# Patient Record
Sex: FEMALE | Race: WHITE | NOT HISPANIC OR LATINO | Employment: OTHER | URBAN - METROPOLITAN AREA
[De-identification: names, ages, dates, MRNs, and addresses within clinical notes are randomized per-mention and may not be internally consistent; named-entity substitution may affect disease eponyms.]

---

## 2017-02-03 ENCOUNTER — OFFICE VISIT (OUTPATIENT)
Dept: FAMILY MEDICINE | Facility: CLINIC | Age: 62
End: 2017-02-03
Payer: COMMERCIAL

## 2017-02-03 VITALS
OXYGEN SATURATION: 97 % | TEMPERATURE: 98 F | BODY MASS INDEX: 22.81 KG/M2 | WEIGHT: 128.75 LBS | DIASTOLIC BLOOD PRESSURE: 62 MMHG | SYSTOLIC BLOOD PRESSURE: 114 MMHG | HEART RATE: 82 BPM | HEIGHT: 63 IN

## 2017-02-03 DIAGNOSIS — J20.9 ACUTE BRONCHITIS, UNSPECIFIED ORGANISM: Primary | ICD-10-CM

## 2017-02-03 DIAGNOSIS — R05.3 PERSISTENT COUGH: ICD-10-CM

## 2017-02-03 DIAGNOSIS — R06.2 WHEEZING: ICD-10-CM

## 2017-02-03 PROCEDURE — 3078F DIAST BP <80 MM HG: CPT | Mod: S$GLB,,, | Performed by: FAMILY MEDICINE

## 2017-02-03 PROCEDURE — 99214 OFFICE O/P EST MOD 30 MIN: CPT | Mod: S$GLB,,, | Performed by: FAMILY MEDICINE

## 2017-02-03 PROCEDURE — 99999 PR PBB SHADOW E&M-EST. PATIENT-LVL III: CPT | Mod: PBBFAC,,, | Performed by: FAMILY MEDICINE

## 2017-02-03 PROCEDURE — 3074F SYST BP LT 130 MM HG: CPT | Mod: S$GLB,,, | Performed by: FAMILY MEDICINE

## 2017-02-03 RX ORDER — ALBUTEROL SULFATE 90 UG/1
2 AEROSOL, METERED RESPIRATORY (INHALATION) 4 TIMES DAILY
Qty: 1 INHALER | Refills: 1 | Status: SHIPPED | OUTPATIENT
Start: 2017-02-03 | End: 2017-04-11 | Stop reason: ALTCHOICE

## 2017-02-03 RX ORDER — HYDROCODONE POLISTIREX AND CHLORPHENIRAMINE POLISTIREX 10; 8 MG/5ML; MG/5ML
5 SUSPENSION, EXTENDED RELEASE ORAL EVERY 12 HOURS PRN
Qty: 115 ML | Refills: 0 | Status: SHIPPED | OUTPATIENT
Start: 2017-02-03 | End: 2017-02-13

## 2017-02-03 RX ORDER — ALBUTEROL SULFATE 0.83 MG/ML
2.5 SOLUTION RESPIRATORY (INHALATION) EVERY 6 HOURS PRN
Qty: 1 BOX | Refills: 0 | Status: SHIPPED | OUTPATIENT
Start: 2017-02-03 | End: 2017-03-06

## 2017-02-03 RX ORDER — AZITHROMYCIN 250 MG/1
TABLET, FILM COATED ORAL
Qty: 6 TABLET | Refills: 0 | Status: SHIPPED | OUTPATIENT
Start: 2017-02-03 | End: 2017-03-06 | Stop reason: ALTCHOICE

## 2017-02-03 NOTE — PROGRESS NOTES
Subjective:       Patient ID: Aysha Moore is a 61 y.o. female.    Chief Complaint: Cough    Cough   This is a new problem. The current episode started 1 to 4 weeks ago. The problem has been gradually worsening. The cough is productive of purulent sputum. Associated symptoms include headaches and shortness of breath. Pertinent negatives include no chest pain, fever or rash. She has tried a beta-agonist inhaler for the symptoms. The treatment provided mild relief.     Review of Systems   Constitutional: Negative for fever.   Respiratory: Positive for cough and shortness of breath.    Cardiovascular: Negative for chest pain.   Gastrointestinal: Positive for diarrhea (resolved now). Negative for abdominal pain and nausea.   Skin: Negative for rash.   Neurological: Positive for headaches.   All other systems reviewed and are negative.      Objective:      Physical Exam   Constitutional: She appears well-developed. No distress.   HENT:   Right Ear: Tympanic membrane is not erythematous.   Left Ear: Tympanic membrane is not erythematous.   Nose: Mucosal edema present. Right sinus exhibits no maxillary sinus tenderness. Left sinus exhibits no maxillary sinus tenderness.   Mouth/Throat: Posterior oropharyngeal erythema present.   Neck: Neck supple.   Cardiovascular: Normal rate and regular rhythm.    No murmur heard.  Pulmonary/Chest: Effort normal. She has wheezes.   Lymphadenopathy:     She has no cervical adenopathy.       Assessment:       1. Acute bronchitis, unspecified organism    2. Wheezing    3. Persistent cough        Plan:         Aysha was seen today for cough.    Diagnoses and all orders for this visit:    Acute bronchitis, unspecified organism  -     azithromycin (Z-ROYAL) 250 MG tablet; As directed on pack  -     albuterol 90 mcg/actuation inhaler; Inhale 2 puffs into the lungs 4 (four) times daily.  -     albuterol (PROVENTIL) 2.5 mg /3 mL (0.083 %) nebulizer solution; Take 3 mLs (2.5 mg total) by  nebulization every 6 (six) hours as needed for Wheezing. Rescue  -     hydrocodone-chlorpheniramine (TUSSIONEX) 10-8 mg/5 mL suspension; Take 5 mLs by mouth every 12 (twelve) hours as needed.    Wheezing  -     azithromycin (Z-ROYAL) 250 MG tablet; As directed on pack  -     albuterol 90 mcg/actuation inhaler; Inhale 2 puffs into the lungs 4 (four) times daily.  -     albuterol (PROVENTIL) 2.5 mg /3 mL (0.083 %) nebulizer solution; Take 3 mLs (2.5 mg total) by nebulization every 6 (six) hours as needed for Wheezing. Rescue  -     hydrocodone-chlorpheniramine (TUSSIONEX) 10-8 mg/5 mL suspension; Take 5 mLs by mouth every 12 (twelve) hours as needed.    Persistent cough  -     azithromycin (Z-ROYAL) 250 MG tablet; As directed on pack  -     albuterol 90 mcg/actuation inhaler; Inhale 2 puffs into the lungs 4 (four) times daily.  -     albuterol (PROVENTIL) 2.5 mg /3 mL (0.083 %) nebulizer solution; Take 3 mLs (2.5 mg total) by nebulization every 6 (six) hours as needed for Wheezing. Rescue  -     hydrocodone-chlorpheniramine (TUSSIONEX) 10-8 mg/5 mL suspension; Take 5 mLs by mouth every 12 (twelve) hours as needed.

## 2017-02-24 ENCOUNTER — TELEPHONE (OUTPATIENT)
Dept: FAMILY MEDICINE | Facility: CLINIC | Age: 62
End: 2017-02-24

## 2017-02-24 NOTE — TELEPHONE ENCOUNTER
----- Message from Dana Murphy sent at 2/24/2017 10:46 AM CST -----  Contact: self [phone]  Please call her regarding a prescription she would like for you to order for her.  Thank you!

## 2017-03-06 ENCOUNTER — DOCUMENTATION ONLY (OUTPATIENT)
Dept: FAMILY MEDICINE | Facility: CLINIC | Age: 62
End: 2017-03-06

## 2017-03-06 ENCOUNTER — OFFICE VISIT (OUTPATIENT)
Dept: FAMILY MEDICINE | Facility: CLINIC | Age: 62
End: 2017-03-06
Payer: COMMERCIAL

## 2017-03-06 VITALS
HEIGHT: 63 IN | TEMPERATURE: 98 F | DIASTOLIC BLOOD PRESSURE: 60 MMHG | OXYGEN SATURATION: 96 % | BODY MASS INDEX: 22.46 KG/M2 | WEIGHT: 126.75 LBS | HEART RATE: 89 BPM | SYSTOLIC BLOOD PRESSURE: 109 MMHG

## 2017-03-06 DIAGNOSIS — J45.909 BRONCHITIS WITH ASTHMA, ACUTE: Primary | ICD-10-CM

## 2017-03-06 DIAGNOSIS — J20.9 BRONCHITIS WITH ASTHMA, ACUTE: Primary | ICD-10-CM

## 2017-03-06 PROCEDURE — 3078F DIAST BP <80 MM HG: CPT | Mod: S$GLB,,, | Performed by: PHYSICIAN ASSISTANT

## 2017-03-06 PROCEDURE — 94640 AIRWAY INHALATION TREATMENT: CPT | Mod: 51,S$GLB,, | Performed by: FAMILY MEDICINE

## 2017-03-06 PROCEDURE — 96372 THER/PROPH/DIAG INJ SC/IM: CPT | Mod: 59,S$GLB,, | Performed by: FAMILY MEDICINE

## 2017-03-06 PROCEDURE — 99213 OFFICE O/P EST LOW 20 MIN: CPT | Mod: 25,S$GLB,, | Performed by: PHYSICIAN ASSISTANT

## 2017-03-06 PROCEDURE — 94642 AEROSOL INHALATION TREATMENT: CPT | Mod: S$GLB,,, | Performed by: PHYSICIAN ASSISTANT

## 2017-03-06 PROCEDURE — 99999 PR PBB SHADOW E&M-EST. PATIENT-LVL III: CPT | Mod: PBBFAC,,, | Performed by: PHYSICIAN ASSISTANT

## 2017-03-06 PROCEDURE — 3074F SYST BP LT 130 MM HG: CPT | Mod: S$GLB,,, | Performed by: PHYSICIAN ASSISTANT

## 2017-03-06 PROCEDURE — 1160F RVW MEDS BY RX/DR IN RCRD: CPT | Mod: S$GLB,,, | Performed by: PHYSICIAN ASSISTANT

## 2017-03-06 RX ORDER — PREDNISONE 10 MG/1
TABLET ORAL
Qty: 12 TABLET | Refills: 0 | Status: SHIPPED | OUTPATIENT
Start: 2017-03-06 | End: 2017-04-11 | Stop reason: ALTCHOICE

## 2017-03-06 RX ORDER — IPRATROPIUM BROMIDE AND ALBUTEROL SULFATE 2.5; .5 MG/3ML; MG/3ML
3 SOLUTION RESPIRATORY (INHALATION)
Status: COMPLETED | OUTPATIENT
Start: 2017-03-06 | End: 2017-03-06

## 2017-03-06 RX ORDER — IPRATROPIUM BROMIDE AND ALBUTEROL SULFATE 2.5; .5 MG/3ML; MG/3ML
3 SOLUTION RESPIRATORY (INHALATION) EVERY 6 HOURS PRN
Qty: 72 ML | Refills: 0 | Status: SHIPPED | OUTPATIENT
Start: 2017-03-06 | End: 2017-04-11 | Stop reason: ALTCHOICE

## 2017-03-06 RX ORDER — AMOXICILLIN AND CLAVULANATE POTASSIUM 875; 125 MG/1; MG/1
1 TABLET, FILM COATED ORAL 2 TIMES DAILY
Qty: 20 TABLET | Refills: 0 | Status: SHIPPED | OUTPATIENT
Start: 2017-03-06 | End: 2017-03-16

## 2017-03-06 RX ADMIN — IPRATROPIUM BROMIDE AND ALBUTEROL SULFATE 3 ML: 2.5; .5 SOLUTION RESPIRATORY (INHALATION) at 04:03

## 2017-03-06 NOTE — PROGRESS NOTES
Subjective:       Patient ID: Aysha Moore is a 61 y.o. female.    Chief Complaint: Cough; Shortness of Breath; and Wheezing    Wheezing    This is a new problem. The current episode started yesterday. The problem occurs constantly. The problem has been gradually worsening. Associated symptoms include chills, coughing, rhinorrhea, shortness of breath, a sore throat and sputum production. Pertinent negatives include no abdominal pain, chest pain, coryza, diarrhea, ear pain, fever, headaches, neck pain, swollen glands or vomiting. The symptoms are aggravated by URIs. She has tried OTC cough suppressant for the symptoms. The treatment provided no relief. There is no history of asthma.     Review of Systems   Constitutional: Positive for chills. Negative for activity change, appetite change, fatigue and fever.   HENT: Positive for congestion, postnasal drip, rhinorrhea and sore throat. Negative for ear discharge, ear pain, hearing loss, nosebleeds, trouble swallowing and voice change.    Eyes: Negative for discharge, redness and visual disturbance.   Respiratory: Positive for cough, sputum production, shortness of breath and wheezing. Negative for chest tightness.    Cardiovascular: Negative for chest pain and leg swelling.   Gastrointestinal: Negative.  Negative for abdominal pain, diarrhea and vomiting.   Musculoskeletal: Negative for neck pain.   Neurological: Negative for headaches.       Objective:      Physical Exam   Constitutional: She appears well-developed and well-nourished. No distress.   HENT:   Head: Normocephalic and atraumatic.   Right Ear: External ear normal.   Left Ear: External ear normal.   Mouth/Throat: Uvula is midline, oropharynx is clear and moist and mucous membranes are normal. No oral lesions. No uvula swelling. No oropharyngeal exudate, posterior oropharyngeal edema or posterior oropharyngeal erythema.   Eyes: Conjunctivae and EOM are normal. Pupils are equal, round, and reactive to  light.   Neck: Normal range of motion. Neck supple. No thyromegaly present.   Cardiovascular: Normal rate, regular rhythm and normal heart sounds.  Exam reveals no gallop and no friction rub.    No murmur heard.  Pulmonary/Chest: Effort normal. No respiratory distress. She has wheezes in the right upper field, the right middle field, the right lower field, the left upper field, the left middle field and the left lower field. She has no rales.   Abdominal: Soft. Bowel sounds are normal. There is no tenderness.   Skin: She is not diaphoretic.       Assessment:       1. Bronchitis with asthma, acute        Plan:       Asyha was seen today for cough, shortness of breath and wheezing.    Diagnoses and all orders for this visit:    Bronchitis with asthma, acute  -     Discontinue: methylPREDNISolone sod suc(PF) 125 mg/2 mL injection 125 mg; Inject 125 mg into the vein one time.  -     albuterol-ipratropium 2.5mg-0.5mg/3mL nebulizer solution 3 mL; Take 3 mLs by nebulization one time.  -     methylPREDNISolone sod suc(PF) 125 mg/2 mL injection 125 mg; Inject 125 mg into the muscle one time.  -     albuterol-ipratropium 2.5mg-0.5mg/3mL (DUO-NEB) 0.5 mg-3 mg(2.5 mg base)/3 mL nebulizer solution; Take 3 mLs by nebulization every 6 (six) hours as needed for Wheezing. Rescue  -     amoxicillin-clavulanate 875-125mg (AUGMENTIN) 875-125 mg per tablet; Take 1 tablet by mouth 2 (two) times daily.  -     predniSONE (DELTASONE) 10 MG tablet; Take 2 pills a day for 3 days then 1 pill a day for 3 days

## 2017-03-06 NOTE — PROGRESS NOTES
Pre-Visit Chart Review  For Appointment Scheduled on 03/06/2017      Health Maintenance Due   Topic Date Due    Colonoscopy  11/29/2012    Influenza Vaccine  08/01/2016    Lipid Panel  10/16/2016

## 2017-03-07 DIAGNOSIS — F32.A DEPRESSION: ICD-10-CM

## 2017-03-07 RX ORDER — CITALOPRAM 20 MG/1
TABLET, FILM COATED ORAL
Qty: 30 TABLET | Refills: 2 | Status: SHIPPED | OUTPATIENT
Start: 2017-03-07 | End: 2017-12-21 | Stop reason: SDUPTHER

## 2017-04-07 ENCOUNTER — DOCUMENTATION ONLY (OUTPATIENT)
Dept: FAMILY MEDICINE | Facility: CLINIC | Age: 62
End: 2017-04-07

## 2017-04-07 NOTE — PROGRESS NOTES
Pre-Visit Chart Review  For Appointment Scheduled on 4-11-17    Health Maintenance Due   Topic Date Due    TETANUS VACCINE  08/20/1973    Colonoscopy  11/29/2012    Influenza Vaccine  08/01/2016    Lipid Panel  10/16/2016

## 2017-04-11 ENCOUNTER — OFFICE VISIT (OUTPATIENT)
Dept: FAMILY MEDICINE | Facility: CLINIC | Age: 62
End: 2017-04-11
Payer: COMMERCIAL

## 2017-04-11 VITALS
OXYGEN SATURATION: 97 % | TEMPERATURE: 98 F | HEIGHT: 63 IN | BODY MASS INDEX: 23.48 KG/M2 | WEIGHT: 132.5 LBS | RESPIRATION RATE: 16 BRPM | SYSTOLIC BLOOD PRESSURE: 123 MMHG | DIASTOLIC BLOOD PRESSURE: 73 MMHG | HEART RATE: 73 BPM

## 2017-04-11 DIAGNOSIS — E78.5 HYPERLIPIDEMIA, UNSPECIFIED HYPERLIPIDEMIA TYPE: ICD-10-CM

## 2017-04-11 DIAGNOSIS — Z12.11 COLON CANCER SCREENING: ICD-10-CM

## 2017-04-11 DIAGNOSIS — F41.9 ANXIETY: ICD-10-CM

## 2017-04-11 DIAGNOSIS — I25.83 CORONARY ARTERY DISEASE DUE TO LIPID RICH PLAQUE: ICD-10-CM

## 2017-04-11 DIAGNOSIS — I10 GOOD HYPERTENSION CONTROL: ICD-10-CM

## 2017-04-11 DIAGNOSIS — Z00.00 ANNUAL PHYSICAL EXAM: Primary | ICD-10-CM

## 2017-04-11 DIAGNOSIS — I25.10 CORONARY ARTERY DISEASE DUE TO LIPID RICH PLAQUE: ICD-10-CM

## 2017-04-11 PROCEDURE — 99396 PREV VISIT EST AGE 40-64: CPT | Mod: S$GLB,,, | Performed by: FAMILY MEDICINE

## 2017-04-11 PROCEDURE — 3078F DIAST BP <80 MM HG: CPT | Mod: S$GLB,,, | Performed by: FAMILY MEDICINE

## 2017-04-11 PROCEDURE — 3074F SYST BP LT 130 MM HG: CPT | Mod: S$GLB,,, | Performed by: FAMILY MEDICINE

## 2017-04-11 PROCEDURE — 99999 PR PBB SHADOW E&M-EST. PATIENT-LVL III: CPT | Mod: PBBFAC,,, | Performed by: FAMILY MEDICINE

## 2017-04-11 RX ORDER — IBUPROFEN 100 MG/5ML
1000 SUSPENSION, ORAL (FINAL DOSE FORM) ORAL DAILY PRN
COMMUNITY
End: 2019-05-28 | Stop reason: ALTCHOICE

## 2017-04-11 RX ORDER — ZINC GLUCONATE 50 MG
50 TABLET ORAL DAILY
COMMUNITY
End: 2017-12-20

## 2017-04-11 NOTE — PROGRESS NOTES
Subjective:       Patient ID: Aysha Moore is a 61 y.o. female.    Chief Complaint: Annual Exam    HPI Comments: 61 year old female in for annual exam.  She was ill earlier this year just before leaving on a cruise and was treated with a zithromax z-pack which helped but did not resolve her symptoms.  She worsened in the last two days of the trip and returned to be seen again and was given augmentin and steroids which did resolve the problem.  She states she was told she had a mild pneumonia but no cxr was done.  She more recently developed a stabbing pain in the throat associated with some congestion but that is improving spontaneously.    Past Medical History:  No date: Allergy  No date: Anxiety  No date: Arthritis  No date: CAD (coronary artery disease)  No date: Chronic back pain  No date: Chronic rhinitis  No date: Hyperlipidemia  No date: Hypertension    Past Surgical History:  No date: BREAST SURGERY      Comment: breast reduction  No date: coranary stent  No date: HYSTERECTOMY      Comment: total    Review of patient's family history indicates:    Cancer                         Mother                      Comment: breast, ovarian, cervical     Heart disease                  Mother                    Cancer                         Father                      Comment: melanoma    Stroke                         Sister                    Heart disease                  Sister                    Cancer                         Sister                      Comment: leukemia    Heart disease                  Brother                   Heart disease                  Maternal Grandmother      No Known Problems              Daughter                  No Known Problems              Son                       No Known Problems              Maternal Grandfather      No Known Problems              Paternal Grandmother      No Known Problems              Paternal Grandfather      Cancer                         Maternal Uncle             Cancer                         Paternal Aunt               Comment: breast    Cancer                         Paternal Uncle            Social History    Marital status:              Spouse name:                       Years of education:                 Number of children:               Social History Main Topics    Smoking status: Former Smoker                                                                Packs/day: 1.00      Years: 0.00           Types: Cigarettes       Quit date: 3/4/2017    Smokeless status: Never Used                        Alcohol use: Yes           0.0 oz/week       0 Standard drinks or equivalent per week       Comment: sometimes    Drug use: No              Sexual activity: Yes               Partners with: Male        Current Outpatient Prescriptions:     alprazolam (XANAX) 0.25 MG tablet, Take 0.25 mg by mouth daily as needed., Disp: , Rfl: 5    ascorbic acid, vitamin C, (VITAMIN C) 1000 MG tablet, Take 1,000 mg by mouth once daily., Disp: , Rfl:     aspirin (ASPIRIN LOW DOSE) 81 MG EC tablet, Take 81 mg by mouth once daily. Every day, Disp: , Rfl:     calcium carbonate-vit D3-min (CALTRATE PLUS) 600 mg calcium- 400 unit Tab, Take 1 tablet by mouth once daily. Every day, Disp: , Rfl:     citalopram (CELEXA) 20 MG tablet, TAKE 1 TABLET (20 MG TOTAL) BY MOUTH ONCE DAILY., Disp: 30 tablet, Rfl: 2    diclofenac (VOLTAREN) 75 MG EC tablet, Take 1 tablet (75 mg total) by mouth 2 (two) times daily. (Patient taking differently: Take 75 mg by mouth 2 (two) times daily as needed. ), Disp: 60 tablet, Rfl: 5    simvastatin (ZOCOR) 40 MG tablet, TAKE 1 TABLET (40 MG TOTAL) BY MOUTH EVERY EVENING, Disp: 90 tablet, Rfl: 0    zinc gluconate 50 mg tablet, Take 50 mg by mouth once daily., Disp: , Rfl:     TETANUS VACCINE due on 08/20/1973-DECLINES  Colonoscopy due on 11/29/2012  Influenza Vaccine due on 08/01/2016-DECLINES  Lipid Panel due on 10/16/2016      Review of Systems    Constitutional: Negative for chills, diaphoresis, fatigue, fever and unexpected weight change.   HENT: Positive for congestion, postnasal drip, rhinorrhea, sore throat and trouble swallowing.    Eyes: Negative for itching and visual disturbance.   Respiratory: Negative for cough, chest tightness, shortness of breath and wheezing.    Cardiovascular: Negative for chest pain, palpitations and leg swelling.   Gastrointestinal: Negative for abdominal pain, blood in stool, constipation, diarrhea, nausea and vomiting.   Genitourinary: Negative for dysuria, frequency, hematuria, menstrual problem, pelvic pain, vaginal bleeding and vaginal discharge.   Musculoskeletal: Negative for arthralgias, back pain, joint swelling and myalgias.   Neurological: Negative for dizziness and headaches.   Hematological: Negative for adenopathy.   Psychiatric/Behavioral: Negative for sleep disturbance. The patient is not nervous/anxious.        Objective:      Physical Exam   Constitutional: She is oriented to person, place, and time. She appears well-developed and well-nourished. No distress.   Good blood pressure control  Patient is normal weight with bmi of 23.5.   Weight is increased by 4lb since last visit of 6/15/16.     HENT:   Head: Normocephalic and atraumatic.   Right Ear: Hearing, external ear and ear canal normal. Tympanic membrane is injected.   Left Ear: Hearing, tympanic membrane, external ear and ear canal normal.   Nose: Mucosal edema and rhinorrhea present. Right sinus exhibits no maxillary sinus tenderness and no frontal sinus tenderness. Left sinus exhibits no maxillary sinus tenderness and no frontal sinus tenderness.   Mouth/Throat: Posterior oropharyngeal erythema present. No oropharyngeal exudate, posterior oropharyngeal edema or tonsillar abscesses.   Eyes: Conjunctivae and EOM are normal. Pupils are equal, round, and reactive to light. Right eye exhibits no discharge. Left eye exhibits no discharge. No scleral  icterus.   Neck: Normal range of motion. Neck supple. No JVD present. No tracheal deviation present. No thyromegaly present.   Cardiovascular: Normal rate, regular rhythm and normal heart sounds.  Exam reveals no gallop and no friction rub.    No murmur heard.  Pulmonary/Chest: Effort normal and breath sounds normal. No respiratory distress. She has no wheezes. She has no rales. She exhibits no tenderness.   Abdominal: Soft. Bowel sounds are normal. She exhibits no distension and no mass. There is no tenderness. There is no rebound and no guarding. No hernia.   Musculoskeletal: Normal range of motion. She exhibits no edema or tenderness.   Lymphadenopathy:     She has no cervical adenopathy.   Neurological: She is alert and oriented to person, place, and time. She has normal reflexes. She displays normal reflexes. No cranial nerve deficit. She exhibits normal muscle tone.   Skin: Skin is warm and dry. No rash noted. She is not diaphoretic. No erythema. No pallor.   Psychiatric: She has a normal mood and affect. Her behavior is normal. Judgment and thought content normal.   Nursing note and vitals reviewed.      Assessment:       1. Annual physical exam    2. Good hypertension control    3. Hyperlipidemia, unspecified hyperlipidemia type    4. Coronary artery disease due to lipid rich plaque    5. Anxiety    6. Colon cancer screening    7. Body mass index (BMI) 23.0-23.9, adult        Plan:       1. Annual physical exam  - Comprehensive metabolic panel; Future  - CBC auto differential; Future  - Lipid panel; Future  - TSH; Future    2. Good hypertension control  No changes needed  - Comprehensive metabolic panel; Future  - CBC auto differential; Future    3. Hyperlipidemia, unspecified hyperlipidemia type  Await lab  - Comprehensive metabolic panel; Future  - Lipid panel; Future    4. Coronary artery disease due to lipid rich plaque    5. Anxiety    6. Colon cancer screening  - Ambulatory referral to  Gastroenterology    7. Body mass index (BMI) 23.0-23.9, adult         Patient readiness: acceptance and barriers:none    During the course of the visit the patient was educated and counseled about the following:     Hypertension:   Medication: no change.  Dietary sodium restriction.  Regular aerobic exercise.    Goals: Hypertension: Reduce Blood Pressure    Did patient meet goals/outcomes: Yes    The following self management tools provided: blood pressure log    Patient Instructions (the written plan) was given to the patient/family.     Time spent with patient: 45 minutes

## 2017-04-11 NOTE — MR AVS SNAPSHOT
[facility]  [address]  [place] [address]  Phone: [phone]  Fax: [phone]                  Aysha Moore   2017 3:20 PM   Office Visit    Description:  Female : 1955   Provider:  Yoni Daniels MD   Department:  [facility]           Reason for Visit     Annual Exam           Diagnoses this Visit        Comments    Annual physical exam    -  Primary     Good hypertension control         Hyperlipidemia, unspecified hyperlipidemia type         Coronary artery disease due to lipid rich plaque         Anxiety         Colon cancer screening                To Do List           Future Appointments        Provider Department Dept Phone    2017 9:00 AM [facility] [phone]      Goals (5 Years of Data)     None      [facility]     [facility] On Call Nurse Care Line -  Assistance  Unless otherwise directed by your provider, please contact [facility], our nurse care line that is available for  assistance.     Registered nurses in the [facility] On Call Center provide: appointment scheduling, clinical advisement, health education, and other advisory services.  Call: [phone] (toll free)               Medications           Message regarding Medications     Verify the changes and/or additions to your medication regime listed below are the same as discussed with your clinician today.  If any of these changes or additions are incorrect, please notify your healthcare provider.        STOP taking these medications     albuterol 90 mcg/actuation inhaler Inhale 2 puffs into the lungs 4 (four) times daily.    albuterol-ipratropium 2.5mg-0.5mg/3mL (DUO-NEB) 0.5 mg-3 mg(2.5 mg base)/3 mL nebulizer solution Take 3 mLs by nebulization every 6 (six) hours as needed for Wheezing. Rescue    predniSONE (DELTASONE) 10 MG tablet Take 2 pills a day for 3 days then 1 pill a day for 3 days    valacyclovir (VALTREX) 500 MG tablet  "TAKE 1 TABLET (500 MG TOTAL) BY MOUTH 2 (TWO) TIMES DAILY.           Verify that the below list of medications is an accurate representation of the medications you are currently taking.  If none reported, the list may be blank. If incorrect, please contact your healthcare provider. Carry this list with you in case of emergency.           Current Medications     alprazolam (XANAX) 0.25 MG tablet Take 0.25 mg by mouth daily as needed.    ascorbic acid, vitamin C, (VITAMIN C) 1000 MG tablet Take 1,000 mg by mouth once daily.    aspirin (ASPIRIN LOW DOSE) 81 MG EC tablet Take 81 mg by mouth once daily. Every day    calcium carbonate-vit D3-min (CALTRATE PLUS) 600 mg calcium- 400 unit Tab Take 1 tablet by mouth once daily. Every day    citalopram (CELEXA) 20 MG tablet TAKE 1 TABLET (20 MG TOTAL) BY MOUTH ONCE DAILY.    diclofenac (VOLTAREN) 75 MG EC tablet Take 1 tablet (75 mg total) by mouth 2 (two) times daily.    simvastatin (ZOCOR) 40 MG tablet TAKE 1 TABLET (40 MG TOTAL) BY MOUTH EVERY EVENING    zinc gluconate 50 mg tablet Take 50 mg by mouth once daily.           Clinical Reference Information           Your Vitals Were     BP Pulse Temp Resp Height Weight    123/73 (BP Location: Right arm, Patient Position: Sitting, BP Method: Automatic) 73 98.1 °F (36.7 °C) (Oral) 16 5' 3" (1.6 m) 60.1 kg (132 lb 7.9 oz)    SpO2 BMI             97% 23.47 kg/m2         Blood Pressure          Most Recent Value    BP  123/73      Allergies as of 4/11/2017     Cephalexin    Doxycycline Monohydrate    Lansoprazole    Lanoxin  [Digoxin]      Immunizations Administered on Date of Encounter - 4/11/2017     None      Orders Placed During Today's Visit      Normal Orders This Visit    Ambulatory referral to Gastroenterology     Future Labs/Procedures Expected by Expires    CBC auto differential  4/11/2017 4/12/2018    Comprehensive metabolic panel  4/11/2017 4/12/2018    Lipid panel  4/11/2017 4/12/2018    TSH  4/11/2017 4/12/2018    "   Language Assistance Services     ATTENTION: Language assistance services are available, free of charge. Please call [phone].      ATENCIÓN: Si habla arpan, tiene a garnica disposición servicios gratuitos de asistencia lingüística. Llame al [phone].     CHÚ Ý: N?u b?n nói Ti?ng Vi?t, có các d?ch v? h? tr? ngôn ng? mi?n phí dành cho b?n. G?i s? [phone].         [facility] complies with applicable Federal civil rights laws and does not discriminate on the basis of race, color, national origin, age, disability, or sex.

## 2017-04-25 ENCOUNTER — TELEPHONE (OUTPATIENT)
Dept: FAMILY MEDICINE | Facility: CLINIC | Age: 62
End: 2017-04-25

## 2017-04-25 NOTE — TELEPHONE ENCOUNTER
----- Message from Rosa Jacob sent at 4/25/2017  9:03 AM CDT -----  Contact: Aysha  Was in [facility] and want to get results from [facility], before [facility].  MRI, and CT were done in hospital. Call back [phone] or cell [phone] thanks!

## 2017-04-26 NOTE — TELEPHONE ENCOUNTER
----- Message from Nano Dior sent at 4/26/2017 10:57 AM CDT -----  Please call patient in reference to her ER visit/[facility], please call patient at [phone].

## 2017-04-26 NOTE — TELEPHONE ENCOUNTER
Patient notified results were negative. She was repeating herself and didn't know her date of birth while on phone with daughter. Went to [facility] er. Was taking Chantix to stop smoking after coming home from cruise with pneumonia. Patient declined follow up with .

## 2017-11-13 ENCOUNTER — TELEPHONE (OUTPATIENT)
Dept: FAMILY MEDICINE | Facility: CLINIC | Age: 62
End: 2017-11-13

## 2017-11-13 ENCOUNTER — DOCUMENTATION ONLY (OUTPATIENT)
Dept: FAMILY MEDICINE | Facility: CLINIC | Age: 62
End: 2017-11-13

## 2017-11-13 NOTE — PROGRESS NOTES
Pre-Visit Chart Review  For Appointment Scheduled on 11-14-17    Health Maintenance Due   Topic Date Due    TETANUS VACCINE  08/20/1973    Colonoscopy  11/29/2012    Influenza Vaccine  08/01/2017    Mammogram  10/19/2017

## 2017-11-13 NOTE — TELEPHONE ENCOUNTER
----- Message from Jenny Valente sent at 11/13/2017 11:53 AM CST -----  Contact: self   Patient want to speak with a nurse regarding scheduling a appointment this evening or in the morning. Patient has a sinus infection and coughing. Please call back at [phone] (home)

## 2017-11-14 ENCOUNTER — OFFICE VISIT (OUTPATIENT)
Dept: FAMILY MEDICINE | Facility: CLINIC | Age: 62
End: 2017-11-14
Attending: FAMILY MEDICINE
Payer: COMMERCIAL

## 2017-11-14 VITALS
HEART RATE: 80 BPM | HEIGHT: 63 IN | TEMPERATURE: 98 F | DIASTOLIC BLOOD PRESSURE: 72 MMHG | BODY MASS INDEX: 23.75 KG/M2 | WEIGHT: 134.06 LBS | SYSTOLIC BLOOD PRESSURE: 134 MMHG | RESPIRATION RATE: 16 BRPM

## 2017-11-14 DIAGNOSIS — J01.00 ACUTE NON-RECURRENT MAXILLARY SINUSITIS: Primary | ICD-10-CM

## 2017-11-14 DIAGNOSIS — M54.42 CHRONIC BILATERAL LOW BACK PAIN WITH LEFT-SIDED SCIATICA: ICD-10-CM

## 2017-11-14 DIAGNOSIS — Z72.0 TOBACCO ABUSE: ICD-10-CM

## 2017-11-14 DIAGNOSIS — Z12.31 ENCOUNTER FOR SCREENING MAMMOGRAM FOR BREAST CANCER: ICD-10-CM

## 2017-11-14 DIAGNOSIS — G89.29 CHRONIC BILATERAL LOW BACK PAIN WITH LEFT-SIDED SCIATICA: ICD-10-CM

## 2017-11-14 PROCEDURE — 99214 OFFICE O/P EST MOD 30 MIN: CPT | Mod: S$GLB,,, | Performed by: FAMILY MEDICINE

## 2017-11-14 PROCEDURE — 99999 PR PBB SHADOW E&M-EST. PATIENT-LVL III: CPT | Mod: PBBFAC,,, | Performed by: FAMILY MEDICINE

## 2017-11-14 RX ORDER — METHYLPREDNISOLONE 4 MG/1
TABLET ORAL
Qty: 1 PACKAGE | Refills: 0 | Status: SHIPPED | OUTPATIENT
Start: 2017-11-14 | End: 2017-12-05

## 2017-11-14 RX ORDER — AMOXICILLIN AND CLAVULANATE POTASSIUM 875; 125 MG/1; MG/1
1 TABLET, FILM COATED ORAL EVERY 12 HOURS
Qty: 20 TABLET | Refills: 0 | Status: SHIPPED | OUTPATIENT
Start: 2017-11-14 | End: 2017-12-20 | Stop reason: ALTCHOICE

## 2017-11-14 RX ORDER — DICLOFENAC SODIUM 75 MG/1
75 TABLET, DELAYED RELEASE ORAL 2 TIMES DAILY
Qty: 60 TABLET | Refills: 5 | Status: SHIPPED | OUTPATIENT
Start: 2017-11-14 | End: 2019-05-28 | Stop reason: SDUPTHER

## 2017-11-14 NOTE — PROGRESS NOTES
Subjective:       Patient ID: Aysha Moore is a 62 y.o. female.    Chief Complaint: Cough    62-year-old female who's been having facial pain and pressure, sore throat and a productive cough for over 2 weeks with low-grade fever and some night sweats.  She has pain in the left side of the face and ear as well as the TMJ area that radiates into the jaw.  She's been around numerous ill grandchildren one of whom was determined to have strep throat this morning who stayed with her last night.  She is interested in getting the low-dose CT scanning has she continues to smoke for more than 15 years more than 30 packs per year and is at the appropriate age.  She is due for a mammogram and is still due for a colonoscopy with previous order placed that her physical in April.    Past Medical History:  No date: Allergy  No date: Anxiety  No date: Arthritis  No date: CAD (coronary artery disease)  No date: Chronic back pain  No date: Chronic rhinitis  No date: Hyperlipidemia  No date: Hypertension    Past Surgical History:  No date: BREAST SURGERY      Comment: breast reduction  No date: coranary stent  No date: HYSTERECTOMY      Comment: total      Current Outpatient Prescriptions:     alprazolam (XANAX) 0.25 MG tablet, Take 0.25 mg by mouth daily as needed., Disp: , Rfl: 5    ascorbic acid, vitamin C, (VITAMIN C) 1000 MG tablet, Take 1,000 mg by mouth once daily., Disp: , Rfl:     aspirin (ASPIRIN LOW DOSE) 81 MG EC tablet, Take 81 mg by mouth once daily. Every day, Disp: , Rfl:     calcium carbonate-vit D3-min (CALTRATE PLUS) 600 mg calcium- 400 unit Tab, Take 1 tablet by mouth once daily. Every day, Disp: , Rfl:     citalopram (CELEXA) 20 MG tablet, TAKE 1 TABLET (20 MG TOTAL) BY MOUTH ONCE DAILY., Disp: 30 tablet, Rfl: 2    simvastatin (ZOCOR) 40 MG tablet, TAKE 1 TABLET (40 MG TOTAL) BY MOUTH EVERY EVENING, Disp: 90 tablet, Rfl: 0    zinc gluconate 50 mg tablet, Take 50 mg by mouth once daily., Disp: , Rfl:      TETANUS VACCINE due on 08/20/1973  Colonoscopy due on 11/29/2012  Influenza Vaccine due on 08/01/2017  Mammogram due on 10/19/2017        Review of Systems   Constitutional: Positive for chills and fever.   HENT: Positive for congestion, postnasal drip, sinus pain, sinus pressure, sore throat and voice change.    Respiratory: Positive for cough. Negative for chest tightness and shortness of breath.    Cardiovascular: Negative for chest pain and palpitations.       Objective:      Physical Exam   Constitutional: She appears well-developed and well-nourished. No distress.   Good blood pressure control, afebrile at this time  Normal weight with BMI 23.7 she is up 1.5 pounds from her physical April 11, 2007   HENT:   Head: Normocephalic and atraumatic.   Right Ear: Hearing, external ear and ear canal normal. Tympanic membrane is injected and bulging. Tympanic membrane is not erythematous. Tympanic membrane mobility is abnormal.   Left Ear: Hearing, external ear and ear canal normal. Tympanic membrane is injected and bulging. Tympanic membrane is not erythematous. Tympanic membrane mobility is abnormal.   Nose: Mucosal edema and rhinorrhea present. Right sinus exhibits maxillary sinus tenderness. Left sinus exhibits maxillary sinus tenderness.   Mouth/Throat: Posterior oropharyngeal edema and posterior oropharyngeal erythema present. No oropharyngeal exudate or tonsillar abscesses.   No TMJ tenderness, no temporal artery tenderness   Eyes: EOM are normal. Pupils are equal, round, and reactive to light.   Neck: Normal range of motion. Neck supple. No JVD present. No tracheal deviation present. No thyromegaly present.   Cardiovascular: Normal rate, regular rhythm and normal heart sounds.  Exam reveals no gallop and no friction rub.    No murmur heard.  Pulmonary/Chest: Effort normal and breath sounds normal. No respiratory distress. She has no wheezes. She exhibits no tenderness.   Lymphadenopathy:     She has no  cervical adenopathy.   Skin: She is not diaphoretic.   Nursing note and vitals reviewed.      Assessment:       1. Acute non-recurrent maxillary sinusitis    2. Chronic bilateral low back pain with left-sided sciatica    3. Encounter for screening mammogram for breast cancer    4. Tobacco abuse        Plan:       1. Acute non-recurrent maxillary sinusitis  - amoxicillin-clavulanate 875-125mg (AUGMENTIN) 875-125 mg per tablet; Take 1 tablet by mouth every 12 (twelve) hours.  Dispense: 20 tablet; Refill: 0  - methylPREDNISolone (MEDROL DOSEPACK) 4 mg tablet; use as directed  Dispense: 1 Package; Refill: 0    2. Chronic bilateral low back pain with left-sided sciatica  Needs refill of all tearing  - diclofenac (VOLTAREN) 75 MG EC tablet; Take 1 tablet (75 mg total) by mouth 2 (two) times daily.  Dispense: 60 tablet; Refill: 5    3. Encounter for screening mammogram for breast cancer  - Mammo Digital Screening Bilat with CAD; Future    4. Tobacco abuse  - CT Chest Lung Screening Low Dose; Future

## 2017-11-16 ENCOUNTER — HOSPITAL ENCOUNTER (OUTPATIENT)
Dept: RADIOLOGY | Facility: HOSPITAL | Age: 62
Discharge: HOME | End: 2017-11-16
Attending: FAMILY MEDICINE
Payer: COMMERCIAL

## 2017-11-16 ENCOUNTER — HOSPITAL ENCOUNTER (OUTPATIENT)
Dept: RADIOLOGY | Facility: HOSPITAL | Age: 62
Discharge: HOME | End: 2017-11-16
Attending: FAMILY MEDICINE

## 2017-11-16 VITALS — WEIGHT: 134 LBS | HEIGHT: 63 IN | BODY MASS INDEX: 23.74 KG/M2

## 2017-11-16 DIAGNOSIS — Z12.31 ENCOUNTER FOR SCREENING MAMMOGRAM FOR BREAST CANCER: ICD-10-CM

## 2017-11-16 DIAGNOSIS — Z72.0 TOBACCO ABUSE: ICD-10-CM

## 2017-11-16 PROCEDURE — 77067 SCR MAMMO BI INCL CAD: CPT | Mod: TC

## 2017-11-16 PROCEDURE — 77063 BREAST TOMOSYNTHESIS BI: CPT | Mod: 26,,, | Performed by: FAMILY MEDICINE

## 2017-11-16 PROCEDURE — 77067 SCR MAMMO BI INCL CAD: CPT | Mod: 26,,, | Performed by: FAMILY MEDICINE

## 2017-11-16 PROCEDURE — 76497 UNLISTED CT PROCEDURE: CPT | Mod: TC

## 2017-12-11 ENCOUNTER — LAB VISIT (OUTPATIENT)
Dept: LAB | Facility: HOSPITAL | Age: 62
End: 2017-12-11
Attending: FAMILY MEDICINE
Payer: COMMERCIAL

## 2017-12-11 ENCOUNTER — TELEPHONE (OUTPATIENT)
Dept: FAMILY MEDICINE | Facility: CLINIC | Age: 62
End: 2017-12-11

## 2017-12-11 DIAGNOSIS — E78.5 HYPERLIPIDEMIA, UNSPECIFIED HYPERLIPIDEMIA TYPE: ICD-10-CM

## 2017-12-11 DIAGNOSIS — R30.0 BURNING WITH URINATION: ICD-10-CM

## 2017-12-11 DIAGNOSIS — R31.9 URINARY TRACT INFECTION WITH HEMATURIA, SITE UNSPECIFIED: ICD-10-CM

## 2017-12-11 DIAGNOSIS — R30.0 BURNING WITH URINATION: Primary | ICD-10-CM

## 2017-12-11 DIAGNOSIS — D64.9 ANEMIA, UNSPECIFIED TYPE: Primary | ICD-10-CM

## 2017-12-11 DIAGNOSIS — Z00.00 ANNUAL PHYSICAL EXAM: ICD-10-CM

## 2017-12-11 DIAGNOSIS — I10 GOOD HYPERTENSION CONTROL: ICD-10-CM

## 2017-12-11 DIAGNOSIS — N39.0 URINARY TRACT INFECTION WITH HEMATURIA, SITE UNSPECIFIED: ICD-10-CM

## 2017-12-11 LAB
ALBUMIN SERPL BCP-MCNC: 3 G/DL
ALP SERPL-CCNC: 53 U/L
ALT SERPL W/O P-5'-P-CCNC: 13 U/L
ANION GAP SERPL CALC-SCNC: 9 MMOL/L
AST SERPL-CCNC: 18 U/L
BACTERIA #/AREA URNS AUTO: ABNORMAL /HPF
BASOPHILS # BLD AUTO: 0.03 K/UL
BASOPHILS NFR BLD: 0.6 %
BILIRUB SERPL-MCNC: 1.4 MG/DL
BILIRUB UR QL STRIP: ABNORMAL
BUN SERPL-MCNC: 19 MG/DL
CALCIUM SERPL-MCNC: 9.9 MG/DL
CHLORIDE SERPL-SCNC: 104 MMOL/L
CHOLEST SERPL-MCNC: 106 MG/DL
CHOLEST/HDLC SERPL: 2.8 {RATIO}
CLARITY UR: CLEAR
CO2 SERPL-SCNC: 27 MMOL/L
COLOR UR: YELLOW
CREAT SERPL-MCNC: 0.8 MG/DL
DIFFERENTIAL METHOD: ABNORMAL
EOSINOPHIL # BLD AUTO: 0.4 K/UL
EOSINOPHIL NFR BLD: 6.7 %
ERYTHROCYTE [DISTWIDTH] IN BLOOD BY AUTOMATED COUNT: 13.9 %
EST. GFR  (AFRICAN AMERICAN): >60 ML/MIN/1.73 M^2
EST. GFR  (NON AFRICAN AMERICAN): >60 ML/MIN/1.73 M^2
GLUCOSE SERPL-MCNC: 116 MG/DL
GLUCOSE UR QL STRIP: NEGATIVE
HCT VFR BLD AUTO: 31.5 %
HDLC SERPL-MCNC: 38 MG/DL
HDLC SERPL: 35.8 %
HGB BLD-MCNC: 10.3 G/DL
HGB UR QL STRIP: ABNORMAL
IMM GRANULOCYTES # BLD AUTO: 0.02 K/UL
IMM GRANULOCYTES NFR BLD AUTO: 0.4 %
KETONES UR QL STRIP: NEGATIVE
LDLC SERPL CALC-MCNC: 50.2 MG/DL
LEUKOCYTE ESTERASE UR QL STRIP: ABNORMAL
LYMPHOCYTES # BLD AUTO: 1 K/UL
LYMPHOCYTES NFR BLD: 19.3 %
MCH RBC QN AUTO: 28.5 PG
MCHC RBC AUTO-ENTMCNC: 32.7 G/DL
MCV RBC AUTO: 87 FL
MICROSCOPIC COMMENT: ABNORMAL
MONOCYTES # BLD AUTO: 0.6 K/UL
MONOCYTES NFR BLD: 10.2 %
NEUTROPHILS # BLD AUTO: 3.4 K/UL
NEUTROPHILS NFR BLD: 62.8 %
NITRITE UR QL STRIP: POSITIVE
NON-SQ EPI CELLS #/AREA URNS AUTO: 1 /HPF
NONHDLC SERPL-MCNC: 68 MG/DL
NRBC BLD-RTO: 0 /100 WBC
PH UR STRIP: 6 [PH] (ref 5–8)
PLATELET # BLD AUTO: 169 K/UL
PMV BLD AUTO: 10.8 FL
POTASSIUM SERPL-SCNC: 3.9 MMOL/L
PROT SERPL-MCNC: 6.3 G/DL
PROT UR QL STRIP: NEGATIVE
RBC # BLD AUTO: 3.61 M/UL
RBC #/AREA URNS AUTO: 1 /HPF (ref 0–4)
SODIUM SERPL-SCNC: 140 MMOL/L
SP GR UR STRIP: 1.02 (ref 1–1.03)
SQUAMOUS #/AREA URNS AUTO: 2 /HPF
TRIGL SERPL-MCNC: 89 MG/DL
TSH SERPL DL<=0.005 MIU/L-ACNC: 0.53 UIU/ML
URN SPEC COLLECT METH UR: ABNORMAL
UROBILINOGEN UR STRIP-ACNC: ABNORMAL EU/DL
WBC # BLD AUTO: 5.4 K/UL
WBC #/AREA URNS AUTO: 24 /HPF (ref 0–5)
WBC CLUMPS UR QL AUTO: ABNORMAL
YEAST UR QL AUTO: ABNORMAL

## 2017-12-11 PROCEDURE — 84443 ASSAY THYROID STIM HORMONE: CPT

## 2017-12-11 PROCEDURE — 80061 LIPID PANEL: CPT

## 2017-12-11 PROCEDURE — 81000 URINALYSIS NONAUTO W/SCOPE: CPT | Mod: PO

## 2017-12-11 PROCEDURE — 36415 COLL VENOUS BLD VENIPUNCTURE: CPT | Mod: PO

## 2017-12-11 PROCEDURE — 80053 COMPREHEN METABOLIC PANEL: CPT

## 2017-12-11 PROCEDURE — 87186 SC STD MICRODIL/AGAR DIL: CPT

## 2017-12-11 PROCEDURE — 87088 URINE BACTERIA CULTURE: CPT

## 2017-12-11 PROCEDURE — 87086 URINE CULTURE/COLONY COUNT: CPT

## 2017-12-11 PROCEDURE — 85025 COMPLETE CBC W/AUTO DIFF WBC: CPT

## 2017-12-11 PROCEDURE — 87077 CULTURE AEROBIC IDENTIFY: CPT

## 2017-12-11 RX ORDER — NITROFURANTOIN 25; 75 MG/1; MG/1
100 CAPSULE ORAL 2 TIMES DAILY
Qty: 14 CAPSULE | Refills: 0 | Status: SHIPPED | OUTPATIENT
Start: 2017-12-11 | End: 2017-12-20 | Stop reason: ALTCHOICE

## 2017-12-11 NOTE — TELEPHONE ENCOUNTER
----- Message from Saray Sung sent at 12/11/2017  9:33 AM CST -----  Contact: self  Patient [phone] started with a urinary tract infection this past Saturday 12 09 17 and is asking if Dr Daniels would just call in a prescription for this to her pharmacy/blood in her urine/lower abdominal/pain when urinating/frequency of urinating/     [id number] [place] [facility] [address] [place] [address]  Phone: [phone] Fax: [phone]

## 2017-12-11 NOTE — TELEPHONE ENCOUNTER
Patient will come in today for ua and culture- urinary frequency, burning and abdominal pain- blood in urine- afebrile.

## 2017-12-11 NOTE — TELEPHONE ENCOUNTER
The urine is positive, microscopic exam and culture is pending.  Last kidney function we have is good, todays results not yet available.  Allergic to keflex and doxycycline.   [facility] [place]

## 2017-12-12 ENCOUNTER — TELEPHONE (OUTPATIENT)
Dept: FAMILY MEDICINE | Facility: CLINIC | Age: 62
End: 2017-12-12

## 2017-12-12 NOTE — TELEPHONE ENCOUNTER
Patient informed. Patient was not fasting for testing. She has sugar and cream in coffee. She will  hemocult cards with copy of results. Lab was booked 12-13-17

## 2017-12-12 NOTE — TELEPHONE ENCOUNTER
----- Message from Yoni Daniels MD sent at 12/11/2017  8:20 PM CST -----  The thyroid level is normal.    The blood glucose on the chemistry panel is in the prediabetic range.  Was this fasting?  The albumin is low suggesting possible protein deficiency.    The cholesterol levels look good with no changes needed on the simvastatin.    There is a moderate anemia with hemoglobin down one third from the level of 2 years ago.    Recommend Hemoccults ×3, iron levels, ferritin, B12 and folate, reticulocyte count.  Orders are in.

## 2017-12-13 ENCOUNTER — LAB VISIT (OUTPATIENT)
Dept: LAB | Facility: HOSPITAL | Age: 62
End: 2017-12-13
Attending: FAMILY MEDICINE
Payer: COMMERCIAL

## 2017-12-13 ENCOUNTER — IMMUNIZATION (OUTPATIENT)
Dept: FAMILY MEDICINE | Facility: CLINIC | Age: 62
End: 2017-12-13
Payer: COMMERCIAL

## 2017-12-13 DIAGNOSIS — D64.9 ANEMIA, UNSPECIFIED TYPE: ICD-10-CM

## 2017-12-13 LAB
BACTERIA UR CULT: NORMAL
FERRITIN SERPL-MCNC: 186 NG/ML
FOLATE SERPL-MCNC: 8.8 NG/ML
IRON SERPL-MCNC: 94 UG/DL
RETICS/RBC NFR AUTO: 1 %
SATURATED IRON: 30 %
TOTAL IRON BINDING CAPACITY: 318 UG/DL
TRANSFERRIN SERPL-MCNC: 215 MG/DL
VIT B12 SERPL-MCNC: 346 PG/ML

## 2017-12-13 PROCEDURE — 36415 COLL VENOUS BLD VENIPUNCTURE: CPT | Mod: PO

## 2017-12-13 PROCEDURE — 90686 IIV4 VACC NO PRSV 0.5 ML IM: CPT | Mod: S$GLB,,, | Performed by: INTERNAL MEDICINE

## 2017-12-13 PROCEDURE — 82607 VITAMIN B-12: CPT

## 2017-12-13 PROCEDURE — 82728 ASSAY OF FERRITIN: CPT

## 2017-12-13 PROCEDURE — 83540 ASSAY OF IRON: CPT

## 2017-12-13 PROCEDURE — 82746 ASSAY OF FOLIC ACID SERUM: CPT

## 2017-12-13 PROCEDURE — 90471 IMMUNIZATION ADMIN: CPT | Mod: S$GLB,,, | Performed by: INTERNAL MEDICINE

## 2017-12-13 PROCEDURE — 85045 AUTOMATED RETICULOCYTE COUNT: CPT

## 2017-12-13 NOTE — PROGRESS NOTES
Two person identification name, d.o.b with verbal feedback.  Aseptic technique used. Administration influenza quadrivalent PF  vaccine on R deltoid.  Tolerated well.  VIS 8/7/15  given/mp

## 2017-12-20 ENCOUNTER — DOCUMENTATION ONLY (OUTPATIENT)
Dept: FAMILY MEDICINE | Facility: CLINIC | Age: 62
End: 2017-12-20

## 2017-12-20 ENCOUNTER — HOSPITAL ENCOUNTER (OUTPATIENT)
Dept: RADIOLOGY | Facility: CLINIC | Age: 62
Discharge: HOME | End: 2017-12-20
Attending: FAMILY MEDICINE
Payer: COMMERCIAL

## 2017-12-20 ENCOUNTER — OFFICE VISIT (OUTPATIENT)
Dept: FAMILY MEDICINE | Facility: CLINIC | Age: 62
End: 2017-12-20
Attending: FAMILY MEDICINE
Payer: COMMERCIAL

## 2017-12-20 VITALS
SYSTOLIC BLOOD PRESSURE: 108 MMHG | DIASTOLIC BLOOD PRESSURE: 62 MMHG | HEART RATE: 94 BPM | HEIGHT: 63 IN | WEIGHT: 128.31 LBS | TEMPERATURE: 98 F | BODY MASS INDEX: 22.73 KG/M2 | OXYGEN SATURATION: 92 % | RESPIRATION RATE: 16 BRPM

## 2017-12-20 DIAGNOSIS — J32.0 CHRONIC MAXILLARY SINUSITIS: Primary | ICD-10-CM

## 2017-12-20 DIAGNOSIS — J18.9 PNEUMONIA OF RIGHT LOWER LOBE DUE TO INFECTIOUS ORGANISM: ICD-10-CM

## 2017-12-20 DIAGNOSIS — R93.89 ABNORMAL CXR: Primary | ICD-10-CM

## 2017-12-20 LAB
CTP QC/QA: YES
FECAL OCCULT BLOOD, POC: NEGATIVE

## 2017-12-20 PROCEDURE — 71020 XR CHEST PA AND LATERAL: CPT | Mod: TC,PO

## 2017-12-20 PROCEDURE — 99999 PR PBB SHADOW E&M-EST. PATIENT-LVL IV: CPT | Mod: PBBFAC,,, | Performed by: FAMILY MEDICINE

## 2017-12-20 PROCEDURE — 71020 XR CHEST PA AND LATERAL: CPT | Mod: 26,,, | Performed by: RADIOLOGY

## 2017-12-20 PROCEDURE — 99214 OFFICE O/P EST MOD 30 MIN: CPT | Mod: S$GLB,,, | Performed by: FAMILY MEDICINE

## 2017-12-20 RX ORDER — PREDNISONE 20 MG/1
TABLET ORAL
Qty: 8 TABLET | Refills: 0 | Status: SHIPPED | OUTPATIENT
Start: 2017-12-20 | End: 2018-03-27 | Stop reason: ALTCHOICE

## 2017-12-20 RX ORDER — IPRATROPIUM BROMIDE AND ALBUTEROL SULFATE 2.5; .5 MG/3ML; MG/3ML
3 SOLUTION RESPIRATORY (INHALATION) EVERY 6 HOURS PRN
COMMUNITY
Start: 2017-03-06 | End: 2018-03-27 | Stop reason: SDUPTHER

## 2017-12-20 RX ORDER — LEVOFLOXACIN 750 MG/1
750 TABLET ORAL DAILY
Qty: 14 TABLET | Refills: 0 | Status: SHIPPED | OUTPATIENT
Start: 2017-12-20 | End: 2018-03-27 | Stop reason: ALTCHOICE

## 2017-12-20 NOTE — PROGRESS NOTES
Pre-Visit Chart Review  For Appointment Scheduled on 12-20-17    Health Maintenance Due   Topic Date Due    TETANUS VACCINE  08/20/1973    Colonoscopy  11/29/2012

## 2017-12-20 NOTE — PROGRESS NOTES
Subjective:       Patient ID: Aysha Moore is a 62 y.o. female.    Chief Complaint: Fever and URI    62-year-old female previously treated November 14 with Augmentin and a Medrol pack for maxillary sinusitis on the right side.  She reports that she initially got better but after completing the antibiotics her symptoms returned and she is having worsening cough, shortness of breath, and chest tightness.  She's also had a low-grade fever and just yesterday developed a generalized papular rash over the extremities and trunk.  This started well after completion of the penicillin is not felt to be related.  She has been using her DuoNeb nebulizer which is giving her some relief of the cough and shortness of breath.    Past Medical History:  No date: Allergy  No date: Anxiety  No date: Arthritis  No date: CAD (coronary artery disease)  No date: Chronic back pain  No date: Chronic rhinitis  No date: Hyperlipidemia  No date: Hypertension    Past Surgical History:  No date: BREAST SURGERY      Comment: breast reduction  No date: coranary stent  No date: HYSTERECTOMY      Comment: total      Current Outpatient Prescriptions:     albuterol-ipratropium 2.5mg-0.5mg/3mL (DUO-NEB) 0.5 mg-3 mg(2.5 mg base)/3 mL nebulizer solution, Take 3 mLs by nebulization every 6 (six) hours as needed for Wheezing. Rescue, Disp: , Rfl:     ascorbic acid, vitamin C, (VITAMIN C) 1000 MG tablet, Take 1,000 mg by mouth once daily., Disp: , Rfl:     aspirin (ASPIRIN LOW DOSE) 81 MG EC tablet, Take 81 mg by mouth once daily. Every day, Disp: , Rfl:     calcium carbonate-vit D3-min (CALTRATE PLUS) 600 mg calcium- 400 unit Tab, Take 1 tablet by mouth once daily. Every day, Disp: , Rfl:     citalopram (CELEXA) 20 MG tablet, TAKE 1 TABLET (20 MG TOTAL) BY MOUTH ONCE DAILY., Disp: 30 tablet, Rfl: 2    diclofenac (VOLTAREN) 75 MG EC tablet, Take 1 tablet (75 mg total) by mouth 2 (two) times daily., Disp: 60 tablet, Rfl: 5    simvastatin (ZOCOR) 40  MG tablet, TAKE 1 TABLET (40 MG TOTAL) BY MOUTH EVERY EVENING, Disp: 90 tablet, Rfl: 0    alprazolam (XANAX) 0.25 MG tablet, Take 0.25 mg by mouth daily as needed., Disp: , Rfl: 5    TETANUS VACCINE due on 08/20/1973  Colonoscopy due on 11/29/2012        Review of Systems   Constitutional: Positive for fever. Negative for chills and diaphoresis.   HENT: Positive for congestion, rhinorrhea, sinus pain, sinus pressure and sore throat ([place]). Negative for trouble swallowing and voice change.    Respiratory: Positive for cough, chest tightness, shortness of breath, wheezing and stridor.    Cardiovascular: Negative for chest pain and palpitations.   Gastrointestinal: Negative for nausea and vomiting.   Neurological: Positive for headaches. Negative for dizziness and light-headedness.       Objective:      Physical Exam   Constitutional: She is oriented to person, place, and time. She appears well-developed and well-nourished. She appears distressed.   Good blood pressure, normal pulse with regular rhythm, afebrile at this time, mild hypoxia with oxygen saturation of 92%  Normal weight with BMI 22.7.  She is down 5.7 pounds since the previous visit November 14, 2017  She is coughing frequently with some stridorous noise from the upper airway   HENT:   Head: Normocephalic and atraumatic.   Right Ear: Hearing, tympanic membrane, external ear and ear canal normal.   Left Ear: Hearing, tympanic membrane, external ear and ear canal normal.   Nose: Mucosal edema (Mild turbinate swelling and minimal erythema on the right side but much improved from November 14) and rhinorrhea present. Right sinus exhibits no maxillary sinus tenderness and no frontal sinus tenderness. Left sinus exhibits no maxillary sinus tenderness and no frontal sinus tenderness.   Eyes: EOM are normal. Pupils are equal, round, and reactive to light. No scleral icterus.   Neck: Normal range of motion. Neck supple. No JVD present. No tracheal deviation  present. No thyromegaly present.   Cardiovascular: Normal rate, regular rhythm and normal heart sounds.  Exam reveals no gallop and no friction rub.    No murmur heard.  Pulmonary/Chest: No accessory muscle usage. No tachypnea. She has decreased breath sounds in the right lower field and the left lower field. She has no wheezes. She has no rhonchi. She has rales in the right lower field. Chest wall is dull to percussion (Slight dullness right subscapular area relative to left). She exhibits no crepitus, no deformity and no retraction.   Lymphadenopathy:     She has no cervical adenopathy.   Neurological: She is alert and oriented to person, place, and time.   Skin: She is not diaphoretic.   Psychiatric: She has a normal mood and affect. Her behavior is normal. Thought content normal.   Nursing note and vitals reviewed.      Assessment:       1. Chronic maxillary sinusitis    2. Pneumonia of right lower lobe due to infectious organism        Plan:       1. Chronic maxillary sinusitis  Appears improved but not entirely resolved after treatment November 14, 2017  - levoFLOXacin (LEVAQUIN) 750 MG tablet; Take 1 tablet (750 mg total) by mouth once daily.  Dispense: 14 tablet; Refill: 0  - predniSONE (DELTASONE) 20 MG tablet; Take two a day for three days then one a day for day 4 and 5  Dispense: 8 tablet; Refill: 0    2. Pneumonia of right lower lobe due to infectious organism  Suspect mild right lower lobe pneumonia due to mild dullness and mild hypoxia.  Patient cardiovascular status is stable and not currently febrile.  Will check chest x-ray but suspect she can be successfully treated outpatient.  - X-Ray Chest PA And Lateral; Future  - levoFLOXacin (LEVAQUIN) 750 MG tablet; Take 1 tablet (750 mg total) by mouth once daily.  Dispense: 14 tablet; Refill: 0  - predniSONE (DELTASONE) 20 MG tablet; Take two a day for three days then one a day for day 4 and 5  Dispense: 8 tablet; Refill: 0

## 2017-12-21 RX ORDER — CITALOPRAM 20 MG/1
20 TABLET, FILM COATED ORAL DAILY
Qty: 30 TABLET | Refills: 5 | Status: SHIPPED | OUTPATIENT
Start: 2017-12-21 | End: 2018-06-21 | Stop reason: SDUPTHER

## 2018-01-03 ENCOUNTER — HOSPITAL ENCOUNTER (OUTPATIENT)
Dept: RADIOLOGY | Facility: CLINIC | Age: 63
Discharge: HOME | End: 2018-01-03
Attending: FAMILY MEDICINE
Payer: COMMERCIAL

## 2018-01-03 DIAGNOSIS — R93.89 ABNORMAL CXR: ICD-10-CM

## 2018-01-03 PROCEDURE — 71046 X-RAY EXAM CHEST 2 VIEWS: CPT | Mod: FY,,, | Performed by: RADIOLOGY

## 2018-01-03 PROCEDURE — 71046 X-RAY EXAM CHEST 2 VIEWS: CPT | Mod: TC,FY,PO

## 2018-01-10 ENCOUNTER — TELEPHONE (OUTPATIENT)
Dept: FAMILY MEDICINE | Facility: CLINIC | Age: 63
End: 2018-01-10

## 2018-01-10 NOTE — TELEPHONE ENCOUNTER
----- Message from RT Farooq sent at 1/10/2018  2:51 PM CST -----  Contact: pt   pt , requesting medication refill: Celexa, thanks.

## 2018-01-30 ENCOUNTER — TELEPHONE (OUTPATIENT)
Dept: FAMILY MEDICINE | Facility: CLINIC | Age: 63
End: 2018-01-30

## 2018-01-30 DIAGNOSIS — Z20.828 EXPOSURE TO THE FLU: Primary | ICD-10-CM

## 2018-01-30 RX ORDER — OSELTAMIVIR PHOSPHATE 75 MG/1
75 CAPSULE ORAL DAILY
Qty: 10 CAPSULE | Refills: 0 | Status: SHIPPED | OUTPATIENT
Start: 2018-01-30 | End: 2018-02-09

## 2018-01-30 NOTE — TELEPHONE ENCOUNTER
----- Message from Alyssa Arriaza sent at 1/30/2018  9:31 AM CST -----  Contact: Patient  Aysha, patient [phone], calling to get a Rx for Tamiflu. She is keeping her grandchild who has the flu. Please advise. Thanks.   [facility]/pharmacy #[id number] - [address] [place] [address]  Phone: [phone] Fax: [phone]

## 2018-02-02 ENCOUNTER — TELEPHONE (OUTPATIENT)
Dept: FAMILY MEDICINE | Facility: CLINIC | Age: 63
End: 2018-02-02

## 2018-02-02 NOTE — TELEPHONE ENCOUNTER
----- Message from Cate Mao sent at 2/2/2018 12:14 PM CST -----  Contact: [phone] or cell [phone]  Patient is requesting a call back from the nurse stated she need a copy of lab results to be sent to dr jesus lees office, she have an appt on Monday and they haven't received labs yet.     Please call the patient upon request at phone number [phone] or [phone].

## 2018-02-12 DIAGNOSIS — B00.1 FEVER BLISTER: Primary | ICD-10-CM

## 2018-02-12 NOTE — TELEPHONE ENCOUNTER
----- Message from Sanna Hightower sent at 2/12/2018 11:16 AM CST -----  Contact: self   Patient is needing a refill on medication Valtrex       [facility]/pharmacy [id number] - [place] - [address]  [address] [place] [address]  Phone: [phone] Fax: [phone]

## 2018-02-19 RX ORDER — VALACYCLOVIR HYDROCHLORIDE 1 G/1
TABLET, FILM COATED ORAL
Qty: 20 TABLET | Refills: 5 | Status: SHIPPED | OUTPATIENT
Start: 2018-02-19 | End: 2020-04-07 | Stop reason: SDUPTHER

## 2018-03-26 ENCOUNTER — DOCUMENTATION ONLY (OUTPATIENT)
Dept: FAMILY MEDICINE | Facility: CLINIC | Age: 63
End: 2018-03-26

## 2018-03-26 NOTE — PROGRESS NOTES
Pre-Visit Chart Review  For Appointment Scheduled on 3-27-18    Health Maintenance Due   Topic Date Due    TETANUS VACCINE  08/20/1973    Colonoscopy  11/29/2012

## 2018-03-27 ENCOUNTER — OFFICE VISIT (OUTPATIENT)
Dept: FAMILY MEDICINE | Facility: CLINIC | Age: 63
End: 2018-03-27
Attending: FAMILY MEDICINE
Payer: COMMERCIAL

## 2018-03-27 VITALS
BODY MASS INDEX: 22.35 KG/M2 | SYSTOLIC BLOOD PRESSURE: 120 MMHG | DIASTOLIC BLOOD PRESSURE: 70 MMHG | OXYGEN SATURATION: 95 % | HEIGHT: 63 IN | WEIGHT: 126.13 LBS | TEMPERATURE: 98 F | RESPIRATION RATE: 12 BRPM | HEART RATE: 80 BPM

## 2018-03-27 DIAGNOSIS — J31.0 CHRONIC RHINITIS, UNSPECIFIED TYPE: ICD-10-CM

## 2018-03-27 DIAGNOSIS — J45.21 MILD INTERMITTENT REACTIVE AIRWAY DISEASE WITH ACUTE EXACERBATION: ICD-10-CM

## 2018-03-27 DIAGNOSIS — J32.0 CHRONIC MAXILLARY SINUSITIS: Primary | ICD-10-CM

## 2018-03-27 PROCEDURE — 99214 OFFICE O/P EST MOD 30 MIN: CPT | Mod: S$GLB,,, | Performed by: FAMILY MEDICINE

## 2018-03-27 PROCEDURE — 99999 PR PBB SHADOW E&M-EST. PATIENT-LVL III: CPT | Mod: PBBFAC,,, | Performed by: FAMILY MEDICINE

## 2018-03-27 RX ORDER — AMOXICILLIN AND CLAVULANATE POTASSIUM 875; 125 MG/1; MG/1
1 TABLET, FILM COATED ORAL EVERY 12 HOURS
Qty: 30 TABLET | Refills: 0 | Status: SHIPPED | OUTPATIENT
Start: 2018-03-27 | End: 2019-01-23

## 2018-03-27 RX ORDER — MONTELUKAST SODIUM 10 MG/1
10 TABLET ORAL NIGHTLY
Qty: 30 TABLET | Refills: 5 | Status: SHIPPED | OUTPATIENT
Start: 2018-03-27 | End: 2018-09-13 | Stop reason: SDUPTHER

## 2018-03-27 RX ORDER — IPRATROPIUM BROMIDE AND ALBUTEROL SULFATE 2.5; .5 MG/3ML; MG/3ML
3 SOLUTION RESPIRATORY (INHALATION) EVERY 6 HOURS PRN
Qty: 300 ML | Refills: 5 | Status: SHIPPED | OUTPATIENT
Start: 2018-03-27 | End: 2022-01-12 | Stop reason: SDUPTHER

## 2018-03-27 NOTE — PROGRESS NOTES
Subjective:       Patient ID: Aysha Moore is a 62 y.o. female.    Chief Complaint: Cough and Sinus Problem (congestion)    60-year-old female with chronic respiratory congestion head and chest is been going on since prior to November.  She's completed 1 course of Augmentin and another of Levaquin both time she relapsed within a few days of stopping the antibiotic.  Sputum is green and she has chest tightness and wheezing at times.  She's been using DuoNeb but is out of that needs a refill.  She has frontal pressure type headaches but no fever at present.    Past Medical History:  No date: Allergy  No date: Anxiety  No date: Arthritis  No date: CAD (coronary artery disease)  No date: Chronic back pain  No date: Chronic rhinitis  No date: Hyperlipidemia  No date: Hypertension    Past Surgical History:  No date: BREAST SURGERY      Comment: breast reduction  No date: coranary stent  No date: HYSTERECTOMY      Comment: total      Current Outpatient Prescriptions:     albuterol-ipratropium 2.5mg-0.5mg/3mL (DUO-NEB) 0.5 mg-3 mg(2.5 mg base)/3 mL nebulizer solution, Take 3 mLs by nebulization every 6 (six) hours as needed for Wheezing. Rescue, Disp: 300 mL, Rfl: 5    alprazolam (XANAX) 0.25 MG tablet, Take 0.25 mg by mouth daily as needed., Disp: , Rfl: 5    ascorbic acid, vitamin C, (VITAMIN C) 1000 MG tablet, Take 1,000 mg by mouth daily as needed. , Disp: , Rfl:     aspirin (ASPIRIN LOW DOSE) 81 MG EC tablet, Take 81 mg by mouth once daily. Every day, Disp: , Rfl:     calcium carbonate-vit D3-min (CALTRATE PLUS) 600 mg calcium- 400 unit Tab, Take 1 tablet by mouth once daily. Every day, Disp: , Rfl:     citalopram (CELEXA) 20 MG tablet, Take 1 tablet (20 mg total) by mouth once daily., Disp: 30 tablet, Rfl: 5    diclofenac (VOLTAREN) 75 MG EC tablet, Take 1 tablet (75 mg total) by mouth 2 (two) times daily. (Patient taking differently: Take 75 mg by mouth 2 (two) times daily as needed. ), Disp: 60 tablet,  Rfl: 5    simvastatin (ZOCOR) 40 MG tablet, TAKE 1 TABLET (40 MG TOTAL) BY MOUTH EVERY EVENING, Disp: 90 tablet, Rfl: 0    valACYclovir (VALTREX) 1000 MG tablet, Take 2 at onset of fever blisters then repeat in 12 hours (total 4 tabs per episode), Disp: 20 tablet, Rfl: 5    TETANUS VACCINE due on 08/20/1973  Colonoscopy due on 11/29/2012        Review of Systems   Constitutional: Negative for chills and fever.   HENT: Positive for congestion, postnasal drip, rhinorrhea, sinus pain, sinus pressure, sore throat and voice change.    Respiratory: Positive for cough, chest tightness, shortness of breath, wheezing and stridor.    Cardiovascular: Negative for chest pain and palpitations.       Objective:      Physical Exam   Constitutional: She appears well-developed and well-nourished. No distress.   Good blood pressure control  Normal weight with BMI 22.3 she is down 2.2 pounds since December 20, 2017  She is coughing frequently and repeatedly with mild hoarseness   HENT:   Head: Normocephalic and atraumatic.   Right Ear: Hearing, external ear and ear canal normal. Tympanic membrane is injected, erythematous and bulging. Tympanic membrane mobility is normal.   Left Ear: Hearing, external ear and ear canal normal. Tympanic membrane is injected and bulging. Tympanic membrane is not erythematous. Tympanic membrane mobility is abnormal.   Nose: Mucosal edema and rhinorrhea present. Right sinus exhibits maxillary sinus tenderness. Left sinus exhibits maxillary sinus tenderness and frontal sinus tenderness.   Eyes: EOM are normal. Pupils are equal, round, and reactive to light. No scleral icterus.   Neck: Normal range of motion. Neck supple. No JVD present. No tracheal deviation present. No thyromegaly present.   Cardiovascular: Normal rate, regular rhythm and normal heart sounds.  Exam reveals no gallop and no friction rub.    No murmur heard.  Pulmonary/Chest: She has no decreased breath sounds. She has no wheezes. She has  no rhonchi. She has no rales. Chest wall is not dull to percussion. She exhibits no crepitus and no retraction.   Lymphadenopathy:     She has cervical adenopathy.   Skin: She is not diaphoretic.   Nursing note and vitals reviewed.      Assessment:       1. Chronic maxillary sinusitis    2. Chronic rhinitis, unspecified type    3. Mild intermittent reactive airway disease with acute exacerbation        Plan:       1. Chronic maxillary sinusitis  - amoxicillin-clavulanate 875-125mg (AUGMENTIN) 875-125 mg per tablet; Take 1 tablet by mouth every 12 (twelve) hours.  Dispense: 30 tablet; Refill: 0    2. Chronic rhinitis, unspecified type  - montelukast (SINGULAIR) 10 mg tablet; Take 1 tablet (10 mg total) by mouth every evening.  Dispense: 30 tablet; Refill: 5    3. Mild intermittent reactive airway disease with acute exacerbation  - albuterol-ipratropium 2.5mg-0.5mg/3mL (DUO-NEB) 0.5 mg-3 mg(2.5 mg base)/3 mL nebulizer solution; Take 3 mLs by nebulization every 6 (six) hours as needed for Wheezing. Rescue  Dispense: 300 mL; Refill: 5

## 2018-04-25 ENCOUNTER — TELEPHONE (OUTPATIENT)
Dept: FAMILY MEDICINE | Facility: CLINIC | Age: 63
End: 2018-04-25

## 2018-04-25 ENCOUNTER — LAB VISIT (OUTPATIENT)
Dept: LAB | Facility: HOSPITAL | Age: 63
End: 2018-04-25
Attending: FAMILY MEDICINE
Payer: COMMERCIAL

## 2018-04-25 DIAGNOSIS — R31.9 HEMATURIA, UNSPECIFIED TYPE: ICD-10-CM

## 2018-04-25 DIAGNOSIS — R31.9 HEMATURIA, UNSPECIFIED TYPE: Primary | ICD-10-CM

## 2018-04-25 DIAGNOSIS — R39.15 URGENCY OF URINATION: ICD-10-CM

## 2018-04-25 LAB
BACTERIA #/AREA URNS AUTO: ABNORMAL /HPF
BILIRUB UR QL STRIP: NEGATIVE
CLARITY UR REFRACT.AUTO: CLEAR
COLOR UR AUTO: ABNORMAL
GLUCOSE UR QL STRIP: NEGATIVE
HGB UR QL STRIP: ABNORMAL
KETONES UR QL STRIP: NEGATIVE
LEUKOCYTE ESTERASE UR QL STRIP: ABNORMAL
MICROSCOPIC COMMENT: ABNORMAL
NITRITE UR QL STRIP: POSITIVE
NON-SQ EPI CELLS #/AREA URNS AUTO: 2 /HPF
PH UR STRIP: 5 [PH] (ref 5–8)
PROT UR QL STRIP: NEGATIVE
RBC #/AREA URNS AUTO: 3 /HPF (ref 0–4)
SP GR UR STRIP: 1.01 (ref 1–1.03)
SQUAMOUS #/AREA URNS AUTO: 1 /HPF
URN SPEC COLLECT METH UR: ABNORMAL
UROBILINOGEN UR STRIP-ACNC: 2 EU/DL
WBC #/AREA URNS AUTO: 35 /HPF (ref 0–5)
WBC CLUMPS UR QL AUTO: ABNORMAL

## 2018-04-25 PROCEDURE — 81001 URINALYSIS AUTO W/SCOPE: CPT

## 2018-04-25 PROCEDURE — 87186 SC STD MICRODIL/AGAR DIL: CPT

## 2018-04-25 PROCEDURE — 87086 URINE CULTURE/COLONY COUNT: CPT

## 2018-04-25 PROCEDURE — 87088 URINE BACTERIA CULTURE: CPT

## 2018-04-25 PROCEDURE — 87077 CULTURE AEROBIC IDENTIFY: CPT

## 2018-04-25 NOTE — TELEPHONE ENCOUNTER
----- Message from Nano Barby sent at 4/25/2018  9:25 AM CDT -----  [facility][phone].  Please call when called in at [phone].

## 2018-04-25 NOTE — TELEPHONE ENCOUNTER
Spoke to patient notified to come in for ua and culture tomorrow so azo does not affect result.  Patient requested to come in today because she is pain and needs to take azo tonight.   Appointment scheduled for today

## 2018-04-26 DIAGNOSIS — R31.9 URINARY TRACT INFECTION WITH HEMATURIA, SITE UNSPECIFIED: Primary | ICD-10-CM

## 2018-04-26 DIAGNOSIS — N39.0 URINARY TRACT INFECTION WITH HEMATURIA, SITE UNSPECIFIED: Primary | ICD-10-CM

## 2018-04-26 RX ORDER — NITROFURANTOIN 25; 75 MG/1; MG/1
100 CAPSULE ORAL 2 TIMES DAILY
Qty: 14 CAPSULE | Refills: 0 | Status: SHIPPED | OUTPATIENT
Start: 2018-04-26 | End: 2018-05-03

## 2018-04-27 LAB — BACTERIA UR CULT: NORMAL

## 2018-05-01 ENCOUNTER — TELEPHONE (OUTPATIENT)
Dept: FAMILY MEDICINE | Facility: CLINIC | Age: 63
End: 2018-05-01

## 2018-05-01 NOTE — TELEPHONE ENCOUNTER
Patient sick for 4 days- 104 fever, headache, congestion, sob if active, rash on back- appt made 5/2/18.

## 2018-05-01 NOTE — TELEPHONE ENCOUNTER
----- Message from Cassidy Milligan sent at 5/1/2018 12:41 PM CDT -----  Contact: Patient  Patient would like to speak with someone because she has been in bed sick for 4 days.  Call Back#[phone]  Thanks

## 2018-05-02 ENCOUNTER — TELEPHONE (OUTPATIENT)
Dept: FAMILY MEDICINE | Facility: CLINIC | Age: 63
End: 2018-05-02

## 2018-05-02 ENCOUNTER — NURSE TRIAGE (OUTPATIENT)
Dept: ADMINISTRATIVE | Facility: CLINIC | Age: 63
End: 2018-05-02

## 2018-05-02 NOTE — TELEPHONE ENCOUNTER
----- Message from Cortney Esparza sent at 5/2/2018 10:28 AM CDT -----  Contact: Patient  Type: Needs Medical Advice    Who Called:  Aysha patient  Symptoms (please be specific):  Trouble with breathing  How long has patient had these symptoms:  [facility]  Pharmacy name and phone #:  [facility]  Best Call Back Number: [phone]  Additional Information: Patient is scheduled for 2 pm today, called to speak with a nurse to see if she should wait until then or if she should go to the emergency room. Transferred to [facility] on call nurse (Aundrea) for further advice. Thanks!

## 2018-05-02 NOTE — TELEPHONE ENCOUNTER
Spoke to patient- wheezing and sob- rash worsened- she is advised to go to closest er [facility] since she declines to call 911- to drive patient.

## 2018-05-02 NOTE — TELEPHONE ENCOUNTER
Reason for Disposition   SEVERE difficulty breathing (e.g., struggling for each breath, speaks in single words, pulse > 120)    Protocols used: ST BREATHING DIFFICULTY-A-OH    Aysha was calling to speak to Dr. Daniels's nurse.  She was transferred to [facility] due to difficulty breathing.  Aysha is having severe difficulty breathing.  Can hear her struggling over phone.  Advised to call 911.  States she lives very close to hospital and  can drive her there quicker than waiting on the ambulance.  Again advised she should call 911.  States she will go to hospital but wants to speak to Dr. Daniels's nurse first.  Call placed to Osiris Moreland LPN.  Osiris will call Aysha at home.

## 2018-05-04 ENCOUNTER — TELEPHONE (OUTPATIENT)
Dept: FAMILY MEDICINE | Facility: CLINIC | Age: 63
End: 2018-05-04

## 2018-05-04 NOTE — TELEPHONE ENCOUNTER
Called pt regarding below message. Pt states she was admitted to [facility] on 5/1/18. Pt is curious if the hospitalist at [facility] has access to her records at [facility]. Informed pt she will need to discuss this with [facility] providers. Informed pt if they do need any of her records they can call us. Pt verbalized understanding with no further questions.     ----- Message from Clara Suarez sent at 5/4/2018  9:41 AM CDT -----  Type: Needs Medical Advice    Who Called:  Patient  Symptoms (please be specific): NA  How long has patient had these symptoms:  NA  Best Call Back Number: [phone]  Additional Information: Patient stated she is currently in the [facility] and would like to speak with nurse (Paula)has question.

## 2018-05-07 ENCOUNTER — TELEPHONE (OUTPATIENT)
Dept: FAMILY MEDICINE | Facility: CLINIC | Age: 63
End: 2018-05-07

## 2018-05-07 NOTE — TELEPHONE ENCOUNTER
----- Message from Karyn Gilbert sent at 5/7/2018  9:32 AM CDT -----  Contact: self  Calling to schedule hospital follow up with Dr Daniels. Please call [phone]

## 2018-05-11 ENCOUNTER — OFFICE VISIT (OUTPATIENT)
Dept: FAMILY MEDICINE | Facility: CLINIC | Age: 63
End: 2018-05-11
Attending: FAMILY MEDICINE
Payer: COMMERCIAL

## 2018-05-11 VITALS
WEIGHT: 131.38 LBS | TEMPERATURE: 98 F | OXYGEN SATURATION: 97 % | HEART RATE: 67 BPM | HEIGHT: 63 IN | RESPIRATION RATE: 15 BRPM | BODY MASS INDEX: 23.28 KG/M2 | SYSTOLIC BLOOD PRESSURE: 110 MMHG | DIASTOLIC BLOOD PRESSURE: 58 MMHG

## 2018-05-11 DIAGNOSIS — J18.9 PNEUMONIA DUE TO INFECTIOUS ORGANISM, UNSPECIFIED LATERALITY, UNSPECIFIED PART OF LUNG: ICD-10-CM

## 2018-05-11 DIAGNOSIS — A41.9 SEPSIS, DUE TO UNSPECIFIED ORGANISM: Primary | ICD-10-CM

## 2018-05-11 PROCEDURE — 99214 OFFICE O/P EST MOD 30 MIN: CPT | Mod: S$GLB,,, | Performed by: FAMILY MEDICINE

## 2018-05-11 PROCEDURE — 99999 PR PBB SHADOW E&M-EST. PATIENT-LVL IV: CPT | Mod: PBBFAC,,, | Performed by: FAMILY MEDICINE

## 2018-05-11 PROCEDURE — 3008F BODY MASS INDEX DOCD: CPT | Mod: CPTII,S$GLB,, | Performed by: FAMILY MEDICINE

## 2018-05-11 NOTE — PROGRESS NOTES
Subjective:       Patient ID: Aysha Moore is a 62 y.o. female.    Chief Complaint: Follow-up (hospital)    62-year-old female was treated for urinary tract infection back in late April.  She developed fever chills coughing and wheezing several days later and was referred to the emergency room where she was found to have sepsis with possible right lower lobe pneumonia and lactic acidosis.  She was admitted to ICU started on IV antibiotics and steroids and improved fairly rapidly.  She broke out with a rash that appeared very much like scarlatina according to photos on her cell phone she showed me while in the hospital and was told that it was an ALLERGY to the Macrobid.  Cultures were done with no growth on blood cultures and no growth on urine.  Serologies were done with a negative Legionella, negative mycoplasma IgM but a positive IgG.  Chest x-ray showed suggestion of groundglass opacity in the right lower lobe.  The patient improved and was sent home after 2 days in the hospital on May 4 on Levaquin 750 mg for 10 days and a 4 week taper of prednisone 10 mg.  She's currently on prednisone 30 mg daily.  She's feeling much better with no shortness of breath, cough, or chest discomfort.  She does have some fluid retention and a little bit of agitation and stimulation secondary to the prednisone.  She was also given Xopenex as albuterol caused rather severe stimulation and tachycardia.    Past Medical History:  No date: Allergy  No date: Anxiety  No date: Arthritis  No date: CAD (coronary artery disease)  No date: Chronic back pain  No date: Chronic rhinitis  No date: Hyperlipidemia  No date: Hypertension    Past Surgical History:  No date: BREAST SURGERY      Comment: breast reduction  No date: coranary stent  No date: HYSTERECTOMY      Comment: total      Current Outpatient Prescriptions:     albuterol-ipratropium 2.5mg-0.5mg/3mL (DUO-NEB) 0.5 mg-3 mg(2.5 mg base)/3 mL nebulizer solution, Take 3 mLs by  nebulization every 6 (six) hours as needed for Wheezing. Rescue, Disp: 300 mL, Rfl: 5    alprazolam (XANAX) 0.25 MG tablet, Take 0.25 mg by mouth daily as needed., Disp: , Rfl: 5    amoxicillin-clavulanate 875-125mg (AUGMENTIN) 875-125 mg per tablet, Take 1 tablet by mouth every 12 (twelve) hours., Disp: 30 tablet, Rfl: 0    ascorbic acid, vitamin C, (VITAMIN C) 1000 MG tablet, Take 1,000 mg by mouth daily as needed. , Disp: , Rfl:     aspirin (ASPIRIN LOW DOSE) 81 MG EC tablet, Take 81 mg by mouth once daily. Every day, Disp: , Rfl:     calcium carbonate-vit D3-min (CALTRATE PLUS) 600 mg calcium- 400 unit Tab, Take 1 tablet by mouth once daily. Every day, Disp: , Rfl:     citalopram (CELEXA) 20 MG tablet, Take 1 tablet (20 mg total) by mouth once daily., Disp: 30 tablet, Rfl: 5    diclofenac (VOLTAREN) 75 MG EC tablet, Take 1 tablet (75 mg total) by mouth 2 (two) times daily. (Patient taking differently: Take 75 mg by mouth 2 (two) times daily as needed. ), Disp: 60 tablet, Rfl: 5    montelukast (SINGULAIR) 10 mg tablet, Take 1 tablet (10 mg total) by mouth every evening., Disp: 30 tablet, Rfl: 5    simvastatin (ZOCOR) 40 MG tablet, TAKE 1 TABLET (40 MG TOTAL) BY MOUTH EVERY EVENING, Disp: 90 tablet, Rfl: 0    valACYclovir (VALTREX) 1000 MG tablet, Take 2 at onset of fever blisters then repeat in 12 hours (total 4 tabs per episode), Disp: 20 tablet, Rfl: 5        Review of Systems   Constitutional: Negative for chills, diaphoresis and fever.   HENT: Negative for congestion, postnasal drip, rhinorrhea, sinus pain and sinus pressure.    Respiratory: Negative for cough, chest tightness and shortness of breath.    Cardiovascular: Negative for chest pain and palpitations.   Gastrointestinal: Negative for constipation, diarrhea, nausea and vomiting.   Genitourinary: Negative for dysuria, frequency and hematuria.       Objective:      Physical Exam   Constitutional: She appears well-developed. No distress.    Good blood pressure control, afebrile with good oxygenation 97% oxygen saturation on room air  Normal weight with BMI 23.3 she is up 5.3 pounds from her March 27, 2018 visit   HENT:   Head: Normocephalic and atraumatic.   Right Ear: External ear normal.   Left Ear: External ear normal.   Nose: Nose normal.   Mouth/Throat: Oropharynx is clear and moist. No oropharyngeal exudate.   Eyes: EOM are normal. Pupils are equal, round, and reactive to light. No scleral icterus.   Neck: Normal range of motion. Neck supple. No JVD present. No tracheal deviation present. No thyromegaly present.   Cardiovascular: Normal rate, regular rhythm and normal heart sounds.  Exam reveals no gallop and no friction rub.    No murmur heard.  Pulmonary/Chest: Effort normal and breath sounds normal. No respiratory distress. She has no wheezes. She has no rales. She exhibits no tenderness.   Lymphadenopathy:     She has no cervical adenopathy.   Skin: She is not diaphoretic.   Nursing note and vitals reviewed.      Assessment:       1. Sepsis, due to unspecified organism    2. Pneumonia due to infectious organism, unspecified laterality, unspecified part of lung        Plan:       1. Sepsis, due to unspecified organism  We'll check a repeat titer for mycoplasma IgG at recheck next week.  Arising titer would suggest acute infection  - MYCOPLASMA PNEUMONIAE ANTIBODY, IGG; Future  - CBC auto differential; Future    2. Pneumonia due to infectious organism, unspecified laterality, unspecified part of lung  - MYCOPLASMA PNEUMONIAE ANTIBODY, IGG; Future  - CBC auto differential; Future  - X-Ray Chest PA And Lateral; Future

## 2018-05-16 ENCOUNTER — HOSPITAL ENCOUNTER (OUTPATIENT)
Dept: RADIOLOGY | Facility: CLINIC | Age: 63
Discharge: HOME | End: 2018-05-16
Attending: FAMILY MEDICINE
Payer: COMMERCIAL

## 2018-05-16 DIAGNOSIS — J18.9 PNEUMONIA DUE TO INFECTIOUS ORGANISM, UNSPECIFIED LATERALITY, UNSPECIFIED PART OF LUNG: ICD-10-CM

## 2018-05-16 PROCEDURE — 71046 X-RAY EXAM CHEST 2 VIEWS: CPT | Mod: 26,,, | Performed by: RADIOLOGY

## 2018-05-16 PROCEDURE — 71046 X-RAY EXAM CHEST 2 VIEWS: CPT | Mod: TC,FY,PO

## 2018-05-17 ENCOUNTER — TELEPHONE (OUTPATIENT)
Dept: FAMILY MEDICINE | Facility: CLINIC | Age: 63
End: 2018-05-17

## 2018-05-17 NOTE — TELEPHONE ENCOUNTER
----- Message from Yoni Daniels MD sent at 5/16/2018  7:05 PM CDT -----  The blood count looks normal with no anemia, much improved from your previous drop in 5 months ago.  The white blood cells are normal with no suggestion of infection.  Your platelets are mildly elevated but not a problem.    The repeat mycoplasma titer is still pending    The chest x-ray is normal.  No sign of any active pneumonia.    I will forward copies of labs with the repeat mycoplasma titer when it's available.

## 2018-05-17 NOTE — TELEPHONE ENCOUNTER
Lab results discussed with patient- she has appt tomorrow for a recheck - she is feeling well- she asks if she should rescheduled appt to next week after mycoplasma results are in.

## 2018-06-21 RX ORDER — CITALOPRAM 20 MG/1
20 TABLET, FILM COATED ORAL DAILY
Qty: 30 TABLET | Refills: 5 | Status: SHIPPED | OUTPATIENT
Start: 2018-06-21 | End: 2019-05-28 | Stop reason: DRUGHIGH

## 2018-07-12 RX ORDER — SIMVASTATIN 40 MG/1
TABLET, FILM COATED ORAL
Qty: 90 TABLET | Refills: 0 | Status: SHIPPED | OUTPATIENT
Start: 2018-07-12 | End: 2018-10-14 | Stop reason: SDUPTHER

## 2018-09-04 ENCOUNTER — TELEPHONE (OUTPATIENT)
Dept: FAMILY MEDICINE | Facility: CLINIC | Age: 63
End: 2018-09-04

## 2018-09-04 RX ORDER — TIZANIDINE 4 MG/1
4 TABLET ORAL EVERY 6 HOURS PRN
Qty: 60 TABLET | Refills: 1 | Status: SHIPPED | OUTPATIENT
Start: 2018-09-04 | End: 2020-05-22 | Stop reason: SDUPTHER

## 2018-09-04 NOTE — TELEPHONE ENCOUNTER
Patient pulled muscle in left lower back today moving patio furniture- asking to have muscle relaxer called in to [facility]. Is taking Ibuprofen.

## 2018-09-04 NOTE — TELEPHONE ENCOUNTER
----- Message from Jessi Loredo sent at 9/4/2018  1:13 PM CDT -----  Contact: self  Type: Needs Medical Advice    Who Called:  self  Symptoms (please be specific):  Pulled back   How long has patient had these symptoms:  One hour ago  Pharmacy name and phone #:  [facility]  Best Call Back Number: [phone]  Additional Information: patient would like a muscle relaxer called in. Please call patient to advise. Thanks!   [id number] - [place] - [address]  [address] [place] [address]  Phone: [phone] Fax: [phone]

## 2018-09-13 DIAGNOSIS — J31.0 CHRONIC RHINITIS: ICD-10-CM

## 2018-09-13 RX ORDER — MONTELUKAST SODIUM 10 MG/1
10 TABLET ORAL NIGHTLY
Qty: 30 TABLET | Refills: 5 | Status: SHIPPED | OUTPATIENT
Start: 2018-09-13 | End: 2020-01-24

## 2018-10-15 RX ORDER — SIMVASTATIN 40 MG/1
TABLET, FILM COATED ORAL
Qty: 90 TABLET | Refills: 0 | Status: SHIPPED | OUTPATIENT
Start: 2018-10-15 | End: 2019-01-11 | Stop reason: SDUPTHER

## 2018-10-25 RX ORDER — TIZANIDINE 4 MG/1
TABLET ORAL
Qty: 60 TABLET | Refills: 1 | OUTPATIENT
Start: 2018-10-25

## 2018-10-26 NOTE — TELEPHONE ENCOUNTER
Patient stated pharmacy tried to fill it from auto refill. She did not request or need it at this time.

## 2019-01-11 DIAGNOSIS — E78.5 HYPERLIPIDEMIA, UNSPECIFIED HYPERLIPIDEMIA TYPE: Primary | ICD-10-CM

## 2019-01-11 DIAGNOSIS — I10 HYPERTENSION, ESSENTIAL: ICD-10-CM

## 2019-01-11 DIAGNOSIS — Z51.81 THERAPEUTIC DRUG MONITORING: ICD-10-CM

## 2019-01-11 RX ORDER — SIMVASTATIN 40 MG/1
TABLET, FILM COATED ORAL
Qty: 30 TABLET | Refills: 0 | Status: SHIPPED | OUTPATIENT
Start: 2019-01-11 | End: 2019-02-26 | Stop reason: SDUPTHER

## 2019-01-15 NOTE — TELEPHONE ENCOUNTER
Patient was called. She would like to have her platelet count rechecked since sister had leukemia.   Lab booked for Friday.

## 2019-01-22 ENCOUNTER — TELEPHONE (OUTPATIENT)
Dept: FAMILY MEDICINE | Facility: CLINIC | Age: 64
End: 2019-01-22

## 2019-01-22 NOTE — TELEPHONE ENCOUNTER
----- Message from Saray Daniels sent at 1/22/2019 12:56 PM CST -----  Contact: Aysha  Type: Needs Medical Advice    Who Called:  patient  Symptoms (please be specific):  Productive cough mucous is yellow in color, congestion  How long has patient had these symptoms:  Two days  Pharmacy name and phone #:    [id number] [place] - [address]  [address] [place] [address]  Phone: [phone] Fax: [phone]  Best Call Back Number: [phone]  Additional Information:  Asking for Rx if possible to be sent to above pharmacy as does not want to get pneumonia again as symptoms are not getting better. Thanks!

## 2019-01-23 ENCOUNTER — OFFICE VISIT (OUTPATIENT)
Dept: FAMILY MEDICINE | Facility: CLINIC | Age: 64
End: 2019-01-23
Payer: COMMERCIAL

## 2019-01-23 VITALS
OXYGEN SATURATION: 98 % | BODY MASS INDEX: 25.01 KG/M2 | HEART RATE: 88 BPM | DIASTOLIC BLOOD PRESSURE: 75 MMHG | SYSTOLIC BLOOD PRESSURE: 117 MMHG | HEIGHT: 63 IN | TEMPERATURE: 98 F | WEIGHT: 141.13 LBS

## 2019-01-23 DIAGNOSIS — J32.4 PANSINUSITIS, UNSPECIFIED CHRONICITY: Primary | ICD-10-CM

## 2019-01-23 DIAGNOSIS — J40 BRONCHITIS: ICD-10-CM

## 2019-01-23 PROCEDURE — 3074F SYST BP LT 130 MM HG: CPT | Mod: CPTII,S$GLB,, | Performed by: FAMILY MEDICINE

## 2019-01-23 PROCEDURE — 99999 PR PBB SHADOW E&M-EST. PATIENT-LVL III: CPT | Mod: PBBFAC,,, | Performed by: FAMILY MEDICINE

## 2019-01-23 PROCEDURE — 99214 PR OFFICE/OUTPT VISIT, EST, LEVL IV, 30-39 MIN: ICD-10-PCS | Mod: S$GLB,,, | Performed by: FAMILY MEDICINE

## 2019-01-23 PROCEDURE — 3078F DIAST BP <80 MM HG: CPT | Mod: CPTII,S$GLB,, | Performed by: FAMILY MEDICINE

## 2019-01-23 PROCEDURE — 99214 OFFICE O/P EST MOD 30 MIN: CPT | Mod: S$GLB,,, | Performed by: FAMILY MEDICINE

## 2019-01-23 PROCEDURE — 3008F BODY MASS INDEX DOCD: CPT | Mod: CPTII,S$GLB,, | Performed by: FAMILY MEDICINE

## 2019-01-23 PROCEDURE — 99999 PR PBB SHADOW E&M-EST. PATIENT-LVL III: ICD-10-PCS | Mod: PBBFAC,,, | Performed by: FAMILY MEDICINE

## 2019-01-23 PROCEDURE — 3078F PR MOST RECENT DIASTOLIC BLOOD PRESSURE < 80 MM HG: ICD-10-PCS | Mod: CPTII,S$GLB,, | Performed by: FAMILY MEDICINE

## 2019-01-23 PROCEDURE — 3074F PR MOST RECENT SYSTOLIC BLOOD PRESSURE < 130 MM HG: ICD-10-PCS | Mod: CPTII,S$GLB,, | Performed by: FAMILY MEDICINE

## 2019-01-23 PROCEDURE — 3008F PR BODY MASS INDEX (BMI) DOCUMENTED: ICD-10-PCS | Mod: CPTII,S$GLB,, | Performed by: FAMILY MEDICINE

## 2019-01-23 RX ORDER — ALBUTEROL SULFATE 90 UG/1
2 AEROSOL, METERED RESPIRATORY (INHALATION) 4 TIMES DAILY
Qty: 1 INHALER | Refills: 1 | Status: SHIPPED | OUTPATIENT
Start: 2019-01-23 | End: 2019-03-22 | Stop reason: SDUPTHER

## 2019-01-23 RX ORDER — PREDNISONE 20 MG/1
TABLET ORAL
Qty: 10 TABLET | Refills: 0 | Status: SHIPPED | OUTPATIENT
Start: 2019-01-23 | End: 2019-05-28 | Stop reason: ALTCHOICE

## 2019-01-23 RX ORDER — AMOXICILLIN AND CLAVULANATE POTASSIUM 875; 125 MG/1; MG/1
1 TABLET, FILM COATED ORAL 2 TIMES DAILY
Qty: 20 TABLET | Refills: 0 | Status: SHIPPED | OUTPATIENT
Start: 2019-01-23 | End: 2019-02-02

## 2019-01-23 NOTE — PROGRESS NOTES
Subjective:       Patient ID: Aysha Moore is a 63 y.o. female.    Chief Complaint: Cough and Nasal Congestion    Patient here for UC visit.      Cough   This is a new problem. The current episode started in the past 7 days. The problem has been gradually worsening. The problem occurs hourly. The cough is productive of purulent sputum. Associated symptoms include a fever (low grade), nasal congestion, postnasal drip, shortness of breath and wheezing. Pertinent negatives include no chest pain, hemoptysis or rash. She has tried a beta-agonist inhaler (Using inhlaer BID) for the symptoms. The treatment provided no relief. Her past medical history is significant for pneumonia.     Review of Systems   Constitutional: Positive for fever (low grade).   HENT: Positive for postnasal drip.    Respiratory: Positive for cough, shortness of breath and wheezing. Negative for hemoptysis.    Cardiovascular: Negative for chest pain.   Gastrointestinal: Negative for abdominal pain and nausea.   Skin: Negative for rash.   All other systems reviewed and are negative.      Objective:      Physical Exam   Constitutional: She appears well-developed. No distress.   HENT:   Right Ear: Tympanic membrane normal. Tympanic membrane is not erythematous.   Left Ear: Tympanic membrane normal. Tympanic membrane is not erythematous.   Nose: Mucosal edema present. Right sinus exhibits maxillary sinus tenderness and frontal sinus tenderness. Left sinus exhibits maxillary sinus tenderness and frontal sinus tenderness.   Mouth/Throat: Posterior oropharyngeal erythema present.   Neck: Neck supple.   Cardiovascular: Normal rate and regular rhythm.   No murmur heard.  Pulmonary/Chest: Effort normal. She has wheezes. She has no rales.   Lymphadenopathy:     She has no cervical adenopathy.   Skin: Skin is warm and dry.       Assessment:       1. Pansinusitis, unspecified chronicity    2. Bronchitis        Plan:       Aysha was seen today for cough and  nasal congestion.    Diagnoses and all orders for this visit:    Pansinusitis, unspecified chronicity    Bronchitis    Other orders  -     amoxicillin-clavulanate 875-125mg (AUGMENTIN) 875-125 mg per tablet; Take 1 tablet by mouth 2 (two) times daily. Take after meals. for 10 days  -     albuterol (PROVENTIL/VENTOLIN HFA) 90 mcg/actuation inhaler; Inhale 2 puffs into the lungs 4 (four) times daily.  -     predniSONE (DELTASONE) 20 MG tablet; One BID for 3 days then once a day orally    Neb or Inhlaer QID; push fluids.  Pt had Pneumonia/early sepsis 5/2018 so we discussed her calling or RTC for any worsening.  She also needs Flu Vax and second Pneumococcal vax once she is well.

## 2019-02-25 DIAGNOSIS — E78.5 HYPERLIPIDEMIA, UNSPECIFIED HYPERLIPIDEMIA TYPE: ICD-10-CM

## 2019-02-26 RX ORDER — SIMVASTATIN 40 MG/1
40 TABLET, FILM COATED ORAL NIGHTLY
Qty: 30 TABLET | Refills: 0 | Status: SHIPPED | OUTPATIENT
Start: 2019-02-26 | End: 2019-03-27 | Stop reason: SDUPTHER

## 2019-02-26 RX ORDER — SIMVASTATIN 40 MG/1
TABLET, FILM COATED ORAL
Qty: 30 TABLET | Refills: 0 | OUTPATIENT
Start: 2019-02-26

## 2019-03-22 RX ORDER — ALBUTEROL SULFATE 90 UG/1
AEROSOL, METERED RESPIRATORY (INHALATION)
Qty: 18 INHALER | Refills: 1 | Status: ON HOLD | OUTPATIENT
Start: 2019-03-22 | End: 2020-10-18 | Stop reason: HOSPADM

## 2019-03-25 ENCOUNTER — TELEPHONE (OUTPATIENT)
Dept: FAMILY MEDICINE | Facility: CLINIC | Age: 64
End: 2019-03-25

## 2019-03-25 DIAGNOSIS — R73.09 ELEVATED GLUCOSE: ICD-10-CM

## 2019-03-25 DIAGNOSIS — R73.9 HYPERGLYCEMIA: Primary | ICD-10-CM

## 2019-03-25 NOTE — TELEPHONE ENCOUNTER
Patient due for cbc, hepatic function, lipid and bmp-patient was in [facility] with in last 2 weeks and had lab. Will get records and check for lab. Patient advised will let her know.

## 2019-03-25 NOTE — TELEPHONE ENCOUNTER
----- Message from Hansa Alex sent at 3/25/2019 10:46 AM CDT -----  Contact: pt  Type: Needs Medical Advice    Who Called:  Pt  Best Call Back Number: [phone] (home)   Additional Information: Pt asked that nurse Heena give her a call back  She has a question about blood work

## 2019-03-25 NOTE — TELEPHONE ENCOUNTER
Glucose was pretty high, even with steroids.  Likely has underlying diabetes or prediabetes.  I'd suggest get a fasting bmp and a1c as well.  Orders are in for a1c, bmp already in

## 2019-03-25 NOTE — TELEPHONE ENCOUNTER
All lab done at [facility] except lipids. Glucose was elevated at [facility] but patient was on steroids. See results on your desk, please. Patient will have lipids done 3/27/19 here.

## 2019-03-27 ENCOUNTER — LAB VISIT (OUTPATIENT)
Dept: LAB | Facility: HOSPITAL | Age: 64
End: 2019-03-27
Attending: FAMILY MEDICINE
Payer: COMMERCIAL

## 2019-03-27 DIAGNOSIS — E78.5 HYPERLIPIDEMIA, UNSPECIFIED HYPERLIPIDEMIA TYPE: ICD-10-CM

## 2019-03-27 DIAGNOSIS — Z51.81 THERAPEUTIC DRUG MONITORING: ICD-10-CM

## 2019-03-27 DIAGNOSIS — R73.9 HYPERGLYCEMIA: ICD-10-CM

## 2019-03-27 LAB
ANION GAP SERPL CALC-SCNC: 11 MMOL/L (ref 8–16)
BUN SERPL-MCNC: 13 MG/DL (ref 8–23)
CALCIUM SERPL-MCNC: 9.5 MG/DL (ref 8.7–10.5)
CHLORIDE SERPL-SCNC: 104 MMOL/L (ref 95–110)
CHOLEST SERPL-MCNC: 231 MG/DL (ref 120–199)
CHOLEST/HDLC SERPL: 5.6 {RATIO} (ref 2–5)
CO2 SERPL-SCNC: 24 MMOL/L (ref 23–29)
CREAT SERPL-MCNC: 0.8 MG/DL (ref 0.5–1.4)
EST. GFR  (AFRICAN AMERICAN): >60 ML/MIN/1.73 M^2
EST. GFR  (NON AFRICAN AMERICAN): >60 ML/MIN/1.73 M^2
ESTIMATED AVG GLUCOSE: 117 MG/DL (ref 68–131)
GLUCOSE SERPL-MCNC: 100 MG/DL (ref 70–110)
HBA1C MFR BLD HPLC: 5.7 % (ref 4–5.6)
HDLC SERPL-MCNC: 41 MG/DL (ref 40–75)
HDLC SERPL: 17.7 % (ref 20–50)
LDLC SERPL CALC-MCNC: 134.6 MG/DL (ref 63–159)
NONHDLC SERPL-MCNC: 190 MG/DL
POTASSIUM SERPL-SCNC: 4.1 MMOL/L (ref 3.5–5.1)
SODIUM SERPL-SCNC: 139 MMOL/L (ref 136–145)
TRIGL SERPL-MCNC: 277 MG/DL (ref 30–150)

## 2019-03-27 PROCEDURE — 83036 HEMOGLOBIN GLYCOSYLATED A1C: CPT

## 2019-03-27 PROCEDURE — 80061 LIPID PANEL: CPT

## 2019-03-27 PROCEDURE — 36415 COLL VENOUS BLD VENIPUNCTURE: CPT | Mod: PO

## 2019-03-27 PROCEDURE — 80048 BASIC METABOLIC PNL TOTAL CA: CPT

## 2019-03-28 ENCOUNTER — TELEPHONE (OUTPATIENT)
Dept: FAMILY MEDICINE | Facility: CLINIC | Age: 64
End: 2019-03-28

## 2019-03-28 DIAGNOSIS — Z51.81 THERAPEUTIC DRUG MONITORING: Primary | ICD-10-CM

## 2019-03-28 DIAGNOSIS — E78.5 HYPERLIPIDEMIA, UNSPECIFIED HYPERLIPIDEMIA TYPE: ICD-10-CM

## 2019-03-28 RX ORDER — SIMVASTATIN 40 MG/1
TABLET, FILM COATED ORAL
Qty: 30 TABLET | Refills: 5 | Status: SHIPPED | OUTPATIENT
Start: 2019-03-28 | End: 2019-05-28 | Stop reason: SDUPTHER

## 2019-03-28 NOTE — TELEPHONE ENCOUNTER
Patient just started taking her simvastatin in the last month most days. She stated she will take it daily and work on diet and exercise. Please place future orders to be booked.

## 2019-03-28 NOTE — TELEPHONE ENCOUNTER
See other message / patient has not been using until the last month. She started but not taking daily.   She stated she would start taking daily with diet and exercise.   Please place future labs requested in other note.

## 2019-03-28 NOTE — TELEPHONE ENCOUNTER
----- Message from Yoni Daniels MD sent at 3/27/2019  7:06 PM CDT -----  Her chemistry panel looks good with a normal blood sugar of 100 and good kidney function.  The A1c indicates overall glucose control with no changes needed there.    Her cholesterol levels are very high and much worse than they were a year ago.  Has she been taking the simvastatin?  If so we need to change

## 2019-03-28 NOTE — TELEPHONE ENCOUNTER
See result note dated today, cholesterol levels are very high and it does not look like she has been taking this.  If she has been using it it is not working and we need to make a change

## 2019-05-25 DIAGNOSIS — G89.29 CHRONIC BILATERAL LOW BACK PAIN WITH LEFT-SIDED SCIATICA: ICD-10-CM

## 2019-05-25 DIAGNOSIS — M54.42 CHRONIC BILATERAL LOW BACK PAIN WITH LEFT-SIDED SCIATICA: ICD-10-CM

## 2019-05-28 ENCOUNTER — OFFICE VISIT (OUTPATIENT)
Dept: FAMILY MEDICINE | Facility: CLINIC | Age: 64
End: 2019-05-28
Payer: COMMERCIAL

## 2019-05-28 VITALS
TEMPERATURE: 99 F | WEIGHT: 141 LBS | SYSTOLIC BLOOD PRESSURE: 106 MMHG | DIASTOLIC BLOOD PRESSURE: 70 MMHG | HEART RATE: 74 BPM | BODY MASS INDEX: 24.98 KG/M2 | HEIGHT: 63 IN

## 2019-05-28 DIAGNOSIS — I25.10 CORONARY ARTERY DISEASE INVOLVING NATIVE CORONARY ARTERY OF NATIVE HEART WITHOUT ANGINA PECTORIS: ICD-10-CM

## 2019-05-28 DIAGNOSIS — J43.9 PULMONARY EMPHYSEMA, UNSPECIFIED EMPHYSEMA TYPE: ICD-10-CM

## 2019-05-28 DIAGNOSIS — E78.5 HYPERLIPIDEMIA, UNSPECIFIED HYPERLIPIDEMIA TYPE: ICD-10-CM

## 2019-05-28 DIAGNOSIS — Z95.5 HISTORY OF HEART ARTERY STENT: ICD-10-CM

## 2019-05-28 DIAGNOSIS — Z23 NEED FOR ZOSTER VACCINATION: ICD-10-CM

## 2019-05-28 DIAGNOSIS — G89.29 CHRONIC BILATERAL LOW BACK PAIN WITH LEFT-SIDED SCIATICA: ICD-10-CM

## 2019-05-28 DIAGNOSIS — Z00.00 ANNUAL PHYSICAL EXAM: Primary | ICD-10-CM

## 2019-05-28 DIAGNOSIS — Z12.39 BREAST CANCER SCREENING: ICD-10-CM

## 2019-05-28 DIAGNOSIS — Z23 NEED FOR VACCINE FOR DT (DIPHTHERIA-TETANUS): ICD-10-CM

## 2019-05-28 DIAGNOSIS — Z12.11 COLON CANCER SCREENING: ICD-10-CM

## 2019-05-28 DIAGNOSIS — M54.42 CHRONIC BILATERAL LOW BACK PAIN WITH LEFT-SIDED SCIATICA: ICD-10-CM

## 2019-05-28 DIAGNOSIS — R73.03 PREDIABETES: ICD-10-CM

## 2019-05-28 DIAGNOSIS — S83.241D TEAR OF MEDIAL MENISCUS OF RIGHT KNEE, CURRENT, UNSPECIFIED TEAR TYPE, SUBSEQUENT ENCOUNTER: ICD-10-CM

## 2019-05-28 DIAGNOSIS — F41.9 ANXIETY: ICD-10-CM

## 2019-05-28 PROBLEM — S83.241A TEAR OF MEDIAL MENISCUS OF RIGHT KNEE, CURRENT: Status: ACTIVE | Noted: 2019-05-28

## 2019-05-28 PROCEDURE — 99999 PR PBB SHADOW E&M-EST. PATIENT-LVL IV: ICD-10-PCS | Mod: PBBFAC,,, | Performed by: NURSE PRACTITIONER

## 2019-05-28 PROCEDURE — 90471 IMMUNIZATION ADMIN: CPT | Mod: S$GLB,,, | Performed by: NURSE PRACTITIONER

## 2019-05-28 PROCEDURE — 99396 PR PREVENTIVE VISIT,EST,40-64: ICD-10-PCS | Mod: 25,S$GLB,, | Performed by: NURSE PRACTITIONER

## 2019-05-28 PROCEDURE — 90471 TD VACCINE GREATER THAN OR EQUAL TO 7YO PRESERVATIVE FREE IM: ICD-10-PCS | Mod: S$GLB,,, | Performed by: NURSE PRACTITIONER

## 2019-05-28 PROCEDURE — 3074F PR MOST RECENT SYSTOLIC BLOOD PRESSURE < 130 MM HG: ICD-10-PCS | Mod: CPTII,S$GLB,, | Performed by: NURSE PRACTITIONER

## 2019-05-28 PROCEDURE — 90714 TD VACC NO PRESV 7 YRS+ IM: CPT | Mod: S$GLB,,, | Performed by: NURSE PRACTITIONER

## 2019-05-28 PROCEDURE — 3074F SYST BP LT 130 MM HG: CPT | Mod: CPTII,S$GLB,, | Performed by: NURSE PRACTITIONER

## 2019-05-28 PROCEDURE — 90714 TD VACCINE GREATER THAN OR EQUAL TO 7YO PRESERVATIVE FREE IM: ICD-10-PCS | Mod: S$GLB,,, | Performed by: NURSE PRACTITIONER

## 2019-05-28 PROCEDURE — 3078F DIAST BP <80 MM HG: CPT | Mod: CPTII,S$GLB,, | Performed by: NURSE PRACTITIONER

## 2019-05-28 PROCEDURE — 90472 IMMUNIZATION ADMIN EACH ADD: CPT | Mod: S$GLB,,, | Performed by: NURSE PRACTITIONER

## 2019-05-28 PROCEDURE — 90750 HZV VACC RECOMBINANT IM: CPT | Mod: S$GLB,,, | Performed by: NURSE PRACTITIONER

## 2019-05-28 PROCEDURE — 3078F PR MOST RECENT DIASTOLIC BLOOD PRESSURE < 80 MM HG: ICD-10-PCS | Mod: CPTII,S$GLB,, | Performed by: NURSE PRACTITIONER

## 2019-05-28 PROCEDURE — 90750 ZOSTER RECOMBINANT VACCINE: ICD-10-PCS | Mod: S$GLB,,, | Performed by: NURSE PRACTITIONER

## 2019-05-28 PROCEDURE — 99396 PREV VISIT EST AGE 40-64: CPT | Mod: 25,S$GLB,, | Performed by: NURSE PRACTITIONER

## 2019-05-28 PROCEDURE — 99999 PR PBB SHADOW E&M-EST. PATIENT-LVL IV: CPT | Mod: PBBFAC,,, | Performed by: NURSE PRACTITIONER

## 2019-05-28 PROCEDURE — 90472 ZOSTER RECOMBINANT VACCINE: ICD-10-PCS | Mod: S$GLB,,, | Performed by: NURSE PRACTITIONER

## 2019-05-28 RX ORDER — CITALOPRAM 40 MG/1
40 TABLET, FILM COATED ORAL DAILY
Qty: 30 TABLET | Refills: 5 | Status: SHIPPED | OUTPATIENT
Start: 2019-05-28 | End: 2019-11-26 | Stop reason: SDUPTHER

## 2019-05-28 RX ORDER — SIMVASTATIN 40 MG/1
40 TABLET, FILM COATED ORAL NIGHTLY
Qty: 30 TABLET | Refills: 5 | Status: SHIPPED | OUTPATIENT
Start: 2019-05-28 | End: 2020-03-27 | Stop reason: SDUPTHER

## 2019-05-28 RX ORDER — FLUTICASONE FUROATE AND VILANTEROL 100; 25 UG/1; UG/1
1 POWDER RESPIRATORY (INHALATION) DAILY
COMMUNITY
End: 2019-07-30 | Stop reason: ALTCHOICE

## 2019-05-28 RX ORDER — ASPIRIN 81 MG/1
81 TABLET ORAL DAILY
Refills: 0 | COMMUNITY
Start: 2019-05-28 | End: 2023-08-02

## 2019-05-28 RX ORDER — DICLOFENAC SODIUM 75 MG/1
75 TABLET, DELAYED RELEASE ORAL 2 TIMES DAILY
Qty: 60 TABLET | Refills: 3 | OUTPATIENT
Start: 2019-05-28

## 2019-05-28 RX ORDER — DICLOFENAC SODIUM 75 MG/1
75 TABLET, DELAYED RELEASE ORAL 2 TIMES DAILY
Qty: 60 TABLET | Refills: 5 | Status: SHIPPED | OUTPATIENT
Start: 2019-05-28 | End: 2019-11-26 | Stop reason: SDUPTHER

## 2019-05-28 NOTE — PROGRESS NOTES
Subjective:       Patient ID: Aysha Moore is a 63 y.o. female.    Chief Complaint: Annual Exam    Chief Complaint  Chief Complaint   Patient presents with    Annual Exam       HPI  Aysha Moore is a 63 y.o. female with medical diagnoses as listed in the medical history and problem list that presents for an annual exam.  Patient has coronary artery disease and has had stents placed.  Prior to having the stents placed she was treated for hypertension but is no longer taking medication for her blood pressure and the blood pressure reading today is 106/70.  She is also treated for hyperlipidemia, anxiety, prediabetes, and COPD.  Patient follows up regularly with cardiology, Dr. Simon. Patient has an appointment scheduled with pulmonologist, Dr. Abad in July. She is also being followed by orthopedist Dr. Zuñiga for torn meniscus to the right knee and is planning for surgery after pulmonology clearance. BMI today is 24.98 and the patient's weight is unchanged at 141 lb since her last visit.  Established patient with last clinic appointment 1/23/2019.  The patient is due for a colonoscopy, tetanus immunization, and zoster immunization.      PAST MEDICAL HISTORY:  Past Medical History:   Diagnosis Date    Allergy     Anxiety     Arthritis     CAD (coronary artery disease)     Chronic back pain     Chronic rhinitis     Hyperlipidemia     Hypertension        PAST SURGICAL HISTORY:  Past Surgical History:   Procedure Laterality Date    BREAST SURGERY      breast reduction    coranary stent      HYSTERECTOMY      total       SOCIAL HISTORY:  Social History     Socioeconomic History    Marital status:      Spouse name: Not on file    Number of children: Not on file    Years of education: Not on file    Highest education level: Not on file   Occupational History    Not on file   Social Needs    Financial resource strain: Not on file    Food insecurity:     Worry: Not on file     Inability:  Not on file    Transportation needs:     Medical: Not on file     Non-medical: Not on file   Tobacco Use    Smoking status: Former Smoker     Packs/day: 1.00     Types: Cigarettes     Last attempt to quit: 3/4/2017     Years since quittin.2    Smokeless tobacco: Never Used   Substance and Sexual Activity    Alcohol use: Yes     Alcohol/week: 0.0 oz     Comment: sometimes    Drug use: No    Sexual activity: Yes     Partners: Male   Lifestyle    Physical activity:     Days per week: Not on file     Minutes per session: Not on file    Stress: Not on file   Relationships    Social connections:     Talks on phone: Not on file     Gets together: Not on file     Attends Christianity service: Not on file     Active member of club or organization: Not on file     Attends meetings of clubs or organizations: Not on file     Relationship status: Not on file   Other Topics Concern    Not on file   Social History Narrative    Not on file       FAMILY HISTORY:  Family History   Problem Relation Age of Onset    Cancer Mother         breast, ovarian, cervical     Heart disease Mother     Breast cancer Mother     Cancer Father         melanoma    Stroke Sister     Heart disease Sister     Cancer Sister         leukemia    Heart disease Brother     Heart disease Maternal Grandmother     No Known Problems Daughter     No Known Problems Son     Cancer Maternal Uncle     Cancer Paternal Aunt         breast    Breast cancer Paternal Aunt     Cancer Paternal Uncle     Macular degeneration Sister     Heart disease Sister        ALLERGIES AND MEDICATIONS: updated and reviewed.  Review of patient's allergies indicates:   Allergen Reactions    Cephalexin      Other reaction(s): Rash    Doxycycline monohydrate Hives    Lanoxin  [digoxin]      Other reaction(s): Rash    Lansoprazole      Other reaction(s): Rash    Macrobid [nitrofurantoin monohyd/m-cryst]      Current Outpatient Medications   Medication Sig  Dispense Refill    albuterol-ipratropium 2.5mg-0.5mg/3mL (DUO-NEB) 0.5 mg-3 mg(2.5 mg base)/3 mL nebulizer solution Take 3 mLs by nebulization every 6 (six) hours as needed for Wheezing. Rescue 300 mL 5    alprazolam (XANAX) 0.25 MG tablet Take 0.25 mg by mouth daily as needed.  5    calcium carbonate-vit D3-min (CALTRATE PLUS) 600 mg calcium- 400 unit Tab Take 1 tablet by mouth once daily. Every day      diclofenac (VOLTAREN) 75 MG EC tablet Take 1 tablet (75 mg total) by mouth 2 (two) times daily. 60 tablet 5    fluticasone furoate-vilanterol (BREO) 100-25 mcg/dose diskus inhaler Inhale 1 puff into the lungs once daily. Controller      montelukast (SINGULAIR) 10 mg tablet TAKE 1 TABLET (10 MG TOTAL) BY MOUTH EVERY EVENING. 30 tablet 5    simvastatin (ZOCOR) 40 MG tablet Take 1 tablet (40 mg total) by mouth every evening. 30 tablet 5    valACYclovir (VALTREX) 1000 MG tablet Take 2 at onset of fever blisters then repeat in 12 hours (total 4 tabs per episode) 20 tablet 5    VENTOLIN HFA 90 mcg/actuation inhaler INHALE 2 PUFFS INTO THE LUNGS 4 (FOUR) TIMES DAILY 18 Inhaler 1    aspirin (ECOTRIN) 81 MG EC tablet Take 1 tablet (81 mg total) by mouth once daily.  0    citalopram (CELEXA) 40 MG tablet Take 1 tablet (40 mg total) by mouth once daily. 30 tablet 5     No current facility-administered medications for this visit.        I have reviewed the patient's medical, family, and social history in detail and updated the computerized patient record.    Review of Systems   Constitutional: Negative for activity change, appetite change, chills, fatigue and fever.        No regular exercise, active lifestyle.    HENT: Positive for congestion. Negative for ear pain, postnasal drip, rhinorrhea, sinus pressure, sinus pain and sore throat.         Woke yesterday with congestion and headache, better today.   Eyes: Negative for visual disturbance.        Vision corrected with glasses, needs eye exam, [facility]    Respiratory: Negative for cough, shortness of breath and wheezing.    Cardiovascular: Negative for chest pain, palpitations and leg swelling.   Gastrointestinal: Negative for abdominal pain, constipation, diarrhea, nausea and vomiting.        Some occasional diet related heartburn   Genitourinary: Negative for difficulty urinating, dysuria and menstrual problem.        Patient has had hysterectomy and removal of ovaries.   Musculoskeletal: Positive for arthralgias. Negative for myalgias.        Patient has pain to right knee due to torn right meniscus   Skin: Negative for rash and wound.   Neurological: Positive for headaches. Negative for dizziness and numbness.        Occasional sinus headache.   Hematological: Does not bruise/bleed easily.   Psychiatric/Behavioral: Positive for sleep disturbance. Negative for dysphoric mood. The patient is nervous/anxious.         Has had increased anxiety recently, 2 daughters that are single with children that she is helping,  has retired. Would like to increase the dose of her celexa, was working better at 40 mg dose. Does not sleep well some nights, difficulty falling asleep.         Objective:      Vitals:    05/28/19 1217   BP: 106/70   BP Location: Right arm   Patient Position: Sitting   BP Method: Large (Automatic)   Pulse: 74   Temp: 98.5 °F (36.9 °C)   TempSrc: Oral   Weight: 64 kg (141 lb)   Height: 5' 3" (1.6 m)     Physical Exam   Constitutional: She is oriented to person, place, and time. Vital signs are normal. She appears well-developed and well-nourished. No distress.   Blood pressure 106/70   HENT:   Head: Normocephalic and atraumatic.   Right Ear: Hearing, tympanic membrane, external ear and ear canal normal.   Left Ear: Hearing, tympanic membrane, external ear and ear canal normal.   Nose: Nose normal. No mucosal edema.   Mouth/Throat: Oropharynx is clear and moist and mucous membranes are normal. Normal dentition. No oropharyngeal exudate. "   Eyes: Pupils are equal, round, and reactive to light. Conjunctivae, EOM and lids are normal. Right eye exhibits no discharge. Left eye exhibits no discharge. Right conjunctiva is not injected. Left conjunctiva is not injected.   Neck: Normal range of motion. Neck supple. Normal carotid pulses present. Carotid bruit is not present. Normal range of motion present. No thyromegaly present.   Cardiovascular: Normal rate, regular rhythm, S1 normal, S2 normal, normal heart sounds, intact distal pulses and normal pulses.   No murmur heard.  Pulses:       Carotid pulses are 2+ on the right side, and 2+ on the left side.       Radial pulses are 2+ on the right side, and 2+ on the left side.        Posterior tibial pulses are 2+ on the right side, and 2+ on the left side.   No edema   Pulmonary/Chest: Effort normal and breath sounds normal. No respiratory distress. She has no decreased breath sounds. She has no wheezes. She has no rhonchi. She has no rales.   Abdominal: Soft. Normal appearance, normal aorta and bowel sounds are normal. She exhibits no distension, no abdominal bruit, no pulsatile midline mass and no mass. There is no hepatosplenomegaly. There is no tenderness. No hernia.   Musculoskeletal: Normal range of motion. She exhibits no edema, tenderness or deformity.   Lymphadenopathy:        Head (right side): No submental, no submandibular and no tonsillar adenopathy present.        Head (left side): No submental, no submandibular and no tonsillar adenopathy present.     She has no cervical adenopathy.        Right: No supraclavicular adenopathy present.        Left: No supraclavicular adenopathy present.   Neurological: She is alert and oriented to person, place, and time. She has normal strength. Gait normal.   Skin: Skin is warm, dry and intact. No rash noted. She is not diaphoretic. No erythema. No pallor.   Insect bites noted to bilateral lower legs.   Psychiatric: She has a normal mood and affect. Her speech  is normal and behavior is normal. Judgment and thought content normal. Her mood appears not anxious. Cognition and memory are normal. She does not exhibit a depressed mood.   Nursing note and vitals reviewed.        Assessment:       1. Annual physical exam    2. Hyperlipidemia, unspecified hyperlipidemia type    3. Coronary artery disease involving native coronary artery of native heart without angina pectoris    4. History of heart artery stent    5. Anxiety    6. Pulmonary emphysema, unspecified emphysema type    7. Prediabetes    8. Tear of medial meniscus of right knee, current, unspecified tear type, subsequent encounter    9. Chronic bilateral low back pain with left-sided sciatica    10. BMI 24.0-24.9, adult    11. Need for zoster vaccination    12. Need for vaccine for DT (diphtheria-tetanus)    13. Colon cancer screening    14. Breast cancer screening          Plan:       Aysha was seen today for annual exam.    Diagnoses and all orders for this visit:    Annual physical exam   Discussed healthy diet, regular exercise, necessary labs, age appropriate cancer screening, and routine vaccinations.  Patient will receive tetanus and zoster immunization in office today.  Referral made to Gastroenterology for colonoscopy.  Screening mammogram ordered.   Educational handouts provided.  Prevention guidelines women age 50-64  Hyperlipidemia, unspecified hyperlipidemia type  -     simvastatin (ZOCOR) 40 MG tablet; Take 1 tablet (40 mg total) by mouth every evening.  -   Lab Results   Component Value Date    CHOL 231 (H) 03/27/2019    CHOL 106 (L) 12/11/2017    CHOL 189 10/16/2015     Lab Results   Component Value Date    HDL 41 03/27/2019    HDL 38 (L) 12/11/2017    HDL 42 10/16/2015     Lab Results   Component Value Date    LDLCALC 134.6 03/27/2019    LDLCALC 50.2 (L) 12/11/2017    LDLCALC 106.8 10/16/2015     Lab Results   Component Value Date    TRIG 277 (H) 03/27/2019    TRIG 89 12/11/2017    TRIG 201 (H)  10/16/2015     Lab Results   Component Value Date    CHOLHDL 17.7 (L) 03/27/2019    CHOLHDL 35.8 12/11/2017    CHOLHDL 22.2 10/16/2015   - patient was not taking her simvastatin as prescribed at the time of her last lipid panel, simvastatin 40 mg daily was restarted and patient is scheduled for repeat lab in July.    Coronary artery disease involving native coronary artery of native heart without angina pectoris  -     aspirin (ECOTRIN) 81 MG EC tablet; Take 1 tablet (81 mg total) by mouth once daily.  - Stable, asymptomatic chronic condition.  Will continue to maximize risk factor reduction and adjust medication as needed.  - follow up with Cardiology, Dr. Simon, as instructed    History of heart artery stent   follow up with Cardiology, Dr. Simon, as instructed  Anxiety  -     citalopram (CELEXA) 40 MG tablet; Take 1 tablet (40 mg total) by mouth once daily, dose increased today from 20 mg due to patient's complaints of increased anxiety and in effectiveness of medication at lower dose.    Pulmonary emphysema, unspecified emphysema type   Continue current prescribed medications, reports stable symptoms, denies cough and shortness of breath   Keep scheduled appointment to establish care with pulmonology, Dr. Abad, 7/30/19  Prediabetes     Lab Results   Component Value Date    HGBA1C 5.7 (H) 03/27/2019    Educational handouts provided.  Pre diabetes   At this time lifestyle and diet changes are recommended.  Increase exercise and eat a portion controlled well-balanced diet.  Avoid concentrated sweets like cookies, cakes, and candy.  Do not drink sugar sweetened soft drinks.  Eat fewer white carbohydrates like potatoes, rice, bread, and pasta.  Choose sweet potatoes, brown rice, whole wheat bread and wheat pasta.      Tear of medial meniscus of right knee, current, unspecified tear type, subsequent encounter   Continue follow-up with orthopedist, Dr. Zuñiga, as instructed  Chronic bilateral low back pain with  left-sided sciatica  -     diclofenac (VOLTAREN) 75 MG EC tablet; Take 1 tablet (75 mg total) by mouth 2 (two) times daily.  - medication effective in relieving symptoms, to continue as is    BMI 24.0-24.9, adult   BMI today is 24.98 and the patient's weight is unchanged at 141 lb since visit on 1/23/2019   Maintain healthy weight with heathy diet and exercise/active lifestyle    Need for zoster vaccination  -     (In Office Administered) Zoster Recombinant Vaccine    Need for vaccine for DT (diphtheria-tetanus)  -     (In Office Administered) Td Vaccine - Preservative Free    Colon cancer screening  -     Ambulatory referral to Gastroenterology    Breast cancer screening  -     Mammo Digital Screening Bilateral With CAD; Future      Follow up in about 1 year (around 5/28/2020) for Dr. Daniels, 40 minutes, schedule second shingles vaccine.

## 2019-05-28 NOTE — PATIENT INSTRUCTIONS
Prevention Guidelines, Women Ages 50 to 64  Screening tests and vaccines are an important part of managing your health. Health counseling is essential, too. Below are guidelines for these, for women ages 50 to 64. Talk with your healthcare provider to make sure youre up to date on what you need.  Screening Who needs it How often   Type 2 diabetes or prediabetes All adults beginning at age 45 and adults without symptoms at any age who are overweight or obese and have 1 or more additional risk factors for diabetes. At  least every 3 years   Alcohol misuse All women in this age group At routine exams   Blood pressure All women in this age group Every 2 years if your blood pressure is less than 120/80 mm Hg; yearly if your systolic blood pressure is 120 to 139 mm Hg, or your diastolic blood pressure reading is 80 to 89 mm Hg   Breast cancer All women in this age group Yearly mammogram and clinical breast exam1   Cervical cancer All women in this age group, except women who have had a complete hysterectomy Pap test every 3 years or Pap test with human papillomavirus (HPV) test every 5 years   Chlamydia Women at increased risk for infection At routine exams   Colorectal cancer All women in this age group Flexible sigmoidoscopy every 5 years, or colonoscopy every 10 years, or double-contrast barium enema every 5 years; yearly fecal occult blood test or fecal immunochemical test; or a stool DNA test as often as your health care provider advises; talk with your health care provider about which tests are best for you   Depression All women in this age group At routine exams   Gonorrhea Sexually active women at increased risk for infection At routine exams   Hepatitis C Anyone at increased risk; 1 time for those born between 1945 and 1965 At routine exams   High cholesterol or triglycerides All women in this age group who are at risk for coronary artery disease At least every 5 years   HIV All women At routine exams   Lung  cancer Adults age 55 to 80 who have smoked Yearly screening in smokers with 30 pack-year history of smoking or who quit within 15 years   Obesity All women in this age group At routine exams   Osteoporosis Women who are postmenopausal Ask your healthcare provider   Syphilis Women at increased risk for infection - talk with your healthcare provider At routine exams   Tuberculosis Women at increased risk for infection - talk with your healthcare provider Ask your healthcare provider   Vision All women in this age group Ask your healthcare provider   Vaccine Who needs it How often   Chickenpox (varicella) All women in this age group who have no record of this infection or vaccine 2 doses; the second dose should be given at least 4 weeks after the first dose   Hepatitis A Women at increased risk for infection - talk with your healthcare provider 2 doses given at least 6 months apart   Hepatitis B Women at increased risk for infection - talk with your healthcare provider 3 doses over 6 months; second dose should be given 1 month after the first dose; the third dose should be given at least 2 months after the second dose and at least 4 months after the first dose   Haemophilus influenzaeType B (HIB) Women at increased risk for infection - talk with your healthcare provider 1 to 3 doses   Influenza (flu) All women in this age group Once a year   Measles, mumps, rubella (MMR) Women in this age group through their late 50s who have no record of these infections or vaccines 1 dose   Meningococcal Women at increased risk for infection - talk with your healthcare provider 1 or more doses   Pneumococcal conjugate vaccine (PCV13) and pneumococcal polysaccharide vaccine (PPSV23) Women at increased risk for infection - talk with your healthcare provider PCV13: 1 dose ages 19 to 65 (protects against 13 types of pneumococcal bacteria)  PPSV23: 1 to 2 doses through age 64, or 1 dose at 65 or older (protects against 23 types of  pneumococcal bacteria)   Tetanus/diphtheria/pertussis (Td/Tdap) booster All women in this age group Td every 10 years, or a one-time dose of Tdap instead of a Td booster after age 18, then Td every 10 years   Zoster All women ages 60 and older 1 dose   Counseling Who needs it How often   BRCA gene mutation testing for breast and ovarian cancer susceptibility Women with increased risk for having gene mutation When your risk is known   Breast cancer and chemoprevention Women at high risk for breast cancer When your risk is known   Diet and exercise Women who are overweight or obese When diagnosed, and then at routine exams   Sexually transmitted infection prevention Women at increased risk for infection - talk with your healthcare provider At routine exams   Use of daily aspirin Women ages 55 and up in this age group who are at risk for cardiovascular health problems such as stroke When your risk is known   Use of tobacco and the health effects it can cause All women in this age group Every exam   1[employer]  Date Last Reviewed: 1/26/2016  © 0645-1303 [employer]. [address] [place] [address], [place] [address]. All rights reserved. This information is not intended as a substitute for professional medical care. Always follow your healthcare professional's instructions.        Prediabetes  You have been diagnosed with prediabetes. This means that the level of sugar (glucose) in your blood is too high. If you have prediabetes, you are at risk for developing type 2 diabetes. Type 2 diabetes is diagnosed when the level of glucose in the blood reaches a certain high level. With prediabetes, it hasnt reached this point yet, but it is higher than normal. It is vital to make lifestyle changes to lower your blood sugar, improve your health, and prevent diabetes. This sheet will tell you more.      Why worry about prediabetes?  Prediabetes is a disease where the bodys cells have trouble using glucose in  the blood for energy. As a result, too much glucose stays in the blood and can affect how your heart and blood vessels work. Without changes in diet and lifestyle, the problem can get worse. Once you have type 2 diabetes, it is chronic (ongoing) and needs to be managed for the rest of your life. Diabetes can harm the body and your health by damaging organs, such as your eyes and kidneys. It makes you more likely to have heart disease. And it can damage nerves and blood vessels.  Who is a risk for prediabetes?  The exact cause of prediabetes is not clear. But certain risk factors make a person more likely to have it. These include:  · A family history of type 2 diabetes  · Being overweight  · Being over age 45  · Have hypertension or elevated cholesterol   · Having had gestational diabetes  · Not being physically active  · Being ,  American, , , , or   Diagnosing prediabetes  Prediabetes may have no symptoms or you may have some of the symptoms of diabetes. The diagnosis is made with a blood test. You may have one or more of these blood tests:   · Fasting glucose test. Blood is taken and tested after you have fasted (not eaten) for at least 8 hours. A normal test result is 99 milligrams per deciliter (mg/dL) or lower. Prediabetes is 100 mg/dL to 125 mg/dL. Diabetes is 126 mg/dL or higher.  · Glucose tolerance test. Your blood sugar is measured before and after you drink a very sugary liquid. A normal test result is 139 milligrams per deciliter (mg/dL) or lower. Prediabetes is 140 mg/dL to 199 mg/dL. Diabetes is 200 mg/dL or higher.  · Hemoglobin A1c (HbA1c). Your HbA1c is normal if it is below 5.7%. Prediabetes is 5.7% to 6.4%. Diabetes is 6.5% or higher.   Treating prediabetes  The best way to treat prediabetes is to lose at least 5% to 7% of your current weight and be more physically active by getting at least 150 minutes a week of physical  activity. When sitting for long periods of time, get up for short sessions of light activity every 30 minutes. These changes help the bodys cells use blood sugar better. Even a small amount of weight loss can help. Work with your healthcare provider to make a plan to eat well and be more active. Keep in mind that small changes can add up. Other changes in your lifestyle (or even taking certain medicines, such as metformin) may make you less likely to develop diabetes. Your healthcare provider can talk with you about these.  Follow-up  If it is untreated, prediabetes can turn into diabetes. This is a serious health condition. Take steps to stop this from happening. Follow the treatment plan you have been given. You may have your blood glucose tested again in about 12 to 18 months.  Symptoms of diabetes  Let your healthcare provider know if you have any of the following:  · Always feel very tired  · Feel very thirsty or hungry much of the time  · Have to urinate often  · Lose weight for no reason  · Feel numbness or tingling in your fingers or toes  · Have cuts or bruises that dont seem to heal  · Have blurry vision   Date Last Reviewed: 5/1/2016  © 1914-1596 [employer]. [address] [place] [address], [place] [address]. All rights reserved. This information is not intended as a substitute for professional medical care. Always follow your healthcare professional's instructions.      At this time lifestyle and diet changes are recommended.  You should increase exercise and lose weight by eating a portion controlled well-balanced diet.  Avoid concentrated sweets like cookies, cakes, and candy.  Do not drink sugar sweetened soft drinks.  Eat fewer white carbohydrates like potatoes, rice, bread, and pasta.  Choose sweet potatoes, brown rice, whole wheat bread and wheat pasta.

## 2019-06-21 ENCOUNTER — HOSPITAL ENCOUNTER (OUTPATIENT)
Dept: RADIOLOGY | Facility: HOSPITAL | Age: 64
Discharge: HOME | End: 2019-06-21
Attending: NURSE PRACTITIONER
Payer: COMMERCIAL

## 2019-06-21 DIAGNOSIS — Z12.39 BREAST CANCER SCREENING: ICD-10-CM

## 2019-06-21 PROCEDURE — 77063 MAMMO DIGITAL SCREENING BILAT WITH TOMOSYNTHESIS_CAD: ICD-10-PCS | Mod: 26,,, | Performed by: RADIOLOGY

## 2019-06-21 PROCEDURE — 77067 MAMMO DIGITAL SCREENING BILAT WITH TOMOSYNTHESIS_CAD: ICD-10-PCS | Mod: 26,,, | Performed by: RADIOLOGY

## 2019-06-21 PROCEDURE — 77067 SCR MAMMO BI INCL CAD: CPT | Mod: TC

## 2019-06-21 PROCEDURE — 77067 SCR MAMMO BI INCL CAD: CPT | Mod: 26,,, | Performed by: RADIOLOGY

## 2019-06-21 PROCEDURE — 77063 BREAST TOMOSYNTHESIS BI: CPT | Mod: 26,,, | Performed by: RADIOLOGY

## 2019-07-10 ENCOUNTER — LAB VISIT (OUTPATIENT)
Dept: LAB | Facility: HOSPITAL | Age: 64
End: 2019-07-10
Attending: FAMILY MEDICINE
Payer: COMMERCIAL

## 2019-07-10 DIAGNOSIS — Z51.81 THERAPEUTIC DRUG MONITORING: ICD-10-CM

## 2019-07-10 DIAGNOSIS — E78.5 HYPERLIPIDEMIA, UNSPECIFIED HYPERLIPIDEMIA TYPE: ICD-10-CM

## 2019-07-10 LAB
ALBUMIN SERPL BCP-MCNC: 3.7 G/DL (ref 3.5–5.2)
ALP SERPL-CCNC: 68 U/L (ref 55–135)
ALT SERPL W/O P-5'-P-CCNC: 15 U/L (ref 10–44)
ANION GAP SERPL CALC-SCNC: 10 MMOL/L (ref 8–16)
AST SERPL-CCNC: 14 U/L (ref 10–40)
BASOPHILS # BLD AUTO: 0.03 K/UL (ref 0–0.2)
BASOPHILS NFR BLD: 0.5 % (ref 0–1.9)
BILIRUB SERPL-MCNC: 0.7 MG/DL (ref 0.1–1)
BUN SERPL-MCNC: 16 MG/DL (ref 8–23)
CALCIUM SERPL-MCNC: 9.3 MG/DL (ref 8.7–10.5)
CHLORIDE SERPL-SCNC: 106 MMOL/L (ref 95–110)
CHOLEST SERPL-MCNC: 165 MG/DL (ref 120–199)
CHOLEST/HDLC SERPL: 3.9 {RATIO} (ref 2–5)
CO2 SERPL-SCNC: 23 MMOL/L (ref 23–29)
CREAT SERPL-MCNC: 0.7 MG/DL (ref 0.5–1.4)
DIFFERENTIAL METHOD: ABNORMAL
EOSINOPHIL # BLD AUTO: 0.2 K/UL (ref 0–0.5)
EOSINOPHIL NFR BLD: 3.8 % (ref 0–8)
ERYTHROCYTE [DISTWIDTH] IN BLOOD BY AUTOMATED COUNT: 11.6 % (ref 11.5–14.5)
EST. GFR  (AFRICAN AMERICAN): >60 ML/MIN/1.73 M^2
EST. GFR  (NON AFRICAN AMERICAN): >60 ML/MIN/1.73 M^2
GLUCOSE SERPL-MCNC: 85 MG/DL (ref 70–110)
HCT VFR BLD AUTO: 40.3 % (ref 37–48.5)
HDLC SERPL-MCNC: 42 MG/DL (ref 40–75)
HDLC SERPL: 25.5 % (ref 20–50)
HGB BLD-MCNC: 13.3 G/DL (ref 12–16)
IMM GRANULOCYTES # BLD AUTO: 0 K/UL (ref 0–0.04)
IMM GRANULOCYTES NFR BLD AUTO: 0 % (ref 0–0.5)
LDLC SERPL CALC-MCNC: 85 MG/DL (ref 63–159)
LYMPHOCYTES # BLD AUTO: 3.2 K/UL (ref 1–4.8)
LYMPHOCYTES NFR BLD: 57.3 % (ref 18–48)
MCH RBC QN AUTO: 30.6 PG (ref 27–31)
MCHC RBC AUTO-ENTMCNC: 33 G/DL (ref 32–36)
MCV RBC AUTO: 93 FL (ref 82–98)
MONOCYTES # BLD AUTO: 0.5 K/UL (ref 0.3–1)
MONOCYTES NFR BLD: 8.2 % (ref 4–15)
NEUTROPHILS # BLD AUTO: 1.7 K/UL (ref 1.8–7.7)
NEUTROPHILS NFR BLD: 30.2 % (ref 38–73)
NONHDLC SERPL-MCNC: 123 MG/DL
NRBC BLD-RTO: 0 /100 WBC
PLATELET # BLD AUTO: 241 K/UL (ref 150–350)
PMV BLD AUTO: 10.3 FL (ref 9.2–12.9)
POTASSIUM SERPL-SCNC: 4 MMOL/L (ref 3.5–5.1)
PROT SERPL-MCNC: 6.5 G/DL (ref 6–8.4)
RBC # BLD AUTO: 4.34 M/UL (ref 4–5.4)
SODIUM SERPL-SCNC: 139 MMOL/L (ref 136–145)
TRIGL SERPL-MCNC: 190 MG/DL (ref 30–150)
WBC # BLD AUTO: 5.5 K/UL (ref 3.9–12.7)

## 2019-07-10 PROCEDURE — 80053 COMPREHEN METABOLIC PANEL: CPT

## 2019-07-10 PROCEDURE — 85025 COMPLETE CBC W/AUTO DIFF WBC: CPT

## 2019-07-10 PROCEDURE — 36415 COLL VENOUS BLD VENIPUNCTURE: CPT | Mod: PO

## 2019-07-10 PROCEDURE — 80061 LIPID PANEL: CPT

## 2019-07-30 ENCOUNTER — OFFICE VISIT (OUTPATIENT)
Dept: PULMONOLOGY | Facility: CLINIC | Age: 64
End: 2019-07-30
Payer: COMMERCIAL

## 2019-07-30 VITALS
BODY MASS INDEX: 25.52 KG/M2 | HEART RATE: 83 BPM | WEIGHT: 144 LBS | OXYGEN SATURATION: 98 % | SYSTOLIC BLOOD PRESSURE: 130 MMHG | HEIGHT: 63 IN | DIASTOLIC BLOOD PRESSURE: 74 MMHG

## 2019-07-30 DIAGNOSIS — J43.9 PULMONARY EMPHYSEMA, UNSPECIFIED EMPHYSEMA TYPE: Primary | ICD-10-CM

## 2019-07-30 DIAGNOSIS — Z87.891 PERSONAL HISTORY OF TOBACCO USE, PRESENTING HAZARDS TO HEALTH: ICD-10-CM

## 2019-07-30 PROCEDURE — 3008F PR BODY MASS INDEX (BMI) DOCUMENTED: ICD-10-PCS | Mod: S$GLB,,, | Performed by: INTERNAL MEDICINE

## 2019-07-30 PROCEDURE — 99204 PR OFFICE/OUTPT VISIT, NEW, LEVL IV, 45-59 MIN: ICD-10-PCS | Mod: S$GLB,,, | Performed by: INTERNAL MEDICINE

## 2019-07-30 PROCEDURE — 3078F DIAST BP <80 MM HG: CPT | Mod: S$GLB,,, | Performed by: INTERNAL MEDICINE

## 2019-07-30 PROCEDURE — 3078F PR MOST RECENT DIASTOLIC BLOOD PRESSURE < 80 MM HG: ICD-10-PCS | Mod: S$GLB,,, | Performed by: INTERNAL MEDICINE

## 2019-07-30 PROCEDURE — 3075F PR MOST RECENT SYSTOLIC BLOOD PRESS GE 130-139MM HG: ICD-10-PCS | Mod: S$GLB,,, | Performed by: INTERNAL MEDICINE

## 2019-07-30 PROCEDURE — 3008F BODY MASS INDEX DOCD: CPT | Mod: S$GLB,,, | Performed by: INTERNAL MEDICINE

## 2019-07-30 PROCEDURE — 99204 OFFICE O/P NEW MOD 45 MIN: CPT | Mod: S$GLB,,, | Performed by: INTERNAL MEDICINE

## 2019-07-30 PROCEDURE — 3075F SYST BP GE 130 - 139MM HG: CPT | Mod: S$GLB,,, | Performed by: INTERNAL MEDICINE

## 2019-07-30 NOTE — PROGRESS NOTES
New Office Visit/Consultation Note    Patient Name: Aysha Moore  MRN: [id number]  : 1955      Reason for visit: COPD    HPI:     2019 - Here for evaluation of COPD.  Has been hospitalized twice for respiratory issues.  Here more recently has noted some symptoms but has been out of BREO for a week or so.  Has a h/o smoking - has quit cigarettes but is using a vape.  Has smoked about 1 PPD for about 40 years.    Low Dose Screening CT Chest    Cigarettes - 1 PPD x 40 YEARS  Still smoking -   NO  QUIT 3/2019    Date of last LD CT - p    Result - p    I have discussed with pt about using a screening CT of the chest due to history of cigarette smoking.  We have discussed the possible findings and possible actions as a result of these findings.  The pt would like to proceed.      Past Medical History    Past Medical History:   Diagnosis Date    Allergy     Anxiety     Arthritis     CAD (coronary artery disease)     Chronic back pain     Chronic rhinitis     Hyperlipidemia     Hypertension        Past Surgical History    Past Surgical History:   Procedure Laterality Date    BREAST SURGERY      breast reduction    coranary stent      HYSTERECTOMY      total       Medications      Current Outpatient Medications:     albuterol-ipratropium 2.5mg-0.5mg/3mL (DUO-NEB) 0.5 mg-3 mg(2.5 mg base)/3 mL nebulizer solution, Take 3 mLs by nebulization every 6 (six) hours as needed for Wheezing. Rescue, Disp: 300 mL, Rfl: 5    aspirin (ECOTRIN) 81 MG EC tablet, Take 1 tablet (81 mg total) by mouth once daily., Disp: , Rfl: 0    citalopram (CELEXA) 40 MG tablet, Take 1 tablet (40 mg total) by mouth once daily., Disp: 30 tablet, Rfl: 5    diclofenac (VOLTAREN) 75 MG EC tablet, Take 1 tablet (75 mg total) by mouth 2 (two) times daily., Disp: 60 tablet, Rfl: 5    fluticasone furoate-vilanterol (BREO) 100-25 mcg/dose diskus inhaler, Inhale 1 puff into the lungs once daily. Controller, Disp: , Rfl:     montelukast  (SINGULAIR) 10 mg tablet, TAKE 1 TABLET (10 MG TOTAL) BY MOUTH EVERY EVENING., Disp: 30 tablet, Rfl: 5    simvastatin (ZOCOR) 40 MG tablet, Take 1 tablet (40 mg total) by mouth every evening., Disp: 30 tablet, Rfl: 5    valACYclovir (VALTREX) 1000 MG tablet, Take 2 at onset of fever blisters then repeat in 12 hours (total 4 tabs per episode), Disp: 20 tablet, Rfl: 5    VENTOLIN HFA 90 mcg/actuation inhaler, INHALE 2 PUFFS INTO THE LUNGS 4 (FOUR) TIMES DAILY, Disp: 18 Inhaler, Rfl: 1    alprazolam (XANAX) 0.25 MG tablet, Take 0.25 mg by mouth daily as needed., Disp: , Rfl: 5    calcium carbonate-vit D3-min (CALTRATE PLUS) 600 mg calcium- 400 unit Tab, Take 1 tablet by mouth once daily. Every day, Disp: , Rfl:     Allergies    Review of patient's allergies indicates:   Allergen Reactions    Cephalexin      Other reaction(s): Rash    Doxycycline monohydrate Hives    Lanoxin  [digoxin]      Other reaction(s): Rash    Lansoprazole      Other reaction(s): Rash    Macrobid [nitrofurantoin monohyd/m-cryst]        SocHx    Social History     Tobacco Use   Smoking Status Former Smoker    Packs/day: 1.00    Types: Cigarettes    Last attempt to quit: 3/4/2017    Years since quittin.4   Smokeless Tobacco Never Used       Social History     Substance and Sexual Activity   Alcohol Use Yes    Alcohol/week: 0.0 oz    Comment: sometimes       Drug Use - no  Occupation - housewife  Asbestos exposure - no  Pets - dogs    FMHx    Family History   Problem Relation Age of Onset    Cancer Mother         breast, ovarian, cervical     Heart disease Mother     Breast cancer Mother     Cancer Father         melanoma    Stroke Sister     Heart disease Sister     Cancer Sister         leukemia    Heart disease Brother     Heart disease Maternal Grandmother     No Known Problems Daughter     No Known Problems Son     Cancer Maternal Uncle     Cancer Paternal Aunt         breast    Breast cancer Paternal Aunt      "Cancer Paternal Uncle     Macular degeneration Sister     Heart disease Sister          Review of Systems  Review of Systems   Constitutional: Negative for chills, diaphoresis, fever, malaise/fatigue and weight loss.   HENT: Positive for congestion and tinnitus. Negative for ear discharge, ear pain, hearing loss, nosebleeds, sinus pain and sore throat.         Has had tinnitus for years (has seen ENT)   Eyes: Negative for pain.   Respiratory: Positive for shortness of breath and wheezing. Negative for cough, hemoptysis, sputum production and stridor.    Cardiovascular: Negative for chest pain, palpitations, orthopnea, claudication, leg swelling and PND.        H/o PCI   Gastrointestinal: Negative for abdominal pain, blood in stool, constipation, diarrhea, heartburn, melena, nausea and vomiting.   Genitourinary: Negative for dysuria, flank pain, frequency, hematuria and urgency.   Musculoskeletal: Positive for back pain, joint pain and neck pain. Negative for falls and myalgias.        Right knee meniscal injury   Skin: Negative for itching and rash.   Neurological: Negative for dizziness, tingling, tremors, sensory change, speech change, focal weakness, seizures, weakness and headaches.   Endo/Heme/Allergies: Negative for environmental allergies.   Psychiatric/Behavioral: Negative for depression, substance abuse and suicidal ideas. The patient is not nervous/anxious.        Physical Exam    Vitals:    07/30/19 0934   BP: 130/74   Pulse: 83   SpO2: 98%   Weight: 65.3 kg (144 lb)   Height: 5' 3" (1.6 m)       Physical Exam   Constitutional: She is oriented to person, place, and time. She appears well-developed and well-nourished. No distress.   HENT:   Head: Normocephalic and atraumatic.   Right Ear: External ear normal.   Left Ear: External ear normal.   Nose: Nose normal.   Mouth/Throat: Oropharynx is clear and moist.   O/p - mild postnasal drip   Eyes: Pupils are equal, round, and reactive to light. Conjunctivae " and EOM are normal.   Neck: Normal range of motion. Neck supple. No JVD present. No tracheal deviation present. No thyromegaly present.   Cardiovascular: Normal rate, regular rhythm, normal heart sounds and intact distal pulses. Exam reveals no gallop and no friction rub.   No murmur heard.  Pulmonary/Chest: Effort normal and breath sounds normal. No stridor. No respiratory distress. She has no wheezes. She has no rales. She exhibits no tenderness.   Abdominal: Soft. Bowel sounds are normal. She exhibits no distension. There is no tenderness.   Musculoskeletal: Normal range of motion. She exhibits no edema or tenderness.   Lymphadenopathy:     She has no cervical adenopathy.   Neurological: She is alert and oriented to person, place, and time. No cranial nerve deficit.   Skin: Skin is warm and dry. She is not diaphoretic.   Psychiatric: She has a normal mood and affect. Her behavior is normal.   Nursing note and vitals reviewed.      Labs    Lab Results   Component Value Date    WBC 5.50 07/10/2019    HGB 13.3 07/10/2019    HCT 40.3 07/10/2019     07/10/2019       Sodium   Date Value Ref Range Status   07/10/2019 139 136 - 145 mmol/L Final     Potassium   Date Value Ref Range Status   07/10/2019 4.0 3.5 - 5.1 mmol/L Final     Chloride   Date Value Ref Range Status   07/10/2019 106 95 - 110 mmol/L Final     CO2   Date Value Ref Range Status   07/10/2019 23 23 - 29 mmol/L Final     Glucose   Date Value Ref Range Status   07/10/2019 85 70 - 110 mg/dL Final     BUN, Bld   Date Value Ref Range Status   07/10/2019 16 8 - 23 mg/dL Final     Creatinine   Date Value Ref Range Status   07/10/2019 0.7 0.5 - 1.4 mg/dL Final     Calcium   Date Value Ref Range Status   07/10/2019 9.3 8.7 - 10.5 mg/dL Final     Total Protein   Date Value Ref Range Status   07/10/2019 6.5 6.0 - 8.4 g/dL Final     Albumin   Date Value Ref Range Status   07/10/2019 3.7 3.5 - 5.2 g/dL Final     Total Bilirubin   Date Value Ref Range Status    07/10/2019 0.7 0.1 - 1.0 mg/dL Final     Comment:     For infants and newborns, interpretation of results should be based  on gestational age, weight and in agreement with clinical  observations.  Premature Infant recommended reference ranges:  Up to 24 hours.............<8.0 mg/dL  Up to 48 hours............<12.0 mg/dL  3-5 days..................<15.0 mg/dL  6-29 days.................<15.0 mg/dL       Alkaline Phosphatase   Date Value Ref Range Status   07/10/2019 68 55 - 135 U/L Final     AST   Date Value Ref Range Status   07/10/2019 14 10 - 40 U/L Final     ALT   Date Value Ref Range Status   07/10/2019 15 10 - 44 U/L Final     Anion Gap   Date Value Ref Range Status   07/10/2019 10 8 - 16 mmol/L Final       Xrays        Impression/Plan    Problem List Items Addressed This Visit        Pulmonary    Pulmonary emphysema - Primary  -  Needs evaluation and will order studies  - change to ANORO (samples and RX done), and prn beta agonist  - RTC 3 month    Relevant Orders    Complete PFT with bronchodilator    Six Minute Walk Test to qualify for Home Oxygen       Other    Personal history of tobacco use, presenting hazards to health  - has quit  - needs to stop vaping    Relevant Orders    CT Chest Lung Screening Low Dose          I have spent about 45 minutes with the patient taking the history and examining the patient.  We have discussed the diagnoses and current plan and all questions have been answered.  We have discussed the follow up plan.  The patient and family (if present) know to contact the office with any questions they may have.        Raad Abad MD

## 2019-08-01 ENCOUNTER — HOSPITAL ENCOUNTER (OUTPATIENT)
Dept: RADIOLOGY | Facility: HOSPITAL | Age: 64
Discharge: HOME | End: 2019-08-01
Attending: INTERNAL MEDICINE
Payer: COMMERCIAL

## 2019-08-01 DIAGNOSIS — Z87.891 PERSONAL HISTORY OF TOBACCO USE, PRESENTING HAZARDS TO HEALTH: ICD-10-CM

## 2019-08-01 PROCEDURE — G0297 LDCT FOR LUNG CA SCREEN: HCPCS | Mod: TC

## 2019-08-02 DIAGNOSIS — J43.9 EMPHYSEMATOUS BLEB: Primary | ICD-10-CM

## 2019-08-07 ENCOUNTER — HOSPITAL ENCOUNTER (OUTPATIENT)
Dept: PULMONOLOGY | Facility: HOSPITAL | Age: 64
Discharge: HOME | End: 2019-08-07
Attending: INTERNAL MEDICINE
Payer: COMMERCIAL

## 2019-08-07 VITALS — HEIGHT: 63 IN | WEIGHT: 144 LBS | BODY MASS INDEX: 25.52 KG/M2

## 2019-08-07 DIAGNOSIS — J43.9 EMPHYSEMATOUS BLEB: ICD-10-CM

## 2019-08-07 PROCEDURE — 94060 EVALUATION OF WHEEZING: CPT

## 2019-08-07 PROCEDURE — 94727 GAS DIL/WSHOT DETER LNG VOL: CPT

## 2019-08-07 PROCEDURE — 94618 PULMONARY STRESS TESTING: CPT

## 2019-08-07 PROCEDURE — 94729 DIFFUSING CAPACITY: CPT

## 2019-08-07 NOTE — CARE UPDATE
"   08/07/19 1514   6MW Test   Diagnosis Qualify for Oxygen   Height 5' 3" (1.6 m)   Weight 65.3 kg (144 lb)   BMI (Calculated) 25.6   Predicted Distance 350.97   Patient Race    6MWT Status completed without stopping   Patient Reported Dyspnea   Was O2 used? No   6MW Distance walked (feet) 1200 feet   Distance walked (meters) 365.76 meters   Did patient stop? No   Type of assistive device(s) used? no assistive devices   Is extra documentation required for this patient?   (This is a Non-Hypoxemic 6 min. walk. Patient did not qualify for Home Oxygen. )   Pre-Exercise   Oxygen Saturation 95 %   Supplemental Oxygen Room Air   Heart Rate 73 bpm   [place] Dyspnea Rating  very, very light (just noticeable)   Post Exercise   Oxygen Saturation 94 %   Supplemental Oxygen Room Air   Heart Rate 88 bpm   [place] Dyspnea Rating  moderate   Recovery   Oxygen Saturation 95 %   Supplemental Oxygen Room Air   Heart Rate 86 bpm   [place] Dyspnea Rating  light   Interpretation   Is procedure ready for interpretation? No   Predicted Distance Meters (Calculated) 490.88 meters   Oxygen Qualification   Oxygen Qualification? No     "

## 2019-08-10 ENCOUNTER — TELEPHONE (OUTPATIENT)
Dept: PULMONOLOGY | Facility: HOSPITAL | Age: 64
End: 2019-08-10

## 2019-10-28 ENCOUNTER — TELEPHONE (OUTPATIENT)
Dept: PULMONOLOGY | Facility: CLINIC | Age: 64
End: 2019-10-28

## 2019-11-04 ENCOUNTER — OFFICE VISIT (OUTPATIENT)
Dept: PULMONOLOGY | Facility: CLINIC | Age: 64
End: 2019-11-04
Payer: COMMERCIAL

## 2019-11-04 VITALS
OXYGEN SATURATION: 98 % | SYSTOLIC BLOOD PRESSURE: 122 MMHG | BODY MASS INDEX: 25.87 KG/M2 | DIASTOLIC BLOOD PRESSURE: 80 MMHG | HEIGHT: 63 IN | HEART RATE: 76 BPM | WEIGHT: 146 LBS

## 2019-11-04 DIAGNOSIS — Z87.891 PERSONAL HISTORY OF TOBACCO USE, PRESENTING HAZARDS TO HEALTH: ICD-10-CM

## 2019-11-04 DIAGNOSIS — J43.9 PULMONARY EMPHYSEMA, UNSPECIFIED EMPHYSEMA TYPE: Primary | ICD-10-CM

## 2019-11-04 PROCEDURE — 3079F DIAST BP 80-89 MM HG: CPT | Mod: S$GLB,,, | Performed by: INTERNAL MEDICINE

## 2019-11-04 PROCEDURE — 3074F SYST BP LT 130 MM HG: CPT | Mod: S$GLB,,, | Performed by: INTERNAL MEDICINE

## 2019-11-04 PROCEDURE — 99214 OFFICE O/P EST MOD 30 MIN: CPT | Mod: S$GLB,,, | Performed by: INTERNAL MEDICINE

## 2019-11-04 PROCEDURE — 3074F PR MOST RECENT SYSTOLIC BLOOD PRESSURE < 130 MM HG: ICD-10-PCS | Mod: S$GLB,,, | Performed by: INTERNAL MEDICINE

## 2019-11-04 PROCEDURE — 3008F BODY MASS INDEX DOCD: CPT | Mod: S$GLB,,, | Performed by: INTERNAL MEDICINE

## 2019-11-04 PROCEDURE — 3008F PR BODY MASS INDEX (BMI) DOCUMENTED: ICD-10-PCS | Mod: S$GLB,,, | Performed by: INTERNAL MEDICINE

## 2019-11-04 PROCEDURE — 3079F PR MOST RECENT DIASTOLIC BLOOD PRESSURE 80-89 MM HG: ICD-10-PCS | Mod: S$GLB,,, | Performed by: INTERNAL MEDICINE

## 2019-11-04 PROCEDURE — 99214 PR OFFICE/OUTPT VISIT, EST, LEVL IV, 30-39 MIN: ICD-10-PCS | Mod: S$GLB,,, | Performed by: INTERNAL MEDICINE

## 2019-11-04 RX ORDER — UMECLIDINIUM BROMIDE AND VILANTEROL TRIFENATATE 62.5; 25 UG/1; UG/1
POWDER RESPIRATORY (INHALATION)
Refills: 5 | COMMUNITY
Start: 2019-10-02 | End: 2020-03-09

## 2019-11-04 NOTE — PROGRESS NOTES
"Office Visit    Patient Name: Aysha Moore  MRN: [id number]  : 1955      Reason for visit: COPD    HPI:     2019 - Here for evaluation of COPD.  Has been hospitalized twice for respiratory issues.  Here more recently has noted some symptoms but has been out of BREO for a week or so.  Has a h/o smoking - has quit cigarettes but is using a vape.  Has smoked about 1 PPD for about 40 years.    2019 - Here for follow up, no new issues reported.  Still vaping (d/we pt).  Reviewed PFT with pt.  Pt is currently stable on present medications with no recent increases in their symptoms or use of rescue medications.  I have reviewed the medical regimen and re-educated the pt on the role of rescue and controlling medications.  All questions answered.  Inhaler technique seems adequate.      Low Dose Screening CT Chest    Cigarettes - 1 PPD x 40 YEARS  Still smoking -   NO  QUIT 3/2019     Date of last LD CT - 2019    Result - Category 1, needs repeat in 1 year    I have discussed with pt about using a screening CT of the chest due to history of cigarette smoking.  We have discussed the possible findings and possible actions as a result of these findings.  The pt would like to proceed.    COPD Flowsheet    GOLD A    Last PFT - 2019  FEV1- 74 % DLCO - 50 %     + LABA/LAMA    + prn ALBUTEROL    mMRC -  0 - SOB with strenuous exercise   - + 1 - SOB level ground, slight hill   -  2 - SOB walk slower or stop for breath level ground   -  3 - SOB at 100 yards or after few minutes   -  4 - SOB in house, dressing    Referral to PULMONARY REHABILITATION - NO    Tested for alpha-1-antitrypsin - NO  Result - na    Cigarette Counseling    Currently smoking 1 packs per day  40 pack years    Quit smoking but is vaping    I have counseled pt for 3-5 minutes regarding cigarette cessation.  This has included the need to stop smoking as well as strategies, including but not limited to "cold turkey", CHANTIX (including risks " and benefits of whitney drug), nicotine replacement and WELLBUTRIN.    Flu and Pneumonia Vaccination    I have recommended that the patient get this years influenza vaccine.  We discussed the risks and benefits of this treatment.    I reviewed patient's current pneumonia vaccine status.  Patient needs vaccination.  We have discussed the current guidelines and recommendations for pneumonia vaccination.            Past Medical History    Past Medical History:   Diagnosis Date    Allergy     Anxiety     Arthritis     CAD (coronary artery disease)     Chronic back pain     Chronic rhinitis     Hyperlipidemia     Hypertension        Past Surgical History    Past Surgical History:   Procedure Laterality Date    BREAST SURGERY      breast reduction    coranary stent      HYSTERECTOMY      total       Medications      Current Outpatient Medications:     albuterol-ipratropium 2.5mg-0.5mg/3mL (DUO-NEB) 0.5 mg-3 mg(2.5 mg base)/3 mL nebulizer solution, Take 3 mLs by nebulization every 6 (six) hours as needed for Wheezing. Rescue, Disp: 300 mL, Rfl: 5    alprazolam (XANAX) 0.25 MG tablet, Take 0.25 mg by mouth daily as needed., Disp: , Rfl: 5    ANORO ELLIPTA 62.5-25 mcg/actuation DsDv, INHALE 1 PUFF INTO THE LUNGS ONCE DAILY, Disp: , Rfl: 5    aspirin (ECOTRIN) 81 MG EC tablet, Take 1 tablet (81 mg total) by mouth once daily., Disp: , Rfl: 0    calcium carbonate-vit D3-min (CALTRATE PLUS) 600 mg calcium- 400 unit Tab, Take 1 tablet by mouth once daily. Every day, Disp: , Rfl:     citalopram (CELEXA) 40 MG tablet, Take 1 tablet (40 mg total) by mouth once daily., Disp: 30 tablet, Rfl: 5    diclofenac (VOLTAREN) 75 MG EC tablet, Take 1 tablet (75 mg total) by mouth 2 (two) times daily., Disp: 60 tablet, Rfl: 5    montelukast (SINGULAIR) 10 mg tablet, TAKE 1 TABLET (10 MG TOTAL) BY MOUTH EVERY EVENING., Disp: 30 tablet, Rfl: 5    simvastatin (ZOCOR) 40 MG tablet, Take 1 tablet (40 mg total) by mouth every  evening., Disp: 30 tablet, Rfl: 5    valACYclovir (VALTREX) 1000 MG tablet, Take 2 at onset of fever blisters then repeat in 12 hours (total 4 tabs per episode), Disp: 20 tablet, Rfl: 5    VENTOLIN HFA 90 mcg/actuation inhaler, INHALE 2 PUFFS INTO THE LUNGS 4 (FOUR) TIMES DAILY, Disp: 18 Inhaler, Rfl: 1    Allergies    Review of patient's allergies indicates:   Allergen Reactions    Cephalexin      Other reaction(s): Rash    Doxycycline monohydrate Hives    Lanoxin  [digoxin]      Other reaction(s): Rash    Lansoprazole      Other reaction(s): Rash    Macrobid [nitrofurantoin monohyd/m-cryst]        SocHx    Social History     Tobacco Use   Smoking Status Former Smoker    Packs/day: 1.00    Types: Cigarettes    Last attempt to quit: 3/4/2017    Years since quittin.6   Smokeless Tobacco Never Used       Social History     Substance and Sexual Activity   Alcohol Use Yes    Alcohol/week: 0.0 standard drinks    Comment: sometimes       Drug Use - no  Occupation - housewife  Asbestos exposure - no  Pets - dogs    FMHx    Family History   Problem Relation Age of Onset    Cancer Mother         breast, ovarian, cervical     Heart disease Mother     Breast cancer Mother     Cancer Father         melanoma    Stroke Sister     Heart disease Sister     Cancer Sister         leukemia    Heart disease Brother     Heart disease Maternal Grandmother     No Known Problems Daughter     No Known Problems Son     Cancer Maternal Uncle     Cancer Paternal Aunt         breast    Breast cancer Paternal Aunt     Cancer Paternal Uncle     Macular degeneration Sister     Heart disease Sister          Review of Systems  Review of Systems   Constitutional: Negative for chills, diaphoresis, fever, malaise/fatigue and weight loss.   HENT: Positive for congestion and tinnitus. Negative for ear discharge, ear pain, hearing loss, nosebleeds, sinus pain and sore throat.         Has had tinnitus for years (has seen  "ENT)   Eyes: Negative for pain.   Respiratory: Positive for shortness of breath and wheezing. Negative for cough, hemoptysis, sputum production and stridor.    Cardiovascular: Negative for chest pain, palpitations, orthopnea, claudication, leg swelling and PND.        H/o PCI   Gastrointestinal: Negative for abdominal pain, blood in stool, constipation, diarrhea, heartburn, melena, nausea and vomiting.   Genitourinary: Negative for dysuria, flank pain, frequency, hematuria and urgency.   Musculoskeletal: Positive for back pain, joint pain and neck pain. Negative for falls and myalgias.        Right knee meniscal injury   Skin: Negative for itching and rash.   Neurological: Negative for dizziness, tingling, tremors, sensory change, speech change, focal weakness, seizures, weakness and headaches.   Endo/Heme/Allergies: Negative for environmental allergies.   Psychiatric/Behavioral: Negative for depression, substance abuse and suicidal ideas. The patient is not nervous/anxious.        Physical Exam    Vitals:    11/04/19 1124   BP: 122/80   Pulse: 76   SpO2: 98%   Weight: 66.2 kg (146 lb)   Height: 5' 3" (1.6 m)       Physical Exam   Constitutional: She is oriented to person, place, and time. She appears well-developed and well-nourished. No distress.   HENT:   Head: Normocephalic and atraumatic.   Right Ear: External ear normal.   Left Ear: External ear normal.   Nose: Nose normal.   Mouth/Throat: Oropharynx is clear and moist.   O/p - mild postnasal drip   Eyes: Pupils are equal, round, and reactive to light. Conjunctivae and EOM are normal.   Neck: Normal range of motion. Neck supple. No JVD present. No tracheal deviation present. No thyromegaly present.   Cardiovascular: Normal rate, regular rhythm, normal heart sounds and intact distal pulses. Exam reveals no gallop and no friction rub.   No murmur heard.  Pulmonary/Chest: Effort normal and breath sounds normal. No stridor. No respiratory distress. She has no " wheezes. She has no rales. She exhibits no tenderness.   Abdominal: Soft. Bowel sounds are normal. She exhibits no distension. There is no tenderness.   Musculoskeletal: Normal range of motion. She exhibits no edema or tenderness.   Lymphadenopathy:     She has no cervical adenopathy.   Neurological: She is alert and oriented to person, place, and time. No cranial nerve deficit.   Skin: Skin is warm and dry. She is not diaphoretic.   Psychiatric: She has a normal mood and affect. Her behavior is normal.   Nursing note and vitals reviewed.      Labs    Lab Results   Component Value Date    WBC 5.50 07/10/2019    HGB 13.3 07/10/2019    HCT 40.3 07/10/2019     07/10/2019       Sodium   Date Value Ref Range Status   07/10/2019 139 136 - 145 mmol/L Final     Potassium   Date Value Ref Range Status   07/10/2019 4.0 3.5 - 5.1 mmol/L Final     Chloride   Date Value Ref Range Status   07/10/2019 106 95 - 110 mmol/L Final     CO2   Date Value Ref Range Status   07/10/2019 23 23 - 29 mmol/L Final     Glucose   Date Value Ref Range Status   07/10/2019 85 70 - 110 mg/dL Final     BUN, Bld   Date Value Ref Range Status   07/10/2019 16 8 - 23 mg/dL Final     Creatinine   Date Value Ref Range Status   07/10/2019 0.7 0.5 - 1.4 mg/dL Final     Calcium   Date Value Ref Range Status   07/10/2019 9.3 8.7 - 10.5 mg/dL Final     Total Protein   Date Value Ref Range Status   07/10/2019 6.5 6.0 - 8.4 g/dL Final     Albumin   Date Value Ref Range Status   07/10/2019 3.7 3.5 - 5.2 g/dL Final     Total Bilirubin   Date Value Ref Range Status   07/10/2019 0.7 0.1 - 1.0 mg/dL Final     Comment:     For infants and newborns, interpretation of results should be based  on gestational age, weight and in agreement with clinical  observations.  Premature Infant recommended reference ranges:  Up to 24 hours.............<8.0 mg/dL  Up to 48 hours............<12.0 mg/dL  3-5 days..................<15.0 mg/dL  6-29 days.................<15.0  mg/dL       Alkaline Phosphatase   Date Value Ref Range Status   07/10/2019 68 55 - 135 U/L Final     AST   Date Value Ref Range Status   07/10/2019 14 10 - 40 U/L Final     ALT   Date Value Ref Range Status   07/10/2019 15 10 - 44 U/L Final     Anion Gap   Date Value Ref Range Status   07/10/2019 10 8 - 16 mmol/L Final       Xrays        Impression/Plan    Problem List Items Addressed This Visit        Pulmonary    Pulmonary emphysema - Primary  -  Continue present meds  -  RTC 6 month                   Other    Personal history of tobacco use, presenting hazards to health  - has quit  - needs to stop vaping                      Raad Abad MD

## 2019-11-26 DIAGNOSIS — M54.42 CHRONIC BILATERAL LOW BACK PAIN WITH LEFT-SIDED SCIATICA: ICD-10-CM

## 2019-11-26 DIAGNOSIS — G89.29 CHRONIC BILATERAL LOW BACK PAIN WITH LEFT-SIDED SCIATICA: ICD-10-CM

## 2019-11-26 DIAGNOSIS — F41.9 ANXIETY: ICD-10-CM

## 2019-11-26 RX ORDER — CITALOPRAM 40 MG/1
TABLET, FILM COATED ORAL
Qty: 30 TABLET | Refills: 0 | Status: SHIPPED | OUTPATIENT
Start: 2019-11-26 | End: 2019-12-30

## 2019-11-26 RX ORDER — DICLOFENAC SODIUM 75 MG/1
TABLET, DELAYED RELEASE ORAL
Qty: 60 TABLET | Refills: 0 | Status: SHIPPED | OUTPATIENT
Start: 2019-11-26 | End: 2019-12-30

## 2019-12-25 DIAGNOSIS — F41.9 ANXIETY: ICD-10-CM

## 2019-12-30 DIAGNOSIS — M54.42 CHRONIC BILATERAL LOW BACK PAIN WITH LEFT-SIDED SCIATICA: ICD-10-CM

## 2019-12-30 DIAGNOSIS — G89.29 CHRONIC BILATERAL LOW BACK PAIN WITH LEFT-SIDED SCIATICA: ICD-10-CM

## 2019-12-30 RX ORDER — DICLOFENAC SODIUM 75 MG/1
TABLET, DELAYED RELEASE ORAL
Qty: 60 TABLET | Refills: 0 | Status: ON HOLD | OUTPATIENT
Start: 2019-12-30 | End: 2020-10-18 | Stop reason: HOSPADM

## 2019-12-30 RX ORDER — CITALOPRAM 40 MG/1
TABLET, FILM COATED ORAL
Qty: 30 TABLET | Refills: 3 | Status: ON HOLD | OUTPATIENT
Start: 2019-12-30 | End: 2020-10-16

## 2020-01-23 DIAGNOSIS — J31.0 CHRONIC RHINITIS: ICD-10-CM

## 2020-01-23 NOTE — TELEPHONE ENCOUNTER
I have not seen her in over a year and a half, she is being followed by Pulmonary.  They could refill this if they want her on it

## 2020-01-24 ENCOUNTER — DOCUMENTATION ONLY (OUTPATIENT)
Dept: FAMILY MEDICINE | Facility: CLINIC | Age: 65
End: 2020-01-24

## 2020-01-24 RX ORDER — MONTELUKAST SODIUM 10 MG/1
10 TABLET ORAL NIGHTLY
Qty: 30 TABLET | Refills: 0 | Status: SHIPPED | OUTPATIENT
Start: 2020-01-24 | End: 2020-02-24

## 2020-01-24 NOTE — PROGRESS NOTES
Pre-Visit Chart Review  For Appointment Scheduled on 1-30-20    Health Maintenance Due   Topic Date Due    Colonoscopy  11/29/2012

## 2020-01-30 ENCOUNTER — DOCUMENTATION ONLY (OUTPATIENT)
Dept: FAMILY MEDICINE | Facility: CLINIC | Age: 65
End: 2020-01-30

## 2020-01-30 NOTE — PROGRESS NOTES
Pre-Visit Chart Review  For Appointment Scheduled on 4-1-20    Health Maintenance Due   Topic Date Due    Colonoscopy  11/29/2012

## 2020-02-23 DIAGNOSIS — J31.0 CHRONIC RHINITIS: ICD-10-CM

## 2020-02-24 RX ORDER — MONTELUKAST SODIUM 10 MG/1
TABLET ORAL
Qty: 30 TABLET | Refills: 1 | Status: SHIPPED | OUTPATIENT
Start: 2020-02-24 | End: 2020-03-26

## 2020-03-09 RX ORDER — UMECLIDINIUM BROMIDE AND VILANTEROL TRIFENATATE 62.5; 25 UG/1; UG/1
POWDER RESPIRATORY (INHALATION)
Qty: 1 EACH | Refills: 5 | Status: SHIPPED | OUTPATIENT
Start: 2020-03-09 | End: 2022-03-10 | Stop reason: ALTCHOICE

## 2020-03-16 ENCOUNTER — NURSE TRIAGE (OUTPATIENT)
Dept: ADMINISTRATIVE | Facility: CLINIC | Age: 65
End: 2020-03-16

## 2020-03-16 NOTE — TELEPHONE ENCOUNTER
Has been having a fever since Friday, has not gone over 100.4, also has a severe headache.  She had a sore throat and nasal congestion for the first few days, but it's almost gone, now.   Began with dry coughing on Saturday, and she has sob, but no worse than her baseline sob.  Pt has a  Cardiac hx and emphysema/COPD.    Reason for Disposition   Fever > 100.0 F (37.8 C) and has diabetes mellitus or a weak immune system (e.g., HIV positive, cancer chemotherapy, organ transplant, splenectomy, chronic steroids)    Additional Information   Negative: Severe difficulty breathing (e.g., struggling for each breath, speaks in single words)   Negative: Very weak (can't stand)   Negative: Sounds like a life-threatening emergency to the triager   Negative: Runny nose is caused by pollen or other allergies   Negative: Cough is the main symptom   Negative: Sore throat is the main symptom   Negative: Patient sounds very sick or weak to the triager   Negative: Fever > 103 F (39.4 C)   Negative: Fever > 101 F (38.3 C) and over 60 years of age    Protocols used: COMMON COLD-A-OH

## 2020-03-17 ENCOUNTER — TELEPHONE (OUTPATIENT)
Dept: FAMILY MEDICINE | Facility: CLINIC | Age: 65
End: 2020-03-17

## 2020-03-18 ENCOUNTER — DOCUMENTATION ONLY (OUTPATIENT)
Dept: FAMILY MEDICINE | Facility: CLINIC | Age: 65
End: 2020-03-18

## 2020-03-18 ENCOUNTER — OFFICE VISIT (OUTPATIENT)
Dept: FAMILY MEDICINE | Facility: CLINIC | Age: 65
End: 2020-03-18
Attending: FAMILY MEDICINE
Payer: COMMERCIAL

## 2020-03-18 VITALS
WEIGHT: 145.06 LBS | RESPIRATION RATE: 12 BRPM | SYSTOLIC BLOOD PRESSURE: 118 MMHG | BODY MASS INDEX: 25.7 KG/M2 | TEMPERATURE: 98 F | HEIGHT: 63 IN | OXYGEN SATURATION: 95 % | HEART RATE: 80 BPM | DIASTOLIC BLOOD PRESSURE: 64 MMHG

## 2020-03-18 DIAGNOSIS — R50.9 FEVER, UNSPECIFIED FEVER CAUSE: ICD-10-CM

## 2020-03-18 DIAGNOSIS — J06.9 UPPER RESPIRATORY TRACT INFECTION, UNSPECIFIED TYPE: ICD-10-CM

## 2020-03-18 DIAGNOSIS — J02.9 PHARYNGITIS, UNSPECIFIED ETIOLOGY: Primary | ICD-10-CM

## 2020-03-18 DIAGNOSIS — J43.9 PULMONARY EMPHYSEMA, UNSPECIFIED EMPHYSEMA TYPE: ICD-10-CM

## 2020-03-18 LAB
GROUP A STREP, MOLECULAR: NEGATIVE
INFLUENZA A, MOLECULAR: NEGATIVE
INFLUENZA B, MOLECULAR: NEGATIVE
SPECIMEN SOURCE: NORMAL

## 2020-03-18 PROCEDURE — 99999 PR PBB SHADOW E&M-EST. PATIENT-LVL III: CPT | Mod: PBBFAC,,, | Performed by: FAMILY MEDICINE

## 2020-03-18 PROCEDURE — 3008F PR BODY MASS INDEX (BMI) DOCUMENTED: ICD-10-PCS | Mod: CPTII,S$GLB,, | Performed by: FAMILY MEDICINE

## 2020-03-18 PROCEDURE — 99214 OFFICE O/P EST MOD 30 MIN: CPT | Mod: S$GLB,,, | Performed by: FAMILY MEDICINE

## 2020-03-18 PROCEDURE — 3074F PR MOST RECENT SYSTOLIC BLOOD PRESSURE < 130 MM HG: ICD-10-PCS | Mod: CPTII,S$GLB,, | Performed by: FAMILY MEDICINE

## 2020-03-18 PROCEDURE — 99999 PR PBB SHADOW E&M-EST. PATIENT-LVL III: ICD-10-PCS | Mod: PBBFAC,,, | Performed by: FAMILY MEDICINE

## 2020-03-18 PROCEDURE — 87502 INFLUENZA DNA AMP PROBE: CPT | Mod: PO

## 2020-03-18 PROCEDURE — U0002 COVID-19 LAB TEST NON-CDC: HCPCS

## 2020-03-18 PROCEDURE — 3078F PR MOST RECENT DIASTOLIC BLOOD PRESSURE < 80 MM HG: ICD-10-PCS | Mod: CPTII,S$GLB,, | Performed by: FAMILY MEDICINE

## 2020-03-18 PROCEDURE — 3008F BODY MASS INDEX DOCD: CPT | Mod: CPTII,S$GLB,, | Performed by: FAMILY MEDICINE

## 2020-03-18 PROCEDURE — 3078F DIAST BP <80 MM HG: CPT | Mod: CPTII,S$GLB,, | Performed by: FAMILY MEDICINE

## 2020-03-18 PROCEDURE — 99214 PR OFFICE/OUTPT VISIT, EST, LEVL IV, 30-39 MIN: ICD-10-PCS | Mod: S$GLB,,, | Performed by: FAMILY MEDICINE

## 2020-03-18 PROCEDURE — 3074F SYST BP LT 130 MM HG: CPT | Mod: CPTII,S$GLB,, | Performed by: FAMILY MEDICINE

## 2020-03-18 PROCEDURE — 87651 STREP A DNA AMP PROBE: CPT | Mod: PO

## 2020-03-18 NOTE — PROGRESS NOTES
Subjective:       Patient ID: Aysha Moore is a 64 y.o. female.    Chief Complaint: Fever; Wheezing; Cough; Sore Throat; and Headache    64-year-old female with a history of pulmonary emphysema, coronary artery disease, hypertension and hyperlipidemia comes in with several days history of fever up to 100.5 wheezing coughing and shortness of breath with sore throat.  Symptoms occurred within a few days after babysitting her two grandchildren who had recently returned from a trip to [place].  The patient did not have a flu shot this year.  One of her grandchildren had a runny nose but was not overtly ill and she has had no other exposure to illness that she is aware of.    Past Medical History:  No date: Allergy  No date: Anxiety  No date: Arthritis  No date: CAD (coronary artery disease)  No date: Chronic back pain  No date: Chronic rhinitis  No date: Hyperlipidemia  No date: Hypertension    Past Surgical History:  No date: BREAST SURGERY      Comment:  breast reduction  No date: coranary stent  No date: HYSTERECTOMY      Comment:  total    Current Outpatient Medications on File Prior to Visit:  albuterol-ipratropium 2.5mg-0.5mg/3mL (DUO-NEB) 0.5 mg-3 mg(2.5 mg base)/3 mL nebulizer solution, Take 3 mLs by nebulization every 6 (six) hours as needed for Wheezing. Rescue, Disp: 300 mL, Rfl: 5  alprazolam (XANAX) 0.25 MG tablet, Take 0.25 mg by mouth daily as needed., Disp: , Rfl: 5  ANORO ELLIPTA 62.5-25 mcg/actuation DsDv, INHALE 1 PUFF INTO THE LUNGS ONCE DAILY, Disp: 1 each, Rfl: 5  citalopram (CELEXA) 40 MG tablet, TAKE 1 TABLET BY MOUTH EVERY DAY, Disp: 30 tablet, Rfl: 3  diclofenac (VOLTAREN) 75 MG EC tablet, TAKE 1 TABLET BY MOUTH TWICE A DAY (Patient taking differently: Take 75 mg by mouth every evening. ), Disp: 60 tablet, Rfl: 0  montelukast (SINGULAIR) 10 mg tablet, TAKE 1 TABLET BY MOUTH EVERY DAY IN THE EVENING, Disp: 30 tablet, Rfl: 1  simvastatin (ZOCOR) 40 MG tablet, Take 1 tablet (40 mg total)  by mouth every evening., Disp: 30 tablet, Rfl: 5  valACYclovir (VALTREX) 1000 MG tablet, Take 2 at onset of fever blisters then repeat in 12 hours (total 4 tabs per episode), Disp: 20 tablet, Rfl: 5  VENTOLIN HFA 90 mcg/actuation inhaler, INHALE 2 PUFFS INTO THE LUNGS 4 (FOUR) TIMES DAILY (Patient taking differently: Inhale 1 puff into the lungs. ), Disp: 18 Inhaler, Rfl: 1  aspirin (ECOTRIN) 81 MG EC tablet, Take 1 tablet (81 mg total) by mouth once daily. (Patient not taking: Reported on 3/18/2020), Disp: , Rfl: 0  calcium carbonate-vit D3-min (CALTRATE PLUS) 600 mg calcium- 400 unit Tab, Take 1 tablet by mouth once daily. Every day, Disp: , Rfl:     No current facility-administered medications on file prior to visit.         Review of Systems   Constitutional: Positive for fatigue and fever. Negative for chills and diaphoresis.   HENT: Positive for congestion and sore throat. Negative for ear pain, hearing loss, sinus pressure and sinus pain.    Respiratory: Positive for cough, chest tightness, shortness of breath and wheezing.    Neurological: Positive for headaches.       Objective:      Physical Exam   Constitutional: She appears well-developed and well-nourished. No distress.   Currently afebrile with a temperature of 98.2°  Normal respiratory rate and oxygen saturation  Normal blood pressure  Normal weight with a BMI of 25.7 she is up 4.1 lb from her last visit with Prabha May 28, 2019   HENT:   Head: Normocephalic and atraumatic.   Right Ear: External ear normal.   Left Ear: External ear normal.   Mouth/Throat: No oropharyngeal exudate.   Mild to moderate nasal turbinate swelling with moderate erythema slightly discolored exudate present  Erythematous throat without exudate   Eyes: Pupils are equal, round, and reactive to light. EOM are normal. No scleral icterus.   Neck: Normal range of motion. Neck supple. No JVD present. No tracheal deviation present. No thyromegaly present.   Cardiovascular: Normal  rate, regular rhythm and normal heart sounds. Exam reveals no gallop and no friction rub.   No murmur heard.  Pulmonary/Chest: Effort normal. No accessory muscle usage or stridor. No respiratory distress. She has no decreased breath sounds. She has no wheezes. She has no rhonchi. She has rales in the right lower field and the left lower field. She exhibits no tenderness, no crepitus and no deformity.   Lymphadenopathy:     She has no cervical adenopathy.   Skin: She is not diaphoretic.   Psychiatric: She has a normal mood and affect. Her behavior is normal. Judgment and thought content normal.   Nursing note and vitals reviewed.      Assessment:       1. Pharyngitis, unspecified etiology    2. Fever, unspecified fever cause    3. Upper respiratory tract infection, unspecified type    4. Pulmonary emphysema, unspecified emphysema type        Plan:       1. Pharyngitis, unspecified etiology  - Influenza A & B by Molecular  - Group A Strep, Molecular  - SARS- CoV-2 (COVID-19) QUALITATIVE PCR    2. Fever, unspecified fever cause  Flu and strep test both negative.  Sars-CoV-2 screen collected and taken to lab  - Influenza A & B by Molecular  - SARS- CoV-2 (COVID-19) QUALITATIVE PCR    3. Upper respiratory tract infection, unspecified type  - SARS- CoV-2 (COVID-19) QUALITATIVE PCR    4. Pulmonary emphysema, unspecified emphysema type  - SARS- CoV-2 (COVID-19) QUALITATIVE PCR

## 2020-03-18 NOTE — PROGRESS NOTES
Pre-Visit Chart Review  For Appointment Scheduled on 3-18-20    Health Maintenance Due   Topic Date Due    Colonoscopy  11/29/2012

## 2020-03-19 ENCOUNTER — TELEPHONE (OUTPATIENT)
Dept: FAMILY MEDICINE | Facility: CLINIC | Age: 65
End: 2020-03-19

## 2020-03-19 NOTE — TELEPHONE ENCOUNTER
Patient said she is feeling a LOT better. She said she has no fever, the headache has subsided, and her cough is just once in a while but feels like she has a sinus drip and that's what is making her cough.

## 2020-03-20 ENCOUNTER — TELEPHONE (OUTPATIENT)
Dept: FAMILY MEDICINE | Facility: CLINIC | Age: 65
End: 2020-03-20

## 2020-03-20 NOTE — TELEPHONE ENCOUNTER
Patient states she is feeling fine, much better. Denies fever, states cough and SOB is much better. Patient states it feels more like a postnasal drip.

## 2020-03-21 ENCOUNTER — TELEPHONE (OUTPATIENT)
Dept: FAMILY MEDICINE | Facility: CLINIC | Age: 65
End: 2020-03-21

## 2020-03-21 NOTE — TELEPHONE ENCOUNTER
No voicemail box set up. Unable to leave message.    Calling to see how patient is feeling and advise her we are still waiting for results.

## 2020-03-22 LAB — SARS-COV-2 RNA RESP QL NAA+PROBE: NOT DETECTED

## 2020-03-26 DIAGNOSIS — J31.0 CHRONIC RHINITIS: ICD-10-CM

## 2020-03-26 RX ORDER — MONTELUKAST SODIUM 10 MG/1
TABLET ORAL
Qty: 30 TABLET | Refills: 10 | Status: SHIPPED | OUTPATIENT
Start: 2020-03-26 | End: 2020-10-29

## 2020-03-27 DIAGNOSIS — E78.5 HYPERLIPIDEMIA, UNSPECIFIED HYPERLIPIDEMIA TYPE: ICD-10-CM

## 2020-03-27 RX ORDER — SIMVASTATIN 40 MG/1
40 TABLET, FILM COATED ORAL NIGHTLY
Qty: 90 TABLET | Refills: 1 | Status: SHIPPED | OUTPATIENT
Start: 2020-03-27 | End: 2020-10-02

## 2020-04-07 DIAGNOSIS — B00.1 FEVER BLISTER: ICD-10-CM

## 2020-04-07 RX ORDER — VALACYCLOVIR HYDROCHLORIDE 1 G/1
TABLET, FILM COATED ORAL
Qty: 20 TABLET | Refills: 5 | Status: SHIPPED | OUTPATIENT
Start: 2020-04-07 | End: 2021-08-04

## 2020-04-07 NOTE — TELEPHONE ENCOUNTER
----- Message from Lynette Kenny sent at 4/7/2020  2:15 PM CDT -----  Contact: patient  Type:  RX Refill Request    Who Called:  patient  Refill or New Rx:  refill  RX Name and Strength:  valACYclovir (VALTREX) 1000 MG tablet  How is the patient currently taking it? (ex. 1XDay):  As needed  Is this a 30 day or 90 day RX:    Preferred Pharmacy with phone number:    [id number] - [place] - [address]  [address] [place] [address]  Phone: [phone] Fax: [phone]    Local or Mail Order:  local  Ordering Provider:  juliet Zaidi Call Back Number:  [phone]  Additional Information:

## 2020-04-17 ENCOUNTER — TELEPHONE (OUTPATIENT)
Dept: FAMILY MEDICINE | Facility: CLINIC | Age: 65
End: 2020-04-17

## 2020-05-19 ENCOUNTER — DOCUMENTATION ONLY (OUTPATIENT)
Dept: FAMILY MEDICINE | Facility: CLINIC | Age: 65
End: 2020-05-19

## 2020-05-19 NOTE — PROGRESS NOTES
Pre-Visit Chart Review  For Appointment Scheduled on 5-22-20    Health Maintenance Due   Topic Date Due    Colonoscopy  11/29/2012

## 2020-05-22 ENCOUNTER — OFFICE VISIT (OUTPATIENT)
Dept: FAMILY MEDICINE | Facility: CLINIC | Age: 65
End: 2020-05-22
Attending: FAMILY MEDICINE
Payer: COMMERCIAL

## 2020-05-22 VITALS
RESPIRATION RATE: 16 BRPM | TEMPERATURE: 98 F | SYSTOLIC BLOOD PRESSURE: 112 MMHG | OXYGEN SATURATION: 96 % | HEART RATE: 74 BPM | WEIGHT: 141.13 LBS | HEIGHT: 63 IN | BODY MASS INDEX: 25.01 KG/M2 | DIASTOLIC BLOOD PRESSURE: 66 MMHG

## 2020-05-22 DIAGNOSIS — G89.29 CHRONIC BILATERAL LOW BACK PAIN WITH LEFT-SIDED SCIATICA: ICD-10-CM

## 2020-05-22 DIAGNOSIS — I25.10 CORONARY ARTERY DISEASE INVOLVING NATIVE CORONARY ARTERY OF NATIVE HEART WITHOUT ANGINA PECTORIS: ICD-10-CM

## 2020-05-22 DIAGNOSIS — E78.5 HYPERLIPIDEMIA, UNSPECIFIED HYPERLIPIDEMIA TYPE: ICD-10-CM

## 2020-05-22 DIAGNOSIS — M54.42 CHRONIC BILATERAL LOW BACK PAIN WITH LEFT-SIDED SCIATICA: ICD-10-CM

## 2020-05-22 DIAGNOSIS — Z95.5 HISTORY OF HEART ARTERY STENT: ICD-10-CM

## 2020-05-22 DIAGNOSIS — R73.03 PREDIABETES: ICD-10-CM

## 2020-05-22 DIAGNOSIS — F41.9 ANXIETY: ICD-10-CM

## 2020-05-22 DIAGNOSIS — Z86.010 HISTORY OF COLON POLYPS: ICD-10-CM

## 2020-05-22 DIAGNOSIS — Z12.11 COLON CANCER SCREENING: ICD-10-CM

## 2020-05-22 DIAGNOSIS — Z12.31 ENCOUNTER FOR SCREENING MAMMOGRAM FOR BREAST CANCER: ICD-10-CM

## 2020-05-22 DIAGNOSIS — Z00.00 ENCOUNTER FOR PREVENTIVE HEALTH EXAMINATION: Primary | ICD-10-CM

## 2020-05-22 PROCEDURE — 3074F PR MOST RECENT SYSTOLIC BLOOD PRESSURE < 130 MM HG: ICD-10-PCS | Mod: CPTII,S$GLB,, | Performed by: FAMILY MEDICINE

## 2020-05-22 PROCEDURE — 99396 PR PREVENTIVE VISIT,EST,40-64: ICD-10-PCS | Mod: S$GLB,,, | Performed by: FAMILY MEDICINE

## 2020-05-22 PROCEDURE — 3074F SYST BP LT 130 MM HG: CPT | Mod: CPTII,S$GLB,, | Performed by: FAMILY MEDICINE

## 2020-05-22 PROCEDURE — 99999 PR PBB SHADOW E&M-EST. PATIENT-LVL IV: CPT | Mod: PBBFAC,,, | Performed by: FAMILY MEDICINE

## 2020-05-22 PROCEDURE — 99999 PR PBB SHADOW E&M-EST. PATIENT-LVL IV: ICD-10-PCS | Mod: PBBFAC,,, | Performed by: FAMILY MEDICINE

## 2020-05-22 PROCEDURE — 3078F PR MOST RECENT DIASTOLIC BLOOD PRESSURE < 80 MM HG: ICD-10-PCS | Mod: CPTII,S$GLB,, | Performed by: FAMILY MEDICINE

## 2020-05-22 PROCEDURE — 99396 PREV VISIT EST AGE 40-64: CPT | Mod: S$GLB,,, | Performed by: FAMILY MEDICINE

## 2020-05-22 PROCEDURE — 3078F DIAST BP <80 MM HG: CPT | Mod: CPTII,S$GLB,, | Performed by: FAMILY MEDICINE

## 2020-05-22 RX ORDER — TIZANIDINE 4 MG/1
4 TABLET ORAL EVERY 6 HOURS PRN
Qty: 60 TABLET | Refills: 1 | Status: SHIPPED | OUTPATIENT
Start: 2020-05-22 | End: 2020-06-02

## 2020-05-22 RX ORDER — ALPRAZOLAM 0.25 MG/1
0.25 TABLET ORAL DAILY PRN
Qty: 30 TABLET | Refills: 0 | Status: SHIPPED | OUTPATIENT
Start: 2020-05-22 | End: 2022-03-10 | Stop reason: ALTCHOICE

## 2020-05-22 NOTE — PROGRESS NOTES
Subjective:       Patient ID: Aysha Moore is a 64 y.o. female.    Chief Complaint: Annual Exam    64-year-old female comes in for a physical examination.  She has been very stressed out with a lock-down, she has not been able to see her grandchildren for several months because their father works for the [employer] and has been exposed to COVID-19.  Her  has been very irritable.  Physically she is feeling fine just somewhat fatigued from the stress.  She has a history of coronary disease with stents, hypertension, hyperlipidemia, chronic allergic rhinitis, arthritis, anxiety, chronic back pain with left-sided sciatica and pre diabetes.  She is not fasting for lab today.  She is overdue for her colonoscopy and will be due for her mammogram after June 21st.    Past Medical History:  No date: Allergy  No date: Anxiety  No date: Arthritis  No date: CAD (coronary artery disease)  No date: Chronic back pain  No date: Chronic rhinitis  No date: Hyperlipidemia  No date: Hypertension    Past Surgical History:  No date: BREAST SURGERY      Comment:  breast reduction  No date: coranary stent  No date: HYSTERECTOMY      Comment:  total    Review of patient's family history indicates:  Problem: Cancer      Relation: Mother          Age of Onset: (Not Specified)          Comment: breast, ovarian, cervical   Problem: Heart disease      Relation: Mother          Age of Onset: (Not Specified)  Problem: Breast cancer      Relation: Mother          Age of Onset: (Not Specified)  Problem: Cancer      Relation: Father          Age of Onset: (Not Specified)          Comment: melanoma  Problem: Stroke      Relation: Sister          Age of Onset: (Not Specified)  Problem: Heart disease      Relation: Sister          Age of Onset: (Not Specified)  Problem: Cancer      Relation: Sister          Age of Onset: (Not Specified)          Comment: leukemia  Problem: Heart disease      Relation: Brother          Age of Onset: (Not  Specified)  Problem: Heart disease      Relation: Maternal Grandmother          Age of Onset: (Not Specified)  Problem: No Known Problems      Relation: Daughter          Age of Onset: (Not Specified)  Problem: No Known Problems      Relation: Son          Age of Onset: (Not Specified)  Problem: Cancer      Relation: Maternal Uncle          Age of Onset: (Not Specified)  Problem: Cancer      Relation: Paternal Aunt          Age of Onset: (Not Specified)          Comment: breast  Problem: Breast cancer      Relation: Paternal Aunt          Age of Onset: (Not Specified)  Problem: Cancer      Relation: Paternal Uncle          Age of Onset: (Not Specified)  Problem: Macular degeneration      Relation: Sister          Age of Onset: (Not Specified)  Problem: Heart disease      Relation: Sister          Age of Onset: (Not Specified)    Social History    Tobacco Use      Smoking status: Former Smoker        Packs/day: 1.00        Types: Cigarettes        Quit date: 3/4/2017        Years since quitting: 3.2      Smokeless tobacco: Never Used    Alcohol use: Yes      Alcohol/week: 0.0 standard drinks      Comment: sometimes    Drug use: No    Current Outpatient Medications on File Prior to Visit:  albuterol-ipratropium 2.5mg-0.5mg/3mL (DUO-NEB) 0.5 mg-3 mg(2.5 mg base)/3 mL nebulizer solution, Take 3 mLs by nebulization every 6 (six) hours as needed for Wheezing. Rescue, Disp: 300 mL, Rfl: 5  ANORO ELLIPTA 62.5-25 mcg/actuation DsDv, INHALE 1 PUFF INTO THE LUNGS ONCE DAILY, Disp: 1 each, Rfl: 5  aspirin (ECOTRIN) 81 MG EC tablet, Take 1 tablet (81 mg total) by mouth once daily. (Patient taking differently: Take 81 mg by mouth as needed. ), Disp: , Rfl: 0  citalopram (CELEXA) 40 MG tablet, TAKE 1 TABLET BY MOUTH EVERY DAY, Disp: 30 tablet, Rfl: 3  diclofenac (VOLTAREN) 75 MG EC tablet, TAKE 1 TABLET BY MOUTH TWICE A DAY (Patient taking differently: Take 75 mg by mouth once daily. ), Disp: 60 tablet, Rfl: 0  montelukast  (SINGULAIR) 10 mg tablet, TAKE 1 TABLET BY MOUTH EVERY DAY IN THE EVENING, Disp: 30 tablet, Rfl: 10  simvastatin (ZOCOR) 40 MG tablet, Take 1 tablet (40 mg total) by mouth every evening., Disp: 90 tablet, Rfl: 1  valACYclovir (VALTREX) 1000 MG tablet, Take 2 at onset of fever blisters then repeat in 12 hours (total 4 tabs per episode) (Patient taking differently: Take by mouth as needed. Take 2 at onset of fever blisters then repeat in 12 hours (total 4 tabs per episode)), Disp: 20 tablet, Rfl: 5  VENTOLIN HFA 90 mcg/actuation inhaler, INHALE 2 PUFFS INTO THE LUNGS 4 (FOUR) TIMES DAILY (Patient taking differently: Inhale 1 puff into the lungs as needed. ), Disp: 18 Inhaler, Rfl: 1  (DISCONTINUED) alprazolam (XANAX) 0.25 MG tablet, Take 0.25 mg by mouth daily as needed., Disp: , Rfl: 5  (DISCONTINUED) calcium carbonate-vit D3-min (CALTRATE PLUS) 600 mg calcium- 400 unit Tab, Take 1 tablet by mouth once daily. Every day, Disp: , Rfl:   (DISCONTINUED) tiZANidine (ZANAFLEX) 4 MG tablet, Take 1 tablet (4 mg total) by mouth every 6 (six) hours as needed., Disp: 60 tablet, Rfl: 1    No current facility-administered medications on file prior to visit.     Colonoscopy due on 11/29/2012      Review of Systems   Constitutional: Negative for chills, diaphoresis, fatigue, fever and unexpected weight change.   HENT: Positive for congestion and sinus pressure. Negative for ear pain, hearing loss and postnasal drip.    Eyes: Negative for itching and visual disturbance.   Respiratory: Negative for cough, chest tightness, shortness of breath and wheezing.    Cardiovascular: Negative for chest pain, palpitations and leg swelling.   Gastrointestinal: Negative for abdominal pain, blood in stool, constipation, diarrhea, nausea and vomiting.   Genitourinary: Negative for dysuria, frequency, hematuria, menstrual problem, pelvic pain, vaginal bleeding and vaginal discharge.   Musculoskeletal: Positive for back pain. Negative for  arthralgias, joint swelling and myalgias.   Neurological: Negative for dizziness and headaches.   Hematological: Negative for adenopathy.   Psychiatric/Behavioral: Negative for sleep disturbance. The patient is nervous/anxious.        Objective:      Physical Exam   Constitutional: She is oriented to person, place, and time. She appears well-developed and well-nourished. No distress.   Good blood pressure control  Normal weight with a BMI of 25.2 she is down 4 lb from her March 18, 2020 visit   HENT:   Head: Normocephalic and atraumatic.   Right Ear: External ear normal.   Left Ear: External ear normal.   Mouth/Throat: Oropharynx is clear and moist. No oropharyngeal exudate.   Moderate nasal turbinate swelling on the right side without erythema exudate or tenderness in the frontal or maxillary areas to percussion   Eyes: Pupils are equal, round, and reactive to light. Conjunctivae and EOM are normal. Right eye exhibits no discharge. Left eye exhibits no discharge. No scleral icterus.   Neck: Normal range of motion. Neck supple. No JVD present. No tracheal deviation present. No thyromegaly present.   Cardiovascular: Normal rate, regular rhythm and normal heart sounds. Exam reveals no gallop and no friction rub.   No murmur heard.  Carotid pulses 2+ no bruit   Pulmonary/Chest: Effort normal and breath sounds normal. No stridor. No respiratory distress. She has no wheezes. She has no rales. She exhibits no tenderness.   Abdominal: Soft. Bowel sounds are normal. She exhibits no distension and no mass. There is no tenderness. There is no rebound and no guarding. No hernia.   Musculoskeletal: Normal range of motion. She exhibits no edema or tenderness.   Lymphadenopathy:     She has no cervical adenopathy.   Neurological: She is alert and oriented to person, place, and time. She has normal reflexes. She displays normal reflexes. No cranial nerve deficit or sensory deficit. She exhibits normal muscle tone.   Skin: Skin is  warm and dry. No rash noted. She is not diaphoretic. No erythema.   Psychiatric: She has a normal mood and affect. Her behavior is normal. Judgment and thought content normal.   Nursing note and vitals reviewed.      Assessment:       1. Encounter for preventive health examination    2. Coronary artery disease involving native coronary artery of native heart without angina pectoris    3. History of heart artery stent    4. Hyperlipidemia, unspecified hyperlipidemia type    5. Prediabetes    6. Chronic bilateral low back pain with left-sided sciatica    7. Anxiety    8. Colon cancer screening    9. History of colon polyps    10. Encounter for screening mammogram for breast cancer    11. BMI 25.0-25.9,adult        Plan:       1. Encounter for preventive health examination  - Comprehensive metabolic panel; Future  - Lipid Panel; Future  - CBC auto differential; Future    2. Coronary artery disease involving native coronary artery of native heart without angina pectoris  Asymptomatic, had recent follow-up with Dr. Simon and will be scheduled for a two year follow-up stress test in the near future    3. History of heart artery stent  Asymptomatic    4. Hyperlipidemia, unspecified hyperlipidemia type  Awaiting labs, copies to Dr. Simon  - Comprehensive metabolic panel; Future  - Lipid Panel; Future    5. Prediabetes  Awaiting labs  - Comprehensive metabolic panel; Future  - Hemoglobin A1C; Future    6. Chronic bilateral low back pain with left-sided sciatica  Moderate pain usually controlled pretty well with tizanidine but needs a refill  - tiZANidine (ZANAFLEX) 4 MG tablet; Take 1 tablet (4 mg total) by mouth every 6 (six) hours as needed.  Dispense: 60 tablet; Refill: 1    7. Anxiety  Discussed habit-forming nature, usually she gets these from Dr. Simon but did not do so at her recent visit  - ALPRAZolam (XANAX) 0.25 MG tablet; Take 1 tablet (0.25 mg total) by mouth daily as needed.  Dispense: 30 tablet; Refill:  0    8. Colon cancer screening  - Ambulatory referral/consult to Gastroenterology; Future    9. History of colon polyps  Prefers to see Dr. Arshad  - Ambulatory referral/consult to Gastroenterology; Future    10. Encounter for screening mammogram for breast cancer  - Mammo Digital Screening Bilat; Future    11. BMI 25.0-25.9,adult

## 2020-05-29 ENCOUNTER — LAB VISIT (OUTPATIENT)
Dept: LAB | Facility: HOSPITAL | Age: 65
End: 2020-05-29
Attending: FAMILY MEDICINE
Payer: COMMERCIAL

## 2020-05-29 DIAGNOSIS — Z00.00 ENCOUNTER FOR PREVENTIVE HEALTH EXAMINATION: ICD-10-CM

## 2020-05-29 DIAGNOSIS — E78.5 HYPERLIPIDEMIA, UNSPECIFIED HYPERLIPIDEMIA TYPE: ICD-10-CM

## 2020-05-29 DIAGNOSIS — R73.03 PREDIABETES: ICD-10-CM

## 2020-05-29 LAB
ALBUMIN SERPL BCP-MCNC: 4.2 G/DL (ref 3.5–5.2)
ALP SERPL-CCNC: 82 U/L (ref 55–135)
ALT SERPL W/O P-5'-P-CCNC: 18 U/L (ref 10–44)
ANION GAP SERPL CALC-SCNC: 10 MMOL/L (ref 8–16)
AST SERPL-CCNC: 15 U/L (ref 10–40)
BASOPHILS # BLD AUTO: 0.02 K/UL (ref 0–0.2)
BASOPHILS NFR BLD: 0.4 % (ref 0–1.9)
BILIRUB SERPL-MCNC: 0.8 MG/DL (ref 0.1–1)
BUN SERPL-MCNC: 15 MG/DL (ref 8–23)
CALCIUM SERPL-MCNC: 9.3 MG/DL (ref 8.7–10.5)
CHLORIDE SERPL-SCNC: 105 MMOL/L (ref 95–110)
CHOLEST SERPL-MCNC: 178 MG/DL (ref 120–199)
CHOLEST/HDLC SERPL: 4 {RATIO} (ref 2–5)
CO2 SERPL-SCNC: 25 MMOL/L (ref 23–29)
CREAT SERPL-MCNC: 0.8 MG/DL (ref 0.5–1.4)
DIFFERENTIAL METHOD: NORMAL
EOSINOPHIL # BLD AUTO: 0.2 K/UL (ref 0–0.5)
EOSINOPHIL NFR BLD: 3.3 % (ref 0–8)
ERYTHROCYTE [DISTWIDTH] IN BLOOD BY AUTOMATED COUNT: 11.8 % (ref 11.5–14.5)
EST. GFR  (AFRICAN AMERICAN): >60 ML/MIN/1.73 M^2
EST. GFR  (NON AFRICAN AMERICAN): >60 ML/MIN/1.73 M^2
ESTIMATED AVG GLUCOSE: 103 MG/DL (ref 68–131)
GLUCOSE SERPL-MCNC: 104 MG/DL (ref 70–110)
HBA1C MFR BLD HPLC: 5.2 % (ref 4–5.6)
HCT VFR BLD AUTO: 41 % (ref 37–48.5)
HDLC SERPL-MCNC: 45 MG/DL (ref 40–75)
HDLC SERPL: 25.3 % (ref 20–50)
HGB BLD-MCNC: 13.4 G/DL (ref 12–16)
IMM GRANULOCYTES # BLD AUTO: 0.01 K/UL (ref 0–0.04)
IMM GRANULOCYTES NFR BLD AUTO: 0.2 % (ref 0–0.5)
LDLC SERPL CALC-MCNC: 105 MG/DL (ref 63–159)
LYMPHOCYTES # BLD AUTO: 2.2 K/UL (ref 1–4.8)
LYMPHOCYTES NFR BLD: 41 % (ref 18–48)
MCH RBC QN AUTO: 30.5 PG (ref 27–31)
MCHC RBC AUTO-ENTMCNC: 32.7 G/DL (ref 32–36)
MCV RBC AUTO: 93 FL (ref 82–98)
MONOCYTES # BLD AUTO: 0.6 K/UL (ref 0.3–1)
MONOCYTES NFR BLD: 10.4 % (ref 4–15)
NEUTROPHILS # BLD AUTO: 2.4 K/UL (ref 1.8–7.7)
NEUTROPHILS NFR BLD: 44.7 % (ref 38–73)
NONHDLC SERPL-MCNC: 133 MG/DL
NRBC BLD-RTO: 0 /100 WBC
PLATELET # BLD AUTO: 253 K/UL (ref 150–350)
PMV BLD AUTO: 10.6 FL (ref 9.2–12.9)
POTASSIUM SERPL-SCNC: 4.1 MMOL/L (ref 3.5–5.1)
PROT SERPL-MCNC: 7.5 G/DL (ref 6–8.4)
RBC # BLD AUTO: 4.4 M/UL (ref 4–5.4)
SODIUM SERPL-SCNC: 140 MMOL/L (ref 136–145)
TRIGL SERPL-MCNC: 140 MG/DL (ref 30–150)
WBC # BLD AUTO: 5.41 K/UL (ref 3.9–12.7)

## 2020-05-29 PROCEDURE — 80061 LIPID PANEL: CPT

## 2020-05-29 PROCEDURE — 36415 COLL VENOUS BLD VENIPUNCTURE: CPT | Mod: PO

## 2020-05-29 PROCEDURE — 85025 COMPLETE CBC W/AUTO DIFF WBC: CPT

## 2020-05-29 PROCEDURE — 80053 COMPREHEN METABOLIC PANEL: CPT

## 2020-05-29 PROCEDURE — 83036 HEMOGLOBIN GLYCOSYLATED A1C: CPT

## 2020-06-01 ENCOUNTER — OFFICE VISIT (OUTPATIENT)
Dept: FAMILY MEDICINE | Facility: CLINIC | Age: 65
End: 2020-06-01
Payer: COMMERCIAL

## 2020-06-01 ENCOUNTER — TELEPHONE (OUTPATIENT)
Dept: FAMILY MEDICINE | Facility: CLINIC | Age: 65
End: 2020-06-01

## 2020-06-01 VITALS
SYSTOLIC BLOOD PRESSURE: 114 MMHG | OXYGEN SATURATION: 97 % | HEIGHT: 62 IN | WEIGHT: 143.75 LBS | TEMPERATURE: 100 F | HEART RATE: 80 BPM | DIASTOLIC BLOOD PRESSURE: 72 MMHG | RESPIRATION RATE: 18 BRPM | BODY MASS INDEX: 26.45 KG/M2

## 2020-06-01 DIAGNOSIS — J02.9 PHARYNGITIS, UNSPECIFIED ETIOLOGY: ICD-10-CM

## 2020-06-01 DIAGNOSIS — J03.90 TONSILLITIS: Primary | ICD-10-CM

## 2020-06-01 PROCEDURE — 99999 PR PBB SHADOW E&M-EST. PATIENT-LVL IV: CPT | Mod: PBBFAC,,, | Performed by: NURSE PRACTITIONER

## 2020-06-01 PROCEDURE — 99213 PR OFFICE/OUTPT VISIT, EST, LEVL III, 20-29 MIN: ICD-10-PCS | Mod: S$GLB,,, | Performed by: NURSE PRACTITIONER

## 2020-06-01 PROCEDURE — 3008F PR BODY MASS INDEX (BMI) DOCUMENTED: ICD-10-PCS | Mod: CPTII,S$GLB,, | Performed by: NURSE PRACTITIONER

## 2020-06-01 PROCEDURE — 3074F PR MOST RECENT SYSTOLIC BLOOD PRESSURE < 130 MM HG: ICD-10-PCS | Mod: CPTII,S$GLB,, | Performed by: NURSE PRACTITIONER

## 2020-06-01 PROCEDURE — 3008F BODY MASS INDEX DOCD: CPT | Mod: CPTII,S$GLB,, | Performed by: NURSE PRACTITIONER

## 2020-06-01 PROCEDURE — 3078F PR MOST RECENT DIASTOLIC BLOOD PRESSURE < 80 MM HG: ICD-10-PCS | Mod: CPTII,S$GLB,, | Performed by: NURSE PRACTITIONER

## 2020-06-01 PROCEDURE — 3074F SYST BP LT 130 MM HG: CPT | Mod: CPTII,S$GLB,, | Performed by: NURSE PRACTITIONER

## 2020-06-01 PROCEDURE — 99213 OFFICE O/P EST LOW 20 MIN: CPT | Mod: S$GLB,,, | Performed by: NURSE PRACTITIONER

## 2020-06-01 PROCEDURE — 3078F DIAST BP <80 MM HG: CPT | Mod: CPTII,S$GLB,, | Performed by: NURSE PRACTITIONER

## 2020-06-01 PROCEDURE — 99999 PR PBB SHADOW E&M-EST. PATIENT-LVL IV: ICD-10-PCS | Mod: PBBFAC,,, | Performed by: NURSE PRACTITIONER

## 2020-06-01 RX ORDER — SULFAMETHOXAZOLE AND TRIMETHOPRIM 800; 160 MG/1; MG/1
1 TABLET ORAL 2 TIMES DAILY
Qty: 10 TABLET | Refills: 0 | Status: CANCELLED | OUTPATIENT
Start: 2020-06-01 | End: 2020-06-06

## 2020-06-01 RX ORDER — AMOXICILLIN AND CLAVULANATE POTASSIUM 875; 125 MG/1; MG/1
1 TABLET, FILM COATED ORAL EVERY 12 HOURS
Qty: 14 TABLET | Refills: 0 | Status: SHIPPED | OUTPATIENT
Start: 2020-06-01 | End: 2020-06-08

## 2020-06-01 NOTE — PROGRESS NOTES
Patient ID: Aysha Moore is a 64 y.o. female.    Chief Complaint:   Otalgia (right side ) and Sore Throat    Otalgia    There is pain in the right ear. This is a new problem. The current episode started in the past 7 days. The problem occurs constantly. The problem has been gradually worsening. There has been no fever. The pain is moderate. Associated symptoms include headaches and a sore throat. Pertinent negatives include no abdominal pain, coughing, diarrhea, ear discharge, hearing loss, neck pain, rash, rhinorrhea or vomiting. She has tried NSAIDs for the symptoms. The treatment provided no relief.   Sore Throat    This is a new problem. The current episode started in the past 7 days. The problem has been gradually worsening. The pain is worse on the right side. There has been no fever. The pain is moderate. Associated symptoms include ear pain and headaches. Pertinent negatives include no abdominal pain, congestion, coughing, diarrhea, drooling, ear discharge, hoarse voice, neck pain, shortness of breath, stridor, swollen glands, trouble swallowing or vomiting. She has tried NSAIDs for the symptoms. The treatment provided no relief.        Patient is new to me. Reviewed past medical and social history.    Past Medical History:   Diagnosis Date    Allergy     Anxiety     Arthritis     CAD (coronary artery disease)     Chronic back pain     Chronic rhinitis     Hyperlipidemia     Hypertension      Past Surgical History:   Procedure Laterality Date    BREAST SURGERY      breast reduction    coranary stent      HYSTERECTOMY      total     Current Outpatient Medications on File Prior to Visit   Medication Sig Dispense Refill    albuterol-ipratropium 2.5mg-0.5mg/3mL (DUO-NEB) 0.5 mg-3 mg(2.5 mg base)/3 mL nebulizer solution Take 3 mLs by nebulization every 6 (six) hours as needed for Wheezing. Rescue 300 mL 5    ALPRAZolam (XANAX) 0.25 MG tablet Take 1 tablet (0.25 mg total) by mouth daily as  needed. 30 tablet 0    ANORO ELLIPTA 62.5-25 mcg/actuation DsDv INHALE 1 PUFF INTO THE LUNGS ONCE DAILY 1 each 5    citalopram (CELEXA) 40 MG tablet TAKE 1 TABLET BY MOUTH EVERY DAY 30 tablet 3    diclofenac (VOLTAREN) 75 MG EC tablet TAKE 1 TABLET BY MOUTH TWICE A DAY (Patient taking differently: Take 75 mg by mouth once daily. ) 60 tablet 0    montelukast (SINGULAIR) 10 mg tablet TAKE 1 TABLET BY MOUTH EVERY DAY IN THE EVENING 30 tablet 10    simvastatin (ZOCOR) 40 MG tablet Take 1 tablet (40 mg total) by mouth every evening. 90 tablet 1    tiZANidine (ZANAFLEX) 4 MG tablet Take 1 tablet (4 mg total) by mouth every 6 (six) hours as needed. 60 tablet 1    valACYclovir (VALTREX) 1000 MG tablet Take 2 at onset of fever blisters then repeat in 12 hours (total 4 tabs per episode) (Patient taking differently: Take by mouth as needed. Take 2 at onset of fever blisters then repeat in 12 hours (total 4 tabs per episode)) 20 tablet 5    VENTOLIN HFA 90 mcg/actuation inhaler INHALE 2 PUFFS INTO THE LUNGS 4 (FOUR) TIMES DAILY (Patient taking differently: Inhale 1 puff into the lungs as needed. ) 18 Inhaler 1    aspirin (ECOTRIN) 81 MG EC tablet Take 1 tablet (81 mg total) by mouth once daily. (Patient taking differently: Take 81 mg by mouth as needed. )  0     No current facility-administered medications on file prior to visit.        Review of Systems   Constitutional: Negative for chills and fever.   HENT: Positive for ear pain, postnasal drip and sore throat. Negative for congestion, drooling, ear discharge, hearing loss, hoarse voice, rhinorrhea, sinus pressure, sinus pain, trouble swallowing and voice change.    Respiratory: Negative for cough, chest tightness, shortness of breath and stridor.    Cardiovascular: Negative for chest pain and palpitations.   Gastrointestinal: Negative for abdominal pain, diarrhea and vomiting.   Musculoskeletal: Negative for neck pain.   Skin: Negative for rash.   Neurological:  Positive for headaches.   All other systems reviewed and are negative.      Objective:      Physical Exam   Constitutional: She appears well-developed and well-nourished.   HENT:   Head: Normocephalic.   Right Ear: Hearing, external ear and ear canal normal. Tympanic membrane is erythematous and bulging.   Mouth/Throat: Uvula is midline. Posterior oropharyngeal erythema present. Tonsils are 1+ on the right. Tonsils are 1+ on the left. No tonsillar exudate.   Eyes: Pupils are equal, round, and reactive to light. Conjunctivae and EOM are normal.   Neck: Normal range of motion. Neck supple.   Cardiovascular: Normal rate, regular rhythm and normal heart sounds.   Pulmonary/Chest: Effort normal and breath sounds normal.   Abdominal: Soft. Bowel sounds are normal.   Musculoskeletal: Normal range of motion.   Skin: Skin is warm and dry.   Psychiatric: She has a normal mood and affect.   Vitals reviewed.      Assessment:       1. Tonsillitis    2. Pharyngitis, unspecified etiology    3. BMI 26.0-26.9,adult        Plan:       Aysha was seen today for otalgia and sore throat.    Diagnoses and all orders for this visit:    Tonsillitis  -     amoxicillin-clavulanate 875-125mg (AUGMENTIN) 875-125 mg per tablet; Take 1 tablet by mouth every 12 (twelve) hours. for 7 days    Pharyngitis, unspecified etiology  -     amoxicillin-clavulanate 875-125mg (AUGMENTIN) 875-125 mg per tablet; Take 1 tablet by mouth every 12 (twelve) hours. for 7 days    BMI 26.0-26.9,adult    Patient education provided.  All questions and concerns addressed  RTC PRN and if symptoms worsen or fail to improve  Patient verbalizes understanding      Patient Instructions     Pharyngitis: Strep (Presumed)    You have pharyngitis (sore throat). The cause is thought to be the streptococcus, or strep, bacterium. Strep throat infection can cause throat pain that is worse when swallowing, aching all over, headache, and fever. The infection may be spread by coughing,  kissing, or touching others after touching your mouth or nose. Antibiotic medications are given to treat the infection.  Home care  · Rest at home. Drink plenty of fluids to avoid dehydration.  · No work or school for the first 2 days of taking the antibiotics. After this time, you will not be contagious. You can then return to work or school if you are feeling better.   · The antibiotic medication must be taken for the full 10 days, even if you feel better. This is very important to ensure the infection is treated. It is also important to prevent drug-resistant organisms from developing. If you were given an antibiotic shot, no more antibiotics are needed.  · You may use acetaminophen or ibuprofen to control pain or fever, unless another medicine was prescribed for this. If you have chronic liver or kidney disease or ever had a stomach ulcer or GI bleeding, talk with your doctor before using these medicines.  · Throat lozenges or a throat-numbing sprays can help reduce throat pain. Gargling with warm salt water can also help. Dissolve 1/2 teaspoon of salt in 1 8 ounce glass of warm water.   · Avoid salty or spicy foods, which can irritate the throat.  Follow-up care  Follow up with your healthcare provider or our staff if you are not improving over the next week.  When to seek medical advice  Call your healthcare provider right away if any of these occur:  · Fever as directed by your doctor.   · New or worsening ear pain, sinus pain, or headache  · Painful lumps in the back of neck  · Stiff neck  · Lymph nodes are getting larger  · Inability to swallow liquids, excessive drooling, or inability to open mouth wide due to throat pain  · Signs of dehydration (very dark urine or no urine, sunken eyes, dizziness)  · Trouble breathing or noisy breathing  · Muffled voice  · New rash  Date Last Reviewed: 4/13/2015  © 1646-7351 [employer]. [address] [place] [address], [place] [address]. All rights reserved. This  information is not intended as a substitute for professional medical care. Always follow your healthcare professional's instructions.

## 2020-06-01 NOTE — TELEPHONE ENCOUNTER
----- Message from Nichole Gallardo sent at 6/1/2020  9:05 AM CDT -----  Contact: pt  Type: Needs Medical Advice  Who Called:  pt  Symptoms (please be specific):    How long has patient had these symptoms:    Pharmacy name and phone #:    [id number] - [place] - [address] [facility]  [address] [place] [address]  Phone: [phone] Fax: [phone]    Best Call Back Number: [phone] (cell)     Additional Information: pt stated that rt side ear and throat is sore she wants to know if the provider call into her pharmacy a script for antibiotic. pls call to advise

## 2020-06-01 NOTE — PATIENT INSTRUCTIONS
Recommend otc non-sedating antihistamine such as Loratadine and/or steroid nasal spray such as Flonase as directed and as needed.  Please return to clinic if symptoms persist after these interventions.    Pharyngitis: Strep (Presumed)    You have pharyngitis (sore throat). The cause is thought to be the streptococcus, or strep, bacterium. Strep throat infection can cause throat pain that is worse when swallowing, aching all over, headache, and fever. The infection may be spread by coughing, kissing, or touching others after touching your mouth or nose. Antibiotic medications are given to treat the infection.  Home care  · Rest at home. Drink plenty of fluids to avoid dehydration.  · No work or school for the first 2 days of taking the antibiotics. After this time, you will not be contagious. You can then return to work or school if you are feeling better.   · The antibiotic medication must be taken for the full 10 days, even if you feel better. This is very important to ensure the infection is treated. It is also important to prevent drug-resistant organisms from developing. If you were given an antibiotic shot, no more antibiotics are needed.  · You may use acetaminophen or ibuprofen to control pain or fever, unless another medicine was prescribed for this. If you have chronic liver or kidney disease or ever had a stomach ulcer or GI bleeding, talk with your doctor before using these medicines.  · Throat lozenges or a throat-numbing sprays can help reduce throat pain. Gargling with warm salt water can also help. Dissolve 1/2 teaspoon of salt in 1 8 ounce glass of warm water.   · Avoid salty or spicy foods, which can irritate the throat.  Follow-up care  Follow up with your healthcare provider or our staff if you are not improving over the next week.  When to seek medical advice  Call your healthcare provider right away if any of these occur:  · Fever as directed by your doctor.   · New or worsening ear pain,  sinus pain, or headache  · Painful lumps in the back of neck  · Stiff neck  · Lymph nodes are getting larger  · Inability to swallow liquids, excessive drooling, or inability to open mouth wide due to throat pain  · Signs of dehydration (very dark urine or no urine, sunken eyes, dizziness)  · Trouble breathing or noisy breathing  · Muffled voice  · New rash  Date Last Reviewed: 4/13/2015  © 7364-1495 [employer]. [address] [place] [address], [place] [address]. All rights reserved. This information is not intended as a substitute for professional medical care. Always follow your healthcare professional's instructions.

## 2020-06-05 ENCOUNTER — PATIENT MESSAGE (OUTPATIENT)
Dept: FAMILY MEDICINE | Facility: CLINIC | Age: 65
End: 2020-06-05

## 2020-06-05 NOTE — TELEPHONE ENCOUNTER
Patient seen by Ms. Munguia 6/1- given 7 day course of Augmentin-patient says she has had severe pain since Saturday with throat and ear-about 20 mins ago she got a terrible taste in her mouth- she looked and her right tonsil is very swollen and oozing infection that is brownish. She found an old Prednisone at home and took that with some relief. She is asking Dr. Daniels if he can help with the pain.

## 2020-06-05 NOTE — TELEPHONE ENCOUNTER
Patient says after she spoke to me it burst and drained and is no longer in pain. Has been gargling with salt water. She was advised to go to er this weekend if it starts again. She will call me on Monday with progress.

## 2020-06-05 NOTE — TELEPHONE ENCOUNTER
Sounds like she has a peritonsillar abscess and needs to be seen as soon as possible.  Usually ENT has to drain these.

## 2020-06-24 ENCOUNTER — PATIENT OUTREACH (OUTPATIENT)
Dept: ADMINISTRATIVE | Facility: HOSPITAL | Age: 65
End: 2020-06-24

## 2020-06-29 ENCOUNTER — HOSPITAL ENCOUNTER (OUTPATIENT)
Dept: RADIOLOGY | Facility: CLINIC | Age: 65
Discharge: HOME | End: 2020-06-29
Attending: FAMILY MEDICINE
Payer: COMMERCIAL

## 2020-06-29 DIAGNOSIS — Z12.31 ENCOUNTER FOR SCREENING MAMMOGRAM FOR BREAST CANCER: ICD-10-CM

## 2020-06-29 PROCEDURE — 77063 BREAST TOMOSYNTHESIS BI: CPT | Mod: 26,,, | Performed by: RADIOLOGY

## 2020-06-29 PROCEDURE — 77067 MAMMO DIGITAL SCREENING BILAT WITH TOMOSYNTHESIS_CAD: ICD-10-PCS | Mod: 26,,, | Performed by: RADIOLOGY

## 2020-06-29 PROCEDURE — 77063 MAMMO DIGITAL SCREENING BILAT WITH TOMOSYNTHESIS_CAD: ICD-10-PCS | Mod: 26,,, | Performed by: RADIOLOGY

## 2020-06-29 PROCEDURE — 77067 SCR MAMMO BI INCL CAD: CPT | Mod: 26,,, | Performed by: RADIOLOGY

## 2020-06-29 PROCEDURE — 77067 SCR MAMMO BI INCL CAD: CPT | Mod: TC,PO

## 2020-10-02 DIAGNOSIS — E78.5 HYPERLIPIDEMIA, UNSPECIFIED HYPERLIPIDEMIA TYPE: ICD-10-CM

## 2020-10-02 RX ORDER — SIMVASTATIN 40 MG/1
TABLET, FILM COATED ORAL
Qty: 90 TABLET | Refills: 3 | Status: ON HOLD | OUTPATIENT
Start: 2020-10-02 | End: 2020-10-18 | Stop reason: HOSPADM

## 2020-10-02 NOTE — TELEPHONE ENCOUNTER
No new care gaps identified.  Powered by [employer]. Reference number: [id number]. 10/02/2020 12:06:06 AM   ANGELITO

## 2020-10-02 NOTE — PROGRESS NOTES
Refill Authorization Note   Aysha Moore is requesting a refill authorization.     Brief assessment and rationale for refill: APPROVE: PRR          Medication Therapy Plan: CDMR; APPROVE PER PROTOCOL    Medication reconciliation completed: No                    Comments:          Requested Prescriptions   Pending Prescriptions Disp Refills    simvastatin (ZOCOR) 40 MG tablet [Pharmacy Med Name: SIMVASTATIN 40 MG TABLET] 90 tablet 3     Sig: TAKE 1 TABLET BY MOUTH EVERY DAY IN THE EVENING       Cardiovascular:  Antilipid - Statins Passed - 10/2/2020 12:05 AM        Passed - Patient is at least 18 years old        Passed - Office Visit within last 12 months or future 90 days.     Recent Outpatient Visits            4 months ago Tonsillitis    [facility] Sosa Munguia DNP    4 months ago Encounter for preventive health examination    [facility] Yoni Daniels MD    6 months ago Pharyngitis, unspecified etiology    [facility] Yoni Daniels MD    11 months ago Pulmonary emphysema, unspecified emphysema type    [facility] Raad Abad MD    1 year ago Pulmonary emphysema, unspecified emphysema type    [facility] Pul[facility] Raad Abad MD                    Passed - ALT is 94 or below and within 360 days     ALT   Date Value Ref Range Status   05/29/2020 18 10 - 44 U/L Final   07/10/2019 15 10 - 44 U/L Final   12/11/2017 13 10 - 44 U/L Final              Passed - AST is 54 or below and within 360 days     AST   Date Value Ref Range Status   05/29/2020 15 10 - 40 U/L Final   07/10/2019 14 10 - 40 U/L Final   12/11/2017 18 10 - 40 U/L Final              Passed - Total Cholesterol within 360 days     Cholesterol   Date Value Ref Range Status   05/29/2020 178 120 - 199 mg/dL Final     Comment:     The National Cholesterol Education Program (NCEP) has set the  following guidelines (reference ranges) for  Cholesterol:  Optimal.....................<200 mg/dL  Borderline High.............200-239 mg/dL  High........................> or = 240 mg/dL     07/10/2019 165 120 - 199 mg/dL Final     Comment:     The National Cholesterol Education Program (NCEP) has set the  following guidelines (reference ranges) for Cholesterol:  Optimal.....................<200 mg/dL  Borderline High.............200-239 mg/dL  High........................> or = 240 mg/dL     03/27/2019 231 (H) 120 - 199 mg/dL Final     Comment:     The National Cholesterol Education Program (NCEP) has set the  following guidelines (reference ranges) for Cholesterol:  Optimal.....................<200 mg/dL  Borderline High.............200-239 mg/dL  High........................> or = 240 mg/dL                Passed - LDL within 360 days     LDL Cholesterol   Date Value Ref Range Status   05/29/2020 105.0 63.0 - 159.0 mg/dL Final     Comment:     The National Cholesterol Education Program (NCEP) has set the  following guidelines (reference values) for LDL Cholesterol:  Optimal.......................<130 mg/dL  Borderline High...............130-159 mg/dL  High..........................160-189 mg/dL  Very High.....................>190 mg/dL              Passed - HDL within 360 days     HDL   Date Value Ref Range Status   05/29/2020 45 40 - 75 mg/dL Final     Comment:     The National Cholesterol Education Program (NCEP) has set the  following guidelines (reference values) for HDL Cholesterol:  Low...............<40 mg/dL  Optimal...........>60 mg/dL              Passed - Triglycerides within 360 days     Triglycerides   Date Value Ref Range Status   05/29/2020 140 30 - 150 mg/dL Final     Comment:     The National Cholesterol Education Program (NCEP) has set the  following guidelines (reference values) for triglycerides:  Normal......................<150 mg/dL  Borderline High.............150-199 mg/dL  High........................200-499 mg/dL     07/10/2019 190  (H) 30 - 150 mg/dL Final     Comment:     The National Cholesterol Education Program (NCEP) has set the  following guidelines (reference values) for triglycerides:  Normal......................<150 mg/dL  Borderline High.............150-199 mg/dL  High........................200-499 mg/dL     03/27/2019 277 (H) 30 - 150 mg/dL Final     Comment:     The National Cholesterol Education Program (NCEP) has set the  following guidelines (reference values) for triglycerides:  Normal......................<150 mg/dL  Borderline High.............150-199 mg/dL  High........................200-499 mg/dL                    Appointments  past 12m or future 3m with PCP    Date Provider   Last Visit   5/22/2020 Yoni Daniels MD   Next Visit   Visit date not found Yoni Daniels MD   ED visits in past 90 days: 0     Note composed:10:01 AM 10/02/2020

## 2020-10-16 ENCOUNTER — HOSPITAL ENCOUNTER (INPATIENT)
Facility: HOSPITAL | Age: 65
LOS: 2 days | Discharge: HOME | DRG: 247 | End: 2020-10-18
Attending: EMERGENCY MEDICINE | Admitting: HOSPITALIST
Payer: COMMERCIAL

## 2020-10-16 DIAGNOSIS — I25.10 CAD (CORONARY ARTERY DISEASE): ICD-10-CM

## 2020-10-16 DIAGNOSIS — R07.9 CHEST PAIN: ICD-10-CM

## 2020-10-16 DIAGNOSIS — I21.3 STEMI (ST ELEVATION MYOCARDIAL INFARCTION): ICD-10-CM

## 2020-10-16 DIAGNOSIS — I21.19 ACUTE MYOCARDIAL INFARCTION OF INFERIOR WALL: ICD-10-CM

## 2020-10-16 DIAGNOSIS — I21.19 ACUTE ST ELEVATION MYOCARDIAL INFARCTION (STEMI) OF INFERIOR WALL: Primary | ICD-10-CM

## 2020-10-16 LAB
ABO + RH BLD: NORMAL
ALBUMIN SERPL BCP-MCNC: 4.4 G/DL (ref 3.5–5.2)
ALBUMIN SERPL BCP-MCNC: 4.4 G/DL (ref 3.5–5.2)
ALP SERPL-CCNC: 71 U/L (ref 55–135)
ALP SERPL-CCNC: 71 U/L (ref 55–135)
ALT SERPL W/O P-5'-P-CCNC: 21 U/L (ref 10–44)
ALT SERPL W/O P-5'-P-CCNC: 21 U/L (ref 10–44)
ANION GAP SERPL CALC-SCNC: 11 MMOL/L (ref 8–16)
ANION GAP SERPL CALC-SCNC: 11 MMOL/L (ref 8–16)
APTT PPP: 27.1 SEC (ref 23.6–33.3)
AST SERPL-CCNC: 20 U/L (ref 10–40)
AST SERPL-CCNC: 20 U/L (ref 10–40)
BASOPHILS # BLD AUTO: 0.03 K/UL (ref 0–0.2)
BASOPHILS # BLD AUTO: 0.03 K/UL (ref 0–0.2)
BASOPHILS NFR BLD: 0.6 % (ref 0–1.9)
BASOPHILS NFR BLD: 0.6 % (ref 0–1.9)
BILIRUB SERPL-MCNC: 1.8 MG/DL (ref 0.1–1)
BILIRUB SERPL-MCNC: 1.8 MG/DL (ref 0.1–1)
BLD GP AB SCN CELLS X3 SERPL QL: NORMAL
BNP SERPL-MCNC: 24 PG/ML (ref 0–99)
BUN SERPL-MCNC: 13 MG/DL (ref 8–23)
BUN SERPL-MCNC: 13 MG/DL (ref 8–23)
CALCIUM SERPL-MCNC: 9.3 MG/DL (ref 8.7–10.5)
CALCIUM SERPL-MCNC: 9.3 MG/DL (ref 8.7–10.5)
CHLORIDE SERPL-SCNC: 100 MMOL/L (ref 95–110)
CHLORIDE SERPL-SCNC: 100 MMOL/L (ref 95–110)
CK MB SERPL-MCNC: 479.2 NG/ML (ref 0.1–6.5)
CO2 SERPL-SCNC: 26 MMOL/L (ref 23–29)
CO2 SERPL-SCNC: 26 MMOL/L (ref 23–29)
CREAT SERPL-MCNC: 0.6 MG/DL (ref 0.5–1.4)
CREAT SERPL-MCNC: 0.6 MG/DL (ref 0.5–1.4)
DIFFERENTIAL METHOD: ABNORMAL
DIFFERENTIAL METHOD: ABNORMAL
EOSINOPHIL # BLD AUTO: 0.1 K/UL (ref 0–0.5)
EOSINOPHIL # BLD AUTO: 0.1 K/UL (ref 0–0.5)
EOSINOPHIL NFR BLD: 2.7 % (ref 0–8)
EOSINOPHIL NFR BLD: 2.7 % (ref 0–8)
ERYTHROCYTE [DISTWIDTH] IN BLOOD BY AUTOMATED COUNT: 11.7 % (ref 11.5–14.5)
ERYTHROCYTE [DISTWIDTH] IN BLOOD BY AUTOMATED COUNT: 11.7 % (ref 11.5–14.5)
EST. GFR  (AFRICAN AMERICAN): >60 ML/MIN/1.73 M^2
EST. GFR  (AFRICAN AMERICAN): >60 ML/MIN/1.73 M^2
EST. GFR  (NON AFRICAN AMERICAN): >60 ML/MIN/1.73 M^2
EST. GFR  (NON AFRICAN AMERICAN): >60 ML/MIN/1.73 M^2
GLUCOSE SERPL-MCNC: 128 MG/DL (ref 70–110)
GLUCOSE SERPL-MCNC: 128 MG/DL (ref 70–110)
HCT VFR BLD AUTO: 40.9 % (ref 37–48.5)
HCT VFR BLD AUTO: 40.9 % (ref 37–48.5)
HGB BLD-MCNC: 13.9 G/DL (ref 12–16)
HGB BLD-MCNC: 13.9 G/DL (ref 12–16)
IMM GRANULOCYTES # BLD AUTO: 0.01 K/UL (ref 0–0.04)
IMM GRANULOCYTES # BLD AUTO: 0.01 K/UL (ref 0–0.04)
IMM GRANULOCYTES NFR BLD AUTO: 0.2 % (ref 0–0.5)
IMM GRANULOCYTES NFR BLD AUTO: 0.2 % (ref 0–0.5)
INR PPP: 1.1
INR PPP: 1.1
LYMPHOCYTES # BLD AUTO: 2.7 K/UL (ref 1–4.8)
LYMPHOCYTES # BLD AUTO: 2.7 K/UL (ref 1–4.8)
LYMPHOCYTES NFR BLD: 50.9 % (ref 18–48)
LYMPHOCYTES NFR BLD: 50.9 % (ref 18–48)
MCH RBC QN AUTO: 29.9 PG (ref 27–31)
MCH RBC QN AUTO: 29.9 PG (ref 27–31)
MCHC RBC AUTO-ENTMCNC: 34 G/DL (ref 32–36)
MCHC RBC AUTO-ENTMCNC: 34 G/DL (ref 32–36)
MCV RBC AUTO: 88 FL (ref 82–98)
MCV RBC AUTO: 88 FL (ref 82–98)
MONOCYTES # BLD AUTO: 0.5 K/UL (ref 0.3–1)
MONOCYTES # BLD AUTO: 0.5 K/UL (ref 0.3–1)
MONOCYTES NFR BLD: 9.9 % (ref 4–15)
MONOCYTES NFR BLD: 9.9 % (ref 4–15)
NEUTROPHILS # BLD AUTO: 1.9 K/UL (ref 1.8–7.7)
NEUTROPHILS # BLD AUTO: 1.9 K/UL (ref 1.8–7.7)
NEUTROPHILS NFR BLD: 35.7 % (ref 38–73)
NEUTROPHILS NFR BLD: 35.7 % (ref 38–73)
NRBC BLD-RTO: 0 /100 WBC
NRBC BLD-RTO: 0 /100 WBC
PLATELET # BLD AUTO: 193 K/UL (ref 150–350)
PLATELET # BLD AUTO: 193 K/UL (ref 150–350)
PMV BLD AUTO: 10.9 FL (ref 9.2–12.9)
PMV BLD AUTO: 10.9 FL (ref 9.2–12.9)
POC ACTIVATED CLOTTING TIME K: 180 SEC (ref 74–137)
POC ACTIVATED CLOTTING TIME K: 208 SEC (ref 74–137)
POTASSIUM SERPL-SCNC: 3.5 MMOL/L (ref 3.5–5.1)
POTASSIUM SERPL-SCNC: 3.5 MMOL/L (ref 3.5–5.1)
PROT SERPL-MCNC: 7.3 G/DL (ref 6–8.4)
PROT SERPL-MCNC: 7.3 G/DL (ref 6–8.4)
PROTHROMBIN TIME: 13.2 SEC (ref 10.6–14.8)
PROTHROMBIN TIME: 13.5 SEC (ref 10.6–14.8)
RBC # BLD AUTO: 4.65 M/UL (ref 4–5.4)
RBC # BLD AUTO: 4.65 M/UL (ref 4–5.4)
SAMPLE: ABNORMAL
SAMPLE: ABNORMAL
SARS-COV-2 RDRP RESP QL NAA+PROBE: NEGATIVE
SODIUM SERPL-SCNC: 137 MMOL/L (ref 136–145)
SODIUM SERPL-SCNC: 137 MMOL/L (ref 136–145)
TROPONIN I SERPL DL<=0.01 NG/ML-MCNC: 71.37 NG/ML
TROPONIN I SERPL DL<=0.01 NG/ML-MCNC: <0.03 NG/ML
TROPONIN I SERPL DL<=0.01 NG/ML-MCNC: <0.03 NG/ML
WBC # BLD AUTO: 5.25 K/UL (ref 3.9–12.7)
WBC # BLD AUTO: 5.25 K/UL (ref 3.9–12.7)

## 2020-10-16 PROCEDURE — 96375 TX/PRO/DX INJ NEW DRUG ADDON: CPT

## 2020-10-16 PROCEDURE — 85025 COMPLETE CBC W/AUTO DIFF WBC: CPT

## 2020-10-16 PROCEDURE — 63600175 PHARM REV CODE 636 W HCPCS: Performed by: SPECIALIST

## 2020-10-16 PROCEDURE — 85610 PROTHROMBIN TIME: CPT

## 2020-10-16 PROCEDURE — 80053 COMPREHEN METABOLIC PANEL: CPT

## 2020-10-16 PROCEDURE — 94761 N-INVAS EAR/PLS OXIMETRY MLT: CPT

## 2020-10-16 PROCEDURE — 83036 HEMOGLOBIN GLYCOSYLATED A1C: CPT

## 2020-10-16 PROCEDURE — 99152 MOD SED SAME PHYS/QHP 5/>YRS: CPT | Performed by: SPECIALIST

## 2020-10-16 PROCEDURE — 93010 ELECTROCARDIOGRAM REPORT: CPT | Mod: ,,, | Performed by: INTERNAL MEDICINE

## 2020-10-16 PROCEDURE — 25000003 PHARM REV CODE 250: Performed by: HOSPITALIST

## 2020-10-16 PROCEDURE — S4991 NICOTINE PATCH NONLEGEND: HCPCS | Performed by: HOSPITALIST

## 2020-10-16 PROCEDURE — C1725 CATH, TRANSLUMIN NON-LASER: HCPCS | Performed by: SPECIALIST

## 2020-10-16 PROCEDURE — 86850 RBC ANTIBODY SCREEN: CPT

## 2020-10-16 PROCEDURE — C1874 STENT, COATED/COV W/DEL SYS: HCPCS | Performed by: SPECIALIST

## 2020-10-16 PROCEDURE — 93005 ELECTROCARDIOGRAM TRACING: CPT | Performed by: INTERNAL MEDICINE

## 2020-10-16 PROCEDURE — 63600175 PHARM REV CODE 636 W HCPCS

## 2020-10-16 PROCEDURE — C1894 INTRO/SHEATH, NON-LASER: HCPCS | Performed by: SPECIALIST

## 2020-10-16 PROCEDURE — 25000003 PHARM REV CODE 250: Performed by: SPECIALIST

## 2020-10-16 PROCEDURE — 93458 L HRT ARTERY/VENTRICLE ANGIO: CPT | Mod: XU | Performed by: SPECIALIST

## 2020-10-16 PROCEDURE — 25000003 PHARM REV CODE 250: Performed by: EMERGENCY MEDICINE

## 2020-10-16 PROCEDURE — C9606 PERC D-E COR REVASC W AMI S: HCPCS | Mod: LC | Performed by: SPECIALIST

## 2020-10-16 PROCEDURE — C1887 CATHETER, GUIDING: HCPCS | Performed by: SPECIALIST

## 2020-10-16 PROCEDURE — 63600175 PHARM REV CODE 636 W HCPCS: Performed by: EMERGENCY MEDICINE

## 2020-10-16 PROCEDURE — 99291 CRITICAL CARE FIRST HOUR: CPT

## 2020-10-16 PROCEDURE — 85610 PROTHROMBIN TIME: CPT | Mod: 91

## 2020-10-16 PROCEDURE — 84484 ASSAY OF TROPONIN QUANT: CPT

## 2020-10-16 PROCEDURE — 96365 THER/PROPH/DIAG IV INF INIT: CPT

## 2020-10-16 PROCEDURE — 27800903 OPTIME MED/SURG SUP & DEVICES OTHER IMPLANTS: Performed by: SPECIALIST

## 2020-10-16 PROCEDURE — C1760 CLOSURE DEV, VASC: HCPCS | Performed by: SPECIALIST

## 2020-10-16 PROCEDURE — 84484 ASSAY OF TROPONIN QUANT: CPT | Mod: 91

## 2020-10-16 PROCEDURE — 99153 MOD SED SAME PHYS/QHP EA: CPT | Performed by: SPECIALIST

## 2020-10-16 PROCEDURE — 25000003 PHARM REV CODE 250

## 2020-10-16 PROCEDURE — C1769 GUIDE WIRE: HCPCS | Performed by: SPECIALIST

## 2020-10-16 PROCEDURE — 85730 THROMBOPLASTIN TIME PARTIAL: CPT

## 2020-10-16 PROCEDURE — 93010 EKG 12-LEAD: ICD-10-PCS | Mod: ,,, | Performed by: INTERNAL MEDICINE

## 2020-10-16 PROCEDURE — 20000000 HC ICU ROOM

## 2020-10-16 PROCEDURE — U0002 COVID-19 LAB TEST NON-CDC: HCPCS

## 2020-10-16 PROCEDURE — 27000221 HC OXYGEN, UP TO 24 HOURS

## 2020-10-16 PROCEDURE — 99900035 HC TECH TIME PER 15 MIN (STAT)

## 2020-10-16 PROCEDURE — 83880 ASSAY OF NATRIURETIC PEPTIDE: CPT

## 2020-10-16 PROCEDURE — 82553 CREATINE MB FRACTION: CPT

## 2020-10-16 PROCEDURE — 36415 COLL VENOUS BLD VENIPUNCTURE: CPT

## 2020-10-16 DEVICE — STENT RONYX25022UX RESOLUTE ONYX 2.50X22
Type: IMPLANTABLE DEVICE | Site: HEART | Status: FUNCTIONAL
Brand: RESOLUTE ONYX™

## 2020-10-16 DEVICE — ANGIO-SEAL VIP VASCULAR CLOSURE DEVICE
Type: IMPLANTABLE DEVICE | Site: HEART | Status: FUNCTIONAL
Brand: ANGIO-SEAL

## 2020-10-16 RX ORDER — POTASSIUM CHLORIDE 20 MEQ/1
40 TABLET, EXTENDED RELEASE ORAL
Status: DISCONTINUED | OUTPATIENT
Start: 2020-10-16 | End: 2020-10-18 | Stop reason: HOSPADM

## 2020-10-16 RX ORDER — HEPARIN SODIUM 10000 [USP'U]/ML
INJECTION, SOLUTION INTRAVENOUS; SUBCUTANEOUS
Status: DISCONTINUED | OUTPATIENT
Start: 2020-10-16 | End: 2020-10-16 | Stop reason: HOSPADM

## 2020-10-16 RX ORDER — LANOLIN ALCOHOL/MO/W.PET/CERES
800 CREAM (GRAM) TOPICAL
Status: DISCONTINUED | OUTPATIENT
Start: 2020-10-16 | End: 2020-10-18 | Stop reason: HOSPADM

## 2020-10-16 RX ORDER — SODIUM CHLORIDE 9 MG/ML
20 INJECTION, SOLUTION INTRAVENOUS CONTINUOUS
Status: DISCONTINUED | OUTPATIENT
Start: 2020-10-16 | End: 2020-10-17

## 2020-10-16 RX ORDER — NITROGLYCERIN 20 MG/100ML
5 INJECTION INTRAVENOUS CONTINUOUS
Status: DISCONTINUED | OUTPATIENT
Start: 2020-10-16 | End: 2020-10-18 | Stop reason: HOSPADM

## 2020-10-16 RX ORDER — ACETAMINOPHEN 325 MG/1
650 TABLET ORAL EVERY 6 HOURS PRN
Status: DISCONTINUED | OUTPATIENT
Start: 2020-10-16 | End: 2020-10-18 | Stop reason: HOSPADM

## 2020-10-16 RX ORDER — SODIUM,POTASSIUM PHOSPHATES 280-250MG
2 POWDER IN PACKET (EA) ORAL
Status: DISCONTINUED | OUTPATIENT
Start: 2020-10-16 | End: 2020-10-18 | Stop reason: HOSPADM

## 2020-10-16 RX ORDER — POTASSIUM CHLORIDE 7.45 MG/ML
20 INJECTION INTRAVENOUS
Status: DISCONTINUED | OUTPATIENT
Start: 2020-10-16 | End: 2020-10-18 | Stop reason: HOSPADM

## 2020-10-16 RX ORDER — CLOPIDOGREL BISULFATE 75 MG/1
TABLET ORAL
Status: DISCONTINUED | OUTPATIENT
Start: 2020-10-16 | End: 2020-10-16 | Stop reason: HOSPADM

## 2020-10-16 RX ORDER — IBUPROFEN 200 MG
1 TABLET ORAL
Status: DISCONTINUED | OUTPATIENT
Start: 2020-10-16 | End: 2020-10-18 | Stop reason: HOSPADM

## 2020-10-16 RX ORDER — METOPROLOL TARTRATE 25 MG/1
25 TABLET, FILM COATED ORAL 2 TIMES DAILY
Status: DISCONTINUED | OUTPATIENT
Start: 2020-10-16 | End: 2020-10-18 | Stop reason: HOSPADM

## 2020-10-16 RX ORDER — MIDAZOLAM HYDROCHLORIDE 1 MG/ML
INJECTION INTRAMUSCULAR; INTRAVENOUS
Status: DISCONTINUED | OUTPATIENT
Start: 2020-10-16 | End: 2020-10-16 | Stop reason: HOSPADM

## 2020-10-16 RX ORDER — LIDOCAINE HYDROCHLORIDE 10 MG/ML
INJECTION, SOLUTION EPIDURAL; INFILTRATION; INTRACAUDAL; PERINEURAL
Status: DISCONTINUED | OUTPATIENT
Start: 2020-10-16 | End: 2020-10-16 | Stop reason: HOSPADM

## 2020-10-16 RX ORDER — ACETAMINOPHEN 325 MG/1
650 TABLET ORAL EVERY 4 HOURS PRN
Status: DISCONTINUED | OUTPATIENT
Start: 2020-10-16 | End: 2020-10-18 | Stop reason: HOSPADM

## 2020-10-16 RX ORDER — CLOPIDOGREL BISULFATE 75 MG/1
75 TABLET ORAL DAILY
Status: DISCONTINUED | OUTPATIENT
Start: 2020-10-17 | End: 2020-10-18 | Stop reason: HOSPADM

## 2020-10-16 RX ORDER — ALPRAZOLAM 0.25 MG/1
0.25 TABLET ORAL 2 TIMES DAILY PRN
Status: DISCONTINUED | OUTPATIENT
Start: 2020-10-16 | End: 2020-10-18 | Stop reason: HOSPADM

## 2020-10-16 RX ORDER — MAGNESIUM SULFATE 1 G/100ML
1 INJECTION INTRAVENOUS
Status: DISCONTINUED | OUTPATIENT
Start: 2020-10-16 | End: 2020-10-18 | Stop reason: HOSPADM

## 2020-10-16 RX ORDER — MAGNESIUM SULFATE HEPTAHYDRATE 40 MG/ML
2 INJECTION, SOLUTION INTRAVENOUS
Status: DISCONTINUED | OUTPATIENT
Start: 2020-10-16 | End: 2020-10-18 | Stop reason: HOSPADM

## 2020-10-16 RX ORDER — POTASSIUM CHLORIDE 20 MEQ/1
20 TABLET, EXTENDED RELEASE ORAL
Status: DISCONTINUED | OUTPATIENT
Start: 2020-10-16 | End: 2020-10-18 | Stop reason: HOSPADM

## 2020-10-16 RX ORDER — IBUPROFEN 200 MG
1 TABLET ORAL DAILY
Status: DISCONTINUED | OUTPATIENT
Start: 2020-10-17 | End: 2020-10-16

## 2020-10-16 RX ORDER — NITROGLYCERIN 20 MG/100ML
INJECTION INTRAVENOUS
Status: DISCONTINUED | OUTPATIENT
Start: 2020-10-16 | End: 2020-10-18 | Stop reason: HOSPADM

## 2020-10-16 RX ORDER — IPRATROPIUM BROMIDE AND ALBUTEROL SULFATE 2.5; .5 MG/3ML; MG/3ML
3 SOLUTION RESPIRATORY (INHALATION) EVERY 6 HOURS PRN
Status: DISCONTINUED | OUTPATIENT
Start: 2020-10-16 | End: 2020-10-18 | Stop reason: HOSPADM

## 2020-10-16 RX ORDER — FENTANYL CITRATE 50 UG/ML
INJECTION, SOLUTION INTRAMUSCULAR; INTRAVENOUS
Status: DISCONTINUED | OUTPATIENT
Start: 2020-10-16 | End: 2020-10-16 | Stop reason: HOSPADM

## 2020-10-16 RX ORDER — POTASSIUM CHLORIDE 7.45 MG/ML
40 INJECTION INTRAVENOUS
Status: DISCONTINUED | OUTPATIENT
Start: 2020-10-16 | End: 2020-10-18 | Stop reason: HOSPADM

## 2020-10-16 RX ORDER — HYDROCODONE BITARTRATE AND ACETAMINOPHEN 5; 325 MG/1; MG/1
1 TABLET ORAL EVERY 4 HOURS PRN
Status: DISCONTINUED | OUTPATIENT
Start: 2020-10-16 | End: 2020-10-18 | Stop reason: HOSPADM

## 2020-10-16 RX ORDER — ATORVASTATIN CALCIUM 40 MG/1
80 TABLET, FILM COATED ORAL ONCE
Status: COMPLETED | OUTPATIENT
Start: 2020-10-16 | End: 2020-10-16

## 2020-10-16 RX ORDER — POTASSIUM CHLORIDE 1.5 G/1.58G
20 POWDER, FOR SOLUTION ORAL ONCE
Status: COMPLETED | OUTPATIENT
Start: 2020-10-16 | End: 2020-10-16

## 2020-10-16 RX ORDER — MAGNESIUM SULFATE HEPTAHYDRATE 40 MG/ML
4 INJECTION, SOLUTION INTRAVENOUS
Status: DISCONTINUED | OUTPATIENT
Start: 2020-10-16 | End: 2020-10-18 | Stop reason: HOSPADM

## 2020-10-16 RX ORDER — HEPARIN SODIUM 5000 [USP'U]/ML
4000 INJECTION, SOLUTION INTRAVENOUS; SUBCUTANEOUS
Status: COMPLETED | OUTPATIENT
Start: 2020-10-16 | End: 2020-10-16

## 2020-10-16 RX ORDER — ASPIRIN 325 MG
325 TABLET ORAL
Status: COMPLETED | OUTPATIENT
Start: 2020-10-16 | End: 2020-10-16

## 2020-10-16 RX ORDER — ONDANSETRON 2 MG/ML
4 INJECTION INTRAMUSCULAR; INTRAVENOUS EVERY 6 HOURS PRN
Status: DISCONTINUED | OUTPATIENT
Start: 2020-10-16 | End: 2020-10-18 | Stop reason: HOSPADM

## 2020-10-16 RX ORDER — ASPIRIN 81 MG/1
81 TABLET ORAL DAILY
Status: DISCONTINUED | OUTPATIENT
Start: 2020-10-17 | End: 2020-10-18 | Stop reason: HOSPADM

## 2020-10-16 RX ADMIN — HEPARIN SODIUM 4000 UNITS: 5000 INJECTION, SOLUTION INTRAVENOUS; SUBCUTANEOUS at 01:10

## 2020-10-16 RX ADMIN — NICOTINE 1 PATCH: 21 PATCH, EXTENDED RELEASE TRANSDERMAL at 09:10

## 2020-10-16 RX ADMIN — SODIUM CHLORIDE 1270 ML: 0.9 INJECTION, SOLUTION INTRAVENOUS at 05:10

## 2020-10-16 RX ADMIN — METOPROLOL TARTRATE 25 MG: 25 TABLET, FILM COATED ORAL at 05:10

## 2020-10-16 RX ADMIN — ATORVASTATIN CALCIUM 80 MG: 40 TABLET, FILM COATED ORAL at 05:10

## 2020-10-16 RX ADMIN — NITROGLYCERIN 5 MCG/MIN: 20 INJECTION INTRAVENOUS at 01:10

## 2020-10-16 RX ADMIN — ASPIRIN 325 MG ORAL TABLET 325 MG: 325 PILL ORAL at 01:10

## 2020-10-16 RX ADMIN — POTASSIUM CHLORIDE 20 MEQ: 1.5 POWDER, FOR SOLUTION ORAL at 05:10

## 2020-10-16 RX ADMIN — ALPRAZOLAM 0.25 MG: 0.25 TABLET ORAL at 10:10

## 2020-10-16 NOTE — Clinical Note
Catheter is inserted into the aorta. Angiography performed of the right coronary arteries in multiple views.Unable to engage.

## 2020-10-16 NOTE — Clinical Note
250 ml injected throughout the case. 30 mL total wasted during the case. 280 mL total used in the case.

## 2020-10-16 NOTE — SUBJECTIVE & OBJECTIVE
Past Medical History:   Diagnosis Date    Allergy     Anxiety     Arthritis     CAD (coronary artery disease)     Chronic back pain     Chronic rhinitis     Hyperlipidemia     Hypertension        Past Surgical History:   Procedure Laterality Date    BREAST SURGERY      breast reduction    coranary stent      HYSTERECTOMY      total    TOTAL REDUCTION MAMMOPLASTY Bilateral 2000       Review of patient's allergies indicates:   Allergen Reactions    Cephalexin      Other reaction(s): Rash    Doxycycline monohydrate Hives    Lanoxin  [digoxin]      Other reaction(s): Rash    Lansoprazole      Other reaction(s): Rash    Macrobid [nitrofurantoin monohyd/m-cryst]        No current facility-administered medications on file prior to encounter.      Current Outpatient Medications on File Prior to Encounter   Medication Sig    NITROGLYCERIN ORAL Take by mouth.    albuterol-ipratropium 2.5mg-0.5mg/3mL (DUO-NEB) 0.5 mg-3 mg(2.5 mg base)/3 mL nebulizer solution Take 3 mLs by nebulization every 6 (six) hours as needed for Wheezing. Rescue    ALPRAZolam (XANAX) 0.25 MG tablet Take 1 tablet (0.25 mg total) by mouth daily as needed.    ANORO ELLIPTA 62.5-25 mcg/actuation DsDv INHALE 1 PUFF INTO THE LUNGS ONCE DAILY (Patient taking differently: Inhale 1 puff into the lungs once daily. )    aspirin (ECOTRIN) 81 MG EC tablet Take 1 tablet (81 mg total) by mouth once daily. (Patient taking differently: Take 81 mg by mouth as needed. )    diclofenac (VOLTAREN) 75 MG EC tablet TAKE 1 TABLET BY MOUTH TWICE A DAY (Patient taking differently: Take 75 mg by mouth once daily. )    montelukast (SINGULAIR) 10 mg tablet TAKE 1 TABLET BY MOUTH EVERY DAY IN THE EVENING (Patient taking differently: Take 10 mg by mouth every evening. )    simvastatin (ZOCOR) 40 MG tablet TAKE 1 TABLET BY MOUTH EVERY DAY IN THE EVENING (Patient taking differently: Take 40 mg by mouth every evening. )    tiZANidine (ZANAFLEX) 4 MG tablet TAKE 1  TABLET BY MOUTH EVERY 6 HOURS AS NEEDED    valACYclovir (VALTREX) 1000 MG tablet Take 2 at onset of fever blisters then repeat in 12 hours (total 4 tabs per episode) (Patient taking differently: Take by mouth as needed. Take 2 at onset of fever blisters then repeat in 12 hours (total 4 tabs per episode))    VENTOLIN HFA 90 mcg/actuation inhaler INHALE 2 PUFFS INTO THE LUNGS 4 (FOUR) TIMES DAILY (Patient taking differently: Inhale 1 puff into the lungs as needed. )    [DISCONTINUED] citalopram (CELEXA) 40 MG tablet TAKE 1 TABLET BY MOUTH EVERY DAY (Patient taking differently: Take 40 mg by mouth once daily. )     Family History     Problem Relation (Age of Onset)    Breast cancer Mother, Paternal Aunt    Cancer Mother, Father, Sister, Maternal Uncle, Paternal Aunt, Paternal Uncle    Heart disease Mother, Sister, Brother, Maternal Grandmother, Sister    Macular degeneration Sister    No Known Problems Daughter, Son    Stroke Sister        Tobacco Use    Smoking status: Former Smoker     Packs/day: 1.00     Types: Cigarettes     Quit date: 3/4/2017     Years since quitting: 3.6    Smokeless tobacco: Never Used   Substance and Sexual Activity    Alcohol use: Yes     Alcohol/week: 0.0 standard drinks     Comment: sometimes    Drug use: No    Sexual activity: Yes     Partners: Male     Review of Systems comprehensive review of systems as per HPI otherwise noncontributory  Objective:     Vital Signs (Most Recent):  Temp: 98 °F (36.7 °C) (10/16/20 1701)  Pulse: 68 (10/16/20 1816)  Resp: (!) 29 (10/16/20 1816)  BP: (!) 141/63 (10/16/20 1816)  SpO2: 100 % (10/16/20 1816) Vital Signs (24h Range):  Temp:  [97.5 °F (36.4 °C)-98 °F (36.7 °C)] 98 °F (36.7 °C)  Pulse:  [57-77] 68  Resp:  [14-29] 29  SpO2:  [96 %-100 %] 100 %  BP: (131-172)/(60-82) 141/63     Weight: 63.5 kg (140 lb)  Body mass index is 24.8 kg/m².    Physical Exam      Patient appears in no acute distress lying in bed  HEENT sclerae nonicteric  Neck is  supple nontender  Lungs are clear to auscultation  Heart is regular rate and rhythm no rub no murmur  Abdomen is soft nontender  Extremities no edema  Neuro patient is alert oriented x3 motor exam is nonfocal  Psych patient is pleasant cooperative   exam no Marin    Significant Labs:   BMP:   Recent Labs   Lab 10/16/20  1343   *  128*     137   K 3.5  3.5     100   CO2 26  26   BUN 13  13   CREATININE 0.6  0.6   CALCIUM 9.3  9.3     CBC:   Recent Labs   Lab 10/16/20  1343   WBC 5.25  5.25   HGB 13.9  13.9   HCT 40.9  40.9     193     CMP:   Recent Labs   Lab 10/16/20  1343     137   K 3.5  3.5     100   CO2 26  26   *  128*   BUN 13  13   CREATININE 0.6  0.6   CALCIUM 9.3  9.3   PROT 7.3  7.3   ALBUMIN 4.4  4.4   BILITOT 1.8*  1.8*   ALKPHOS 71  71   AST 20  20   ALT 21  21   ANIONGAP 11  11   EGFRNONAA >60.0  >60.0     Troponin:   Recent Labs   Lab 10/16/20  1343   TROPONINI <0.030  <0.030       Significant Imaging:  Chest x-ray read by me shows no acute cardiopulmonary process  ECG read by me shows ST elevation inferiorly sinus rhythm

## 2020-10-16 NOTE — ED PROVIDER NOTES
Encounter Date: 10/16/2020       History     Chief Complaint   Patient presents with    Chest Pain     65-year-old female presented emergency department with substernal chest pain which felt like indigestion which started about 2 hours ago while she was trying to take a shower and still had persistent pain so took nitroglycerin and came here.  Patient states the pain radiates across the chest and rates it 10/10 when it started however after the nitroglycerin she took now the pain is down to 3/10 however still has persistent pain.  Denies shortness of breath.  Denies nausea or vomiting.  Patient describes this pain as indigestion and also pressure in the substernal area.  Patient had previous history of heart disease and had 2 previous cardiac stents done in 2002.  Patient's pain does not radiate into the arm.  Denies any dysuria or hematuria or fever chills denies any cough or shortness of breath.        Review of patient's allergies indicates:   Allergen Reactions    Cephalexin      Other reaction(s): Rash    Doxycycline monohydrate Hives    Lanoxin  [digoxin]      Other reaction(s): Rash    Lansoprazole      Other reaction(s): Rash    Macrobid [nitrofurantoin monohyd/m-cryst]      Past Medical History:   Diagnosis Date    Allergy     Anxiety     Arthritis     CAD (coronary artery disease)     Chronic back pain     Chronic rhinitis     Hyperlipidemia     Hypertension      Past Surgical History:   Procedure Laterality Date    BREAST SURGERY      breast reduction    coranary stent      HYSTERECTOMY      total    TOTAL REDUCTION MAMMOPLASTY Bilateral 2000     Family History   Problem Relation Age of Onset    Cancer Mother         breast, ovarian, cervical     Heart disease Mother     Breast cancer Mother     Cancer Father         melanoma    Stroke Sister     Heart disease Sister     Cancer Sister         leukemia    Heart disease Brother     Heart disease Maternal Grandmother     No Known  Problems Daughter     No Known Problems Son     Cancer Maternal Uncle     Cancer Paternal Aunt         breast    Breast cancer Paternal Aunt     Cancer Paternal Uncle     Macular degeneration Sister     Heart disease Sister      Social History     Tobacco Use    Smoking status: Former Smoker     Packs/day: 1.00     Types: Cigarettes     Quit date: 3/4/2017     Years since quitting: 3.6    Smokeless tobacco: Never Used   Substance Use Topics    Alcohol use: Yes     Alcohol/week: 0.0 standard drinks     Comment: sometimes    Drug use: No     Review of Systems   Constitutional: Negative.    HENT: Negative.    Eyes: Negative.    Respiratory: Negative.    Cardiovascular: Positive for chest pain.   Gastrointestinal: Negative.    Endocrine: Negative.    Genitourinary: Negative.    Musculoskeletal: Negative.    Skin: Negative.    Allergic/Immunologic: Negative.    Neurological: Negative.    Hematological: Negative.    Psychiatric/Behavioral: Negative.    All other systems reviewed and are negative.      Physical Exam     Initial Vitals [10/16/20 1335]   BP Pulse Resp Temp SpO2   (!) (P) 172/82 (!) (P) 57 (P) 20 -- (P) 96 %      MAP       --         Physical Exam    Nursing note and vitals reviewed.  Constitutional: She appears well-developed and well-nourished.   HENT:   Head: Normocephalic and atraumatic.   Nose: Nose normal.   Mouth/Throat: Oropharynx is clear and moist.   Eyes: Conjunctivae and EOM are normal. Pupils are equal, round, and reactive to light.   Neck: Normal range of motion. Neck supple. No thyromegaly present. No tracheal deviation present. No JVD present.   Cardiovascular: Normal rate, regular rhythm, normal heart sounds and intact distal pulses.   No murmur heard.  Pulmonary/Chest: Breath sounds normal. No stridor. No respiratory distress. She has no wheezes. She has no rales.   Abdominal: Soft. Bowel sounds are normal. She exhibits no distension. There is no abdominal tenderness.    Musculoskeletal: Normal range of motion. No edema.   Neurological: She is alert and oriented to person, place, and time. She has normal strength. No cranial nerve deficit or sensory deficit. GCS score is 15. GCS eye subscore is 4. GCS verbal subscore is 5. GCS motor subscore is 6.   Skin: Skin is warm. Capillary refill takes less than 2 seconds.   Psychiatric: She has a normal mood and affect. Thought content normal.         ED Course   Critical Care    Date/Time: 10/16/2020 1:53 PM  Performed by: Jose Slade MD  Authorized by: Jose Slade MD   Direct patient critical care time: 20 minutes  Ordering / reviewing critical care time: 5 minutes  Documentation critical care time: 5 minutes  Consulting other physicians critical care time: 5 minutes  Total critical care time (exclusive of procedural time) : 35 minutes        Labs Reviewed   CBC W/ AUTO DIFFERENTIAL   COMPREHENSIVE METABOLIC PANEL   TROPONIN I   B-TYPE NATRIURETIC PEPTIDE   CBC W/ AUTO DIFFERENTIAL   COMPREHENSIVE METABOLIC PANEL   PROTIME-INR   TROPONIN I   URINALYSIS, REFLEX TO URINE CULTURE   SARS-COV-2 RNA AMPLIFICATION, QUAL   TYPE & SCREEN     EKG Readings: (Independently Interpreted)   Initial Reading: STEMI. Rhythm: Normal Sinus Rhythm. Ectopy: No Ectopy. Conduction: Normal.   EKG consistent with inferior ST-elevation myocardial infarction       Imaging Results          X-Ray Chest AP Portable (In process)               X-Rays:   Independently Interpreted Readings:   Other Readings:  Chest x-ray unremarkable without any mediastinal widening    Medical Decision Making:   Differential Diagnosis:   65-year-old female presented emergency department with chest pain and EKG consistent with ST-elevation myocardial infarction.  Cardiologist contacted immediately and Dr. Robins on his way and cath lab contacted.  Patient to go to cath lab for intervention.  Patient having heart attack and patient and family counseled.  Chest x-ray unremarkable and  screening cardiac workup started and patient started on IV fluids and nitroglycerin.  Heparin given.  Aspirin given.  Patient did have improvement of pain and symptoms with treatment.  Hospital Medicine will later evaluate the patient however cardiologist taking the patient to cath lab  Clinical Tests:   Lab Tests: Reviewed  Radiological Study: Reviewed  Medical Tests: Reviewed                             Clinical Impression:       ICD-10-CM ICD-9-CM   1. Chest pain  R07.9 786.50   2. STEMI (ST elevation myocardial infarction)  I21.3 410.90                          ED Disposition Condition    Admit                             Jose Slade MD  10/16/20 4218

## 2020-10-16 NOTE — Clinical Note
Catheter is inserted into the ostium   right coronary artery. Angiography performed of the aorta and right coronary arteries. Angiography performed via power injection. Injection was 6 mL contrast at 3 mL/s. Power injection PSI was 200.. Unable to engage and suboptimal result.

## 2020-10-16 NOTE — Clinical Note
Catheter is inserted into the ostium   left main. Angiography performed of the left coronary arteries. Angiography performed via power injection. Injection was 8 mL contrast at 4 mL/s. Power injection PSI was 450..

## 2020-10-16 NOTE — Clinical Note
Catheter is inserted into the ostium   left main. Angiography performed of the left coronary arteries in multiple views. Angiography performed via power injection. Injection was 8 mL contrast at 4 mL/s. Power injection PSI was 450.. 6fr JL4

## 2020-10-16 NOTE — FIRST PROVIDER EVALUATION
"Medical screening exam completed.  I have conducted a focused provider triage encounter, findings are as follows:    Brief history of present illness:  Chest pain with nausea  Onset about 1 hour ago  Pain going between shoulder blades  Stress test last week. Took nitro times one with "some" relief     There were no vitals filed for this visit.    Pertinent physical exam:  HRR  Lungs CTA  Color pink     Brief workup plan:  Cardiac work up     Preliminary workup initiated; this workup will be continued and followed by the physician or advanced practice provider that is assigned to the patient when roomed.  "

## 2020-10-16 NOTE — Clinical Note
Catheter is inserted into the left ventricle. Angiography performed of the LV. Angiography performed via power injection. Injection was 24 mL contrast at 12 mL/s. Power injection PSI was 700.. 6FR PIGTAIL

## 2020-10-16 NOTE — Clinical Note
Catheter is inserted into the ostium   right coronary artery. Angiography performed of the right coronary arteries in multiple views. Angiography performed via power injection. Injection was 6 mL contrast at 3 mL/s. Power injection PSI was 200..

## 2020-10-16 NOTE — HPI
65-year-old female presented emergency department with substernal chest pain which felt like indigestion which started about 2 hours ago while she was trying to take a shower and still had persistent pain so took nitroglycerin and came here.  Patient states the pain radiates across the chest and rates it 10/10 when it started however after the nitroglycerin she took now the pain is down to 3/10 however still has persistent pain.  Denies shortness of breath.  Denies nausea or vomiting.  Patient describes this pain as indigestion and also pressure in the substernal area.  Patient had previous history of heart disease and had 2 previous cardiac stents done in 2002.  Patient was taken urgently to the cath lab    Cardiac catheterization and PCI note:  Patient has an acute inferior wall myocardial infarction.  There is high-grade stenosis in the distal edge of the stent and adjacent left circumflex artery; there is mild stenosis in the proximal portion of the stent in the circumflex.  There is TRUNG 2-3 flow.  PTCA 2.5 mm balloon; A RESOLUTE 2.5/20 MM stent was deployed with 0% residual stenosis is 40-50% ostial RCA stenosis; there is calcification of the aortic root adjacent to the RCA ostium.  Severe Postero basal hypokinesia on ventriculogram.  Plavix 600 mg load given; Plavix 70 mg daily.  Aspirin 81 mg daily

## 2020-10-16 NOTE — BRIEF OP NOTE
Cardiac catheterization and PCI note:  Patient has an acute inferior wall myocardial infarction.  There is high-grade stenosis in the distal edge of the stent and adjacent left circumflex artery; there is mild stenosis in the proximal portion of the stent in the circumflex.  There is TRUNG 2-3 flow.  PTCA 2.5 mm balloon; A RESOLUTE 2.5/20 MM stent was deployed with 0% residual stenosis is 40-50% ostial RCA stenosis; there is calcification of the aortic root adjacent to the RCA ostium.  Severe Postero basal hypokinesia on ventriculogram.  Plavix 600 mg load given; Plavix 70 mg daily.  Aspirin 81 mg daily

## 2020-10-16 NOTE — CONSULTS
Cardiology consultation                                                       10/16/2020    This 65-year-old white lady presents with an acute inferior wall myocardial infarction.  Patient seen in the emergency room.    The patient started with the chest discomfort approximately 3-4 hours ago with got progressively worse; pain in the mid epigastrium and retrosternal area and is described as an indigestion.  There was associated diaphoresis he denies nausea vomiting.  He has had 2 prior coronary stents in 2000 and 2004.  He had a recent routine stress test-results are pending at this time.    Past history:  Hypertension.  Dyslipidemia.  Chronic rhinitis.  Chronic back pain.  Arthritis.  Anxiety-Celexa.  Normal colonoscopy 3 months ago.  Breast reduction surgery.  Hysterectomy.    Family history:  Mother had heart disease and cancer.  Father had melanoma.    Social history:  Patient is to be 1/2 pack-per-day smoker.  She stopped smoking in 2017 after a bout of pneumonia.  She vapes.  She has an occasional drink.    Review of systems:    The patient denies hematemesis hematochezia melena.  He denies hematuria.  Occasional wheezing.  Patient's exercise capacity is good; she denies exertional chest pain tightness or shortness of breath.    This is a well-built white lady complaining of moderately severe chest discomfort.  Blood pressure 167/78 respirations 16 per minute pulse 67 per minute temperature 98° F O2 sats 96% on 4 L O2  Eyes:  Mild conjunctival pallor.  No scleral icterus.  Neck:  JVP is normal.  Hepatojugular reflex is negative.  Carotids are without bruits.  Lungs:  Air entry is equal bilaterally.  There were no rales or rhonchi.  Heart:  S1-S2 well heard.  There are no murmurs gallops or rubs.  Abdomen:  Soft; nontender.  No hepatomegaly.  Extremities:  No clubbing cyanosis or edema.    ECG reveals sinus bradycardia with marked ST segment elevation in the  inferior leads; there is mild ST elevation in V5 V6.  Chest x-ray is unremarkable      Impression:  1.  Acute inferior wall myocardial infarction; prior coronary stents 2000 and 2002; recent stress test-results unavailable  2.  Hypertension; dyslipidemia; anxiety disorder; chronic back pain    The patient was taken emergently to the cardiac catheterization laboratory.  She had high-grade stenosis in the mid left circumflex artery stents.  Resolute 2.5/22 mm stent was deployed with 0% residual stenosis.  I was unable to engage the right coronary artery.  There appears to be 40-50% ostial stenosis.  There is heavy calcification around the ostium in the aortic root. ST segment elevation resolved; the patient became transiently bradycardic and hypotensive during PCI-resolved spontaneously; the patient was chest pain-free.  Metoprolol 25 mg b.i.d..  Lipitor 80 mg now.

## 2020-10-16 NOTE — Clinical Note
Catheter is inserted into the ostium   right coronary artery. Angiography performed of the right coronary arteries in multiple views. Angiography performed via power injection. Injection was 6 mL contrast at 3 mL/s. Power injection PSI was 300.. 6FR JR4

## 2020-10-17 LAB
ANION GAP SERPL CALC-SCNC: 8 MMOL/L (ref 8–16)
ANION GAP SERPL CALC-SCNC: 8 MMOL/L (ref 8–16)
BASOPHILS # BLD AUTO: 0.01 K/UL (ref 0–0.2)
BASOPHILS NFR BLD: 0.1 % (ref 0–1.9)
BILIRUB UR QL STRIP: NEGATIVE
BUN SERPL-MCNC: 12 MG/DL (ref 8–23)
BUN SERPL-MCNC: 12 MG/DL (ref 8–23)
CALCIUM SERPL-MCNC: 8.8 MG/DL (ref 8.7–10.5)
CALCIUM SERPL-MCNC: 8.8 MG/DL (ref 8.7–10.5)
CHLORIDE SERPL-SCNC: 105 MMOL/L (ref 95–110)
CHLORIDE SERPL-SCNC: 105 MMOL/L (ref 95–110)
CHOLEST SERPL-MCNC: 159 MG/DL (ref 120–199)
CHOLEST/HDLC SERPL: 4.8 {RATIO} (ref 2–5)
CK MB SERPL-MCNC: 345.9 NG/ML (ref 0.1–6.5)
CLARITY UR: CLEAR
CO2 SERPL-SCNC: 24 MMOL/L (ref 23–29)
CO2 SERPL-SCNC: 24 MMOL/L (ref 23–29)
COLOR UR: YELLOW
CREAT SERPL-MCNC: 0.6 MG/DL (ref 0.5–1.4)
CREAT SERPL-MCNC: 0.6 MG/DL (ref 0.5–1.4)
DIFFERENTIAL METHOD: ABNORMAL
EOSINOPHIL # BLD AUTO: 0.1 K/UL (ref 0–0.5)
EOSINOPHIL NFR BLD: 0.8 % (ref 0–8)
ERYTHROCYTE [DISTWIDTH] IN BLOOD BY AUTOMATED COUNT: 11.8 % (ref 11.5–14.5)
ERYTHROCYTE [DISTWIDTH] IN BLOOD BY AUTOMATED COUNT: 11.8 % (ref 11.5–14.5)
EST. GFR  (AFRICAN AMERICAN): >60 ML/MIN/1.73 M^2
EST. GFR  (AFRICAN AMERICAN): >60 ML/MIN/1.73 M^2
EST. GFR  (NON AFRICAN AMERICAN): >60 ML/MIN/1.73 M^2
EST. GFR  (NON AFRICAN AMERICAN): >60 ML/MIN/1.73 M^2
ESTIMATED AVG GLUCOSE: 108 MG/DL (ref 68–131)
GLUCOSE SERPL-MCNC: 153 MG/DL (ref 70–110)
GLUCOSE SERPL-MCNC: 153 MG/DL (ref 70–110)
GLUCOSE UR QL STRIP: NEGATIVE
HBA1C MFR BLD HPLC: 5.4 % (ref 4.5–6.2)
HCT VFR BLD AUTO: 37.6 % (ref 37–48.5)
HCT VFR BLD AUTO: 37.6 % (ref 37–48.5)
HDLC SERPL-MCNC: 33 MG/DL (ref 40–75)
HDLC SERPL: 20.8 % (ref 20–50)
HGB BLD-MCNC: 12.6 G/DL (ref 12–16)
HGB BLD-MCNC: 12.6 G/DL (ref 12–16)
HGB UR QL STRIP: ABNORMAL
IMM GRANULOCYTES # BLD AUTO: 0.01 K/UL (ref 0–0.04)
IMM GRANULOCYTES NFR BLD AUTO: 0.1 % (ref 0–0.5)
KETONES UR QL STRIP: NEGATIVE
LDLC SERPL CALC-MCNC: 98.2 MG/DL (ref 63–159)
LEUKOCYTE ESTERASE UR QL STRIP: NEGATIVE
LYMPHOCYTES # BLD AUTO: 1.2 K/UL (ref 1–4.8)
LYMPHOCYTES NFR BLD: 15 % (ref 18–48)
MAGNESIUM SERPL-MCNC: 2 MG/DL (ref 1.6–2.6)
MCH RBC QN AUTO: 30.5 PG (ref 27–31)
MCH RBC QN AUTO: 30.5 PG (ref 27–31)
MCHC RBC AUTO-ENTMCNC: 33.5 G/DL (ref 32–36)
MCHC RBC AUTO-ENTMCNC: 33.5 G/DL (ref 32–36)
MCV RBC AUTO: 91 FL (ref 82–98)
MCV RBC AUTO: 91 FL (ref 82–98)
MONOCYTES # BLD AUTO: 0.6 K/UL (ref 0.3–1)
MONOCYTES NFR BLD: 7.4 % (ref 4–15)
NEUTROPHILS # BLD AUTO: 6.1 K/UL (ref 1.8–7.7)
NEUTROPHILS NFR BLD: 76.6 % (ref 38–73)
NITRITE UR QL STRIP: NEGATIVE
NONHDLC SERPL-MCNC: 126 MG/DL
NRBC BLD-RTO: 0 /100 WBC
PH UR STRIP: 6 [PH] (ref 5–8)
PLATELET # BLD AUTO: 233 K/UL (ref 150–350)
PLATELET # BLD AUTO: 233 K/UL (ref 150–350)
PMV BLD AUTO: 10.3 FL (ref 9.2–12.9)
PMV BLD AUTO: 10.3 FL (ref 9.2–12.9)
POTASSIUM SERPL-SCNC: 4.2 MMOL/L (ref 3.5–5.1)
POTASSIUM SERPL-SCNC: 4.2 MMOL/L (ref 3.5–5.1)
PROT UR QL STRIP: NEGATIVE
RBC # BLD AUTO: 4.13 M/UL (ref 4–5.4)
RBC # BLD AUTO: 4.13 M/UL (ref 4–5.4)
SODIUM SERPL-SCNC: 137 MMOL/L (ref 136–145)
SODIUM SERPL-SCNC: 137 MMOL/L (ref 136–145)
SP GR UR STRIP: >1.03 (ref 1–1.03)
TRIGL SERPL-MCNC: 139 MG/DL (ref 30–150)
TROPONIN I SERPL DL<=0.01 NG/ML-MCNC: 47.68 NG/ML
URN SPEC COLLECT METH UR: ABNORMAL
UROBILINOGEN UR STRIP-ACNC: NEGATIVE EU/DL
WBC # BLD AUTO: 7.92 K/UL (ref 3.9–12.7)
WBC # BLD AUTO: 7.92 K/UL (ref 3.9–12.7)

## 2020-10-17 PROCEDURE — 80061 LIPID PANEL: CPT

## 2020-10-17 PROCEDURE — 93005 ELECTROCARDIOGRAM TRACING: CPT | Performed by: INTERNAL MEDICINE

## 2020-10-17 PROCEDURE — 82553 CREATINE MB FRACTION: CPT

## 2020-10-17 PROCEDURE — 25000242 PHARM REV CODE 250 ALT 637 W/ HCPCS: Performed by: HOSPITALIST

## 2020-10-17 PROCEDURE — S4991 NICOTINE PATCH NONLEGEND: HCPCS | Performed by: HOSPITALIST

## 2020-10-17 PROCEDURE — 80048 BASIC METABOLIC PNL TOTAL CA: CPT

## 2020-10-17 PROCEDURE — 25000003 PHARM REV CODE 250: Performed by: HOSPITALIST

## 2020-10-17 PROCEDURE — 25000003 PHARM REV CODE 250: Performed by: SPECIALIST

## 2020-10-17 PROCEDURE — 36415 COLL VENOUS BLD VENIPUNCTURE: CPT

## 2020-10-17 PROCEDURE — 94640 AIRWAY INHALATION TREATMENT: CPT

## 2020-10-17 PROCEDURE — 99900035 HC TECH TIME PER 15 MIN (STAT)

## 2020-10-17 PROCEDURE — 85025 COMPLETE CBC W/AUTO DIFF WBC: CPT

## 2020-10-17 PROCEDURE — 93010 EKG 12-LEAD: ICD-10-PCS | Mod: ,,, | Performed by: INTERNAL MEDICINE

## 2020-10-17 PROCEDURE — 81003 URINALYSIS AUTO W/O SCOPE: CPT

## 2020-10-17 PROCEDURE — 83735 ASSAY OF MAGNESIUM: CPT

## 2020-10-17 PROCEDURE — 84484 ASSAY OF TROPONIN QUANT: CPT

## 2020-10-17 PROCEDURE — 93010 ELECTROCARDIOGRAM REPORT: CPT | Mod: ,,, | Performed by: INTERNAL MEDICINE

## 2020-10-17 PROCEDURE — 94761 N-INVAS EAR/PLS OXIMETRY MLT: CPT

## 2020-10-17 PROCEDURE — 21400001 HC TELEMETRY ROOM

## 2020-10-17 RX ORDER — MUPIROCIN 20 MG/G
OINTMENT TOPICAL 2 TIMES DAILY
Status: DISCONTINUED | OUTPATIENT
Start: 2020-10-17 | End: 2020-10-18 | Stop reason: HOSPADM

## 2020-10-17 RX ORDER — LISINOPRIL 2.5 MG/1
2.5 TABLET ORAL DAILY
Status: DISCONTINUED | OUTPATIENT
Start: 2020-10-17 | End: 2020-10-18 | Stop reason: HOSPADM

## 2020-10-17 RX ORDER — CHLORHEXIDINE GLUCONATE ORAL RINSE 1.2 MG/ML
15 SOLUTION DENTAL 2 TIMES DAILY
Status: DISCONTINUED | OUTPATIENT
Start: 2020-10-17 | End: 2020-10-18 | Stop reason: HOSPADM

## 2020-10-17 RX ORDER — ATORVASTATIN CALCIUM 40 MG/1
40 TABLET, FILM COATED ORAL NIGHTLY
Status: DISCONTINUED | OUTPATIENT
Start: 2020-10-17 | End: 2020-10-18 | Stop reason: HOSPADM

## 2020-10-17 RX ADMIN — UMECLIDINIUM BROMIDE AND VILANTEROL TRIFENATATE 1 PUFF: 62.5; 25 POWDER RESPIRATORY (INHALATION) at 07:10

## 2020-10-17 RX ADMIN — LISINOPRIL 2.5 MG: 2.5 TABLET ORAL at 02:10

## 2020-10-17 RX ADMIN — ATORVASTATIN CALCIUM 40 MG: 40 TABLET, FILM COATED ORAL at 09:10

## 2020-10-17 RX ADMIN — NITROGLYCERIN 5 MCG/MIN: 20 INJECTION INTRAVENOUS at 02:10

## 2020-10-17 RX ADMIN — ASPIRIN 81 MG: 81 TABLET, DELAYED RELEASE ORAL at 10:10

## 2020-10-17 RX ADMIN — ACETAMINOPHEN 650 MG: 325 TABLET, FILM COATED ORAL at 09:10

## 2020-10-17 RX ADMIN — CLOPIDOGREL BISULFATE 75 MG: 75 TABLET, FILM COATED ORAL at 10:10

## 2020-10-17 RX ADMIN — METOPROLOL TARTRATE 25 MG: 25 TABLET, FILM COATED ORAL at 10:10

## 2020-10-17 RX ADMIN — NICOTINE 1 PATCH: 21 PATCH, EXTENDED RELEASE TRANSDERMAL at 09:10

## 2020-10-17 RX ADMIN — METOPROLOL TARTRATE 25 MG: 25 TABLET, FILM COATED ORAL at 09:10

## 2020-10-17 NOTE — SUBJECTIVE & OBJECTIVE
Interval History:  Chest pain last night now resolved  No shortness of breath no abdominal pain no nausea no vomiting  Patient vapes  No  Acute events since emergent angiogram yesterday        Objective:     Vital Signs (Most Recent):  Temp: 98.3 °F (36.8 °C) (10/17/20 0301)  Pulse: 74 (10/17/20 0800)  Resp: (!) 24 (10/17/20 0800)  BP: (!) 112/58 (10/17/20 0800)  SpO2: 96 % (10/17/20 0800) Vital Signs (24h Range):  Temp:  [97.5 °F (36.4 °C)-98.5 °F (36.9 °C)] 98.3 °F (36.8 °C)  Pulse:  [57-77] 74  Resp:  [10-29] 24  SpO2:  [93 %-100 %] 96 %  BP: ()/(44-82) 112/58     Weight: 63.5 kg (140 lb)  Body mass index is 24.8 kg/m².    Intake/Output Summary (Last 24 hours) at 10/17/2020 0901  Last data filed at 10/17/2020 0600  Gross per 24 hour   Intake 1290 ml   Output 1400 ml   Net -110 ml      Physical Exam appears in no distress lying in bed  HEENT sclerae nonicteric  Neck is supple nontender  Lungs are clear to auscultation  Heart is regular rate and rhythm no rub no murmur  Abdomen is soft nontender, +BS  Extremities no edema  Neuro patient is alert oriented x3 motor exam is nonfocal  Exam no Marin  Skin no rash  Psych patient is pleasant calm cooperative    Significant Labs:   BMP:   Recent Labs   Lab 10/17/20  0445   *  153*     137   K 4.2  4.2     105   CO2 24  24   BUN 12  12   CREATININE 0.6  0.6   CALCIUM 8.8  8.8   MG 2.0     CBC:   Recent Labs   Lab 10/16/20  1343 10/17/20  0445   WBC 5.25  5.25 7.92  7.92   HGB 13.9  13.9 12.6  12.6   HCT 40.9  40.9 37.6  37.6     193 233  233     CMP:   Recent Labs   Lab 10/16/20  1343 10/17/20  0445     137 137  137   K 3.5  3.5 4.2  4.2     100 105  105   CO2 26  26 24  24   *  128* 153*  153*   BUN 13  13 12  12   CREATININE 0.6  0.6 0.6  0.6   CALCIUM 9.3  9.3 8.8  8.8   PROT 7.3  7.3  --    ALBUMIN 4.4  4.4  --    BILITOT 1.8*  1.8*  --    ALKPHOS 71  71  --    AST 20  20  --    ALT  21  21  --    ANIONGAP 11  11 8  8   EGFRNONAA >60.0  >60.0 >60.0  >60.0      Troponin peaked at 71 now trending down in the 40s

## 2020-10-17 NOTE — PROGRESS NOTES
[facility]  Progress Note    Patient Name: Aysha Moore  MRN: [id number]  Patient Class: IP- Inpatient   Admission Date: 10/16/2020  Length of Stay: 1 days  Attending Physician: Michael Prado DO  Primary Care Provider: Yoni Daniels MD        Subjective:     Principal Problem:<principal problem not specified>        HPI:  65-year-old female presented emergency department with substernal chest pain which felt like indigestion which started about 2 hours ago while she was trying to take a shower and still had persistent pain so took nitroglycerin and came here.  Patient states the pain radiates across the chest and rates it 10/10 when it started however after the nitroglycerin she took now the pain is down to 3/10 however still has persistent pain.  Denies shortness of breath.  Denies nausea or vomiting.  Patient describes this pain as indigestion and also pressure in the substernal area.  Patient had previous history of heart disease and had 2 previous cardiac stents done in 2002.  Patient was taken urgently to the cath lab    Cardiac catheterization and PCI note:  Patient has an acute inferior wall myocardial infarction.  There is high-grade stenosis in the distal edge of the stent and adjacent left circumflex artery; there is mild stenosis in the proximal portion of the stent in the circumflex.  There is TRUNG 2-3 flow.  PTCA 2.5 mm balloon; A RESOLUTE 2.5/20 MM stent was deployed with 0% residual stenosis is 40-50% ostial RCA stenosis; there is calcification of the aortic root adjacent to the RCA ostium.  Severe Postero basal hypokinesia on ventriculogram.  Plavix 600 mg load given; Plavix 70 mg daily.  Aspirin 81 mg daily    Overview/Hospital Course:  No notes on file    Interval History:  Chest pain last night now resolved  No shortness of breath no abdominal pain no nausea no vomiting  Patient vapes  No  Acute events since emergent angiogram yesterday        Objective:      Vital Signs (Most Recent):  Temp: 98.3 °F (36.8 °C) (10/17/20 0301)  Pulse: 74 (10/17/20 0800)  Resp: (!) 24 (10/17/20 0800)  BP: (!) 112/58 (10/17/20 0800)  SpO2: 96 % (10/17/20 0800) Vital Signs (24h Range):  Temp:  [97.5 °F (36.4 °C)-98.5 °F (36.9 °C)] 98.3 °F (36.8 °C)  Pulse:  [57-77] 74  Resp:  [10-29] 24  SpO2:  [93 %-100 %] 96 %  BP: ()/(44-82) 112/58     Weight: 63.5 kg (140 lb)  Body mass index is 24.8 kg/m².    Intake/Output Summary (Last 24 hours) at 10/17/2020 0901  Last data filed at 10/17/2020 0600  Gross per 24 hour   Intake 1290 ml   Output 1400 ml   Net -110 ml      Physical Exam appears in no distress lying in bed  HEENT sclerae nonicteric  Neck is supple nontender  Lungs are clear to auscultation  Heart is regular rate and rhythm no rub no murmur  Abdomen is soft nontender, +BS  Extremities no edema  Neuro patient is alert oriented x3 motor exam is nonfocal  Exam no Marin  Skin no rash  Psych patient is pleasant calm cooperative    Significant Labs:   BMP:   Recent Labs   Lab 10/17/20  0445   *  153*     137   K 4.2  4.2     105   CO2 24  24   BUN 12  12   CREATININE 0.6  0.6   CALCIUM 8.8  8.8   MG 2.0     CBC:   Recent Labs   Lab 10/16/20  1343 10/17/20  0445   WBC 5.25  5.25 7.92  7.92   HGB 13.9  13.9 12.6  12.6   HCT 40.9  40.9 37.6  37.6     193 233  233     CMP:   Recent Labs   Lab 10/16/20  1343 10/17/20  0445     137 137  137   K 3.5  3.5 4.2  4.2     100 105  105   CO2 26  26 24  24   *  128* 153*  153*   BUN 13  13 12  12   CREATININE 0.6  0.6 0.6  0.6   CALCIUM 9.3  9.3 8.8  8.8   PROT 7.3  7.3  --    ALBUMIN 4.4  4.4  --    BILITOT 1.8*  1.8*  --    ALKPHOS 71  71  --    AST 20  20  --    ALT 21  21  --    ANIONGAP 11  11 8  8   EGFRNONAA >60.0  >60.0 >60.0  >60.0      Troponin peaked at 71 now trending down in the 40s          Assessment/Plan:      #1 Acute ST-elevation inf MI-\S/P stent  of stenotic preexisting left circumflex stent.  Continuing care as outlined  #2 CAD 40-50% RCA  #3 Essential HTN -monitor  #4 Dyslipidemia -LDL no at target.  Increase statin  #5 Chronic back pain  #6 tob use counseled at bedside  #7 Hyperglycemia-check hemoglobin A1c    Troponins are trending down  Transfer out of ICU later today if okay with Cardiology    VTE Risk Mitigation (From admission, onward)         Ordered     IP VTE HIGH RISK PATIENT  Once      10/16/20 1843     Place sequential compression device  Until discontinued      10/16/20 1843     Place ALEXIS hose  Until discontinued      10/16/20 1843                      Michael Prado DO  Department of Hospital Medicine   [facility]

## 2020-10-17 NOTE — PROGRESS NOTES
Progress Note  Cardiology    Admit Date: 10/16/2020   LOS: 1 day     Follow-up For:  Acute inferior wall myocardial infarction; stent in left circumflex artery for InStent stenosis; prior coronary stents    Scheduled Meds:   aspirin  81 mg Oral Daily    clopidogreL  75 mg Oral Daily    metoprolol tartrate  25 mg Oral BID    nicotine  1 patch Transdermal Q24H    umeclidinium-vilanteroL  1 puff Inhalation Daily     Continuous Infusions:   nitroGLYCERIN Stopped (10/17/20 0301)    nitroGLYCERIN Stopped (10/16/20 1522)     PRN Meds:acetaminophen, acetaminophen, albuterol-ipratropium, ALPRAZolam, calcium chloride IVPB, calcium chloride IVPB, calcium chloride IVPB, HYDROcodone-acetaminophen, magnesium oxide, magnesium sulfate IVPB, magnesium sulfate IVPB, magnesium sulfate IVPB, magnesium sulfate IVPB, nitroGLYCERIN, ondansetron, potassium chloride in water, potassium chloride in water, potassium chloride in water, potassium chloride in water, potassium chloride, potassium chloride, potassium chloride, potassium chloride, potassium, sodium phosphates, sodium phosphate IVPB, sodium phosphate IVPB, sodium phosphate IVPB, sodium phosphate IVPB, sodium phosphate IVPB    Review of patient's allergies indicates:   Allergen Reactions    Cephalexin      Other reaction(s): Rash    Doxycycline monohydrate Hives    Lanoxin  [digoxin]      Other reaction(s): Rash    Lansoprazole      Other reaction(s): Rash    Macrobid [nitrofurantoin monohyd/m-cryst]        SUBJECTIVE:     Interval History: Patient had mild transient anterior chest discomfort early this morning; quality of pain different from admit chest pain.  She was on intravenous nitroglycerin for a short period of time.  The patient feels well now.    Review of Systems  Respiratory: negative for cough, hemoptysis, sputum and wheezing  Cardiovascular: negative for chest pressure/discomfort, dyspnea, orthopnea, palpitations and paroxysmal nocturnal dyspnea  She has  ambulated to the bathroom.  OBJECTIVE:     Vital Signs (Most Recent)  Temp: 98.1 °F (36.7 °C) (10/17/20 1130)  Pulse: 63 (10/17/20 1300)  Resp: 17 (10/17/20 1300)  BP: (!) 119/56 (10/17/20 1200)  SpO2: 97 % (10/17/20 1300)    Vital Signs Range (Last 24H):  Temp:  [97.5 °F (36.4 °C)-98.9 °F (37.2 °C)]   Pulse:  [57-77]   Resp:  [10-29]   BP: ()/(44-82)   SpO2:  [93 %-100 %]       Physical Exam:  Neck: no carotid bruit, no JVD and supple, symmetrical, trachea midline  Lungs: clear to auscultation bilaterally, normal respiratory effort  Heart: regular rate and rhythm, S1, S2 normal, no murmur, click, rub or gallop  Abdomen: soft, non-tender; bowel sounds normal; no masses,  no organomegaly  Extremities: Extremities normal, atraumatic, no cyanosis, clubbing, or edema and The right groin looks good    Recent Results (from the past 24 hour(s))   CBC auto differential    Collection Time: 10/16/20  1:43 PM   Result Value Ref Range    WBC 5.25 3.90 - 12.70 K/uL    RBC 4.65 4.00 - 5.40 M/uL    Hemoglobin 13.9 12.0 - 16.0 g/dL    Hematocrit 40.9 37.0 - 48.5 %    Mean Corpuscular Volume 88 82 - 98 fL    Mean Corpuscular Hemoglobin 29.9 27.0 - 31.0 pg    Mean Corpuscular Hemoglobin Conc 34.0 32.0 - 36.0 g/dL    RDW 11.7 11.5 - 14.5 %    Platelets 193 150 - 350 K/uL    MPV 10.9 9.2 - 12.9 fL    Immature Granulocytes 0.2 0.0 - 0.5 %    Gran # (ANC) 1.9 1.8 - 7.7 K/uL    Immature Grans (Abs) 0.01 0.00 - 0.04 K/uL    Lymph # 2.7 1.0 - 4.8 K/uL    Mono # 0.5 0.3 - 1.0 K/uL    Eos # 0.1 0.0 - 0.5 K/uL    Baso # 0.03 0.00 - 0.20 K/uL    nRBC 0 0 /100 WBC    Gran% 35.7 (L) 38.0 - 73.0 %    Lymph% 50.9 (H) 18.0 - 48.0 %    Mono% 9.9 4.0 - 15.0 %    Eosinophil% 2.7 0.0 - 8.0 %    Basophil% 0.6 0.0 - 1.9 %    Differential Method Automated    Comprehensive metabolic panel    Collection Time: 10/16/20  1:43 PM   Result Value Ref Range    Sodium 137 136 - 145 mmol/L    Potassium 3.5 3.5 - 5.1 mmol/L    Chloride 100 95 - 110 mmol/L     CO2 26 23 - 29 mmol/L    Glucose 128 (H) 70 - 110 mg/dL    BUN, Bld 13 8 - 23 mg/dL    Creatinine 0.6 0.5 - 1.4 mg/dL    Calcium 9.3 8.7 - 10.5 mg/dL    Total Protein 7.3 6.0 - 8.4 g/dL    Albumin 4.4 3.5 - 5.2 g/dL    Total Bilirubin 1.8 (H) 0.1 - 1.0 mg/dL    Alkaline Phosphatase 71 55 - 135 U/L    AST 20 10 - 40 U/L    ALT 21 10 - 44 U/L    Anion Gap 11 8 - 16 mmol/L    eGFR if African American >60.0 >60 mL/min/1.73 m^2    eGFR if non African American >60.0 >60 mL/min/1.73 m^2   Troponin I #1    Collection Time: 10/16/20  1:43 PM   Result Value Ref Range    Troponin I <0.030 <=0.040 ng/mL   B-Type natriuretic peptide (BNP)    Collection Time: 10/16/20  1:43 PM   Result Value Ref Range    BNP 24 0 - 99 pg/mL   CBC auto differential    Collection Time: 10/16/20  1:43 PM   Result Value Ref Range    WBC 5.25 3.90 - 12.70 K/uL    RBC 4.65 4.00 - 5.40 M/uL    Hemoglobin 13.9 12.0 - 16.0 g/dL    Hematocrit 40.9 37.0 - 48.5 %    Mean Corpuscular Volume 88 82 - 98 fL    Mean Corpuscular Hemoglobin 29.9 27.0 - 31.0 pg    Mean Corpuscular Hemoglobin Conc 34.0 32.0 - 36.0 g/dL    RDW 11.7 11.5 - 14.5 %    Platelets 193 150 - 350 K/uL    MPV 10.9 9.2 - 12.9 fL    Immature Granulocytes 0.2 0.0 - 0.5 %    Gran # (ANC) 1.9 1.8 - 7.7 K/uL    Immature Grans (Abs) 0.01 0.00 - 0.04 K/uL    Lymph # 2.7 1.0 - 4.8 K/uL    Mono # 0.5 0.3 - 1.0 K/uL    Eos # 0.1 0.0 - 0.5 K/uL    Baso # 0.03 0.00 - 0.20 K/uL    nRBC 0 0 /100 WBC    Gran% 35.7 (L) 38.0 - 73.0 %    Lymph% 50.9 (H) 18.0 - 48.0 %    Mono% 9.9 4.0 - 15.0 %    Eosinophil% 2.7 0.0 - 8.0 %    Basophil% 0.6 0.0 - 1.9 %    Differential Method Automated    Comprehensive metabolic panel    Collection Time: 10/16/20  1:43 PM   Result Value Ref Range    Sodium 137 136 - 145 mmol/L    Potassium 3.5 3.5 - 5.1 mmol/L    Chloride 100 95 - 110 mmol/L    CO2 26 23 - 29 mmol/L    Glucose 128 (H) 70 - 110 mg/dL    BUN, Bld 13 8 - 23 mg/dL    Creatinine 0.6 0.5 - 1.4 mg/dL    Calcium 9.3 8.7 -  10.5 mg/dL    Total Protein 7.3 6.0 - 8.4 g/dL    Albumin 4.4 3.5 - 5.2 g/dL    Total Bilirubin 1.8 (H) 0.1 - 1.0 mg/dL    Alkaline Phosphatase 71 55 - 135 U/L    AST 20 10 - 40 U/L    ALT 21 10 - 44 U/L    Anion Gap 11 8 - 16 mmol/L    eGFR if African American >60.0 >60 mL/min/1.73 m^2    eGFR if non African American >60.0 >60 mL/min/1.73 m^2   Protime-INR    Collection Time: 10/16/20  1:43 PM   Result Value Ref Range    PT 13.2 10.6 - 14.8 sec    INR 1.1    Troponin I    Collection Time: 10/16/20  1:43 PM   Result Value Ref Range    Troponin I <0.030 <=0.040 ng/mL   COVID-19 Rapid Screening    Collection Time: 10/16/20  1:44 PM   Result Value Ref Range    SARS-CoV-2 RNA, Amplification, Qual Negative Negative   ISTAT ACT-K    Collection Time: 10/16/20  2:35 PM   Result Value Ref Range    POC ACTIVATED CLOTTING TIME K 180 (H) 74 - 137 sec    Sample ARTERIAL    Type & Screen    Collection Time: 10/16/20  2:46 PM   Result Value Ref Range    Group & Rh A POS     Indirect Rajendra NEG    ISTAT ACT-K    Collection Time: 10/16/20  2:50 PM   Result Value Ref Range    POC ACTIVATED CLOTTING TIME K 208 (H) 74 - 137 sec    Sample ARTERIAL    CK-MB in 6 hours    Collection Time: 10/16/20  9:23 PM   Result Value Ref Range    CPK .2 (H) 0.1 - 6.5 ng/mL   Troponin I in 6 hours    Collection Time: 10/16/20  9:23 PM   Result Value Ref Range    Troponin I 71.369 (HH) <=0.040 ng/mL   Hemoglobin A1c    Collection Time: 10/16/20  9:23 PM   Result Value Ref Range    Hemoglobin A1C 5.4 4.5 - 6.2 %    Estimated Avg Glucose 108 68 - 131 mg/dL   PT/INR    Collection Time: 10/16/20  9:23 PM   Result Value Ref Range    PT 13.5 10.6 - 14.8 sec    INR 1.1    PTT    Collection Time: 10/16/20  9:23 PM   Result Value Ref Range    aPTT 27.1 23.6 - 33.3 sec   Urinalysis, Reflex to Urine Culture Urine, Clean Catch    Collection Time: 10/17/20  3:49 AM    Specimen: Urine   Result Value Ref Range    Specimen UA Urine, Clean Catch     Color, UA  Yellow Yellow, Straw, Mary    Appearance, UA Clear Clear    pH, UA 6.0 5.0 - 8.0    Specific Gravity, UA >1.030 (A) 1.005 - 1.030    Protein, UA Negative Negative    Glucose, UA Negative Negative    Ketones, UA Negative Negative    Bilirubin (UA) Negative Negative    Occult Blood UA Trace (A) Negative    Nitrite, UA Negative Negative    Urobilinogen, UA Negative Negative EU/dL    Leukocytes, UA Negative Negative   Basic metabolic panel    Collection Time: 10/17/20  4:45 AM   Result Value Ref Range    Sodium 137 136 - 145 mmol/L    Potassium 4.2 3.5 - 5.1 mmol/L    Chloride 105 95 - 110 mmol/L    CO2 24 23 - 29 mmol/L    Glucose 153 (H) 70 - 110 mg/dL    BUN, Bld 12 8 - 23 mg/dL    Creatinine 0.6 0.5 - 1.4 mg/dL    Calcium 8.8 8.7 - 10.5 mg/dL    Anion Gap 8 8 - 16 mmol/L    eGFR if African American >60.0 >60 mL/min/1.73 m^2    eGFR if non African American >60.0 >60 mL/min/1.73 m^2   CBC auto differential    Collection Time: 10/17/20  4:45 AM   Result Value Ref Range    WBC 7.92 3.90 - 12.70 K/uL    RBC 4.13 4.00 - 5.40 M/uL    Hemoglobin 12.6 12.0 - 16.0 g/dL    Hematocrit 37.6 37.0 - 48.5 %    Mean Corpuscular Volume 91 82 - 98 fL    Mean Corpuscular Hemoglobin 30.5 27.0 - 31.0 pg    Mean Corpuscular Hemoglobin Conc 33.5 32.0 - 36.0 g/dL    RDW 11.8 11.5 - 14.5 %    Platelets 233 150 - 350 K/uL    MPV 10.3 9.2 - 12.9 fL    Immature Granulocytes 0.1 0.0 - 0.5 %    Gran # (ANC) 6.1 1.8 - 7.7 K/uL    Immature Grans (Abs) 0.01 0.00 - 0.04 K/uL    Lymph # 1.2 1.0 - 4.8 K/uL    Mono # 0.6 0.3 - 1.0 K/uL    Eos # 0.1 0.0 - 0.5 K/uL    Baso # 0.01 0.00 - 0.20 K/uL    nRBC 0 0 /100 WBC    Gran% 76.6 (H) 38.0 - 73.0 %    Lymph% 15.0 (L) 18.0 - 48.0 %    Mono% 7.4 4.0 - 15.0 %    Eosinophil% 0.8 0.0 - 8.0 %    Basophil% 0.1 0.0 - 1.9 %    Differential Method Automated    CK-MB in a.m.    Collection Time: 10/17/20  4:45 AM   Result Value Ref Range    CPK .9 (H) 0.1 - 6.5 ng/mL   Troponin I in a.m.    Collection Time:  10/17/20  4:45 AM   Result Value Ref Range    Troponin I 47.683 (HH) <=0.040 ng/mL   Lipid Panel    Collection Time: 10/17/20  4:45 AM   Result Value Ref Range    Cholesterol 159 120 - 199 mg/dL    Triglycerides 139 30 - 150 mg/dL    HDL 33 (L) 40 - 75 mg/dL    LDL Cholesterol 98.2 63.0 - 159.0 mg/dL    Hdl/Cholesterol Ratio 20.8 20.0 - 50.0 %    Total Cholesterol/HDL Ratio 4.8 2.0 - 5.0    Non-HDL Cholesterol 126 mg/dL   Basic Metabolic Panel (BMP)    Collection Time: 10/17/20  4:45 AM   Result Value Ref Range    Sodium 137 136 - 145 mmol/L    Potassium 4.2 3.5 - 5.1 mmol/L    Chloride 105 95 - 110 mmol/L    CO2 24 23 - 29 mmol/L    Glucose 153 (H) 70 - 110 mg/dL    BUN, Bld 12 8 - 23 mg/dL    Creatinine 0.6 0.5 - 1.4 mg/dL    Calcium 8.8 8.7 - 10.5 mg/dL    Anion Gap 8 8 - 16 mmol/L    eGFR if African American >60.0 >60 mL/min/1.73 m^2    eGFR if non African American >60.0 >60 mL/min/1.73 m^2   Magnesium    Collection Time: 10/17/20  4:45 AM   Result Value Ref Range    Magnesium 2.0 1.6 - 2.6 mg/dL   CBC Without Differential    Collection Time: 10/17/20  4:45 AM   Result Value Ref Range    WBC 7.92 3.90 - 12.70 K/uL    RBC 4.13 4.00 - 5.40 M/uL    Hemoglobin 12.6 12.0 - 16.0 g/dL    Hematocrit 37.6 37.0 - 48.5 %    Mean Corpuscular Volume 91 82 - 98 fL    Mean Corpuscular Hemoglobin 30.5 27.0 - 31.0 pg    Mean Corpuscular Hemoglobin Conc 33.5 32.0 - 36.0 g/dL    RDW 11.8 11.5 - 14.5 %    Platelets 233 150 - 350 K/uL    MPV 10.3 9.2 - 12.9 fL       Diagnostic Results:  Labs: Reviewed  ECG: Reviewed    ASSESSMENT/PLAN:     The patient is doing well.  No chest pain or tightness.  Transfer to the floor.  Ambulate.  Echo in a.m..  Plavix, aspirin.  Start low-dose lisinopril.  Lipitor 20 mg daily.  Hopefully home tomorrow.  Smoking cessation emphasized; stop vapes.

## 2020-10-17 NOTE — PLAN OF CARE
10/17/20 0730   Patient Assessment/Suction   Level of Consciousness (AVPU) alert   Respiratory Effort Normal;Unlabored   Expansion/Accessory Muscles/Retractions expansion symmetric   All Lung Fields Breath Sounds clear;equal bilaterally   PRE-TX-O2   SpO2 95 %   Pulse (!) 58   Resp 19   Aerosol Therapy   $ Aerosol Therapy Charges PRN treatment not required   Inhaler   $ Inhaler Charges MDI (Metered Dose Inahler) Treatment;Mouth rinsed post treatment   Daily Review of Necessity (Inhaler) completed   Respiratory Treatment Status (Inhaler) given   Treatment Route (Inhaler) mouthpiece   Patient Position (Inhaler) HOB elevated   Post Treatment Assessment (Inhaler) breath sounds unchanged   Signs of Intolerance (Inhaler) none   CONTINUE MDI

## 2020-10-17 NOTE — NURSING
Eric called from lab. Troponin has increased to 71.369. Dr. JOSE ALBERTO Robins beeped thru answering service. 2320. Dr. JOSE ALBERTO Robins beeped again thru answering service. 0215. Pt complains of chest pain/chest soreness of a 2 on pain scale. EKG done. Dr. JOSE ALBERTO Robins beeped thru answering service. 0225. Pt started on low dose NTG infusion. 0237 Called Dr. KATIUSKA Robins cellphone and notified him of elevated troponin and pt having chest pain/chest soreness. 12 lead EKG done,and the NTG infusion started. Dr. KATIUSKA Robins said that he expected the troponins to be elevated and NTG infusion was fine. No new orders received at this time.

## 2020-10-18 ENCOUNTER — CLINICAL SUPPORT (OUTPATIENT)
Dept: CARDIOLOGY | Facility: HOSPITAL | Age: 65
DRG: 247 | End: 2020-10-18
Attending: SPECIALIST
Payer: COMMERCIAL

## 2020-10-18 VITALS
OXYGEN SATURATION: 98 % | RESPIRATION RATE: 16 BRPM | HEIGHT: 63 IN | WEIGHT: 140 LBS | SYSTOLIC BLOOD PRESSURE: 85 MMHG | DIASTOLIC BLOOD PRESSURE: 49 MMHG | HEART RATE: 66 BPM | TEMPERATURE: 98 F | BODY MASS INDEX: 24.8 KG/M2

## 2020-10-18 VITALS — WEIGHT: 140 LBS | HEIGHT: 63 IN | BODY MASS INDEX: 24.8 KG/M2

## 2020-10-18 LAB
ANION GAP SERPL CALC-SCNC: 8 MMOL/L (ref 8–16)
AORTIC ROOT ANNULUS: 2.67 CM
AORTIC VALVE CUSP SEPERATION: 2.7 CM
AV INDEX (PROSTH): 0.75
AV MEAN GRADIENT: 6 MMHG
AV PEAK GRADIENT: 11 MMHG
AV VALVE AREA: 2.46 CM2
AV VELOCITY RATIO: 58.79
BSA FOR ECHO PROCEDURE: 1.68 M2
BUN SERPL-MCNC: 13 MG/DL (ref 8–23)
CALCIUM SERPL-MCNC: 8.8 MG/DL (ref 8.7–10.5)
CHLORIDE SERPL-SCNC: 103 MMOL/L (ref 95–110)
CO2 SERPL-SCNC: 26 MMOL/L (ref 23–29)
CREAT SERPL-MCNC: 0.6 MG/DL (ref 0.5–1.4)
CV ECHO LV RWT: 0.49 CM
DOP CALC AO PEAK VEL: 1.63 M/S
DOP CALC AO VTI: 27.73 CM
DOP CALC LVOT AREA: 3.3 CM2
DOP CALC LVOT DIAMETER: 2.04 CM
DOP CALC LVOT PEAK VEL: 95.82 M/S
DOP CALC LVOT STROKE VOLUME: 68.34 CM3
DOP CALCLVOT PEAK VEL VTI: 20.92 CM
E WAVE DECELERATION TIME: 223.77 MSEC
E/A RATIO: 0.78
E/E' RATIO: 11.2 M/S
ECHO LV POSTERIOR WALL: 1.05 CM (ref 0.6–1.1)
EST. GFR  (AFRICAN AMERICAN): >60 ML/MIN/1.73 M^2
EST. GFR  (NON AFRICAN AMERICAN): >60 ML/MIN/1.73 M^2
FRACTIONAL SHORTENING: 23 % (ref 28–44)
GLUCOSE SERPL-MCNC: 126 MG/DL (ref 70–110)
INTERVENTRICULAR SEPTUM: 1.01 CM (ref 0.6–1.1)
LEFT ATRIUM SIZE: 3.42 CM
LEFT INTERNAL DIMENSION IN SYSTOLE: 3.28 CM (ref 2.1–4)
LEFT VENTRICLE DIASTOLIC VOLUME INDEX: 69.08 ML/M2
LEFT VENTRICLE DIASTOLIC VOLUME: 114.8 ML
LEFT VENTRICLE MASS INDEX: 88 G/M2
LEFT VENTRICLE SYSTOLIC VOLUME INDEX: 40.6 ML/M2
LEFT VENTRICLE SYSTOLIC VOLUME: 67.5 ML
LEFT VENTRICULAR INTERNAL DIMENSION IN DIASTOLE: 4.27 CM (ref 3.5–6)
LEFT VENTRICULAR MASS: 146.85 G
LV LATERAL E/E' RATIO: 12 M/S
LV SEPTAL E/E' RATIO: 10.5 M/S
MV PEAK A VEL: 1.08 M/S
MV PEAK E VEL: 0.84 M/S
PISA TR MAX VEL: 2.43 M/S
POTASSIUM SERPL-SCNC: 3.8 MMOL/L (ref 3.5–5.1)
PV PEAK VELOCITY: 111.22 CM/S
RA PRESSURE: 3 MMHG
RIGHT VENTRICULAR END-DIASTOLIC DIMENSION: 237 CM
SODIUM SERPL-SCNC: 137 MMOL/L (ref 136–145)
TDI LATERAL: 0.07 M/S
TDI SEPTAL: 0.08 M/S
TDI: 0.08 M/S
TR MAX PG: 24 MMHG
TROPONIN I SERPL DL<=0.01 NG/ML-MCNC: 18.65 NG/ML
TV REST PULMONARY ARTERY PRESSURE: 27 MMHG

## 2020-10-18 PROCEDURE — 94761 N-INVAS EAR/PLS OXIMETRY MLT: CPT

## 2020-10-18 PROCEDURE — 99900035 HC TECH TIME PER 15 MIN (STAT)

## 2020-10-18 PROCEDURE — 93306 TTE W/DOPPLER COMPLETE: CPT

## 2020-10-18 PROCEDURE — 25000003 PHARM REV CODE 250

## 2020-10-18 PROCEDURE — 25000003 PHARM REV CODE 250: Performed by: SPECIALIST

## 2020-10-18 PROCEDURE — 36415 COLL VENOUS BLD VENIPUNCTURE: CPT

## 2020-10-18 PROCEDURE — 25000003 PHARM REV CODE 250: Performed by: HOSPITALIST

## 2020-10-18 PROCEDURE — 84484 ASSAY OF TROPONIN QUANT: CPT

## 2020-10-18 PROCEDURE — 80048 BASIC METABOLIC PNL TOTAL CA: CPT

## 2020-10-18 RX ORDER — METOPROLOL TARTRATE 25 MG/1
25 TABLET, FILM COATED ORAL 2 TIMES DAILY
Qty: 60 TABLET | Refills: 0 | Status: SHIPPED | OUTPATIENT
Start: 2020-10-18 | End: 2022-08-24 | Stop reason: ALTCHOICE

## 2020-10-18 RX ORDER — NITROGLYCERIN 0.4 MG/1
0.4 TABLET SUBLINGUAL EVERY 5 MIN PRN
Qty: 30 TABLET | Refills: 0 | Status: SHIPPED | OUTPATIENT
Start: 2020-10-18

## 2020-10-18 RX ORDER — CLOPIDOGREL BISULFATE 75 MG/1
75 TABLET ORAL DAILY
Qty: 30 TABLET | Refills: 0 | Status: SHIPPED | OUTPATIENT
Start: 2020-10-18 | End: 2022-03-10 | Stop reason: ALTCHOICE

## 2020-10-18 RX ORDER — LISINOPRIL 2.5 MG/1
2.5 TABLET ORAL DAILY
Qty: 30 TABLET | Refills: 0 | Status: SHIPPED | OUTPATIENT
Start: 2020-10-19 | End: 2023-06-06 | Stop reason: SDUPTHER

## 2020-10-18 RX ORDER — CLOPIDOGREL BISULFATE 75 MG/1
75 TABLET ORAL DAILY
Qty: 30 TABLET | Refills: 0 | Status: SHIPPED | OUTPATIENT
Start: 2020-10-19 | End: 2020-10-18

## 2020-10-18 RX ORDER — ATORVASTATIN CALCIUM 40 MG/1
40 TABLET, FILM COATED ORAL NIGHTLY
Qty: 30 TABLET | Refills: 0 | Status: SHIPPED | OUTPATIENT
Start: 2020-10-18 | End: 2021-05-11 | Stop reason: SDUPTHER

## 2020-10-18 RX ADMIN — METOPROLOL TARTRATE 25 MG: 25 TABLET, FILM COATED ORAL at 09:10

## 2020-10-18 RX ADMIN — CHLORHEXIDINE GLUCONATE 15 ML: 1.2 RINSE ORAL at 09:10

## 2020-10-18 RX ADMIN — MUPIROCIN: 20 OINTMENT TOPICAL at 09:10

## 2020-10-18 RX ADMIN — ASPIRIN 81 MG: 81 TABLET, DELAYED RELEASE ORAL at 09:10

## 2020-10-18 RX ADMIN — CLOPIDOGREL BISULFATE 75 MG: 75 TABLET, FILM COATED ORAL at 09:10

## 2020-10-18 RX ADMIN — LISINOPRIL 2.5 MG: 2.5 TABLET ORAL at 09:10

## 2020-10-18 NOTE — HOSPITAL COURSE
Patient is a 65-year-old female with history of underlying coronary artery disease.  She presented with acute ST-elevation MI and was taken emergently to the cath lab.  She had a new stent placed in her circumflex artery with had a stenosed stent.  She did well without complications..  She has been cleared for discharge by Cardiology.

## 2020-10-18 NOTE — RESPIRATORY THERAPY
10/18/20 0730   Patient Assessment/Suction   Level of Consciousness (AVPU) alert   Respiratory Effort Normal;Unlabored   Expansion/Accessory Muscles/Retractions no use of accessory muscles;no retractions;expansion symmetric   All Lung Fields Breath Sounds clear   PRE-TX-O2   O2 Device (Oxygen Therapy) room air   SpO2 95 %   Pulse Oximetry Type Intermittent   $ Pulse Oximetry - Multiple Charge Pulse Oximetry - Multiple   Pulse 68   Resp 18   Temp 97.7 °F (36.5 °C)   BP (!) 107/55   Positioning   Body Position positioned/repositioned independently   Head of Bed (HOB) HOB at 30-45 degrees   Positioning/Transfer Devices pillows;in use   Inhaler   $ Inhaler Charges Other (see comments)  (Med unavailable)   Daily Review of Necessity (Inhaler) completed   Wound Care   Skin Color/Characteristics without discoloration   Skin Temperature warm        10/18/20 0730   Patient Assessment/Suction   Level of Consciousness (AVPU) alert   Respiratory Effort Normal;Unlabored   Expansion/Accessory Muscles/Retractions no use of accessory muscles;no retractions;expansion symmetric   All Lung Fields Breath Sounds clear   PRE-TX-O2   O2 Device (Oxygen Therapy) room air   SpO2 95 %   Pulse Oximetry Type Intermittent   $ Pulse Oximetry - Multiple Charge Pulse Oximetry - Multiple   Pulse 68   Resp 18   Temp 97.7 °F (36.5 °C)   BP (!) 107/55   Positioning   Body Position positioned/repositioned independently   Head of Bed (HOB) HOB at 30-45 degrees   Positioning/Transfer Devices pillows;in use   Inhaler   $ Inhaler Charges Other (see comments)  (Med unavailable)   Daily Review of Necessity (Inhaler) completed   Wound Care   Skin Color/Characteristics without discoloration   Skin Temperature warm

## 2020-10-18 NOTE — PROGRESS NOTES
Progress Note  Cardiology    Admit Date: 10/16/2020   LOS: 2 days     Follow-up For:  Acute inferior wall myocardial, stent in the mid left circumflex for InStent restenoses.  TRUNG 2 flow restored to TRUNG 3 flow.  Transient bradycardia and hypotension with PCI    Scheduled Meds:   aspirin  81 mg Oral Daily    atorvastatin  40 mg Oral QHS    chlorhexidine  15 mL Mouth/Throat BID    clopidogreL  75 mg Oral Daily    lisinopriL  2.5 mg Oral Daily    metoprolol tartrate  25 mg Oral BID    mupirocin   Nasal BID    nicotine  1 patch Transdermal Q24H    umeclidinium-vilanteroL  1 puff Inhalation Daily     Continuous Infusions:   nitroGLYCERIN Stopped (10/17/20 0301)    nitroGLYCERIN Stopped (10/16/20 1522)     PRN Meds:acetaminophen, acetaminophen, albuterol-ipratropium, ALPRAZolam, calcium chloride IVPB, calcium chloride IVPB, calcium chloride IVPB, HYDROcodone-acetaminophen, magnesium oxide, magnesium sulfate IVPB, magnesium sulfate IVPB, magnesium sulfate IVPB, magnesium sulfate IVPB, nitroGLYCERIN, ondansetron, potassium chloride in water, potassium chloride in water, potassium chloride in water, potassium chloride in water, potassium chloride, potassium chloride, potassium chloride, potassium chloride, potassium, sodium phosphates, sodium phosphate IVPB, sodium phosphate IVPB, sodium phosphate IVPB, sodium phosphate IVPB, sodium phosphate IVPB    Review of patient's allergies indicates:   Allergen Reactions    Cephalexin      Other reaction(s): Rash    Doxycycline monohydrate Hives    Lanoxin  [digoxin]      Other reaction(s): Rash    Lansoprazole      Other reaction(s): Rash    Macrobid [nitrofurantoin monohyd/m-cryst]        SUBJECTIVE:     Interval History: Patient has no complaint of chest pain tightness or shortness of breath.  The patient has been ambulating without problem.    Review of Systems  Respiratory: negative for cough, hemoptysis, sputum and wheezing  Cardiovascular: negative for chest  pressure/discomfort, dyspnea, orthopnea, palpitations and paroxysmal nocturnal dyspnea    OBJECTIVE:     Vital Signs (Most Recent)  Temp: 97.9 °F (36.6 °C) (10/18/20 1130)  Pulse: 66 (10/18/20 1130)  Resp: 16 (10/18/20 1130)  BP: (!) 85/49 (10/18/20 1132)  SpO2: 98 % (10/18/20 1130)    Vital Signs Range (Last 24H):  Temp:  [97.6 °F (36.4 °C)-99.3 °F (37.4 °C)]   Pulse:  [62-76]   Resp:  [16-24]   BP: ()/(40-58)   SpO2:  [95 %-98 %]       Physical Exam:  Neck: no carotid bruit, no JVD and supple, symmetrical, trachea midline  Lungs: clear to auscultation bilaterally, normal respiratory effort  Heart: regular rate and rhythm, S1, S2 normal, no murmur, click, rub or gallop  Abdomen: soft, non-tender; bowel sounds normal; no masses,  no organomegaly  Extremities: Extremities normal, atraumatic, no cyanosis, clubbing, or edema and Right groin is soft; mild tenderness    Recent Results (from the past 24 hour(s))   Troponin I    Collection Time: 10/18/20  4:45 AM   Result Value Ref Range    Troponin I 18.653 (HH) <=0.040 ng/mL   Basic Metabolic Panel    Collection Time: 10/18/20  4:45 AM   Result Value Ref Range    Sodium 137 136 - 145 mmol/L    Potassium 3.8 3.5 - 5.1 mmol/L    Chloride 103 95 - 110 mmol/L    CO2 26 23 - 29 mmol/L    Glucose 126 (H) 70 - 110 mg/dL    BUN, Bld 13 8 - 23 mg/dL    Creatinine 0.6 0.5 - 1.4 mg/dL    Calcium 8.8 8.7 - 10.5 mg/dL    Anion Gap 8 8 - 16 mmol/L    eGFR if African American >60.0 >60 mL/min/1.73 m^2    eGFR if non African American >60.0 >60 mL/min/1.73 m^2   Echo Color Flow Doppler? Yes    Collection Time: 10/18/20 10:58 AM   Result Value Ref Range    BSA 1.68 m2    PV PEAK VELOCITY 111.22 cm/s    LVIDd 4.27 3.5 - 6.0 cm    IVS 1.01 0.6 - 1.1 cm    Posterior Wall 1.05 0.6 - 1.1 cm    LVIDs 3.28 2.1 - 4.0 cm    LA size 3.42 cm    Ao root annulus 2.67 cm    RVDD 237.00 cm    TR Max Aston 2.43 m/s    AV mean gradient 6 mmHg    TDI LATERAL 0.07 m/s    E wave decelartion time 223.77  msec    LVOT diameter 2.04 cm    LVOT peak aston 95.82 m/s    LVOT peak VTI 20.92 cm    Ao peak aston 1.63 m/s    Ao VTI 27.73 cm    MV Peak E Aston 0.84 m/s    MV Peak A Aston 1.08 m/s    LV Systolic Volume 67.50 mL    LV Diastolic Volume 114.80 mL    AORTIC VALVE CUSP SEPERATION 2.70 cm    TDI SEPTAL 0.08 m/s    LV LATERAL E/E' RATIO 12.00 m/s    LV SEPTAL E/E' RATIO 10.50 m/s    FS 23 %    LV mass 146.85 g    Left Ventricle Relative Wall Thickness 0.49 cm    AV valve area 2.46 cm2    AV Velocity Ratio 58.79     AV index (prosthetic) 0.75     E/A ratio 0.78     Mean e' 0.08 m/s    LVOT area 3.3 cm2    LVOT stroke volume 68.34 cm3    AV peak gradient 11 mmHg    E/E' ratio 11.20 m/s    LV Systolic Volume Index 40.6 mL/m2    LV Diastolic Volume Index 69.08 mL/m2    LV Mass Index 88 g/m2    Triscuspid Valve Regurgitation Peak Gradient 24 mmHg    Right Atrial Pressure (from IVC) 3 mmHg    TV rest pulmonary artery pressure 27 mmHg       Diagnostic Results:  Labs: Reviewed  ECG: Reviewed  Echo: Reviewed    ASSESSMENT/PLAN:     Doing well.  The patient's blood pressure is low.  Discontinue lisinopril.  Metoprolol 25 mg b.i.d..  Plavix 75 mg daily aspirin 81 mg daily.  May switch to atorvastatin from simvastatin.  Follow-up in approximately 10 days.  Patient is strongly advised to stop vaping.

## 2020-10-18 NOTE — DISCHARGE SUMMARY
[facility]  Discharge Summary      Patient Name: Aysha Moore  MRN: [id number]  Admission Date: 10/16/2020  Hospital Length of Stay: 2 days  Discharge Date and Time:  10/18/2020 2:25 PM  Attending Physician: Michael Prado DO   Discharging Provider: Michael Prado DO  Primary Care Provider: Yoni Daniels MD      HPI:   65-year-old female presented emergency department with substernal chest pain which felt like indigestion which started about 2 hours ago while she was trying to take a shower and still had persistent pain so took nitroglycerin and came here.  Patient states the pain radiates across the chest and rates it 10/10 when it started however after the nitroglycerin she took now the pain is down to 3/10 however still has persistent pain.  Denies shortness of breath.  Denies nausea or vomiting.  Patient describes this pain as indigestion and also pressure in the substernal area.  Patient had previous history of heart disease and had 2 previous cardiac stents done in 2002.  Patient was taken urgently to the cath lab    Cardiac catheterization and PCI note:  Patient has an acute inferior wall myocardial infarction.  There is high-grade stenosis in the distal edge of the stent and adjacent left circumflex artery; there is mild stenosis in the proximal portion of the stent in the circumflex.  There is TRUNG 2-3 flow.  PTCA 2.5 mm balloon; A RESOLUTE 2.5/20 MM stent was deployed with 0% residual stenosis is 40-50% ostial RCA stenosis; there is calcification of the aortic root adjacent to the RCA ostium.  Severe Postero basal hypokinesia on ventriculogram.  Plavix 600 mg load given; Plavix 70 mg daily.  Aspirin 81 mg daily    Procedure(s) (LRB):  Left heart cath (Left)  Percutaneous coronary intervention (N/A)      Hospital Course:   Patient is a 65-year-old female with history of underlying coronary artery disease.  She presented with acute ST-elevation MI and was taken  emergently to the cath lab.  She had a new stent placed in her circumflex artery with had a stenosed stent.  She did well without complications..  She has been cleared for discharge by Cardiology.        Discharge diagnoses  #1 Acute ST-elevation inf MI-\S/P stent of stenotic preexisting left circumflex stent.  #2 CAD 40-50% RCA  #3 Essential HTN -  #4 Dyslipidemia -  #5 Chronic back pain  #6 tob use   #7 Hyperglycemia- hemoglobin A1c5.4      Final Active Diagnoses:    Diagnosis Date Noted POA    PRINCIPAL PROBLEM:  Acute ST elevation myocardial infarction (STEMI) of inferior wall [I21.19] 10/16/2020 Yes    Chest pain [R07.9] 10/16/2020 Yes      Problems Resolved During this Admission:       Discharged Condition: good  Patient appears in no distress lying in bed comfortable  Lungs are clear  Heart regular rate rhythm  Neuro patient is alert oriented x3    Disposition: Home or Self Care    Follow Up:  Follow-up Information     Garrett Robins MD In 1 week.    Specialties: Cardiology, INTERVENTIONAL CARDIOLOGY  Contact information:  [address]  [address]  [place] [address]  [phone]             Yoni Daniels MD In 1 week.    Specialty: Family Medicine  Contact information:  [address]  [place] [address]  [phone]                 Patient Instructions:      Diet Cardiac     Activity as tolerated       Significant Diagnostic Studies: Labs:   BMP:   Recent Labs   Lab 10/17/20  0445 10/18/20  0445   *  153* 126*     137 137   K 4.2  4.2 3.8     105 103   CO2 24  24 26   BUN 12  12 13   CREATININE 0.6  0.6 0.6   CALCIUM 8.8  8.8 8.8   MG 2.0  --    , CMP   Recent Labs   Lab 10/17/20  0445 10/18/20  0445     137 137   K 4.2  4.2 3.8     105 103   CO2 24  24 26   *  153* 126*   BUN 12  12 13   CREATININE 0.6  0.6 0.6   CALCIUM 8.8  8.8 8.8   ANIONGAP 8  8 8   ESTGFRAFRICA >60.0  >60.0 >60.0   EGFRNONAA >60.0  >60.0 >60.0   , CBC   Recent Labs    Lab 10/17/20  0445   WBC 7.92  7.92   HGB 12.6  12.6   HCT 37.6  37.6     233   , Lipid Panel   Lab Results   Component Value Date    CHOL 159 10/17/2020    HDL 33 (L) 10/17/2020    LDLCALC 98.2 10/17/2020    TRIG 139 10/17/2020    CHOLHDL 20.8 10/17/2020   , Troponin   Recent Labs   Lab 10/16/20  2123 10/17/20  0445 10/18/20  0445   TROPONINI 71.369* 47.683* 18.653*    and A1C:   Recent Labs   Lab 05/29/20  0854 10/16/20  2123   HGBA1C 5.2 5.4       Pending Diagnostic Studies:     None         Medications:  Reconciled Home Medications:      Medication List      START taking these medications    atorvastatin 40 MG tablet  Commonly known as: LIPITOR  Take 1 tablet (40 mg total) by mouth every evening.     clopidogreL 75 mg tablet  Commonly known as: PLAVIX  Take 1 tablet (75 mg total) by mouth once daily.     lisinopriL 2.5 MG tablet  Commonly known as: PRINIVIL,ZESTRIL  Take 1 tablet (2.5 mg total) by mouth once daily.  Start taking on: October 19, 2020     metoprolol tartrate 25 MG tablet  Commonly known as: LOPRESSOR  Take 1 tablet (25 mg total) by mouth 2 (two) times daily.     nitroGLYCERIN 0.4 MG SL tablet  Commonly known as: NITROSTAT  Place 1 tablet (0.4 mg total) under the tongue every 5 (five) minutes as needed for Chest pain.  Replaces: NITROGLYCERIN ORAL        CHANGE how you take these medications    aspirin 81 MG EC tablet  Commonly known as: ECOTRIN  Take 1 tablet (81 mg total) by mouth once daily.  What changed:   · when to take this  · reasons to take this     valACYclovir 1000 MG tablet  Commonly known as: VALTREX  Take 2 at onset of fever blisters then repeat in 12 hours (total 4 tabs per episode)  What changed:   · how to take this  · when to take this  · reasons to take this        CONTINUE taking these medications    albuterol-ipratropium 2.5 mg-0.5 mg/3 mL nebulizer solution  Commonly known as: DUO-NEB  Take 3 mLs by nebulization every 6 (six) hours as needed for Wheezing. Rescue      ALPRAZolam 0.25 MG tablet  Commonly known as: XANAX  Take 1 tablet (0.25 mg total) by mouth daily as needed.     ANORO ELLIPTA 62.5-25 mcg/actuation Dsdv  Generic drug: umeclidinium-vilanteroL  INHALE 1 PUFF INTO THE LUNGS ONCE DAILY     montelukast 10 mg tablet  Commonly known as: SINGULAIR  TAKE 1 TABLET BY MOUTH EVERY DAY IN THE EVENING        STOP taking these medications    citalopram 40 MG tablet  Commonly known as: CELEXA     diclofenac 75 MG EC tablet  Commonly known as: VOLTAREN     NITROGLYCERIN ORAL  Replaced by: nitroGLYCERIN 0.4 MG SL tablet     simvastatin 40 MG tablet  Commonly known as: ZOCOR     tiZANidine 4 MG tablet  Commonly known as: ZANAFLEX     VENTOLIN HFA 90 mcg/actuation inhaler  Generic drug: albuterol            Indwelling Lines/Drains at time of discharge:   Lines/Drains/Airways     None                 Time spent on the discharge of patient: 37 minutes  Patient was seen and examined on the date of discharge and determined to be suitable for discharge.         Michael Prado DO  Department of Hospital Medicine  [facility]

## 2020-10-19 ENCOUNTER — TELEPHONE (OUTPATIENT)
Dept: CARDIAC REHAB | Facility: HOSPITAL | Age: 65
End: 2020-10-19

## 2020-10-21 ENCOUNTER — TELEPHONE (OUTPATIENT)
Dept: FAMILY MEDICINE | Facility: CLINIC | Age: 65
End: 2020-10-21

## 2020-10-21 NOTE — TELEPHONE ENCOUNTER
Patient stated that she had a heart attack over the weekend, one of her stints was blocked and she had to be rushed into surgery to replace the stint that was clogged. She is c/o sore throat and low grade fever 100.1. she said that her left tonsil is red and inflamed and the right one is fine. She would like to get some antibiotics called in for her. Please advise.

## 2020-10-21 NOTE — TELEPHONE ENCOUNTER
----- Message from Marybeth Reynaga sent at 10/21/2020  9:33 AM CDT -----  Contact: pt  Type: Needs Medical Advice    Who Called:  Pt  Best Call Back Number: [phone]  Additional Information: Requesting a call back regarding pt running a low fever  Please Advise ---Thank you

## 2020-10-27 LAB — CATH EF ESTIMATED: 45 %

## 2020-10-28 DIAGNOSIS — J31.0 CHRONIC RHINITIS: ICD-10-CM

## 2020-10-28 DIAGNOSIS — J30.2 SEASONAL ALLERGIC RHINITIS, UNSPECIFIED TRIGGER: Primary | ICD-10-CM

## 2020-10-28 NOTE — TELEPHONE ENCOUNTER
No new care gaps identified.  Powered by [employer]. Reference number: [id number]. 10/28/2020 12:08:35 AM   INDIOT

## 2020-10-29 PROBLEM — J30.2 SEASONAL ALLERGIC RHINITIS: Status: ACTIVE | Noted: 2020-10-29

## 2020-10-29 RX ORDER — MONTELUKAST SODIUM 10 MG/1
10 TABLET ORAL NIGHTLY
Qty: 90 TABLET | Refills: 3 | Status: SHIPPED | OUTPATIENT
Start: 2020-10-29 | End: 2021-04-20

## 2020-10-29 NOTE — PROGRESS NOTES
Refill Routing Note   Medication(s) are not appropriate for processing by [facility] for the following reason(s):     - Patient has been hospitalized since the last PCP visit    [facility] actions taken in this encounter: Defer       Medication Therapy Plan: CDMR; Pt visited admitted for STEMI in 10/20; Defer to you  Medication reconciliation completed: No   Automatic Epic Generated Protocol Data:        Requested Prescriptions   Pending Prescriptions Disp Refills    montelukast (SINGULAIR) 10 mg tablet [Pharmacy Med Name: MONTELUKAST SOD 10 MG TABLET] 90 tablet 1     Sig: Take 1 tablet (10 mg total) by mouth every evening.       Pulmonology:  Leukotriene Inhibitors Failed - 10/28/2020 12:07 AM        Failed - An appropriate indication is on the problem list     Allergic Rhinitis  Sinusitis  Seasonal Allergies   Asthma              Passed - Patient is at least 18 years old        Passed - Office visit in past 12 months or future 90 days     Recent Outpatient Visits            5 months ago Tonsillitis    [place] - [facility] Medicine Sosa Munguia DNP    5 months ago Encounter for preventive health examination    [place] - Family Medicine Yoni Daniels MD    7 months ago Pharyngitis, unspecified etiology    [place] - [facility] Yoni Daniels MD    12 months ago Pulmonary emphysema, unspecified emphysema type    [facility] - Pulmonology Raad Abad MD    1 year ago Pulmonary emphysema, unspecified emphysema type    [facility] - Pulmonology Raad Abad MD                          Appointments  past 12m or future 3m with PCP    Date Provider   Last Visit   5/22/2020 Yoni Daniels MD   Next Visit   Visit date not found Yoni Daniels MD   ED visits in past 90 days: 0        Note composed:11:52 AM 10/29/2020

## 2020-10-30 DIAGNOSIS — J30.2 SEASONAL ALLERGIC RHINITIS, UNSPECIFIED TRIGGER: ICD-10-CM

## 2020-10-30 RX ORDER — MONTELUKAST SODIUM 10 MG/1
TABLET ORAL
Qty: 90 TABLET | Refills: 3 | OUTPATIENT
Start: 2020-10-30

## 2020-10-30 NOTE — TELEPHONE ENCOUNTER
No new care gaps identified.  Powered by [employer]. Reference number: [id number]. 10/30/2020 11:05:35 AM   INDIOT

## 2020-10-31 NOTE — PROGRESS NOTES
[employer]. Duplicate Request    Refill Authorization Note   Aysha Moore is requesting a refill authorization.    Brief assessment and rationale for refill: Quick Discontinue       Medication Therapy Plan: [facility]  Medication reconciliation completed: No    Comments:   Pended Medication(s)       Requested Prescriptions     Refused Prescriptions Disp Refills    montelukast (SINGULAIR) 10 mg tablet [Pharmacy Med Name: MONTELUKAST SOD 10 MG TABLET] 90 tablet 3     Sig: TAKE 1 TABLET BY MOUTH EVERY DAY IN THE EVENING     Refused By: HERB BRAR     Reason for Refusal: Request already responded to by other means (e.g. phone or fax)        Duplicate Pended Encounter(s)/ Last Prescribed Details:    Ordering Encounter Report    Associated Reports   View Encounter          Note composed:10:47 PM 10/30/2020

## 2020-11-07 DIAGNOSIS — F41.9 ANXIETY: ICD-10-CM

## 2020-11-07 NOTE — TELEPHONE ENCOUNTER
No new care gaps identified.  Powered by [employer]. Reference number: [id number]. 11/07/2020 9:33:23 AM   CST

## 2020-11-09 RX ORDER — CITALOPRAM 40 MG/1
TABLET, FILM COATED ORAL
Qty: 90 TABLET | Refills: 1 | OUTPATIENT
Start: 2020-11-09

## 2020-11-09 NOTE — TELEPHONE ENCOUNTER
This was discontinued by Dr. Robins after her heart attack admission and replaced with Xanax.  Refill is not appropriate

## 2020-11-09 NOTE — PROGRESS NOTES
Refill Routing Note   Medication(s) are not appropriate for processing by [facility] for the following reason(s):     - Medication not active on medication list    [facility] actions taken in this encounter: Defer       Medication Therapy Plan: CDMR; Discontinued 10/16/20 by Dr. Robins; Defer to you  Medication reconciliation completed: No   Automatic Epic Generated Protocol Data:        Requested Prescriptions   Pending Prescriptions Disp Refills    citalopram (CELEXA) 40 MG tablet [Pharmacy Med Name: CITALOPRAM HBR 40 MG TABLET] 90 tablet 1     Sig: TAKE 1 TABLET BY MOUTH EVERY DAY       Citalopram (Celexa) Protocol: Should not exceed 40 mg / day Failed - 11/7/2020  9:32 AM        Failed - Active on medication list        Passed - Patient is not currently pregnant        Passed - No positive pregnancy test in past 12 months        Passed - Visit with authorizing provider in past 12 months or upcoming 90 days         Passed - Maximum daily dose does not exceed 20mg       SSRI Protocol Failed - 11/7/2020  9:32 AM        Failed - Active on medication list        Passed - Patient is not currently pregnant        Passed - No positive pregnancy test in past 12 months         Passed - Visit with authorizing provider in past 12 months or upcoming 90 days        Psychiatry:  Antidepressants - SSRI Passed - 11/7/2020  9:32 AM        Passed - Patient is at least 18 years old        Passed - Office visit in past 12 months or future 90 days     Recent Outpatient Visits            5 months ago Tonsillitis    [place] - [facility] Sosa Munguia DNP    5 months ago Encounter for preventive health examination    [place] - [facility] Medicine Yoni Daniels MD    7 months ago Pharyngitis, unspecified etiology    [place] - [facility] Medicine Yoni Daniels MD    1 year ago Pulmonary emphysema, unspecified emphysema type    [facility] - Pulmonology Raad Abad MD    1 year ago Pulmonary emphysema, unspecified  emphysema type    [facility] - Pulmonology Raad Abad MD                          Appointments  past 12m or future 3m with PCP    Date Provider   Last Visit   5/22/2020 Yoni Daniels MD   Next Visit   Visit date not found Yoni Daniels MD   ED visits in past 90 days: 0        Note composed:9:03 PM 11/08/2020

## 2020-11-10 ENCOUNTER — TELEPHONE (OUTPATIENT)
Dept: FAMILY MEDICINE | Facility: CLINIC | Age: 65
End: 2020-11-10

## 2020-11-10 NOTE — TELEPHONE ENCOUNTER
Her discharge orders said to stop it.  Citalopram can cause heart rhythm disturbances.  She needs to confirm with her cardiologist if he is okay with her resuming it.

## 2020-11-18 NOTE — TELEPHONE ENCOUNTER
Sent message to cardiologist (in another encounter) to confirm if patient is ok to take this medication from a cardiac standpoint. I still have not received a response so I am faxing my request to his office to follow up.

## 2020-11-23 NOTE — TELEPHONE ENCOUNTER
Called patient to advise the medication dosage changes and to offer BuSpar if she is interested. I let pt know the cardiologist only approved her to take 10mg on citalopram. Pt reports this is not OK she needs the higher dose and she states she has already went over her medication list with another cardiologist, Dr. Simon, who says there is no problem with her being on the 40mg dose.    I called Dr. Simon office, spoke to Elaine. Advised Elaine we would need clearance from a cardiology standpoint to say whether or not the patient should take citalopram 40mg or decrease and add BuSpar. Elaine said she will send a message to his nurse to call me back. Will follow up with patient when I hear back.

## 2020-11-23 NOTE — TELEPHONE ENCOUNTER
Cardiology has given us the okay for a 10 mg citalopram dose.  This will be 1/4 of the previous dose.  Citalopram can aggravate irregular heartbeats.  I do not believe she has ever tried BuSpar which should not have the problem and is purely for anxiety.  If she wants to try the lower dose citalopram we can do so but keep the BuSpar in mind as a backup.

## 2020-11-24 ENCOUNTER — TELEPHONE (OUTPATIENT)
Dept: FAMILY MEDICINE | Facility: CLINIC | Age: 65
End: 2020-11-24

## 2020-11-24 DIAGNOSIS — F41.9 ANXIETY: ICD-10-CM

## 2020-11-24 RX ORDER — BUSPIRONE HYDROCHLORIDE 5 MG/1
5 TABLET ORAL 2 TIMES DAILY
Qty: 60 TABLET | Refills: 3 | Status: SHIPPED | OUTPATIENT
Start: 2020-11-24 | End: 2021-02-24

## 2020-11-24 RX ORDER — CITALOPRAM 20 MG/1
20 TABLET, FILM COATED ORAL DAILY
Qty: 30 TABLET | Refills: 2 | Status: SHIPPED | OUTPATIENT
Start: 2020-11-24 | End: 2021-02-24

## 2020-11-24 NOTE — TELEPHONE ENCOUNTER
Received call from Connie at Dr. Simon' office. She states that Dr. Simon does in fact want the patient to decrease her Cymbalta dose to 20mg. She states they want patient to try this, with BuSpar if patient agrees. They will approve the 40mg daily only as a last resort. I will call patient and let her know.

## 2020-11-24 NOTE — TELEPHONE ENCOUNTER
Connie with Dr. Simon office returned my phone call and states that Dr. Simon would like patient to take only 20mg of Celexa before they will authorize anything more. Called patient to advise. She states she is aware, they called her and advised of the decrease in dose. Patient is afraid the 20mg will not be enough to treat her anxiety so, she is interested in trying to Buspar. Pharmacy in pt chart

## 2020-12-28 NOTE — TELEPHONE ENCOUNTER
Visit Type : 3 week post op    Hx/Sx: Penile urethral stricture, urethral diverticulum    Records/Orders: Available    Pt Contacted: n/a    At Rooming: Normal     Patient notified  gave her 6 refills in December and to call her pharmacy.

## 2021-01-04 ENCOUNTER — TELEPHONE (OUTPATIENT)
Dept: FAMILY MEDICINE | Facility: CLINIC | Age: 66
End: 2021-01-04

## 2021-01-04 DIAGNOSIS — J01.00 ACUTE MAXILLARY SINUSITIS, RECURRENCE NOT SPECIFIED: Primary | ICD-10-CM

## 2021-01-04 RX ORDER — SULFAMETHOXAZOLE AND TRIMETHOPRIM 800; 160 MG/1; MG/1
1 TABLET ORAL 2 TIMES DAILY
Qty: 20 TABLET | Refills: 0 | Status: SHIPPED | OUTPATIENT
Start: 2021-01-04 | End: 2021-01-14

## 2021-02-01 ENCOUNTER — TELEPHONE (OUTPATIENT)
Dept: PULMONOLOGY | Facility: HOSPITAL | Age: 66
End: 2021-02-01

## 2021-02-01 ENCOUNTER — OFFICE VISIT (OUTPATIENT)
Dept: PULMONOLOGY | Facility: CLINIC | Age: 66
End: 2021-02-01
Payer: MEDICARE

## 2021-02-01 VITALS
WEIGHT: 144 LBS | TEMPERATURE: 97 F | DIASTOLIC BLOOD PRESSURE: 85 MMHG | SYSTOLIC BLOOD PRESSURE: 115 MMHG | BODY MASS INDEX: 25.51 KG/M2

## 2021-02-01 DIAGNOSIS — Z87.891 PERSONAL HISTORY OF TOBACCO USE, PRESENTING HAZARDS TO HEALTH: Primary | ICD-10-CM

## 2021-02-01 DIAGNOSIS — J43.9 PULMONARY EMPHYSEMA, UNSPECIFIED EMPHYSEMA TYPE: ICD-10-CM

## 2021-02-01 DIAGNOSIS — Z12.2 ENCOUNTER FOR SCREENING FOR MALIGNANT NEOPLASM OF RESPIRATORY ORGANS: ICD-10-CM

## 2021-02-01 PROCEDURE — 99406 BEHAV CHNG SMOKING 3-10 MIN: CPT | Mod: ,,, | Performed by: INTERNAL MEDICINE

## 2021-02-01 PROCEDURE — 99214 PR OFFICE/OUTPT VISIT, EST, LEVL IV, 30-39 MIN: ICD-10-PCS | Mod: 25,S$GLB,, | Performed by: INTERNAL MEDICINE

## 2021-02-01 PROCEDURE — 99214 OFFICE O/P EST MOD 30 MIN: CPT | Mod: 25,S$GLB,, | Performed by: INTERNAL MEDICINE

## 2021-02-01 PROCEDURE — 99406 PR TOBACCO USE CESSATION INTERMEDIATE 3-10 MINUTES: ICD-10-PCS | Mod: ,,, | Performed by: INTERNAL MEDICINE

## 2021-02-01 RX ORDER — AMOXICILLIN AND CLAVULANATE POTASSIUM 875; 125 MG/1; MG/1
1 TABLET, FILM COATED ORAL 2 TIMES DAILY
Qty: 14 TABLET | Refills: 0 | Status: SHIPPED | OUTPATIENT
Start: 2021-02-01 | End: 2021-05-07 | Stop reason: ALTCHOICE

## 2021-02-24 DIAGNOSIS — F41.9 ANXIETY: ICD-10-CM

## 2021-02-24 RX ORDER — CITALOPRAM 20 MG/1
TABLET, FILM COATED ORAL
Qty: 90 TABLET | Refills: 0 | Status: SHIPPED | OUTPATIENT
Start: 2021-02-24 | End: 2021-04-20

## 2021-02-24 RX ORDER — BUSPIRONE HYDROCHLORIDE 5 MG/1
TABLET ORAL
Qty: 180 TABLET | Refills: 1 | Status: SHIPPED | OUTPATIENT
Start: 2021-02-24 | End: 2021-08-26

## 2021-03-12 ENCOUNTER — HOSPITAL ENCOUNTER (OUTPATIENT)
Dept: RADIOLOGY | Facility: HOSPITAL | Age: 66
Discharge: HOME | End: 2021-03-12
Attending: INTERNAL MEDICINE
Payer: MEDICARE

## 2021-03-12 DIAGNOSIS — Z87.891 PERSONAL HISTORY OF TOBACCO USE, PRESENTING HAZARDS TO HEALTH: ICD-10-CM

## 2021-03-12 PROCEDURE — 71271 CT THORAX LUNG CANCER SCR C-: CPT | Mod: TC,PO

## 2021-04-17 DIAGNOSIS — F41.9 ANXIETY: ICD-10-CM

## 2021-04-17 DIAGNOSIS — J30.2 SEASONAL ALLERGIC RHINITIS, UNSPECIFIED TRIGGER: ICD-10-CM

## 2021-04-20 RX ORDER — CITALOPRAM 20 MG/1
TABLET, FILM COATED ORAL
Qty: 90 TABLET | Refills: 0 | Status: SHIPPED | OUTPATIENT
Start: 2021-04-20 | End: 2022-01-06

## 2021-04-20 RX ORDER — CITALOPRAM 40 MG/1
TABLET, FILM COATED ORAL
Qty: 90 TABLET | Refills: 1 | OUTPATIENT
Start: 2021-04-20

## 2021-04-20 RX ORDER — MONTELUKAST SODIUM 10 MG/1
TABLET ORAL
Qty: 90 TABLET | Refills: 0 | Status: SHIPPED | OUTPATIENT
Start: 2021-04-20 | End: 2021-07-16

## 2021-04-23 ENCOUNTER — PATIENT OUTREACH (OUTPATIENT)
Dept: ADMINISTRATIVE | Facility: HOSPITAL | Age: 66
End: 2021-04-23

## 2021-04-23 ENCOUNTER — PATIENT MESSAGE (OUTPATIENT)
Dept: ADMINISTRATIVE | Facility: HOSPITAL | Age: 66
End: 2021-04-23

## 2021-04-23 DIAGNOSIS — Z78.0 ASYMPTOMATIC MENOPAUSAL STATE: Primary | ICD-10-CM

## 2021-05-06 ENCOUNTER — PATIENT MESSAGE (OUTPATIENT)
Dept: RESEARCH | Facility: HOSPITAL | Age: 66
End: 2021-05-06

## 2021-05-07 ENCOUNTER — LAB VISIT (OUTPATIENT)
Dept: LAB | Facility: HOSPITAL | Age: 66
End: 2021-05-07
Attending: FAMILY MEDICINE
Payer: MEDICARE

## 2021-05-07 ENCOUNTER — OFFICE VISIT (OUTPATIENT)
Dept: FAMILY MEDICINE | Facility: CLINIC | Age: 66
End: 2021-05-07
Attending: FAMILY MEDICINE
Payer: MEDICARE

## 2021-05-07 VITALS
SYSTOLIC BLOOD PRESSURE: 104 MMHG | DIASTOLIC BLOOD PRESSURE: 62 MMHG | WEIGHT: 149.5 LBS | TEMPERATURE: 99 F | HEIGHT: 63 IN | OXYGEN SATURATION: 97 % | BODY MASS INDEX: 26.49 KG/M2 | HEART RATE: 63 BPM

## 2021-05-07 DIAGNOSIS — R73.03 PREDIABETES: ICD-10-CM

## 2021-05-07 DIAGNOSIS — Z95.5 HISTORY OF HEART ARTERY STENT: ICD-10-CM

## 2021-05-07 DIAGNOSIS — I25.10 CORONARY ARTERY DISEASE INVOLVING NATIVE CORONARY ARTERY OF NATIVE HEART WITHOUT ANGINA PECTORIS: ICD-10-CM

## 2021-05-07 DIAGNOSIS — J01.40 ACUTE NON-RECURRENT PANSINUSITIS: Primary | ICD-10-CM

## 2021-05-07 DIAGNOSIS — E78.5 HYPERLIPIDEMIA, UNSPECIFIED HYPERLIPIDEMIA TYPE: ICD-10-CM

## 2021-05-07 DIAGNOSIS — Z78.0 MENOPAUSE: ICD-10-CM

## 2021-05-07 DIAGNOSIS — J30.2 SEASONAL ALLERGIC RHINITIS, UNSPECIFIED TRIGGER: ICD-10-CM

## 2021-05-07 DIAGNOSIS — J43.9 PULMONARY EMPHYSEMA, UNSPECIFIED EMPHYSEMA TYPE: ICD-10-CM

## 2021-05-07 DIAGNOSIS — Z12.31 ENCOUNTER FOR SCREENING MAMMOGRAM FOR BREAST CANCER: ICD-10-CM

## 2021-05-07 DIAGNOSIS — M54.42 CHRONIC BILATERAL LOW BACK PAIN WITH LEFT-SIDED SCIATICA: ICD-10-CM

## 2021-05-07 DIAGNOSIS — G89.29 CHRONIC BILATERAL LOW BACK PAIN WITH LEFT-SIDED SCIATICA: ICD-10-CM

## 2021-05-07 LAB
ALBUMIN SERPL BCP-MCNC: 3.8 G/DL (ref 3.5–5.2)
ALP SERPL-CCNC: 63 U/L (ref 55–135)
ALT SERPL W/O P-5'-P-CCNC: 18 U/L (ref 10–44)
ANION GAP SERPL CALC-SCNC: 9 MMOL/L (ref 8–16)
AST SERPL-CCNC: 17 U/L (ref 10–40)
BASOPHILS # BLD AUTO: 0.02 K/UL (ref 0–0.2)
BASOPHILS NFR BLD: 0.5 % (ref 0–1.9)
BILIRUB SERPL-MCNC: 0.7 MG/DL (ref 0.1–1)
BUN SERPL-MCNC: 12 MG/DL (ref 8–23)
CALCIUM SERPL-MCNC: 9.6 MG/DL (ref 8.7–10.5)
CHLORIDE SERPL-SCNC: 106 MMOL/L (ref 95–110)
CHOLEST SERPL-MCNC: 210 MG/DL (ref 120–199)
CHOLEST/HDLC SERPL: 4.7 {RATIO} (ref 2–5)
CO2 SERPL-SCNC: 25 MMOL/L (ref 23–29)
CREAT SERPL-MCNC: 0.7 MG/DL (ref 0.5–1.4)
DIFFERENTIAL METHOD: ABNORMAL
EOSINOPHIL # BLD AUTO: 0.2 K/UL (ref 0–0.5)
EOSINOPHIL NFR BLD: 4.6 % (ref 0–8)
ERYTHROCYTE [DISTWIDTH] IN BLOOD BY AUTOMATED COUNT: 11.8 % (ref 11.5–14.5)
EST. GFR  (AFRICAN AMERICAN): >60 ML/MIN/1.73 M^2
EST. GFR  (NON AFRICAN AMERICAN): >60 ML/MIN/1.73 M^2
ESTIMATED AVG GLUCOSE: 103 MG/DL (ref 68–131)
GLUCOSE SERPL-MCNC: 91 MG/DL (ref 70–110)
HBA1C MFR BLD: 5.2 % (ref 4–5.6)
HCT VFR BLD AUTO: 39.3 % (ref 37–48.5)
HDLC SERPL-MCNC: 45 MG/DL (ref 40–75)
HDLC SERPL: 21.4 % (ref 20–50)
HGB BLD-MCNC: 13.3 G/DL (ref 12–16)
IMM GRANULOCYTES # BLD AUTO: 0.01 K/UL (ref 0–0.04)
IMM GRANULOCYTES NFR BLD AUTO: 0.2 % (ref 0–0.5)
LDLC SERPL CALC-MCNC: 139.8 MG/DL (ref 63–159)
LYMPHOCYTES # BLD AUTO: 2 K/UL (ref 1–4.8)
LYMPHOCYTES NFR BLD: 45 % (ref 18–48)
MCH RBC QN AUTO: 30.4 PG (ref 27–31)
MCHC RBC AUTO-ENTMCNC: 33.8 G/DL (ref 32–36)
MCV RBC AUTO: 90 FL (ref 82–98)
MONOCYTES # BLD AUTO: 0.5 K/UL (ref 0.3–1)
MONOCYTES NFR BLD: 11.7 % (ref 4–15)
NEUTROPHILS # BLD AUTO: 1.7 K/UL (ref 1.8–7.7)
NEUTROPHILS NFR BLD: 38 % (ref 38–73)
NONHDLC SERPL-MCNC: 165 MG/DL
NRBC BLD-RTO: 0 /100 WBC
PLATELET # BLD AUTO: 240 K/UL (ref 150–450)
PMV BLD AUTO: 10.7 FL (ref 9.2–12.9)
POTASSIUM SERPL-SCNC: 4.3 MMOL/L (ref 3.5–5.1)
PROT SERPL-MCNC: 7.3 G/DL (ref 6–8.4)
RBC # BLD AUTO: 4.37 M/UL (ref 4–5.4)
SODIUM SERPL-SCNC: 140 MMOL/L (ref 136–145)
TRIGL SERPL-MCNC: 126 MG/DL (ref 30–150)
WBC # BLD AUTO: 4.36 K/UL (ref 3.9–12.7)

## 2021-05-07 PROCEDURE — 36415 COLL VENOUS BLD VENIPUNCTURE: CPT | Mod: PO | Performed by: FAMILY MEDICINE

## 2021-05-07 PROCEDURE — 80053 COMPREHEN METABOLIC PANEL: CPT | Performed by: FAMILY MEDICINE

## 2021-05-07 PROCEDURE — 99214 OFFICE O/P EST MOD 30 MIN: CPT | Mod: S$PBB,,, | Performed by: FAMILY MEDICINE

## 2021-05-07 PROCEDURE — 85025 COMPLETE CBC W/AUTO DIFF WBC: CPT | Performed by: FAMILY MEDICINE

## 2021-05-07 PROCEDURE — 99999 PR PBB SHADOW E&M-EST. PATIENT-LVL V: CPT | Mod: PBBFAC,,, | Performed by: FAMILY MEDICINE

## 2021-05-07 PROCEDURE — 83036 HEMOGLOBIN GLYCOSYLATED A1C: CPT | Performed by: FAMILY MEDICINE

## 2021-05-07 PROCEDURE — 99215 OFFICE O/P EST HI 40 MIN: CPT | Mod: PBBFAC,PO | Performed by: FAMILY MEDICINE

## 2021-05-07 PROCEDURE — 80061 LIPID PANEL: CPT | Performed by: FAMILY MEDICINE

## 2021-05-07 PROCEDURE — 99214 PR OFFICE/OUTPT VISIT, EST, LEVL IV, 30-39 MIN: ICD-10-PCS | Mod: S$PBB,,, | Performed by: FAMILY MEDICINE

## 2021-05-07 PROCEDURE — 99999 PR PBB SHADOW E&M-EST. PATIENT-LVL V: ICD-10-PCS | Mod: PBBFAC,,, | Performed by: FAMILY MEDICINE

## 2021-05-07 RX ORDER — KETOROLAC TROMETHAMINE 5 MG/ML
SOLUTION OPHTHALMIC
COMMUNITY
Start: 2021-05-05 | End: 2022-03-29

## 2021-05-07 RX ORDER — PREDNISOLONE ACETATE 10 MG/ML
SUSPENSION/ DROPS OPHTHALMIC
COMMUNITY
Start: 2021-05-05 | End: 2022-03-10 | Stop reason: ALTCHOICE

## 2021-05-07 RX ORDER — TIZANIDINE 4 MG/1
4 TABLET ORAL EVERY 6 HOURS PRN
Qty: 60 TABLET | Refills: 5 | Status: SHIPPED | OUTPATIENT
Start: 2021-05-07 | End: 2021-10-11

## 2021-05-07 RX ORDER — OFLOXACIN 3 MG/ML
SOLUTION/ DROPS OPHTHALMIC
COMMUNITY
Start: 2021-05-05 | End: 2022-03-10 | Stop reason: ALTCHOICE

## 2021-05-07 RX ORDER — AMOXICILLIN AND CLAVULANATE POTASSIUM 875; 125 MG/1; MG/1
1 TABLET, FILM COATED ORAL 2 TIMES DAILY
Qty: 20 TABLET | Refills: 0 | Status: SHIPPED | OUTPATIENT
Start: 2021-05-07 | End: 2021-05-17

## 2021-05-11 ENCOUNTER — TELEPHONE (OUTPATIENT)
Dept: FAMILY MEDICINE | Facility: CLINIC | Age: 66
End: 2021-05-11

## 2021-05-11 DIAGNOSIS — E78.5 HYPERLIPIDEMIA, UNSPECIFIED HYPERLIPIDEMIA TYPE: Primary | ICD-10-CM

## 2021-05-11 RX ORDER — ATORVASTATIN CALCIUM 40 MG/1
40 TABLET, FILM COATED ORAL NIGHTLY
Qty: 90 TABLET | Refills: 4 | Status: SHIPPED | OUTPATIENT
Start: 2021-05-11 | End: 2022-01-19

## 2021-06-07 ENCOUNTER — PATIENT MESSAGE (OUTPATIENT)
Dept: PRIMARY CARE CLINIC | Facility: CLINIC | Age: 66
End: 2021-06-07

## 2021-07-07 ENCOUNTER — PATIENT MESSAGE (OUTPATIENT)
Dept: ADMINISTRATIVE | Facility: HOSPITAL | Age: 66
End: 2021-07-07

## 2021-07-14 RX ORDER — SILVER SULFADIAZINE 10 G/1000G
CREAM TOPICAL DAILY
Qty: 15 G | Refills: 0 | Status: SHIPPED | OUTPATIENT
Start: 2021-07-14 | End: 2022-03-10 | Stop reason: ALTCHOICE

## 2021-07-15 DIAGNOSIS — J30.2 SEASONAL ALLERGIC RHINITIS, UNSPECIFIED TRIGGER: ICD-10-CM

## 2021-07-16 RX ORDER — MONTELUKAST SODIUM 10 MG/1
TABLET ORAL
Qty: 90 TABLET | Refills: 3 | Status: SHIPPED | OUTPATIENT
Start: 2021-07-16 | End: 2022-03-10 | Stop reason: ALTCHOICE

## 2021-07-29 DIAGNOSIS — M54.42 CHRONIC BILATERAL LOW BACK PAIN WITH LEFT-SIDED SCIATICA: ICD-10-CM

## 2021-07-29 DIAGNOSIS — G89.29 CHRONIC BILATERAL LOW BACK PAIN WITH LEFT-SIDED SCIATICA: ICD-10-CM

## 2021-07-29 RX ORDER — TIZANIDINE 4 MG/1
4 TABLET ORAL EVERY 6 HOURS PRN
Qty: 360 TABLET | OUTPATIENT
Start: 2021-07-29

## 2021-08-03 ENCOUNTER — OFFICE VISIT (OUTPATIENT)
Dept: PULMONOLOGY | Facility: CLINIC | Age: 66
End: 2021-08-03
Payer: MEDICARE

## 2021-08-03 VITALS
HEART RATE: 77 BPM | OXYGEN SATURATION: 97 % | DIASTOLIC BLOOD PRESSURE: 70 MMHG | SYSTOLIC BLOOD PRESSURE: 102 MMHG | BODY MASS INDEX: 26.22 KG/M2 | WEIGHT: 148 LBS

## 2021-08-03 DIAGNOSIS — J43.9 PULMONARY EMPHYSEMA, UNSPECIFIED EMPHYSEMA TYPE: Primary | ICD-10-CM

## 2021-08-03 DIAGNOSIS — Z87.891 PERSONAL HISTORY OF TOBACCO USE, PRESENTING HAZARDS TO HEALTH: ICD-10-CM

## 2021-08-03 DIAGNOSIS — B00.1 FEVER BLISTER: ICD-10-CM

## 2021-08-03 PROCEDURE — 99214 PR OFFICE/OUTPT VISIT, EST, LEVL IV, 30-39 MIN: ICD-10-PCS | Mod: 25,S$GLB,, | Performed by: INTERNAL MEDICINE

## 2021-08-03 PROCEDURE — 99406 PR TOBACCO USE CESSATION INTERMEDIATE 3-10 MINUTES: ICD-10-PCS | Mod: ,,, | Performed by: INTERNAL MEDICINE

## 2021-08-03 PROCEDURE — 99214 OFFICE O/P EST MOD 30 MIN: CPT | Mod: 25,S$GLB,, | Performed by: INTERNAL MEDICINE

## 2021-08-03 PROCEDURE — 99406 BEHAV CHNG SMOKING 3-10 MIN: CPT | Mod: ,,, | Performed by: INTERNAL MEDICINE

## 2021-08-04 RX ORDER — VALACYCLOVIR HYDROCHLORIDE 1 G/1
TABLET, FILM COATED ORAL
Qty: 20 TABLET | Refills: 3 | Status: SHIPPED | OUTPATIENT
Start: 2021-08-04

## 2021-08-05 ENCOUNTER — TELEPHONE (OUTPATIENT)
Dept: FAMILY MEDICINE | Facility: CLINIC | Age: 66
End: 2021-08-05

## 2021-08-26 DIAGNOSIS — F41.9 ANXIETY: ICD-10-CM

## 2021-08-26 RX ORDER — BUSPIRONE HYDROCHLORIDE 5 MG/1
TABLET ORAL
Qty: 180 TABLET | Refills: 1 | Status: SHIPPED | OUTPATIENT
Start: 2021-08-26 | End: 2024-02-07

## 2021-09-13 ENCOUNTER — HOSPITAL ENCOUNTER (OUTPATIENT)
Dept: RADIOLOGY | Facility: HOSPITAL | Age: 66
Discharge: HOME | End: 2021-09-13
Attending: FAMILY MEDICINE
Payer: MEDICARE

## 2021-09-13 ENCOUNTER — OFFICE VISIT (OUTPATIENT)
Dept: OBSTETRICS AND GYNECOLOGY | Facility: CLINIC | Age: 66
End: 2021-09-13
Payer: MEDICARE

## 2021-09-13 VITALS
SYSTOLIC BLOOD PRESSURE: 120 MMHG | HEIGHT: 64 IN | WEIGHT: 147.94 LBS | BODY MASS INDEX: 25.25 KG/M2 | DIASTOLIC BLOOD PRESSURE: 80 MMHG

## 2021-09-13 DIAGNOSIS — Z01.419 WELL WOMAN EXAM: Primary | ICD-10-CM

## 2021-09-13 DIAGNOSIS — B37.49 CANDIDA INFECTION OF GENITAL REGION: ICD-10-CM

## 2021-09-13 DIAGNOSIS — Z71.6 ENCOUNTER FOR SMOKING CESSATION COUNSELING: ICD-10-CM

## 2021-09-13 DIAGNOSIS — Z12.31 ENCOUNTER FOR SCREENING MAMMOGRAM FOR BREAST CANCER: ICD-10-CM

## 2021-09-13 PROCEDURE — 87624 HPV HI-RISK TYP POOLED RSLT: CPT | Performed by: STUDENT IN AN ORGANIZED HEALTH CARE EDUCATION/TRAINING PROGRAM

## 2021-09-13 PROCEDURE — 99999 PR PBB SHADOW E&M-EST. PATIENT-LVL III: ICD-10-PCS | Mod: PBBFAC,,, | Performed by: STUDENT IN AN ORGANIZED HEALTH CARE EDUCATION/TRAINING PROGRAM

## 2021-09-13 PROCEDURE — 77063 BREAST TOMOSYNTHESIS BI: CPT | Mod: 26,,, | Performed by: RADIOLOGY

## 2021-09-13 PROCEDURE — G0101 PR CA SCREEN;PELVIC/BREAST EXAM: ICD-10-PCS | Mod: S$PBB,,, | Performed by: STUDENT IN AN ORGANIZED HEALTH CARE EDUCATION/TRAINING PROGRAM

## 2021-09-13 PROCEDURE — 99213 OFFICE O/P EST LOW 20 MIN: CPT | Mod: PBBFAC,PN | Performed by: STUDENT IN AN ORGANIZED HEALTH CARE EDUCATION/TRAINING PROGRAM

## 2021-09-13 PROCEDURE — G0101 CA SCREEN;PELVIC/BREAST EXAM: HCPCS | Mod: S$PBB,,, | Performed by: STUDENT IN AN ORGANIZED HEALTH CARE EDUCATION/TRAINING PROGRAM

## 2021-09-13 PROCEDURE — 99999 PR PBB SHADOW E&M-EST. PATIENT-LVL III: CPT | Mod: PBBFAC,,, | Performed by: STUDENT IN AN ORGANIZED HEALTH CARE EDUCATION/TRAINING PROGRAM

## 2021-09-13 PROCEDURE — 77067 SCR MAMMO BI INCL CAD: CPT | Mod: TC,PN

## 2021-09-13 PROCEDURE — 77063 MAMMO DIGITAL SCREENING BILAT WITH TOMO: ICD-10-PCS | Mod: 26,,, | Performed by: RADIOLOGY

## 2021-09-13 PROCEDURE — 77067 SCR MAMMO BI INCL CAD: CPT | Mod: 26,,, | Performed by: RADIOLOGY

## 2021-09-13 PROCEDURE — 77067 MAMMO DIGITAL SCREENING BILAT WITH TOMO: ICD-10-PCS | Mod: 26,,, | Performed by: RADIOLOGY

## 2021-09-13 RX ORDER — CLOBETASOL PROPIONATE 0.5 MG/G
CREAM TOPICAL 2 TIMES DAILY
Qty: 30 G | Refills: 1 | Status: SHIPPED | OUTPATIENT
Start: 2021-09-13 | End: 2022-03-10 | Stop reason: ALTCHOICE

## 2021-09-13 RX ORDER — FERROUS SULFATE, DRIED 160(50) MG
1 TABLET, EXTENDED RELEASE ORAL EVERY MORNING
COMMUNITY

## 2021-09-13 RX ORDER — NYSTATIN 100000 [USP'U]/G
POWDER TOPICAL
Qty: 60 G | Refills: 1 | Status: SHIPPED | OUTPATIENT
Start: 2021-09-13 | End: 2022-04-18

## 2021-09-28 ENCOUNTER — OFFICE VISIT (OUTPATIENT)
Dept: OBSTETRICS AND GYNECOLOGY | Facility: CLINIC | Age: 66
End: 2021-09-28
Payer: MEDICARE

## 2021-09-28 VITALS
HEIGHT: 64 IN | BODY MASS INDEX: 25.45 KG/M2 | WEIGHT: 149.06 LBS | RESPIRATION RATE: 18 BRPM | SYSTOLIC BLOOD PRESSURE: 110 MMHG | DIASTOLIC BLOOD PRESSURE: 70 MMHG

## 2021-09-28 DIAGNOSIS — R87.615 ENCOUNTER FOR REPEAT PAP SMEAR DUE TO PREVIOUS INSUFF CERVICAL CELLS: Primary | ICD-10-CM

## 2021-09-28 PROCEDURE — 88175 CYTOPATH C/V AUTO FLUID REDO: CPT | Performed by: STUDENT IN AN ORGANIZED HEALTH CARE EDUCATION/TRAINING PROGRAM

## 2021-09-28 PROCEDURE — 99213 OFFICE O/P EST LOW 20 MIN: CPT | Mod: S$PBB,,, | Performed by: STUDENT IN AN ORGANIZED HEALTH CARE EDUCATION/TRAINING PROGRAM

## 2021-09-28 PROCEDURE — 99213 PR OFFICE/OUTPT VISIT, EST, LEVL III, 20-29 MIN: ICD-10-PCS | Mod: S$PBB,,, | Performed by: STUDENT IN AN ORGANIZED HEALTH CARE EDUCATION/TRAINING PROGRAM

## 2021-09-28 PROCEDURE — 99999 PR PBB SHADOW E&M-EST. PATIENT-LVL II: ICD-10-PCS | Mod: PBBFAC,,, | Performed by: STUDENT IN AN ORGANIZED HEALTH CARE EDUCATION/TRAINING PROGRAM

## 2021-09-28 PROCEDURE — 99999 PR PBB SHADOW E&M-EST. PATIENT-LVL II: CPT | Mod: PBBFAC,,, | Performed by: STUDENT IN AN ORGANIZED HEALTH CARE EDUCATION/TRAINING PROGRAM

## 2021-09-28 PROCEDURE — 99212 OFFICE O/P EST SF 10 MIN: CPT | Mod: PBBFAC,PN,25 | Performed by: STUDENT IN AN ORGANIZED HEALTH CARE EDUCATION/TRAINING PROGRAM

## 2021-10-04 LAB
FINAL PATHOLOGIC DIAGNOSIS: NORMAL
Lab: NORMAL

## 2021-10-11 DIAGNOSIS — M54.42 CHRONIC BILATERAL LOW BACK PAIN WITH LEFT-SIDED SCIATICA: ICD-10-CM

## 2021-10-11 DIAGNOSIS — G89.29 CHRONIC BILATERAL LOW BACK PAIN WITH LEFT-SIDED SCIATICA: ICD-10-CM

## 2021-10-11 RX ORDER — TIZANIDINE 4 MG/1
4 TABLET ORAL EVERY 6 HOURS PRN
Qty: 360 TABLET | Refills: 0 | Status: SHIPPED | OUTPATIENT
Start: 2021-10-11 | End: 2022-01-06

## 2021-10-20 LAB
CYTOLOGIST CVX/VAG CYTO: NORMAL
CYTOLOGY CVX/VAG DOC CYTO: NORMAL
CYTOLOGY CVX/VAG DOC THIN PREP: NORMAL
CYTOLOGY THINPREP PAP COMMENT: NORMAL
HPV HR 12 DNA CVX QL NAA+PROBE: NEGATIVE
HPV16 DNA CVX QL NAA+PROBE: NEGATIVE
HPV18 DNA CVX QL NAA+PROBE: NEGATIVE
PAP NOTE: NORMAL
PAP QC REVIEWED BY: NORMAL
PATHOLOGIST CVX/VAG CYTO: NORMAL
STAT OF ADQ CVX/VAG CYTO-IMP: NORMAL

## 2021-10-22 ENCOUNTER — PATIENT MESSAGE (OUTPATIENT)
Dept: FAMILY MEDICINE | Facility: CLINIC | Age: 66
End: 2021-10-22
Payer: MEDICARE

## 2021-10-22 DIAGNOSIS — I25.10 CORONARY ARTERY DISEASE INVOLVING NATIVE CORONARY ARTERY OF NATIVE HEART WITHOUT ANGINA PECTORIS: ICD-10-CM

## 2021-10-22 DIAGNOSIS — E78.5 HYPERLIPIDEMIA, UNSPECIFIED HYPERLIPIDEMIA TYPE: Primary | ICD-10-CM

## 2021-10-22 DIAGNOSIS — R73.03 PREDIABETES: ICD-10-CM

## 2021-10-22 DIAGNOSIS — Z95.5 HISTORY OF HEART ARTERY STENT: ICD-10-CM

## 2021-10-22 NOTE — TELEPHONE ENCOUNTER
Called patient to find out why there is a refill request for Celexa when it was d/c'd by her cardiologist. Pt states she was only told to discontinue for the 2 days she was in the hospital. She has since started taking it again and also is taking the Xanex prescription as needed.    Patient verbalized understanding of results and stated she will try PT first. Referral placed and referral call center number provided.

## 2021-12-27 ENCOUNTER — LAB VISIT (OUTPATIENT)
Dept: PRIMARY CARE CLINIC | Facility: OTHER | Age: 66
End: 2021-12-27
Attending: INTERNAL MEDICINE
Payer: MEDICARE

## 2021-12-27 DIAGNOSIS — Z20.822 ENCOUNTER FOR LABORATORY TESTING FOR COVID-19 VIRUS: ICD-10-CM

## 2021-12-27 PROCEDURE — U0003 INFECTIOUS AGENT DETECTION BY NUCLEIC ACID (DNA OR RNA); SEVERE ACUTE RESPIRATORY SYNDROME CORONAVIRUS 2 (SARS-COV-2) (CORONAVIRUS DISEASE [COVID-19]), AMPLIFIED PROBE TECHNIQUE, MAKING USE OF HIGH THROUGHPUT TECHNOLOGIES AS DESCRIBED BY CMS-2020-01-R: HCPCS | Performed by: INTERNAL MEDICINE

## 2021-12-29 LAB
SARS-COV-2 RNA RESP QL NAA+PROBE: NOT DETECTED
SARS-COV-2- CYCLE NUMBER: NORMAL

## 2022-01-06 DIAGNOSIS — M54.42 CHRONIC BILATERAL LOW BACK PAIN WITH LEFT-SIDED SCIATICA: ICD-10-CM

## 2022-01-06 DIAGNOSIS — F41.9 ANXIETY: ICD-10-CM

## 2022-01-06 DIAGNOSIS — G89.29 CHRONIC BILATERAL LOW BACK PAIN WITH LEFT-SIDED SCIATICA: ICD-10-CM

## 2022-01-06 RX ORDER — TIZANIDINE 4 MG/1
4 TABLET ORAL EVERY 6 HOURS PRN
Qty: 360 TABLET | Refills: 0 | Status: SHIPPED | OUTPATIENT
Start: 2022-01-06 | End: 2022-04-05

## 2022-01-06 RX ORDER — CITALOPRAM 20 MG/1
TABLET, FILM COATED ORAL
Qty: 90 TABLET | Refills: 0 | Status: SHIPPED | OUTPATIENT
Start: 2022-01-06 | End: 2022-04-05

## 2022-01-06 NOTE — TELEPHONE ENCOUNTER
No new care gaps identified.  Powered by [employer]. Reference number: [id number].   1/06/2022 12:11:01 AM CST

## 2022-01-06 NOTE — TELEPHONE ENCOUNTER
Citalopram--LR--4-  Tizanidine--LR--10-    LOV--5-7-2021  FOV--None Noted    Order for Citalopram pended in this encounter to e-scribe to [facility]. (Normal, Disp-90 tablets, R-0).   Order for Tizanidine pended in this encounter to e-scribe to [facility]. (Normal, Disp-360 tablets, R-0). Please advise. Thank you.

## 2022-01-07 ENCOUNTER — PATIENT MESSAGE (OUTPATIENT)
Dept: FAMILY MEDICINE | Facility: CLINIC | Age: 67
End: 2022-01-07
Payer: MEDICARE

## 2022-01-07 NOTE — TELEPHONE ENCOUNTER
Patient has cough and difficulty breathing, afebrile, yellow mucus from chest- advised to go to ED.

## 2022-01-12 ENCOUNTER — LAB VISIT (OUTPATIENT)
Dept: LAB | Facility: HOSPITAL | Age: 67
End: 2022-01-12
Attending: FAMILY MEDICINE
Payer: MEDICARE

## 2022-01-12 ENCOUNTER — OFFICE VISIT (OUTPATIENT)
Dept: FAMILY MEDICINE | Facility: CLINIC | Age: 67
End: 2022-01-12
Payer: MEDICARE

## 2022-01-12 VITALS
DIASTOLIC BLOOD PRESSURE: 80 MMHG | HEART RATE: 88 BPM | HEIGHT: 64 IN | OXYGEN SATURATION: 95 % | BODY MASS INDEX: 25.33 KG/M2 | WEIGHT: 148.38 LBS | SYSTOLIC BLOOD PRESSURE: 136 MMHG

## 2022-01-12 DIAGNOSIS — J20.8 ACUTE BACTERIAL BRONCHITIS: ICD-10-CM

## 2022-01-12 DIAGNOSIS — R73.03 PREDIABETES: ICD-10-CM

## 2022-01-12 DIAGNOSIS — R05.9 COUGH: Primary | ICD-10-CM

## 2022-01-12 DIAGNOSIS — J45.21 MILD INTERMITTENT REACTIVE AIRWAY DISEASE WITH ACUTE EXACERBATION: ICD-10-CM

## 2022-01-12 DIAGNOSIS — E78.5 HYPERLIPIDEMIA, UNSPECIFIED HYPERLIPIDEMIA TYPE: ICD-10-CM

## 2022-01-12 DIAGNOSIS — B96.89 ACUTE BACTERIAL BRONCHITIS: ICD-10-CM

## 2022-01-12 DIAGNOSIS — I25.10 CORONARY ARTERY DISEASE INVOLVING NATIVE CORONARY ARTERY OF NATIVE HEART WITHOUT ANGINA PECTORIS: ICD-10-CM

## 2022-01-12 LAB
ALBUMIN SERPL BCP-MCNC: 4.1 G/DL (ref 3.5–5.2)
ALP SERPL-CCNC: 80 U/L (ref 55–135)
ALT SERPL W/O P-5'-P-CCNC: 17 U/L (ref 10–44)
ANION GAP SERPL CALC-SCNC: 9 MMOL/L (ref 8–16)
AST SERPL-CCNC: 20 U/L (ref 10–40)
BASOPHILS # BLD AUTO: 0.04 K/UL (ref 0–0.2)
BASOPHILS NFR BLD: 0.7 % (ref 0–1.9)
BILIRUB SERPL-MCNC: 1.1 MG/DL (ref 0.1–1)
BUN SERPL-MCNC: 9 MG/DL (ref 8–23)
CALCIUM SERPL-MCNC: 9.2 MG/DL (ref 8.7–10.5)
CHLORIDE SERPL-SCNC: 102 MMOL/L (ref 95–110)
CHOLEST SERPL-MCNC: 156 MG/DL (ref 120–199)
CHOLEST/HDLC SERPL: 3.5 {RATIO} (ref 2–5)
CO2 SERPL-SCNC: 26 MMOL/L (ref 23–29)
CREAT SERPL-MCNC: 0.8 MG/DL (ref 0.5–1.4)
CTP QC/QA: YES
DIFFERENTIAL METHOD: ABNORMAL
EOSINOPHIL # BLD AUTO: 0.8 K/UL (ref 0–0.5)
EOSINOPHIL NFR BLD: 14.6 % (ref 0–8)
ERYTHROCYTE [DISTWIDTH] IN BLOOD BY AUTOMATED COUNT: 11.5 % (ref 11.5–14.5)
EST. GFR  (AFRICAN AMERICAN): >60 ML/MIN/1.73 M^2
EST. GFR  (NON AFRICAN AMERICAN): >60 ML/MIN/1.73 M^2
ESTIMATED AVG GLUCOSE: 108 MG/DL (ref 68–131)
GLUCOSE SERPL-MCNC: 93 MG/DL (ref 70–110)
HBA1C MFR BLD: 5.4 % (ref 4–5.6)
HCT VFR BLD AUTO: 39.5 % (ref 37–48.5)
HDLC SERPL-MCNC: 44 MG/DL (ref 40–75)
HDLC SERPL: 28.2 % (ref 20–50)
HGB BLD-MCNC: 13.4 G/DL (ref 12–16)
IMM GRANULOCYTES # BLD AUTO: 0.01 K/UL (ref 0–0.04)
IMM GRANULOCYTES NFR BLD AUTO: 0.2 % (ref 0–0.5)
LDLC SERPL CALC-MCNC: 91.8 MG/DL (ref 63–159)
LYMPHOCYTES # BLD AUTO: 2.4 K/UL (ref 1–4.8)
LYMPHOCYTES NFR BLD: 41.1 % (ref 18–48)
MCH RBC QN AUTO: 30.6 PG (ref 27–31)
MCHC RBC AUTO-ENTMCNC: 33.9 G/DL (ref 32–36)
MCV RBC AUTO: 90 FL (ref 82–98)
MONOCYTES # BLD AUTO: 0.5 K/UL (ref 0.3–1)
MONOCYTES NFR BLD: 8.5 % (ref 4–15)
NEUTROPHILS # BLD AUTO: 2 K/UL (ref 1.8–7.7)
NEUTROPHILS NFR BLD: 34.9 % (ref 38–73)
NONHDLC SERPL-MCNC: 112 MG/DL
NRBC BLD-RTO: 0 /100 WBC
PLATELET # BLD AUTO: 235 K/UL (ref 150–450)
PMV BLD AUTO: 10.9 FL (ref 9.2–12.9)
POTASSIUM SERPL-SCNC: 3.9 MMOL/L (ref 3.5–5.1)
PROT SERPL-MCNC: 7.2 G/DL (ref 6–8.4)
RBC # BLD AUTO: 4.38 M/UL (ref 4–5.4)
SARS-COV-2 RDRP RESP QL NAA+PROBE: NEGATIVE
SODIUM SERPL-SCNC: 137 MMOL/L (ref 136–145)
TRIGL SERPL-MCNC: 101 MG/DL (ref 30–150)
WBC # BLD AUTO: 5.77 K/UL (ref 3.9–12.7)

## 2022-01-12 PROCEDURE — 80061 LIPID PANEL: CPT | Performed by: FAMILY MEDICINE

## 2022-01-12 PROCEDURE — 83036 HEMOGLOBIN GLYCOSYLATED A1C: CPT | Performed by: FAMILY MEDICINE

## 2022-01-12 PROCEDURE — 99214 OFFICE O/P EST MOD 30 MIN: CPT | Mod: S$PBB,,, | Performed by: FAMILY MEDICINE

## 2022-01-12 PROCEDURE — 99999 PR PBB SHADOW E&M-EST. PATIENT-LVL IV: ICD-10-PCS | Mod: PBBFAC,,, | Performed by: FAMILY MEDICINE

## 2022-01-12 PROCEDURE — 36415 COLL VENOUS BLD VENIPUNCTURE: CPT | Mod: PO | Performed by: FAMILY MEDICINE

## 2022-01-12 PROCEDURE — 99214 OFFICE O/P EST MOD 30 MIN: CPT | Mod: PBBFAC,PO | Performed by: FAMILY MEDICINE

## 2022-01-12 PROCEDURE — 99999 PR PBB SHADOW E&M-EST. PATIENT-LVL IV: CPT | Mod: PBBFAC,,, | Performed by: FAMILY MEDICINE

## 2022-01-12 PROCEDURE — 85025 COMPLETE CBC W/AUTO DIFF WBC: CPT | Performed by: FAMILY MEDICINE

## 2022-01-12 PROCEDURE — 99214 PR OFFICE/OUTPT VISIT, EST, LEVL IV, 30-39 MIN: ICD-10-PCS | Mod: S$PBB,,, | Performed by: FAMILY MEDICINE

## 2022-01-12 PROCEDURE — 80053 COMPREHEN METABOLIC PANEL: CPT | Performed by: FAMILY MEDICINE

## 2022-01-12 PROCEDURE — U0002 COVID-19 LAB TEST NON-CDC: HCPCS | Mod: PBBFAC,PO | Performed by: FAMILY MEDICINE

## 2022-01-12 RX ORDER — AMOXICILLIN AND CLAVULANATE POTASSIUM 875; 125 MG/1; MG/1
1 TABLET, FILM COATED ORAL 2 TIMES DAILY WITH MEALS
Qty: 20 TABLET | Refills: 0 | Status: SHIPPED | OUTPATIENT
Start: 2022-01-12 | End: 2022-03-10 | Stop reason: ALTCHOICE

## 2022-01-12 RX ORDER — ALBUTEROL SULFATE 90 UG/1
2 AEROSOL, METERED RESPIRATORY (INHALATION) EVERY 4 HOURS PRN
Qty: 18 G | Refills: 1 | Status: SHIPPED | OUTPATIENT
Start: 2022-01-12 | End: 2022-03-10 | Stop reason: ALTCHOICE

## 2022-01-12 RX ORDER — IPRATROPIUM BROMIDE AND ALBUTEROL SULFATE 2.5; .5 MG/3ML; MG/3ML
3 SOLUTION RESPIRATORY (INHALATION) EVERY 6 HOURS PRN
Qty: 300 ML | Refills: 5 | Status: SHIPPED | OUTPATIENT
Start: 2022-01-12 | End: 2022-03-10 | Stop reason: ALTCHOICE

## 2022-01-12 NOTE — PROGRESS NOTES
Subjective:       Patient ID: Aysha Moore is a 66 y.o. female.    Chief Complaint: Cough    New to me patient here for UC visit.  CC - cough; onset around Xmas with viral URI sx's - negative C19 then.  Improved until last week when she began having a productive cough with some SOB.  No fever or HA's.  Still some residual sinus congestion.  Negative C19 here today    Review of Systems   Constitutional: Negative for fever.   HENT: Positive for congestion and sinus pressure.    Respiratory: Positive for cough, shortness of breath and wheezing.    Cardiovascular: Negative for chest pain.   Gastrointestinal: Negative for abdominal pain and nausea.   Skin: Negative for rash.   All other systems reviewed and are negative.      Objective:      Physical Exam  Constitutional:       General: She is not in acute distress.     Appearance: She is well-developed.   HENT:      Right Ear: Tympanic membrane normal. Tympanic membrane is not erythematous.      Left Ear: Tympanic membrane normal. Tympanic membrane is not erythematous.      Nose: Mucosal edema present.      Right Sinus: No maxillary sinus tenderness.      Left Sinus: No maxillary sinus tenderness.      Mouth/Throat:      Pharynx: Posterior oropharyngeal erythema present.   Cardiovascular:      Rate and Rhythm: Normal rate and regular rhythm.      Heart sounds: No murmur heard.      Pulmonary:      Effort: Pulmonary effort is normal.      Breath sounds: Wheezing present.   Musculoskeletal:      Cervical back: Neck supple.   Lymphadenopathy:      Cervical: No cervical adenopathy.   Skin:     General: Skin is warm and dry.         Assessment:       1. Cough    2. Mild intermittent reactive airway disease with acute exacerbation    3. Acute bacterial bronchitis        Plan:       Cough  -     POCT COVID-19 Rapid Screening    Mild intermittent reactive airway disease with acute exacerbation  -     albuterol-ipratropium (DUO-NEB) 2.5 mg-0.5 mg/3 mL nebulizer solution;  Take 3 mLs by nebulization every 6 (six) hours as needed for Wheezing. Rescue  Dispense: 300 mL; Refill: 5  -     albuterol (PROVENTIL/VENTOLIN HFA) 90 mcg/actuation inhaler; Inhale 2 puffs into the lungs every 4 (four) hours as needed for Wheezing or Shortness of Breath (or cough).  Dispense: 18 g; Refill: 1    Acute bacterial bronchitis  -     albuterol-ipratropium (DUO-NEB) 2.5 mg-0.5 mg/3 mL nebulizer solution; Take 3 mLs by nebulization every 6 (six) hours as needed for Wheezing. Rescue  Dispense: 300 mL; Refill: 5  -     albuterol (PROVENTIL/VENTOLIN HFA) 90 mcg/actuation inhaler; Inhale 2 puffs into the lungs every 4 (four) hours as needed for Wheezing or Shortness of Breath (or cough).  Dispense: 18 g; Refill: 1  -     amoxicillin-clavulanate 875-125mg (AUGMENTIN) 875-125 mg per tablet; Take 1 tablet by mouth 2 (two) times daily with meals.  Dispense: 20 tablet; Refill: 0      Call [facility] w above or any worsening.

## 2022-01-14 ENCOUNTER — PATIENT MESSAGE (OUTPATIENT)
Dept: FAMILY MEDICINE | Facility: CLINIC | Age: 67
End: 2022-01-14
Payer: MEDICARE

## 2022-01-14 DIAGNOSIS — J40 BRONCHITIS: Primary | ICD-10-CM

## 2022-01-14 RX ORDER — PREDNISONE 20 MG/1
TABLET ORAL
Qty: 8 TABLET | Refills: 0 | Status: SHIPPED | OUTPATIENT
Start: 2022-01-14 | End: 2022-03-10 | Stop reason: ALTCHOICE

## 2022-01-14 RX ORDER — CODEINE PHOSPHATE AND GUAIFENESIN 10; 100 MG/5ML; MG/5ML
5 SOLUTION ORAL 3 TIMES DAILY PRN
Qty: 236 ML | Refills: 0 | Status: SHIPPED | OUTPATIENT
Start: 2022-01-14 | End: 2022-01-24

## 2022-01-19 ENCOUNTER — TELEPHONE (OUTPATIENT)
Dept: FAMILY MEDICINE | Facility: CLINIC | Age: 67
End: 2022-01-19
Payer: MEDICARE

## 2022-01-19 DIAGNOSIS — E78.5 HYPERLIPIDEMIA, UNSPECIFIED HYPERLIPIDEMIA TYPE: ICD-10-CM

## 2022-01-19 RX ORDER — ATORVASTATIN CALCIUM 80 MG/1
80 TABLET, FILM COATED ORAL NIGHTLY
Qty: 90 TABLET | Refills: 4 | Status: SHIPPED | OUTPATIENT
Start: 2022-01-19 | End: 2023-02-19

## 2022-01-19 NOTE — TELEPHONE ENCOUNTER
----- Message from Jeanne Boeckelman, MA sent at 1/19/2022  8:44 AM CST -----  I called the patient and went over her results with her, patient expressed verbal understanding and agreed to the increase in her atorvastatin

## 2022-01-19 NOTE — TELEPHONE ENCOUNTER
80 mg of atorvastatin sent to [facility]    Orders in for lipid panel and liver panel in 3 to 4 months    She may take her 40 mg two at a time to use them up

## 2022-02-01 ENCOUNTER — OFFICE VISIT (OUTPATIENT)
Dept: PULMONOLOGY | Facility: CLINIC | Age: 67
End: 2022-02-01
Payer: MEDICARE

## 2022-02-01 ENCOUNTER — LAB VISIT (OUTPATIENT)
Dept: LAB | Facility: HOSPITAL | Age: 67
End: 2022-02-01
Attending: INTERNAL MEDICINE
Payer: MEDICARE

## 2022-02-01 VITALS
DIASTOLIC BLOOD PRESSURE: 86 MMHG | HEART RATE: 75 BPM | BODY MASS INDEX: 25.58 KG/M2 | SYSTOLIC BLOOD PRESSURE: 116 MMHG | WEIGHT: 149 LBS | OXYGEN SATURATION: 96 %

## 2022-02-01 DIAGNOSIS — Z01.84 ENCOUNTER FOR ANTIBODY RESPONSE EXAMINATION: ICD-10-CM

## 2022-02-01 DIAGNOSIS — Z87.891 PERSONAL HISTORY OF TOBACCO USE, PRESENTING HAZARDS TO HEALTH: Primary | ICD-10-CM

## 2022-02-01 LAB
SARS-COV-2 IGG SERPL IA-ACNC: <50 AU/ML
SARS-COV-2 IGG SERPL QL IA: NEGATIVE

## 2022-02-01 PROCEDURE — 99214 PR OFFICE/OUTPT VISIT, EST, LEVL IV, 30-39 MIN: ICD-10-PCS | Mod: S$GLB,,, | Performed by: INTERNAL MEDICINE

## 2022-02-01 PROCEDURE — 86769 SARS-COV-2 COVID-19 ANTIBODY: CPT | Performed by: INTERNAL MEDICINE

## 2022-02-01 PROCEDURE — 36415 COLL VENOUS BLD VENIPUNCTURE: CPT | Performed by: INTERNAL MEDICINE

## 2022-02-01 PROCEDURE — 99214 OFFICE O/P EST MOD 30 MIN: CPT | Mod: S$GLB,,, | Performed by: INTERNAL MEDICINE

## 2022-02-01 NOTE — PROGRESS NOTES
Office Visit    Patient Name: Aysha Moore  MRN: [id number]  : 1955      Reason for visit: COPD    HPI:     2019 - Here for evaluation of COPD.  Has been hospitalized twice for respiratory issues.  Here more recently has noted some symptoms but has been out of BREO for a week or so.  Has a h/o smoking - has quit cigarettes but is using a vape.  Has smoked about 1 PPD for about 40 years.    2019 - Here for follow up, no new issues reported.  Still vaping (d/we pt).  Reviewed PFT with pt.  Pt is currently stable on present medications with no recent increases in their symptoms or use of rescue medications.  I have reviewed the medical regimen and re-educated the pt on the role of rescue and controlling medications.  All questions answered.  Inhaler technique seems adequate.    2021 - Here for follow up, Pt is currently stable on present medications with no recent increases in their symptoms or use of rescue medications.  Since our last visit there have been no hospitalizations or ER visits for their respiratory issues and there does not seem to be anything to suggest unrecognized exacerbations.  I have reviewed the medical regimen and re-educated the pt on the role of rescue and controlling medications.  Inhaler technique and understanding seems adequate.  The patient reports no issues with any of there medications for their COPD.  Refills will be taken care of as needed.  All questions answered.  She stopped singulair - she felt that it made her short tempered and she is better since stopping it.  She had a MI recently.  Continues with some sinus infection - seen at Urgent Care given doxycycline but developed itching.  Still with symptoms.  Patient has no known corona virus exposures and has been practicing social distancing.  We have discussed the virus and precautions and all questions have been answered.    8/3/2021 - Here for follow up, Pt is currently stable on present medications with no  recent increases in their symptoms or use of rescue medications.  Since our last visit there have been no hospitalizations or ER visits for their respiratory issues and there does not seem to be anything to suggest unrecognized exacerbations.  I have reviewed the medical regimen and re-educated the pt on the role of rescue and controlling medications.  Inhaler technique and understanding seems adequate.  The patient reports no issues with any of there medications for their COPD.  Refills will be taken care of as needed.  All questions answered.  Has been taken off of lopressor due to decreased BP.  Patient has no known corona virus exposures and has been practicing social distancing.  We have discussed the virus and precautions and all questions have been answered.    2/1/2022 - Here for follow up, was sick for about 3 weeks around [place] (had known Covid exposure, had HA, sore throat, weak, cough, increased dyspnea, low grade fever).  She did 2 rapid tests which were negative but is interested in getting Covid antibodies checked.  Pt is currently stable on present medications with no recent increases in their symptoms or use of rescue medications.  Since our last visit there have been no hospitalizations or ER visits for their respiratory issues and there does not seem to be anything to suggest unrecognized exacerbations.  I have reviewed the medical regimen and re-educated the pt on the role of rescue and controlling medications.  Inhaler technique and understanding seems adequate.  The patient reports no issues with any of there medications for their COPD.  Refills will be taken care of as needed.  All questions answered.  She has been otherwise stable except for recent illness.  She has not been vaccinated for Covid.          Low Dose Screening CT Chest    Cigarettes - 1 PPD x 40 YEARS  Still smoking -   NO  QUIT 3/2019     Date of last LD CT - 2/2021    Result - Category 1, needs repeat 2/2022    I have  "discussed with pt about using a screening CT of the chest due to history of cigarette smoking.  We have discussed the possible findings and possible actions as a result of these findings.  The pt would like to proceed.    COPD Flowsheet    GOLD A    Last PFT - 8/2019  FEV1- 74 % DLCO - 50 %     + LABA/LAMA    + prn ALBUTEROL    mMRC -  0 - SOB with strenuous exercise   - + 1 - SOB level ground, slight hill   -  2 - SOB walk slower or stop for breath level ground   -  3 - SOB at 100 yards or after few minutes   -  4 - SOB in house, dressing    Referral to PULMONARY REHABILITATION - NO    Tested for alpha-1-antitrypsin - NO  Result - na    Cigarette Counseling    Currently smoking 0 packs per day  40 pack years    Quit vaping as well    I have counseled pt for 3-5 minutes regarding cigarette cessation.  This has included the need to stop smoking as well as strategies, including but not limited to "cold turkey", CHANTIX (including risks and benefits of whitney drug), nicotine replacement and WELLBUTRIN.    Flu and Pneumonia Vaccination    I have recommended that the patient get this years influenza vaccine.  We discussed the risks and benefits of this treatment.  Did not get    I reviewed patient's current pneumonia vaccine status.  Patient needs vaccination.  We have discussed the current guidelines and recommendations for pneumonia vaccination.    She is considering Covid vaccine            Past Medical History    Past Medical History:   Diagnosis Date    Allergy     Anxiety     Arthritis     CAD (coronary artery disease)     Chronic back pain     Chronic rhinitis     Hyperlipidemia     Hypertension     Seasonal allergic rhinitis 10/29/2020       Past Surgical History    Past Surgical History:   Procedure Laterality Date    BREAST SURGERY      breast reduction    coranary stent      HYSTERECTOMY      total    LEFT HEART CATHETERIZATION Left 10/16/2020    Procedure: Left heart cath;  Surgeon: Garrett ABRAMS" MD Shimon;  Location: [facility] CATH/EP LAB;  Service: Cardiology;  Laterality: Left;    OOPHORECTOMY      TOTAL REDUCTION MAMMOPLASTY Bilateral 2000       Medications      Current Outpatient Medications:     albuterol (PROVENTIL/VENTOLIN HFA) 90 mcg/actuation inhaler, Inhale 2 puffs into the lungs every 4 (four) hours as needed for Wheezing or Shortness of Breath (or cough)., Disp: 18 g, Rfl: 1    albuterol-ipratropium (DUO-NEB) 2.5 mg-0.5 mg/3 mL nebulizer solution, Take 3 mLs by nebulization every 6 (six) hours as needed for Wheezing. Rescue, Disp: 300 mL, Rfl: 5    ALPRAZolam (XANAX) 0.25 MG tablet, Take 1 tablet (0.25 mg total) by mouth daily as needed., Disp: 30 tablet, Rfl: 0    amoxicillin-clavulanate 875-125mg (AUGMENTIN) 875-125 mg per tablet, Take 1 tablet by mouth 2 (two) times daily with meals., Disp: 20 tablet, Rfl: 0    ANORO ELLIPTA 62.5-25 mcg/actuation DsDv, INHALE 1 PUFF INTO THE LUNGS ONCE DAILY (Patient taking differently: Inhale 1 puff into the lungs once daily.), Disp: 1 each, Rfl: 5    atorvastatin (LIPITOR) 80 MG tablet, Take 1 tablet (80 mg total) by mouth every evening., Disp: 90 tablet, Rfl: 4    busPIRone (BUSPAR) 5 MG Tab, TAKE 1 TABLET BY MOUTH TWICE A DAY, Disp: 180 tablet, Rfl: 1    calcium-vitamin D3 (OS-JOLIE 500 + D3) 500 mg-5 mcg (200 unit) per tablet, Take 1 tablet by mouth 2 (two) times daily with meals., Disp: , Rfl:     citalopram (CELEXA) 20 MG tablet, TAKE 1 TABLET BY MOUTH EVERY DAY, Disp: 90 tablet, Rfl: 0    clobetasoL (TEMOVATE) 0.05 % cream, Apply topically 2 (two) times daily. Apply to area nightly for 10 days, Disp: 30 g, Rfl: 1    clopidogreL (PLAVIX) 75 mg tablet, Take 1 tablet (75 mg total) by mouth once daily., Disp: 30 tablet, Rfl: 0    ketorolac 0.5% (ACULAR) 0.5 % Drop, , Disp: , Rfl:     montelukast (SINGULAIR) 10 mg tablet, TAKE 1 TABLET BY MOUTH EVERY DAY IN THE EVENING, Disp: 90 tablet, Rfl: 3    nitroGLYCERIN (NITROSTAT) 0.4 MG SL tablet, Place  1 tablet (0.4 mg total) under the tongue every 5 (five) minutes as needed for Chest pain., Disp: 30 tablet, Rfl: 0    nystatin (MYCOSTATIN) powder, Apply to affected area 3 times daily, Disp: 60 g, Rfl: 1    ofloxacin (OCUFLOX) 0.3 % ophthalmic solution, , Disp: , Rfl:     prednisoLONE acetate (PRED FORTE) 1 % DrpS, , Disp: , Rfl:     predniSONE (DELTASONE) 20 MG tablet, Take two a day (40mg) for three days then one a day (20mg) for two days, Disp: 8 tablet, Rfl: 0    silver sulfADIAZINE 1% (SILVADENE) 1 % cream, Apply topically once daily., Disp: 15 g, Rfl: 0    tiZANidine (ZANAFLEX) 4 MG tablet, TAKE 1 TABLET (4 MG TOTAL) BY MOUTH EVERY 6 (SIX) HOURS AS NEEDED. BACK PAIN, Disp: 360 tablet, Rfl: 0    valACYclovir (VALTREX) 1000 MG tablet, TAKE 2 AT ONSET OF FEVER BLISTERS THEN REPEAT IN 12 HOURS (TOTAL 4 TABS PER EPISODE), Disp: 20 tablet, Rfl: 3    zinc 50 mg Tab, Take by mouth., Disp: , Rfl:     aspirin (ECOTRIN) 81 MG EC tablet, Take 1 tablet (81 mg total) by mouth once daily. (Patient taking differently: Take 81 mg by mouth as needed. ), Disp: , Rfl: 0    lisinopriL (PRINIVIL,ZESTRIL) 2.5 MG tablet, Take 1 tablet (2.5 mg total) by mouth once daily., Disp: 30 tablet, Rfl: 0    metoprolol tartrate (LOPRESSOR) 25 MG tablet, Take 1 tablet (25 mg total) by mouth 2 (two) times daily., Disp: 60 tablet, Rfl: 0    Allergies    Review of patient's allergies indicates:   Allergen Reactions    Cephalexin      Other reaction(s): Rash    Doxycycline monohydrate Hives    Lanoxin  [digoxin]      Other reaction(s): Rash    Lansoprazole      Other reaction(s): Rash    Macrobid [nitrofurantoin monohyd/m-cryst]        SocHx    Social History     Tobacco Use   Smoking Status Former Smoker    Packs/day: 1.00    Types: Cigarettes    Quit date: 3/4/2017    Years since quittin.9   Smokeless Tobacco Never Used       Social History     Substance and Sexual Activity   Alcohol Use Yes    Alcohol/week: 0.0 standard  drinks    Comment: sometimes       Drug Use - no  Occupation - housewife  Asbestos exposure - no  Pets - dogs    FMHx    Family History   Problem Relation Age of Onset    Cancer Mother         breast, ovarian, cervical     Heart disease Mother     Breast cancer Mother 50    Ovarian cancer Mother     Cancer Father         melanoma    Stroke Sister     Heart disease Sister     Cancer Sister         leukemia    Heart disease Brother     Heart disease Maternal Grandmother     No Known Problems Daughter     No Known Problems Son     Cancer Maternal Uncle     Cancer Paternal Aunt         breast    Breast cancer Paternal Aunt 60    Cancer Paternal Uncle     Macular degeneration Sister     Heart disease Sister          Review of Systems  Review of Systems   Constitutional: Negative for chills, diaphoresis, fever, malaise/fatigue and weight loss.   HENT: Positive for congestion and tinnitus. Negative for ear discharge, ear pain, hearing loss, nosebleeds, sinus pain and sore throat.         Has had tinnitus for years (has seen ENT)   Eyes: Negative for pain.   Respiratory: Positive for shortness of breath and wheezing. Negative for cough, hemoptysis, sputum production and stridor.    Cardiovascular: Negative for chest pain, palpitations, orthopnea, claudication, leg swelling and PND.        H/o PCI   Gastrointestinal: Negative for abdominal pain, blood in stool, constipation, diarrhea, heartburn, melena, nausea and vomiting.   Genitourinary: Negative for dysuria, flank pain, frequency, hematuria and urgency.   Musculoskeletal: Positive for back pain, joint pain and neck pain. Negative for falls and myalgias.        Right knee meniscal injury   Skin: Negative for itching and rash.   Neurological: Negative for dizziness, tingling, tremors, sensory change, speech change, focal weakness, seizures, weakness and headaches.   Endo/Heme/Allergies: Negative for environmental allergies.   Psychiatric/Behavioral:  Negative for depression, substance abuse and suicidal ideas. The patient is not nervous/anxious.        Physical Exam    Vitals:    02/01/22 1102   BP: 116/86   BP Location: Left arm   Patient Position: Sitting   BP Method: X-Large (Manual)   Pulse: 75   SpO2: 96%   Weight: 67.6 kg (149 lb)       Physical Exam  Vitals and nursing note reviewed.   Constitutional:       General: She is not in acute distress.     Appearance: She is well-developed. She is not diaphoretic.   HENT:      Head: Normocephalic and atraumatic.      Right Ear: External ear normal.      Left Ear: External ear normal.      Nose: Nose normal.   Eyes:      Conjunctiva/sclera: Conjunctivae normal.      Pupils: Pupils are equal, round, and reactive to light.   Neck:      Thyroid: No thyromegaly.      Vascular: No JVD.      Trachea: No tracheal deviation.   Cardiovascular:      Rate and Rhythm: Normal rate and regular rhythm.      Heart sounds: Normal heart sounds. No murmur heard.  No friction rub. No gallop.    Pulmonary:      Effort: Pulmonary effort is normal. No respiratory distress.      Breath sounds: Normal breath sounds. No stridor. No wheezing or rales.   Chest:      Chest wall: No tenderness.   Abdominal:      General: Bowel sounds are normal. There is no distension.      Palpations: Abdomen is soft.      Tenderness: There is no abdominal tenderness.   Musculoskeletal:         General: No tenderness. Normal range of motion.      Cervical back: Normal range of motion and neck supple.   Lymphadenopathy:      Cervical: No cervical adenopathy.   Skin:     General: Skin is warm and dry.   Neurological:      Mental Status: She is alert and oriented to person, place, and time.      Cranial Nerves: No cranial nerve deficit.   Psychiatric:         Behavior: Behavior normal.         Labs    Lab Results   Component Value Date    WBC 5.77 01/12/2022    HGB 13.4 01/12/2022    HCT 39.5 01/12/2022     01/12/2022       Sodium   Date Value Ref Range  Status   01/12/2022 137 136 - 145 mmol/L Final     Potassium   Date Value Ref Range Status   01/12/2022 3.9 3.5 - 5.1 mmol/L Final     Chloride   Date Value Ref Range Status   01/12/2022 102 95 - 110 mmol/L Final     CO2   Date Value Ref Range Status   01/12/2022 26 23 - 29 mmol/L Final     Glucose   Date Value Ref Range Status   01/12/2022 93 70 - 110 mg/dL Final     BUN   Date Value Ref Range Status   01/12/2022 9 8 - 23 mg/dL Final     Creatinine   Date Value Ref Range Status   01/12/2022 0.8 0.5 - 1.4 mg/dL Final     Calcium   Date Value Ref Range Status   01/12/2022 9.2 8.7 - 10.5 mg/dL Final     Total Protein   Date Value Ref Range Status   01/12/2022 7.2 6.0 - 8.4 g/dL Final     Albumin   Date Value Ref Range Status   01/12/2022 4.1 3.5 - 5.2 g/dL Final     Total Bilirubin   Date Value Ref Range Status   01/12/2022 1.1 (H) 0.1 - 1.0 mg/dL Final     Comment:     For infants and newborns, interpretation of results should be based  on gestational age, weight and in agreement with clinical  observations.    Premature Infant recommended reference ranges:  Up to 24 hours.............<8.0 mg/dL  Up to 48 hours............<12.0 mg/dL  3-5 days..................<15.0 mg/dL  6-29 days.................<15.0 mg/dL       Alkaline Phosphatase   Date Value Ref Range Status   01/12/2022 80 55 - 135 U/L Final     AST   Date Value Ref Range Status   01/12/2022 20 10 - 40 U/L Final     ALT   Date Value Ref Range Status   01/12/2022 17 10 - 44 U/L Final     Anion Gap   Date Value Ref Range Status   01/12/2022 9 8 - 16 mmol/L Final       Xrays    CMS MANDATED QUALITY DATA - CT RADIATION - 436     All CT scans at this facility utilize dose modulation, iterative  reconstruction, and/or weight based dosing when appropriate to reduce  radiation dose to as low as reasonably achievable.        REASON: screening     TECHNIQUE: Low-dose chest CT without IV contrast.     COMPARISON: CT chest August 1, 2019.     FINDINGS:     The central  tracheobronchial tree is patent. There is minimal biapical  pleural-parenchymal thickening. No pulmonary nodules identified. There  is redemonstration of parenchymal destructive changes in the periphery  of the bilateral lower lobes, unchanged from previous exam. No  interstitial thickening or consolidation. No pleural effusions. The  heart is normal size. The thoracic aorta is normal caliber with  atherosclerosis. There is no mediastinal lymphadenopathy or mass.     The visualized abdominal viscera are unremarkable. There is no acute  osseous abnormality.     IMPRESSION:     No pulmonary nodules identified.     LUNG RADS CATEGORY 1: NEGATIVE     RECOMMENDATION: Continued annual screening with LDCT in 12 months.     Electronically Signed by Soham Hyde on 3/12/2021 3:16 PM      Impression/Plan    Problem List Items Addressed This Visit        Pulmonary    Pulmonary emphysema - Primary   Continue present medications.   Will refill medications as needed.   Instructed patient to contact us with any issues concerning their medications (cost, reactions, etc.).   Have discussed with patient about inciting conditions which may exacerbate their disease.   We did discuss possible new therapies or de-escalation of therapy (if appropriate).   Asked patient if they were interested in pursuing pulmonary rehabilitation.   All questions answered   RTC 6 months   Patient instructed that they are to call if symptoms change or new issues develop prior to their next visit.                     Other    Personal history of tobacco use, presenting hazards to health  ·  has quit  ·  needs to stop vaping                        Raad Abad MD

## 2022-02-15 ENCOUNTER — HOSPITAL ENCOUNTER (OUTPATIENT)
Dept: RADIOLOGY | Facility: HOSPITAL | Age: 67
Discharge: HOME | End: 2022-02-15
Attending: INTERNAL MEDICINE
Payer: MEDICARE

## 2022-02-15 DIAGNOSIS — Z87.891 PERSONAL HISTORY OF TOBACCO USE, PRESENTING HAZARDS TO HEALTH: ICD-10-CM

## 2022-02-15 DIAGNOSIS — J18.9 PNEUMONIA OF RIGHT UPPER LOBE DUE TO INFECTIOUS ORGANISM: Primary | ICD-10-CM

## 2022-02-15 PROCEDURE — 71271 CT THORAX LUNG CANCER SCR C-: CPT | Mod: TC,PO

## 2022-03-09 ENCOUNTER — PATIENT MESSAGE (OUTPATIENT)
Dept: FAMILY MEDICINE | Facility: CLINIC | Age: 67
End: 2022-03-09
Payer: MEDICARE

## 2022-03-09 ENCOUNTER — TELEPHONE (OUTPATIENT)
Dept: FAMILY MEDICINE | Facility: CLINIC | Age: 67
End: 2022-03-09
Payer: MEDICARE

## 2022-03-09 NOTE — TELEPHONE ENCOUNTER
Spoke with pt via phone. Scheduled per pt request for 3/10/2022 at 8 am. Pt states she has been taking OTC meds to help with fever. All understanding.

## 2022-03-10 ENCOUNTER — TELEPHONE (OUTPATIENT)
Dept: CARDIOLOGY | Facility: CLINIC | Age: 67
End: 2022-03-10
Payer: MEDICARE

## 2022-03-10 ENCOUNTER — OFFICE VISIT (OUTPATIENT)
Dept: FAMILY MEDICINE | Facility: CLINIC | Age: 67
End: 2022-03-10
Payer: MEDICARE

## 2022-03-10 VITALS
SYSTOLIC BLOOD PRESSURE: 104 MMHG | BODY MASS INDEX: 25.36 KG/M2 | OXYGEN SATURATION: 95 % | HEIGHT: 64 IN | DIASTOLIC BLOOD PRESSURE: 60 MMHG | WEIGHT: 148.56 LBS | HEART RATE: 96 BPM

## 2022-03-10 DIAGNOSIS — B96.89 ACUTE BACTERIAL TONSILLITIS: Primary | ICD-10-CM

## 2022-03-10 DIAGNOSIS — J03.80 ACUTE BACTERIAL TONSILLITIS: Primary | ICD-10-CM

## 2022-03-10 LAB — GROUP A STREP, MOLECULAR: NEGATIVE

## 2022-03-10 PROCEDURE — 99213 OFFICE O/P EST LOW 20 MIN: CPT | Mod: PBBFAC,PO | Performed by: FAMILY MEDICINE

## 2022-03-10 PROCEDURE — 99213 PR OFFICE/OUTPT VISIT, EST, LEVL III, 20-29 MIN: ICD-10-PCS | Mod: S$PBB,,, | Performed by: FAMILY MEDICINE

## 2022-03-10 PROCEDURE — 87070 CULTURE OTHR SPECIMN AEROBIC: CPT | Performed by: FAMILY MEDICINE

## 2022-03-10 PROCEDURE — 87651 STREP A DNA AMP PROBE: CPT | Mod: PO | Performed by: FAMILY MEDICINE

## 2022-03-10 PROCEDURE — 99999 PR PBB SHADOW E&M-EST. PATIENT-LVL III: CPT | Mod: PBBFAC,,, | Performed by: FAMILY MEDICINE

## 2022-03-10 PROCEDURE — 99999 PR PBB SHADOW E&M-EST. PATIENT-LVL III: ICD-10-PCS | Mod: PBBFAC,,, | Performed by: FAMILY MEDICINE

## 2022-03-10 PROCEDURE — 99213 OFFICE O/P EST LOW 20 MIN: CPT | Mod: S$PBB,,, | Performed by: FAMILY MEDICINE

## 2022-03-10 RX ORDER — METHYLPREDNISOLONE 4 MG/1
TABLET ORAL
Qty: 1 EACH | Refills: 0 | Status: SHIPPED | OUTPATIENT
Start: 2022-03-10 | End: 2022-03-22

## 2022-03-10 RX ORDER — AMOXICILLIN AND CLAVULANATE POTASSIUM 875; 125 MG/1; MG/1
1 TABLET, FILM COATED ORAL 2 TIMES DAILY WITH MEALS
Qty: 20 TABLET | Refills: 0 | Status: SHIPPED | OUTPATIENT
Start: 2022-03-10 | End: 2022-03-29

## 2022-03-10 NOTE — PROGRESS NOTES
Subjective:       Patient ID: Aysha Moore is a 66 y.o. female.    Chief Complaint: No chief complaint on file.    Known to me patient here for UC visit.  Few day hx of worsening sore throat; Fever with T last night of 102.9.  More pain on left and radiates to left ear.  Pt reports has had tonsil infections throughout her life.  No known exposure to ill contacts.    Review of Systems   Constitutional: Positive for diaphoresis, fatigue and fever.   HENT: Positive for ear pain and sore throat.    Respiratory: Negative for shortness of breath.    Cardiovascular: Negative for chest pain.   Gastrointestinal: Negative for abdominal pain and nausea.   Skin: Negative for rash.   All other systems reviewed and are negative.      Objective:      Physical Exam  Constitutional:       General: She is not in acute distress.     Appearance: She is well-developed.   HENT:      Right Ear: Tympanic membrane normal. Tympanic membrane is not erythematous.      Left Ear: Tympanic membrane normal. Tympanic membrane is not erythematous.      Nose: Mucosal edema present.      Right Sinus: No maxillary sinus tenderness.      Left Sinus: No maxillary sinus tenderness.      Mouth/Throat:      Pharynx: Uvula midline. Posterior oropharyngeal erythema present. No uvula swelling.      Tonsils: 2+ on the right. 2+ on the left.   Cardiovascular:      Rate and Rhythm: Normal rate and regular rhythm.      Heart sounds: No murmur heard.  Pulmonary:      Effort: Pulmonary effort is normal.      Breath sounds: Normal breath sounds.   Musculoskeletal:      Cervical back: Neck supple.   Lymphadenopathy:      Cervical: No cervical adenopathy.   Skin:     General: Skin is warm and dry.         Assessment:       1. Acute bacterial tonsillitis        Plan:       Acute bacterial tonsillitis  -     amoxicillin-clavulanate 875-125mg (AUGMENTIN) 875-125 mg per tablet; Take 1 tablet by mouth 2 (two) times daily with meals.  Dispense: 20 tablet; Refill: 0  -      methylPREDNISolone (MEDROL DOSEPACK) 4 mg tablet; use as directed  Dispense: 1 each; Refill: 0  -     Group A Strep, Molecular  -     Throat culture      Hydrate - push fluids intake; soft, bland diet for now.

## 2022-03-10 NOTE — TELEPHONE ENCOUNTER
----- Message from [facility] sent at 3/10/2022  9:15 AM CST -----  Type:  Needs Medical Advice    Who Called: PT   Would the patient rather a call back or a response via [facility]? Callback   Best Call Back Number: [phone]  Additional Information: Pt states she feels really sick. Wanted a callback to see if she should cancel her appt today (03/10/2022) based on her symptoms (didn't give specifics)

## 2022-03-14 LAB — BACTERIA THROAT CULT: NORMAL

## 2022-03-22 ENCOUNTER — OFFICE VISIT (OUTPATIENT)
Dept: SURGERY | Facility: CLINIC | Age: 67
End: 2022-03-22
Payer: MEDICARE

## 2022-03-22 VITALS — SYSTOLIC BLOOD PRESSURE: 118 MMHG | DIASTOLIC BLOOD PRESSURE: 80 MMHG | TEMPERATURE: 97 F | HEART RATE: 96 BPM

## 2022-03-22 DIAGNOSIS — C43.72 MALIGNANT MELANOMA OF SKIN OF LEFT LEG: Primary | ICD-10-CM

## 2022-03-22 DIAGNOSIS — Z01.818 PRE-OP TESTING: ICD-10-CM

## 2022-03-22 DIAGNOSIS — L98.9 SKIN LESION OF LEFT LEG: ICD-10-CM

## 2022-03-22 PROCEDURE — 99203 PR OFFICE/OUTPT VISIT, NEW, LEVL III, 30-44 MIN: ICD-10-PCS | Mod: 25,S$GLB,, | Performed by: PHYSICIAN ASSISTANT

## 2022-03-22 PROCEDURE — 99203 OFFICE O/P NEW LOW 30 MIN: CPT | Mod: 25,S$GLB,, | Performed by: PHYSICIAN ASSISTANT

## 2022-03-22 PROCEDURE — 11104 PR PUNCH BIOPSY, SKIN, SINGLE LESION: ICD-10-PCS | Mod: S$GLB,,, | Performed by: PHYSICIAN ASSISTANT

## 2022-03-22 PROCEDURE — 11104 PUNCH BX SKIN SINGLE LESION: CPT | Mod: S$GLB,,, | Performed by: PHYSICIAN ASSISTANT

## 2022-03-22 RX ORDER — SODIUM CHLORIDE 9 MG/ML
INJECTION, SOLUTION INTRAVENOUS CONTINUOUS
Status: CANCELLED | OUTPATIENT
Start: 2022-03-22

## 2022-03-22 NOTE — PROCEDURES
"Excision of Lesion    Date/Time: 3/22/2022 9:45 AM  Performed by: Ana Maria Maria PA-C  Authorized by: Ana Maria Maria PA-C     Consent Done?:  Yes (Verbal)  Time out: Immediately prior to procedure a "time out" was called to verify the correct patient, procedure, equipment, support staff and site/side marked as required.      STAFF:  Assistants?: No      INDICATIONS:: skin lesion of left hip.  LOCATION:  Body area:  Upper leg / knee (left hip)(Left)    ANESTHESIA:  Anesthesia:  Local infiltration  Local anesthetic:  Bupivacaine 0.25% with epinephrine    PROCEDURE DETAILS:  Excision type:  Skin  Excision size (cm):  0.4  Incision type: 4mm punch.  Specimens?: Yes    Specimens submitted to pathology.  Hemostasis was obtained.  Wound closure:  Simple  Sutures:  Other (comments)  Post-op diagnosis: Same as pre-op diagnosis.  Needle, instrument, and sponge counts were correct.  Patient tolerated the procedure well with no immediate complications.  Post-operative instructions were provided for the patient.  Patient was discharged and will follow up for wound check and pathology results.      "

## 2022-03-22 NOTE — H&P
History & Physical    SUBJECTIVE:     History of Present Illness:  Patient is a 66 y.o. female presents with melanoma of the left thigh s/p shave biopsy. She also has a lesion on her left hip that she is concerned about.     Chief Complaint   Patient presents with    Cancer     skin       Review of patient's allergies indicates:   Allergen Reactions    Cephalexin      Other reaction(s): Rash    Doxycycline monohydrate Hives    Lanoxin  [digoxin]      Other reaction(s): Rash    Lansoprazole      Other reaction(s): Rash    Macrobid [nitrofurantoin monohyd/m-cryst]        Current Outpatient Medications   Medication Sig Dispense Refill    ammonium lactate 12 % Crea aaa bid prn dry skin 385 g 11    amoxicillin-clavulanate 875-125mg (AUGMENTIN) 875-125 mg per tablet Take 1 tablet by mouth 2 (two) times daily with meals. 20 tablet 0    atorvastatin (LIPITOR) 80 MG tablet Take 1 tablet (80 mg total) by mouth every evening. 90 tablet 4    busPIRone (BUSPAR) 5 MG Tab TAKE 1 TABLET BY MOUTH TWICE A  tablet 1    calcium-vitamin D3 (OS-JOLIE 500 + D3) 500 mg-5 mcg (200 unit) per tablet Take 1 tablet by mouth 2 (two) times daily with meals.      citalopram (CELEXA) 20 MG tablet TAKE 1 TABLET BY MOUTH EVERY DAY 90 tablet 0    ketorolac 0.5% (ACULAR) 0.5 % Drop       nitroGLYCERIN (NITROSTAT) 0.4 MG SL tablet Place 1 tablet (0.4 mg total) under the tongue every 5 (five) minutes as needed for Chest pain. 30 tablet 0    nystatin (MYCOSTATIN) powder Apply to affected area 3 times daily 60 g 1    tiZANidine (ZANAFLEX) 4 MG tablet TAKE 1 TABLET (4 MG TOTAL) BY MOUTH EVERY 6 (SIX) HOURS AS NEEDED. BACK PAIN 360 tablet 0    valACYclovir (VALTREX) 1000 MG tablet TAKE 2 AT ONSET OF FEVER BLISTERS THEN REPEAT IN 12 HOURS (TOTAL 4 TABS PER EPISODE) 20 tablet 3    zinc 50 mg Tab Take by mouth.      aspirin (ECOTRIN) 81 MG EC tablet Take 1 tablet (81 mg total) by mouth once daily. (Patient taking differently: Take 81 mg  by mouth as needed. )  0    lisinopriL (PRINIVIL,ZESTRIL) 2.5 MG tablet Take 1 tablet (2.5 mg total) by mouth once daily. 30 tablet 0    methylPREDNISolone (MEDROL DOSEPACK) 4 mg tablet use as directed 1 each 0    metoprolol tartrate (LOPRESSOR) 25 MG tablet Take 1 tablet (25 mg total) by mouth 2 (two) times daily. 60 tablet 0     No current facility-administered medications for this visit.       Past Medical History:   Diagnosis Date    Allergy     Anxiety     Arthritis     CAD (coronary artery disease)     Chronic back pain     Chronic rhinitis     Hyperlipidemia     Hypertension     Seasonal allergic rhinitis 10/29/2020     Past Surgical History:   Procedure Laterality Date    BREAST SURGERY      breast reduction    coranary stent      HYSTERECTOMY      total    LEFT HEART CATHETERIZATION Left 10/16/2020    Procedure: Left heart cath;  Surgeon: Garrett Robins MD;  Location: [facility] CATH/EP LAB;  Service: Cardiology;  Laterality: Left;    OOPHORECTOMY      TOTAL REDUCTION MAMMOPLASTY Bilateral      Family History   Problem Relation Age of Onset    Cancer Mother         breast, ovarian, cervical     Heart disease Mother     Breast cancer Mother 50    Ovarian cancer Mother     Cancer Father         melanoma    Stroke Sister     Heart disease Sister     Cancer Sister         leukemia    Heart disease Brother     Heart disease Maternal Grandmother     No Known Problems Daughter     No Known Problems Son     Cancer Maternal Uncle     Cancer Paternal Aunt         breast    Breast cancer Paternal Aunt 60    Cancer Paternal Uncle     Macular degeneration Sister     Heart disease Sister      Social History     Tobacco Use    Smoking status: Former Smoker     Packs/day: 1.00     Years: 20.00     Pack years: 20.00     Types: Cigarettes     Quit date: 3/4/2017     Years since quittin.0    Smokeless tobacco: Never Used   Substance Use Topics    Alcohol use: Yes     Alcohol/week:  0.0 standard drinks     Comment: sometimes    Drug use: No        Review of Systems:  Review of Systems   Constitutional: Negative for activity change, chills and fever.   Respiratory: Negative for shortness of breath.    Cardiovascular: Negative for chest pain.   Gastrointestinal: Negative for abdominal pain, nausea and vomiting.   Genitourinary: Negative for dysuria.   Musculoskeletal: Negative for myalgias.   Skin: Positive for wound.   Neurological: Negative for weakness.   Hematological: Does not bruise/bleed easily.   Psychiatric/Behavioral: The patient is not nervous/anxious.        OBJECTIVE:     Vital Signs (Most Recent)  Temp: 97.3 °F (36.3 °C) (03/22/22 0950)  Pulse: 96 (03/22/22 0950)  BP: 118/80 (03/22/22 0950)           Physical Exam:  Physical Exam  Vitals reviewed.   Constitutional:       Appearance: She is well-developed and normal weight. She is not ill-appearing.   HENT:      Head: Normocephalic and atraumatic.   Cardiovascular:      Rate and Rhythm: Normal rate.   Pulmonary:      Effort: Pulmonary effort is normal. No respiratory distress.   Musculoskeletal:         General: Normal range of motion.   Skin:     General: Skin is warm and dry.             Comments: Circular shallow wound on left thigh at shave biopsy site.    Neurological:      Mental Status: She is alert.   Psychiatric:         Thought Content: Thought content normal.       Pathology:   LEFT THIGH, SHAVE:   - MALIGNANT MELANOMA.     CASE SUMMARY: MALIGNANT MELANOMA:   HISTOLOGIC TYPE:   Superficial spreading type   SURGICAL MARGIN STATUS (SPECIFY PERIPHERAL AND/ OR DEEP;        SPECIFY IN SITU OR INVASIVE):     Lesion extends close to a peripheral edge in these planes of section   MEASURED BRESLOW THICKNESS (ROUND TO NEAREST 0.1 MM):   0.5 mm   DERMAL MITOTIC RATE (PER SQUARE MM):   0   ULCERATION (NOT IDENTIFIED OR PRESENT):   Not identified   GROWTH PHASE (RADIAL OR VERTICAL):   Vertical   LEVEL OF INVASION (LAY'S):   III    MICROSATELLITOSIS (NOT IDENTIFIED OR PRESENT):   Not identified   LYMPHOVASCULAR INVASION (NOT IDENTIFIED OR PRESENT):   Not identified   HOST CELL REACTION:   Non-Brisk   TUMOR REGRESSION:   Present, involving less than 75% of the lesion   ASSOCIATED NEVUS (NOT IDENTIFIED OR PRESENT):   Not identified   TNM CODE:   pT1a     ASSESSMENT/PLAN:     Superficial spreading malignant melanoma of the left thigh - plan for wide excision in OR. The procedure was discussed in detail, including risks and alternatives. The patient voices understanding and all questions were answered. The patient agrees to proceed as planned.    Lesion of left hip - will punch biopsy today and pending results plan for excision in OR vs other indicated procedure based on path. The patient agrees to proceed with this plan.

## 2022-03-22 NOTE — H&P (VIEW-ONLY)
History & Physical    SUBJECTIVE:     History of Present Illness:  Patient is a 66 y.o. female presents with melanoma of the left thigh s/p shave biopsy. She also has a lesion on her left hip that she is concerned about.     Chief Complaint   Patient presents with    Cancer     skin       Review of patient's allergies indicates:   Allergen Reactions    Cephalexin      Other reaction(s): Rash    Doxycycline monohydrate Hives    Lanoxin  [digoxin]      Other reaction(s): Rash    Lansoprazole      Other reaction(s): Rash    Macrobid [nitrofurantoin monohyd/m-cryst]        Current Outpatient Medications   Medication Sig Dispense Refill    ammonium lactate 12 % Crea aaa bid prn dry skin 385 g 11    amoxicillin-clavulanate 875-125mg (AUGMENTIN) 875-125 mg per tablet Take 1 tablet by mouth 2 (two) times daily with meals. 20 tablet 0    atorvastatin (LIPITOR) 80 MG tablet Take 1 tablet (80 mg total) by mouth every evening. 90 tablet 4    busPIRone (BUSPAR) 5 MG Tab TAKE 1 TABLET BY MOUTH TWICE A  tablet 1    calcium-vitamin D3 (OS-JOLIE 500 + D3) 500 mg-5 mcg (200 unit) per tablet Take 1 tablet by mouth 2 (two) times daily with meals.      citalopram (CELEXA) 20 MG tablet TAKE 1 TABLET BY MOUTH EVERY DAY 90 tablet 0    ketorolac 0.5% (ACULAR) 0.5 % Drop       nitroGLYCERIN (NITROSTAT) 0.4 MG SL tablet Place 1 tablet (0.4 mg total) under the tongue every 5 (five) minutes as needed for Chest pain. 30 tablet 0    nystatin (MYCOSTATIN) powder Apply to affected area 3 times daily 60 g 1    tiZANidine (ZANAFLEX) 4 MG tablet TAKE 1 TABLET (4 MG TOTAL) BY MOUTH EVERY 6 (SIX) HOURS AS NEEDED. BACK PAIN 360 tablet 0    valACYclovir (VALTREX) 1000 MG tablet TAKE 2 AT ONSET OF FEVER BLISTERS THEN REPEAT IN 12 HOURS (TOTAL 4 TABS PER EPISODE) 20 tablet 3    zinc 50 mg Tab Take by mouth.      aspirin (ECOTRIN) 81 MG EC tablet Take 1 tablet (81 mg total) by mouth once daily. (Patient taking differently: Take 81 mg  by mouth as needed. )  0    lisinopriL (PRINIVIL,ZESTRIL) 2.5 MG tablet Take 1 tablet (2.5 mg total) by mouth once daily. 30 tablet 0    methylPREDNISolone (MEDROL DOSEPACK) 4 mg tablet use as directed 1 each 0    metoprolol tartrate (LOPRESSOR) 25 MG tablet Take 1 tablet (25 mg total) by mouth 2 (two) times daily. 60 tablet 0     No current facility-administered medications for this visit.       Past Medical History:   Diagnosis Date    Allergy     Anxiety     Arthritis     CAD (coronary artery disease)     Chronic back pain     Chronic rhinitis     Hyperlipidemia     Hypertension     Seasonal allergic rhinitis 10/29/2020     Past Surgical History:   Procedure Laterality Date    BREAST SURGERY      breast reduction    coranary stent      HYSTERECTOMY      total    LEFT HEART CATHETERIZATION Left 10/16/2020    Procedure: Left heart cath;  Surgeon: Garrett Robins MD;  Location[facility] CATH[facility]EP LAB;  Service: Cardiology;  Laterality: Left;    OOPHORECTOMY      TOTAL REDUCTION MAMMOPLASTY Bilateral      Family History   Problem Relation Age of Onset    Cancer Mother         breast, ovarian, cervical     Heart disease Mother     Breast cancer Mother 50    Ovarian cancer Mother     Cancer Father         melanoma    Stroke Sister     Heart disease Sister     Cancer Sister         leukemia    Heart disease Brother     Heart disease Maternal Grandmother     No Known Problems Daughter     No Known Problems Son     Cancer Maternal Uncle     Cancer Paternal Aunt         breast    Breast cancer Paternal Aunt 60    Cancer Paternal Uncle     Macular degeneration Sister     Heart disease Sister      Social History     Tobacco Use    Smoking status: Former Smoker     Packs/day: 1.00     Years: 20.00     Pack years: 20.00     Types: Cigarettes     Quit date: 3/4/2017     Years since quittin.0    Smokeless tobacco: Never Used   Substance Use Topics    Alcohol use: Yes     Alcohol/week:  0.0 standard drinks     Comment: sometimes    Drug use: No        Review of Systems:  Review of Systems   Constitutional: Negative for activity change, chills and fever.   Respiratory: Negative for shortness of breath.    Cardiovascular: Negative for chest pain.   Gastrointestinal: Negative for abdominal pain, nausea and vomiting.   Genitourinary: Negative for dysuria.   Musculoskeletal: Negative for myalgias.   Skin: Positive for wound.   Neurological: Negative for weakness.   Hematological: Does not bruise/bleed easily.   Psychiatric/Behavioral: The patient is not nervous/anxious.        OBJECTIVE:     Vital Signs (Most Recent)  Temp: 97.3 °F (36.3 °C) (03/22/22 0950)  Pulse: 96 (03/22/22 0950)  BP: 118/80 (03/22/22 0950)           Physical Exam:  Physical Exam  Vitals reviewed.   Constitutional:       Appearance: She is well-developed and normal weight. She is not ill-appearing.   HENT:      Head: Normocephalic and atraumatic.   Cardiovascular:      Rate and Rhythm: Normal rate.   Pulmonary:      Effort: Pulmonary effort is normal. No respiratory distress.   Musculoskeletal:         General: Normal range of motion.   Skin:     General: Skin is warm and dry.             Comments: Circular shallow wound on left thigh at shave biopsy site.    Neurological:      Mental Status: She is alert.   Psychiatric:         Thought Content: Thought content normal.       Pathology:   LEFT THIGH, SHAVE:   - MALIGNANT MELANOMA.     CASE SUMMARY: MALIGNANT MELANOMA:   HISTOLOGIC TYPE:   Superficial spreading type   SURGICAL MARGIN STATUS (SPECIFY PERIPHERAL AND/ OR DEEP;        SPECIFY IN SITU OR INVASIVE):     Lesion extends close to a peripheral edge in these planes of section   MEASURED BRESLOW THICKNESS (ROUND TO NEAREST 0.1 MM):   0.5 mm   DERMAL MITOTIC RATE (PER SQUARE MM):   0   ULCERATION (NOT IDENTIFIED OR PRESENT):   Not identified   GROWTH PHASE (RADIAL OR VERTICAL):   Vertical   LEVEL OF INVASION (LAY'S):   III    MICROSATELLITOSIS (NOT IDENTIFIED OR PRESENT):   Not identified   LYMPHOVASCULAR INVASION (NOT IDENTIFIED OR PRESENT):   Not identified   HOST CELL REACTION:   Non-Brisk   TUMOR REGRESSION:   Present, involving less than 75% of the lesion   ASSOCIATED NEVUS (NOT IDENTIFIED OR PRESENT):   Not identified   TNM CODE:   pT1a     ASSESSMENT/PLAN:     Superficial spreading malignant melanoma of the left thigh - plan for wide excision in OR. The procedure was discussed in detail, including risks and alternatives. The patient voices understanding and all questions were answered. The patient agrees to proceed as planned.    Lesion of left hip - will punch biopsy today and pending results plan for excision in OR vs other indicated procedure based on path. The patient agrees to proceed with this plan.

## 2022-03-29 ENCOUNTER — HOSPITAL ENCOUNTER (OUTPATIENT)
Dept: PREADMISSION TESTING | Facility: HOSPITAL | Age: 67
Discharge: HOME | End: 2022-03-29
Attending: SURGERY
Payer: MEDICARE

## 2022-03-29 VITALS
SYSTOLIC BLOOD PRESSURE: 114 MMHG | DIASTOLIC BLOOD PRESSURE: 73 MMHG | WEIGHT: 149.69 LBS | HEIGHT: 63 IN | OXYGEN SATURATION: 97 % | RESPIRATION RATE: 12 BRPM | HEART RATE: 88 BPM | BODY MASS INDEX: 26.52 KG/M2 | TEMPERATURE: 98 F

## 2022-03-29 DIAGNOSIS — Z01.818 PREOP TESTING: Primary | ICD-10-CM

## 2022-03-29 PROCEDURE — 93005 ELECTROCARDIOGRAM TRACING: CPT | Performed by: GENERAL PRACTICE

## 2022-03-29 PROCEDURE — 93010 ELECTROCARDIOGRAM REPORT: CPT | Mod: ,,, | Performed by: GENERAL PRACTICE

## 2022-03-29 PROCEDURE — 93010 EKG 12-LEAD: ICD-10-PCS | Mod: ,,, | Performed by: GENERAL PRACTICE

## 2022-03-29 NOTE — DISCHARGE INSTRUCTIONS
INSTRUCTIONS  To confirm your doctor has scheduled your surgery for:    COVID TESTING SCHEDULED:     Morning of surgery please check in with registration near Parking Garage Entrance then proceed to Outpatient Surgery Department.    Preop nurses will call the afternoon prior to surgery between 4:00 and 6:00 PM with your final arrival time.  PLEASE NOTE:  The surgery schedule has many variables which may affect the time of your surgery case. Family members should be available if your surgery time changes. Plan to be here the day of your procedure between 4-6 hours.    TAKE ONLY THESE MEDICATIONS WITH A SMALL SIP OF WATER THE MORNING OF SURGERY: see list    DO NOT TAKE THESE MEDICATIONS 5-7 DAYS PRIOR to your procedure per your surgeon's request: ASPIRIN, ALEVE, BC powder, HARSH SELTZER, IBUPROFEN, FISH OIL, VITAMIN E, OR HERBALS   (May take Tylenol)    If you are prescribed any types of blood thinners (Aspirin, Coumadin, Plavix, Pradaxa, Xarelto, Aggrenox, Effient, Eliquis, Savasya, Brilinta or any other), please ask your surgeon how many days before scheduled procedure should you stop taking them. You may also need to verify with prescribing physician if it is OK to stop your blood thinners.      INSTRUCTIONS IMPORTANT!!  Do not eat or drink anything after midnight.  ONLY if you are diabetic, check your sugar in the morning before your procedure.  Do not smoke, vape or drink alcoholic beverages 24 hours prior to your procedure.  Shower the night before AND the morning of your procedure with a Chlorhexidine wash such as hibiclens or Dial antibacterial soap from neck down. You may use your own shampoo and face wash. This helps your skin to be as bacteria free as possible.  If you wear contact lenses, dentures, hearing aids or glasses, bring a container to put them in and give to a family member.    Please leave all jewelry, piercings and valuables at home.  DO NOT remove hair from the surgery site.   If your condition  changes such as fever, cough, etc, please notify your surgeon.   ONLY if you have been diagnosed with sleep apnea please bring your C-PAP machine.  ONLY if you wear home oxygen please bring your portable oxygen tank the day of your procedure.   ONLY for patients requiring bowel prep, written instructions will be given by your doctor's office.  ONLY if you have a neuro stimulator, please bring the controller with you the morning of surgery  Make arrangements in advance for transportation home by a responsible adult.  You must make arrangements for transportation, TAXI'S, UBER'S OR LYFTS ARE NOT ALLOWED.        If you have any questions about these instructions, call Pre-Op Admit  Nursing at [phone] or the Pre-Op Day Surgery Unit at [phone].

## 2022-03-30 ENCOUNTER — ANESTHESIA (OUTPATIENT)
Dept: SURGERY | Facility: HOSPITAL | Age: 67
End: 2022-03-30
Payer: MEDICARE

## 2022-03-30 ENCOUNTER — HOSPITAL ENCOUNTER (OUTPATIENT)
Facility: HOSPITAL | Age: 67
Discharge: HOME | End: 2022-03-30
Attending: SURGERY | Admitting: SURGERY
Payer: MEDICARE

## 2022-03-30 ENCOUNTER — ANESTHESIA EVENT (OUTPATIENT)
Dept: SURGERY | Facility: HOSPITAL | Age: 67
End: 2022-03-30
Payer: MEDICARE

## 2022-03-30 VITALS
SYSTOLIC BLOOD PRESSURE: 98 MMHG | RESPIRATION RATE: 16 BRPM | DIASTOLIC BLOOD PRESSURE: 65 MMHG | HEART RATE: 68 BPM | OXYGEN SATURATION: 96 % | TEMPERATURE: 98 F

## 2022-03-30 DIAGNOSIS — C43.72 MALIGNANT MELANOMA OF SKIN OF LEFT LEG: Primary | ICD-10-CM

## 2022-03-30 LAB — SARS-COV-2 RDRP RESP QL NAA+PROBE: NEGATIVE

## 2022-03-30 PROCEDURE — 71000033 HC RECOVERY, INTIAL HOUR: Performed by: SURGERY

## 2022-03-30 PROCEDURE — 11606 PR EXC SKIN MALIG >4 CM TRUNK,ARM,LEG: ICD-10-PCS | Mod: ,,, | Performed by: SURGERY

## 2022-03-30 PROCEDURE — 25000003 PHARM REV CODE 250: Performed by: STUDENT IN AN ORGANIZED HEALTH CARE EDUCATION/TRAINING PROGRAM

## 2022-03-30 PROCEDURE — 25000003 PHARM REV CODE 250: Performed by: SURGERY

## 2022-03-30 PROCEDURE — 11606 EXC TR-EXT MAL+MARG >4 CM: CPT | Mod: ,,, | Performed by: SURGERY

## 2022-03-30 PROCEDURE — 36000706: Performed by: SURGERY

## 2022-03-30 PROCEDURE — 63600175 PHARM REV CODE 636 W HCPCS: Performed by: STUDENT IN AN ORGANIZED HEALTH CARE EDUCATION/TRAINING PROGRAM

## 2022-03-30 PROCEDURE — 25000003 PHARM REV CODE 250: Performed by: PHYSICIAN ASSISTANT

## 2022-03-30 PROCEDURE — 71000016 HC POSTOP RECOV ADDL HR: Performed by: SURGERY

## 2022-03-30 PROCEDURE — 71000015 HC POSTOP RECOV 1ST HR: Performed by: SURGERY

## 2022-03-30 PROCEDURE — U0002 COVID-19 LAB TEST NON-CDC: HCPCS | Performed by: SURGERY

## 2022-03-30 PROCEDURE — 36000707: Performed by: SURGERY

## 2022-03-30 PROCEDURE — 37000009 HC ANESTHESIA EA ADD 15 MINS: Performed by: SURGERY

## 2022-03-30 PROCEDURE — 63600175 PHARM REV CODE 636 W HCPCS: Performed by: NURSE ANESTHETIST, CERTIFIED REGISTERED

## 2022-03-30 PROCEDURE — 25000003 PHARM REV CODE 250: Performed by: NURSE ANESTHETIST, CERTIFIED REGISTERED

## 2022-03-30 PROCEDURE — 71000039 HC RECOVERY, EACH ADD'L HOUR: Performed by: SURGERY

## 2022-03-30 PROCEDURE — 37000008 HC ANESTHESIA 1ST 15 MINUTES: Performed by: SURGERY

## 2022-03-30 PROCEDURE — 88305 TISSUE EXAM BY PATHOLOGIST: CPT | Mod: TC,59

## 2022-03-30 RX ORDER — HYDROMORPHONE HYDROCHLORIDE 1 MG/ML
0.2 INJECTION, SOLUTION INTRAMUSCULAR; INTRAVENOUS; SUBCUTANEOUS EVERY 5 MIN PRN
Status: DISCONTINUED | OUTPATIENT
Start: 2022-03-30 | End: 2022-03-30 | Stop reason: HOSPADM

## 2022-03-30 RX ORDER — LIDOCAINE HYDROCHLORIDE 20 MG/ML
INJECTION, SOLUTION EPIDURAL; INFILTRATION; INTRACAUDAL; PERINEURAL
Status: DISCONTINUED | OUTPATIENT
Start: 2022-03-30 | End: 2022-03-30

## 2022-03-30 RX ORDER — SODIUM CHLORIDE 9 MG/ML
INJECTION, SOLUTION INTRAVENOUS CONTINUOUS
Status: DISCONTINUED | OUTPATIENT
Start: 2022-03-30 | End: 2022-03-30 | Stop reason: HOSPADM

## 2022-03-30 RX ORDER — BUPIVACAINE HYDROCHLORIDE AND EPINEPHRINE 2.5; 5 MG/ML; UG/ML
INJECTION, SOLUTION EPIDURAL; INFILTRATION; INTRACAUDAL; PERINEURAL
Status: DISCONTINUED | OUTPATIENT
Start: 2022-03-30 | End: 2022-03-30 | Stop reason: HOSPADM

## 2022-03-30 RX ORDER — ROCURONIUM BROMIDE 10 MG/ML
INJECTION, SOLUTION INTRAVENOUS
Status: DISCONTINUED | OUTPATIENT
Start: 2022-03-30 | End: 2022-03-30

## 2022-03-30 RX ORDER — DIPHENHYDRAMINE HYDROCHLORIDE 50 MG/ML
INJECTION INTRAMUSCULAR; INTRAVENOUS
Status: DISCONTINUED | OUTPATIENT
Start: 2022-03-30 | End: 2022-03-30

## 2022-03-30 RX ORDER — ONDANSETRON 2 MG/ML
4 INJECTION INTRAMUSCULAR; INTRAVENOUS DAILY PRN
Status: DISCONTINUED | OUTPATIENT
Start: 2022-03-30 | End: 2022-03-30 | Stop reason: HOSPADM

## 2022-03-30 RX ORDER — SUCCINYLCHOLINE CHLORIDE 20 MG/ML
INJECTION INTRAMUSCULAR; INTRAVENOUS
Status: DISCONTINUED | OUTPATIENT
Start: 2022-03-30 | End: 2022-03-30

## 2022-03-30 RX ORDER — HYDROCODONE BITARTRATE AND ACETAMINOPHEN 5; 325 MG/1; MG/1
1 TABLET ORAL EVERY 6 HOURS PRN
Qty: 25 TABLET | Refills: 0 | Status: SHIPPED | OUTPATIENT
Start: 2022-03-30 | End: 2022-08-24

## 2022-03-30 RX ORDER — OXYCODONE HYDROCHLORIDE 5 MG/1
5 TABLET ORAL EVERY 4 HOURS PRN
Status: DISCONTINUED | OUTPATIENT
Start: 2022-03-30 | End: 2022-03-30 | Stop reason: HOSPADM

## 2022-03-30 RX ORDER — OXYCODONE HYDROCHLORIDE 5 MG/1
5 TABLET ORAL
Status: DISCONTINUED | OUTPATIENT
Start: 2022-03-30 | End: 2022-03-30 | Stop reason: HOSPADM

## 2022-03-30 RX ORDER — ONDANSETRON 4 MG/1
4 TABLET, ORALLY DISINTEGRATING ORAL ONCE AS NEEDED
Status: DISCONTINUED | OUTPATIENT
Start: 2022-03-30 | End: 2022-03-30 | Stop reason: HOSPADM

## 2022-03-30 RX ORDER — ACETAMINOPHEN 10 MG/ML
INJECTION, SOLUTION INTRAVENOUS
Status: DISCONTINUED | OUTPATIENT
Start: 2022-03-30 | End: 2022-03-30

## 2022-03-30 RX ORDER — ONDANSETRON 2 MG/ML
INJECTION INTRAMUSCULAR; INTRAVENOUS
Status: DISCONTINUED | OUTPATIENT
Start: 2022-03-30 | End: 2022-03-30

## 2022-03-30 RX ORDER — CLINDAMYCIN PHOSPHATE 900 MG/50ML
900 INJECTION, SOLUTION INTRAVENOUS
Status: DISCONTINUED | OUTPATIENT
Start: 2022-03-30 | End: 2022-03-30 | Stop reason: HOSPADM

## 2022-03-30 RX ORDER — SODIUM CHLORIDE 0.9 % (FLUSH) 0.9 %
10 SYRINGE (ML) INJECTION
Status: DISCONTINUED | OUTPATIENT
Start: 2022-03-30 | End: 2022-03-30 | Stop reason: HOSPADM

## 2022-03-30 RX ORDER — MEPERIDINE HYDROCHLORIDE 50 MG/ML
12.5 INJECTION INTRAMUSCULAR; INTRAVENOUS; SUBCUTANEOUS EVERY 10 MIN PRN
Status: DISCONTINUED | OUTPATIENT
Start: 2022-03-30 | End: 2022-03-30 | Stop reason: HOSPADM

## 2022-03-30 RX ORDER — DIPHENHYDRAMINE HYDROCHLORIDE 50 MG/ML
12.5 INJECTION INTRAMUSCULAR; INTRAVENOUS
Status: DISCONTINUED | OUTPATIENT
Start: 2022-03-30 | End: 2022-03-30 | Stop reason: HOSPADM

## 2022-03-30 RX ORDER — PHENYLEPHRINE HYDROCHLORIDE 10 MG/ML
INJECTION INTRAVENOUS
Status: DISCONTINUED | OUTPATIENT
Start: 2022-03-30 | End: 2022-03-30

## 2022-03-30 RX ORDER — FENTANYL CITRATE 50 UG/ML
INJECTION, SOLUTION INTRAMUSCULAR; INTRAVENOUS
Status: DISCONTINUED | OUTPATIENT
Start: 2022-03-30 | End: 2022-03-30

## 2022-03-30 RX ORDER — PROPOFOL 10 MG/ML
VIAL (ML) INTRAVENOUS
Status: DISCONTINUED | OUTPATIENT
Start: 2022-03-30 | End: 2022-03-30

## 2022-03-30 RX ORDER — FAMOTIDINE 10 MG/ML
INJECTION INTRAVENOUS
Status: DISCONTINUED | OUTPATIENT
Start: 2022-03-30 | End: 2022-03-30

## 2022-03-30 RX ORDER — MIDAZOLAM HYDROCHLORIDE 1 MG/ML
INJECTION INTRAMUSCULAR; INTRAVENOUS
Status: DISCONTINUED | OUTPATIENT
Start: 2022-03-30 | End: 2022-03-30

## 2022-03-30 RX ORDER — EPHEDRINE SULFATE 50 MG/ML
INJECTION, SOLUTION INTRAVENOUS
Status: DISCONTINUED | OUTPATIENT
Start: 2022-03-30 | End: 2022-03-30

## 2022-03-30 RX ADMIN — SODIUM CHLORIDE: 0.9 INJECTION, SOLUTION INTRAVENOUS at 06:03

## 2022-03-30 RX ADMIN — SUGAMMADEX 200 MG: 100 INJECTION, SOLUTION INTRAVENOUS at 08:03

## 2022-03-30 RX ADMIN — EPHEDRINE SULFATE 10 MG: 50 INJECTION INTRAVENOUS at 08:03

## 2022-03-30 RX ADMIN — MEPERIDINE HYDROCHLORIDE 12.5 MG: 50 INJECTION INTRAMUSCULAR; INTRAVENOUS; SUBCUTANEOUS at 09:03

## 2022-03-30 RX ADMIN — ROCURONIUM BROMIDE 10 MG: 10 INJECTION, SOLUTION INTRAVENOUS at 07:03

## 2022-03-30 RX ADMIN — ROCURONIUM BROMIDE 40 MG: 10 INJECTION, SOLUTION INTRAVENOUS at 07:03

## 2022-03-30 RX ADMIN — PHENYLEPHRINE HYDROCHLORIDE 100 MCG: 10 INJECTION INTRAVENOUS at 07:03

## 2022-03-30 RX ADMIN — ONDANSETRON 4 MG: 2 INJECTION INTRAMUSCULAR; INTRAVENOUS at 07:03

## 2022-03-30 RX ADMIN — PROPOFOL 100 MG: 10 INJECTION, EMULSION INTRAVENOUS at 07:03

## 2022-03-30 RX ADMIN — PHENYLEPHRINE HYDROCHLORIDE 100 MCG: 10 INJECTION INTRAVENOUS at 08:03

## 2022-03-30 RX ADMIN — DIPHENHYDRAMINE HYDROCHLORIDE 6.25 MG: 50 INJECTION, SOLUTION INTRAMUSCULAR; INTRAVENOUS at 07:03

## 2022-03-30 RX ADMIN — DEXTROSE 2 G: 50 INJECTION, SOLUTION INTRAVENOUS at 07:03

## 2022-03-30 RX ADMIN — SODIUM CHLORIDE, SODIUM LACTATE, POTASSIUM CHLORIDE, AND CALCIUM CHLORIDE: .6; .31; .03; .02 INJECTION, SOLUTION INTRAVENOUS at 07:03

## 2022-03-30 RX ADMIN — FAMOTIDINE 20 MG: 10 INJECTION, SOLUTION INTRAVENOUS at 07:03

## 2022-03-30 RX ADMIN — SUCCINYLCHOLINE CHLORIDE 120 MG: 20 INJECTION, SOLUTION INTRAMUSCULAR; INTRAVENOUS at 07:03

## 2022-03-30 RX ADMIN — LIDOCAINE HYDROCHLORIDE 60 MG: 20 INJECTION, SOLUTION INTRAVENOUS at 07:03

## 2022-03-30 RX ADMIN — OXYCODONE HYDROCHLORIDE 5 MG: 5 TABLET ORAL at 09:03

## 2022-03-30 RX ADMIN — FENTANYL CITRATE 100 MCG: 50 INJECTION INTRAMUSCULAR; INTRAVENOUS at 07:03

## 2022-03-30 RX ADMIN — ACETAMINOPHEN 1000 MG: 10 INJECTION, SOLUTION INTRAVENOUS at 07:03

## 2022-03-30 RX ADMIN — MIDAZOLAM HYDROCHLORIDE 2 MG: 1 INJECTION, SOLUTION INTRAMUSCULAR; INTRAVENOUS at 07:03

## 2022-03-30 NOTE — ANESTHESIA PROCEDURE NOTES
Intubation    Date/Time: 3/30/2022 7:35 AM  Performed by: Cheo Lai CRNA  Authorized by: Pasha Dickson MD     Intubation:     Induction:  Intravenous    Intubated:  Postinduction    Mask Ventilation:  Easy mask    Attempts:  1    Attempted By:  CRNA    Method of Intubation:  Video laryngoscopy    Blade:  Sanders 3    Laryngeal View Grade: Grade I - full view of cords      Difficult Airway Encountered?: No      Complications:  None    Airway Device:  Oral endotracheal tube    Airway Device Size:  7.5    Style/Cuff Inflation:  Cuffed (inflated to minimal occlusive pressure)    Tube secured:  21    Secured at:  The lips    Placement Verified By:  Capnometry and Revisualization with laryngoscopy    Complicating Factors:  None    Findings Post-Intubation:  BS equal bilateral

## 2022-03-30 NOTE — DISCHARGE INSTRUCTIONS
For 24 hours:  Do not shower  Do not sign any legal documents  Do not drink alcohol  No driving

## 2022-03-30 NOTE — PLAN OF CARE
1150 Pt states minimal pain at present. Denies any other c/o or concerns. Left via w/c in care of spouse.

## 2022-03-30 NOTE — TRANSFER OF CARE
Anesthesia Transfer of Care Note    Patient: Aysha Moore    Procedure(s) Performed: Procedure(s) (LRB):  EXCISION, MELANOMA (THIGH) (Left)  EXCISIONAL BIOPSY (HIP) (Left)    Patient location: PACU    Anesthesia Type: general    Transport from OR: Transported from OR on room air with adequate spontaneous ventilation    Post pain: adequate analgesia    Post assessment: no apparent anesthetic complications    Post vital signs: stable    Level of consciousness: awake and alert    Nausea/Vomiting: no nausea/vomiting    Complications: none    Transfer of care protocol was followed      Last vitals:   Visit Vitals  /87   Pulse 91   Temp 36.6 °C (97.9 °F) (Oral)   Resp 16   SpO2 96%   Breastfeeding No

## 2022-03-30 NOTE — ANESTHESIA PREPROCEDURE EVALUATION
03/30/2022  Aysha Moore is a 66 y.o., female.        Patient Active Problem List   Diagnosis    Hyperlipidemia    CAD (coronary artery disease)    Anxiety    History of heart artery stent    Pulmonary emphysema    Prediabetes    Tear of medial meniscus of right knee, current    Chronic bilateral low back pain with left-sided sciatica    BMI 24.0-24.9, adult    Personal history of tobacco use, presenting hazards to health    Chest pain    Acute ST elevation myocardial infarction (STEMI) of inferior wall    Seasonal allergic rhinitis       Past Surgical History:   Procedure Laterality Date    BREAST SURGERY      breast reduction    CATARACT EXTRACTION, BILATERAL      coranary stent      HYSTERECTOMY      total    LEFT HEART CATHETERIZATION Left 10/16/2020    Procedure: Left heart cath;  Surgeon: Garrett Robins MD;  Location: [facility] CATH/EP LAB;  Service: Cardiology;  Laterality: Left;    OOPHORECTOMY      TOTAL REDUCTION MAMMOPLASTY Bilateral 2000        Tobacco Use:  The patient  reports that she quit smoking about 5 years ago. Her smoking use included cigarettes. She started smoking about 48 years ago. She has a 20.00 pack-year smoking history. She has never used smokeless tobacco.     Results for orders placed or performed during the hospital encounter of 03/29/22   EKG 12-lead    Collection Time: 03/29/22  1:10 PM    Narrative    Test Reason : Z01.818,    Vent. Rate : 072 BPM     Atrial Rate : 072 BPM     P-R Int : 154 ms          QRS Dur : 102 ms      QT Int : 398 ms       P-R-T Axes : 056 069 069 degrees     QTc Int : 435 ms    Normal sinus rhythm  Low voltage QRS  Nonspecific T wave abnormality  Abnormal ECG  When compared with ECG of 17-OCT-2020 02:18,  ST no longer depressed in Anterior leads  Nonspecific T wave abnormality has replaced inverted T waves in  Inferior  leads  Nonspecific T wave abnormality has replaced inverted T waves in   Anterior-lateral leads    Referred By:  NATALIIA           Confirmed By:              Lab Results   Component Value Date    WBC 4.19 03/29/2022    HGB 13.5 03/29/2022    HCT 40.2 03/29/2022    MCV 90 03/29/2022     03/29/2022     BMP  Lab Results   Component Value Date     03/29/2022    K 4.0 03/29/2022     03/29/2022    CO2 28 03/29/2022    BUN 11 03/29/2022    CREATININE 0.7 03/29/2022    CALCIUM 9.1 03/29/2022    ANIONGAP 7 (L) 03/29/2022    GLU 87 03/29/2022    GLU 93 01/12/2022    GLU 91 05/07/2021       Results for orders placed during the hospital encounter of 10/16/20    Echo Color Flow Doppler? Yes    Interpretation Summary  · There is left ventricular concentric remodeling.  · The left ventricle is normal in sizeThe estimated ejection fraction is 46%.  · Grade I diastolic dysfunction.  · Plaque present in the aortic root (sinus).  · Normal central venous pressure (3 mmHg).  · The estimated PA systolic pressure is 27 mmHg.  · There are segmental left ventricular wall motion abnormalities.             Pre-op Assessment    I have reviewed the Patient Summary Reports.     I have reviewed the Nursing Notes. I have reviewed the NPO Status.   I have reviewed the Medications.     Review of Systems  Anesthesia Hx:  No problems with previous Anesthesia  Denies Family Hx of Anesthesia complications.   Denies Personal Hx of Anesthesia complications.   Social:  Non-Smoker    EENT/Dental:   chronic allergic rhinitis   Cardiovascular:   Exercise tolerance: good Hypertension Past MI CAD  CABG/stent (PCI x 3; most recent in 2018)  DAILY ECG has been reviewed. > 4 METS   Pulmonary:   COPD (No symptoms ), mild Shortness of breath    Renal/:  Renal/ Normal     Musculoskeletal:  Musculoskeletal Normal    Neurological:   Neuromuscular Disease,    Endocrine:  Endocrine Normal    Psych:  Psychiatric Normal           Physical  Exam  General: Well nourished    Airway:  Mallampati: II   Mouth Opening: Normal  Tongue: Normal  Neck ROM: Normal ROM    Dental:  Intact    Chest/Lungs:  Clear to auscultation, Normal Respiratory Rate    Heart:  Rate: Normal  Rhythm: Regular Rhythm  Sounds: Normal        Anesthesia Plan  Type of Anesthesia, risks & benefits discussed:    Anesthesia Type: Gen ETT  Intra-op Monitoring Plan: Standard ASA Monitors  Post Op Pain Control Plan: multimodal analgesia  Informed Consent: Informed consent signed with the Patient and all parties understand the risks and agree with anesthesia plan.  All questions answered.   ASA Score: 2  Anesthesia Plan Notes: GETA  Ofirmev 1000mg  Zofran  Pepcid     Ready For Surgery From Anesthesia Perspective.     .

## 2022-03-30 NOTE — ANESTHESIA POSTPROCEDURE EVALUATION
Anesthesia Post Evaluation    Patient: Aysha Moore    Procedure(s) Performed: Procedure(s) (LRB):  EXCISION, MELANOMA (THIGH) (Left)  EXCISIONAL BIOPSY (HIP) (Left)    Final Anesthesia Type: general      Patient location during evaluation: PACU  Patient participation: Yes- Able to Participate  Level of consciousness: awake and alert, oriented and awake  Post-procedure vital signs: reviewed and stable  Pain management: adequate  Airway patency: patent    PONV status at discharge: No PONV  Anesthetic complications: no      Cardiovascular status: blood pressure returned to baseline, hemodynamically stable and stable  Respiratory status: unassisted, spontaneous ventilation and room air  Hydration status: euvolemic  Follow-up not needed.          Vitals Value Taken Time   /55 03/30/22 1000   Temp 36.4 °C (97.5 °F) 03/30/22 1000   Pulse 76 03/30/22 1000   Resp 19 03/30/22 1000   SpO2 95 % 03/30/22 1000         No case tracking events are documented in the log.      Pain/Faisal Score: Pain Rating Prior to Med Admin: 3 (3/30/2022  9:34 AM)  Faisal Score: 9 (3/30/2022  9:45 AM)

## 2022-03-30 NOTE — INTERVAL H&P NOTE
The patient has been examined and the H&P has been reviewed:    I concur with the findings and changes have been noted since the H&P was written: 0.5 mm Breslow thickness. Punch biopsy of the left hip lesion is benign - seborrheic keratosis. Will proceed with WLE     Surgery risks, benefits and alternative options discussed and understood by patient/family.          Active Hospital Problems    Diagnosis  POA    *Malignant melanoma of skin of left leg [C43.72]  Yes      Resolved Hospital Problems   No resolved problems to display.

## 2022-03-30 NOTE — INTERVAL H&P NOTE
The patient has been examined and the H&P has been reviewed:    I concur with the findings and changes have been noted since the H&P was written: The left lower abdominal lesion is benign seborrheic keratosis    Surgery risks, benefits and alternative options discussed and understood by patient/family.          There are no hospital problems to display for this patient.

## 2022-03-31 NOTE — OP NOTE
Surgery Date: 3/30/2022     Surgeon(s) and Role:     * David Mcpherson III, MD - Primary    Assisting Surgeon: None    Pre-op Diagnosis:  Malignant melanoma of skin of left leg [C43.72] T1a    Post-op Diagnosis:  Post-Op Diagnosis Codes:     * Malignant melanoma of skin of left leg [C43.72] T1a    Procedure(s) (LRB):  Wide local excision of malignant melanoma from left lateral thigh    Anesthesia: General    Operative Findings:  Patient diagnosed with a malignant melanoma of the left lateral thigh via shave biopsy.  Breslow thickness is  0.5 mm.  There is no ulceration.    Patient brought the operating room transferred the operating table supine position.  She was given general anesthesia intubated.  She was put on her right side left side up.  The left thigh was prepped and draped.  I measured 1 cm on each side of the edges of the biopsy.  A large elliptical incision was made around the melanoma staying close to the lateral and medial 1 cm margins.  The cut tissue involved the skin subcutaneous tissue down to the muscle fascia.  This ellipse of specimen was excised and sent for permanent specimen.  It was marked at the superior and lateral margins.  Hemostasis was obtained.  This resulted in a 10 by 3-4 cm wound.  The incision was closed with interrupted nylon suture.  Incision was bandaged.  She was put back in supine position and extubated.  She was brought to recovery room stable condition.  Complications none.  Instrument counts correct.    Estimated Blood Loss: 10 mL  Estimated Blood Loss has been documented.         Specimens:   Specimen (24h ago, onward)            None          [id number]

## 2022-03-31 NOTE — BRIEF OP NOTE
[facility]  Brief Operative Note    SUMMARY     Surgery Date: 3/30/2022     Surgeon(s) and Role:     * David Mcpherson III, MD - Primary    Assisting Surgeon: None    Pre-op Diagnosis:  Malignant melanoma of skin of left leg [C43.72]    Post-op Diagnosis:  Post-Op Diagnosis Codes:     * Malignant melanoma of skin of left leg [C43.72]    Procedure(s) (LRB):  Wide local excision of malignant melanoma from left lateral thigh    Anesthesia: General    Operative Findings:  Patient diagnosed with a malignant melanoma of the left lateral thigh via shave biopsy.  Breslow thickness is  0.5 mm.  There is no ulceration.    Patient brought the operating room transferred the operating table supine position.  She was given general anesthesia intubated.  She was put on her right side left side up.  The left thigh was prepped and draped.  I measured 1 cm on each side of the edges of the biopsy.  A large elliptical incision was made around the melanoma staying close to the lateral and medial 1 cm margins.  The cut tissue involved the skin subcutaneous tissue down to the muscle fascia.  This ellipse of specimen was excised and sent for permanent specimen.  It was marked at the superior and lateral margins.  Hemostasis was obtained.  This resulted in a 10 by 3-4 cm wound.  The incision was closed with interrupted nylon suture.  Incision was bandaged.  She was put back in supine position and extubated.  She was brought to recovery room stable condition.  Complications none.  Instrument counts correct.    Estimated Blood Loss: 10 mL  Estimated Blood Loss has been documented.         Specimens:   Specimen (24h ago, onward)            None          [id number]

## 2022-04-05 DIAGNOSIS — M54.42 CHRONIC BILATERAL LOW BACK PAIN WITH LEFT-SIDED SCIATICA: ICD-10-CM

## 2022-04-05 DIAGNOSIS — F41.9 ANXIETY: ICD-10-CM

## 2022-04-05 DIAGNOSIS — G89.29 CHRONIC BILATERAL LOW BACK PAIN WITH LEFT-SIDED SCIATICA: ICD-10-CM

## 2022-04-05 RX ORDER — TIZANIDINE 4 MG/1
4 TABLET ORAL EVERY 6 HOURS PRN
Qty: 360 TABLET | Refills: 0 | Status: SHIPPED | OUTPATIENT
Start: 2022-04-05 | End: 2022-07-05

## 2022-04-05 RX ORDER — CITALOPRAM 20 MG/1
TABLET, FILM COATED ORAL
Qty: 90 TABLET | Refills: 0 | Status: SHIPPED | OUTPATIENT
Start: 2022-04-05 | End: 2022-07-05

## 2022-04-05 NOTE — TELEPHONE ENCOUNTER
No new care gaps identified.  Powered by [employer]. Reference number: [id number].   4/05/2022 12:06:41 AM CDT

## 2022-04-05 NOTE — TELEPHONE ENCOUNTER
No new care gaps identified.  Powered by [employer]. Reference number: [id number].   4/05/2022 12:06:11 AM CDT

## 2022-04-05 NOTE — TELEPHONE ENCOUNTER
No new care gaps identified.  Powered by [employer]. Reference number: [id number].   4/05/2022 12:10:58 AM CDT

## 2022-04-07 ENCOUNTER — OFFICE VISIT (OUTPATIENT)
Dept: SURGERY | Facility: CLINIC | Age: 67
End: 2022-04-07
Payer: MEDICARE

## 2022-04-07 VITALS — TEMPERATURE: 98 F

## 2022-04-07 DIAGNOSIS — Z98.890 POST-OPERATIVE STATE: Primary | ICD-10-CM

## 2022-04-07 PROCEDURE — 99024 PR POST-OP FOLLOW-UP VISIT: ICD-10-PCS | Mod: S$GLB,POP,, | Performed by: PHYSICIAN ASSISTANT

## 2022-04-07 PROCEDURE — 99024 POSTOP FOLLOW-UP VISIT: CPT | Mod: S$GLB,POP,, | Performed by: PHYSICIAN ASSISTANT

## 2022-04-07 NOTE — PROGRESS NOTES
"Aysha Moore is status post excision of melanoma from left leg and additional lesion from left hip and presents today for follow-up care.  She has the following systemic symptoms or complaints: no complaints.     PE: Incisions clean, dry, and intact with suture. No signs of infection.    Pathology: reviewed with patient -   1. SKIN, LEFT LATERAL MALIGNANT MELANOMA WIDE EXCISION:   - BIOPSY SITE CHANGES.   - NO RESIDUAL IN SITU OR INVASIVE MELANOMA IS IDENTIFIED.     2. SKIN, DESIGNATED AS "SEBORRHEIC KERATOSIS":   - SEBORRHEIC KERATOSIS.     A/P:  Normal post-operative course.    The patient is instructed to avoid heavy lifting until 2 weeks after the surgery date.  Return to clinic as scheduled for suture removal    "

## 2022-04-08 ENCOUNTER — TELEPHONE (OUTPATIENT)
Dept: CARDIOLOGY | Facility: CLINIC | Age: 67
End: 2022-04-08
Payer: MEDICARE

## 2022-04-08 NOTE — TELEPHONE ENCOUNTER
----- Message from James Munguia sent at 4/8/2022  3:36 PM CDT -----  Regarding: advice  Contact: self  Type: Needs Medical Advice  Who Called:  self  Symptoms (please be specific):    How long has patient had these symptoms:    Pharmacy name and phone #:    Best Call Back Number:   Additional Information: Patient requesting a doctor's excuse for jury duty. Please e mail to [email or web address]

## 2022-04-18 ENCOUNTER — OFFICE VISIT (OUTPATIENT)
Dept: CARDIOLOGY | Facility: CLINIC | Age: 67
End: 2022-04-18
Payer: MEDICARE

## 2022-04-18 VITALS
OXYGEN SATURATION: 99 % | BODY MASS INDEX: 26.22 KG/M2 | HEIGHT: 63 IN | WEIGHT: 148 LBS | SYSTOLIC BLOOD PRESSURE: 138 MMHG | RESPIRATION RATE: 16 BRPM | DIASTOLIC BLOOD PRESSURE: 80 MMHG | HEART RATE: 78 BPM

## 2022-04-18 DIAGNOSIS — Z95.5 HISTORY OF HEART ARTERY STENT: ICD-10-CM

## 2022-04-18 DIAGNOSIS — I25.10 CORONARY ARTERY DISEASE, UNSPECIFIED VESSEL OR LESION TYPE, UNSPECIFIED WHETHER ANGINA PRESENT, UNSPECIFIED WHETHER NATIVE OR TRANSPLANTED HEART: Primary | ICD-10-CM

## 2022-04-18 DIAGNOSIS — M54.42 CHRONIC BILATERAL LOW BACK PAIN WITH LEFT-SIDED SCIATICA: ICD-10-CM

## 2022-04-18 DIAGNOSIS — C43.72 MALIGNANT MELANOMA OF SKIN OF LEFT LEG: ICD-10-CM

## 2022-04-18 DIAGNOSIS — E78.00 PURE HYPERCHOLESTEROLEMIA: ICD-10-CM

## 2022-04-18 DIAGNOSIS — G89.29 CHRONIC BILATERAL LOW BACK PAIN WITH LEFT-SIDED SCIATICA: ICD-10-CM

## 2022-04-18 PROCEDURE — 99214 PR OFFICE/OUTPT VISIT, EST, LEVL IV, 30-39 MIN: ICD-10-PCS | Mod: S$GLB,,, | Performed by: INTERNAL MEDICINE

## 2022-04-18 PROCEDURE — 93000 EKG 12-LEAD: ICD-10-PCS | Mod: S$GLB,,, | Performed by: INTERNAL MEDICINE

## 2022-04-18 PROCEDURE — 99214 OFFICE O/P EST MOD 30 MIN: CPT | Mod: S$GLB,,, | Performed by: INTERNAL MEDICINE

## 2022-04-18 PROCEDURE — 93000 ELECTROCARDIOGRAM COMPLETE: CPT | Mod: S$GLB,,, | Performed by: INTERNAL MEDICINE

## 2022-04-18 RX ORDER — EZETIMIBE 10 MG/1
10 TABLET ORAL DAILY
Qty: 90 TABLET | Refills: 3 | Status: SHIPPED | OUTPATIENT
Start: 2022-04-18 | End: 2023-02-08

## 2022-04-18 NOTE — ASSESSMENT & PLAN NOTE
No recent anginal symptoms are noted.  She has some residual bronchospasm at this time continue on risk factor modification maintain on low doses of aspirin at 81 mg daily, maintain lipid management as mentioned above and continue low-dose ofmetoprolol 25 mg she is currently taking once a day may consider changing this to Toprol-XL for longer-acting properties.

## 2022-04-18 NOTE — PROGRESS NOTES
Subjective:    Patient ID:  Aysha Moore is a 66 y.o. female patient here for evaluation Establish Care (Previous Dr. Simon patient)      History of Present Illness:    Patient is a 66-year-old lady with history of known coronary artery disease and previous myocardial infarction had 2 stents placed and repeated are revascularization was performed about 2020 or so.  Since then she has been doing fairly well denies having episodes of angina are she does have some shortness of breath she attributes to her pulmonary condition no arm neck or jaw pain noted she does have some back problems with sciatica and are that limits her physical activity but no swelling in the lower extremities noted.  From a cardiac perspective no palpitations and no symptoms of worsening heart failure noted.          Review of patient's allergies indicates:   Allergen Reactions    Cephalexin      Other reaction(s): Rash    Doxycycline monohydrate Hives    Lanoxin  [digoxin]      Other reaction(s): Rash    Lansoprazole      Other reaction(s): Rash    Macrobid [nitrofurantoin monohyd/m-cryst]        Past Medical History:   Diagnosis Date    Allergy     Anxiety     Arthritis     CAD (coronary artery disease)     coronary stents    Chronic back pain     lower back pain radiates to left leg    Chronic rhinitis     DAILY (dyspnea on exertion)     Hyperlipidemia     Hypertension     Melanoma in situ of left upper extremity     left thigh area    MI (myocardial infarction)     Seasonal allergic rhinitis 10/29/2020     Past Surgical History:   Procedure Laterality Date    BREAST SURGERY      breast reduction    CATARACT EXTRACTION, BILATERAL      coranary stent      EXCISION OF MELANOMA Left 3/30/2022    Procedure: EXCISION, MELANOMA;  Surgeon: David Mcpherson III, MD;  [facility];  Service: General;  Laterality: Left;    EXCISIONAL BIOPSY Left 3/30/2022    Procedure: EXCISIONAL BIOPSY;  Surgeon: David Mcpherson III  MD;  Location: [facility] OR;  Service: General;  Laterality: Left;    HYSTERECTOMY      total    LEFT HEART CATHETERIZATION Left 10/16/2020    Procedure: Left heart cath;  Surgeon: Garrett Robins MD;  Location: [facility] CATH/EP LAB;  Service: Cardiology;  Laterality: Left;    OOPHORECTOMY      TOTAL REDUCTION MAMMOPLASTY Bilateral      Social History     Tobacco Use    Smoking status: Former Smoker     Packs/day: 1.00     Years: 20.00     Pack years: 20.00     Types: Cigarettes     Start date:      Quit date: 3/4/2017     Years since quittin.1    Smokeless tobacco: Never Used   Substance Use Topics    Alcohol use: Not Currently     Alcohol/week: 0.0 standard drinks     Comment: sometimes    Drug use: No        Review of Systems   As noted in HPI in addition     Constitutional: Negative for chills, fatigue and fever.   Eyes: No double vision, No blurred vision  Neuro: No headaches, No dizziness  Respiratory:  She does have some she has seen pulmonologist are advised to use some are inhalers a regular basis.    Cardiovascular: Negative for chest pain. Negative for palpitations and leg swelling.   Gastrointestinal: Negative for abdominal pain, No melena, diarrhea, nausea and vomiting.   Genitourinary: Negative for dysuria and frequency, Negative for hematuria  Skin: Negative for bruising, Negative for edema or discoloration noted.   Endocrine: Negative for polyphagia, Negative for heat intolerance, Negative for cold intolerance  Psychiatric: Negative for depression, Negative for anxiety, Negative for memory loss  Musculoskeletal: Negative for neck pain, Negative for muscle weakness, Negative for back pain   She does have occasional headache on the left side of her temporoparietal area.  And resolved spontaneously no trauma elicited       Objective        Vitals:    22 1138   BP: 138/80   Pulse: 78   Resp: 16       LIPIDS - LAST 2   Lab Results   Component Value Date    CHOL 156 2022    CHOL  210 (H) 05/07/2021    HDL 44 01/12/2022    HDL 45 05/07/2021    LDLCALC 91.8 01/12/2022    LDLCALC 139.8 05/07/2021    TRIG 101 01/12/2022    TRIG 126 05/07/2021    CHOLHDL 28.2 01/12/2022    CHOLHDL 21.4 05/07/2021       CBC - LAST 2  Lab Results   Component Value Date    WBC 4.19 03/29/2022    WBC 5.77 01/12/2022    RBC 4.49 03/29/2022    RBC 4.38 01/12/2022    HGB 13.5 03/29/2022    HGB 13.4 01/12/2022    HCT 40.2 03/29/2022    HCT 39.5 01/12/2022    MCV 90 03/29/2022    MCV 90 01/12/2022    MCH 30.1 03/29/2022    MCH 30.6 01/12/2022    MCHC 33.6 03/29/2022    MCHC 33.9 01/12/2022    RDW 11.9 03/29/2022    RDW 11.5 01/12/2022     03/29/2022     01/12/2022    MPV 10.3 03/29/2022    MPV 10.9 01/12/2022    GRAN 1.4 (L) 03/29/2022    GRAN 33.4 (L) 03/29/2022    LYMPH 1.9 03/29/2022    LYMPH 46.1 03/29/2022    MONO 0.6 03/29/2022    MONO 13.8 03/29/2022    BASO 0.03 03/29/2022    BASO 0.04 01/12/2022    NRBC 0 03/29/2022    NRBC 0 01/12/2022       CHEMISTRY & LIVER FUNCTION - LAST 2  Lab Results   Component Value Date     03/29/2022     01/12/2022    K 4.0 03/29/2022    K 3.9 01/12/2022     03/29/2022     01/12/2022    CO2 28 03/29/2022    CO2 26 01/12/2022    ANIONGAP 7 (L) 03/29/2022    ANIONGAP 9 01/12/2022    BUN 11 03/29/2022    BUN 9 01/12/2022    CREATININE 0.7 03/29/2022    CREATININE 0.8 01/12/2022    GLU 87 03/29/2022    GLU 93 01/12/2022    CALCIUM 9.1 03/29/2022    CALCIUM 9.2 01/12/2022    MG 2.0 10/17/2020    ALBUMIN 4.1 01/12/2022    ALBUMIN 3.8 05/07/2021    PROT 7.2 01/12/2022    PROT 7.3 05/07/2021    ALKPHOS 80 01/12/2022    ALKPHOS 63 05/07/2021    ALT 17 01/12/2022    ALT 18 05/07/2021    AST 20 01/12/2022    AST 17 05/07/2021    BILITOT 1.1 (H) 01/12/2022    BILITOT 0.7 05/07/2021        CARDIAC PROFILE - LAST 2  Lab Results   Component Value Date    BNP 24 10/16/2020    CPK 68 07/18/2008    CPK 46 03/06/2008    CPKMB 345.9 (H) 10/17/2020    CPKMB 479.2 (H)  10/16/2020    TROPONINI 18.653 (HH) 10/18/2020    TROPONINI 47.683 (HH) 10/17/2020        COAGULATION - LAST 2  Lab Results   Component Value Date    LABPT 13.5 10/16/2020    LABPT 13.2 10/16/2020    INR 1.1 10/16/2020    INR 1.1 10/16/2020    APTT 27.1 10/16/2020       ENDOCRINE & PSA - LAST 2  Lab Results   Component Value Date    HGBA1C 5.4 01/12/2022    HGBA1C 5.2 05/07/2021    TSH 0.528 12/11/2017    TSH 0.871 10/16/2015        ECHOCARDIOGRAM RESULTS  Results for orders placed during the hospital encounter of 10/16/20    Echo Color Flow Doppler? Yes    Interpretation Summary  · There is left ventricular concentric remodeling.  · The left ventricle is normal in sizeThe estimated ejection fraction is 46%.  · Grade I diastolic dysfunction.  · Plaque present in the aortic root (sinus).  · Normal central venous pressure (3 mmHg).  · The estimated PA systolic pressure is 27 mmHg.  · There are segmental left ventricular wall motion abnormalities.      CURRENT/PREVIOUS VISIT EKG  Results for orders placed or performed during the hospital encounter of 03/29/22   EKG 12-lead    Collection Time: 03/29/22  1:10 PM    Narrative    Test Reason : Z01.818,    Vent. Rate : 072 BPM     Atrial Rate : 072 BPM     P-R Int : 154 ms          QRS Dur : 102 ms      QT Int : 398 ms       P-R-T Axes : 056 069 069 degrees     QTc Int : 435 ms    Normal sinus rhythm  Low voltage QRS  Nonspecific T wave abnormality  Abnormal ECG  When compared with ECG of 17-OCT-2020 02:18,  ST no longer depressed in Anterior leads  Nonspecific T wave abnormality has replaced inverted T waves in Inferior  leads  Nonspecific T wave abnormality has replaced inverted T waves in   Anterior-lateral leads  Confirmed by Bruna FRANCISCO, Terence SMALL ([id number]) on 4/6/2022 5:23:13 PM    Referred By:  NATALIIA           Confirmed By:Terence Mcfarland MD     No valid procedures specified.   No results found for this or any previous visit.    No valid procedures  specified.      Twelve lead EKG  April 18, 2022 normal sinus rhythm low-voltage QRS duration nonspecific ST T wave changes noted.      PREVIOUS STRESS TEST              PREVIOUS ANGIOGRAM        PHYSICAL EXAM    GENERAL: well built, well nourished, well-developed in no apparent distress alert and oriented.   HEENT: Normocephalic. Pupils normal and conjunctivae normal.  Mucous membranes normal, no cyanosis or icterus, trachea central,no pallor or icterus is noted..   NECK: No JVD. No bruit..   THYROID: Thyroid not enlarged. No nodules present..   CARDIAC: Regular rate and rhythm. S1 is normal.S2 is normal.No gallops, clicks or murmurs noted at this time.  CHEST ANATOMY: normal.   LUNGS:  Mild end-expiratory wheezes are noted.    ABDOMEN: Soft no masses or organomegaly.  No abdomen pulsations or bruits.  Normal bowel sounds. No pulsations and no masses felt, No guarding or rebound.   EXTREMITIES: No cyanosis, clubbing or edema noted at this time., no calf tenderness bilaterally.   PERIPHERAL VASCULAR SYSTEM: Good palpable distal pulses.   CENTRAL NERVOUS SYSTEM: No focal motor or sensory deficits noted.   SKIN: Skin without lesions, moist, well perfused.   MUSCLE STRENGTH & TONE: No noteable weakness, atrophy or abnormal movement.     I HAVE REVIEWED :    The vital signs, nurses notes, and all the pertinent radiology and labs.        Current Outpatient Medications   Medication Instructions    ammonium lactate 12 % Crea aaa bid prn dry skin    aspirin (ECOTRIN) 81 mg, Oral, Daily    atorvastatin (LIPITOR) 80 mg, Oral, Nightly    busPIRone (BUSPAR) 5 MG Tab TAKE 1 TABLET BY MOUTH TWICE A DAY    calcium-vitamin D3 (OS-JOLIE 500 + D3) 500 mg-5 mcg (200 unit) per tablet 1 tablet, Oral, Every morning    citalopram (CELEXA) 20 MG tablet TAKE 1 TABLET BY MOUTH EVERY DAY    HYDROcodone-acetaminophen (NORCO) 5-325 mg per tablet 1 tablet, Oral, Every 6 hours PRN    lisinopriL (PRINIVIL,ZESTRIL) 2.5 mg, Oral, Daily     metoprolol tartrate (LOPRESSOR) 25 mg, Oral, 2 times daily    nitroGLYCERIN (NITROSTAT) 0.4 mg, Sublingual, Every 5 min PRN    tiZANidine (ZANAFLEX) 4 mg, Oral, Every 6 hours PRN, Back pain    valACYclovir (VALTREX) 1000 MG tablet TAKE 2 AT ONSET OF FEVER BLISTERS THEN REPEAT IN 12 HOURS (TOTAL 4 TABS PER EPISODE)    zinc 50 mg Tab Oral          Assessment & Plan     Hyperlipidemia  She is subtherapeutic on her lipid management.  Her dose was recently titrated upwards to 80 mg await their evaluation of the same.  May consider adding Zetia 10 mg to her regimen in an effort to bring her LDL cholesterol 70 or less.  Also maintain on low-fat low-cholesterol diet    CAD (coronary artery disease)  No recent anginal symptoms are noted.  She has some residual bronchospasm at this time continue on risk factor modification maintain on low doses of aspirin at 81 mg daily, maintain lipid management as mentioned above and continue low-dose ofmetoprolol 25 mg she is currently taking once a day may consider changing this to Toprol-XL for longer-acting properties.    History of heart artery stent  Prior history of coronary revascularization and aggressive diet control and for management of lipids as mentioned    Malignant melanoma of skin of left leg  She had a skin lesion removed her mildly melanoma about 5 in of scar or of from the left leg and she is a bit sore from the site otherwise no drainage apparently from the side    Chronic bilateral low back pain with left-sided sciatica  Management per Dr. Daniels          No follow-ups on file.

## 2022-04-18 NOTE — ASSESSMENT & PLAN NOTE
She is subtherapeutic on her lipid management.  Her dose was recently titrated upwards to 80 mg await their evaluation of the same.  May consider adding Zetia 10 mg to her regimen in an effort to bring her LDL cholesterol 70 or less.  Also maintain on low-fat low-cholesterol diet

## 2022-04-18 NOTE — ASSESSMENT & PLAN NOTE
She had a skin lesion removed her mildly melanoma about 5 in of scar or of from the left leg and she is a bit sore from the site otherwise no drainage apparently from the side

## 2022-04-18 NOTE — ASSESSMENT & PLAN NOTE
Prior history of coronary revascularization and aggressive diet control and for management of lipids as mentioned

## 2022-05-05 ENCOUNTER — HOSPITAL ENCOUNTER (OUTPATIENT)
Dept: RADIOLOGY | Facility: HOSPITAL | Age: 67
Discharge: HOME | End: 2022-05-05
Attending: INTERNAL MEDICINE
Payer: MEDICARE

## 2022-05-05 DIAGNOSIS — J18.9 PNEUMONIA OF RIGHT UPPER LOBE DUE TO INFECTIOUS ORGANISM: ICD-10-CM

## 2022-05-05 PROCEDURE — 71250 CT THORAX DX C-: CPT | Mod: TC,PO

## 2022-05-12 DIAGNOSIS — E78.5 HYPERLIPIDEMIA, UNSPECIFIED HYPERLIPIDEMIA TYPE: ICD-10-CM

## 2022-05-12 RX ORDER — ATORVASTATIN CALCIUM 40 MG/1
TABLET, FILM COATED ORAL
Qty: 90 TABLET | Refills: 4 | OUTPATIENT
Start: 2022-05-12

## 2022-05-12 NOTE — TELEPHONE ENCOUNTER
Quick DC. Inappropriate Request    Refill Authorization Note   Aysha Moore  is requesting a refill authorization.  Brief Assessment and Rationale for Refill:  Quick Discontinue  Medication Therapy Plan:       Medication Reconciliation Completed:  No      Comments:     Note composed:7:33 AM 05/12/2022

## 2022-05-12 NOTE — TELEPHONE ENCOUNTER
No new care gaps identified.  [facility] Embedded Care Gaps. Reference number: [id number]. 5/12/2022   1:23:10 AM CDT

## 2022-07-01 ENCOUNTER — TELEPHONE (OUTPATIENT)
Dept: FAMILY MEDICINE | Facility: CLINIC | Age: 67
End: 2022-07-01
Payer: MEDICARE

## 2022-07-01 NOTE — TELEPHONE ENCOUNTER
I agree with the Tylenol and fluids.  She can use Chloraseptic spray or salt water gargle for the sore throat.      At this point I do not recommend taking the antiviral medications, no one seems to be able to tolerate them.

## 2022-07-01 NOTE — TELEPHONE ENCOUNTER
Patient reports testing positive for Covid per home test kit today 7-1-22.  Reports symptoms of headache, sore throat, and temp of 100.8.  Symptoms started on yesterday. Patient's granddaughter was Covid positive on Friday of last week; and patient was around her granddaughter. Patient is currently taking Tylenol, and drinking plenty of fluids. Patient is requesting to notify Dr. Daniels. Please advise. Thank you.

## 2022-07-01 NOTE — TELEPHONE ENCOUNTER
----- Message from Inge Meyer, Patient Care Assistant sent at 7/1/2022  2:08 PM CDT -----  Contact: Pt  Type: Needs Medical Advice    Who Called: Pt  Best Call Back Number: [phone]  Inquiry/Question: Pt is calling to advise of testing positive for Covid. Thank you~

## 2022-07-18 ENCOUNTER — PATIENT MESSAGE (OUTPATIENT)
Dept: FAMILY MEDICINE | Facility: CLINIC | Age: 67
End: 2022-07-18
Payer: MEDICARE

## 2022-07-18 ENCOUNTER — TELEPHONE (OUTPATIENT)
Dept: FAMILY MEDICINE | Facility: CLINIC | Age: 67
End: 2022-07-18
Payer: MEDICARE

## 2022-08-23 ENCOUNTER — TELEPHONE (OUTPATIENT)
Dept: CARDIOLOGY | Facility: CLINIC | Age: 67
End: 2022-08-23
Payer: MEDICARE

## 2022-08-24 ENCOUNTER — OFFICE VISIT (OUTPATIENT)
Dept: FAMILY MEDICINE | Facility: CLINIC | Age: 67
End: 2022-08-24
Attending: FAMILY MEDICINE
Payer: MEDICARE

## 2022-08-24 VITALS
HEART RATE: 72 BPM | TEMPERATURE: 98 F | HEIGHT: 63 IN | BODY MASS INDEX: 25.84 KG/M2 | RESPIRATION RATE: 18 BRPM | OXYGEN SATURATION: 98 % | DIASTOLIC BLOOD PRESSURE: 63 MMHG | WEIGHT: 145.81 LBS | SYSTOLIC BLOOD PRESSURE: 99 MMHG

## 2022-08-24 DIAGNOSIS — G89.29 CHRONIC BILATERAL LOW BACK PAIN WITH LEFT-SIDED SCIATICA: ICD-10-CM

## 2022-08-24 DIAGNOSIS — I25.10 CORONARY ARTERY DISEASE INVOLVING NATIVE CORONARY ARTERY OF NATIVE HEART WITHOUT ANGINA PECTORIS: Primary | ICD-10-CM

## 2022-08-24 DIAGNOSIS — E78.00 PURE HYPERCHOLESTEROLEMIA: ICD-10-CM

## 2022-08-24 DIAGNOSIS — J43.9 PULMONARY EMPHYSEMA, UNSPECIFIED EMPHYSEMA TYPE: ICD-10-CM

## 2022-08-24 DIAGNOSIS — C43.72 MALIGNANT MELANOMA OF SKIN OF LEFT LEG: ICD-10-CM

## 2022-08-24 DIAGNOSIS — I10 PRIMARY HYPERTENSION: ICD-10-CM

## 2022-08-24 DIAGNOSIS — M54.42 CHRONIC BILATERAL LOW BACK PAIN WITH LEFT-SIDED SCIATICA: ICD-10-CM

## 2022-08-24 DIAGNOSIS — R73.03 PREDIABETES: ICD-10-CM

## 2022-08-24 DIAGNOSIS — Z95.5 HISTORY OF HEART ARTERY STENT: ICD-10-CM

## 2022-08-24 DIAGNOSIS — I51.89 GRADE I DIASTOLIC DYSFUNCTION: ICD-10-CM

## 2022-08-24 DIAGNOSIS — J30.2 SEASONAL ALLERGIC RHINITIS, UNSPECIFIED TRIGGER: ICD-10-CM

## 2022-08-24 PROCEDURE — 99214 OFFICE O/P EST MOD 30 MIN: CPT | Mod: S$PBB,,, | Performed by: FAMILY MEDICINE

## 2022-08-24 PROCEDURE — 99214 OFFICE O/P EST MOD 30 MIN: CPT | Mod: PBBFAC,PN | Performed by: FAMILY MEDICINE

## 2022-08-24 PROCEDURE — 99214 PR OFFICE/OUTPT VISIT, EST, LEVL IV, 30-39 MIN: ICD-10-PCS | Mod: S$PBB,,, | Performed by: FAMILY MEDICINE

## 2022-08-24 PROCEDURE — 99999 PR PBB SHADOW E&M-EST. PATIENT-LVL IV: CPT | Mod: PBBFAC,,, | Performed by: FAMILY MEDICINE

## 2022-08-24 PROCEDURE — 99999 PR PBB SHADOW E&M-EST. PATIENT-LVL IV: ICD-10-PCS | Mod: PBBFAC,,, | Performed by: FAMILY MEDICINE

## 2022-08-24 RX ORDER — METOPROLOL SUCCINATE 50 MG/1
50 TABLET, EXTENDED RELEASE ORAL DAILY
Qty: 30 TABLET | Refills: 11 | Status: SHIPPED | OUTPATIENT
Start: 2022-08-24 | End: 2023-06-06 | Stop reason: SDUPTHER

## 2022-08-24 NOTE — PROGRESS NOTES
Subjective:       Patient ID: Aysha Moore is a 67 y.o. female.    Chief Complaint: Annual Exam (Pt states that she is here for her annual exam )    67-year-old female coming in for general checkup and follow-up of multiple medical problems.  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 Cheng.  Dr. Corado performed an excision with apparent resolution of the problem but the patient has some other lesions, one of which on the right lower extremity appears similar to the lesion that was biopsied and removed.  She is concerned that Dr. Cheng focused on the one obvious lesion in did not seem to pay much attention to the others.  She would like to see Dr. Billy for a more extensive skin evaluation and follow-up.  She has a history of coronary artery disease with ST elevation MI and recently established with Dr. Adorno for cardiology care.  On an echocardiogram in 2020 she had grade 1 diastolic dysfunction and a mildly reduced ejection fraction of 46% but she is relatively asymptomatic.  She does have some fatigue which she attributes to aging and her blood pressure commonly runs a little low with the pulse generally within normal range.  She is currently taking lisinopril 2.5 mg daily and Lopressor 25 mg b.i.d..  The Lopressor seems to contribute to her fatigue and she has difficulty remembering to take the 2nd pill.  We discussed the extended release Toprol and she was interested in trying it.  I suggested switching to the 50 mg once a day for a direct transfer at the same dose and we can reduce it if needed and replace it with a 25 mg daily if needed.  She has had prediabetic level blood glucose readings with an A1c of 5.7 about three years ago.  Most recently she had a 5.4 approximately eight months ago.  She has a history of bilateral low back pain with left-sided sciatica and has had a medial meniscus tear of the right knee repaired.    Past  Medical History:  No date: Allergy  No date: Anxiety  No date: Arthritis  No date: CAD (coronary artery disease)      Comment:  coronary stents  No date: Chronic back pain      Comment:  lower back pain radiates to left leg  No date: Chronic rhinitis  No date: DAILY (dyspnea on exertion)  No date: Hyperlipidemia  No date: Hypertension  No date: Melanoma in situ of left upper extremity      Comment:  left thigh area  No date: MI (myocardial infarction)  10/29/2020: Seasonal allergic rhinitis    Past Surgical History:  No date: BREAST SURGERY      Comment:  breast reduction  No date: CATARACT EXTRACTION, BILATERAL  No date: coranary stent  3/30/2022: EXCISION OF MELANOMA; Left      Comment:  Procedure: EXCISION, MELANOMA;  Surgeon: David Mcpherson III, MD;  Location: [facility] OR;  Service: General;                 Laterality: Left;  3/30/2022: EXCISIONAL BIOPSY; Left      Comment:  Procedure: EXCISIONAL BIOPSY;  Surgeon: David Mcpherson III, MD;  Location: [facility] OR;  Service: General;                 Laterality: Left;  No date: HYSTERECTOMY      Comment:  total  10/16/2020: LEFT HEART CATHETERIZATION; Left      Comment:  Procedure: Left heart cath;  Surgeon: Garrett Robins MD;  Location: [facility] CATH/EP LAB;  Service:                Cardiology;  Laterality: Left;  No date: OOPHORECTOMY  2000: TOTAL REDUCTION MAMMOPLASTY; Bilateral  '    Review of patient's family history indicates:  Problem: Cancer      Relation: Mother          Age of Onset: (Not Specified)          Comment: breast, ovarian, cervical   Problem: Heart disease      Relation: Mother          Age of Onset: (Not Specified)  Problem: Breast cancer      Relation: Mother          Age of Onset: 50  Problem: Ovarian cancer      Relation: Mother          Age of Onset: (Not Specified)  Problem: Cancer      Relation: Father          Age of Onset: (Not Specified)          Comment: melanoma  Problem: Stroke       Relation: Sister          Age of Onset: (Not Specified)  Problem: Heart disease      Relation: Sister          Age of Onset: (Not Specified)  Problem: Cancer      Relation: Sister          Age of Onset: (Not Specified)          Comment: leukemia  Problem: Heart disease      Relation: Brother          Age of Onset: (Not Specified)  Problem: Heart disease      Relation: Maternal Grandmother          Age of Onset: (Not Specified)  Problem: No Known Problems      Relation: Daughter          Age of Onset: (Not Specified)  Problem: No Known Problems      Relation: Son          Age of Onset: (Not Specified)  Problem: Cancer      Relation: Maternal Uncle          Age of Onset: (Not Specified)  Problem: Cancer      Relation: Paternal Aunt          Age of Onset: (Not Specified)          Comment: breast  Problem: Breast cancer      Relation: Paternal Aunt          Age of Onset: 60  Problem: Cancer      Relation: Paternal Uncle          Age of Onset: (Not Specified)  Problem: Macular degeneration      Relation: Sister          Age of Onset: (Not Specified)  Problem: Heart disease      Relation: Sister          Age of Onset: (Not Specified)    Social History    Tobacco Use      Smoking status: Former Smoker        Packs/day: 1.00        Years: 20.00        Pack years: 20        Types: Cigarettes, Vaping with nicotine        Start date:         Quit date: 3/4/2017        Years since quittin.4      Smokeless tobacco: Never Used    Alcohol use: Not Currently      Alcohol/week: 0.0 standard drinks      Comment: sometimes    Drug use: No    Current Outpatient Medications on File Prior to Visit:  ammonium lactate 12 % Crea, aaa bid prn dry skin, Disp: 385 g, Rfl: 11  atorvastatin (LIPITOR) 80 MG tablet, Take 1 tablet (80 mg total) by mouth every evening., Disp: 90 tablet, Rfl: 4  busPIRone (BUSPAR) 5 MG Tab, TAKE 1 TABLET BY MOUTH TWICE A DAY (Patient taking differently: Take 5 mg by mouth every evening.), Disp: 180 tablet,  Rfl: 1  calcium-vitamin D3 (OS-JOLIE 500 + D3) 500 mg-5 mcg (200 unit) per tablet, Take 1 tablet by mouth every morning., Disp: , Rfl:   citalopram (CELEXA) 20 MG tablet, TAKE 1 TABLET BY MOUTH EVERY DAY, Disp: 90 tablet, Rfl: 0  ezetimibe (ZETIA) 10 mg tablet, Take 1 tablet (10 mg total) by mouth once daily., Disp: 90 tablet, Rfl: 3  lisinopriL (PRINIVIL,ZESTRIL) 2.5 MG tablet, Take 1 tablet (2.5 mg total) by mouth once daily. (Patient taking differently: Take 2.5 mg by mouth every evening.), Disp: 30 tablet, Rfl: 0  nitroGLYCERIN (NITROSTAT) 0.4 MG SL tablet, Place 1 tablet (0.4 mg total) under the tongue every 5 (five) minutes as needed for Chest pain., Disp: 30 tablet, Rfl: 0  tiZANidine (ZANAFLEX) 4 MG tablet, TAKE 1 TABLET (4 MG TOTAL) BY MOUTH EVERY 6 (SIX) HOURS AS NEEDED. BACK PAIN, Disp: 360 tablet, Rfl: 0  valACYclovir (VALTREX) 1000 MG tablet, TAKE 2 AT ONSET OF FEVER BLISTERS THEN REPEAT IN 12 HOURS (TOTAL 4 TABS PER EPISODE), Disp: 20 tablet, Rfl: 3  zinc 50 mg Tab, Take by mouth., Disp: , Rfl:   (DISCONTINUED) metoprolol tartrate (LOPRESSOR) 25 MG tablet, Take 1 tablet (25 mg total) by mouth 2 (two) times daily. (Patient taking differently: Take 25 mg by mouth nightly.), Disp: 60 tablet, Rfl: 0  aspirin (ECOTRIN) 81 MG EC tablet, Take 1 tablet (81 mg total) by mouth once daily. (Patient not taking: Reported on 8/24/2022), Disp: , Rfl: 0  (DISCONTINUED) HYDROcodone-acetaminophen (NORCO) 5-325 mg per tablet, Take 1 tablet by mouth every 6 (six) hours as needed for Pain., Disp: 25 tablet, Rfl: 0    No current facility-administered medications on file prior to visit.        Review of Systems   Constitutional: Positive for fatigue. Negative for chills, diaphoresis, fever and unexpected weight change.   HENT: Negative for congestion, ear pain, hearing loss, postnasal drip and sinus pressure.    Eyes: Negative for itching and visual disturbance.   Respiratory: Negative for cough, chest tightness, shortness of  breath and wheezing.    Cardiovascular: Negative for chest pain, palpitations and leg swelling.   Gastrointestinal: Negative for abdominal pain, blood in stool, constipation, diarrhea, nausea and vomiting.   Genitourinary: Negative for dysuria, frequency and hematuria.   Musculoskeletal: Negative for arthralgias, back pain, joint swelling and myalgias.   Skin: Positive for color change.   Neurological: Negative for dizziness and headaches.   Hematological: Negative for adenopathy.   Psychiatric/Behavioral: Negative for sleep disturbance. The patient is not nervous/anxious.        Objective:      Physical Exam  Vitals and nursing note reviewed.   Constitutional:       General: She is not in acute distress.     Appearance: Normal appearance. She is well-developed and normal weight. She is not ill-appearing, toxic-appearing or diaphoretic.      Comments: Borderline low blood pressure with systolic of 99 and diastolic of 63   Normal pulse at 72 with a regular rhythm  Normal weight with a BMI of 25.8 she is down 3.7 lb from her last general checkup May 7, 2021   HENT:      Head: Normocephalic and atraumatic.      Right Ear: Tympanic membrane, ear canal and external ear normal. There is no impacted cerumen.      Left Ear: Tympanic membrane, ear canal and external ear normal. There is no impacted cerumen.      Nose: Nose normal. No congestion or rhinorrhea.      Mouth/Throat:      Mouth: Mucous membranes are moist.      Pharynx: Oropharynx is clear. No oropharyngeal exudate or posterior oropharyngeal erythema.   Eyes:      General: No scleral icterus.        Right eye: No discharge.         Left eye: No discharge.      Extraocular Movements: Extraocular movements intact.      Conjunctiva/sclera: Conjunctivae normal.      Pupils: Pupils are equal, round, and reactive to light.   Neck:      Thyroid: No thyromegaly.      Vascular: No carotid bruit or JVD.   Cardiovascular:      Rate and Rhythm: Normal rate and regular rhythm.       Pulses: Normal pulses.      Heart sounds: Normal heart sounds. No murmur heard.    No friction rub. No gallop.   Pulmonary:      Effort: Pulmonary effort is normal. No respiratory distress.      Breath sounds: Normal breath sounds. No stridor. No wheezing, rhonchi or rales.   Chest:      Chest wall: No tenderness.   Abdominal:      General: Bowel sounds are normal. There is no distension.      Palpations: Abdomen is soft. There is no mass.      Tenderness: There is no abdominal tenderness. There is no guarding or rebound.      Hernia: No hernia is present.   Musculoskeletal:         General: No swelling, tenderness, deformity or signs of injury. Normal range of motion.      Cervical back: Normal range of motion and neck supple. No rigidity or tenderness.      Right lower leg: No edema.      Left lower leg: No edema.   Lymphadenopathy:      Cervical: No cervical adenopathy.   Skin:     General: Skin is warm and dry.      Coloration: Skin is not pale.      Findings: Lesion present. No erythema or rash.          Neurological:      General: No focal deficit present.      Mental Status: She is alert and oriented to person, place, and time. Mental status is at baseline.      Cranial Nerves: No cranial nerve deficit.      Sensory: No sensory deficit.      Motor: No weakness.      Coordination: Coordination normal.      Gait: Gait normal.      Deep Tendon Reflexes: Reflexes are normal and symmetric. Reflexes normal.   Psychiatric:         Mood and Affect: Mood normal.         Behavior: Behavior normal.         Thought Content: Thought content normal.         Judgment: Judgment normal.         Assessment:       1. Coronary artery disease involving native coronary artery of native heart without angina pectoris    2. Malignant melanoma of skin of left leg    3. Pure hypercholesterolemia    4. Pulmonary emphysema, unspecified emphysema type    5. Seasonal allergic rhinitis, unspecified trigger    6. Primary hypertension     7. History of heart artery stent    8. Prediabetes    9. Chronic bilateral low back pain with left-sided sciatica    10. Grade I diastolic dysfunction    11. BMI 25.0-25.9,adult        Plan:       1. Coronary artery disease involving native coronary artery of native heart without angina pectoris  Asymptomatic, echocardiogram scheduled with Dr. Adorno on Friday August 26th  - Comprehensive Metabolic Panel; Future  - Lipid Panel; Future  - CBC Auto Differential; Future  - metoprolol succinate (TOPROL-XL) 50 MG 24 hr tablet; Take 1 tablet (50 mg total) by mouth once daily.  Dispense: 30 tablet; Refill: 11    2. Malignant melanoma of skin of left leg  Status post excision of superficial spreading melanoma left thigh, possible 2nd lesion anterior distal right thigh  - Ambulatory referral/consult to Dermatology; Future    3. Pure hypercholesterolemia  Lab Results   Component Value Date    CHOL 156 01/12/2022    CHOL 210 (H) 05/07/2021    CHOL 159 10/17/2020     Lab Results   Component Value Date    HDL 44 01/12/2022    HDL 45 05/07/2021    HDL 33 (L) 10/17/2020     Lab Results   Component Value Date    LDLCALC 91.8 01/12/2022    LDLCALC 139.8 05/07/2021    LDLCALC 98.2 10/17/2020     Lab Results   Component Value Date    TRIG 101 01/12/2022    TRIG 126 05/07/2021    TRIG 139 10/17/2020     Lab Results   Component Value Date    CHOLHDL 28.2 01/12/2022    CHOLHDL 21.4 05/07/2021    CHOLHDL 20.8 10/17/2020     Insufficient reduction of LDL with maximum dose Lipitor 80 mg.  Started on Zetia approximately four months ago by Dr. Adorno.  Time for a recheck.  Will schedule fasting  - Comprehensive Metabolic Panel; Future  - Lipid Panel; Future    4. Pulmonary emphysema, unspecified emphysema type  Followed by Pulmonary upcoming appointment scheduled    5. Seasonal allergic rhinitis, unspecified trigger  Mildly symptomatic    6. Primary hypertension  Good control, possibly a little too low.  Will change Lopressor 25 b.i.d. to  Toprol XL 50 daily.  If still too strong consider reduction to 25 mg.  If that should proved to be too little we could increase lisinopril  - Comprehensive Metabolic Panel; Future  - Lipid Panel; Future  - CBC Auto Differential; Future  - metoprolol succinate (TOPROL-XL) 50 MG 24 hr tablet; Take 1 tablet (50 mg total) by mouth once daily.  Dispense: 30 tablet; Refill: 11    7. History of heart artery stent  Followed by Dr. Adorno    8. Prediabetes  Await fasting chemistry panel and A1c  - Comprehensive Metabolic Panel; Future  - Hemoglobin A1C; Future    9. Chronic bilateral low back pain with left-sided sciatica  Stable    10. Grade I diastolic dysfunction  Followed by Dr. Adorno    11. BMI 25.0-25.9,adult  Good weight, no changes needed

## 2022-08-25 ENCOUNTER — LAB VISIT (OUTPATIENT)
Dept: LAB | Facility: HOSPITAL | Age: 67
End: 2022-08-25
Attending: FAMILY MEDICINE
Payer: MEDICARE

## 2022-08-25 DIAGNOSIS — R73.03 PREDIABETES: ICD-10-CM

## 2022-08-25 DIAGNOSIS — I25.10 CORONARY ARTERY DISEASE INVOLVING NATIVE CORONARY ARTERY OF NATIVE HEART WITHOUT ANGINA PECTORIS: ICD-10-CM

## 2022-08-25 DIAGNOSIS — I10 PRIMARY HYPERTENSION: ICD-10-CM

## 2022-08-25 DIAGNOSIS — E78.00 PURE HYPERCHOLESTEROLEMIA: ICD-10-CM

## 2022-08-25 LAB
ALBUMIN SERPL BCP-MCNC: 4.1 G/DL (ref 3.5–5.2)
ALP SERPL-CCNC: 81 U/L (ref 55–135)
ALT SERPL W/O P-5'-P-CCNC: 22 U/L (ref 10–44)
ANION GAP SERPL CALC-SCNC: 8 MMOL/L (ref 8–16)
AST SERPL-CCNC: 17 U/L (ref 10–40)
BASOPHILS # BLD AUTO: 0.03 K/UL (ref 0–0.2)
BASOPHILS NFR BLD: 0.6 % (ref 0–1.9)
BILIRUB SERPL-MCNC: 1.7 MG/DL (ref 0.1–1)
BUN SERPL-MCNC: 13 MG/DL (ref 8–23)
CALCIUM SERPL-MCNC: 9.6 MG/DL (ref 8.7–10.5)
CHLORIDE SERPL-SCNC: 105 MMOL/L (ref 95–110)
CHOLEST SERPL-MCNC: 141 MG/DL (ref 120–199)
CHOLEST/HDLC SERPL: 3.3 {RATIO} (ref 2–5)
CO2 SERPL-SCNC: 27 MMOL/L (ref 23–29)
CREAT SERPL-MCNC: 0.8 MG/DL (ref 0.5–1.4)
DIFFERENTIAL METHOD: ABNORMAL
EOSINOPHIL # BLD AUTO: 0.2 K/UL (ref 0–0.5)
EOSINOPHIL NFR BLD: 4.4 % (ref 0–8)
ERYTHROCYTE [DISTWIDTH] IN BLOOD BY AUTOMATED COUNT: 12 % (ref 11.5–14.5)
EST. GFR  (NO RACE VARIABLE): >60 ML/MIN/1.73 M^2
ESTIMATED AVG GLUCOSE: 111 MG/DL (ref 68–131)
GLUCOSE SERPL-MCNC: 104 MG/DL (ref 70–110)
HBA1C MFR BLD: 5.5 % (ref 4–5.6)
HCT VFR BLD AUTO: 41.4 % (ref 37–48.5)
HDLC SERPL-MCNC: 43 MG/DL (ref 40–75)
HDLC SERPL: 30.5 % (ref 20–50)
HGB BLD-MCNC: 13.9 G/DL (ref 12–16)
IMM GRANULOCYTES # BLD AUTO: 0.01 K/UL (ref 0–0.04)
IMM GRANULOCYTES NFR BLD AUTO: 0.2 % (ref 0–0.5)
LDLC SERPL CALC-MCNC: 70.6 MG/DL (ref 63–159)
LYMPHOCYTES # BLD AUTO: 3.1 K/UL (ref 1–4.8)
LYMPHOCYTES NFR BLD: 59.9 % (ref 18–48)
MCH RBC QN AUTO: 30.8 PG (ref 27–31)
MCHC RBC AUTO-ENTMCNC: 33.6 G/DL (ref 32–36)
MCV RBC AUTO: 92 FL (ref 82–98)
MONOCYTES # BLD AUTO: 0.5 K/UL (ref 0.3–1)
MONOCYTES NFR BLD: 10.4 % (ref 4–15)
NEUTROPHILS # BLD AUTO: 1.3 K/UL (ref 1.8–7.7)
NEUTROPHILS NFR BLD: 24.5 % (ref 38–73)
NONHDLC SERPL-MCNC: 98 MG/DL
NRBC BLD-RTO: 0 /100 WBC
PLATELET # BLD AUTO: 222 K/UL (ref 150–450)
PMV BLD AUTO: 10.5 FL (ref 9.2–12.9)
POTASSIUM SERPL-SCNC: 4.5 MMOL/L (ref 3.5–5.1)
PROT SERPL-MCNC: 6.9 G/DL (ref 6–8.4)
RBC # BLD AUTO: 4.52 M/UL (ref 4–5.4)
SODIUM SERPL-SCNC: 140 MMOL/L (ref 136–145)
TRIGL SERPL-MCNC: 137 MG/DL (ref 30–150)
WBC # BLD AUTO: 5.21 K/UL (ref 3.9–12.7)

## 2022-08-25 PROCEDURE — 83036 HEMOGLOBIN GLYCOSYLATED A1C: CPT | Performed by: FAMILY MEDICINE

## 2022-08-25 PROCEDURE — 80061 LIPID PANEL: CPT | Performed by: FAMILY MEDICINE

## 2022-08-25 PROCEDURE — 80053 COMPREHEN METABOLIC PANEL: CPT | Performed by: FAMILY MEDICINE

## 2022-08-25 PROCEDURE — 36415 COLL VENOUS BLD VENIPUNCTURE: CPT | Mod: PO | Performed by: FAMILY MEDICINE

## 2022-08-25 PROCEDURE — 85025 COMPLETE CBC W/AUTO DIFF WBC: CPT | Performed by: FAMILY MEDICINE

## 2022-08-26 ENCOUNTER — TELEPHONE (OUTPATIENT)
Dept: FAMILY MEDICINE | Facility: CLINIC | Age: 67
End: 2022-08-26
Payer: MEDICARE

## 2022-08-29 ENCOUNTER — TELEPHONE (OUTPATIENT)
Dept: CARDIOLOGY | Facility: CLINIC | Age: 67
End: 2022-08-29
Payer: MEDICARE

## 2022-08-29 NOTE — TELEPHONE ENCOUNTER
----- Message from Danii Fiore MA sent at 8/29/2022 12:01 PM CDT -----  Contact: ROMINA STANLEY [[id number]]  Type: Needs Medical Advice    Who Called: ROMINA STANLEY [[id number]]  Best Call Back Number: [phone]  Inquiry/Question: Please call ROMINA STANLEY [[id number]] regarding follow up visit from echo      Thank you~

## 2022-08-31 ENCOUNTER — HOSPITAL ENCOUNTER (OUTPATIENT)
Dept: CARDIOLOGY | Facility: HOSPITAL | Age: 67
Discharge: HOME | End: 2022-08-31
Attending: INTERNAL MEDICINE
Payer: MEDICARE

## 2022-08-31 VITALS — WEIGHT: 145 LBS | BODY MASS INDEX: 25.69 KG/M2 | HEIGHT: 63 IN

## 2022-08-31 DIAGNOSIS — I25.10 CORONARY ARTERY DISEASE, UNSPECIFIED VESSEL OR LESION TYPE, UNSPECIFIED WHETHER ANGINA PRESENT, UNSPECIFIED WHETHER NATIVE OR TRANSPLANTED HEART: ICD-10-CM

## 2022-08-31 PROCEDURE — 93306 TTE W/DOPPLER COMPLETE: CPT | Mod: 26,,, | Performed by: INTERNAL MEDICINE

## 2022-08-31 PROCEDURE — 93306 TTE W/DOPPLER COMPLETE: CPT

## 2022-08-31 PROCEDURE — 93306 ECHO (CUPID ONLY): ICD-10-PCS | Mod: 26,,, | Performed by: INTERNAL MEDICINE

## 2022-10-05 ENCOUNTER — OFFICE VISIT (OUTPATIENT)
Dept: PULMONOLOGY | Facility: CLINIC | Age: 67
End: 2022-10-05
Payer: MEDICARE

## 2022-10-05 VITALS
HEART RATE: 67 BPM | BODY MASS INDEX: 26.08 KG/M2 | SYSTOLIC BLOOD PRESSURE: 140 MMHG | WEIGHT: 147.19 LBS | OXYGEN SATURATION: 97 % | DIASTOLIC BLOOD PRESSURE: 80 MMHG

## 2022-10-05 DIAGNOSIS — Z87.891 PERSONAL HISTORY OF TOBACCO USE, PRESENTING HAZARDS TO HEALTH: ICD-10-CM

## 2022-10-05 DIAGNOSIS — J43.9 PULMONARY EMPHYSEMA, UNSPECIFIED EMPHYSEMA TYPE: Primary | ICD-10-CM

## 2022-10-05 PROCEDURE — 90662 IIV NO PRSV INCREASED AG IM: CPT | Mod: S$GLB,,, | Performed by: INTERNAL MEDICINE

## 2022-10-05 PROCEDURE — 90662 FLU VACCINE - QUADRIVALENT - HIGH DOSE (65+) PRESERVATIVE FREE IM: ICD-10-PCS | Mod: S$GLB,,, | Performed by: INTERNAL MEDICINE

## 2022-10-05 PROCEDURE — 99214 OFFICE O/P EST MOD 30 MIN: CPT | Mod: 25,S$GLB,, | Performed by: INTERNAL MEDICINE

## 2022-10-05 PROCEDURE — 99214 PR OFFICE/OUTPT VISIT, EST, LEVL IV, 30-39 MIN: ICD-10-PCS | Mod: 25,S$GLB,, | Performed by: INTERNAL MEDICINE

## 2022-10-05 PROCEDURE — G0008 ADMIN INFLUENZA VIRUS VAC: HCPCS | Mod: S$GLB,,, | Performed by: INTERNAL MEDICINE

## 2022-10-05 PROCEDURE — G0008 FLU VACCINE - QUADRIVALENT - HIGH DOSE (65+) PRESERVATIVE FREE IM: ICD-10-PCS | Mod: S$GLB,,, | Performed by: INTERNAL MEDICINE

## 2022-10-05 NOTE — PROGRESS NOTES
Office Visit    Patient Name: Aysha Moore  MRN: [id number]  : 1955      Reason for visit: COPD    HPI:     2019 - Here for evaluation of COPD.  Has been hospitalized twice for respiratory issues.  Here more recently has noted some symptoms but has been out of BREO for a week or so.  Has a h/o smoking - has quit cigarettes but is using a vape.  Has smoked about 1 PPD for about 40 years.    2019 - Here for follow up, no new issues reported.  Still vaping (d/we pt).  Reviewed PFT with pt.  Pt is currently stable on present medications with no recent increases in their symptoms or use of rescue medications.  I have reviewed the medical regimen and re-educated the pt on the role of rescue and controlling medications.  All questions answered.  Inhaler technique seems adequate.    2021 - Here for follow up, Pt is currently stable on present medications with no recent increases in their symptoms or use of rescue medications.  Since our last visit there have been no hospitalizations or ER visits for their respiratory issues and there does not seem to be anything to suggest unrecognized exacerbations.  I have reviewed the medical regimen and re-educated the pt on the role of rescue and controlling medications.  Inhaler technique and understanding seems adequate.  The patient reports no issues with any of there medications for their COPD.  Refills will be taken care of as needed.  All questions answered.  She stopped singulair - she felt that it made her short tempered and she is better since stopping it.  She had a MI recently.  Continues with some sinus infection - seen at Urgent Care given doxycycline but developed itching.  Still with symptoms.  Patient has no known corona virus exposures and has been practicing social distancing.  We have discussed the virus and precautions and all questions have been answered.    8/3/2021 - Here for follow up, Pt is currently stable on present medications with no  recent increases in their symptoms or use of rescue medications.  Since our last visit there have been no hospitalizations or ER visits for their respiratory issues and there does not seem to be anything to suggest unrecognized exacerbations.  I have reviewed the medical regimen and re-educated the pt on the role of rescue and controlling medications.  Inhaler technique and understanding seems adequate.  The patient reports no issues with any of there medications for their COPD.  Refills will be taken care of as needed.  All questions answered.  Has been taken off of lopressor due to decreased BP.  Patient has no known corona virus exposures and has been practicing social distancing.  We have discussed the virus and precautions and all questions have been answered.    2/1/2022 - Here for follow up, was sick for about 3 weeks around [employer] (had known Covid exposure, had HA, sore throat, weak, cough, increased dyspnea, low grade fever).  She did 2 rapid tests which were negative but is interested in getting Covid antibodies checked.  Pt is currently stable on present medications with no recent increases in their symptoms or use of rescue medications.  Since our last visit there have been no hospitalizations or ER visits for their respiratory issues and there does not seem to be anything to suggest unrecognized exacerbations.  I have reviewed the medical regimen and re-educated the pt on the role of rescue and controlling medications.  Inhaler technique and understanding seems adequate.  The patient reports no issues with any of there medications for their COPD.  Refills will be taken care of as needed.  All questions answered.  She has been otherwise stable except for recent illness.  She has not been vaccinated for Covid.    10/5/2022 - Here for follow p, no new issues or problems reported.  We reviewed her CT scans and she will need a follow up CT in 5/2023.  Pt is currently stable on present medications with no  "recent increases in their symptoms or use of rescue medications.  Since our last visit there have been no hospitalizations or ER visits for their respiratory issues and there does not seem to be anything to suggest unrecognized exacerbations.  I have reviewed the medical regimen and re-educated the pt on the role of rescue and controlling medications.  Inhaler technique and understanding seems adequate.  The patient reports no issues with any of there medications for their COPD.  Refills will be taken care of as needed.  All questions answered.  Did have Covid in early summer but was not very sick.          Low Dose Screening CT Chest    Cigarettes - 1 PPD x 40 YEARS  Still smoking -   NO  QUIT 3/2019     Date of last LD CT - 2/2021    Result - Category 1, needs repeat 2/2022    I have discussed with pt about using a screening CT of the chest due to history of cigarette smoking.  We have discussed the possible findings and possible actions as a result of these findings.  The pt would like to proceed.    COPD Flowsheet    GOLD A    Last PFT - 8/2019  FEV1- 74 % DLCO - 50 %     + LABA/LAMA    + prn ALBUTEROL    mMRC -  0 - SOB with strenuous exercise   - + 1 - SOB level ground, slight hill   -  2 - SOB walk slower or stop for breath level ground   -  3 - SOB at 100 yards or after few minutes   -  4 - SOB in house, dressing    Referral to PULMONARY REHABILITATION - NO    Tested for alpha-1-antitrypsin - NO  Result - na    Cigarette Counseling    Currently smoking 0 packs per day  40 pack years    Vaping a little    I have counseled pt for 3-5 minutes regarding cigarette cessation.  This has included the need to stop smoking as well as strategies, including but not limited to "cold turkey", CHANTIX (including risks and benefits of whitney drug), nicotine replacement and WELLBUTRIN.    Flu and Pneumonia Vaccination    I have recommended that the patient get this years influenza vaccine.  We discussed the risks and benefits " of this treatment.  Will give today    I reviewed patient's current pneumonia vaccine status.  Patient needs vaccination.  We have discussed the current guidelines and recommendations for pneumonia vaccination.              Past Medical History    Past Medical History:   Diagnosis Date    Allergy     Anxiety     Arthritis     CAD (coronary artery disease)     coronary stents    Chronic back pain     lower back pain radiates to left leg    Chronic rhinitis     DAILY (dyspnea on exertion)     Hyperlipidemia     Hypertension     Melanoma in situ of left upper extremity     left thigh area    MI (myocardial infarction)     Seasonal allergic rhinitis 10/29/2020       Past Surgical History    Past Surgical History:   Procedure Laterality Date    BREAST SURGERY      breast reduction    CATARACT EXTRACTION, BILATERAL      coranary stent      EXCISION OF MELANOMA Left 3/30/2022    Procedure: EXCISION, MELANOMA;  Surgeon: David Mcpherson III, MD;  Location: [facility] OR;  Service: General;  Laterality: Left;    EXCISIONAL BIOPSY Left 3/30/2022    Procedure: EXCISIONAL BIOPSY;  Surgeon: David Mcpherson III, MD;  Location: [facility] OR;  Service: General;  Laterality: Left;    HYSTERECTOMY      total    LEFT HEART CATHETERIZATION Left 10/16/2020    Procedure: Left heart cath;  Surgeon: Garrett Robins MD;  Location: [facility] CATH/EP LAB;  Service: Cardiology;  Laterality: Left;    OOPHORECTOMY      TOTAL REDUCTION MAMMOPLASTY Bilateral 2000       Medications      Current Outpatient Medications:     ammonium lactate 12 % Crea, aaa bid prn dry skin, Disp: 385 g, Rfl: 11    atorvastatin (LIPITOR) 80 MG tablet, Take 1 tablet (80 mg total) by mouth every evening., Disp: 90 tablet, Rfl: 4    busPIRone (BUSPAR) 5 MG Tab, TAKE 1 TABLET BY MOUTH TWICE A DAY (Patient taking differently: Take 5 mg by mouth every evening.), Disp: 180 tablet, Rfl: 1    calcium-vitamin D3 (OS-JOLIE 500 + D3) 500 mg-5 mcg (200 unit) per tablet, Take 1 tablet by mouth  every morning., Disp: , Rfl:     citalopram (CELEXA) 20 MG tablet, TAKE 1 TABLET BY MOUTH EVERY DAY, Disp: 90 tablet, Rfl: 3    ezetimibe (ZETIA) 10 mg tablet, Take 1 tablet (10 mg total) by mouth once daily., Disp: 90 tablet, Rfl: 3    metoprolol succinate (TOPROL-XL) 50 MG 24 hr tablet, Take 1 tablet (50 mg total) by mouth once daily., Disp: 30 tablet, Rfl: 11    nitroGLYCERIN (NITROSTAT) 0.4 MG SL tablet, Place 1 tablet (0.4 mg total) under the tongue every 5 (five) minutes as needed for Chest pain., Disp: 30 tablet, Rfl: 0    tiZANidine (ZANAFLEX) 4 MG tablet, TAKE 1 TABLET (4 MG TOTAL) BY MOUTH EVERY 6 (SIX) HOURS AS NEEDED. BACK PAIN, Disp: 360 tablet, Rfl: 0    valACYclovir (VALTREX) 1000 MG tablet, TAKE 2 AT ONSET OF FEVER BLISTERS THEN REPEAT IN 12 HOURS (TOTAL 4 TABS PER EPISODE), Disp: 20 tablet, Rfl: 3    zinc 50 mg Tab, Take by mouth., Disp: , Rfl:     aspirin (ECOTRIN) 81 MG EC tablet, Take 1 tablet (81 mg total) by mouth once daily. (Patient not taking: Reported on 2022), Disp: , Rfl: 0    lisinopriL (PRINIVIL,ZESTRIL) 2.5 MG tablet, Take 1 tablet (2.5 mg total) by mouth once daily. (Patient taking differently: Take 2.5 mg by mouth every evening.), Disp: 30 tablet, Rfl: 0    Allergies    Review of patient's allergies indicates:   Allergen Reactions    Cephalexin      Other reaction(s): Rash    Doxycycline monohydrate Hives    Lanoxin  [digoxin]      Other reaction(s): Rash    Lansoprazole      Other reaction(s): Rash    Macrobid [nitrofurantoin monohyd/m-cryst]        SocHx    Social History     Tobacco Use   Smoking Status Former    Packs/day: 1.00    Years: 20.00    Pack years: 20.00    Types: Cigarettes, Vaping with nicotine    Start date:     Quit date: 3/4/2017    Years since quittin.5   Smokeless Tobacco Never       Social History     Substance and Sexual Activity   Alcohol Use Not Currently    Alcohol/week: 0.0 standard drinks    Comment: sometimes       Drug Use - no  Occupation -  housewife  Asbestos exposure - no  Pets - dogs    FMHx    Family History   Problem Relation Age of Onset    Cancer Mother         breast, ovarian, cervical     Heart disease Mother     Breast cancer Mother 50    Ovarian cancer Mother     Cancer Father         melanoma    Stroke Sister     Heart disease Sister     Cancer Sister         leukemia    Heart disease Brother     Heart disease Maternal Grandmother     No Known Problems Daughter     No Known Problems Son     Cancer Maternal Uncle     Cancer Paternal Aunt         breast    Breast cancer Paternal Aunt 60    Cancer Paternal Uncle     Macular degeneration Sister     Heart disease Sister          Review of Systems  Review of Systems   Constitutional:  Negative for chills, diaphoresis, fever, malaise/fatigue and weight loss.   HENT:  Positive for congestion and tinnitus. Negative for ear discharge, ear pain, hearing loss, nosebleeds, sinus pain and sore throat.         Has had tinnitus for years (has seen ENT)   Eyes:  Negative for pain.   Respiratory:  Positive for shortness of breath and wheezing. Negative for cough, hemoptysis, sputum production and stridor.    Cardiovascular:  Negative for chest pain, palpitations, orthopnea, claudication, leg swelling and PND.        H/o PCI   Gastrointestinal:  Negative for abdominal pain, blood in stool, constipation, diarrhea, heartburn, melena, nausea and vomiting.   Genitourinary:  Negative for dysuria, flank pain, frequency, hematuria and urgency.   Musculoskeletal:  Positive for back pain, joint pain and neck pain. Negative for falls and myalgias.        Right knee meniscal injury   Skin:  Negative for itching and rash.   Neurological:  Negative for dizziness, tingling, tremors, sensory change, speech change, focal weakness, seizures, weakness and headaches.   Endo/Heme/Allergies:  Negative for environmental allergies.   Psychiatric/Behavioral:  Negative for depression, substance abuse and suicidal ideas. The patient  is not nervous/anxious.      Physical Exam    Vitals:    10/05/22 1051   BP: (!) 140/80   BP Location: Left arm   Patient Position: Sitting   BP Method: Medium (Manual)   Pulse: 67   SpO2: 97%   Weight: 66.8 kg (147 lb 3.2 oz)       Physical Exam  Vitals and nursing note reviewed.   Constitutional:       General: She is not in acute distress.     Appearance: She is well-developed. She is not diaphoretic.   HENT:      Head: Normocephalic and atraumatic.      Right Ear: External ear normal.      Left Ear: External ear normal.      Nose: Nose normal.   Eyes:      Conjunctiva/sclera: Conjunctivae normal.      Pupils: Pupils are equal, round, and reactive to light.   Neck:      Thyroid: No thyromegaly.      Vascular: No JVD.      Trachea: No tracheal deviation.   Cardiovascular:      Rate and Rhythm: Normal rate and regular rhythm.      Heart sounds: Normal heart sounds. No murmur heard.    No friction rub. No gallop.   Pulmonary:      Effort: Pulmonary effort is normal. No respiratory distress.      Breath sounds: Normal breath sounds. No stridor. No wheezing or rales.   Chest:      Chest wall: No tenderness.   Abdominal:      General: Bowel sounds are normal. There is no distension.      Palpations: Abdomen is soft.      Tenderness: There is no abdominal tenderness.   Musculoskeletal:         General: No tenderness. Normal range of motion.      Cervical back: Normal range of motion and neck supple.   Lymphadenopathy:      Cervical: No cervical adenopathy.   Skin:     General: Skin is warm and dry.   Neurological:      Mental Status: She is alert and oriented to person, place, and time.      Cranial Nerves: No cranial nerve deficit.   Psychiatric:         Behavior: Behavior normal.       Labs    Lab Results   Component Value Date    WBC 5.21 08/25/2022    HGB 13.9 08/25/2022    HCT 41.4 08/25/2022     08/25/2022       Sodium   Date Value Ref Range Status   08/25/2022 140 136 - 145 mmol/L Final     Potassium    Date Value Ref Range Status   08/25/2022 4.5 3.5 - 5.1 mmol/L Final     Chloride   Date Value Ref Range Status   08/25/2022 105 95 - 110 mmol/L Final     CO2   Date Value Ref Range Status   08/25/2022 27 23 - 29 mmol/L Final     Glucose   Date Value Ref Range Status   08/25/2022 104 70 - 110 mg/dL Final     BUN   Date Value Ref Range Status   08/25/2022 13 8 - 23 mg/dL Final     Creatinine   Date Value Ref Range Status   08/25/2022 0.8 0.5 - 1.4 mg/dL Final     Calcium   Date Value Ref Range Status   08/25/2022 9.6 8.7 - 10.5 mg/dL Final     Total Protein   Date Value Ref Range Status   08/25/2022 6.9 6.0 - 8.4 g/dL Final     Albumin   Date Value Ref Range Status   08/25/2022 4.1 3.5 - 5.2 g/dL Final     Total Bilirubin   Date Value Ref Range Status   08/25/2022 1.7 (H) 0.1 - 1.0 mg/dL Final     Comment:     For infants and newborns, interpretation of results should be based  on gestational age, weight and in agreement with clinical  observations.    Premature Infant recommended reference ranges:  Up to 24 hours.............<8.0 mg/dL  Up to 48 hours............<12.0 mg/dL  3-5 days..................<15.0 mg/dL  6-29 days.................<15.0 mg/dL       Alkaline Phosphatase   Date Value Ref Range Status   08/25/2022 81 55 - 135 U/L Final     AST   Date Value Ref Range Status   08/25/2022 17 10 - 40 U/L Final     ALT   Date Value Ref Range Status   08/25/2022 22 10 - 44 U/L Final     Anion Gap   Date Value Ref Range Status   08/25/2022 8 8 - 16 mmol/L Final       Xrays    CT chest (5/2022)  1.  No pulmonary nodules identified.  2.  Mild/moderate emphysematous lung disease.  3.  No consolidation or pleural effusion.     LUNG RADS CATEGORY 1: NEGATIVE        Impression/Plan    Problem List Items Addressed This Visit          Pulmonary    Pulmonary emphysema - Primary  Continue present medications.  Will refill medications as needed.  Instructed patient to contact us with any issues concerning their medications  (cost, reactions, etc.).  Have discussed with patient about inciting conditions which may exacerbate their disease.  We did discuss possible new therapies or de-escalation of therapy (if appropriate).  Asked patient if they were interested in pursuing pulmonary rehabilitation.  All questions answered  RTC 6 months  Patient instructed that they are to call if symptoms change or new issues develop prior to their next visit.                     Other    Personal history of tobacco use, presenting hazards to health   has quit   needs to stop vaping                          Raad Abad MD

## 2022-10-20 LAB
AORTIC VALVE CUSP SEPERATION: 1.7 CM
AV INDEX (PROSTH): 0.85
AV MEAN GRADIENT: 9 MMHG
AV PEAK GRADIENT: 9 MMHG
AV VALVE AREA: 2.68 CM2
AV VELOCITY RATIO: 0.42
BSA FOR ECHO PROCEDURE: 1.71 M2
CV ECHO LV RWT: 0.37 CM
DOP CALC AO PEAK VEL: 1.52 M/S
DOP CALC AO VTI: 28.2 CM
DOP CALC LVOT AREA: 3.1 CM2
DOP CALC LVOT DIAMETER: 2 CM
DOP CALC LVOT PEAK VEL: 0.64 M/S
DOP CALC LVOT STROKE VOLUME: 75.67 CM3
DOP CALCLVOT PEAK VEL VTI: 24.1 CM
E WAVE DECELERATION TIME: 187 MS
ECHO LV POSTERIOR WALL: 0.9 CM (ref 0.6–1.1)
EJECTION FRACTION: 45 %
FRACTIONAL SHORTENING: 37 % (ref 28–44)
INTERVENTRICULAR SEPTUM: 0.9 CM (ref 0.6–1.1)
IVRT: 111 MS
LEFT ATRIUM SIZE: 3.1 CM
LEFT INTERNAL DIMENSION IN SYSTOLE: 3.1 CM (ref 2.1–4)
LEFT VENTRICLE MASS INDEX: 91 G/M2
LEFT VENTRICULAR INTERNAL DIMENSION IN DIASTOLE: 4.9 CM (ref 3.5–6)
LEFT VENTRICULAR MASS: 152.95 G
MV PEAK A VEL: 0.9 M/S
MV STENOSIS PRESSURE HALF TIME: 68 MS
MV VALVE AREA P 1/2 METHOD: 3.24 CM2
PISA TR MAX VEL: 1.98 M/S
RA PRESSURE: 3 MMHG
RIGHT VENTRICULAR END-DIASTOLIC DIMENSION: 3 CM
TDI LATERAL: 0.06 M/S
TDI SEPTAL: 0.04 M/S
TDI: 0.05 M/S
TR MAX PG: 16 MMHG
TV REST PULMONARY ARTERY PRESSURE: 19 MMHG

## 2022-11-23 PROBLEM — E78.00 PURE HYPERCHOLESTEROLEMIA: Status: ACTIVE | Noted: 2022-11-23

## 2022-11-23 PROBLEM — I50.42 CHRONIC COMBINED SYSTOLIC AND DIASTOLIC HEART FAILURE: Status: ACTIVE | Noted: 2022-11-23

## 2022-11-30 ENCOUNTER — PATIENT MESSAGE (OUTPATIENT)
Dept: SURGERY | Facility: CLINIC | Age: 67
End: 2022-11-30
Payer: MEDICARE

## 2022-12-29 ENCOUNTER — PATIENT MESSAGE (OUTPATIENT)
Dept: OBSTETRICS AND GYNECOLOGY | Facility: CLINIC | Age: 67
End: 2022-12-29
Payer: MEDICARE

## 2023-02-06 ENCOUNTER — OFFICE VISIT (OUTPATIENT)
Dept: FAMILY MEDICINE | Facility: CLINIC | Age: 68
End: 2023-02-06
Payer: MEDICARE

## 2023-02-06 ENCOUNTER — TELEPHONE (OUTPATIENT)
Dept: FAMILY MEDICINE | Facility: CLINIC | Age: 68
End: 2023-02-06
Payer: MEDICARE

## 2023-02-06 VITALS
TEMPERATURE: 98 F | BODY MASS INDEX: 25.78 KG/M2 | RESPIRATION RATE: 16 BRPM | HEIGHT: 63 IN | SYSTOLIC BLOOD PRESSURE: 120 MMHG | DIASTOLIC BLOOD PRESSURE: 72 MMHG | OXYGEN SATURATION: 95 % | HEART RATE: 64 BPM | WEIGHT: 145.5 LBS

## 2023-02-06 DIAGNOSIS — E78.00 PURE HYPERCHOLESTEROLEMIA: ICD-10-CM

## 2023-02-06 DIAGNOSIS — I10 PRIMARY HYPERTENSION: ICD-10-CM

## 2023-02-06 DIAGNOSIS — R09.81 SINUS CONGESTION: Primary | ICD-10-CM

## 2023-02-06 PROCEDURE — 99214 PR OFFICE/OUTPT VISIT, EST, LEVL IV, 30-39 MIN: ICD-10-PCS | Mod: S$PBB,,,

## 2023-02-06 PROCEDURE — 99214 OFFICE O/P EST MOD 30 MIN: CPT | Mod: S$PBB,,,

## 2023-02-06 PROCEDURE — 99999 PR PBB SHADOW E&M-EST. PATIENT-LVL IV: CPT | Mod: PBBFAC,,,

## 2023-02-06 PROCEDURE — 99999 PR PBB SHADOW E&M-EST. PATIENT-LVL IV: ICD-10-PCS | Mod: PBBFAC,,,

## 2023-02-06 PROCEDURE — 99214 OFFICE O/P EST MOD 30 MIN: CPT | Mod: PBBFAC,PO

## 2023-02-06 RX ORDER — MONTELUKAST SODIUM 10 MG/1
10 TABLET ORAL NIGHTLY
Qty: 30 TABLET | Refills: 0 | Status: SHIPPED | OUTPATIENT
Start: 2023-02-06 | End: 2023-03-03

## 2023-02-06 RX ORDER — AMOXICILLIN AND CLAVULANATE POTASSIUM 875; 125 MG/1; MG/1
1 TABLET, FILM COATED ORAL 2 TIMES DAILY
Qty: 14 TABLET | Refills: 0 | Status: SHIPPED | OUTPATIENT
Start: 2023-02-06 | End: 2023-02-13

## 2023-02-06 RX ORDER — FLUTICASONE PROPIONATE 50 MCG
1 SPRAY, SUSPENSION (ML) NASAL DAILY PRN
Qty: 9.9 ML | Refills: 2 | Status: SHIPPED | OUTPATIENT
Start: 2023-02-06

## 2023-02-06 RX ORDER — AZELASTINE 1 MG/ML
1 SPRAY, METERED NASAL 2 TIMES DAILY PRN
Qty: 30 ML | Refills: 2 | Status: SHIPPED | OUTPATIENT
Start: 2023-02-06 | End: 2023-04-21

## 2023-02-06 NOTE — PROGRESS NOTES
Subjective:       Patient ID: Aysha Moore is a 67 y.o. female.    Chief Complaint: Nasal Congestion and Sinus Problem    Presents to the clinic w/ complaints of     Sinus Problem  This is a new problem. The current episode started 1 to 4 weeks ago. The problem has been gradually worsening since onset. There has been no fever. The pain is moderate. Associated symptoms include congestion, ear pain, sinus pressure and sneezing. Pertinent negatives include no coughing. Past treatments include saline sprays, sitting up, lying down, oral decongestants and spray decongestants. The treatment provided mild relief.     Past Medical History:   Diagnosis Date    Allergy     Anxiety     Arthritis     CAD (coronary artery disease)     coronary stents    Chronic back pain     lower back pain radiates to left leg    Chronic rhinitis     DAILY (dyspnea on exertion)     Hyperlipidemia     Hypertension     Melanoma in situ of left upper extremity     left thigh area    MI (myocardial infarction)     Seasonal allergic rhinitis 10/29/2020       Review of patient's allergies indicates:   Allergen Reactions    Cephalexin      Other reaction(s): Rash    Doxycycline monohydrate Hives    Lanoxin  [digoxin]      Other reaction(s): Rash    Lansoprazole      Other reaction(s): Rash    Macrobid [nitrofurantoin monohyd/m-cryst]          Current Outpatient Medications:     ammonium lactate 12 % Crea, aaa bid prn dry skin, Disp: 385 g, Rfl: 11    atorvastatin (LIPITOR) 80 MG tablet, Take 1 tablet (80 mg total) by mouth every evening., Disp: 90 tablet, Rfl: 4    busPIRone (BUSPAR) 5 MG Tab, TAKE 1 TABLET BY MOUTH TWICE A DAY (Patient taking differently: Take 5 mg by mouth every evening.), Disp: 180 tablet, Rfl: 1    calcium-vitamin D3 (OS-JOLIE 500 + D3) 500 mg-5 mcg (200 unit) per tablet, Take 1 tablet by mouth every morning., Disp: , Rfl:     citalopram (CELEXA) 20 MG tablet, TAKE 1 TABLET BY MOUTH EVERY DAY, Disp: 90 tablet, Rfl: 3    ezetimibe  (ZETIA) 10 mg tablet, Take 1 tablet (10 mg total) by mouth once daily., Disp: 90 tablet, Rfl: 3    lisinopriL (PRINIVIL,ZESTRIL) 2.5 MG tablet, Take 1 tablet (2.5 mg total) by mouth once daily. (Patient taking differently: Take 2.5 mg by mouth every evening.), Disp: 30 tablet, Rfl: 0    metoprolol succinate (TOPROL-XL) 50 MG 24 hr tablet, Take 1 tablet (50 mg total) by mouth once daily., Disp: 30 tablet, Rfl: 11    nitroGLYCERIN (NITROSTAT) 0.4 MG SL tablet, Place 1 tablet (0.4 mg total) under the tongue every 5 (five) minutes as needed for Chest pain., Disp: 30 tablet, Rfl: 0    tiZANidine (ZANAFLEX) 4 MG tablet, TAKE 1 TABLET (4 MG TOTAL) BY MOUTH EVERY 6 (SIX) HOURS AS NEEDED. BACK PAIN, Disp: 360 tablet, Rfl: 0    valACYclovir (VALTREX) 1000 MG tablet, TAKE 2 AT ONSET OF FEVER BLISTERS THEN REPEAT IN 12 HOURS (TOTAL 4 TABS PER EPISODE), Disp: 20 tablet, Rfl: 3    zinc 50 mg Tab, Take by mouth., Disp: , Rfl:     amoxicillin-clavulanate 875-125mg (AUGMENTIN) 875-125 mg per tablet, Take 1 tablet by mouth 2 (two) times daily. for 7 days, Disp: 14 tablet, Rfl: 0    aspirin (ECOTRIN) 81 MG EC tablet, Take 1 tablet (81 mg total) by mouth once daily. (Patient not taking: Reported on 8/24/2022), Disp: , Rfl: 0    azelastine (ASTELIN) 137 mcg (0.1 %) nasal spray, 1 spray (137 mcg total) by Nasal route 2 (two) times daily as needed for Rhinitis (or sinusitis)., Disp: 30 mL, Rfl: 2    fluticasone propionate (FLONASE) 50 mcg/actuation nasal spray, 1 spray (50 mcg total) by Each Nostril route daily as needed for Rhinitis or Allergies., Disp: 9.9 mL, Rfl: 2    montelukast (SINGULAIR) 10 mg tablet, Take 1 tablet (10 mg total) by mouth every evening., Disp: 30 tablet, Rfl: 0    Review of Systems   HENT:  Positive for congestion, ear pain, sinus pressure and sneezing.    Respiratory:  Negative for cough.      Objective:      /72 (BP Location: Right arm, Patient Position: Sitting, BP Method: Medium (Manual))   Pulse 64    "Temp 98.2 °F (36.8 °C) (Oral)   Resp 16   Ht 5' 3" (1.6 m)   Wt 66 kg (145 lb 8.1 oz)   SpO2 95%   BMI 25.77 kg/m²   Physical Exam  Vitals reviewed.   Constitutional:       General: She is not in acute distress.     Appearance: Normal appearance. She is normal weight. She is not ill-appearing, toxic-appearing or diaphoretic.   HENT:      Head: Normocephalic.      Right Ear: Hearing and external ear normal. No middle ear effusion. There is no impacted cerumen. Tympanic membrane is injected and erythematous.      Left Ear: Hearing and external ear normal.  No middle ear effusion. There is no impacted cerumen. Tympanic membrane is injected and erythematous.      Nose: Congestion and rhinorrhea present.      Mouth/Throat:      Mouth: Mucous membranes are moist.      Pharynx: Oropharynx is clear.   Eyes:      General: No scleral icterus.        Right eye: No discharge.         Left eye: No discharge.      Extraocular Movements: Extraocular movements intact.      Conjunctiva/sclera: Conjunctivae normal.   Cardiovascular:      Rate and Rhythm: Normal rate and regular rhythm.      Pulses: Normal pulses.      Heart sounds: Normal heart sounds. No murmur heard.    No friction rub. No gallop.   Pulmonary:      Effort: Pulmonary effort is normal. No respiratory distress.      Breath sounds: Normal breath sounds. No wheezing, rhonchi or rales.   Chest:      Chest wall: No tenderness.   Musculoskeletal:         General: No swelling, tenderness or deformity. Normal range of motion.      Cervical back: Normal range of motion.      Right lower leg: No edema.      Left lower leg: No edema.   Skin:     General: Skin is warm and dry.      Capillary Refill: Capillary refill takes less than 2 seconds.      Coloration: Skin is not jaundiced.      Findings: No bruising, erythema, lesion or rash.   Neurological:      Mental Status: She is alert and oriented to person, place, and time.      Gait: Gait normal.   Psychiatric:         Mood " and Affect: Mood normal.         Behavior: Behavior normal.         Thought Content: Thought content normal.         Judgment: Judgment normal.       Assessment:       1. Sinus congestion    2. Pure hypercholesterolemia    3. Primary hypertension          Plan:       Sinus congestion  -     amoxicillin-clavulanate 875-125mg (AUGMENTIN) 875-125 mg per tablet; Take 1 tablet by mouth 2 (two) times daily. for 7 days  Dispense: 14 tablet; Refill: 0  -     montelukast (SINGULAIR) 10 mg tablet; Take 1 tablet (10 mg total) by mouth every evening.  Dispense: 30 tablet; Refill: 0  -     azelastine (ASTELIN) 137 mcg (0.1 %) nasal spray; 1 spray (137 mcg total) by Nasal route 2 (two) times daily as needed for Rhinitis (or sinusitis).  Dispense: 30 mL; Refill: 2  -     fluticasone propionate (FLONASE) 50 mcg/actuation nasal spray; 1 spray (50 mcg total) by Each Nostril route daily as needed for Rhinitis or Allergies.  Dispense: 9.9 mL; Refill: 2    Pure hypercholesterolemia        -    Continue meds. Will continue to monitor.    Primary hypertension        -    Stable. Continue meds. Will continue to monitor.          Has follow up with Dr. Daniels scheduled        Dru Gaxiola PA-C  Family Medicine Physician Assistant       I spent a total of 20 minutes on the day of the visit.This includes face to face time and non-face to face time preparing to see the patient (eg, review of tests), obtaining and/or reviewing separately obtained history, documenting clinical information in the electronic or other health record, independently interpreting results and communicating results to the patient/family/caregiver, or care coordinator.      We have addressed [4] Moderate: 1 or more chronic illnesses with exacerbation, progression, or side effects of treatment / 2 or more stable chronic illnesses / 1 undiagnosed new problem with uncertain prognosis / 1 acute illness with systemic symptoms / 1 acute complicated injury  The complexity of  the data reviewed and analyzed for this visit was [2] Minimal or None  The risk of complications and/or morbidity or mortality are [4] Moderate risk (I.e. prescription drug management / decision regarding minor surgery with identified pt or procedure risk factors / decision regarding elective major surgery without identified pt or procedure risk factors / diagnosis or treatment significantly limited by social determinants of health)   The level of Medical Decision Making for this visit is [4] Moderate

## 2023-02-06 NOTE — PATIENT INSTRUCTIONS
Boy Olmstead,     If you are due for any health screening(s) below please notify me so we can arrange them to be ordered and scheduled to maintain your health. Most healthy patients complete it. Don't lose out on improving your health.     Tests to Keep You Healthy    Mammogram: DUE  Colon Cancer Screening: Met on 6/23/2020  Last Blood Pressure <= 139/89 (2/6/2023): Yes      Breast Cancer Screening    Breast cancer is the second most common cancer in women after skin cancer, and the second leading cause of death from cancer after lung cancer. Mammograms can detect breast cancer early, which significantly increases the chances of curing the cancer.      A screening mammogram is an x-ray image of the breasts used for early breast cancer detection. It can help reduce the number of deaths from breast cancer among women. To get a clear image, the breast is placed between two plastic plates to make it flat. How often a mammogram is needed depends on your age and your breast cancer risk.    Although you are still overdue for this important screening, due to the COVID-19 pandemic, we recommend scheduling it in the near future.

## 2023-02-06 NOTE — TELEPHONE ENCOUNTER
----- Message from Zully Yoo sent at 2/6/2023  7:59 AM CST -----  Contact: patient  Type: Needs Medical Advice  Who Called:  patient  Symptoms (please be specific):  congestion, ear pain, pressure in head, runny nose  How long has patient had these symptoms:  2 weeks  Pharmacy name and phone #:    [id number] - [place] - [address]  [address] [place] [address]  Phone: [phone] Fax: [phone]  [facility] Call Back Number: [phone] (home)   Additional Information: patient requesting a call back from Heena- she would like an antibiotic

## 2023-02-14 ENCOUNTER — PATIENT MESSAGE (OUTPATIENT)
Dept: FAMILY MEDICINE | Facility: CLINIC | Age: 68
End: 2023-02-14
Payer: MEDICARE

## 2023-02-14 DIAGNOSIS — J32.9 SINUSITIS, UNSPECIFIED CHRONICITY, UNSPECIFIED LOCATION: Primary | ICD-10-CM

## 2023-02-14 RX ORDER — AMOXICILLIN AND CLAVULANATE POTASSIUM 875; 125 MG/1; MG/1
1 TABLET, FILM COATED ORAL 2 TIMES DAILY
Qty: 6 TABLET | Refills: 0 | Status: SHIPPED | OUTPATIENT
Start: 2023-02-14 | End: 2023-02-17

## 2023-02-19 DIAGNOSIS — E78.5 HYPERLIPIDEMIA, UNSPECIFIED HYPERLIPIDEMIA TYPE: ICD-10-CM

## 2023-02-19 RX ORDER — ATORVASTATIN CALCIUM 80 MG/1
TABLET, FILM COATED ORAL
Qty: 90 TABLET | Refills: 1 | Status: SHIPPED | OUTPATIENT
Start: 2023-02-19 | End: 2023-06-06 | Stop reason: SDUPTHER

## 2023-02-19 NOTE — TELEPHONE ENCOUNTER
No new care gaps identified.  [facility] Embedded Care Gaps. Reference number: [id number]. 2/19/2023   12:22:07 AM CST

## 2023-03-02 ENCOUNTER — OFFICE VISIT (OUTPATIENT)
Dept: OBSTETRICS AND GYNECOLOGY | Facility: CLINIC | Age: 68
End: 2023-03-02
Payer: MEDICARE

## 2023-03-02 ENCOUNTER — HOSPITAL ENCOUNTER (OUTPATIENT)
Dept: RADIOLOGY | Facility: HOSPITAL | Age: 68
Discharge: HOME | End: 2023-03-02
Attending: STUDENT IN AN ORGANIZED HEALTH CARE EDUCATION/TRAINING PROGRAM
Payer: MEDICARE

## 2023-03-02 VITALS — SYSTOLIC BLOOD PRESSURE: 118 MMHG | WEIGHT: 147.5 LBS | DIASTOLIC BLOOD PRESSURE: 76 MMHG | BODY MASS INDEX: 26.13 KG/M2

## 2023-03-02 DIAGNOSIS — Z12.31 ENCOUNTER FOR SCREENING MAMMOGRAM FOR MALIGNANT NEOPLASM OF BREAST: ICD-10-CM

## 2023-03-02 DIAGNOSIS — Z01.419 WELL WOMAN EXAM: ICD-10-CM

## 2023-03-02 DIAGNOSIS — Z01.419 WELL WOMAN EXAM: Primary | ICD-10-CM

## 2023-03-02 PROCEDURE — 77067 MAMMO DIGITAL SCREENING BILAT WITH TOMO: ICD-10-PCS | Mod: 26,,, | Performed by: RADIOLOGY

## 2023-03-02 PROCEDURE — G0101 CA SCREEN;PELVIC/BREAST EXAM: HCPCS | Mod: S$PBB,,, | Performed by: STUDENT IN AN ORGANIZED HEALTH CARE EDUCATION/TRAINING PROGRAM

## 2023-03-02 PROCEDURE — 99213 OFFICE O/P EST LOW 20 MIN: CPT | Mod: PBBFAC,PN | Performed by: STUDENT IN AN ORGANIZED HEALTH CARE EDUCATION/TRAINING PROGRAM

## 2023-03-02 PROCEDURE — 77063 MAMMO DIGITAL SCREENING BILAT WITH TOMO: ICD-10-PCS | Mod: 26,,, | Performed by: RADIOLOGY

## 2023-03-02 PROCEDURE — 99999 PR PBB SHADOW E&M-EST. PATIENT-LVL III: ICD-10-PCS | Mod: PBBFAC,,, | Performed by: STUDENT IN AN ORGANIZED HEALTH CARE EDUCATION/TRAINING PROGRAM

## 2023-03-02 PROCEDURE — 77063 BREAST TOMOSYNTHESIS BI: CPT | Mod: 26,,, | Performed by: RADIOLOGY

## 2023-03-02 PROCEDURE — 77067 SCR MAMMO BI INCL CAD: CPT | Mod: 26,,, | Performed by: RADIOLOGY

## 2023-03-02 PROCEDURE — 99999 PR PBB SHADOW E&M-EST. PATIENT-LVL III: CPT | Mod: PBBFAC,,, | Performed by: STUDENT IN AN ORGANIZED HEALTH CARE EDUCATION/TRAINING PROGRAM

## 2023-03-02 PROCEDURE — 77067 SCR MAMMO BI INCL CAD: CPT | Mod: TC,PN

## 2023-03-02 PROCEDURE — G0101 PR CA SCREEN;PELVIC/BREAST EXAM: ICD-10-PCS | Mod: S$PBB,,, | Performed by: STUDENT IN AN ORGANIZED HEALTH CARE EDUCATION/TRAINING PROGRAM

## 2023-03-02 RX ORDER — CLOBETASOL PROPIONATE 0.5 MG/G
OINTMENT TOPICAL 2 TIMES DAILY
Qty: 45 G | Refills: 3 | Status: SHIPPED | OUTPATIENT
Start: 2023-03-02 | End: 2023-12-29

## 2023-03-02 NOTE — PROGRESS NOTES
"History & Physical  Gynecology      SUBJECTIVE:     Chief Complaint: Annual Exam       History of Present Illness:  67 y.o.  presents today for annual.     Gyn: hx of abdominal hyst 20 years ago for "precervical cancer", had  BSO later in life, last pap normal. Not sexually active. Denies PMB. Denies FH of gyn or colon cancer.    Social: vapes,     Hx of MI and CAD     Review of patient's allergies indicates:   Allergen Reactions    Cephalexin      Other reaction(s): Rash    Doxycycline monohydrate Hives    Lanoxin  [digoxin]      Other reaction(s): Rash    Lansoprazole      Other reaction(s): Rash    Macrobid [nitrofurantoin monohyd/m-cryst]        Past Medical History:   Diagnosis Date    Allergy     Anxiety     Arthritis     CAD (coronary artery disease)     coronary stents    Chronic back pain     lower back pain radiates to left leg    Chronic rhinitis     DAILY (dyspnea on exertion)     Hyperlipidemia     Hypertension     Melanoma in situ of left upper extremity     left thigh area    MI (myocardial infarction)     Seasonal allergic rhinitis 10/29/2020     Past Surgical History:   Procedure Laterality Date    BREAST SURGERY      breast reduction    CATARACT EXTRACTION, BILATERAL      coranary stent      EXCISION OF MELANOMA Left 3/30/2022    Procedure: EXCISION, MELANOMA;  Surgeon: David Mcpherson III, MD;  Location: [facility] OR;  Service: General;  Laterality: Left;    EXCISIONAL BIOPSY Left 3/30/2022    Procedure: EXCISIONAL BIOPSY;  Surgeon: David Mcpherson III, MD;  Location: [facility] OR;  Service: General;  Laterality: Left;    HYSTERECTOMY      total    LEFT HEART CATHETERIZATION Left 10/16/2020    Procedure: Left heart cath;  Surgeon: Garrett Robins MD;  Location: [facility] CATH/EP LAB;  Service: Cardiology;  Laterality: Left;    OOPHORECTOMY      TOTAL REDUCTION MAMMOPLASTY Bilateral      OB History          4    Para   4    Term   4            AB        Living             SAB        " IAB        Ectopic        Multiple        Live Births                   Family History   Problem Relation Age of Onset    Cancer Mother         breast, ovarian, cervical     Heart disease Mother     Breast cancer Mother 50    Ovarian cancer Mother     Cancer Father         melanoma    Stroke Sister     Heart disease Sister     Cancer Sister         leukemia    Heart disease Brother     Heart disease Maternal Grandmother     No Known Problems Daughter     No Known Problems Son     Cancer Maternal Uncle     Cancer Paternal Aunt         breast    Breast cancer Paternal Aunt 60    Cancer Paternal Uncle     Macular degeneration Sister     Heart disease Sister      Social History     Tobacco Use    Smoking status: Former     Packs/day: 1.00     Years: 20.00     Pack years: 20.00     Types: Cigarettes, Vaping with nicotine     Start date:      Quit date: 3/4/2017     Years since quittin.9    Smokeless tobacco: Never   Substance Use Topics    Alcohol use: Not Currently     Alcohol/week: 0.0 standard drinks     Comment: sometimes    Drug use: No       Current Outpatient Medications   Medication Sig    ammonium lactate 12 % Crea aaa bid prn dry skin    aspirin (ECOTRIN) 81 MG EC tablet Take 1 tablet (81 mg total) by mouth once daily. (Patient not taking: Reported on 2022)    atorvastatin (LIPITOR) 80 MG tablet TAKE 1 TABLET BY MOUTH EVERY EVENING    azelastine (ASTELIN) 137 mcg (0.1 %) nasal spray 1 spray (137 mcg total) by Nasal route 2 (two) times daily as needed for Rhinitis (or sinusitis).    busPIRone (BUSPAR) 5 MG Tab TAKE 1 TABLET BY MOUTH TWICE A DAY (Patient taking differently: Take 5 mg by mouth every evening.)    calcium-vitamin D3 (OS-JOLIE 500 + D3) 500 mg-5 mcg (200 unit) per tablet Take 1 tablet by mouth every morning.    citalopram (CELEXA) 20 MG tablet TAKE 1 TABLET BY MOUTH EVERY DAY    ezetimibe (ZETIA) 10 mg tablet TAKE 1 TABLET BY MOUTH EVERY DAY    fluticasone propionate (FLONASE) 50  mcg/actuation nasal spray 1 spray (50 mcg total) by Each Nostril route daily as needed for Rhinitis or Allergies.    lisinopriL (PRINIVIL,ZESTRIL) 2.5 MG tablet Take 1 tablet (2.5 mg total) by mouth once daily. (Patient taking differently: Take 2.5 mg by mouth every evening.)    metoprolol succinate (TOPROL-XL) 50 MG 24 hr tablet Take 1 tablet (50 mg total) by mouth once daily.    montelukast (SINGULAIR) 10 mg tablet Take 1 tablet (10 mg total) by mouth every evening.    nitroGLYCERIN (NITROSTAT) 0.4 MG SL tablet Place 1 tablet (0.4 mg total) under the tongue every 5 (five) minutes as needed for Chest pain.    tiZANidine (ZANAFLEX) 4 MG tablet TAKE 1 TABLET (4 MG TOTAL) BY MOUTH EVERY 6 (SIX) HOURS AS NEEDED. BACK PAIN    valACYclovir (VALTREX) 1000 MG tablet TAKE 2 AT ONSET OF FEVER BLISTERS THEN REPEAT IN 12 HOURS (TOTAL 4 TABS PER EPISODE)    zinc 50 mg Tab Take by mouth.     No current facility-administered medications for this visit.         Review of Systems:  Review of Systems   Constitutional:  Negative for chills, fatigue and fever.   HENT:  Negative for congestion.    Eyes:  Negative for visual disturbance.   Respiratory:  Negative for cough and shortness of breath.    Cardiovascular:  Negative for chest pain and palpitations.   Gastrointestinal:  Negative for abdominal distention, abdominal pain, constipation, diarrhea, nausea and vomiting.   Genitourinary:  Positive for vaginal pain. Negative for difficulty urinating, dysuria, hematuria, vaginal bleeding and vaginal discharge.   Skin:  Positive for rash.   Neurological:  Negative for dizziness, seizures, light-headedness and headaches.   Hematological:  Does not bruise/bleed easily.   Psychiatric/Behavioral:  Negative for dysphoric mood. The patient is not nervous/anxious.       OBJECTIVE:     Physical Exam:  Physical Exam  Vitals reviewed.   Constitutional:       General: She is not in acute distress.     Appearance: Normal appearance. She is  well-developed.   HENT:      Head: Normocephalic and atraumatic.   Cardiovascular:      Rate and Rhythm: Normal rate and regular rhythm.      Heart sounds: Normal heart sounds.   Pulmonary:      Effort: Pulmonary effort is normal.      Breath sounds: Normal breath sounds.   Chest:   Breasts:     Right: No inverted nipple, mass, nipple discharge, skin change or tenderness.      Left: No inverted nipple, mass, nipple discharge, skin change or tenderness.      Comments: Evidence of breast reduction   Abdominal:      General: Bowel sounds are normal. There is no distension.      Palpations: Abdomen is soft.   Genitourinary:     Vagina: Normal.      Comments: Vaginal cuff intact, no lesions noted. Adnexa without fullness or tenderness.    Some lichen changes noted   Skin:     General: Skin is warm.   Neurological:      Mental Status: She is alert and oriented to person, place, and time.   Psychiatric:         Behavior: Behavior normal.         Thought Content: Thought content normal.         Judgment: Judgment normal.         ASSESSMENT:       ICD-10-CM ICD-9-CM    1. Well woman exam  Z01.419 V72.31 Mammo Digital Screening Bilat      2. Encounter for screening mammogram for malignant neoplasm of breast  Z12.31 V76.12 Mammo Digital Screening Bilat          Orders Placed This Encounter   Procedures    Mammo Digital Screening Bilat     Standing Status:   Future     Standing Expiration Date:   5/1/2024           Plan:      Well Woman Exam:  - Pap cotest negative last year   - MMG today   - colonoscopy has had done  - tobacco cessation referral placed   - contraception n/a  - HPV vaccine n/a  - Safe Sex n/a  - DEXA ordered per PCP  - Counseled to take daily multivitamin. If patient is of reproductive age and not on contraception, to take prenatal vitamin. Patient has been counseled on the vitamin D and calcium requirements per ACOG recommendations.  Age   Calcium(mg/day)   Vitamin D (IU/day)  9-18    1300                        600  19-50  1000                       600  51-70  1200                       600  >70      1,200                      800        Naya Mccauley M.D.  Obstetrics and Gynecology

## 2023-05-17 NOTE — TELEPHONE ENCOUNTER
Aysha PARSON Oscar   [id number]   10/19/2020         Spoke with: no answer    Received Medications?:not applicable    Follow Up Appt?:not applicable    Cardio Pulmonary Rehab Appt?:not applicable    Comments: no answer. Will retry 10/20/2020    Connie Olsen RN     Acute encephalopathy

## 2023-05-24 ENCOUNTER — OFFICE VISIT (OUTPATIENT)
Dept: PULMONOLOGY | Facility: CLINIC | Age: 68
End: 2023-05-24
Payer: MEDICARE

## 2023-05-24 VITALS
BODY MASS INDEX: 26.09 KG/M2 | OXYGEN SATURATION: 96 % | HEART RATE: 76 BPM | SYSTOLIC BLOOD PRESSURE: 120 MMHG | DIASTOLIC BLOOD PRESSURE: 72 MMHG | WEIGHT: 147.31 LBS

## 2023-05-24 DIAGNOSIS — J43.9 PULMONARY EMPHYSEMA, UNSPECIFIED EMPHYSEMA TYPE: ICD-10-CM

## 2023-05-24 DIAGNOSIS — Z87.891 PERSONAL HISTORY OF TOBACCO USE, PRESENTING HAZARDS TO HEALTH: Primary | ICD-10-CM

## 2023-05-24 PROCEDURE — 99214 OFFICE O/P EST MOD 30 MIN: CPT | Mod: 25,S$GLB,, | Performed by: INTERNAL MEDICINE

## 2023-05-24 PROCEDURE — 99406 BEHAV CHNG SMOKING 3-10 MIN: CPT | Mod: S$GLB,,, | Performed by: INTERNAL MEDICINE

## 2023-05-24 PROCEDURE — 99214 PR OFFICE/OUTPT VISIT, EST, LEVL IV, 30-39 MIN: ICD-10-PCS | Mod: 25,S$GLB,, | Performed by: INTERNAL MEDICINE

## 2023-05-24 PROCEDURE — 99406 PR TOBACCO USE CESSATION INTERMEDIATE 3-10 MINUTES: ICD-10-PCS | Mod: S$GLB,,, | Performed by: INTERNAL MEDICINE

## 2023-05-24 RX ORDER — LEVOFLOXACIN 500 MG/1
500 TABLET, FILM COATED ORAL EVERY MORNING
COMMUNITY
Start: 2023-04-14 | End: 2023-07-12 | Stop reason: CLARIF

## 2023-05-24 RX ORDER — METHYLPREDNISOLONE 4 MG/1
TABLET ORAL
COMMUNITY
Start: 2023-04-19 | End: 2023-07-12 | Stop reason: CLARIF

## 2023-05-24 NOTE — PROGRESS NOTES
Office Visit    Patient Name: Aysha Moore  MRN: [id number]  : 1955      Reason for visit: COPD    HPI:     2019 - Here for evaluation of COPD.  Has been hospitalized twice for respiratory issues.  Here more recently has noted some symptoms but has been out of BREO for a week or so.  Has a h/o smoking - has quit cigarettes but is using a vape.  Has smoked about 1 PPD for about 40 years.    2019 - Here for follow up, no new issues reported.  Still vaping (d/we pt).  Reviewed PFT with pt.  Pt is currently stable on present medications with no recent increases in their symptoms or use of rescue medications.  I have reviewed the medical regimen and re-educated the pt on the role of rescue and controlling medications.  All questions answered.  Inhaler technique seems adequate.    2021 - Here for follow up, Pt is currently stable on present medications with no recent increases in their symptoms or use of rescue medications.  Since our last visit there have been no hospitalizations or ER visits for their respiratory issues and there does not seem to be anything to suggest unrecognized exacerbations.  I have reviewed the medical regimen and re-educated the pt on the role of rescue and controlling medications.  Inhaler technique and understanding seems adequate.  The patient reports no issues with any of there medications for their COPD.  Refills will be taken care of as needed.  All questions answered.  She stopped singulair - she felt that it made her short tempered and she is better since stopping it.  She had a MI recently.  Continues with some sinus infection - seen at Urgent Care given doxycycline but developed itching.  Still with symptoms.  Patient has no known corona virus exposures and has been practicing social distancing.  We have discussed the virus and precautions and all questions have been answered.    8/3/2021 - Here for follow up, Pt is currently stable on present medications with no  recent increases in their symptoms or use of rescue medications.  Since our last visit there have been no hospitalizations or ER visits for their respiratory issues and there does not seem to be anything to suggest unrecognized exacerbations.  I have reviewed the medical regimen and re-educated the pt on the role of rescue and controlling medications.  Inhaler technique and understanding seems adequate.  The patient reports no issues with any of there medications for their COPD.  Refills will be taken care of as needed.  All questions answered.  Has been taken off of lopressor due to decreased BP.  Patient has no known corona virus exposures and has been practicing social distancing.  We have discussed the virus and precautions and all questions have been answered.    2/1/2022 - Here for follow up, was sick for about 3 weeks around [place] (had known Covid exposure, had HA, sore throat, weak, cough, increased dyspnea, low grade fever).  She did 2 rapid tests which were negative but is interested in getting Covid antibodies checked.  Pt is currently stable on present medications with no recent increases in their symptoms or use of rescue medications.  Since our last visit there have been no hospitalizations or ER visits for their respiratory issues and there does not seem to be anything to suggest unrecognized exacerbations.  I have reviewed the medical regimen and re-educated the pt on the role of rescue and controlling medications.  Inhaler technique and understanding seems adequate.  The patient reports no issues with any of there medications for their COPD.  Refills will be taken care of as needed.  All questions answered.  She has been otherwise stable except for recent illness.  She has not been vaccinated for Covid.    10/5/2022 - Here for follow p, no new issues or problems reported.  We reviewed her CT scans and she will need a follow up CT in 5/2023.  Pt is currently stable on present medications with no  recent increases in their symptoms or use of rescue medications.  Since our last visit there have been no hospitalizations or ER visits for their respiratory issues and there does not seem to be anything to suggest unrecognized exacerbations.  I have reviewed the medical regimen and re-educated the pt on the role of rescue and controlling medications.  Inhaler technique and understanding seems adequate.  The patient reports no issues with any of there medications for their COPD.  Refills will be taken care of as needed.  All questions answered.  Did have Covid in early summer but was not very sick.    5/24/2023 - Here for follow up, Pt is currently stable on present medications with no recent increases in their symptoms or use of rescue medications.  Since our last visit there have been no hospitalizations or ER visits for their respiratory issues and there does not seem to be anything to suggest unrecognized exacerbations.  I have reviewed the medical regimen and re-educated the pt on the role of rescue and controlling medications.  Inhaler technique and understanding seems adequate.  The patient reports no issues with any of there medications for their COPD.  Refills will be taken care of as needed.  All questions answered.  Having issues with her sinuses and she is seeing Dr Chisholm and they are considering surgery.  Reviewed last PFT with her.  She has a skin lesion on her leg and is to see dermatology (she has a FMHx of melanoma and a prior melanoma).             Low Dose Screening CT Chest    Cigarettes - 1 PPD x 40 YEARS  Still smoking -   NO  QUIT 3/2019     Date of last LD CT - 5/2022    Result - Category 1, needs repeat 5/2023    I have discussed with pt about using a screening CT of the chest due to history of cigarette smoking.  We have discussed the possible findings and possible actions as a result of these findings.  The pt would like to proceed.    COPD Flowsheet    GOLD A    Last PFT - 8/2019  FEV1-  "74 % DLCO - 50 %     + LABA/LAMA    + prn ALBUTEROL    mMRC -  0 - SOB with strenuous exercise   - + 1 - SOB level ground, slight hill   -  2 - SOB walk slower or stop for breath level ground   -  3 - SOB at 100 yards or after few minutes   -  4 - SOB in house, dressing    Referral to PULMONARY REHABILITATION - NO    Tested for alpha-1-antitrypsin - NO  Result - na    Cigarette Counseling    Currently smoking 0 packs per day  40 pack years    Vaping a little    I have counseled pt for 3-5 minutes regarding cigarette cessation.  This has included the need to stop smoking as well as strategies, including but not limited to "cold turkey", CHANTIX (including risks and benefits of whitney drug), nicotine replacement and WELLBUTRIN.    Flu and Pneumonia Vaccination    I have recommended that the patient get this years influenza vaccine.  We discussed the risks and benefits of this treatment.      I reviewed patient's current pneumonia vaccine status.  Patient needs vaccination.  We have discussed the current guidelines and recommendations for pneumonia vaccination.              Past Medical History    Past Medical History:   Diagnosis Date    Allergy     Anxiety     Arthritis     CAD (coronary artery disease)     coronary stents    Chronic back pain     lower back pain radiates to left leg    Chronic rhinitis     DAILY (dyspnea on exertion)     Hyperlipidemia     Hypertension     Melanoma in situ of left upper extremity     left thigh area    MI (myocardial infarction)     Seasonal allergic rhinitis 10/29/2020       Past Surgical History    Past Surgical History:   Procedure Laterality Date    BREAST SURGERY      breast reduction    CATARACT EXTRACTION, BILATERAL      coranary stent      EXCISION OF MELANOMA Left 3/30/2022    Procedure: EXCISION, MELANOMA;  Surgeon: David Mcpherson III, MD;  Location: [facility] OR;  Service: General;  Laterality: Left;    EXCISIONAL BIOPSY Left 3/30/2022    Procedure: EXCISIONAL BIOPSY;  Surgeon: " David Mcpherson III, MD;  Location: [facility] OR;  Service: General;  Laterality: Left;    HYSTERECTOMY      total    LEFT HEART CATHETERIZATION Left 10/16/2020    Procedure: Left heart cath;  Surgeon: Garrett Robins MD;  Location: [facility] CATH/EP LAB;  Service: Cardiology;  Laterality: Left;    OOPHORECTOMY      TOTAL REDUCTION MAMMOPLASTY Bilateral 2000       Medications      Current Outpatient Medications:     ammonium lactate 12 % Crea, aaa bid prn dry skin, Disp: 385 g, Rfl: 11    atorvastatin (LIPITOR) 80 MG tablet, TAKE 1 TABLET BY MOUTH EVERY EVENING, Disp: 90 tablet, Rfl: 1    azelastine (ASTELIN) 137 mcg (0.1 %) nasal spray, 1 SPRAY (137 MCG TOTAL) BY NASAL ROUTE 2 (TWO) TIMES DAILY AS NEEDED FOR RHINITIS (OR SINUSITIS)., Disp: 90 mL, Rfl: 1    busPIRone (BUSPAR) 5 MG Tab, TAKE 1 TABLET BY MOUTH TWICE A DAY (Patient taking differently: Take 5 mg by mouth every evening.), Disp: 180 tablet, Rfl: 1    calcium-vitamin D3 (OS-JOLIE 500 + D3) 500 mg-5 mcg (200 unit) per tablet, Take 1 tablet by mouth every morning., Disp: , Rfl:     citalopram (CELEXA) 20 MG tablet, TAKE 1 TABLET BY MOUTH EVERY DAY, Disp: 90 tablet, Rfl: 3    ezetimibe (ZETIA) 10 mg tablet, TAKE 1 TABLET BY MOUTH EVERY DAY, Disp: 30 tablet, Rfl: 0    fluticasone propionate (FLONASE) 50 mcg/actuation nasal spray, 1 spray (50 mcg total) by Each Nostril route daily as needed for Rhinitis or Allergies., Disp: 9.9 mL, Rfl: 2    metoprolol succinate (TOPROL-XL) 50 MG 24 hr tablet, Take 1 tablet (50 mg total) by mouth once daily., Disp: 30 tablet, Rfl: 11    tiZANidine (ZANAFLEX) 4 MG tablet, TAKE 1 TABLET (4 MG TOTAL) BY MOUTH EVERY 6 (SIX) HOURS AS NEEDED. BACK PAIN, Disp: 360 tablet, Rfl: 0    valACYclovir (VALTREX) 1000 MG tablet, TAKE 2 AT ONSET OF FEVER BLISTERS THEN REPEAT IN 12 HOURS (TOTAL 4 TABS PER EPISODE), Disp: 20 tablet, Rfl: 3    zinc 50 mg Tab, Take by mouth., Disp: , Rfl:     aspirin (ECOTRIN) 81 MG EC tablet, Take 1 tablet (81 mg  total) by mouth once daily. (Patient not taking: Reported on 2022), Disp: , Rfl: 0    clindamycin (CLEOCIN) 300 MG capsule, TAKE 1 CAPSULE BY MOUTH EVERY MORNING AT NOON AT BEDTIME FOR 10 DAYS, Disp: , Rfl:     clobetasol 0.05% (TEMOVATE) 0.05 % Oint, Apply topically 2 (two) times daily. for 14 days, Disp: 45 g, Rfl: 3    levoFLOXacin (LEVAQUIN) 500 MG tablet, Take 500 mg by mouth every morning., Disp: , Rfl:     lisinopriL (PRINIVIL,ZESTRIL) 2.5 MG tablet, Take 1 tablet (2.5 mg total) by mouth once daily. (Patient taking differently: Take 2.5 mg by mouth every evening.), Disp: 30 tablet, Rfl: 0    methylPREDNISolone (MEDROL DOSEPACK) 4 mg tablet, TAKE 6 TABLETS ON DAY 1 AS DIRECTED ON PACKAGE AND DECREASE BY 1 TAB EACH DAY FOR A TOTAL OF 6 DAYS, Disp: , Rfl:     montelukast (SINGULAIR) 10 mg tablet, TAKE 1 TABLET BY MOUTH EVERY DAY IN THE EVENING (Patient not taking: Reported on 2023), Disp: 30 tablet, Rfl: 5    nitroGLYCERIN (NITROSTAT) 0.4 MG SL tablet, Place 1 tablet (0.4 mg total) under the tongue every 5 (five) minutes as needed for Chest pain., Disp: 30 tablet, Rfl: 0    Allergies    Review of patient's allergies indicates:   Allergen Reactions    Cephalexin      Other reaction(s): Rash    Doxycycline monohydrate Hives    Lanoxin  [digoxin]      Other reaction(s): Rash    Lansoprazole      Other reaction(s): Rash    Macrobid [nitrofurantoin monohyd/m-cryst]        SocHx    Social History     Tobacco Use   Smoking Status Former    Packs/day: 1.00    Years: 20.00    Pack years: 20.00    Types: Cigarettes, Vaping with nicotine    Start date:     Quit date: 3/4/2017    Years since quittin.2   Smokeless Tobacco Never       Social History     Substance and Sexual Activity   Alcohol Use Not Currently    Alcohol/week: 0.0 standard drinks    Comment: sometimes       Drug Use - no  Occupation - housewife  Asbestos exposure - no  Pets - dogs    FMHx    Family History   Problem Relation Age of Onset     Cancer Mother         breast, ovarian, cervical     Heart disease Mother     Breast cancer Mother 50    Ovarian cancer Mother     Cancer Father         melanoma    Stroke Sister     Heart disease Sister     Cancer Sister         leukemia    Macular degeneration Sister     Heart disease Sister     No Known Problems Daughter     Cancer Maternal Uncle     Cancer Paternal Aunt         breast    Breast cancer Paternal Aunt 60    Cancer Paternal Uncle     Heart disease Maternal Grandmother     Heart disease Brother     No Known Problems Son          Review of Systems  Review of Systems   Constitutional:  Negative for chills, diaphoresis, fever, malaise/fatigue and weight loss.   HENT:  Positive for congestion and tinnitus. Negative for ear discharge, ear pain, hearing loss, nosebleeds, sinus pain and sore throat.         Has had tinnitus for years (has seen ENT)   Eyes:  Negative for pain.   Respiratory:  Positive for shortness of breath and wheezing. Negative for cough, hemoptysis, sputum production and stridor.    Cardiovascular:  Negative for chest pain, palpitations, orthopnea, claudication, leg swelling and PND.        H/o PCI   Gastrointestinal:  Negative for abdominal pain, blood in stool, constipation, diarrhea, heartburn, melena, nausea and vomiting.   Genitourinary:  Negative for dysuria, flank pain, frequency, hematuria and urgency.   Musculoskeletal:  Positive for back pain, joint pain and neck pain. Negative for falls and myalgias.        Right knee meniscal injury   Skin:  Negative for itching and rash.   Neurological:  Negative for dizziness, tingling, tremors, sensory change, speech change, focal weakness, seizures, weakness and headaches.   Endo/Heme/Allergies:  Negative for environmental allergies.   Psychiatric/Behavioral:  Negative for depression, substance abuse and suicidal ideas. The patient is not nervous/anxious.      Physical Exam    Vitals:    05/24/23 1038   BP: 120/72   BP Location: Right arm    Patient Position: Sitting   BP Method: Medium (Manual)   Pulse: 76   SpO2: 96%   Weight: 66.8 kg (147 lb 4.8 oz)       Physical Exam  Vitals and nursing note reviewed.   Constitutional:       General: She is not in acute distress.     Appearance: She is well-developed. She is not diaphoretic.   HENT:      Head: Normocephalic and atraumatic.      Right Ear: External ear normal.      Left Ear: External ear normal.      Nose: Nose normal.   Eyes:      Conjunctiva/sclera: Conjunctivae normal.      Pupils: Pupils are equal, round, and reactive to light.   Neck:      Thyroid: No thyromegaly.      Vascular: No JVD.      Trachea: No tracheal deviation.   Cardiovascular:      Rate and Rhythm: Normal rate and regular rhythm.      Heart sounds: Normal heart sounds. No murmur heard.    No friction rub. No gallop.   Pulmonary:      Effort: Pulmonary effort is normal. No respiratory distress.      Breath sounds: Normal breath sounds. No stridor. No wheezing or rales.   Chest:      Chest wall: No tenderness.   Abdominal:      General: Bowel sounds are normal. There is no distension.      Palpations: Abdomen is soft.      Tenderness: There is no abdominal tenderness.   Musculoskeletal:         General: No tenderness. Normal range of motion.      Cervical back: Normal range of motion and neck supple.   Lymphadenopathy:      Cervical: No cervical adenopathy.   Skin:     General: Skin is warm and dry.   Neurological:      Mental Status: She is alert and oriented to person, place, and time.      Cranial Nerves: No cranial nerve deficit.   Psychiatric:         Behavior: Behavior normal.       Labs    Lab Results   Component Value Date    WBC 5.21 08/25/2022    HGB 13.9 08/25/2022    HCT 41.4 08/25/2022     08/25/2022       Sodium   Date Value Ref Range Status   08/25/2022 140 136 - 145 mmol/L Final     Potassium   Date Value Ref Range Status   08/25/2022 4.5 3.5 - 5.1 mmol/L Final     Chloride   Date Value Ref Range Status    08/25/2022 105 95 - 110 mmol/L Final     CO2   Date Value Ref Range Status   08/25/2022 27 23 - 29 mmol/L Final     Glucose   Date Value Ref Range Status   08/25/2022 104 70 - 110 mg/dL Final     BUN   Date Value Ref Range Status   08/25/2022 13 8 - 23 mg/dL Final     Creatinine   Date Value Ref Range Status   08/25/2022 0.8 0.5 - 1.4 mg/dL Final     Calcium   Date Value Ref Range Status   08/25/2022 9.6 8.7 - 10.5 mg/dL Final     Total Protein   Date Value Ref Range Status   08/25/2022 6.9 6.0 - 8.4 g/dL Final     Albumin   Date Value Ref Range Status   08/25/2022 4.1 3.5 - 5.2 g/dL Final     Total Bilirubin   Date Value Ref Range Status   08/25/2022 1.7 (H) 0.1 - 1.0 mg/dL Final     Comment:     For infants and newborns, interpretation of results should be based  on gestational age, weight and in agreement with clinical  observations.    Premature Infant recommended reference ranges:  Up to 24 hours.............<8.0 mg/dL  Up to 48 hours............<12.0 mg/dL  3-5 days..................<15.0 mg/dL  6-29 days.................<15.0 mg/dL       Alkaline Phosphatase   Date Value Ref Range Status   08/25/2022 81 55 - 135 U/L Final     AST   Date Value Ref Range Status   08/25/2022 17 10 - 40 U/L Final     ALT   Date Value Ref Range Status   08/25/2022 22 10 - 44 U/L Final     Anion Gap   Date Value Ref Range Status   08/25/2022 8 8 - 16 mmol/L Final       Xrays    CT chest (5/2022)  1.  No pulmonary nodules identified.  2.  Mild/moderate emphysematous lung disease.  3.  No consolidation or pleural effusion.     LUNG RADS CATEGORY 1: NEGATIVE        Impression/Plan    Problem List Items Addressed This Visit          Pulmonary    Pulmonary emphysema - Primary  Continue present medications.  Will refill medications as needed.  Instructed patient to contact us with any issues concerning their medications (cost, reactions, etc.).  Have discussed with patient about inciting conditions which may exacerbate their  disease.  We did discuss possible new therapies or de-escalation of therapy (if appropriate).  Asked patient if they were interested in pursuing pulmonary rehabilitation.  All questions answered  RTC 6 months  Patient instructed that they are to call if symptoms change or new issues develop prior to their next visit.                     Other    Personal history of tobacco use, presenting hazards to health   has quit   needs to stop vaping                          Raad Abad MD

## 2023-06-02 ENCOUNTER — HOSPITAL ENCOUNTER (OUTPATIENT)
Dept: RADIOLOGY | Facility: HOSPITAL | Age: 68
Discharge: HOME | End: 2023-06-02
Attending: INTERNAL MEDICINE
Payer: MEDICARE

## 2023-06-02 DIAGNOSIS — Z87.891 PERSONAL HISTORY OF TOBACCO USE, PRESENTING HAZARDS TO HEALTH: ICD-10-CM

## 2023-06-02 PROCEDURE — 71271 CT THORAX LUNG CANCER SCR C-: CPT | Mod: TC,PO

## 2023-06-06 ENCOUNTER — OFFICE VISIT (OUTPATIENT)
Dept: CARDIOLOGY | Facility: CLINIC | Age: 68
End: 2023-06-06
Payer: MEDICARE

## 2023-06-06 VITALS
HEIGHT: 63 IN | DIASTOLIC BLOOD PRESSURE: 64 MMHG | WEIGHT: 146 LBS | RESPIRATION RATE: 16 BRPM | BODY MASS INDEX: 25.87 KG/M2 | HEART RATE: 90 BPM | SYSTOLIC BLOOD PRESSURE: 108 MMHG | OXYGEN SATURATION: 97 %

## 2023-06-06 DIAGNOSIS — I50.42 CHRONIC COMBINED SYSTOLIC AND DIASTOLIC HEART FAILURE: ICD-10-CM

## 2023-06-06 DIAGNOSIS — Z95.5 HISTORY OF HEART ARTERY STENT: ICD-10-CM

## 2023-06-06 DIAGNOSIS — E78.5 HYPERLIPIDEMIA, UNSPECIFIED HYPERLIPIDEMIA TYPE: ICD-10-CM

## 2023-06-06 DIAGNOSIS — R91.8 ABNORMAL CT SCAN OF LUNG: ICD-10-CM

## 2023-06-06 DIAGNOSIS — E78.00 PURE HYPERCHOLESTEROLEMIA: ICD-10-CM

## 2023-06-06 DIAGNOSIS — R91.8 LUNG MASS: Primary | ICD-10-CM

## 2023-06-06 DIAGNOSIS — I10 PRIMARY HYPERTENSION: ICD-10-CM

## 2023-06-06 DIAGNOSIS — I25.10 CORONARY ARTERY DISEASE INVOLVING NATIVE CORONARY ARTERY OF NATIVE HEART WITHOUT ANGINA PECTORIS: ICD-10-CM

## 2023-06-06 PROCEDURE — 99999 PR PBB SHADOW E&M-EST. PATIENT-LVL IV: CPT | Mod: PBBFAC,,, | Performed by: INTERNAL MEDICINE

## 2023-06-06 PROCEDURE — 99214 OFFICE O/P EST MOD 30 MIN: CPT | Mod: S$PBB,,, | Performed by: INTERNAL MEDICINE

## 2023-06-06 PROCEDURE — 99214 OFFICE O/P EST MOD 30 MIN: CPT | Mod: PBBFAC,PN | Performed by: INTERNAL MEDICINE

## 2023-06-06 PROCEDURE — 99999 PR PBB SHADOW E&M-EST. PATIENT-LVL IV: ICD-10-PCS | Mod: PBBFAC,,, | Performed by: INTERNAL MEDICINE

## 2023-06-06 PROCEDURE — 99214 PR OFFICE/OUTPT VISIT, EST, LEVL IV, 30-39 MIN: ICD-10-PCS | Mod: S$PBB,,, | Performed by: INTERNAL MEDICINE

## 2023-06-06 RX ORDER — ATORVASTATIN CALCIUM 80 MG/1
80 TABLET, FILM COATED ORAL NIGHTLY
Qty: 90 TABLET | Refills: 1 | Status: SHIPPED | OUTPATIENT
Start: 2023-06-06 | End: 2024-02-28

## 2023-06-06 RX ORDER — LISINOPRIL 2.5 MG/1
2.5 TABLET ORAL NIGHTLY
Qty: 90 TABLET | Refills: 3 | Status: SHIPPED | OUTPATIENT
Start: 2023-06-06 | End: 2024-02-07

## 2023-06-06 RX ORDER — METOPROLOL SUCCINATE 50 MG/1
50 TABLET, EXTENDED RELEASE ORAL DAILY
Qty: 90 TABLET | Refills: 3 | Status: SHIPPED | OUTPATIENT
Start: 2023-06-06 | End: 2024-06-05

## 2023-06-06 RX ORDER — EZETIMIBE 10 MG/1
10 TABLET ORAL DAILY
Qty: 90 TABLET | Refills: 3 | Status: SHIPPED | OUTPATIENT
Start: 2023-06-06 | End: 2024-06-05

## 2023-06-06 NOTE — ASSESSMENT & PLAN NOTE
Continue on Lipitor 80 mg daily and Zetia 10 mg a day her last LDL cholesterol is 71 and add Co maintain low-fat low-cholesterol diet

## 2023-06-06 NOTE — PROGRESS NOTES
Subjective:    Patient ID:  Aysha Moore is a 67 y.o. female patient here for evaluation Follow-up and Results (She has a recent ct scan done with Dr. Abad, he has ordered a pet scan)      History of Present Illness:     Patient is a 67-year-old has been doing very well new symptoms of shortness of PND orthopnea.  She is seeking to have follow-up evaluation.    She had CT scan June 2nd 2023 and noted to have some abnormality in the left pleural based mass was identified.  She is now waiting to schedule a PET scan.  Patient offers no symptoms of angina shortness of breath no cough or congestion no fevers or chills no weight loss noted.    She does have a history of tobacco usage in the past quit about 4 years ago  No active cardiac symptoms are noted.  There is no blood in the stools no black stools    Patient had recurrent sinus infections had 3 course of antibiotics CT has demonstrated chronic sinusitis.  She was in the care of ENT and finally she is being to respond to this and feeling better at this time.  No headaches no visual disturbances currently rare sinus headaches are noted.    Review of patient's allergies indicates:   Allergen Reactions    Cephalexin      Other reaction(s): Rash    Doxycycline monohydrate Hives    Lanoxin  [digoxin]      Other reaction(s): Rash    Lansoprazole      Other reaction(s): Rash    Macrobid [nitrofurantoin monohyd/m-cryst]        Past Medical History:   Diagnosis Date    Allergy     Anxiety     Arthritis     CAD (coronary artery disease)     coronary stents    Chronic back pain     lower back pain radiates to left leg    Chronic rhinitis     DAILY (dyspnea on exertion)     Hyperlipidemia     Hypertension     Melanoma in situ of left upper extremity     left thigh area    MI (myocardial infarction)     Seasonal allergic rhinitis 10/29/2020     Past Surgical History:   Procedure Laterality Date    BREAST SURGERY      breast reduction    CATARACT EXTRACTION, BILATERAL       coranary stent      EXCISION OF MELANOMA Left 3/30/2022    Procedure: EXCISION, MELANOMA;  Surgeon: David Mcpherson III, MD;  Location: [facility] OR;  Service: General;  Laterality: Left;    EXCISIONAL BIOPSY Left 3/30/2022    Procedure: EXCISIONAL BIOPSY;  Surgeon: David Mcpherson III, MD;  Location: [facility] OR;  Service: General;  Laterality: Left;    HYSTERECTOMY      total    LEFT HEART CATHETERIZATION Left 10/16/2020    Procedure: Left heart cath;  Surgeon: Garrett Robins MD;  Location: [facility] CATH/EP LAB;  Service: Cardiology;  Laterality: Left;    OOPHORECTOMY      TOTAL REDUCTION MAMMOPLASTY Bilateral      Social History     Tobacco Use    Smoking status: Former     Packs/day: 1.00     Years: 20.00     Pack years: 20.00     Types: Cigarettes, Vaping with nicotine     Start date:      Quit date: 3/4/2017     Years since quittin.2    Smokeless tobacco: Never   Substance Use Topics    Alcohol use: Not Currently     Alcohol/week: 0.0 standard drinks     Comment: sometimes    Drug use: No        Review of Systems:    As noted in HPI in addition      REVIEW OF SYSTEMS  CARDIOVASCULAR: No recent chest pain, palpitations, arm, neck, or jaw pain  RESPIRATORY: No recent fever, cough chills, SOB or congestion  : No blood in the urine  GI: No Nausea, vomiting, constipation, diarrhea, blood, or reflux.  MUSCULOSKELETAL: No myalgias  NEURO: No lightheadedness or dizziness  EYES: No Double vision, blurry, vision or headache              Objective        Vitals:    23 1412   BP: 108/64   Pulse: 90   Resp: 16       LIPIDS - LAST 2   Lab Results   Component Value Date    CHOL 141 2022    CHOL 156 2022    HDL 43 2022    HDL 44 2022    LDLCALC 70.6 2022    LDLCALC 91.8 2022    TRIG 137 2022    TRIG 101 2022    CHOLHDL 30.5 2022    CHOLHDL 28.2 2022       CBC - LAST 2  Lab Results   Component Value Date    WBC 5.21 2022    WBC 4.19 2022     RBC 4.52 08/25/2022    RBC 4.49 03/29/2022    HGB 13.9 08/25/2022    HGB 13.5 03/29/2022    HCT 41.4 08/25/2022    HCT 40.2 03/29/2022    MCV 92 08/25/2022    MCV 90 03/29/2022    MCH 30.8 08/25/2022    MCH 30.1 03/29/2022    MCHC 33.6 08/25/2022    MCHC 33.6 03/29/2022    RDW 12.0 08/25/2022    RDW 11.9 03/29/2022     08/25/2022     03/29/2022    MPV 10.5 08/25/2022    MPV 10.3 03/29/2022    GRAN 1.3 (L) 08/25/2022    GRAN 24.5 (L) 08/25/2022    LYMPH 3.1 08/25/2022    LYMPH 59.9 (H) 08/25/2022    MONO 0.5 08/25/2022    MONO 10.4 08/25/2022    BASO 0.03 08/25/2022    BASO 0.03 03/29/2022    NRBC 0 08/25/2022    NRBC 0 03/29/2022       CHEMISTRY & LIVER FUNCTION - LAST 2  Lab Results   Component Value Date     08/25/2022     03/29/2022    K 4.5 08/25/2022    K 4.0 03/29/2022     08/25/2022     03/29/2022    CO2 27 08/25/2022    CO2 28 03/29/2022    ANIONGAP 8 08/25/2022    ANIONGAP 7 (L) 03/29/2022    BUN 13 08/25/2022    BUN 11 03/29/2022    CREATININE 0.8 08/25/2022    CREATININE 0.7 03/29/2022     08/25/2022    GLU 87 03/29/2022    CALCIUM 9.6 08/25/2022    CALCIUM 9.1 03/29/2022    MG 2.0 10/17/2020    ALBUMIN 4.1 08/25/2022    ALBUMIN 4.1 01/12/2022    PROT 6.9 08/25/2022    PROT 7.2 01/12/2022    ALKPHOS 81 08/25/2022    ALKPHOS 80 01/12/2022    ALT 22 08/25/2022    ALT 17 01/12/2022    AST 17 08/25/2022    AST 20 01/12/2022    BILITOT 1.7 (H) 08/25/2022    BILITOT 1.1 (H) 01/12/2022        CARDIAC PROFILE - LAST 2  Lab Results   Component Value Date    BNP 24 10/16/2020    CPK 68 07/18/2008    CPK 46 03/06/2008    CPKMB 345.9 (H) 10/17/2020    CPKMB 479.2 (H) 10/16/2020    TROPONINI 18.653 (HH) 10/18/2020    TROPONINI 47.683 (HH) 10/17/2020        COAGULATION - LAST 2  Lab Results   Component Value Date    LABPT 13.5 10/16/2020    LABPT 13.2 10/16/2020    INR 1.1 10/16/2020    INR 1.1 10/16/2020    APTT 27.1 10/16/2020       ENDOCRINE & PSA - LAST 2  Lab Results    Component Value Date    HGBA1C 5.5 08/25/2022    HGBA1C 5.4 01/12/2022    TSH 0.528 12/11/2017    TSH 0.871 10/16/2015        ECHOCARDIOGRAM RESULTS  Results for orders placed during the hospital encounter of 08/31/22    Echo    Interpretation Summary  · The left ventricle is normal in size with mildly decreased systolic function. The estimated ejection fraction is 45%.  · There are segmental left ventricular wall motion abnormalities.  · Normal right ventricular size with normal right ventricular systolic function.  · Left ventricular diastolic dysfunction.  · The estimated PA systolic pressure is 19 mmHg.  · Normal central venous pressure (3 mmHg).      CURRENT/PREVIOUS VISIT EKG  Results for orders placed or performed in visit on 04/18/22   EKG 12-lead    Collection Time: 04/18/22 11:43 AM    Narrative    Test Reason : I25.10,E78.00,Z95.5,    Vent. Rate : 073 BPM     Atrial Rate : 073 BPM     P-R Int : 154 ms          QRS Dur : 104 ms      QT Int : 410 ms       P-R-T Axes : 027 014 034 degrees     QTc Int : 451 ms    Normal sinus rhythm  Low voltage QRS  Nonspecific T wave abnormality  Abnormal ECG  When compared with ECG of 29-MAR-2022 13:10,  No significant change was found  Confirmed by Bernabe Adorno MD ([id number]) on 4/26/2022 5:57:31 PM    Referred By: BEBA   SELF           Confirmed By:Bernabe Adorno MD     No valid procedures specified.   No results found for this or any previous visit.    CT of the chest on June 6, 2023  IMPRESSION: Interval development of a lobulated pleural-based 2.6 x 2.5 x 3.2 cm mass in the posterior inferior left lower lobe with air bronchograms and cystic changes along the posterior margin of the mass and pleural surface. Findings are suspicious for malignancy although infectious process cannot be excluded. A PET CT is recommended for further evaluation     Mild emphysematous changes      PHYSICAL EXAM  CONSTITUTIONAL: Well built, well nourished in no apparent distress  NECK:  no carotid bruit, no JVD  LUNGS: CTA  CHEST WALL: no tenderness  HEART: regular rate and rhythm, S1, S2 normal, no murmur, click, rub or gallop   ABDOMEN: soft, non-tender; bowel sounds normal; no masses,  no organomegaly  EXTREMITIES: Extremities normal, no edema, no calf tenderness noted  NEURO: AAO X 3    I HAVE REVIEWED :    The vital signs, nurses notes, and all the pertinent radiology and labs.        Current Outpatient Medications   Medication Instructions    ammonium lactate 12 % Crea aaa bid prn dry skin    aspirin (ECOTRIN) 81 mg, Oral, Daily    atorvastatin (LIPITOR) 80 MG tablet TAKE 1 TABLET BY MOUTH EVERY EVENING    azelastine (ASTELIN) 137 mcg, Nasal, 2 times daily PRN    busPIRone (BUSPAR) 5 MG Tab TAKE 1 TABLET BY MOUTH TWICE A DAY    calcium-vitamin D3 (OS-JOLIE 500 + D3) 500 mg-5 mcg (200 unit) per tablet 1 tablet, Oral, Every morning    citalopram (CELEXA) 20 MG tablet TAKE 1 TABLET BY MOUTH EVERY DAY    clindamycin (CLEOCIN) 300 MG capsule TAKE 1 CAPSULE BY MOUTH EVERY MORNING AT NOON AT BEDTIME FOR 10 DAYS    clobetasol 0.05% (TEMOVATE) 0.05 % Oint Topical (Top), 2 times daily    ezetimibe (ZETIA) 10 mg tablet TAKE 1 TABLET BY MOUTH EVERY DAY    fluticasone propionate (FLONASE) 50 mcg, Each Nostril, Daily PRN    levoFLOXacin (LEVAQUIN) 500 mg, Oral, Every morning    lisinopriL (PRINIVIL,ZESTRIL) 2.5 mg, Oral, Daily    methylPREDNISolone (MEDROL DOSEPACK) 4 mg tablet TAKE 6 TABLETS ON DAY 1 AS DIRECTED ON PACKAGE AND DECREASE BY 1 TAB EACH DAY FOR A TOTAL OF 6 DAYS    metoprolol succinate (TOPROL-XL) 50 mg, Oral, Daily    montelukast (SINGULAIR) 10 mg tablet TAKE 1 TABLET BY MOUTH EVERY DAY IN THE EVENING    nitroGLYCERIN (NITROSTAT) 0.4 mg, Sublingual, Every 5 min PRN    tiZANidine (ZANAFLEX) 4 mg, Oral, Every 6 hours PRN, Back pain    valACYclovir (VALTREX) 1000 MG tablet TAKE 2 AT ONSET OF FEVER BLISTERS THEN REPEAT IN 12 HOURS (TOTAL 4 TABS PER EPISODE)    zinc 50 mg Tab Oral           Assessment & Plan     Hypertension  Blood pressure is stable at 1 0 8/64 mm Hg continue on low doses of lisinopril 2.5 mg at nighttime continue Toprol-XL 50 mg a day heart rate is at 90 beats per minute.  Continue on present therapy maintain low-salt diet    Pure hypercholesterolemia  Continue on Lipitor 80 mg daily and Zetia 10 mg a day her last LDL cholesterol is 71 and add Co maintain low-fat low-cholesterol diet    History of heart artery stent  No active cardiac problems are noted.  Continue on risk factor modification and prevention    Chronic combined systolic and diastolic heart failure  Clinically stable at this time continue on low-dose of lisinopril as well as metoprolol no heart failure manifestation at this time    Abnormal CT scan of lung  She is scheduled for are PET-CT await the results and follow-up with pulmonologist.          Follow up in about 6 months (around 12/6/2023).

## 2023-06-06 NOTE — ASSESSMENT & PLAN NOTE
Clinically stable at this time continue on low-dose of lisinopril as well as metoprolol no heart failure manifestation at this time

## 2023-06-06 NOTE — ASSESSMENT & PLAN NOTE
Blood pressure is stable at 1 0 8/64 mm Hg continue on low doses of lisinopril 2.5 mg at nighttime continue Toprol-XL 50 mg a day heart rate is at 90 beats per minute.  Continue on present therapy maintain low-salt diet

## 2023-06-07 ENCOUNTER — TELEPHONE (OUTPATIENT)
Dept: PULMONOLOGY | Facility: CLINIC | Age: 68
End: 2023-06-07

## 2023-06-07 NOTE — TELEPHONE ENCOUNTER
----- Message from Harper Araujo MA sent at 6/7/2023 11:46 AM CDT -----  Regarding: schedule  Had a ct needs a pet scan  wants to have her come in when he gets back from vacation   ----- Message -----  From: Raad Abad MD  Sent: 6/6/2023   6:10 AM CDT  To: Harper Araujo MA    CT shows a new area in her LLL and I will order a PET scan to look at it more closely.  She needs a follow up appointment when I get back from vacation.  ALos - has she been sick recently - cough, congestion, etc ?

## 2023-06-15 ENCOUNTER — TELEPHONE (OUTPATIENT)
Dept: FAMILY MEDICINE | Facility: CLINIC | Age: 68
End: 2023-06-15
Payer: MEDICARE

## 2023-06-15 NOTE — TELEPHONE ENCOUNTER
I called the patient in regards to her appointment that she has scheduled with Dr. Daniels on 08/25/23 at 10:20am, no answer left detailed message on voicemail. Dr. Daniels will not be in the office on this day and we need to see about getting her rescheduled. I will send the patient a portal message as well.

## 2023-06-20 ENCOUNTER — HOSPITAL ENCOUNTER (OUTPATIENT)
Dept: RADIOLOGY | Facility: HOSPITAL | Age: 68
Discharge: HOME | End: 2023-06-20
Attending: INTERNAL MEDICINE
Payer: MEDICARE

## 2023-06-20 ENCOUNTER — OFFICE VISIT (OUTPATIENT)
Dept: PULMONOLOGY | Facility: CLINIC | Age: 68
End: 2023-06-20
Payer: MEDICARE

## 2023-06-20 VITALS
OXYGEN SATURATION: 95 % | DIASTOLIC BLOOD PRESSURE: 72 MMHG | BODY MASS INDEX: 25.26 KG/M2 | HEART RATE: 93 BPM | SYSTOLIC BLOOD PRESSURE: 120 MMHG | WEIGHT: 142.63 LBS

## 2023-06-20 VITALS — BODY MASS INDEX: 26.22 KG/M2 | WEIGHT: 148 LBS | HEIGHT: 63 IN

## 2023-06-20 DIAGNOSIS — R91.8 LUNG MASS: ICD-10-CM

## 2023-06-20 DIAGNOSIS — R91.8 ABNORMAL CT SCAN OF LUNG: ICD-10-CM

## 2023-06-20 LAB — GLUCOSE SERPL-MCNC: 125 MG/DL (ref 70–110)

## 2023-06-20 PROCEDURE — 99214 PR OFFICE/OUTPT VISIT, EST, LEVL IV, 30-39 MIN: ICD-10-PCS | Mod: S$GLB,,, | Performed by: INTERNAL MEDICINE

## 2023-06-20 PROCEDURE — A9552 F18 FDG: HCPCS | Mod: PO

## 2023-06-20 PROCEDURE — 99214 OFFICE O/P EST MOD 30 MIN: CPT | Mod: S$GLB,,, | Performed by: INTERNAL MEDICINE

## 2023-06-20 NOTE — PROGRESS NOTES
Office Visit    Patient Name: Aysha Moore  MRN: [id number]  : 1955      Reason for visit: COPD    HPI:     2019 - Here for evaluation of COPD.  Has been hospitalized twice for respiratory issues.  Here more recently has noted some symptoms but has been out of BREO for a week or so.  Has a h/o smoking - has quit cigarettes but is using a vape.  Has smoked about 1 PPD for about 40 years.    2019 - Here for follow up, no new issues reported.  Still vaping (d/we pt).  Reviewed PFT with pt.  Pt is currently stable on present medications with no recent increases in their symptoms or use of rescue medications.  I have reviewed the medical regimen and re-educated the pt on the role of rescue and controlling medications.  All questions answered.  Inhaler technique seems adequate.    2021 - Here for follow up, Pt is currently stable on present medications with no recent increases in their symptoms or use of rescue medications.  Since our last visit there have been no hospitalizations or ER visits for their respiratory issues and there does not seem to be anything to suggest unrecognized exacerbations.  I have reviewed the medical regimen and re-educated the pt on the role of rescue and controlling medications.  Inhaler technique and understanding seems adequate.  The patient reports no issues with any of there medications for their COPD.  Refills will be taken care of as needed.  All questions answered.  She stopped singulair - she felt that it made her short tempered and she is better since stopping it.  She had a MI recently.  Continues with some sinus infection - seen at Urgent Care given doxycycline but developed itching.  Still with symptoms.  Patient has no known corona virus exposures and has been practicing social distancing.  We have discussed the virus and precautions and all questions have been answered.    8/3/2021 - Here for follow up, Pt is currently stable on present medications with no  recent increases in their symptoms or use of rescue medications.  Since our last visit there have been no hospitalizations or ER visits for their respiratory issues and there does not seem to be anything to suggest unrecognized exacerbations.  I have reviewed the medical regimen and re-educated the pt on the role of rescue and controlling medications.  Inhaler technique and understanding seems adequate.  The patient reports no issues with any of there medications for their COPD.  Refills will be taken care of as needed.  All questions answered.  Has been taken off of lopressor due to decreased BP.  Patient has no known corona virus exposures and has been practicing social distancing.  We have discussed the virus and precautions and all questions have been answered.    2/1/2022 - Here for follow up, was sick for about 3 weeks around [place] (had known Covid exposure, had HA, sore throat, weak, cough, increased dyspnea, low grade fever).  She did 2 rapid tests which were negative but is interested in getting Covid antibodies checked.  Pt is currently stable on present medications with no recent increases in their symptoms or use of rescue medications.  Since our last visit there have been no hospitalizations or ER visits for their respiratory issues and there does not seem to be anything to suggest unrecognized exacerbations.  I have reviewed the medical regimen and re-educated the pt on the role of rescue and controlling medications.  Inhaler technique and understanding seems adequate.  The patient reports no issues with any of there medications for their COPD.  Refills will be taken care of as needed.  All questions answered.  She has been otherwise stable except for recent illness.  She has not been vaccinated for Covid.    10/5/2022 - Here for follow p, no new issues or problems reported.  We reviewed her CT scans and she will need a follow up CT in 5/2023.  Pt is currently stable on present medications with no  recent increases in their symptoms or use of rescue medications.  Since our last visit there have been no hospitalizations or ER visits for their respiratory issues and there does not seem to be anything to suggest unrecognized exacerbations.  I have reviewed the medical regimen and re-educated the pt on the role of rescue and controlling medications.  Inhaler technique and understanding seems adequate.  The patient reports no issues with any of there medications for their COPD.  Refills will be taken care of as needed.  All questions answered.  Did have Covid in early summer but was not very sick.    5/24/2023 - Here for follow up, Pt is currently stable on present medications with no recent increases in their symptoms or use of rescue medications.  Since our last visit there have been no hospitalizations or ER visits for their respiratory issues and there does not seem to be anything to suggest unrecognized exacerbations.  I have reviewed the medical regimen and re-educated the pt on the role of rescue and controlling medications.  Inhaler technique and understanding seems adequate.  The patient reports no issues with any of there medications for their COPD.  Refills will be taken care of as needed.  All questions answered.  Having issues with her sinuses and she is seeing Dr Chisholm and they are considering surgery.  Reviewed last PFT with her.  She has a skin lesion on her leg and is to see dermatology (she has a FMHx of melanoma and a prior melanoma).       6/20/2023 - Here for results - reviewed CT and PET scans with pt and daughter and all questions answered - we will proceed with Ct guided needle biopsy.          Low Dose Screening CT Chest    Cigarettes - 1 PPD x 40 YEARS  Still smoking -   NO  QUIT 3/2019     Date of last LD CT - 5/2022    Result - Category 1, needs repeat 5/2023    I have discussed with pt about using a screening CT of the chest due to history of cigarette smoking.  We have discussed the  "possible findings and possible actions as a result of these findings.  The pt would like to proceed.    COPD Flowsheet    GOLD A    Last PFT - 8/2019  FEV1- 74 % DLCO - 50 %     + LABA/LAMA    + prn ALBUTEROL    mMRC -  0 - SOB with strenuous exercise   - + 1 - SOB level ground, slight hill   -  2 - SOB walk slower or stop for breath level ground   -  3 - SOB at 100 yards or after few minutes   -  4 - SOB in house, dressing    Referral to PULMONARY REHABILITATION - NO    Tested for alpha-1-antitrypsin - NO  Result - na    Cigarette Counseling    Currently smoking 0 packs per day  40 pack years    Vaping a little    I have counseled pt for 3-5 minutes regarding cigarette cessation.  This has included the need to stop smoking as well as strategies, including but not limited to "cold turkey", CHANTIX (including risks and benefits of whitney drug), nicotine replacement and WELLBUTRIN.    Flu and Pneumonia Vaccination    I have recommended that the patient get this years influenza vaccine.  We discussed the risks and benefits of this treatment.      I reviewed patient's current pneumonia vaccine status.  Patient needs vaccination.  We have discussed the current guidelines and recommendations for pneumonia vaccination.              Past Medical History    Past Medical History:   Diagnosis Date    Allergy     Anxiety     Arthritis     CAD (coronary artery disease)     coronary stents    Chronic back pain     lower back pain radiates to left leg    Chronic rhinitis     DAILY (dyspnea on exertion)     Hyperlipidemia     Hypertension     Melanoma in situ of left upper extremity     left thigh area    MI (myocardial infarction)     Seasonal allergic rhinitis 10/29/2020       Past Surgical History    Past Surgical History:   Procedure Laterality Date    BREAST SURGERY      breast reduction    CATARACT EXTRACTION, BILATERAL      coranary stent      EXCISION OF MELANOMA Left 3/30/2022    Procedure: EXCISION, MELANOMA;  Surgeon: David" JENIFFER Mcpherson III, MD;  Location: [facility] OR;  Service: General;  Laterality: Left;    EXCISIONAL BIOPSY Left 3/30/2022    Procedure: EXCISIONAL BIOPSY;  Surgeon: David Mcpherson III, MD;  Location: [facility] OR;  Service: General;  Laterality: Left;    HYSTERECTOMY      total    LEFT HEART CATHETERIZATION Left 10/16/2020    Procedure: Left heart cath;  Surgeon: Garrett Robins MD;  Location: [facility] CATH/EP LAB;  Service: Cardiology;  Laterality: Left;    OOPHORECTOMY      TOTAL REDUCTION MAMMOPLASTY Bilateral 2000       Medications      Current Outpatient Medications:     ammonium lactate 12 % Crea, aaa bid prn dry skin, Disp: 385 g, Rfl: 11    atorvastatin (LIPITOR) 80 MG tablet, Take 1 tablet (80 mg total) by mouth every evening., Disp: 90 tablet, Rfl: 1    azelastine (ASTELIN) 137 mcg (0.1 %) nasal spray, 1 SPRAY (137 MCG TOTAL) BY NASAL ROUTE 2 (TWO) TIMES DAILY AS NEEDED FOR RHINITIS (OR SINUSITIS)., Disp: 90 mL, Rfl: 1    busPIRone (BUSPAR) 5 MG Tab, TAKE 1 TABLET BY MOUTH TWICE A DAY (Patient taking differently: Take 5 mg by mouth every evening.), Disp: 180 tablet, Rfl: 1    calcium-vitamin D3 (OS-JOLIE 500 + D3) 500 mg-5 mcg (200 unit) per tablet, Take 1 tablet by mouth every morning., Disp: , Rfl:     citalopram (CELEXA) 20 MG tablet, TAKE 1 TABLET BY MOUTH EVERY DAY, Disp: 90 tablet, Rfl: 3    clindamycin (CLEOCIN) 300 MG capsule, TAKE 1 CAPSULE BY MOUTH EVERY MORNING AT NOON AT BEDTIME FOR 10 DAYS, Disp: , Rfl:     ezetimibe (ZETIA) 10 mg tablet, Take 1 tablet (10 mg total) by mouth once daily., Disp: 90 tablet, Rfl: 3    fluticasone propionate (FLONASE) 50 mcg/actuation nasal spray, 1 spray (50 mcg total) by Each Nostril route daily as needed for Rhinitis or Allergies., Disp: 9.9 mL, Rfl: 2    levoFLOXacin (LEVAQUIN) 500 MG tablet, Take 500 mg by mouth every morning., Disp: , Rfl:     lisinopriL (PRINIVIL,ZESTRIL) 2.5 MG tablet, Take 1 tablet (2.5 mg total) by mouth every evening., Disp: 90 tablet, Rfl: 3     methylPREDNISolone (MEDROL DOSEPACK) 4 mg tablet, TAKE 6 TABLETS ON DAY 1 AS DIRECTED ON PACKAGE AND DECREASE BY 1 TAB EACH DAY FOR A TOTAL OF 6 DAYS, Disp: , Rfl:     metoprolol succinate (TOPROL-XL) 50 MG 24 hr tablet, Take 1 tablet (50 mg total) by mouth once daily., Disp: 90 tablet, Rfl: 3    nitroGLYCERIN (NITROSTAT) 0.4 MG SL tablet, Place 1 tablet (0.4 mg total) under the tongue every 5 (five) minutes as needed for Chest pain., Disp: 30 tablet, Rfl: 0    tiZANidine (ZANAFLEX) 4 MG tablet, TAKE 1 TABLET (4 MG TOTAL) BY MOUTH EVERY 6 (SIX) HOURS AS NEEDED. BACK PAIN, Disp: 360 tablet, Rfl: 0    valACYclovir (VALTREX) 1000 MG tablet, TAKE 2 AT ONSET OF FEVER BLISTERS THEN REPEAT IN 12 HOURS (TOTAL 4 TABS PER EPISODE), Disp: 20 tablet, Rfl: 3    zinc 50 mg Tab, Take by mouth., Disp: , Rfl:     aspirin (ECOTRIN) 81 MG EC tablet, Take 1 tablet (81 mg total) by mouth once daily. (Patient not taking: Reported on 2022), Disp: , Rfl: 0    clobetasol 0.05% (TEMOVATE) 0.05 % Oint, Apply topically 2 (two) times daily. for 14 days, Disp: 45 g, Rfl: 3    montelukast (SINGULAIR) 10 mg tablet, TAKE 1 TABLET BY MOUTH EVERY DAY IN THE EVENING (Patient not taking: Reported on 2023), Disp: 30 tablet, Rfl: 5    Allergies    Review of patient's allergies indicates:   Allergen Reactions    Cephalexin      Other reaction(s): Rash    Doxycycline monohydrate Hives    Lanoxin  [digoxin]      Other reaction(s): Rash    Lansoprazole      Other reaction(s): Rash    Macrobid [nitrofurantoin monohyd/m-cryst]        SocHx    Social History     Tobacco Use   Smoking Status Former    Packs/day: 1.00    Years: 20.00    Pack years: 20.00    Types: Cigarettes, Vaping with nicotine    Start date:     Quit date: 3/4/2017    Years since quittin.2   Smokeless Tobacco Never       Social History     Substance and Sexual Activity   Alcohol Use Not Currently    Alcohol/week: 0.0 standard drinks    Comment: sometimes       Drug Use -  no  Occupation - housewife  Asbestos exposure - no  Pets - dogs    FMHx    Family History   Problem Relation Age of Onset    Cancer Mother         breast, ovarian, cervical     Heart disease Mother     Breast cancer Mother 50    Ovarian cancer Mother     Cancer Father         melanoma    Stroke Sister     Heart disease Sister     Cancer Sister         leukemia    Macular degeneration Sister     Heart disease Sister     No Known Problems Daughter     Cancer Maternal Uncle     Cancer Paternal Aunt         breast    Breast cancer Paternal Aunt 60    Cancer Paternal Uncle     Heart disease Maternal Grandmother     Heart disease Brother     No Known Problems Son          Review of Systems  Review of Systems   Constitutional:  Negative for chills, diaphoresis, fever, malaise/fatigue and weight loss.   HENT:  Positive for congestion and tinnitus. Negative for ear discharge, ear pain, hearing loss, nosebleeds, sinus pain and sore throat.         Has had tinnitus for years (has seen ENT)   Eyes:  Negative for pain.   Respiratory:  Positive for shortness of breath and wheezing. Negative for cough, hemoptysis, sputum production and stridor.    Cardiovascular:  Negative for chest pain, palpitations, orthopnea, claudication, leg swelling and PND.        H/o PCI   Gastrointestinal:  Negative for abdominal pain, blood in stool, constipation, diarrhea, heartburn, melena, nausea and vomiting.   Genitourinary:  Negative for dysuria, flank pain, frequency, hematuria and urgency.   Musculoskeletal:  Positive for back pain, joint pain and neck pain. Negative for falls and myalgias.        Right knee meniscal injury   Skin:  Negative for itching and rash.   Neurological:  Negative for dizziness, tingling, tremors, sensory change, speech change, focal weakness, seizures, weakness and headaches.   Endo/Heme/Allergies:  Negative for environmental allergies.   Psychiatric/Behavioral:  Negative for depression, substance abuse and suicidal  ideas. The patient is not nervous/anxious.      Physical Exam    Vitals:    06/20/23 1319   BP: 120/72   BP Location: Left arm   Patient Position: Sitting   BP Method: Medium (Manual)   Pulse: 93   SpO2: 95%   Weight: 64.7 kg (142 lb 9.6 oz)       Physical Exam  Vitals and nursing note reviewed.   Constitutional:       General: She is not in acute distress.     Appearance: She is well-developed. She is not diaphoretic.   HENT:      Head: Normocephalic and atraumatic.      Right Ear: External ear normal.      Left Ear: External ear normal.      Nose: Nose normal.   Eyes:      Conjunctiva/sclera: Conjunctivae normal.      Pupils: Pupils are equal, round, and reactive to light.   Neck:      Thyroid: No thyromegaly.      Vascular: No JVD.      Trachea: No tracheal deviation.   Cardiovascular:      Rate and Rhythm: Normal rate and regular rhythm.      Heart sounds: Normal heart sounds. No murmur heard.    No friction rub. No gallop.   Pulmonary:      Effort: Pulmonary effort is normal. No respiratory distress.      Breath sounds: Normal breath sounds. No stridor. No wheezing or rales.   Chest:      Chest wall: No tenderness.   Abdominal:      General: Bowel sounds are normal. There is no distension.      Palpations: Abdomen is soft.      Tenderness: There is no abdominal tenderness.   Musculoskeletal:         General: No tenderness. Normal range of motion.      Cervical back: Normal range of motion and neck supple.   Lymphadenopathy:      Cervical: No cervical adenopathy.   Skin:     General: Skin is warm and dry.   Neurological:      Mental Status: She is alert and oriented to person, place, and time.      Cranial Nerves: No cranial nerve deficit.   Psychiatric:         Behavior: Behavior normal.       Labs    Lab Results   Component Value Date    WBC 5.21 08/25/2022    HGB 13.9 08/25/2022    HCT 41.4 08/25/2022     08/25/2022       Sodium   Date Value Ref Range Status   08/25/2022 140 136 - 145 mmol/L Final      Potassium   Date Value Ref Range Status   08/25/2022 4.5 3.5 - 5.1 mmol/L Final     Chloride   Date Value Ref Range Status   08/25/2022 105 95 - 110 mmol/L Final     CO2   Date Value Ref Range Status   08/25/2022 27 23 - 29 mmol/L Final     Glucose   Date Value Ref Range Status   08/25/2022 104 70 - 110 mg/dL Final     BUN   Date Value Ref Range Status   08/25/2022 13 8 - 23 mg/dL Final     Creatinine   Date Value Ref Range Status   08/25/2022 0.8 0.5 - 1.4 mg/dL Final     Calcium   Date Value Ref Range Status   08/25/2022 9.6 8.7 - 10.5 mg/dL Final     Total Protein   Date Value Ref Range Status   08/25/2022 6.9 6.0 - 8.4 g/dL Final     Albumin   Date Value Ref Range Status   08/25/2022 4.1 3.5 - 5.2 g/dL Final     Total Bilirubin   Date Value Ref Range Status   08/25/2022 1.7 (H) 0.1 - 1.0 mg/dL Final     Comment:     For infants and newborns, interpretation of results should be based  on gestational age, weight and in agreement with clinical  observations.    Premature Infant recommended reference ranges:  Up to 24 hours.............<8.0 mg/dL  Up to 48 hours............<12.0 mg/dL  3-5 days..................<15.0 mg/dL  6-29 days.................<15.0 mg/dL       Alkaline Phosphatase   Date Value Ref Range Status   08/25/2022 81 55 - 135 U/L Final     AST   Date Value Ref Range Status   08/25/2022 17 10 - 40 U/L Final     ALT   Date Value Ref Range Status   08/25/2022 22 10 - 44 U/L Final     Anion Gap   Date Value Ref Range Status   08/25/2022 8 8 - 16 mmol/L Final       Xrays    CT chest (5/2022)  1.  No pulmonary nodules identified.  2.  Mild/moderate emphysematous lung disease.  3.  No consolidation or pleural effusion.     LUNG RADS CATEGORY 1: NEGATIVE        Impression/Plan    Problem List Items Addressed This Visit          Pulmonary    Pulmonary emphysema - Primary  Continue present medications.  Will refill medications as needed.  Instructed patient to contact us with any issues concerning their  medications (cost, reactions, etc.).  Have discussed with patient about inciting conditions which may exacerbate their disease.  We did discuss possible new therapies or de-escalation of therapy (if appropriate).  Asked patient if they were interested in pursuing pulmonary rehabilitation.  All questions answered  RTC 6 months  Patient instructed that they are to call if symptoms change or new issues develop prior to their next visit.                     Other    Personal history of tobacco use, presenting hazards to health   has quit   needs to stop vaping    Abnormal Ct chest  Will order lung biopsy                          Raad Abad MD

## 2023-06-23 ENCOUNTER — PATIENT MESSAGE (OUTPATIENT)
Dept: FAMILY MEDICINE | Facility: CLINIC | Age: 68
End: 2023-06-23
Payer: MEDICARE

## 2023-06-23 ENCOUNTER — TELEPHONE (OUTPATIENT)
Dept: RADIOLOGY | Facility: HOSPITAL | Age: 68
End: 2023-06-23

## 2023-06-23 NOTE — NURSING
Pt scheduled for ct guided lung bx.  Given arrival date and time of 7/12/23 at 7am.    Pt denies taking blood thinners or aspirin products.  Pt instructed to have nothing to eat or drink after midnight day of procedure.    Pt must have a  day of procedure.

## 2023-06-28 ENCOUNTER — TELEPHONE (OUTPATIENT)
Dept: PULMONOLOGY | Facility: CLINIC | Age: 68
End: 2023-06-28

## 2023-06-28 NOTE — TELEPHONE ENCOUNTER
Patient called an said she has the ct biopsy scheduled on 07/12/23 . She is having anxiety an almost anxiety attacks. She wants to know that if we can give her a low dose of xanax . She said she use to get it from

## 2023-06-29 RX ORDER — ALPRAZOLAM 0.25 MG/1
0.25 TABLET ORAL 3 TIMES DAILY PRN
Qty: 30 TABLET | Refills: 1 | Status: SHIPPED | OUTPATIENT
Start: 2023-06-29 | End: 2023-11-29 | Stop reason: SDUPTHER

## 2023-07-12 ENCOUNTER — HOSPITAL ENCOUNTER (OUTPATIENT)
Dept: RADIOLOGY | Facility: HOSPITAL | Age: 68
Discharge: HOME | End: 2023-07-12
Attending: RADIOLOGY
Payer: MEDICARE

## 2023-07-12 ENCOUNTER — HOSPITAL ENCOUNTER (OUTPATIENT)
Dept: RADIOLOGY | Facility: HOSPITAL | Age: 68
Discharge: HOME | End: 2023-07-12
Attending: INTERNAL MEDICINE
Payer: MEDICARE

## 2023-07-12 ENCOUNTER — TELEPHONE (OUTPATIENT)
Dept: INFECTIOUS DISEASES | Facility: CLINIC | Age: 68
End: 2023-07-12

## 2023-07-12 VITALS
RESPIRATION RATE: 16 BRPM | OXYGEN SATURATION: 99 % | HEART RATE: 76 BPM | HEIGHT: 63 IN | DIASTOLIC BLOOD PRESSURE: 74 MMHG | TEMPERATURE: 98 F | BODY MASS INDEX: 25.27 KG/M2 | WEIGHT: 142.63 LBS | SYSTOLIC BLOOD PRESSURE: 108 MMHG

## 2023-07-12 LAB
APTT PPP: 26.3 SEC (ref 21–32)
BASOPHILS # BLD AUTO: 0.04 K/UL (ref 0–0.2)
BASOPHILS NFR BLD: 0.6 % (ref 0–1.9)
DIFFERENTIAL METHOD: ABNORMAL
EOSINOPHIL # BLD AUTO: 0.7 K/UL (ref 0–0.5)
EOSINOPHIL NFR BLD: 11.2 % (ref 0–8)
ERYTHROCYTE [DISTWIDTH] IN BLOOD BY AUTOMATED COUNT: 12.2 % (ref 11.5–14.5)
HCT VFR BLD AUTO: 36.3 % (ref 37–48.5)
HGB BLD-MCNC: 12.1 G/DL (ref 12–16)
IMM GRANULOCYTES # BLD AUTO: 0.01 K/UL (ref 0–0.04)
IMM GRANULOCYTES NFR BLD AUTO: 0.2 % (ref 0–0.5)
INR PPP: 0.9 (ref 0.8–1.2)
LYMPHOCYTES # BLD AUTO: 2.4 K/UL (ref 1–4.8)
LYMPHOCYTES NFR BLD: 36.9 % (ref 18–48)
MCH RBC QN AUTO: 30.2 PG (ref 27–31)
MCHC RBC AUTO-ENTMCNC: 33.3 G/DL (ref 32–36)
MCV RBC AUTO: 91 FL (ref 82–98)
MONOCYTES # BLD AUTO: 0.8 K/UL (ref 0.3–1)
MONOCYTES NFR BLD: 11.5 % (ref 4–15)
NEUTROPHILS # BLD AUTO: 2.6 K/UL (ref 1.8–7.7)
NEUTROPHILS NFR BLD: 39.6 % (ref 38–73)
NRBC BLD-RTO: 0 /100 WBC
PLATELET # BLD AUTO: 317 K/UL (ref 150–450)
PMV BLD AUTO: 9.8 FL (ref 9.2–12.9)
PROTHROMBIN TIME: 10.2 SEC (ref 9–12.5)
RBC # BLD AUTO: 4.01 M/UL (ref 4–5.4)
WBC # BLD AUTO: 6.59 K/UL (ref 3.9–12.7)

## 2023-07-12 PROCEDURE — 99153 MOD SED SAME PHYS/QHP EA: CPT

## 2023-07-12 PROCEDURE — 25000003 PHARM REV CODE 250: Performed by: RADIOLOGY

## 2023-07-12 PROCEDURE — 85610 PROTHROMBIN TIME: CPT | Performed by: RADIOLOGY

## 2023-07-12 PROCEDURE — 85025 COMPLETE CBC W/AUTO DIFF WBC: CPT | Performed by: RADIOLOGY

## 2023-07-12 PROCEDURE — 32408 CORE NDL BX LNG/MED PERQ: CPT

## 2023-07-12 PROCEDURE — 85730 THROMBOPLASTIN TIME PARTIAL: CPT | Performed by: RADIOLOGY

## 2023-07-12 PROCEDURE — 99152 MOD SED SAME PHYS/QHP 5/>YRS: CPT

## 2023-07-12 PROCEDURE — 71045 X-RAY EXAM CHEST 1 VIEW: CPT | Mod: TC

## 2023-07-12 PROCEDURE — 88305 TISSUE EXAM BY PATHOLOGIST: CPT | Mod: TC | Performed by: PATHOLOGY

## 2023-07-12 PROCEDURE — 63600175 PHARM REV CODE 636 W HCPCS: Performed by: RADIOLOGY

## 2023-07-12 RX ORDER — MIDAZOLAM HYDROCHLORIDE 1 MG/ML
INJECTION INTRAMUSCULAR; INTRAVENOUS
Status: COMPLETED | OUTPATIENT
Start: 2023-07-12 | End: 2023-07-12

## 2023-07-12 RX ORDER — SODIUM CHLORIDE 9 MG/ML
INJECTION, SOLUTION INTRAVENOUS
Status: COMPLETED | OUTPATIENT
Start: 2023-07-12 | End: 2023-07-12

## 2023-07-12 RX ORDER — FENTANYL CITRATE 50 UG/ML
INJECTION, SOLUTION INTRAMUSCULAR; INTRAVENOUS
Status: COMPLETED | OUTPATIENT
Start: 2023-07-12 | End: 2023-07-12

## 2023-07-12 RX ADMIN — FENTANYL CITRATE 50 MCG: 50 INJECTION INTRAMUSCULAR; INTRAVENOUS at 10:07

## 2023-07-12 RX ADMIN — SODIUM CHLORIDE 250 ML/HR: 0.9 INJECTION, SOLUTION INTRAVENOUS at 09:07

## 2023-07-12 RX ADMIN — MIDAZOLAM HYDROCHLORIDE 2 MG: 1 INJECTION, SOLUTION INTRAMUSCULAR; INTRAVENOUS at 09:07

## 2023-07-12 NOTE — PLAN OF CARE
SPOKE TO CHANG ANGLIN IN RADIOLOGY.  SHE STATES CHEST X-RAY IS NEGATIVE PER RADIOLOGIST AND PATIENT IS OK TO BE DISCHARGED.

## 2023-07-12 NOTE — TELEPHONE ENCOUNTER
Patient called wants you to know she had the biopsy and everything went fine.  Wants to know where does she go from here.  Told her I would send a note to Jenniffer.

## 2023-07-13 DIAGNOSIS — M54.42 CHRONIC BILATERAL LOW BACK PAIN WITH LEFT-SIDED SCIATICA: ICD-10-CM

## 2023-07-13 DIAGNOSIS — G89.29 CHRONIC BILATERAL LOW BACK PAIN WITH LEFT-SIDED SCIATICA: ICD-10-CM

## 2023-07-13 RX ORDER — TIZANIDINE 4 MG/1
4 TABLET ORAL EVERY 6 HOURS PRN
Qty: 360 TABLET | Refills: 0 | Status: SHIPPED | OUTPATIENT
Start: 2023-07-13 | End: 2023-10-09

## 2023-07-13 NOTE — TELEPHONE ENCOUNTER
Care Due:                  Date            Visit Type   Department     Provider  --------------------------------------------------------------------------------                                EP -                              PRIMARY      [facility]  Last Visit: 08-      CARE ([facility])   PRACTICE       Yoni Daniels                              EP -                              PRIMARY      [facility]  Next Visit: 08-      CARE ([facility])   PRACTICE       Yoni Alexandra  Test          Frequency    Reason                     Performed    Due Date  --------------------------------------------------------------------------------    Cr..........  12 months..  valACYclovir.............  08- 08-    [facility] Embedded Care Due Messages. Reference number: [id number].   7/13/2023 1:00:22 AM CDT

## 2023-07-18 ENCOUNTER — OFFICE VISIT (OUTPATIENT)
Dept: PULMONOLOGY | Facility: CLINIC | Age: 68
End: 2023-07-18
Payer: MEDICARE

## 2023-07-18 VITALS
SYSTOLIC BLOOD PRESSURE: 118 MMHG | DIASTOLIC BLOOD PRESSURE: 72 MMHG | HEART RATE: 69 BPM | OXYGEN SATURATION: 97 % | WEIGHT: 141.88 LBS | BODY MASS INDEX: 25.14 KG/M2

## 2023-07-18 DIAGNOSIS — C34.90 MALIGNANT NEOPLASM OF UNSPECIFIED PART OF UNSPECIFIED BRONCHUS OR LUNG: Primary | ICD-10-CM

## 2023-07-18 PROCEDURE — 99214 OFFICE O/P EST MOD 30 MIN: CPT | Mod: S$GLB,,, | Performed by: INTERNAL MEDICINE

## 2023-07-18 PROCEDURE — 99214 PR OFFICE/OUTPT VISIT, EST, LEVL IV, 30-39 MIN: ICD-10-PCS | Mod: S$GLB,,, | Performed by: INTERNAL MEDICINE

## 2023-07-18 RX ORDER — AMOXICILLIN 500 MG/1
500 CAPSULE ORAL EVERY 8 HOURS
COMMUNITY
Start: 2023-06-24 | End: 2023-08-02

## 2023-07-18 NOTE — PROGRESS NOTES
Office Visit    Patient Name: Aysha Moore  MRN: [id number]  : 1955      Reason for visit: COPD    HPI:     2019 - Here for evaluation of COPD.  Has been hospitalized twice for respiratory issues.  Here more recently has noted some symptoms but has been out of [facility]O for a week or so.  Has a h/o smoking - has quit cigarettes but is using a vape.  Has smoked about 1 PPD for about 40 years.    2019 - Here for follow up, no new issues reported.  Still vaping (d/we pt).  Reviewed PFT with pt.  Pt is currently stable on present medications with no recent increases in their symptoms or use of rescue medications.  I have reviewed the medical regimen and re-educated the pt on the role of rescue and controlling medications.  All questions answered.  Inhaler technique seems adequate.    2021 - Here for follow up, Pt is currently stable on present medications with no recent increases in their symptoms or use of rescue medications.  Since our last visit there have been no hospitalizations or ER visits for their respiratory issues and there does not seem to be anything to suggest unrecognized exacerbations.  I have reviewed the medical regimen and re-educated the pt on the role of rescue and controlling medications.  Inhaler technique and understanding seems adequate.  The patient reports no issues with any of there medications for their COPD.  Refills will be taken care of as needed.  All questions answered.  She stopped singulair - she felt that it made her short tempered and she is better since stopping it.  She had a MI recently.  Continues with some sinus infection - seen at Urgent Care given doxycycline but developed itching.  Still with symptoms.  Patient has no known corona virus exposures and has been practicing social distancing.  We have discussed the virus and precautions and all questions have been answered.    8/3/2021 - Here for follow up, Pt is currently stable on present medications with no  recent increases in their symptoms or use of rescue medications.  Since our last visit there have been no hospitalizations or ER visits for their respiratory issues and there does not seem to be anything to suggest unrecognized exacerbations.  I have reviewed the medical regimen and re-educated the pt on the role of rescue and controlling medications.  Inhaler technique and understanding seems adequate.  The patient reports no issues with any of there medications for their COPD.  Refills will be taken care of as needed.  All questions answered.  Has been taken off of lopressor due to decreased BP.  Patient has no known corona virus exposures and has been practicing social distancing.  We have discussed the virus and precautions and all questions have been answered.    2/1/2022 - Here for follow up, was sick for about 3 weeks around [place] (had known Covid exposure, had HA, sore throat, weak, cough, increased dyspnea, low grade fever).  She did 2 rapid tests which were negative but is interested in getting Covid antibodies checked.  Pt is currently stable on present medications with no recent increases in their symptoms or use of rescue medications.  Since our last visit there have been no hospitalizations or ER visits for their respiratory issues and there does not seem to be anything to suggest unrecognized exacerbations.  I have reviewed the medical regimen and re-educated the pt on the role of rescue and controlling medications.  Inhaler technique and understanding seems adequate.  The patient reports no issues with any of there medications for their COPD.  Refills will be taken care of as needed.  All questions answered.  She has been otherwise stable except for recent illness.  She has not been vaccinated for Covid.    10/5/2022 - Here for follow p, no new issues or problems reported.  We reviewed her CT scans and she will need a follow up CT in 5/2023.  Pt is currently stable on present medications with no  recent increases in their symptoms or use of rescue medications.  Since our last visit there have been no hospitalizations or ER visits for their respiratory issues and there does not seem to be anything to suggest unrecognized exacerbations.  I have reviewed the medical regimen and re-educated the pt on the role of rescue and controlling medications.  Inhaler technique and understanding seems adequate.  The patient reports no issues with any of there medications for their COPD.  Refills will be taken care of as needed.  All questions answered.  Did have Covid in early summer but was not very sick.    5/24/2023 - Here for follow up, Pt is currently stable on present medications with no recent increases in their symptoms or use of rescue medications.  Since our last visit there have been no hospitalizations or ER visits for their respiratory issues and there does not seem to be anything to suggest unrecognized exacerbations.  I have reviewed the medical regimen and re-educated the pt on the role of rescue and controlling medications.  Inhaler technique and understanding seems adequate.  The patient reports no issues with any of there medications for their COPD.  Refills will be taken care of as needed.  All questions answered.  Having issues with her sinuses and she is seeing Dr Chisholm and they are considering surgery.  Reviewed last PFT with her.  She has a skin lesion on her leg and is to see dermatology (she has a FMHx of melanoma and a prior melanoma).       6/20/2023 - Here for results - reviewed CT and PET scans with pt and daughter and all questions answered - we will proceed with Ct guided needle biopsy.    7/18/2023 - Here for biopsy results - + adenocarcinoma.  D/W pt and  and discussed PET findings (also reviewed PET scan).  At this point she needs MRI and referral to oncology.  All questions answered.          Low Dose Screening CT Chest    Cigarettes - 1 PPD x 40 YEARS  Still smoking -   NO  QUIT  "3/2019     Date of last LD CT - 5/2022    Result - Category 1, needs repeat 5/2023    I have discussed with pt about using a screening CT of the chest due to history of cigarette smoking.  We have discussed the possible findings and possible actions as a result of these findings.  The pt would like to proceed.    COPD Flowsheet    GOLD A    Last PFT - 8/2019  FEV1- 74 % DLCO - 50 %     + LABA/LAMA    + prn ALBUTEROL    mMRC -  0 - SOB with strenuous exercise   - + 1 - SOB level ground, slight hill   -  2 - SOB walk slower or stop for breath level ground   -  3 - SOB at 100 yards or after few minutes   -  4 - SOB in house, dressing    Referral to PULMONARY REHABILITATION - NO    Tested for alpha-1-antitrypsin - NO  Result - na    Cigarette Counseling    Currently smoking 0 packs per day  40 pack years    Vaping a little    I have counseled pt for 3-5 minutes regarding cigarette cessation.  This has included the need to stop smoking as well as strategies, including but not limited to "cold turkey", CHANTIX (including risks and benefits of whitney drug), nicotine replacement and WELLBUTRIN.    Flu and Pneumonia Vaccination    I have recommended that the patient get this years influenza vaccine.  We discussed the risks and benefits of this treatment.      I reviewed patient's current pneumonia vaccine status.  Patient needs vaccination.  We have discussed the current guidelines and recommendations for pneumonia vaccination.              Past Medical History    Past Medical History:   Diagnosis Date    Allergy     Anxiety     Arthritis     CAD (coronary artery disease)     coronary stents    Chronic back pain     lower back pain radiates to left leg    Chronic rhinitis     DAILY (dyspnea on exertion)     Hyperlipidemia     Hypertension     Melanoma in situ of left upper extremity     left thigh area    MI (myocardial infarction)     Seasonal allergic rhinitis 10/29/2020       Past Surgical History    Past Surgical History: "   Procedure Laterality Date    BREAST SURGERY      breast reduction    CATARACT EXTRACTION, BILATERAL      coranary stent      EXCISION OF MELANOMA Left 3/30/2022    Procedure: EXCISION, MELANOMA;  Surgeon: David Mcpherson III, MD;  Location: [facility] OR;  Service: General;  Laterality: Left;    EXCISIONAL BIOPSY Left 3/30/2022    Procedure: EXCISIONAL BIOPSY;  Surgeon: David Mcpherson III, MD;  Location: [facility] OR;  Service: General;  Laterality: Left;    HYSTERECTOMY      total    LEFT HEART CATHETERIZATION Left 10/16/2020    Procedure: Left heart cath;  Surgeon: Garrett Robins MD;  Location: [facility] CATH/EP LAB;  Service: Cardiology;  Laterality: Left;    OOPHORECTOMY      TOTAL REDUCTION MAMMOPLASTY Bilateral 2000       Medications      Current Outpatient Medications:     ALPRAZolam (XANAX) 0.25 MG tablet, Take 1 tablet (0.25 mg total) by mouth 3 (three) times daily as needed for Anxiety., Disp: 30 tablet, Rfl: 1    ammonium lactate 12 % Crea, aaa bid prn dry skin, Disp: 385 g, Rfl: 11    atorvastatin (LIPITOR) 80 MG tablet, Take 1 tablet (80 mg total) by mouth every evening., Disp: 90 tablet, Rfl: 1    azelastine (ASTELIN) 137 mcg (0.1 %) nasal spray, 1 SPRAY (137 MCG TOTAL) BY NASAL ROUTE 2 (TWO) TIMES DAILY AS NEEDED FOR RHINITIS (OR SINUSITIS)., Disp: 90 mL, Rfl: 1    busPIRone (BUSPAR) 5 MG Tab, TAKE 1 TABLET BY MOUTH TWICE A DAY (Patient taking differently: Take 5 mg by mouth every evening.), Disp: 180 tablet, Rfl: 1    calcium-vitamin D3 (OS-JOLIE 500 + D3) 500 mg-5 mcg (200 unit) per tablet, Take 1 tablet by mouth every morning., Disp: , Rfl:     citalopram (CELEXA) 20 MG tablet, TAKE 1 TABLET BY MOUTH EVERY DAY, Disp: 90 tablet, Rfl: 3    ezetimibe (ZETIA) 10 mg tablet, Take 1 tablet (10 mg total) by mouth once daily., Disp: 90 tablet, Rfl: 3    fluticasone propionate (FLONASE) 50 mcg/actuation nasal spray, 1 spray (50 mcg total) by Each Nostril route daily as needed for Rhinitis or Allergies., Disp:  9.9 mL, Rfl: 2    lisinopriL (PRINIVIL,ZESTRIL) 2.5 MG tablet, Take 1 tablet (2.5 mg total) by mouth every evening., Disp: 90 tablet, Rfl: 3    metoprolol succinate (TOPROL-XL) 50 MG 24 hr tablet, Take 1 tablet (50 mg total) by mouth once daily., Disp: 90 tablet, Rfl: 3    nitroGLYCERIN (NITROSTAT) 0.4 MG SL tablet, Place 1 tablet (0.4 mg total) under the tongue every 5 (five) minutes as needed for Chest pain., Disp: 30 tablet, Rfl: 0    tiZANidine (ZANAFLEX) 4 MG tablet, TAKE 1 TABLET (4 MG TOTAL) BY MOUTH EVERY 6 (SIX) HOURS AS NEEDED. BACK PAIN, Disp: 360 tablet, Rfl: 0    valACYclovir (VALTREX) 1000 MG tablet, TAKE 2 AT ONSET OF FEVER BLISTERS THEN REPEAT IN 12 HOURS (TOTAL 4 TABS PER EPISODE), Disp: 20 tablet, Rfl: 3    zinc 50 mg Tab, Take by mouth., Disp: , Rfl:     amoxicillin (AMOXIL) 500 MG capsule, Take 500 mg by mouth every 8 (eight) hours., Disp: , Rfl:     aspirin (ECOTRIN) 81 MG EC tablet, Take 1 tablet (81 mg total) by mouth once daily. (Patient not taking: Reported on 2022), Disp: , Rfl: 0    clobetasol 0.05% (TEMOVATE) 0.05 % Oint, Apply topically 2 (two) times daily. for 14 days, Disp: 45 g, Rfl: 3    montelukast (SINGULAIR) 10 mg tablet, TAKE 1 TABLET BY MOUTH EVERY DAY IN THE EVENING (Patient not taking: Reported on 2023), Disp: 30 tablet, Rfl: 5    Allergies    Review of patient's allergies indicates:   Allergen Reactions    Cephalexin      Other reaction(s): Rash    Doxycycline monohydrate Hives    Lanoxin  [digoxin]      Other reaction(s): Rash    Lansoprazole      Other reaction(s): Rash    Macrobid [nitrofurantoin monohyd/m-cryst]        SocHx    Social History     Tobacco Use   Smoking Status Former    Packs/day: 1.00    Years: 20.00    Pack years: 20.00    Types: Cigarettes, Vaping with nicotine    Start date:     Quit date: 3/4/2017    Years since quittin.3   Smokeless Tobacco Never       Social History     Substance and Sexual Activity   Alcohol Use Not Currently     Alcohol/week: 0.0 standard drinks    Comment: sometimes       Drug Use - no  Occupation - housewife  Asbestos exposure - no  Pets - dogs    FMHx    Family History   Problem Relation Age of Onset    Cancer Mother         breast, ovarian, cervical     Heart disease Mother     Breast cancer Mother 50    Ovarian cancer Mother     Cancer Father         melanoma    Stroke Sister     Heart disease Sister     Cancer Sister         leukemia    Macular degeneration Sister     Heart disease Sister     No Known Problems Daughter     Cancer Maternal Uncle     Cancer Paternal Aunt         breast    Breast cancer Paternal Aunt 60    Cancer Paternal Uncle     Heart disease Maternal Grandmother     Heart disease Brother     No Known Problems Son          Review of Systems  Review of Systems   Constitutional:  Negative for chills, diaphoresis, fever, malaise/fatigue and weight loss.   HENT:  Positive for congestion and tinnitus. Negative for ear discharge, ear pain, hearing loss, nosebleeds, sinus pain and sore throat.         Has had tinnitus for years (has seen ENT)   Eyes:  Negative for pain.   Respiratory:  Positive for shortness of breath and wheezing. Negative for cough, hemoptysis, sputum production and stridor.    Cardiovascular:  Negative for chest pain, palpitations, orthopnea, claudication, leg swelling and PND.        H/o PCI   Gastrointestinal:  Negative for abdominal pain, blood in stool, constipation, diarrhea, heartburn, melena, nausea and vomiting.   Genitourinary:  Negative for dysuria, flank pain, frequency, hematuria and urgency.   Musculoskeletal:  Positive for back pain, joint pain and neck pain. Negative for falls and myalgias.        Right knee meniscal injury   Skin:  Negative for itching and rash.   Neurological:  Negative for dizziness, tingling, tremors, sensory change, speech change, focal weakness, seizures, weakness and headaches.   Endo/Heme/Allergies:  Negative for environmental allergies.    Psychiatric/Behavioral:  Negative for depression, substance abuse and suicidal ideas. The patient is not nervous/anxious.      Physical Exam    Vitals:    07/18/23 1002   BP: 118/72   BP Location: Right arm   Patient Position: Sitting   BP Method: Medium (Manual)   Pulse: 69   SpO2: 97%   Weight: 64.4 kg (141 lb 14.4 oz)       Physical Exam  Vitals and nursing note reviewed.   Constitutional:       General: She is not in acute distress.     Appearance: She is well-developed. She is not diaphoretic.   HENT:      Head: Normocephalic and atraumatic.      Right Ear: External ear normal.      Left Ear: External ear normal.      Nose: Nose normal.   Eyes:      Conjunctiva/sclera: Conjunctivae normal.      Pupils: Pupils are equal, round, and reactive to light.   Neck:      Thyroid: No thyromegaly.      Vascular: No JVD.      Trachea: No tracheal deviation.   Cardiovascular:      Rate and Rhythm: Normal rate and regular rhythm.      Heart sounds: Normal heart sounds. No murmur heard.    No friction rub. No gallop.   Pulmonary:      Effort: Pulmonary effort is normal. No respiratory distress.      Breath sounds: Normal breath sounds. No stridor. No wheezing or rales.   Chest:      Chest wall: No tenderness.   Abdominal:      General: Bowel sounds are normal. There is no distension.      Palpations: Abdomen is soft.      Tenderness: There is no abdominal tenderness.   Musculoskeletal:         General: No tenderness. Normal range of motion.      Cervical back: Normal range of motion and neck supple.   Lymphadenopathy:      Cervical: No cervical adenopathy.   Skin:     General: Skin is warm and dry.   Neurological:      Mental Status: She is alert and oriented to person, place, and time.      Cranial Nerves: No cranial nerve deficit.   Psychiatric:         Behavior: Behavior normal.       Labs    Lab Results   Component Value Date    WBC 6.59 07/12/2023    HGB 12.1 07/12/2023    HCT 36.3 (L) 07/12/2023     07/12/2023        Sodium   Date Value Ref Range Status   08/25/2022 140 136 - 145 mmol/L Final     Potassium   Date Value Ref Range Status   08/25/2022 4.5 3.5 - 5.1 mmol/L Final     Chloride   Date Value Ref Range Status   08/25/2022 105 95 - 110 mmol/L Final     CO2   Date Value Ref Range Status   08/25/2022 27 23 - 29 mmol/L Final     Glucose   Date Value Ref Range Status   08/25/2022 104 70 - 110 mg/dL Final     BUN   Date Value Ref Range Status   08/25/2022 13 8 - 23 mg/dL Final     Creatinine   Date Value Ref Range Status   08/25/2022 0.8 0.5 - 1.4 mg/dL Final     Calcium   Date Value Ref Range Status   08/25/2022 9.6 8.7 - 10.5 mg/dL Final     Total Protein   Date Value Ref Range Status   08/25/2022 6.9 6.0 - 8.4 g/dL Final     Albumin   Date Value Ref Range Status   08/25/2022 4.1 3.5 - 5.2 g/dL Final     Total Bilirubin   Date Value Ref Range Status   08/25/2022 1.7 (H) 0.1 - 1.0 mg/dL Final     Comment:     For infants and newborns, interpretation of results should be based  on gestational age, weight and in agreement with clinical  observations.    Premature Infant recommended reference ranges:  Up to 24 hours.............<8.0 mg/dL  Up to 48 hours............<12.0 mg/dL  3-5 days..................<15.0 mg/dL  6-29 days.................<15.0 mg/dL       Alkaline Phosphatase   Date Value Ref Range Status   08/25/2022 81 55 - 135 U/L Final     AST   Date Value Ref Range Status   08/25/2022 17 10 - 40 U/L Final     ALT   Date Value Ref Range Status   08/25/2022 22 10 - 44 U/L Final     Anion Gap   Date Value Ref Range Status   08/25/2022 8 8 - 16 mmol/L Final       Xrays    CT chest (5/2022)  1.  No pulmonary nodules identified.  2.  Mild/moderate emphysematous lung disease.  3.  No consolidation or pleural effusion.     LUNG RADS CATEGORY 1: NEGATIVE        Impression/Plan    Problem List Items Addressed This Visit          Pulmonary    Pulmonary emphysema - Primary  Continue present medications.  Will refill  medications as needed.  Instructed patient to contact us with any issues concerning their medications (cost, reactions, etc.).  Have discussed with patient about inciting conditions which may exacerbate their disease.  We did discuss possible new therapies or de-escalation of therapy (if appropriate).  Asked patient if they were interested in pursuing pulmonary rehabilitation.  All questions answered  RTC 6 months  Patient instructed that they are to call if symptoms change or new issues develop prior to their next visit.                     Other    Personal history of tobacco use, presenting hazards to health   has quit   needs to stop vaping    Adenocarcinoma lung  Will order MRI  Will send to oncology to evaluate  RTC 1 month                          Raad Abad MD

## 2023-07-21 ENCOUNTER — HOSPITAL ENCOUNTER (OUTPATIENT)
Dept: RADIOLOGY | Facility: HOSPITAL | Age: 68
Discharge: HOME | End: 2023-07-21
Attending: INTERNAL MEDICINE
Payer: MEDICARE

## 2023-07-21 DIAGNOSIS — C34.90 MALIGNANT NEOPLASM OF UNSPECIFIED PART OF UNSPECIFIED BRONCHUS OR LUNG: ICD-10-CM

## 2023-07-21 PROCEDURE — A9585 GADOBUTROL INJECTION: HCPCS | Performed by: INTERNAL MEDICINE

## 2023-07-21 PROCEDURE — 70553 MRI BRAIN STEM W/O & W/DYE: CPT | Mod: TC

## 2023-07-21 PROCEDURE — 25500020 PHARM REV CODE 255: Performed by: INTERNAL MEDICINE

## 2023-07-21 RX ORDER — GADOBUTROL 604.72 MG/ML
6.5 INJECTION INTRAVENOUS
Status: COMPLETED | OUTPATIENT
Start: 2023-07-21 | End: 2023-07-21

## 2023-07-21 RX ADMIN — GADOBUTROL 6.5 ML: 604.72 INJECTION INTRAVENOUS at 05:07

## 2023-07-25 ENCOUNTER — TELEPHONE (OUTPATIENT)
Dept: PULMONOLOGY | Facility: CLINIC | Age: 68
End: 2023-07-25

## 2023-07-27 ENCOUNTER — OFFICE VISIT (OUTPATIENT)
Dept: HEMATOLOGY/ONCOLOGY | Facility: CLINIC | Age: 68
End: 2023-07-27
Payer: MEDICARE

## 2023-07-27 VITALS
HEART RATE: 94 BPM | TEMPERATURE: 98 F | BODY MASS INDEX: 24.8 KG/M2 | RESPIRATION RATE: 18 BRPM | WEIGHT: 140 LBS | SYSTOLIC BLOOD PRESSURE: 109 MMHG | HEIGHT: 63 IN | DIASTOLIC BLOOD PRESSURE: 57 MMHG

## 2023-07-27 DIAGNOSIS — C34.90 MALIGNANT NEOPLASM OF UNSPECIFIED PART OF UNSPECIFIED BRONCHUS OR LUNG: ICD-10-CM

## 2023-07-27 DIAGNOSIS — C34.32 MALIGNANT NEOPLASM OF LOWER LOBE OF LEFT LUNG: ICD-10-CM

## 2023-07-27 PROCEDURE — 99205 PR OFFICE/OUTPT VISIT, NEW, LEVL V, 60-74 MIN: ICD-10-PCS | Mod: S$PBB,,, | Performed by: INTERNAL MEDICINE

## 2023-07-27 PROCEDURE — 99215 OFFICE O/P EST HI 40 MIN: CPT | Performed by: INTERNAL MEDICINE

## 2023-07-27 PROCEDURE — 99205 OFFICE O/P NEW HI 60 MIN: CPT | Mod: S$PBB,,, | Performed by: INTERNAL MEDICINE

## 2023-07-27 NOTE — PROGRESS NOTES
FOLLOW-UP APPOINTMENT    PATIENT:   Aysha Moore  :    1955  MR#:    [id number]  DATE OF VISIT:  2023      Chief Complaint: Chemo School/Lung Cancer    HPI:   Ms. Aysha Moore presents today for chemotherapy education.  She will be starting treatment with Taxol and Carboplatin with concurrent radiation for the above diagnosis.     Depression Patient Health Questionnaire 2023   Over the last two weeks how often have you been bothered by little interest or pleasure in doing things Not at all Not at all Not at all Not at all Not at all   Over the last two weeks how often have you been bothered by feeling down, depressed or hopeless Not at all Not at all Not at all Not at all Not at all   PHQ-2 Total Score 0 0 0 0 0         Review of Systems   Constitutional:  Positive for fatigue. Negative for appetite change and unexpected weight change.   HENT:   Positive for hearing loss. Negative for mouth sores and sore throat.    Respiratory:  Negative for chest tightness, hemoptysis and shortness of breath.    Cardiovascular:  Negative for chest pain and leg swelling.   Gastrointestinal:  Negative for abdominal distention, blood in stool, constipation, diarrhea and nausea.   Genitourinary:  Negative for dysuria.    Musculoskeletal:  Negative for arthralgias and myalgias.   Skin:  Negative for itching.   Hematological:  Negative for adenopathy.   Psychiatric/Behavioral:  The patient is not nervous/anxious.        Oncology History   LUNG CANCER-ADENOCARCINOMA OF THE LLL   2023 Initial Diagnosis    LUNG CANCER-ADENOCARCINOMA OF THE LLL     2023 Cancer Staged    Staging form: Lung, AJCC 8th Edition  - Clinical: Stage IIIA (cT2a, cN2, cM0)     Malignant neoplasm of unspecified part of unspecified bronchus or lung   2023 Initial Diagnosis    Malignant neoplasm of unspecified part of unspecified bronchus or lung     2023 -  Chemotherapy    Treatment  Summary   Plan Name: OP NSCLC PACLITAXEL + CARBOPLATIN (AUC) QW + RADIATION  Treatment Goal: Curative  Status: Active  Start Date: 8/8/2023 (Planned)  End Date: 9/12/2023 (Planned)  Provider: Raad Hankins MD  Chemotherapy: CARBOplatin (PARAPLATIN) in sodium chloride 0.9% 250 mL chemo infusion, , Intravenous, Clinic/HOD 1 time, 0 of 6 cycles  PACLitaxeL (TAXOL) 45 mg/m2 = 78 mg in sodium chloride 0.9% chemo infusion, 45 mg/m2, Intravenous, Clinic/HOD 1 time, 0 of 6 cycles         Patient Active Problem List   Diagnosis    Hyperlipidemia    Hypertension    Coronary artery disease    Anxiety    History of heart artery stent    Pulmonary emphysema    Prediabetes    Tear of medial meniscus of right knee, current    Chronic bilateral low back pain with left-sided sciatica    BMI 24.0-24.9, adult    Personal history of tobacco use, presenting hazards to health    Chest pain    Acute ST elevation myocardial infarction (STEMI) of inferior wall    Seasonal allergic rhinitis    Malignant melanoma of skin of left leg    Pure hypercholesterolemia    Chronic combined systolic and diastolic heart failure    Abnormal CT scan of lung    LUNG CANCER-ADENOCARCINOMA OF THE LLL    Malignant neoplasm of unspecified part of unspecified bronchus or lung       Past Medical History:   Diagnosis Date    Allergy     Anxiety     Arthritis     CAD (coronary artery disease)     coronary stents    Chronic back pain     lower back pain radiates to left leg    Chronic rhinitis     DAILY (dyspnea on exertion)     Hyperlipidemia     Hypertension     Melanoma in situ of left upper extremity     left thigh area    MI (myocardial infarction)     Seasonal allergic rhinitis 10/29/2020       Past Surgical History:   Procedure Laterality Date    BREAST SURGERY      breast reduction    CATARACT EXTRACTION, BILATERAL      coranary stent      EXCISION OF MELANOMA Left 3/30/2022    Procedure: EXCISION, MELANOMA;  Surgeon: David Mcpherson III, MD;   Location: [facility] OR;  Service: General;  Laterality: Left;    EXCISIONAL BIOPSY Left 3/30/2022    Procedure: EXCISIONAL BIOPSY;  Surgeon: David Mcpherson III, MD;  Location: [facility] OR;  Service: General;  Laterality: Left;    HYSTERECTOMY      total    LEFT HEART CATHETERIZATION Left 10/16/2020    Procedure: Left heart cath;  Surgeon: Garrett Robins MD;  Location: [facility] CATH/EP LAB;  Service: Cardiology;  Laterality: Left;    OOPHORECTOMY      TOTAL REDUCTION MAMMOPLASTY Bilateral        Social History     Socioeconomic History    Marital status:    Tobacco Use    Smoking status: Former     Current packs/day: 0.00     Average packs/day: 1 pack/day for 43.2 years (43.2 ttl pk-yrs)     Types: Cigarettes, Vaping with nicotine     Start date:      Quit date: 3/4/2017     Years since quittin.4    Smokeless tobacco: Never   Substance and Sexual Activity    Alcohol use: Not Currently     Alcohol/week: 0.0 standard drinks of alcohol     Comment: sometimes    Drug use: No    Sexual activity: Yes     Partners: Male       Family History   Problem Relation Age of Onset    Cancer Mother         breast, ovarian, cervical     Heart disease Mother     Breast cancer Mother 50    Ovarian cancer Mother     Cancer Father         melanoma    Stroke Sister     Heart disease Sister     Cancer Sister         leukemia    Macular degeneration Sister     Heart disease Sister     Heart disease Brother     Lung cancer Brother     Cancer Maternal Uncle     Cancer Paternal Aunt         breast    Breast cancer Paternal Aunt 60    Cancer Paternal Uncle     Heart disease Maternal Grandmother     No Known Problems Daughter     No Known Problems Son          Current Outpatient Medications:     ammonium lactate 12 % Crea, aaa bid prn dry skin, Disp: 385 g, Rfl: 11    amoxicillin (AMOXIL) 500 MG capsule, Take 500 mg by mouth every 8 (eight) hours., Disp: , Rfl:     atorvastatin (LIPITOR) 80 MG tablet, Take 1 tablet (80 mg total) by  mouth every evening., Disp: 90 tablet, Rfl: 1    azelastine (ASTELIN) 137 mcg (0.1 %) nasal spray, 1 SPRAY (137 MCG TOTAL) BY NASAL ROUTE 2 (TWO) TIMES DAILY AS NEEDED FOR RHINITIS (OR SINUSITIS)., Disp: 90 mL, Rfl: 1    busPIRone (BUSPAR) 5 MG Tab, TAKE 1 TABLET BY MOUTH TWICE A DAY (Patient taking differently: Take 5 mg by mouth every evening.), Disp: 180 tablet, Rfl: 1    calcium-vitamin D3 (OS-JOLIE 500 + D3) 500 mg-5 mcg (200 unit) per tablet, Take 1 tablet by mouth every morning., Disp: , Rfl:     citalopram (CELEXA) 20 MG tablet, TAKE 1 TABLET BY MOUTH EVERY DAY, Disp: 90 tablet, Rfl: 3    clobetasol 0.05% (TEMOVATE) 0.05 % Oint, Apply topically 2 (two) times daily. for 14 days, Disp: 45 g, Rfl: 3    ezetimibe (ZETIA) 10 mg tablet, Take 1 tablet (10 mg total) by mouth once daily., Disp: 90 tablet, Rfl: 3    fluticasone propionate (FLONASE) 50 mcg/actuation nasal spray, 1 spray (50 mcg total) by Each Nostril route daily as needed for Rhinitis or Allergies., Disp: 9.9 mL, Rfl: 2    lisinopriL (PRINIVIL,ZESTRIL) 2.5 MG tablet, Take 1 tablet (2.5 mg total) by mouth every evening., Disp: 90 tablet, Rfl: 3    metoprolol succinate (TOPROL-XL) 50 MG 24 hr tablet, Take 1 tablet (50 mg total) by mouth once daily., Disp: 90 tablet, Rfl: 3    montelukast (SINGULAIR) 10 mg tablet, TAKE 1 TABLET BY MOUTH EVERY DAY IN THE EVENING, Disp: 30 tablet, Rfl: 5    nitroGLYCERIN (NITROSTAT) 0.4 MG SL tablet, Place 1 tablet (0.4 mg total) under the tongue every 5 (five) minutes as needed for Chest pain., Disp: 30 tablet, Rfl: 0    tiZANidine (ZANAFLEX) 4 MG tablet, TAKE 1 TABLET (4 MG TOTAL) BY MOUTH EVERY 6 (SIX) HOURS AS NEEDED. BACK PAIN, Disp: 360 tablet, Rfl: 0    zinc 50 mg Tab, Take by mouth., Disp: , Rfl:     ALPRAZolam (XANAX) 0.25 MG tablet, Take 1 tablet (0.25 mg total) by mouth 3 (three) times daily as needed for Anxiety. (Patient not taking: Reported on 7/31/2023), Disp: 30 tablet, Rfl: 1    aspirin (ECOTRIN) 81 MG EC  "tablet, Take 1 tablet (81 mg total) by mouth once daily. (Patient not taking: Reported on 8/24/2022), Disp: , Rfl: 0    ondansetron (ZOFRAN) 8 MG tablet, Take 1 tablet (8 mg total) by mouth every 8 (eight) hours as needed., Disp: 30 tablet, Rfl: 5    promethazine (PHENERGAN) 25 MG tablet, Take 1 tablet (25 mg total) by mouth every 4 to 6 hours as needed., Disp: 30 tablet, Rfl: 5    valACYclovir (VALTREX) 1000 MG tablet, TAKE 2 AT ONSET OF FEVER BLISTERS THEN REPEAT IN 12 HOURS (TOTAL 4 TABS PER EPISODE) (Patient not taking: Reported on 7/31/2023), Disp: 20 tablet, Rfl: 3    Review of patient's allergies indicates:   Allergen Reactions    Cephalexin      Other reaction(s): Rash    Doxycycline monohydrate Hives    Lanoxin  [digoxin]      Other reaction(s): Rash    Lansoprazole      Other reaction(s): Rash    Macrobid [nitrofurantoin monohyd/m-cryst]        Physcial Examination  VITAL SIGNS:    Body surface area is 1.68 meters squared.   Pain Assessment  Vitals:    07/31/23 1024   BP: 100/67   Pulse: 86   Resp: 18   Temp: 97.8 °F (36.6 °C)   Weight: 63.5 kg (140 lb)   Height: 5' 3" (1.6 m)   PainSc: 0-No pain      Wt Readings from Last 5 Encounters:   07/31/23 63.5 kg (140 lb)   07/31/23 64.1 kg (141 lb 6.4 oz)   07/27/23 63.5 kg (140 lb)   07/18/23 64.4 kg (141 lb 14.4 oz)   07/12/23 64.7 kg (142 lb 10.2 oz)       GENERAL:  Aysha Moore is healthy-appearing 67 y.o. female, in no distress.   EYES:   Pupils equal, round, reactive.  Conjunctivae, sclera and lids normal.  HEENT: Head normocephalic and atraumatic, without alopecia.  Oropharynx is unremarkable.  No icterus, jaundice, stomatitis, mucositis, or ulceration is noted.  Ears are clear and unremarkable.  Nose, nares, and septum are unremarkable.    NECK:   No masses.  Thyroid and trachea are normal.    BREASTS:  Deferred.  RESPIRATORY: Clear to auscultation bilaterally.  Symmetrically effortless expansion.  No wheezing and no stridor.    CV: Heart reveals " regular rate and rhythm without murmur, rub, or gallops.  ABDOMEN: Soft, non-tender.  No masses, no hernias, and no rebound or rigidity are noted.  /RECTAL:  Deferred.  LYMPHATICS: No preauricular, submandibular, cervical, supraclavicular, axillary, lymphadenopathy.  MUSCULOSKELETAL:Fair musculature, no atrophy.  No arthritic changes.  No edema or cyanosis. Back is without gross abnormal curvature.   NEUROLOGICAL: Cranial nerves II-XII grossly intact.  Motor and sensory exam intact.  SKIN:   No lesions, bruises, petechiae or rashes.  Good turgor.    PSYCHIATRIC: Patient is alert and oriented to time, place and person.  Mood and affect are appropriate.         Laboratory and Radiology   Lab Results   Component Value Date    WBC 7.34 07/31/2023    RBC 4.35 07/31/2023    HGB 13.0 07/31/2023    HCT 40.3 07/31/2023    MCV 93 07/31/2023    MCH 29.9 07/31/2023    MCHC 32.3 07/31/2023    RDW 12.2 07/31/2023     07/31/2023    MPV 9.2 07/31/2023    GRAN 4.1 07/31/2023    GRAN 56.5 07/31/2023    LYMPH 1.8 07/31/2023    LYMPH 25.1 07/31/2023    MONO 0.7 07/31/2023    MONO 9.1 07/31/2023    EOS 0.6 (H) 07/31/2023    BASO 0.04 07/31/2023    EOSINOPHIL 8.7 (H) 07/31/2023    BASOPHIL 0.5 07/31/2023     BMP  Lab Results   Component Value Date     08/25/2022    K 4.5 08/25/2022     08/25/2022    CO2 27 08/25/2022    BUN 13 08/25/2022    CREATININE 0.7 07/21/2023    CALCIUM 9.6 08/25/2022    ANIONGAP 8 08/25/2022    ESTGFRAFRICA >60.0 03/29/2022    EGFRNONAA >60.0 03/29/2022     Lab Results   Component Value Date    ALT 22 08/25/2022    AST 17 08/25/2022    ALKPHOS 81 08/25/2022    BILITOT 1.7 (H) 08/25/2022     Results for orders placed or performed in visit on 06/20/23 (from the past 2160 hour(s))   CT Biopsy Lung w/ guidance    Narrative    CMS MANDATED QUALITY DATA-CT RADIATION DOSE-436  All CT scans at this facility use dose modulation, iterative reconstruction, and or weight based dosing when appropriate, to  reduce radiation dose to as low as reasonably achievable.    HISTORY: Left lower lobe mass suspicious for pulmonary neoplasm.    TECHNIQUE:  After obtaining written informed consent, which included a discussion of the risks and benefits of the procedure to include infection and bleeding, air and blood collection around the lung requiring chest tube insertion and hospitalization, and allowing the patient the opportunity to ask questions, the patient agreed to the procedure.    The patient was placed prone on the CT fluoroscopy table. A suitable skin entrance site overlying the left lower lobe mass was localized with CT guidance. The skin was marked, and then prepped and draped in sterile fashion, with several milliliters of Lidocaine 1% injected subcutaneously to achieve adequate local anesthesia.    Following, a 19-gauge coaxial needle was advanced under intermittent CT fluoroscopic guidance into the left lower lobe mass. Two 20-gauge core biopsy samples were then obtained. The needle was removed. The patient tolerated the procedure well, with no blood loss. CT fluoroscopy time was less than 1 minute.    Total moderate conscious sedation time with supervision by the interventional radiologist and monitoring by the special procedures registered nurse was 21 minutes. The patient received Versed 2 mg and Fentanyl 100 mcg IV during the procedure.    IMPRESSION: Successful CT guided left lung mass core biopsy.    Electronically signed by:  Jorge Welch MD  7/12/2023 1:00 PM CDT Workstation: [id number]   Results for orders placed or performed during the hospital encounter of 06/02/23 (from the past 2160 hour(s))   CT Chest Lung Screening Low Dose    Narrative    CMS MANDATED QUALITY DATA - CT RADIATION - 436    All CT scans at this facility utilize dose modulation, iterative   reconstruction, and/or weight based dosing when appropriate to reduce   radiation dose to as low as reasonably achievable.          Reason:  screening    TECHNIQUE: Low-dose CT thorax without IV contrast.    COMPARISON: 5/5/2022    CT THORAX:  Lungs: There are mild emphysematous changes.  There is development of a lobulated pleural-based 2.6 x 2.5 x 3.2 cm cm   mass within the posterior left lower lobe  . There are air bronchograms within the mass with small cysts along the   pleural surface. Differential includes malignancy versus atypical   infection. A PET/CT is recommended for further evaluation.  The remainder the lungs are clear. The trachea and bronchi are patent.    The heart is normal in size. There is coronary artery calcification.  There are stable mildly enlarged mediastinal nodes. There is a 12 mm AP   window node. There is a 10 mm precarinal node.  The esophagus is normal.  The visualized portion of the upper abdomen is unremarkable. There are no   acute osseous abnormalities    IMPRESSION: Interval development of a lobulated pleural-based 2.6 x 2.5 x   3.2 cm mass in the posterior inferior left lower lobe with air   bronchograms and cystic changes along the posterior margin of the mass and   pleural surface. Findings are suspicious for malignancy although   infectious process cannot be excluded. A PET CT is recommended for further   evaluation    Mild emphysematous changes    LUNG-RADS CATEGORY 4X: SUSPICIOUS FINDINGS    Recommendation: Further evaluation with either diagnostic chest CT with or   without IV contrast, PET/CT, and/or tissue sampling (PET/CT may be   considered when solid component measures greater than 7 mm).    Management decision predicated on probability of malignancy based on   clinical patient evaluation, patient preference, and calculated risk of   malignancy.    [facility] lung cancer risk calculator:    http://www.[facility].ca/lung-cancer-risk-calculator  5.    Electronically signed by:  Flakita Vázquez MD  6/2/2023 4:43 PM CDT   Workstation: [id number]       No results found for this or any previous visit (from the  past 2160 hour(s)).  No results found for this or any previous visit (from the past 2160 hour(s)).    Pathology  Pathology Results  (Last 10 years)                 09/28/21 1012  Liquid-Based Pap Smear, Screening Final result    Narrative:  Release to patient->Immediate               TITLE: PLAN OF CARE FOR THE CHEMOTHERAPY PATIENT / TEACHING PROTOCOL    PURPOSE: To involve the patient / significant other in the plan of care and to provide teaching to the significant other & patient receiving chemotherapy.    LEVEL: Independent.    CONTENT: The Plan of Care for the chemotherapy patient is individualized and appropriate to the patients needs, strengths, limitations, & goals.  Education includes information regarding chemotherapy side effects, the treatment itself, and self-care  Activities.    GOAL / OUTCOME STANDARDS    PHYSIOLOGIC: The client will remain free or experience minimal side effects or toxicities throughout the chemotherapy treatment period.     PSYCHOLOGIC: The client/significant others will demonstrate positive coping mechanisms in relation to chemotherapy and its side effects.      COGINITIVE: The client/significant others will verbalize understanding of self-care measure to avoid/minimize side effects of the chemotherapy regimen.    EVALUATION / COMMENT KEY:    V = Audiovisual/Video  S = Successfully meets outcome  N = Needs further instruction  NA = Not applicable to the patient  P = Previous knowledge  U = Unable to comprehend  * = See progress notes          PLAN OF CARE  INFORMATION TO BE DELIVERED / NURSING INTERVENTIONS DATE EVALUATION   Assessment of client/caregiver,         knowledge of cancer diagnosis,         and chemotherapy as a treatment. 1a. Evaluate patient/caregiver learning ability    b. Plan teaching sessions with patient/caregiver according to needs and present anxiety level/ability to learn.    c. Provide Chemotherapy Education Packet,        Mouth Care Protocol,          Specific Patient Education Sheets. 07/31/2023 S   Individual chemotherapy treatment         plan. 2a. Review of Chemotherapy Education handout from [email or web address]            07/31/2023   S   Knowledge Deficit & Self-Management of general side effects common to all chemotherapy:  Nausea/Vomiting  b.   Diarrhea  Mouth Care  Dental care  Constipation  Hair Loss  Potential for infection  Fatigue   3a. Reinforce that the majority of side effects from chemotherapy are reversible and are  controlled both in the hospital and at home        (blood counts recover, hair grows back).   b.  Refer to the following for reinforcement of         information post-treatment:  Mouth Care Protocol.  Bowel Protocol for constipation or diarrhea.  3.  Drug Specific Chemotherapy Information Sheets for each medication patient receiving.    07/31/2023     S     PLAN OF CARE  INFORMATION TO BE DELIVERED / NURSING INTERVENTIONS DATE EVALUATION   h. Potential for bleeding         i. Potential anemia/fatigue         j. Potential sunburn         k. Birth control measures  l. Safety measures post treatment 4.  Chemotherapy Home Care Instruction  and Safety Information Sheet.  A. patient/caregivers to thoroughly cook shellfish (shrimp, crab, etc) to decrease the chance of infection.    B.  Use sunscreen and protective clothing while in the sun.   07/31/2023      Knowledge deficit & Self Management of Drug Specific  Side Effects.    BLADDER EFFECTS        (Hemorrhagic Cystitis)                  Preventable with adequate hydration; occurs 2-3 days or more post treatment.   1.  Instruct patient to:  a.   Void at least every 2 hours; increase intake.  b.   DO NOT hold urine; go when urge is felt.  c.    Empty bladder at bedtime and on         awakening.  d.   Observe for color changes (red to tea           colored), amount and frequency changes.  e.   Notify oncologist of any abnormalities           in urine or voiding or if you cannot                drink adequate fluids.   07/31/2023   S   b.   CHANGES IN URINE        COLOR:      1.   Instruct patient:  a.   Most evident in first 2-3 voidings after           administration.  Lasts less than 24 hours.  If urine is discolored 2 or more days post- treatment, notify oncologist.      07/31/2023 S   c.    KIDNEY EFFECTS           (Nephrotoxicity)   1.  Instruct patient to:  a.   Drink 8-16 glasses of fluid/day the day   pre-treatment and 3-4 days post-treatment to maintain hydration; the best way to minimize kidney problems.  b.   Notify oncologist immediately if unable to drink fluids or if changes are noted in urinary elimination.     07/31/2023   S   PULMONARY TOXICITY    Instruct patient to report symptoms such as shortness of breath, chest pain, shallow breathing, or chest wall discomfort to physician.  Reinforce preventative measures used by the health care team.  Baseline and periodic PFT and chest x-ray.   07/31/2023   S     PLAN OF CARE INFORMATION TO BE DELIVERED / NURSING INTERVENTIONS DATE EVALUATION   NERVE & MUSCLE EFFECTS (neurotoxocity; neuropathy, possible visual/hearing changes)        Instruct patient to:    Report numbness or tingling of the hands/feet, loss of fine motor movement (buttoning shirt, tying shoelaces), or gait changes to your oncologist.  If numbness/tingling are present:  protect feet with shoes at all times.  Use gloves for washing dishes/gardening & potholders in kitchen.       07/31/2023   S   CARDIOTOXICITY  Decreased effectiveness of             cardiac function. Effective are                  cumulative and irreversible.                                    CARDIAC ARRYTHMIAS              4   Instruct:  Heart function may be tested before treatment and perdiocally during treatment.  Notify oncologist of irregular pulse, palpitations, shortness of breath, or swelling in lower extremities/feet.          Taxol can cause arrhythmias on infusion that resolve once infusion  discontinued. Instruct nurse if any irregularity felt.    07/31/2023   S   EXTRAVASTION  Occurs when vesicants leak outside of vein and cause damage to the skin and underlying tissues.   Reinforce preventive measures used to avoid complications.  Fresh IV site or central line monitored continuously with vesicant IVP.  Continuous infusion via central line site and blood return monitored periodically around the clock.  Instruct to:  Notify nurse of any discomfort, burning, stinging, etc. at IV site during chemotherapy administration.  Notify oncologist of any redness, pain, or swelling at IV site after discharge from hospital.   07/31/2023   S   HYPERSENSITIVITY can happen with any medication.   Instruct patient:  Nurse is with them during the initial part of treatment and will be close by to monitor.  Pre-medication ordered by the oncologist must be taken on time. If doses are missed, treatment will need to be re-scheduled.  Skin redness, itching, or hives appearing after discharge should be reported to oncologist. 07/31/2023   S       PLAN OF CARE INFORMATION TO BE DELIVERED / NURSING INTERVENTIONS DATE EVALUATION   FLU-LIKE SYNDROME      Instruct patient symptoms are hard to prevent and may include fever, shaking chills, muscle and body aches.  Taking prescribed medications from physician if needed.  Adequate fluids are important.    Reinforce the need to call if temperature is         elevated to 100.4 or more  07/31/2023   S   HAND-FOOT SYNDROME  causes painful, symmetric swelling and redness of palms and soles                  Instruct patient to report any numbness or tingling in the hands or feet.  Explain prevention techniques, such as     Use heavy moisturizers to lessen skin dryness and itching, but to avoid if skin is cracked or broken  Bathe in tepid water, use non-perfumed soap, and wash gently. Baths with oatmeal or diluted baking soda may be soothing.  Avoid tight fitting shoes and repetitive actions,  such as rubbing hands or applying pressure to hands/feet.  Review measures to take should syndrome occur:  Cold compresses and elevation for          edema  Pain medications and other measures as ordered by oncologist.   4.   Syndrome resolves few weeks after therapy. 07/31/2023   S   5. DISCHARGE PLANNING /        EDUCATION 1.    Explain importance of compliance with follow- up  tests (CBC, CMP).  2.    Verify patient/caregiver know:  a.    Oncologists office phone number.  b.    Dates of follow-up appointments.  c.    Prescriptions given for nausea  3.   Review side effects to monitor and notify          oncologist about.  4.   Reinforce the need for patient and caregivers to:  a.    Review information given.  b.    Call oncologists office with questions          or symptoms  5.   Provide [facility]chure make referrals if needed for financial or .   07/31/2023   S     PROGRESS NOTES: I met with the patient and her  today for chemotherapy education. she will be starting treatment with Taxol and Carboplatin . We discussed the mechanism of action, potential side effects of this treatment as well as ways she can manage them at home. Some of these side effects include but or not limited to fever, nausea, vomiting, decreased appetite, fatigue, weakness, cytopenias, myalgia/arthralgia, constipation, diarrhea, bleeding, headache, shortness of breath, nail changes, taste change, hair thinning/loss, mood disturbances, or edema. We also discussed dietary modifications she should make although this will be discussed in more detail with the dietician. she was provided with anti-emetic medication, a copy of all of the information we discussed today as well as our contact information. she will be provided a schedule on his first day of treatment. We will obtain labs on a weekly basis and the patient will follow-up with the physician for toxicity monitoring throughout treatment. All  questions were answered and an informed consent was obtained. she was reminded to certainly contact us sooner if needed.  Attached to the patients folder and discussed with the patient the 24 hour/ 7 days a week after hours telephone number for the physician.  Patient notified to call anytime 24/7 because their is a physician on call for any problems that may arise.  Patient also notified to report to [facility] if they can not get in touch with a physician after hours.  Discussed the five wishes booklet with the patient and their family.           Assessment/Plan     ICD-10-CM ICD-9-CM   1. Malignant neoplasm of lower lobe of left lung  C34.32 162.5          Medications Ordered:  Zofran 8mg 1 tab PO Q8h prn nausea  Phenergan 25mg PO Q4-6h prn nausea      Standing Labs Ordered:  CBC weekly  CMP weekly  Mag weekly    Follow up in about 2 weeks (around 8/14/2023) for with me and 4 weeks with Dr. Hankins.    Total Face to Face Time: 75 minutes face to face with the patient and their family discussing the chemotherapy side-effects and when to call our office.   Electronically signed by: Meka Chiang, MSN, APRN, AGNP-C, OCN

## 2023-07-27 NOTE — ASSESSMENT & PLAN NOTE
I had a long discussion with Ms. Moore and her  today.  PET scan shows very obvious N2 disease and in my opinion this is not a resectable cancer.  I reviewed the PET images with her today and discussed the option of concurrent radiation/chemotherapy with carbo/taxol.  Reviewed risks and benefits as well.  I will make arrangements for her to get a port and chemo education and to be seen by radiation oncology.      I further discussed her stage and prognosis which at this time is a stage III process.  This has been difficult to cure but is not impossible and we also have the option of adding IO therapy which will has been shown to improve DFS.      Would like to begin in the next 2 weeks.     Spent over 60 min in total care today including pre-visit review and charting, visit and post-visit planning.

## 2023-07-27 NOTE — PROGRESS NOTES
[facility] HEM/ONC CONSULTATION      Subjective:       Patient ID: Aysha Moore is a 67 y.o. female.    6/2/2023:  Screening CT chest:  2.6 x 2.5 x 3.2cm mass in the posterior LLL    6/20/2023:  PET scan:  35 x 21 mm lobulated pulmonary mass in the posterior left lower lobe with SUV max 11.6     FDG avid lymphadenopathy is present with index nodes outlined below:  -21 x 11 mm AP window node with SUV max 12.1 (image 103)  -21 x 12 mm posterior left hilar node with SUV max 13.7 (image 109)  -16 x 13 mm left hilar node with SUV max 14 (image 1:15)  -10 x 10 mm subcarinal node with SUV max 6.3 (image 111)    7/12/2023-Biopsy of LLL:  LEFT LOWER LOBE OF LUNG, CORE BIOPSIES:   - LUNG ADENOCARCINOMA WITH PLEOMORPHIC FEATURES.     7/21/2023:  MRI brain: no mets.     Chief Complaint: No chief complaint on file.  Lung cancer    Ms. Moore is a 68yo female who presents with an abnormal screening chest CT seen above.  This was done in early June this year and found to have a mass in the LLL of her lung.  She was referred her for further recommendations.      PMH: AMI, 2 stents placed.  She has no lung, liver, kidney disease and has had no other malignancy.        Past Medical History:   Diagnosis Date    Allergy     Anxiety     Arthritis     CAD (coronary artery disease)     coronary stents    Chronic back pain     lower back pain radiates to left leg    Chronic rhinitis     DAILY (dyspnea on exertion)     Hyperlipidemia     Hypertension     Melanoma in situ of left upper extremity     left thigh area    MI (myocardial infarction)     Seasonal allergic rhinitis 10/29/2020       Past Surgical History:   Procedure Laterality Date    BREAST SURGERY      breast reduction    CATARACT EXTRACTION, BILATERAL      coranary stent      EXCISION OF MELANOMA Left 3/30/2022    Procedure: EXCISION, MELANOMA;  Surgeon: David Mcpherson III, MD;  Location: [facility] OR;  Service: General;  Laterality: Left;    EXCISIONAL BIOPSY Left 3/30/2022     Procedure: EXCISIONAL BIOPSY;  Surgeon: David Mcpherson III, MD;  Location: [facility] OR;  Service: General;  Laterality: Left;    HYSTERECTOMY      total    LEFT HEART CATHETERIZATION Left 10/16/2020    Procedure: Left heart cath;  Surgeon: Garrett Robins MD;  Location: [facility] CATH/EP LAB;  Service: Cardiology;  Laterality: Left;    OOPHORECTOMY      TOTAL REDUCTION MAMMOPLASTY Bilateral        Social History     Socioeconomic History    Marital status:    Tobacco Use    Smoking status: Former     Packs/day: 1.00     Years: 20.00     Pack years: 20.00     Types: Cigarettes, Vaping with nicotine     Start date:      Quit date: 3/4/2017     Years since quittin.4    Smokeless tobacco: Never   Substance and Sexual Activity    Alcohol use: Not Currently     Alcohol/week: 0.0 standard drinks     Comment: sometimes    Drug use: No    Sexual activity: Yes     Partners: Male       Family History   Problem Relation Age of Onset    Cancer Mother         breast, ovarian, cervical     Heart disease Mother     Breast cancer Mother 50    Ovarian cancer Mother     Cancer Father         melanoma    Stroke Sister     Heart disease Sister     Cancer Sister         leukemia    Macular degeneration Sister     Heart disease Sister     No Known Problems Daughter     Cancer Maternal Uncle     Cancer Paternal Aunt         breast    Breast cancer Paternal Aunt 60    Cancer Paternal Uncle     Heart disease Maternal Grandmother     Heart disease Brother     No Known Problems Son        Review of patient's allergies indicates:   Allergen Reactions    Cephalexin      Other reaction(s): Rash    Doxycycline monohydrate Hives    Lanoxin  [digoxin]      Other reaction(s): Rash    Lansoprazole      Other reaction(s): Rash    Macrobid [nitrofurantoin monohyd/m-cryst]        Current Outpatient Medications:     ALPRAZolam (XANAX) 0.25 MG tablet, Take 1 tablet (0.25 mg total) by mouth 3 (three) times daily as needed for Anxiety.,  Disp: 30 tablet, Rfl: 1    ammonium lactate 12 % Crea, aaa bid prn dry skin, Disp: 385 g, Rfl: 11    amoxicillin (AMOXIL) 500 MG capsule, Take 500 mg by mouth every 8 (eight) hours., Disp: , Rfl:     atorvastatin (LIPITOR) 80 MG tablet, Take 1 tablet (80 mg total) by mouth every evening., Disp: 90 tablet, Rfl: 1    azelastine (ASTELIN) 137 mcg (0.1 %) nasal spray, 1 SPRAY (137 MCG TOTAL) BY NASAL ROUTE 2 (TWO) TIMES DAILY AS NEEDED FOR RHINITIS (OR SINUSITIS)., Disp: 90 mL, Rfl: 1    busPIRone (BUSPAR) 5 MG Tab, TAKE 1 TABLET BY MOUTH TWICE A DAY (Patient taking differently: Take 5 mg by mouth every evening.), Disp: 180 tablet, Rfl: 1    calcium-vitamin D3 (OS-JOLIE 500 + D3) 500 mg-5 mcg (200 unit) per tablet, Take 1 tablet by mouth every morning., Disp: , Rfl:     citalopram (CELEXA) 20 MG tablet, TAKE 1 TABLET BY MOUTH EVERY DAY, Disp: 90 tablet, Rfl: 3    ezetimibe (ZETIA) 10 mg tablet, Take 1 tablet (10 mg total) by mouth once daily., Disp: 90 tablet, Rfl: 3    fluticasone propionate (FLONASE) 50 mcg/actuation nasal spray, 1 spray (50 mcg total) by Each Nostril route daily as needed for Rhinitis or Allergies., Disp: 9.9 mL, Rfl: 2    lisinopriL (PRINIVIL,ZESTRIL) 2.5 MG tablet, Take 1 tablet (2.5 mg total) by mouth every evening., Disp: 90 tablet, Rfl: 3    metoprolol succinate (TOPROL-XL) 50 MG 24 hr tablet, Take 1 tablet (50 mg total) by mouth once daily., Disp: 90 tablet, Rfl: 3    nitroGLYCERIN (NITROSTAT) 0.4 MG SL tablet, Place 1 tablet (0.4 mg total) under the tongue every 5 (five) minutes as needed for Chest pain., Disp: 30 tablet, Rfl: 0    tiZANidine (ZANAFLEX) 4 MG tablet, TAKE 1 TABLET (4 MG TOTAL) BY MOUTH EVERY 6 (SIX) HOURS AS NEEDED. BACK PAIN, Disp: 360 tablet, Rfl: 0    valACYclovir (VALTREX) 1000 MG tablet, TAKE 2 AT ONSET OF FEVER BLISTERS THEN REPEAT IN 12 HOURS (TOTAL 4 TABS PER EPISODE), Disp: 20 tablet, Rfl: 3    zinc 50 mg Tab, Take by mouth., Disp: , Rfl:     aspirin (ECOTRIN) 81 MG EC  "tablet, Take 1 tablet (81 mg total) by mouth once daily. (Patient not taking: Reported on 8/24/2022), Disp: , Rfl: 0    clobetasol 0.05% (TEMOVATE) 0.05 % Oint, Apply topically 2 (two) times daily. for 14 days, Disp: 45 g, Rfl: 3    montelukast (SINGULAIR) 10 mg tablet, TAKE 1 TABLET BY MOUTH EVERY DAY IN THE EVENING (Patient not taking: Reported on 5/24/2023), Disp: 30 tablet, Rfl: 5    All medications and past history have been reviewed.    Review of Systems   Constitutional:  Negative for appetite change and unexpected weight change.   HENT:  Negative for mouth sores.    Eyes:  Negative for visual disturbance.   Respiratory:  Negative for cough and shortness of breath.    Cardiovascular:  Negative for chest pain.   Gastrointestinal:  Negative for abdominal pain and diarrhea.   Genitourinary:  Negative for frequency.   Musculoskeletal:  Negative for back pain.   Skin:  Negative for rash.   Neurological:  Negative for headaches.   Hematological:  Negative for adenopathy.   Psychiatric/Behavioral:  The patient is not nervous/anxious.      Objective:        BP (!) 109/57   Pulse 94   Temp 98.1 °F (36.7 °C)   Resp 18   Ht 5' 3" (1.6 m)   Wt 63.5 kg (140 lb)   BMI 24.80 kg/m²     Physical Exam  Constitutional:       Appearance: Normal appearance.   HENT:      Head: Normocephalic and atraumatic.      Right Ear: External ear normal.      Left Ear: External ear normal.   Eyes:      General: No scleral icterus.  Cardiovascular:      Rate and Rhythm: Normal rate and regular rhythm.      Heart sounds: Normal heart sounds.   Pulmonary:      Effort: Pulmonary effort is normal.      Breath sounds: Normal breath sounds.   Abdominal:      General: Abdomen is flat.   Neurological:      General: No focal deficit present.      Mental Status: She is alert and oriented to person, place, and time.   Psychiatric:         Mood and Affect: Mood normal.         Behavior: Behavior normal.         Lab  No results found for this or any " previous visit (from the past 336 hour(s)).  CMP  Sodium   Date Value Ref Range Status   08/25/2022 140 136 - 145 mmol/L Final     Potassium   Date Value Ref Range Status   08/25/2022 4.5 3.5 - 5.1 mmol/L Final     Chloride   Date Value Ref Range Status   08/25/2022 105 95 - 110 mmol/L Final     CO2   Date Value Ref Range Status   08/25/2022 27 23 - 29 mmol/L Final     Glucose   Date Value Ref Range Status   08/25/2022 104 70 - 110 mg/dL Final     BUN   Date Value Ref Range Status   08/25/2022 13 8 - 23 mg/dL Final     Creatinine   Date Value Ref Range Status   07/21/2023 0.7 0.5 - 1.4 mg/dL Final     Calcium   Date Value Ref Range Status   08/25/2022 9.6 8.7 - 10.5 mg/dL Final     Total Protein   Date Value Ref Range Status   08/25/2022 6.9 6.0 - 8.4 g/dL Final     Albumin   Date Value Ref Range Status   08/25/2022 4.1 3.5 - 5.2 g/dL Final     Total Bilirubin   Date Value Ref Range Status   08/25/2022 1.7 (H) 0.1 - 1.0 mg/dL Final     Comment:     For infants and newborns, interpretation of results should be based  on gestational age, weight and in agreement with clinical  observations.    Premature Infant recommended reference ranges:  Up to 24 hours.............<8.0 mg/dL  Up to 48 hours............<12.0 mg/dL  3-5 days..................<15.0 mg/dL  6-29 days.................<15.0 mg/dL       Alkaline Phosphatase   Date Value Ref Range Status   08/25/2022 81 55 - 135 U/L Final     AST   Date Value Ref Range Status   08/25/2022 17 10 - 40 U/L Final     ALT   Date Value Ref Range Status   08/25/2022 22 10 - 44 U/L Final     Anion Gap   Date Value Ref Range Status   08/25/2022 8 8 - 16 mmol/L Final     eGFR if    Date Value Ref Range Status   03/29/2022 >60.0 >60 mL/min/1.73 m^2 Final     eGFR if non    Date Value Ref Range Status   03/29/2022 >60.0 >60 mL/min/1.73 m^2 Final     Comment:     Calculation used to obtain the estimated glomerular filtration  rate (eGFR) is the CKD-EPI  equation.            Specimen (24h ago, onward)      None                  All lab results and imaging results have been reviewed and discussed with the patient.     Assessment:       1. LUNG CANCER-ADENOCARCINOMA OF THE LLL    2. Malignant neoplasm of unspecified part of unspecified bronchus or lung      Problem List Items Addressed This Visit       Malignant neoplasm of unspecified part of unspecified bronchus or lung    LUNG CANCER-ADENOCARCINOMA OF THE LLL     I had a long discussion with Ms. Moore and her  today.  PET scan shows very obvious N2 disease and in my opinion this is not a resectable cancer.  I reviewed the PET images with her today and discussed the option of concurrent radiation/chemotherapy with carbo/taxol.  Reviewed risks and benefits as well.  I will make arrangements for her to get a port and chemo education and to be seen by radiation oncology.      I further discussed her stage and prognosis which at this time is a stage III process.  This has been difficult to cure but is not impossible and we also have the option of adding IO therapy which will has been shown to improve DFS.      Would like to begin in the next 2 weeks.     Spent over 60 min in total care today including pre-visit review and charting, visit and post-visit planning.            Relevant Orders    Ambulatory referral/consult to Chemo School    Ambulatory referral/consult to Radiation Oncology    Ambulatory referral/consult to General Surgery      Cancer Staging   No matching staging information was found for the patient.      Plan:         Follow up in about 3 weeks (around 8/17/2023).       The plan was discussed with the patient and all questions/concerns have been answered to the patient's satisfaction.

## 2023-07-31 ENCOUNTER — OFFICE VISIT (OUTPATIENT)
Dept: HEMATOLOGY/ONCOLOGY | Facility: CLINIC | Age: 68
End: 2023-07-31
Payer: MEDICARE

## 2023-07-31 ENCOUNTER — SOCIAL WORK (OUTPATIENT)
Dept: HEMATOLOGY/ONCOLOGY | Facility: CLINIC | Age: 68
End: 2023-07-31

## 2023-07-31 ENCOUNTER — OFFICE VISIT (OUTPATIENT)
Dept: RADIATION ONCOLOGY | Facility: CLINIC | Age: 68
End: 2023-07-31
Payer: MEDICARE

## 2023-07-31 ENCOUNTER — LAB VISIT (OUTPATIENT)
Dept: LAB | Facility: HOSPITAL | Age: 68
End: 2023-07-31
Attending: NURSE PRACTITIONER
Payer: MEDICARE

## 2023-07-31 VITALS
HEIGHT: 63 IN | OXYGEN SATURATION: 98 % | WEIGHT: 141.38 LBS | SYSTOLIC BLOOD PRESSURE: 100 MMHG | RESPIRATION RATE: 16 BRPM | HEART RATE: 86 BPM | DIASTOLIC BLOOD PRESSURE: 67 MMHG | BODY MASS INDEX: 25.05 KG/M2

## 2023-07-31 VITALS
RESPIRATION RATE: 18 BRPM | DIASTOLIC BLOOD PRESSURE: 67 MMHG | WEIGHT: 140 LBS | TEMPERATURE: 98 F | HEIGHT: 63 IN | SYSTOLIC BLOOD PRESSURE: 100 MMHG | HEART RATE: 86 BPM | BODY MASS INDEX: 24.8 KG/M2

## 2023-07-31 DIAGNOSIS — C34.32 MALIGNANT NEOPLASM OF LOWER LOBE OF LEFT LUNG: ICD-10-CM

## 2023-07-31 DIAGNOSIS — C34.32 MALIGNANT NEOPLASM OF LOWER LOBE OF LEFT LUNG: Primary | ICD-10-CM

## 2023-07-31 DIAGNOSIS — C34.90 PRIMARY ADENOCARCINOMA OF LUNG, UNSPECIFIED LATERALITY: Primary | ICD-10-CM

## 2023-07-31 DIAGNOSIS — C34.90 PRIMARY LUNG ADENOCARCINOMA: ICD-10-CM

## 2023-07-31 LAB
ALBUMIN SERPL BCP-MCNC: 3.9 G/DL (ref 3.5–5.2)
ALP SERPL-CCNC: 62 U/L (ref 55–135)
ALT SERPL W/O P-5'-P-CCNC: 9 U/L (ref 10–44)
ANION GAP SERPL CALC-SCNC: 5 MMOL/L (ref 8–16)
AST SERPL-CCNC: 13 U/L (ref 10–40)
BASOPHILS # BLD AUTO: 0.04 K/UL (ref 0–0.2)
BASOPHILS NFR BLD: 0.5 % (ref 0–1.9)
BILIRUB SERPL-MCNC: 0.7 MG/DL (ref 0.1–1)
BUN SERPL-MCNC: <5 MG/DL (ref 8–23)
CALCIUM SERPL-MCNC: 9.6 MG/DL (ref 8.7–10.5)
CHLORIDE SERPL-SCNC: 106 MMOL/L (ref 95–110)
CO2 SERPL-SCNC: 26 MMOL/L (ref 23–29)
CREAT SERPL-MCNC: <0.3 MG/DL (ref 0.5–1.4)
DIFFERENTIAL METHOD: ABNORMAL
EOSINOPHIL # BLD AUTO: 0.6 K/UL (ref 0–0.5)
EOSINOPHIL NFR BLD: 8.7 % (ref 0–8)
ERYTHROCYTE [DISTWIDTH] IN BLOOD BY AUTOMATED COUNT: 12.2 % (ref 11.5–14.5)
EST. GFR  (NO RACE VARIABLE): >60 ML/MIN/1.73 M^2
GLUCOSE SERPL-MCNC: 103 MG/DL (ref 70–110)
HCT VFR BLD AUTO: 40.3 % (ref 37–48.5)
HGB BLD-MCNC: 13 G/DL (ref 12–16)
IMM GRANULOCYTES # BLD AUTO: 0.01 K/UL (ref 0–0.04)
IMM GRANULOCYTES NFR BLD AUTO: 0.1 % (ref 0–0.5)
LYMPHOCYTES # BLD AUTO: 1.8 K/UL (ref 1–4.8)
LYMPHOCYTES NFR BLD: 25.1 % (ref 18–48)
MAGNESIUM SERPL-MCNC: 3.1 MG/DL (ref 1.6–2.6)
MCH RBC QN AUTO: 29.9 PG (ref 27–31)
MCHC RBC AUTO-ENTMCNC: 32.3 G/DL (ref 32–36)
MCV RBC AUTO: 93 FL (ref 82–98)
MONOCYTES # BLD AUTO: 0.7 K/UL (ref 0.3–1)
MONOCYTES NFR BLD: 9.1 % (ref 4–15)
NEUTROPHILS # BLD AUTO: 4.1 K/UL (ref 1.8–7.7)
NEUTROPHILS NFR BLD: 56.5 % (ref 38–73)
NRBC BLD-RTO: 0 /100 WBC
PLATELET # BLD AUTO: 313 K/UL (ref 150–450)
PMV BLD AUTO: 9.2 FL (ref 9.2–12.9)
POTASSIUM SERPL-SCNC: 4.4 MMOL/L (ref 3.5–5.1)
PROT SERPL-MCNC: 6.9 G/DL (ref 6–8.4)
RBC # BLD AUTO: 4.35 M/UL (ref 4–5.4)
SODIUM SERPL-SCNC: 137 MMOL/L (ref 136–145)
WBC # BLD AUTO: 7.34 K/UL (ref 3.9–12.7)

## 2023-07-31 PROCEDURE — 99214 OFFICE O/P EST MOD 30 MIN: CPT | Performed by: NURSE PRACTITIONER

## 2023-07-31 PROCEDURE — 99215 PR OFFICE/OUTPT VISIT, EST, LEVL V, 40-54 MIN: ICD-10-PCS | Mod: S$PBB,,, | Performed by: NURSE PRACTITIONER

## 2023-07-31 PROCEDURE — 80053 COMPREHEN METABOLIC PANEL: CPT | Performed by: NURSE PRACTITIONER

## 2023-07-31 PROCEDURE — 85025 COMPLETE CBC W/AUTO DIFF WBC: CPT | Performed by: NURSE PRACTITIONER

## 2023-07-31 PROCEDURE — 36415 COLL VENOUS BLD VENIPUNCTURE: CPT | Performed by: NURSE PRACTITIONER

## 2023-07-31 PROCEDURE — 99215 OFFICE O/P EST HI 40 MIN: CPT | Mod: S$PBB,,, | Performed by: NURSE PRACTITIONER

## 2023-07-31 PROCEDURE — 99205 PR OFFICE/OUTPT VISIT, NEW, LEVL V, 60-74 MIN: ICD-10-PCS | Mod: S$GLB,,, | Performed by: RADIOLOGY

## 2023-07-31 PROCEDURE — 83735 ASSAY OF MAGNESIUM: CPT | Performed by: NURSE PRACTITIONER

## 2023-07-31 PROCEDURE — 99205 OFFICE O/P NEW HI 60 MIN: CPT | Mod: S$GLB,,, | Performed by: RADIOLOGY

## 2023-07-31 RX ORDER — ONDANSETRON HYDROCHLORIDE 8 MG/1
8 TABLET, FILM COATED ORAL EVERY 8 HOURS PRN
Qty: 30 TABLET | Refills: 5 | Status: SHIPPED | OUTPATIENT
Start: 2023-07-31 | End: 2024-07-30

## 2023-07-31 RX ORDER — PROMETHAZINE HYDROCHLORIDE 25 MG/1
25 TABLET ORAL
Qty: 30 TABLET | Refills: 5 | Status: SHIPPED | OUTPATIENT
Start: 2023-07-31

## 2023-07-31 NOTE — PROGRESS NOTES
Aysha Moore is a 67 year old diagnosed with lung and skin cancer.  Patient is here today, accompanied by her , for a radiation consult with Dr. Gaytan.  I met with patient to complete new patient orientation and the NCCN Distress Screening; patient indicated a rating of 0.  Patient denied needing the number to access the [facility] financial assistance application.  Patient denied needing psychosocial support at this time.  I provided patient with the [facility] community events flyer and my contact information in the event supportive services are needed in the future.

## 2023-07-31 NOTE — PROGRESS NOTES
Aysha Moore  [id number]  1955 7/31/2023  Raad Bee Md  [address] [facility]  [address]  [place] [id number]    REASON FOR CONSULTATION: IIIA, [id number] adenocarcinoma with pleomorphic features of LLL    TREATMENT GOAL: concurrent (with chemotherapy)    HISTORY OF PRESENT ILLNESS:   Aysha Moore is a 67 y.o. female former smoker (43pk-yr; quit x 6yrs) with history of melanoma/NMSCa's who presents with abnormal low-dose CT chest noting a 2.6 x 2.5 x 3.2 cm left lower lobe pleural based mass with follow-up PET-CT confirming SUV 11.6 with additional small GGO and right lower lobe measuring 1.1 cm with SUV 1.3 and FDG avid lymphadenopathy in the AP window, left hilum, subcarinal space, SUV 6.3-14.  CT-guided biopsy returned adenocarcinoma, lung primary with pleomorphic features.  MRI brain was negative for disease.    She follows with Dr. Abad he was referred to Dr. Bee who recommended chemoradiation therapy followed by immune therapy.  [facility] to [facility].    **Addendum**  9/23 PFTs  FEV1 1.48  DLCO 41%    Patient endorses approximate 8 lb weight loss over the past several months.  She denies chest pain, cough or hemoptysis.  She has frequent sinus congestion.  She reports vague reflux type symptoms x several weeks.    Review of systems otherwise negative unless indicated in HPI.    Past Medical History:   Diagnosis Date    Allergy     Anxiety     Arthritis     CAD (coronary artery disease)     coronary stents    Chronic back pain     lower back pain radiates to left leg    Chronic rhinitis     DAILY (dyspnea on exertion)     Hyperlipidemia     Hypertension     Melanoma in situ of left upper extremity     left thigh area    MI (myocardial infarction)     Seasonal allergic rhinitis 10/29/2020     Past Surgical History:   Procedure Laterality Date    BREAST SURGERY      breast reduction    CATARACT EXTRACTION, BILATERAL      coranary stent      EXCISION OF MELANOMA Left 3/30/2022     Procedure: EXCISION, MELANOMA;  Surgeon: David Mcpherson III, MD;  Location: [facility] OR;  Service: General;  Laterality: Left;    EXCISIONAL BIOPSY Left 3/30/2022    Procedure: EXCISIONAL BIOPSY;  Surgeon: David Mcpherson III, MD;  Location: [facility] OR;  Service: General;  Laterality: Left;    HYSTERECTOMY      total    LEFT HEART CATHETERIZATION Left 10/16/2020    Procedure: Left heart cath;  Surgeon: Garrett Robins MD;  Location: [facility] CATH/EP LAB;  Service: Cardiology;  Laterality: Left;    OOPHORECTOMY      TOTAL REDUCTION MAMMOPLASTY Bilateral      Social History     Socioeconomic History    Marital status:    Tobacco Use    Smoking status: Former     Current packs/day: 0.00     Average packs/day: 1 pack/day for 43.2 years (43.2 ttl pk-yrs)     Types: Cigarettes, Vaping with nicotine     Start date:      Quit date: 3/4/2017     Years since quittin.4    Smokeless tobacco: Never   Substance and Sexual Activity    Alcohol use: Not Currently     Alcohol/week: 0.0 standard drinks of alcohol     Comment: sometimes    Drug use: No    Sexual activity: Yes     Partners: Male     Family History   Problem Relation Age of Onset    Cancer Mother         breast, ovarian, cervical     Heart disease Mother     Breast cancer Mother 50    Ovarian cancer Mother     Cancer Father         melanoma    Stroke Sister     Heart disease Sister     Cancer Sister         leukemia    Macular degeneration Sister     Heart disease Sister     Heart disease Brother     Lung cancer Brother     Cancer Maternal Uncle     Cancer Paternal Aunt         breast    Breast cancer Paternal Aunt 60    Cancer Paternal Uncle     Heart disease Maternal Grandmother     No Known Problems Daughter     No Known Problems Son        PRIOR HISTORY OF CHEMOTHERAPY OR RADIOTHERAPY: Please see HPI for patients prior oncologic history.    Medication List with Changes/Refills   Current Medications    ALPRAZOLAM (XANAX) 0.25 MG TABLET    Take 1  tablet (0.25 mg total) by mouth 3 (three) times daily as needed for Anxiety.    AMMONIUM LACTATE 12 % CREA    aaa bid prn dry skin    AMOXICILLIN (AMOXIL) 500 MG CAPSULE    Take 500 mg by mouth every 8 (eight) hours.    ASPIRIN (ECOTRIN) 81 MG EC TABLET    Take 1 tablet (81 mg total) by mouth once daily.    ATORVASTATIN (LIPITOR) 80 MG TABLET    Take 1 tablet (80 mg total) by mouth every evening.    AZELASTINE (ASTELIN) 137 MCG (0.1 %) NASAL SPRAY    1 SPRAY (137 MCG TOTAL) BY NASAL ROUTE 2 (TWO) TIMES DAILY AS NEEDED FOR RHINITIS (OR SINUSITIS).    BUSPIRONE (BUSPAR) 5 MG TAB    TAKE 1 TABLET BY MOUTH TWICE A DAY    CALCIUM-VITAMIN D3 (OS-JOLIE 500 + D3) 500 MG-5 MCG (200 UNIT) PER TABLET    Take 1 tablet by mouth every morning.    CITALOPRAM (CELEXA) 20 MG TABLET    TAKE 1 TABLET BY MOUTH EVERY DAY    CLOBETASOL 0.05% (TEMOVATE) 0.05 % OINT    Apply topically 2 (two) times daily. for 14 days    EZETIMIBE (ZETIA) 10 MG TABLET    Take 1 tablet (10 mg total) by mouth once daily.    FLUTICASONE PROPIONATE (FLONASE) 50 MCG/ACTUATION NASAL SPRAY    1 spray (50 mcg total) by Each Nostril route daily as needed for Rhinitis or Allergies.    LISINOPRIL (PRINIVIL,ZESTRIL) 2.5 MG TABLET    Take 1 tablet (2.5 mg total) by mouth every evening.    METOPROLOL SUCCINATE (TOPROL-XL) 50 MG 24 HR TABLET    Take 1 tablet (50 mg total) by mouth once daily.    MONTELUKAST (SINGULAIR) 10 MG TABLET    TAKE 1 TABLET BY MOUTH EVERY DAY IN THE EVENING    NITROGLYCERIN (NITROSTAT) 0.4 MG SL TABLET    Place 1 tablet (0.4 mg total) under the tongue every 5 (five) minutes as needed for Chest pain.    TIZANIDINE (ZANAFLEX) 4 MG TABLET    TAKE 1 TABLET (4 MG TOTAL) BY MOUTH EVERY 6 (SIX) HOURS AS NEEDED. BACK PAIN    VALACYCLOVIR (VALTREX) 1000 MG TABLET    TAKE 2 AT ONSET OF FEVER BLISTERS THEN REPEAT IN 12 HOURS (TOTAL 4 TABS PER EPISODE)    ZINC 50 MG TAB    Take by mouth.     Review of patient's allergies indicates:   Allergen Reactions     "Cephalexin      Other reaction(s): Rash    Doxycycline monohydrate Hives    Lanoxin  [digoxin]      Other reaction(s): Rash    Lansoprazole      Other reaction(s): Rash    Macrobid [nitrofurantoin monohyd/m-cryst]        QUALITY OF LIFE: 100%- Normal, No Complaints, No Evidence of Disease    Vitals:    07/31/23 0917   BP: 100/67   Pulse: 86   Resp: 16   SpO2: 98%   Weight: 64.1 kg (141 lb 6.4 oz)   Height: 5' 3" (1.6 m)   PainSc: 0-No pain     Body mass index is 25.05 kg/m².    PHYSICAL EXAM:   GENERAL: alert; in no apparent distress.   HEAD: normocephalic, atraumatic.  EYES: pupils are equal, round, reactive to light and accommodation. Sclera anicteric. Conjunctiva not injected.   NOSE/THROAT: no nasal erythema or rhinorrhea. Oropharynx pink, without erythema, ulcerations or thrush.   NECK: no cervical motion rigidity; supple with no masses.    CHEST: Patient is speaking comfortably on room air with normal work of breathing without using accessory muscles of respiration.  CARDIOVASCULAR: regular rate and rhythm  ABDOMEN: soft, nontender, nondistended.   MUSCULOSKELETAL: no tenderness to palpation along the spine or scapulae. Normal range of motion.  NEUROLOGIC: cranial nerves II-XII intact bilaterally. Strength 5/5 in bilateral upper and lower extremities. No sensory deficits appreciated. Normal gait.  EXTREMITIES: no clubbing, cyanosis, edema.  SKIN: no erythema, rashes, ulcerations noted.     REVIEW OF IMAGING/PATHOLOGY/LABS: Please see HPI. All images reviewed personally by dictating physician.     ASSESSMENT: Aysha Moore is a 67 y.o. female with stage IIIA, [id number] adenocarcinoma with pleomorphic features of LLL  PLAN:  Aysha Moore presents with prior smoking history (quit 6 years ago) and screen detected, biopsy-proven adenocarcinoma with pleomorphic features of the left lower lobe with PET avid multi station N2 adenopathy.  MRI of the brain is negative.  I agree with Dr. Bee that this is " inoperable and best treated with definitive intent chemoradiation therapy.  She will meet with Medical Oncology for chemotherapy school today and has appointment with General surgery 8/2--will request port placement before the end of the week, if possible.  Will send for PFTs for baseline.    Today I described a course of definitive radiotherapy with concurrent chemosensitization to 60 Gy using IMRT to spare the lung, esophagus, spinal cord, heart.  We discussed post treatment assessment with CT of the chest at 4-6 weeks followed by likely maintenance immune therapy.    Target start date: 8/7/23.     I carefully explained the process of simulation and treatment delivery with weekly physician visits. Patient wishes to proceed.     We discussed the risks and benefits of the above treatment and have gone over in detail the acute and late toxicities of radiation therapy to the LLL. The patient expressed  understanding and has signed a consent form which is included in the patients chart.      The patient has our contact information and understands that they are free to contact us at any time with questions or concerns regarding radiation therapy.     DISPOSITION: RTC FOR CT SIM     I have personally seen and evaluated this patient with a high complexity diagnosis.      Greater than 60 minutes were dedicated to reviewing/interpreting pertinent laboratory/imaging/pathology as well as prior consultations; reviewing and performing history and physical; counseling patient on oncologic recommendations; documentation in the electronic medical record including ordering of additional tests and/or radiation treatment protocol; and coordination of care with physicians with referrals placed as appropriate.    PHYSICIAN: Franky Gaytan Jr, MD    Thank you for the opportunity to meet and consult with Aysha Moore.   Please feel free to contact me to discuss the above recommendation further.

## 2023-08-02 ENCOUNTER — HOSPITAL ENCOUNTER (OUTPATIENT)
Dept: PREADMISSION TESTING | Facility: HOSPITAL | Age: 68
Discharge: HOME | End: 2023-08-02
Attending: SURGERY
Payer: MEDICARE

## 2023-08-02 ENCOUNTER — OFFICE VISIT (OUTPATIENT)
Dept: SURGERY | Facility: CLINIC | Age: 68
End: 2023-08-02
Payer: MEDICARE

## 2023-08-02 VITALS
HEART RATE: 80 BPM | DIASTOLIC BLOOD PRESSURE: 81 MMHG | TEMPERATURE: 98 F | RESPIRATION RATE: 16 BRPM | BODY MASS INDEX: 24.8 KG/M2 | SYSTOLIC BLOOD PRESSURE: 118 MMHG | HEIGHT: 63 IN | WEIGHT: 140 LBS

## 2023-08-02 VITALS
SYSTOLIC BLOOD PRESSURE: 121 MMHG | HEART RATE: 67 BPM | OXYGEN SATURATION: 96 % | WEIGHT: 140 LBS | HEIGHT: 63 IN | DIASTOLIC BLOOD PRESSURE: 78 MMHG | TEMPERATURE: 98 F | BODY MASS INDEX: 24.8 KG/M2 | RESPIRATION RATE: 16 BRPM

## 2023-08-02 DIAGNOSIS — C34.90 PRIMARY ADENOCARCINOMA OF LUNG, UNSPECIFIED LATERALITY: ICD-10-CM

## 2023-08-02 DIAGNOSIS — C34.32 MALIGNANT NEOPLASM OF LOWER LOBE OF LEFT LUNG: ICD-10-CM

## 2023-08-02 PROCEDURE — 93005 ELECTROCARDIOGRAM TRACING: CPT | Performed by: INTERNAL MEDICINE

## 2023-08-02 PROCEDURE — 93010 EKG 12-LEAD: ICD-10-PCS | Mod: ,,, | Performed by: INTERNAL MEDICINE

## 2023-08-02 PROCEDURE — 93010 ELECTROCARDIOGRAM REPORT: CPT | Mod: ,,, | Performed by: INTERNAL MEDICINE

## 2023-08-02 PROCEDURE — 99213 OFFICE O/P EST LOW 20 MIN: CPT | Mod: S$GLB,,, | Performed by: SURGERY

## 2023-08-02 PROCEDURE — 99213 PR OFFICE/OUTPT VISIT, EST, LEVL III, 20-29 MIN: ICD-10-PCS | Mod: S$GLB,,, | Performed by: SURGERY

## 2023-08-02 NOTE — DISCHARGE INSTRUCTIONS
To confirm, Your doctor has instructed you that surgery is scheduled for: Friday 8/4/23    Pre-Op will call the afternoon prior to surgery between 4:00 and 6:00 PM with the final arrival time.  Thursday 8/3/23    Please report to Registration at front of the hospital --it is best to park in the garage on [address] [facility]    Do not eat or drink anything after midnight the night before your surgery - THIS INCLUDES  WATER, GUM, MINTS AND CANDY.    YOU MAY BRUSH YOUR TEETH     TAKE APPROVED  MEDICATIONS WITH A SMALL SIP OF WATER THE MORNING OF YOUR PROCEDURE: See Medication list    PLEASE NOTE:  The surgery schedule has many variables which may affect the time of your surgery case.  Family members should be available if your surgery time changes.  Plan to be here the day of your procedure between 4-6 hours.    DO NOT TAKE THESE MEDICATIONS 5-7 DAYS PRIOR to your procedure or per your surgeon's request: ASPIRIN, ALEVE, ADVIL, IBUPROFEN,  HARSH SELTZER, BC , FISH OIL , VITAMIN E, HERBALS  (May take Tylenol)                                                     IMPORTANT INSTRUCTIONS    Do not smoke, vape or drink alcoholic beverages 24 hours prior to your procedure.  Shower the night before AND the morning of your procedure with a Chlorhexidine wash such as Hibiclens or Dial antibacterial soap from the neck down.    Do not get it on your face or in your eyes.  You may use your own shampoo and face wash. This helps your skin to be as bacteria free as possible.   Do not apply any deodorant, lotion or powder after you shower.   DO NOT remove hair from the surgery site.  Do not shave the incision site unless you are given specific instructions to do so.    Sleep in a bed with clean sheets.  Do not sleep with a pet in the bed.   If you wear contact lenses, dentures, hearing aids or glasses, bring a container to put them in during surgery and give to a family member for safe keeping.    Please leave all jewelry, piercing's and valuables  at home.   Wear comfortable clothing that is easy to remove and place back on after surgery.     Make arrangements in advance for transportation home by a responsible adult.    You must make arrangements for transportation, TAXI'S, UBER'S OR LYFTS ARE NOT ALLOWED.      If you have any questions about these instructions, call Pre-Op Admit  Nursing at [phone] , Pre-Op Day Surgery Unit at [phone]

## 2023-08-04 ENCOUNTER — HOSPITAL ENCOUNTER (OUTPATIENT)
Facility: HOSPITAL | Age: 68
Discharge: HOME | End: 2023-08-04
Attending: SURGERY | Admitting: SURGERY
Payer: MEDICARE

## 2023-08-04 ENCOUNTER — ANESTHESIA (OUTPATIENT)
Dept: SURGERY | Facility: HOSPITAL | Age: 68
End: 2023-08-04
Payer: MEDICARE

## 2023-08-04 ENCOUNTER — ANESTHESIA EVENT (OUTPATIENT)
Dept: SURGERY | Facility: HOSPITAL | Age: 68
End: 2023-08-04
Payer: MEDICARE

## 2023-08-04 VITALS
OXYGEN SATURATION: 97 % | SYSTOLIC BLOOD PRESSURE: 132 MMHG | RESPIRATION RATE: 16 BRPM | TEMPERATURE: 99 F | DIASTOLIC BLOOD PRESSURE: 70 MMHG | HEART RATE: 80 BPM

## 2023-08-04 DIAGNOSIS — C34.90 PRIMARY ADENOCARCINOMA OF LUNG, UNSPECIFIED LATERALITY: ICD-10-CM

## 2023-08-04 DIAGNOSIS — C34.90 MALIGNANT NEOPLASM OF UNSPECIFIED PART OF UNSPECIFIED BRONCHUS OR LUNG: Primary | ICD-10-CM

## 2023-08-04 DIAGNOSIS — C34.90 PRIMARY LUNG ADENOCARCINOMA: ICD-10-CM

## 2023-08-04 PROCEDURE — 77001 FLUOROGUIDE FOR VEIN DEVICE: CPT | Mod: 26,,, | Performed by: SURGERY

## 2023-08-04 PROCEDURE — 63600175 PHARM REV CODE 636 W HCPCS: Performed by: SURGERY

## 2023-08-04 PROCEDURE — 37000008 HC ANESTHESIA 1ST 15 MINUTES: Performed by: SURGERY

## 2023-08-04 PROCEDURE — 37000009 HC ANESTHESIA EA ADD 15 MINS: Performed by: SURGERY

## 2023-08-04 PROCEDURE — 27000284 HC CANNULA NASAL: Performed by: ANESTHESIOLOGY

## 2023-08-04 PROCEDURE — 77001 CHG FLUOROGUIDE CNTRL VEN ACCESS,PLACE,REPLACE,REMOVE: ICD-10-PCS | Mod: 26,,, | Performed by: SURGERY

## 2023-08-04 PROCEDURE — D9220A PRA ANESTHESIA: ICD-10-PCS | Mod: CRNA,,, | Performed by: NURSE ANESTHETIST, CERTIFIED REGISTERED

## 2023-08-04 PROCEDURE — 36561 INSERT TUNNELED CV CATH: CPT | Mod: LT,,, | Performed by: SURGERY

## 2023-08-04 PROCEDURE — 63600175 PHARM REV CODE 636 W HCPCS: Performed by: NURSE ANESTHETIST, CERTIFIED REGISTERED

## 2023-08-04 PROCEDURE — 25000003 PHARM REV CODE 250: Performed by: ANESTHESIOLOGY

## 2023-08-04 PROCEDURE — 25000003 PHARM REV CODE 250: Performed by: SURGERY

## 2023-08-04 PROCEDURE — 27000653 HC CATH, IV CATHLN: Performed by: ANESTHESIOLOGY

## 2023-08-04 PROCEDURE — D9220A PRA ANESTHESIA: Mod: ANES,,, | Performed by: ANESTHESIOLOGY

## 2023-08-04 PROCEDURE — 27201423 OPTIME MED/SURG SUP & DEVICES STERILE SUPPLY: Performed by: SURGERY

## 2023-08-04 PROCEDURE — 36000707: Performed by: SURGERY

## 2023-08-04 PROCEDURE — D9220A PRA ANESTHESIA: ICD-10-PCS | Mod: ANES,,, | Performed by: ANESTHESIOLOGY

## 2023-08-04 PROCEDURE — C1788 PORT, INDWELLING, IMP: HCPCS | Performed by: SURGERY

## 2023-08-04 PROCEDURE — 27000673 HC TUBING BLOOD Y: Performed by: ANESTHESIOLOGY

## 2023-08-04 PROCEDURE — 36561 PR INSERT TUNNELED CV CATH WITH PORT: ICD-10-PCS | Mod: LT,,, | Performed by: SURGERY

## 2023-08-04 PROCEDURE — 36000706: Performed by: SURGERY

## 2023-08-04 PROCEDURE — D9220A PRA ANESTHESIA: Mod: CRNA,,, | Performed by: NURSE ANESTHETIST, CERTIFIED REGISTERED

## 2023-08-04 PROCEDURE — 71000015 HC POSTOP RECOV 1ST HR: Performed by: SURGERY

## 2023-08-04 PROCEDURE — 27000671 HC TUBING MICROBORE EXT: Performed by: ANESTHESIOLOGY

## 2023-08-04 DEVICE — PORT POWER MRI 8FR 1808000: Type: IMPLANTABLE DEVICE | Site: CHEST | Status: FUNCTIONAL

## 2023-08-04 RX ORDER — HEPARIN SODIUM 1000 [USP'U]/ML
INJECTION, SOLUTION INTRAVENOUS; SUBCUTANEOUS
Status: DISCONTINUED | OUTPATIENT
Start: 2023-08-04 | End: 2023-08-04 | Stop reason: HOSPADM

## 2023-08-04 RX ORDER — SODIUM CHLORIDE, SODIUM LACTATE, POTASSIUM CHLORIDE, CALCIUM CHLORIDE 600; 310; 30; 20 MG/100ML; MG/100ML; MG/100ML; MG/100ML
INJECTION, SOLUTION INTRAVENOUS CONTINUOUS PRN
Status: DISCONTINUED | OUTPATIENT
Start: 2023-08-04 | End: 2023-08-04

## 2023-08-04 RX ORDER — OXYCODONE HYDROCHLORIDE 5 MG/1
5 TABLET ORAL ONCE
Status: COMPLETED | OUTPATIENT
Start: 2023-08-04 | End: 2023-08-04

## 2023-08-04 RX ORDER — MIDAZOLAM HYDROCHLORIDE 1 MG/ML
INJECTION INTRAMUSCULAR; INTRAVENOUS
Status: DISCONTINUED | OUTPATIENT
Start: 2023-08-04 | End: 2023-08-04

## 2023-08-04 RX ORDER — ONDANSETRON 2 MG/ML
INJECTION INTRAMUSCULAR; INTRAVENOUS
Status: DISCONTINUED | OUTPATIENT
Start: 2023-08-04 | End: 2023-08-04

## 2023-08-04 RX ORDER — PROPOFOL 10 MG/ML
INJECTION, EMULSION INTRAVENOUS
Status: DISCONTINUED | OUTPATIENT
Start: 2023-08-04 | End: 2023-08-04

## 2023-08-04 RX ORDER — FENTANYL CITRATE 50 UG/ML
INJECTION, SOLUTION INTRAMUSCULAR; INTRAVENOUS
Status: DISCONTINUED | OUTPATIENT
Start: 2023-08-04 | End: 2023-08-04

## 2023-08-04 RX ORDER — ACETAMINOPHEN 10 MG/ML
INJECTION, SOLUTION INTRAVENOUS
Status: DISCONTINUED | OUTPATIENT
Start: 2023-08-04 | End: 2023-08-04

## 2023-08-04 RX ORDER — CEFAZOLIN SODIUM 1 G/3ML
INJECTION, POWDER, FOR SOLUTION INTRAMUSCULAR; INTRAVENOUS
Status: DISCONTINUED | OUTPATIENT
Start: 2023-08-04 | End: 2023-08-04

## 2023-08-04 RX ORDER — CLINDAMYCIN PHOSPHATE 900 MG/50ML
900 INJECTION, SOLUTION INTRAVENOUS
Status: DISCONTINUED | OUTPATIENT
Start: 2023-08-04 | End: 2023-08-04 | Stop reason: HOSPADM

## 2023-08-04 RX ADMIN — ONDANSETRON 4 MG: 2 INJECTION INTRAMUSCULAR; INTRAVENOUS at 07:08

## 2023-08-04 RX ADMIN — SODIUM CHLORIDE, SODIUM LACTATE, POTASSIUM CHLORIDE, AND CALCIUM CHLORIDE: .6; .31; .03; .02 INJECTION, SOLUTION INTRAVENOUS at 06:08

## 2023-08-04 RX ADMIN — PROPOFOL 50 MG: 10 INJECTION, EMULSION INTRAVENOUS at 07:08

## 2023-08-04 RX ADMIN — PROPOFOL 25 MG: 10 INJECTION, EMULSION INTRAVENOUS at 07:08

## 2023-08-04 RX ADMIN — CEFAZOLIN 2 G: 330 INJECTION, POWDER, FOR SOLUTION INTRAMUSCULAR; INTRAVENOUS at 07:08

## 2023-08-04 RX ADMIN — ACETAMINOPHEN 1000 MG: 10 INJECTION, SOLUTION INTRAVENOUS at 07:08

## 2023-08-04 RX ADMIN — PROPOFOL 25 MG: 10 INJECTION, EMULSION INTRAVENOUS at 08:08

## 2023-08-04 RX ADMIN — MIDAZOLAM HYDROCHLORIDE 2 MG: 1 INJECTION, SOLUTION INTRAMUSCULAR; INTRAVENOUS at 07:08

## 2023-08-04 RX ADMIN — FENTANYL CITRATE 25 MCG: 50 INJECTION, SOLUTION INTRAMUSCULAR; INTRAVENOUS at 08:08

## 2023-08-04 RX ADMIN — FENTANYL CITRATE 25 MCG: 50 INJECTION, SOLUTION INTRAMUSCULAR; INTRAVENOUS at 07:08

## 2023-08-04 RX ADMIN — OXYCODONE HYDROCHLORIDE 5 MG: 5 TABLET ORAL at 08:08

## 2023-08-04 NOTE — H&P
GENERAL SURGERY  OUTPATIENT H&P    REASON FOR VISIT/CC: Port placement    HPI: Aysha Moore is a 67 y.o. female with stage IIIA left lower lung adenocarcinoma planning to initiate chemo radiation referred for port placement. Patient was a former smoker who quit 6 years ago. Never a central line or head or neck trauma.    I have reviewed the patient's chart including prior progress notes, procedures and testing.     ROS:   Review of Systems    PROBLEM LIST:  Patient Active Problem List   Diagnosis    Hyperlipidemia    Hypertension    Coronary artery disease    Anxiety    History of heart artery stent    Pulmonary emphysema    Prediabetes    Tear of medial meniscus of right knee, current    Chronic bilateral low back pain with left-sided sciatica    BMI 24.0-24.9, adult    Personal history of tobacco use, presenting hazards to health    Chest pain    Acute ST elevation myocardial infarction (STEMI) of inferior wall    Seasonal allergic rhinitis    Malignant melanoma of skin of left leg    Pure hypercholesterolemia    Chronic combined systolic and diastolic heart failure    Abnormal CT scan of lung    LUNG CANCER-ADENOCARCINOMA OF THE LLL    Malignant neoplasm of unspecified part of unspecified bronchus or lung         HISTORY  Past Medical History:   Diagnosis Date    Allergy     Anxiety     Arthritis     CAD (coronary artery disease)     coronary stents    Chronic back pain     lower back pain radiates to left leg    Chronic rhinitis     DAILY (dyspnea on exertion)     Hyperlipidemia     Hypertension     Lung cancer 07/01/2023    Melanoma in situ of left upper extremity     left thigh area    MI (myocardial infarction)     Seasonal allergic rhinitis 10/29/2020       Past Surgical History:   Procedure Laterality Date    BREAST SURGERY      breast reduction    CATARACT EXTRACTION, BILATERAL      coranary stent      EXCISION OF MELANOMA Left 3/30/2022    Procedure: EXCISION, MELANOMA;  Surgeon: David Mcpherson III,  MD;  Location: [facility] OR;  Service: General;  Laterality: Left;    EXCISIONAL BIOPSY Left 3/30/2022    Procedure: EXCISIONAL BIOPSY;  Surgeon: David Mcpherson III, MD;  Location: [facility] OR;  Service: General;  Laterality: Left;    HYSTERECTOMY      total    LEFT HEART CATHETERIZATION Left 10/16/2020    Procedure: Left heart cath;  Surgeon: Garrett Robins MD;  Location: [facility] CATH/EP LAB;  Service: Cardiology;  Laterality: Left;    OOPHORECTOMY      TOTAL REDUCTION MAMMOPLASTY Bilateral        Social History     Tobacco Use    Smoking status: Former     Current packs/day: 0.00     Average packs/day: 1 pack/day for 43.2 years (43.2 ttl pk-yrs)     Types: Cigarettes, Vaping with nicotine     Start date:      Quit date: 3/4/2017     Years since quittin.4    Smokeless tobacco: Never   Substance Use Topics    Alcohol use: Not Currently     Alcohol/week: 0.0 standard drinks of alcohol     Comment: sometimes    Drug use: No       Family History   Problem Relation Age of Onset    Cancer Mother         breast, ovarian, cervical     Heart disease Mother     Breast cancer Mother 50    Ovarian cancer Mother     Cancer Father         melanoma    Stroke Sister     Heart disease Sister     Cancer Sister         leukemia    Macular degeneration Sister     Heart disease Sister     Heart disease Brother     Lung cancer Brother     Cancer Maternal Uncle     Cancer Paternal Aunt         breast    Breast cancer Paternal Aunt 60    Cancer Paternal Uncle     Heart disease Maternal Grandmother     No Known Problems Daughter     No Known Problems Son          MEDS:  Current Facility-Administered Medications on File Prior to Visit   Medication Dose Route Frequency Provider Last Rate Last Admin    clindamycin in D5W 900 mg/50 mL IVPB 900 mg  900 mg Intravenous On Call Procedure Remi Mckeon Jr., MD         Current Outpatient Medications on File Prior to Visit   Medication Sig Dispense Refill    ammonium lactate 12 %  Crea aaa bid prn dry skin 385 g 11    atorvastatin (LIPITOR) 80 MG tablet Take 1 tablet (80 mg total) by mouth every evening. 90 tablet 1    busPIRone (BUSPAR) 5 MG Tab TAKE 1 TABLET BY MOUTH TWICE A DAY (Patient taking differently: Take 5 mg by mouth every evening.) 180 tablet 1    calcium-vitamin D3 (OS-JOLIE 500 + D3) 500 mg-5 mcg (200 unit) per tablet Take 1 tablet by mouth every morning.      citalopram (CELEXA) 20 MG tablet TAKE 1 TABLET BY MOUTH EVERY DAY 90 tablet 3    clobetasol 0.05% (TEMOVATE) 0.05 % Oint Apply topically 2 (two) times daily. for 14 days 45 g 3    ezetimibe (ZETIA) 10 mg tablet Take 1 tablet (10 mg total) by mouth once daily. 90 tablet 3    fluticasone propionate (FLONASE) 50 mcg/actuation nasal spray 1 spray (50 mcg total) by Each Nostril route daily as needed for Rhinitis or Allergies. 9.9 mL 2    lisinopriL (PRINIVIL,ZESTRIL) 2.5 MG tablet Take 1 tablet (2.5 mg total) by mouth every evening. 90 tablet 3    metoprolol succinate (TOPROL-XL) 50 MG 24 hr tablet Take 1 tablet (50 mg total) by mouth once daily. 90 tablet 3    ondansetron (ZOFRAN) 8 MG tablet Take 1 tablet (8 mg total) by mouth every 8 (eight) hours as needed. 30 tablet 5    promethazine (PHENERGAN) 25 MG tablet Take 1 tablet (25 mg total) by mouth every 4 to 6 hours as needed. 30 tablet 5    tiZANidine (ZANAFLEX) 4 MG tablet TAKE 1 TABLET (4 MG TOTAL) BY MOUTH EVERY 6 (SIX) HOURS AS NEEDED. BACK PAIN 360 tablet 0    zinc 50 mg Tab Take by mouth.      ALPRAZolam (XANAX) 0.25 MG tablet Take 1 tablet (0.25 mg total) by mouth 3 (three) times daily as needed for Anxiety. (Patient taking differently: Take 0.25 mg by mouth 2 (two) times daily as needed for Anxiety.) 30 tablet 1    azelastine (ASTELIN) 137 mcg (0.1 %) nasal spray 1 SPRAY (137 MCG TOTAL) BY NASAL ROUTE 2 (TWO) TIMES DAILY AS NEEDED FOR RHINITIS (OR SINUSITIS). 90 mL 1    nitroGLYCERIN (NITROSTAT) 0.4 MG SL tablet Place 1 tablet (0.4 mg total) under the tongue every 5  (five) minutes as needed for Chest pain. 30 tablet 0    valACYclovir (VALTREX) 1000 MG tablet TAKE 2 AT ONSET OF FEVER BLISTERS THEN REPEAT IN 12 HOURS (TOTAL 4 TABS PER EPISODE) 20 tablet 3       ALLERGIES:  Review of patient's allergies indicates:   Allergen Reactions    Cephalexin      Other reaction(s): Rash    Doxycycline monohydrate Hives    Lanoxin  [digoxin]      Other reaction(s): Rash    Lansoprazole      Other reaction(s): Rash    Macrobid [nitrofurantoin monohyd/m-cryst] Rash         VITALS:  Vitals:    08/02/23 1048   BP: 118/81   Pulse: 80   Resp: 16   Temp: 97.9 °F (36.6 °C)         PHYSICAL EXAM:  Physical Exam  Vitals reviewed.   Constitutional:       General: She is not in acute distress.     Appearance: Normal appearance. She is well-developed.   HENT:      Head: Normocephalic and atraumatic.      Nose: Nose normal.   Eyes:      General: No scleral icterus.     Conjunctiva/sclera: Conjunctivae normal.   Neck:      Trachea: No tracheal tenderness or tracheal deviation.   Cardiovascular:      Rate and Rhythm: Normal rate and regular rhythm.      Pulses: Normal pulses.   Pulmonary:      Effort: Pulmonary effort is normal. No accessory muscle usage or respiratory distress.      Breath sounds: Normal breath sounds.   Abdominal:      General: There is no distension.      Palpations: Abdomen is soft.      Tenderness: There is no abdominal tenderness.   Musculoskeletal:         General: No swelling or tenderness. Normal range of motion.      Cervical back: Normal range of motion and neck supple. No rigidity.   Skin:     General: Skin is warm and dry.      Coloration: Skin is not jaundiced.      Findings: No erythema.   Neurological:      General: No focal deficit present.      Mental Status: She is alert and oriented to person, place, and time.      Motor: No weakness or abnormal muscle tone.   Psychiatric:         Mood and Affect: Mood normal.         Behavior: Behavior normal.         Thought Content:  Thought content normal.         Judgment: Judgment normal.           LABS:  Lab Results   Component Value Date    WBC 7.34 07/31/2023    RBC 4.35 07/31/2023    HGB 13.0 07/31/2023    HCT 40.3 07/31/2023     07/31/2023     Lab Results   Component Value Date     07/31/2023     07/31/2023    K 4.4 07/31/2023     07/31/2023    CO2 26 07/31/2023    BUN <5 (L) 07/31/2023    CREATININE <0.3 (L) 07/31/2023    CALCIUM 9.6 07/31/2023     Lab Results   Component Value Date    ALT 9 (L) 07/31/2023    AST 13 07/31/2023    ALKPHOS 62 07/31/2023    BILITOT 0.7 07/31/2023     Lab Results   Component Value Date    MG 3.1 (H) 07/31/2023       STUDIES:  Images and reports were personally reviewed.  CXR  FINDINGS:  The lungs show a focal nodular airspace opacity in the retrocardiac left lower lobe.  Costophrenic angles are seen without effusion. No pneumothorax is identified. The heart is normal in size. There is mild left hilar fullness compatible with left hilar lymphadenopathy.  Osseous structures appear within normal limits. The visualized upper abdomen is unremarkable.     Impression:     1.  No pneumothorax after lung biopsy.     2.  Suspicious pulmonary mass in the retrocardiac left lower lobe.     3.  Left hilar fullness/lymphadenopathy.      ASSESSMENT & PLAN:  67 y.o. female with adenocarcinoma of the lung  - we discussed the indication for port placement to ensure secure and easy access to the venous system for chemotherapy infusion   - the procedure for placement as well as associated risk of pain, bleeding, scarring, infection, need to remove port, port malfunction, port malpositioning, injury to artery, injury to lung, arrhythmia, malfunction, hematoma, thrombus, etc.  were reviewed, patient expressed understanding these risks and agreed proceed with surgical intervention  -schedule for port placement on 08/04/2023, preop workup utd

## 2023-08-04 NOTE — TRANSFER OF CARE
Anesthesia Transfer of Care Note    Patient: Aysha Moore    Procedure(s) Performed: Procedure(s) (LRB):  [id number] (N/A)    Anesthesia PACU Handoff    Last vitals:   Visit Vitals  /77 (BP Location: Left arm, Patient Position: Sitting)   Pulse 85   Temp 36.6 °C (97.9 °F) (Oral)   Resp 16   SpO2 96%   Breastfeeding No

## 2023-08-04 NOTE — DISCHARGE INSTRUCTIONS
FOLLOW THE WRITTEN INSTRUCTIONS I REVIEWED WITH YOU AND A COPY GIVEN TO YOU.  DO NOT DRIVE, SIGN LEGAL DOCUMENTS OR SHOWER FOR THE NEXT 24 HOURS.  NO SWIMMING OR TUB BATHS FOR THE NEXT 2 WEEKS.

## 2023-08-04 NOTE — ANESTHESIA POSTPROCEDURE EVALUATION
Anesthesia Post Evaluation    Patient: Aysha Moore    Procedure(s) Performed: Procedure(s) (LRB):  [id number] (N/A)    Final Anesthesia Type: general      Patient location during evaluation: PACU  Patient participation: Yes- Able to Participate  Level of consciousness: awake and alert and oriented  Post-procedure vital signs: reviewed and stable  Pain management: adequate  Airway patency: patent    PONV status at discharge: No PONV  Anesthetic complications: no      Cardiovascular status: blood pressure returned to baseline, hemodynamically stable and stable  Respiratory status: unassisted, spontaneous ventilation and room air  Hydration status: euvolemic  Follow-up not needed.          Vitals Value Taken Time   BP 93/52 08/04/23 0818   Temp 37.3 C 08/04/23 0818   Pulse 76 08/04/23 0818   Resp 16 08/04/23 0818   SpO2 97 % 08/04/23 0818         No case tracking events are documented in the log.      Pain/Faisal Score: No data recorded

## 2023-08-04 NOTE — ANESTHESIA PREPROCEDURE EVALUATION
08/04/2023  Aysha Moore is a 67 y.o., female.      Patient Active Problem List   Diagnosis    Hyperlipidemia    Hypertension    Coronary artery disease    Anxiety    History of heart artery stent    Pulmonary emphysema    Prediabetes    Tear of medial meniscus of right knee, current    Chronic bilateral low back pain with left-sided sciatica    BMI 24.0-24.9, adult    Personal history of tobacco use, presenting hazards to health    Chest pain    Acute ST elevation myocardial infarction (STEMI) of inferior wall    Seasonal allergic rhinitis    Malignant melanoma of skin of left leg    Pure hypercholesterolemia    Chronic combined systolic and diastolic heart failure    Abnormal CT scan of lung    LUNG CANCER-ADENOCARCINOMA OF THE LLL    Malignant neoplasm of unspecified part of unspecified bronchus or lung       Past Surgical History:   Procedure Laterality Date    BREAST SURGERY      breast reduction    CATARACT EXTRACTION, BILATERAL      coranary stent      EXCISION OF MELANOMA Left 3/30/2022    Procedure: EXCISION, MELANOMA;  Surgeon: David Mcpherson III, MD;  Location: [facility] OR;  Service: General;  Laterality: Left;    EXCISIONAL BIOPSY Left 3/30/2022    Procedure: EXCISIONAL BIOPSY;  Surgeon: David Mcpherson III, MD;  Location: [facility] OR;  Service: General;  Laterality: Left;    HYSTERECTOMY      total    LEFT HEART CATHETERIZATION Left 10/16/2020    Procedure: Left heart cath;  Surgeon: Garrett Robins MD;  Location: [facility] CATH/EP LAB;  Service: Cardiology;  Laterality: Left;    OOPHORECTOMY      TOTAL REDUCTION MAMMOPLASTY Bilateral 2000        Tobacco Use:  The patient  reports that she quit smoking about 6 years ago. Her smoking use included cigarettes and vaping with nicotine. She started smoking about 49 years ago. She has a 43.2 pack-year smoking history. She  has never used smokeless tobacco.     Results for orders placed or performed during the hospital encounter of 08/02/23   EKG 12-lead    Collection Time: 08/02/23 11:29 AM    Narrative    Test Reason : C34.90,    Vent. Rate : 070 BPM     Atrial Rate : 070 BPM     P-R Int : 156 ms          QRS Dur : 098 ms      QT Int : 388 ms       P-R-T Axes : 031 039 062 degrees     QTc Int : 419 ms    Normal sinus rhythm  Nonspecific T wave abnormality  Abnormal ECG  When compared with ECG of 18-APR-2022 11:43,  Nonspecific T wave abnormality no longer evident in Inferior leads    Referred By:             Confirmed By:              Lab Results   Component Value Date    WBC 7.34 07/31/2023    HGB 13.0 07/31/2023    HCT 40.3 07/31/2023    MCV 93 07/31/2023     07/31/2023     BMP  Lab Results   Component Value Date     07/31/2023    K 4.4 07/31/2023     07/31/2023    CO2 26 07/31/2023    BUN <5 (L) 07/31/2023    CREATININE <0.3 (L) 07/31/2023    CALCIUM 9.6 07/31/2023    ANIONGAP 5 (L) 07/31/2023     07/31/2023     08/25/2022    GLU 87 03/29/2022       Results for orders placed during the hospital encounter of 08/31/22    Echo    Interpretation Summary  · The left ventricle is normal in size with mildly decreased systolic function. The estimated ejection fraction is 45%.  · There are segmental left ventricular wall motion abnormalities.  · Normal right ventricular size with normal right ventricular systolic function.  · Left ventricular diastolic dysfunction.  · The estimated PA systolic pressure is 19 mmHg.  · Normal central venous pressure (3 mmHg).              Pre-op Assessment    I have reviewed the Patient Summary Reports.     I have reviewed the Nursing Notes. I have reviewed the NPO Status.   I have reviewed the Medications.     Review of Systems  Anesthesia Hx:  No problems with previous Anesthesia  Denies Family Hx of Anesthesia complications.   Denies Personal Hx of Anesthesia  complications.   Social:  Former Smoker    Hematology/Oncology:  Hematology Normal      Current/Recent Cancer. (adenocarcinoma LLL)   EENT/Dental:   chronic allergic rhinitis   Cardiovascular:   Hypertension, well controlled Past MI (hx of STEMI 200, s/p PCI ) CAD asymptomatic CABG/stent  hyperlipidemia DAILY    Pulmonary:   COPD (no home O2, rescue inhaler prn), moderate Shortness of breath (worsening SOB over past few months)    Hepatic/GI:  Hepatic/GI Normal    Musculoskeletal:  Spine Disorders: lumbar Chronic Pain    Neurological:   Neuromuscular Disease, (chronic LBP with occ sciatica)    Endocrine:   Diabetes (pre-diabetes), well controlled    Psych:   anxiety          Physical Exam  General: Well nourished, Cooperative, Alert and Oriented    Airway:  Mallampati: III / II  Mouth Opening: Normal  TM Distance: Normal  Tongue: Normal  Neck ROM: Normal ROM    Dental:  Intact    Chest/Lungs:  Clear to auscultation    Heart:  Rate: Normal  Rhythm: Regular Rhythm  Sounds: Normal    Abdomen:  Normal, Soft, Nontender        Anesthesia Plan  Type of Anesthesia, risks & benefits discussed:    Anesthesia Type: Gen Natural Airway  Intra-op Monitoring Plan: Standard ASA Monitors  Post Op Pain Control Plan:   (medical reason for not using multimodal pain management)  Induction:  IV  Informed Consent: Informed consent signed with the Patient and all parties understand the risks and agree with anesthesia plan.  All questions answered. Patient consented to blood products? No  ASA Score: 3  Anesthesia Plan Notes:   General Natural Airway  Pt requests Versed in holding prior to transfer to OR  IV tylenol  Propofol  Zofran    Ready For Surgery From Anesthesia Perspective.     .

## 2023-08-04 NOTE — TRANSFER OF CARE
Anesthesia Transfer of Care Note    Patient: Aysha Moore    Procedure(s) Performed: Procedure(s) (LRB):  [id number] (N/A)    Patient location: OPS    Anesthesia Type: general    Transport from OR: Transported from OR on room air with adequate spontaneous ventilation    Post pain: adequate analgesia    Post assessment: no apparent anesthetic complications    Post vital signs: stable    Level of consciousness: awake    Nausea/Vomiting: no nausea/vomiting    Complications: none    Transfer of care protocol was followed      Last vitals:   Visit Vitals  /77 (BP Location: Left arm, Patient Position: Sitting)   Pulse 85   Temp 36.7 °C (98 °F) (Oral)   Resp 16   SpO2 96%   Breastfeeding No

## 2023-08-04 NOTE — OP NOTE
DATE OF PROCEDURE: 08/04/2023    PREOPERATIVE DIAGNOSIS: Adenocarcinoma of the lung    POSTOPERATIVE DIAGNOSIS: Same     PROCEDURE: Left IJ Port-A-Cath Placement    SURGEON: Remi Mckeon M.D.    ASSISTANT: None    ANESTHESIA: Local MAC    PREP: Chlorhexidine    ESTIMATED BLOOD LOSS: Minimal    INDICATION: Access for infusions    FINDINGS:  -  the left IJ was found to be widely patent on US evaluation  -  IJ accessed using ultrasound with return of dark red nonpulsatile blood.  -  Wire easily passed and confirmed in the venous system on fluoro  -  Catheter tip positioned at the atrial caval junction under fluoro  -  Port aspirated and flushed with ease.    PROCEDURE IN DETAIL:  The patient was identified in the preoperative unit and taken back to the operating room and laid supine on the operating room table. IV antibiotics were administered prior to the start of anesthesia. MAC anesthesia was administered without complication. The patient was then prepped and draped in the standard sterile fashion. The ultrasound was used to identify the left internal jugular vein.  Local anesthetic was injected and then a small skin nick was made.  The patient was placed into the Trendelenburg position and an 18g needle was used to access the vein under ultrasound guidance.We had return of dark red, non-pulsatile blood. The wire was advanced without issue under fluoroscopic guidance. We turned our attention to creation of the port pocket in the upper chest. Local anesthetic was injected into the upper chest in the location of the port pocket and along the path of the planned catheter tunnel. A 4cm incision was made sharpley and the subcutaneous tissue was dissected until an appropriate sized pocket was created to accommodate the port. Once we were satisfied with the pocket, the catheter was flushed and tunneled from the chest to the neck incision. Under fluoroscopic guidance the dilator and sheath were placed over the wire  using Seldinger technique. The dilator and wire were removed and the catheter was introduced and fed into the sheath as the sheath was peeled away. Fluoro was used to confirm the position of the catheter in the atriocaval junction and confirm that the catheter was not kinked. The catheter was then cut to size and attached to the port which was then placed in the chest wall pocket. The port was then access and easily aspirated blood. It was then flushed with heparinized saline. Hemostasis was assured and the dermis of the port pocket was re-approximated with 3-0 vicryl suture in an interrupted fashion. The skin was re-approximated using 4-0 monocryl suture in a running fashion. Dermabond was then applied. The patient was awakened from anesthesia without complication and returned to the postoperative recovery unit in stable condition. At the end of the case, sponge, instrument, and needle counts were correct and hemostasis was achieved. I was present and scrubbed throughout the entirety of the case. A post-operative CXR will be obtained to confirm appropriate catheter location and to rule out pneumothorax.      All images were personally interpreted by me and stored.    COMPLICATIONS: None    CONDITION:  Stable    DISPO: To PACU then home after CXR    Remi Mckeon Jr

## 2023-08-04 NOTE — H&P (VIEW-ONLY)
GENERAL SURGERY  OUTPATIENT H&P    REASON FOR VISIT/CC: Port placement    HPI: Aysha Moore is a 67 y.o. female with stage IIIA left lower lung adenocarcinoma planning to initiate chemo radiation referred for port placement. Patient was a former smoker who quit 6 years ago. Never a central line or head or neck trauma.    I have reviewed the patient's chart including prior progress notes, procedures and testing.     ROS:   Review of Systems    PROBLEM LIST:  Patient Active Problem List   Diagnosis    Hyperlipidemia    Hypertension    Coronary artery disease    Anxiety    History of heart artery stent    Pulmonary emphysema    Prediabetes    Tear of medial meniscus of right knee, current    Chronic bilateral low back pain with left-sided sciatica    BMI 24.0-24.9, adult    Personal history of tobacco use, presenting hazards to health    Chest pain    Acute ST elevation myocardial infarction (STEMI) of inferior wall    Seasonal allergic rhinitis    Malignant melanoma of skin of left leg    Pure hypercholesterolemia    Chronic combined systolic and diastolic heart failure    Abnormal CT scan of lung    LUNG CANCER-ADENOCARCINOMA OF THE LLL    Malignant neoplasm of unspecified part of unspecified bronchus or lung         HISTORY  Past Medical History:   Diagnosis Date    Allergy     Anxiety     Arthritis     CAD (coronary artery disease)     coronary stents    Chronic back pain     lower back pain radiates to left leg    Chronic rhinitis     DAILY (dyspnea on exertion)     Hyperlipidemia     Hypertension     Lung cancer 07/01/2023    Melanoma in situ of left upper extremity     left thigh area    MI (myocardial infarction)     Seasonal allergic rhinitis 10/29/2020       Past Surgical History:   Procedure Laterality Date    BREAST SURGERY      breast reduction    CATARACT EXTRACTION, BILATERAL      coranary stent      EXCISION OF MELANOMA Left 3/30/2022    Procedure: EXCISION, MELANOMA;  Surgeon: David Mcpherson III,  MD;  Location: [facility] OR;  Service: General;  Laterality: Left;    EXCISIONAL BIOPSY Left 3/30/2022    Procedure: EXCISIONAL BIOPSY;  Surgeon: David Mcpherson III, MD;  Location: [facility] OR;  Service: General;  Laterality: Left;    HYSTERECTOMY      total    LEFT HEART CATHETERIZATION Left 10/16/2020    Procedure: Left heart cath;  Surgeon: Garrett Robins MD;  Location: [facility] CATH/EP LAB;  Service: Cardiology;  Laterality: Left;    OOPHORECTOMY      TOTAL REDUCTION MAMMOPLASTY Bilateral        Social History     Tobacco Use    Smoking status: Former     Current packs/day: 0.00     Average packs/day: 1 pack/day for 43.2 years (43.2 ttl pk-yrs)     Types: Cigarettes, Vaping with nicotine     Start date:      Quit date: 3/4/2017     Years since quittin.4    Smokeless tobacco: Never   Substance Use Topics    Alcohol use: Not Currently     Alcohol/week: 0.0 standard drinks of alcohol     Comment: sometimes    Drug use: No       Family History   Problem Relation Age of Onset    Cancer Mother         breast, ovarian, cervical     Heart disease Mother     Breast cancer Mother 50    Ovarian cancer Mother     Cancer Father         melanoma    Stroke Sister     Heart disease Sister     Cancer Sister         leukemia    Macular degeneration Sister     Heart disease Sister     Heart disease Brother     Lung cancer Brother     Cancer Maternal Uncle     Cancer Paternal Aunt         breast    Breast cancer Paternal Aunt 60    Cancer Paternal Uncle     Heart disease Maternal Grandmother     No Known Problems Daughter     No Known Problems Son          MEDS:  Current Facility-Administered Medications on File Prior to Visit   Medication Dose Route Frequency Provider Last Rate Last Admin    clindamycin in D5W 900 mg/50 mL IVPB 900 mg  900 mg Intravenous On Call Procedure Remi Mckeon Jr., MD         Current Outpatient Medications on File Prior to Visit   Medication Sig Dispense Refill    ammonium lactate 12 %  Crea aaa bid prn dry skin 385 g 11    atorvastatin (LIPITOR) 80 MG tablet Take 1 tablet (80 mg total) by mouth every evening. 90 tablet 1    busPIRone (BUSPAR) 5 MG Tab TAKE 1 TABLET BY MOUTH TWICE A DAY (Patient taking differently: Take 5 mg by mouth every evening.) 180 tablet 1    calcium-vitamin D3 (OS-JOLIE 500 + D3) 500 mg-5 mcg (200 unit) per tablet Take 1 tablet by mouth every morning.      citalopram (CELEXA) 20 MG tablet TAKE 1 TABLET BY MOUTH EVERY DAY 90 tablet 3    clobetasol 0.05% (TEMOVATE) 0.05 % Oint Apply topically 2 (two) times daily. for 14 days 45 g 3    ezetimibe (ZETIA) 10 mg tablet Take 1 tablet (10 mg total) by mouth once daily. 90 tablet 3    fluticasone propionate (FLONASE) 50 mcg/actuation nasal spray 1 spray (50 mcg total) by Each Nostril route daily as needed for Rhinitis or Allergies. 9.9 mL 2    lisinopriL (PRINIVIL,ZESTRIL) 2.5 MG tablet Take 1 tablet (2.5 mg total) by mouth every evening. 90 tablet 3    metoprolol succinate (TOPROL-XL) 50 MG 24 hr tablet Take 1 tablet (50 mg total) by mouth once daily. 90 tablet 3    ondansetron (ZOFRAN) 8 MG tablet Take 1 tablet (8 mg total) by mouth every 8 (eight) hours as needed. 30 tablet 5    promethazine (PHENERGAN) 25 MG tablet Take 1 tablet (25 mg total) by mouth every 4 to 6 hours as needed. 30 tablet 5    tiZANidine (ZANAFLEX) 4 MG tablet TAKE 1 TABLET (4 MG TOTAL) BY MOUTH EVERY 6 (SIX) HOURS AS NEEDED. BACK PAIN 360 tablet 0    zinc 50 mg Tab Take by mouth.      ALPRAZolam (XANAX) 0.25 MG tablet Take 1 tablet (0.25 mg total) by mouth 3 (three) times daily as needed for Anxiety. (Patient taking differently: Take 0.25 mg by mouth 2 (two) times daily as needed for Anxiety.) 30 tablet 1    azelastine (ASTELIN) 137 mcg (0.1 %) nasal spray 1 SPRAY (137 MCG TOTAL) BY NASAL ROUTE 2 (TWO) TIMES DAILY AS NEEDED FOR RHINITIS (OR SINUSITIS). 90 mL 1    nitroGLYCERIN (NITROSTAT) 0.4 MG SL tablet Place 1 tablet (0.4 mg total) under the tongue every 5  (five) minutes as needed for Chest pain. 30 tablet 0    valACYclovir (VALTREX) 1000 MG tablet TAKE 2 AT ONSET OF FEVER BLISTERS THEN REPEAT IN 12 HOURS (TOTAL 4 TABS PER EPISODE) 20 tablet 3       ALLERGIES:  Review of patient's allergies indicates:   Allergen Reactions    Cephalexin      Other reaction(s): Rash    Doxycycline monohydrate Hives    Lanoxin  [digoxin]      Other reaction(s): Rash    Lansoprazole      Other reaction(s): Rash    Macrobid [nitrofurantoin monohyd/m-cryst] Rash         VITALS:  Vitals:    08/02/23 1048   BP: 118/81   Pulse: 80   Resp: 16   Temp: 97.9 °F (36.6 °C)         PHYSICAL EXAM:  Physical Exam  Vitals reviewed.   Constitutional:       General: She is not in acute distress.     Appearance: Normal appearance. She is well-developed.   HENT:      Head: Normocephalic and atraumatic.      Nose: Nose normal.   Eyes:      General: No scleral icterus.     Conjunctiva/sclera: Conjunctivae normal.   Neck:      Trachea: No tracheal tenderness or tracheal deviation.   Cardiovascular:      Rate and Rhythm: Normal rate and regular rhythm.      Pulses: Normal pulses.   Pulmonary:      Effort: Pulmonary effort is normal. No accessory muscle usage or respiratory distress.      Breath sounds: Normal breath sounds.   Abdominal:      General: There is no distension.      Palpations: Abdomen is soft.      Tenderness: There is no abdominal tenderness.   Musculoskeletal:         General: No swelling or tenderness. Normal range of motion.      Cervical back: Normal range of motion and neck supple. No rigidity.   Skin:     General: Skin is warm and dry.      Coloration: Skin is not jaundiced.      Findings: No erythema.   Neurological:      General: No focal deficit present.      Mental Status: She is alert and oriented to person, place, and time.      Motor: No weakness or abnormal muscle tone.   Psychiatric:         Mood and Affect: Mood normal.         Behavior: Behavior normal.         Thought Content:  Thought content normal.         Judgment: Judgment normal.           LABS:  Lab Results   Component Value Date    WBC 7.34 07/31/2023    RBC 4.35 07/31/2023    HGB 13.0 07/31/2023    HCT 40.3 07/31/2023     07/31/2023     Lab Results   Component Value Date     07/31/2023     07/31/2023    K 4.4 07/31/2023     07/31/2023    CO2 26 07/31/2023    BUN <5 (L) 07/31/2023    CREATININE <0.3 (L) 07/31/2023    CALCIUM 9.6 07/31/2023     Lab Results   Component Value Date    ALT 9 (L) 07/31/2023    AST 13 07/31/2023    ALKPHOS 62 07/31/2023    BILITOT 0.7 07/31/2023     Lab Results   Component Value Date    MG 3.1 (H) 07/31/2023       STUDIES:  Images and reports were personally reviewed.  CXR  FINDINGS:  The lungs show a focal nodular airspace opacity in the retrocardiac left lower lobe.  Costophrenic angles are seen without effusion. No pneumothorax is identified. The heart is normal in size. There is mild left hilar fullness compatible with left hilar lymphadenopathy.  Osseous structures appear within normal limits. The visualized upper abdomen is unremarkable.     Impression:     1.  No pneumothorax after lung biopsy.     2.  Suspicious pulmonary mass in the retrocardiac left lower lobe.     3.  Left hilar fullness/lymphadenopathy.      ASSESSMENT & PLAN:  67 y.o. female with adenocarcinoma of the lung  - we discussed the indication for port placement to ensure secure and easy access to the venous system for chemotherapy infusion   - the procedure for placement as well as associated risk of pain, bleeding, scarring, infection, need to remove port, port malfunction, port malpositioning, injury to artery, injury to lung, arrhythmia, malfunction, hematoma, thrombus, etc.  were reviewed, patient expressed understanding these risks and agreed proceed with surgical intervention  -schedule for port placement on 08/04/2023, preop workup utd

## 2023-08-04 NOTE — DISCHARGE SUMMARY
[facility]  Discharge Note  Short Stay    Procedure(s) (LRB):  [id number] (N/A)      OUTCOME: Patient tolerated treatment/procedure well without complication and is now ready for discharge.    DISPOSITION: Home or Self Care    FINAL DIAGNOSIS:  <principal problem not specified>    FOLLOWUP: In clinic    DISCHARGE INSTRUCTIONS:    Discharge Procedure Orders   Diet Adult Regular     Ice to affected area     Lifting restrictions   Order Comments: Please avoid lifting greater than 20 lb, straining, strenuous activity for one week.     Change dressing (specify)   Order Comments: Post-Operative Wound Care    A surgical glue has been placed over your incisions, please leave the glue in place and do not attempt to remove it.  It is ok to shower using mild soap and water over the incisions the day after your procedure. Pat dry your incisions. Do not soak in a bathtub or other body of water for 2 weeks or until cleared by your surgeon.     If you noticed redness, swelling, fever, increasing pain or significant drainage from your wound please call/message the office or the 24 hr nurse hotline after hours.     Notify your health care provider if you experience any of the following:  temperature >100.4     Notify your health care provider if you experience any of the following:  persistent nausea and vomiting or diarrhea     Notify your health care provider if you experience any of the following:  severe uncontrolled pain     Notify your health care provider if you experience any of the following:  redness, tenderness, or signs of infection (pain, swelling, redness, odor or green/yellow discharge around incision site)     Notify your health care provider if you experience any of the following:  worsening rash     Notify your health care provider if you experience any of the following:  increased confusion or weakness     Shower on day dressing removed (No bath)        TIME SPENT ON DISCHARGE: 10  minutes

## 2023-08-07 RX ORDER — EPINEPHRINE 0.3 MG/.3ML
0.3 INJECTION SUBCUTANEOUS ONCE AS NEEDED
Status: CANCELLED | OUTPATIENT
Start: 2023-08-08

## 2023-08-07 RX ORDER — SODIUM CHLORIDE 0.9 % (FLUSH) 0.9 %
10 SYRINGE (ML) INJECTION
Status: CANCELLED | OUTPATIENT
Start: 2023-08-08

## 2023-08-07 RX ORDER — FAMOTIDINE 10 MG/ML
20 INJECTION INTRAVENOUS
Status: CANCELLED | OUTPATIENT
Start: 2023-08-08

## 2023-08-07 RX ORDER — HEPARIN 100 UNIT/ML
500 SYRINGE INTRAVENOUS
Status: CANCELLED | OUTPATIENT
Start: 2023-08-08

## 2023-08-07 RX ORDER — DIPHENHYDRAMINE HYDROCHLORIDE 50 MG/ML
50 INJECTION INTRAMUSCULAR; INTRAVENOUS ONCE AS NEEDED
Status: CANCELLED | OUTPATIENT
Start: 2023-08-08

## 2023-08-08 ENCOUNTER — TREATMENT (OUTPATIENT)
Dept: RADIATION ONCOLOGY | Facility: CLINIC | Age: 68
End: 2023-08-08
Payer: MEDICARE

## 2023-08-08 ENCOUNTER — DOCUMENTATION ONLY (OUTPATIENT)
Dept: HEMATOLOGY/ONCOLOGY | Facility: CLINIC | Age: 68
End: 2023-08-08

## 2023-08-08 ENCOUNTER — INFUSION (OUTPATIENT)
Dept: INFUSION THERAPY | Facility: HOSPITAL | Age: 68
End: 2023-08-08
Attending: INTERNAL MEDICINE
Payer: MEDICARE

## 2023-08-08 VITALS
DIASTOLIC BLOOD PRESSURE: 63 MMHG | WEIGHT: 140 LBS | RESPIRATION RATE: 17 BRPM | OXYGEN SATURATION: 96 % | HEIGHT: 63 IN | SYSTOLIC BLOOD PRESSURE: 119 MMHG | BODY MASS INDEX: 24.8 KG/M2 | TEMPERATURE: 98 F | HEART RATE: 65 BPM

## 2023-08-08 DIAGNOSIS — C34.90 MALIGNANT NEOPLASM OF UNSPECIFIED PART OF UNSPECIFIED BRONCHUS OR LUNG: Primary | ICD-10-CM

## 2023-08-08 PROCEDURE — A4216 STERILE WATER/SALINE, 10 ML: HCPCS | Performed by: NURSE PRACTITIONER

## 2023-08-08 PROCEDURE — 96375 TX/PRO/DX INJ NEW DRUG ADDON: CPT

## 2023-08-08 PROCEDURE — 96413 CHEMO IV INFUSION 1 HR: CPT

## 2023-08-08 PROCEDURE — 77427 PR CHG RADIATION,MANGEMENT,5 TX'S: ICD-10-PCS | Mod: S$GLB,,, | Performed by: RADIOLOGY

## 2023-08-08 PROCEDURE — 77427 RADIATION TX MANAGEMENT X5: CPT | Mod: S$GLB,,, | Performed by: RADIOLOGY

## 2023-08-08 PROCEDURE — 77014 PR  CT GUIDANCE PLACEMENT RAD THERAPY FIELDS: ICD-10-PCS | Mod: S$GLB,,, | Performed by: RADIOLOGY

## 2023-08-08 PROCEDURE — G6015 RADIATION TX DELIVERY IMRT: HCPCS | Mod: S$GLB,,, | Performed by: RADIOLOGY

## 2023-08-08 PROCEDURE — 77014 PR  CT GUIDANCE PLACEMENT RAD THERAPY FIELDS: CPT | Mod: S$GLB,,, | Performed by: RADIOLOGY

## 2023-08-08 PROCEDURE — 96367 TX/PROPH/DG ADDL SEQ IV INF: CPT

## 2023-08-08 PROCEDURE — 25000003 PHARM REV CODE 250: Performed by: NURSE PRACTITIONER

## 2023-08-08 PROCEDURE — G6015 PR RADN TX DELIVERY,  INTENS MOD, 1+ FIELDS PER TX: ICD-10-PCS | Mod: S$GLB,,, | Performed by: RADIOLOGY

## 2023-08-08 PROCEDURE — 96415 CHEMO IV INFUSION ADDL HR: CPT

## 2023-08-08 PROCEDURE — 96417 CHEMO IV INFUS EACH ADDL SEQ: CPT

## 2023-08-08 PROCEDURE — 63600175 PHARM REV CODE 636 W HCPCS: Performed by: NURSE PRACTITIONER

## 2023-08-08 RX ORDER — HEPARIN 100 UNIT/ML
500 SYRINGE INTRAVENOUS
Status: DISCONTINUED | OUTPATIENT
Start: 2023-08-08 | End: 2023-08-08 | Stop reason: HOSPADM

## 2023-08-08 RX ORDER — SODIUM CHLORIDE 0.9 % (FLUSH) 0.9 %
10 SYRINGE (ML) INJECTION
Status: DISCONTINUED | OUTPATIENT
Start: 2023-08-08 | End: 2023-08-08 | Stop reason: HOSPADM

## 2023-08-08 RX ORDER — FAMOTIDINE 10 MG/ML
20 INJECTION INTRAVENOUS
Status: COMPLETED | OUTPATIENT
Start: 2023-08-08 | End: 2023-08-08

## 2023-08-08 RX ORDER — DIPHENHYDRAMINE HYDROCHLORIDE 50 MG/ML
50 INJECTION INTRAMUSCULAR; INTRAVENOUS ONCE AS NEEDED
Status: DISCONTINUED | OUTPATIENT
Start: 2023-08-08 | End: 2023-08-08 | Stop reason: HOSPADM

## 2023-08-08 RX ORDER — EPINEPHRINE 0.3 MG/.3ML
0.3 INJECTION SUBCUTANEOUS ONCE AS NEEDED
Status: DISCONTINUED | OUTPATIENT
Start: 2023-08-08 | End: 2023-08-08 | Stop reason: HOSPADM

## 2023-08-08 RX ADMIN — CARBOPLATIN 205 MG: 10 INJECTION, SOLUTION INTRAVENOUS at 11:08

## 2023-08-08 RX ADMIN — PACLITAXEL 78 MG: 6 INJECTION, SOLUTION, CONCENTRATE INTRAVENOUS at 10:08

## 2023-08-08 RX ADMIN — PALONOSETRON HYDROCHLORIDE 0.25 MG: 0.25 INJECTION INTRAVENOUS at 09:08

## 2023-08-08 RX ADMIN — FAMOTIDINE 20 MG: 10 INJECTION INTRAVENOUS at 09:08

## 2023-08-08 RX ADMIN — SODIUM CHLORIDE, PRESERVATIVE FREE 10 ML: 5 INJECTION INTRAVENOUS at 01:08

## 2023-08-08 RX ADMIN — SODIUM CHLORIDE: 0.9 INJECTION, SOLUTION INTRAVENOUS at 09:08

## 2023-08-08 RX ADMIN — HEPARIN 500 UNITS: 100 SYRINGE at 01:08

## 2023-08-08 RX ADMIN — DIPHENHYDRAMINE HYDROCHLORIDE 50 MG: 50 INJECTION INTRAMUSCULAR; INTRAVENOUS at 09:08

## 2023-08-08 NOTE — PLAN OF CARE
Problem: Fatigue (Oncology Care)  Goal: Improved Activity Tolerance  Outcome: Ongoing, Progressing  Intervention: Promote Improved Energy  Flowsheets (Taken 8/8/2023 7220)  Fatigue Management:   frequent rest breaks encouraged   fatigue-related activity identified   paced activity encouraged  Sleep/Rest Enhancement:   regular sleep/rest pattern promoted   relaxation techniques promoted   family presence promoted   reading promoted  Activity Management: Walk with assistive devise and /or staff member - L3

## 2023-08-08 NOTE — PROGRESS NOTES
CHEMO SCHOOL- NUTRITION    Aysha Moore is a 67 y.o. female with lung cancer. Pt will be receiving carboplatin, taxol and XRT. I met with Mrs. Moore for chemo school today. Pt reports good appetite and intake. She denies any significant unintentional weight loss. She reports good hydration. She denies N/V/D/C. She is not currently taking any herbal or antioxidant supplements. She denies having any nutrition related questions or concerns at the current time.     CW: 140#.     Food Insecurity:  Patient is worried whether their food would run out before they have more money to buy more: Never  The food they bought just didn't last and they didn't have money to buy more: Never      Plan:   Reviewed chemo school packet & provided copy to pt.   Discussed importance of maintaining wt & staying hydrated.   Reviewed food safety guidelines recommended during treatment.   Provided RD contact info & encouraged pt to call with any questions/concerns.   Will f/u as needed.       Electronically signed by: Kaur Rudolph MBA, BERNICEN, LDN

## 2023-08-11 NOTE — PROGRESS NOTES
INITIAL [facility] HEM/ONC CONSULTATION      Subjective:       Patient ID: Aysha Moore is a 67 y.o. female.    6/2/2023:  Screening CT chest:  2.6 x 2.5 x 3.2cm mass in the posterior LLL    6/20/2023:  PET scan:  35 x 21 mm lobulated pulmonary mass in the posterior left lower lobe with SUV max 11.6     FDG avid lymphadenopathy is present with index nodes outlined below:  -21 x 11 mm AP window node with SUV max 12.1 (image 103)  -21 x 12 mm posterior left hilar node with SUV max 13.7 (image 109)  -16 x 13 mm left hilar node with SUV max 14 (image 1:15)  -10 x 10 mm subcarinal node with SUV max 6.3 (image 111)    7/12/2023-Biopsy of LLL:  LEFT LOWER LOBE OF LUNG, CORE BIOPSIES:   - LUNG ADENOCARCINOMA WITH PLEOMORPHIC FEATURES.     7/21/2023:  MRI brain: no mets.     Chief Complaint: Lung cancer    Patient is s/p Cycle 1 Taxol and Carboplatin. She did have delayed diarrhea yesterday. Some intermittent nausea and fatigue. She has been having diarrhea but has not been taking anything..    Ms. Moore is a 68yo female who presents with an abnormal screening chest CT seen above.  This was done in early June this year and found to have a mass in the LLL of her lung.  She was referred her for further recommendations.      PMH: AMI, 2 stents placed.  She has no lung, liver, kidney disease and has had no other malignancy.        Past Medical History:   Diagnosis Date    Allergy     Anxiety     Arthritis     CAD (coronary artery disease)     coronary stents    Chronic back pain     lower back pain radiates to left leg    Chronic rhinitis     DAILY (dyspnea on exertion)     Hyperlipidemia     Hypertension     Lung cancer 07/01/2023    Melanoma in situ of left upper extremity     left thigh area    MI (myocardial infarction)     Seasonal allergic rhinitis 10/29/2020       Past Surgical History:   Procedure Laterality Date    BREAST SURGERY      breast reduction    CATARACT EXTRACTION, BILATERAL      coranary stent      EXCISION OF  MELANOMA Left 3/30/2022    Procedure: EXCISION, MELANOMA;  Surgeon: David Mcpherson III, MD;  Location: [facility] OR;  Service: General;  Laterality: Left;    EXCISIONAL BIOPSY Left 3/30/2022    Procedure: EXCISIONAL BIOPSY;  Surgeon: David Mcpherson III, MD;  Location: [facility] OR;  Service: General;  Laterality: Left;    HYSTERECTOMY      total    INSERTION OF TUNNELED CENTRAL VENOUS CATHETER (CVC) WITH SUBCUTANEOUS PORT N/A 2023    Procedure: [id number];  Surgeon: Remi Mckeon Jr., MD;  Location: [facility] OR;  Service: General;  Laterality: N/A;    LEFT HEART CATHETERIZATION Left 10/16/2020    Procedure: Left heart cath;  Surgeon: Garrett Robins MD;  Location: [facility] CATH/EP LAB;  Service: Cardiology;  Laterality: Left;    OOPHORECTOMY      TOTAL REDUCTION MAMMOPLASTY Bilateral        Social History     Socioeconomic History    Marital status:    Tobacco Use    Smoking status: Former     Current packs/day: 0.00     Average packs/day: 1 pack/day for 43.2 years (43.2 ttl pk-yrs)     Types: Cigarettes, Vaping with nicotine     Start date:      Quit date: 3/4/2017     Years since quittin.4    Smokeless tobacco: Never   Substance and Sexual Activity    Alcohol use: Not Currently     Alcohol/week: 0.0 standard drinks of alcohol     Comment: sometimes    Drug use: No    Sexual activity: Yes     Partners: Male       Family History   Problem Relation Age of Onset    Cancer Mother         breast, ovarian, cervical     Heart disease Mother     Breast cancer Mother 50    Ovarian cancer Mother     Cancer Father         melanoma    Stroke Sister     Heart disease Sister     Cancer Sister         leukemia    Macular degeneration Sister     Heart disease Sister     Heart disease Brother     Lung cancer Brother     Cancer Maternal Uncle     Cancer Paternal Aunt         breast    Breast cancer Paternal Aunt 60    Cancer Paternal Uncle     Heart disease Maternal Grandmother     No Known  Problems Daughter     No Known Problems Son        Review of patient's allergies indicates:   Allergen Reactions    Cephalexin      Other reaction(s): Rash    Doxycycline monohydrate Hives    Lanoxin  [digoxin]      Other reaction(s): Rash    Lansoprazole      Other reaction(s): Rash    Macrobid [nitrofurantoin monohyd/m-cryst] Rash       Current Outpatient Medications:     ALPRAZolam (XANAX) 0.25 MG tablet, Take 1 tablet (0.25 mg total) by mouth 3 (three) times daily as needed for Anxiety. (Patient taking differently: Take 0.25 mg by mouth 2 (two) times daily as needed for Anxiety.), Disp: 30 tablet, Rfl: 1    ammonium lactate 12 % Crea, aaa bid prn dry skin, Disp: 385 g, Rfl: 11    atorvastatin (LIPITOR) 80 MG tablet, Take 1 tablet (80 mg total) by mouth every evening., Disp: 90 tablet, Rfl: 1    azelastine (ASTELIN) 137 mcg (0.1 %) nasal spray, 1 SPRAY (137 MCG TOTAL) BY NASAL ROUTE 2 (TWO) TIMES DAILY AS NEEDED FOR RHINITIS (OR SINUSITIS)., Disp: 90 mL, Rfl: 1    busPIRone (BUSPAR) 5 MG Tab, TAKE 1 TABLET BY MOUTH TWICE A DAY (Patient taking differently: Take 5 mg by mouth every evening.), Disp: 180 tablet, Rfl: 1    calcium-vitamin D3 (OS-JOLIE 500 + D3) 500 mg-5 mcg (200 unit) per tablet, Take 1 tablet by mouth every morning., Disp: , Rfl:     citalopram (CELEXA) 20 MG tablet, TAKE 1 TABLET BY MOUTH EVERY DAY, Disp: 90 tablet, Rfl: 3    clobetasol 0.05% (TEMOVATE) 0.05 % Oint, Apply topically 2 (two) times daily. for 14 days, Disp: 45 g, Rfl: 3    ezetimibe (ZETIA) 10 mg tablet, Take 1 tablet (10 mg total) by mouth once daily., Disp: 90 tablet, Rfl: 3    famotidine (PEPCID) 40 MG tablet, Take 1 tablet (40 mg total) by mouth every evening., Disp: 30 tablet, Rfl: 1    fluticasone propionate (FLONASE) 50 mcg/actuation nasal spray, 1 spray (50 mcg total) by Each Nostril route daily as needed for Rhinitis or Allergies., Disp: 9.9 mL, Rfl: 2    lisinopriL (PRINIVIL,ZESTRIL) 2.5 MG tablet, Take 1 tablet (2.5 mg total)  by mouth every evening., Disp: 90 tablet, Rfl: 3    metoprolol succinate (TOPROL-XL) 50 MG 24 hr tablet, Take 1 tablet (50 mg total) by mouth once daily., Disp: 90 tablet, Rfl: 3    nitroGLYCERIN (NITROSTAT) 0.4 MG SL tablet, Place 1 tablet (0.4 mg total) under the tongue every 5 (five) minutes as needed for Chest pain., Disp: 30 tablet, Rfl: 0    omeprazole (PRILOSEC) 40 MG capsule, Take 1 capsule (40 mg total) by mouth once daily., Disp: 30 capsule, Rfl: 11    ondansetron (ZOFRAN) 8 MG tablet, Take 1 tablet (8 mg total) by mouth every 8 (eight) hours as needed., Disp: 30 tablet, Rfl: 5    promethazine (PHENERGAN) 25 MG tablet, Take 1 tablet (25 mg total) by mouth every 4 to 6 hours as needed., Disp: 30 tablet, Rfl: 5    tiZANidine (ZANAFLEX) 4 MG tablet, TAKE 1 TABLET (4 MG TOTAL) BY MOUTH EVERY 6 (SIX) HOURS AS NEEDED. BACK PAIN, Disp: 360 tablet, Rfl: 0    valACYclovir (VALTREX) 1000 MG tablet, TAKE 2 AT ONSET OF FEVER BLISTERS THEN REPEAT IN 12 HOURS (TOTAL 4 TABS PER EPISODE), Disp: 20 tablet, Rfl: 3    zinc 50 mg Tab, Take by mouth., Disp: , Rfl:     All medications and past history have been reviewed.    Review of Systems   Constitutional:  Positive for fatigue. Negative for appetite change and unexpected weight change.   HENT:  Negative for mouth sores.    Eyes:  Negative for visual disturbance.   Respiratory:  Negative for cough and shortness of breath.    Cardiovascular:  Negative for chest pain.   Gastrointestinal:  Positive for diarrhea and nausea. Negative for abdominal pain.   Genitourinary:  Negative for frequency.   Musculoskeletal:  Negative for back pain.   Skin:  Negative for rash.   Neurological:  Negative for headaches.   Hematological:  Negative for adenopathy.   Psychiatric/Behavioral:  The patient is not nervous/anxious.        Objective:        /60   Pulse 82   Temp 97.6 °F (36.4 °C)   Wt 61.5 kg (135 lb 9.6 oz)   SpO2 97%   BMI 24.02 kg/m²     Physical Exam  Constitutional:        Appearance: Normal appearance.   HENT:      Head: Normocephalic and atraumatic.      Right Ear: External ear normal.      Left Ear: External ear normal.      Nose: Nose normal.      Mouth/Throat:      Mouth: Mucous membranes are moist.   Eyes:      General: No scleral icterus.     Pupils: Pupils are equal, round, and reactive to light.   Cardiovascular:      Rate and Rhythm: Normal rate and regular rhythm.      Heart sounds: Normal heart sounds.   Pulmonary:      Effort: Pulmonary effort is normal.      Breath sounds: Normal breath sounds.   Abdominal:      General: Abdomen is flat.   Musculoskeletal:      Right lower leg: No edema.      Left lower leg: No edema.   Skin:     General: Skin is warm and dry.   Neurological:      General: No focal deficit present.      Mental Status: She is alert and oriented to person, place, and time.   Psychiatric:         Mood and Affect: Mood normal.         Behavior: Behavior normal.         Thought Content: Thought content normal.         Judgment: Judgment normal.           Lab  Recent Results (from the past 336 hour(s))   CBC Auto Differential    Collection Time: 08/14/23 11:53 AM   Result Value Ref Range    WBC 4.89 3.90 - 12.70 K/uL    Hemoglobin 13.1 12.0 - 16.0 g/dL    Hematocrit 39.3 37.0 - 48.5 %    Platelets 295 150 - 450 K/uL     CMP  Sodium   Date Value Ref Range Status   08/14/2023 135 (L) 136 - 145 mmol/L Final     Potassium   Date Value Ref Range Status   08/14/2023 4.3 3.5 - 5.1 mmol/L Final     Chloride   Date Value Ref Range Status   08/14/2023 99 95 - 110 mmol/L Final     CO2   Date Value Ref Range Status   08/14/2023 26 23 - 29 mmol/L Final     Glucose   Date Value Ref Range Status   08/14/2023 118 (H) 70 - 110 mg/dL Final     BUN   Date Value Ref Range Status   08/14/2023 <5 (L) 8 - 23 mg/dL Final     Creatinine   Date Value Ref Range Status   08/14/2023 <0.3 (L) 0.5 - 1.4 mg/dL Final     Calcium   Date Value Ref Range Status   08/14/2023 10.1 8.7 - 10.5  mg/dL Final     Total Protein   Date Value Ref Range Status   08/14/2023 5.0 (L) 6.0 - 8.4 g/dL Final     Albumin   Date Value Ref Range Status   08/14/2023 5.4 (H) 3.5 - 5.2 g/dL Final     Total Bilirubin   Date Value Ref Range Status   08/14/2023 0.2 0.1 - 1.0 mg/dL Final     Comment:     For infants and newborns, interpretation of results should be based  on gestational age, weight and in agreement with clinical  observations.    Premature Infant recommended reference ranges:  Up to 24 hours.............<8.0 mg/dL  Up to 48 hours............<12.0 mg/dL  3-5 days..................<15.0 mg/dL  6-29 days.................<15.0 mg/dL       Alkaline Phosphatase   Date Value Ref Range Status   08/14/2023 81 55 - 135 U/L Final     AST   Date Value Ref Range Status   08/14/2023 20 10 - 40 U/L Final     ALT   Date Value Ref Range Status   08/14/2023 <5 (L) 10 - 44 U/L Final     Anion Gap   Date Value Ref Range Status   08/14/2023 10 8 - 16 mmol/L Final     eGFR if    Date Value Ref Range Status   03/29/2022 >60.0 >60 mL/min/1.73 m^2 Final     eGFR if non    Date Value Ref Range Status   03/29/2022 >60.0 >60 mL/min/1.73 m^2 Final     Comment:     Calculation used to obtain the estimated glomerular filtration  rate (eGFR) is the CKD-EPI equation.            Specimen (24h ago, onward)      None                  All lab results and imaging results have been reviewed and discussed with the patient.     Assessment:       1. LUNG CANCER-ADENOCARCINOMA OF THE LLL    2. Diarrhea, unspecified type    3. Nausea    4. Malignant melanoma of skin of left leg    5. Gastroesophageal reflux disease, unspecified whether esophagitis present        Problem List Items Addressed This Visit          Oncology    Malignant melanoma of skin of left leg    LUNG CANCER-ADENOCARCINOMA OF THE LLL - Primary     Other Visit Diagnoses       Diarrhea, unspecified type        Nausea        Gastroesophageal reflux disease,  unspecified whether esophagitis present        Relevant Medications    omeprazole (PRILOSEC) 40 MG capsule    famotidine (PEPCID) 40 MG tablet             Cancer Staging   LUNG CANCER-ADENOCARCINOMA OF THE LLL  Staging form: Lung, AJCC 8th Edition  - Clinical: Stage IIIA (cT2a, cN2, cM0) - Signed by Franky Gaytan Jr., MD on 7/31/2023    Lung Cancer- Continue with Cycle 2 Taxol and Carboplatin  Diarrhea- Imodium PRN  Nausea- Zofran and Phenergan PRN add Civanti to treatment cycles  Personal History of Melanoma; Mother had breast and Ovarian cancer- Cancer next expanded with Meridium.  GERD- Start Pepcid and Omeprazole daily  Plan:     Follow up in about 2 weeks (around 8/28/2023) for with Dr. Hankins and 4 weeks with me.     The plan was discussed with the patient and all questions/concerns have been answered to the patient's satisfaction.    Electronically signed by: Meka Chiang, MSN, APRN, AGNP-C, OCN

## 2023-08-14 ENCOUNTER — LAB VISIT (OUTPATIENT)
Dept: LAB | Facility: HOSPITAL | Age: 68
End: 2023-08-14
Attending: NURSE PRACTITIONER
Payer: MEDICARE

## 2023-08-14 ENCOUNTER — OFFICE VISIT (OUTPATIENT)
Dept: HEMATOLOGY/ONCOLOGY | Facility: CLINIC | Age: 68
End: 2023-08-14
Payer: MEDICARE

## 2023-08-14 VITALS
TEMPERATURE: 98 F | HEART RATE: 82 BPM | OXYGEN SATURATION: 97 % | BODY MASS INDEX: 24.02 KG/M2 | DIASTOLIC BLOOD PRESSURE: 60 MMHG | SYSTOLIC BLOOD PRESSURE: 130 MMHG | WEIGHT: 135.63 LBS

## 2023-08-14 DIAGNOSIS — R19.7 DIARRHEA, UNSPECIFIED TYPE: ICD-10-CM

## 2023-08-14 DIAGNOSIS — C43.72 MALIGNANT MELANOMA OF SKIN OF LEFT LEG: ICD-10-CM

## 2023-08-14 DIAGNOSIS — C34.32 MALIGNANT NEOPLASM OF LOWER LOBE OF LEFT LUNG: ICD-10-CM

## 2023-08-14 DIAGNOSIS — K21.9 GASTROESOPHAGEAL REFLUX DISEASE, UNSPECIFIED WHETHER ESOPHAGITIS PRESENT: ICD-10-CM

## 2023-08-14 DIAGNOSIS — R11.0 NAUSEA: ICD-10-CM

## 2023-08-14 DIAGNOSIS — C34.32 MALIGNANT NEOPLASM OF LOWER LOBE OF LEFT LUNG: Primary | ICD-10-CM

## 2023-08-14 LAB
ALBUMIN SERPL BCP-MCNC: 5.4 G/DL (ref 3.5–5.2)
ALP SERPL-CCNC: 81 U/L (ref 55–135)
ALT SERPL W/O P-5'-P-CCNC: <5 U/L (ref 10–44)
ANION GAP SERPL CALC-SCNC: 10 MMOL/L (ref 8–16)
AST SERPL-CCNC: 20 U/L (ref 10–40)
BASOPHILS # BLD AUTO: 0.02 K/UL (ref 0–0.2)
BASOPHILS NFR BLD: 0.4 % (ref 0–1.9)
BILIRUB SERPL-MCNC: 0.2 MG/DL (ref 0.1–1)
BUN SERPL-MCNC: <5 MG/DL (ref 8–23)
CALCIUM SERPL-MCNC: 10.1 MG/DL (ref 8.7–10.5)
CHLORIDE SERPL-SCNC: 99 MMOL/L (ref 95–110)
CO2 SERPL-SCNC: 26 MMOL/L (ref 23–29)
CREAT SERPL-MCNC: <0.3 MG/DL (ref 0.5–1.4)
DIFFERENTIAL METHOD: ABNORMAL
EOSINOPHIL # BLD AUTO: 0.3 K/UL (ref 0–0.5)
EOSINOPHIL NFR BLD: 6.3 % (ref 0–8)
ERYTHROCYTE [DISTWIDTH] IN BLOOD BY AUTOMATED COUNT: 11.9 % (ref 11.5–14.5)
EST. GFR  (NO RACE VARIABLE): >60 ML/MIN/1.73 M^2
GLUCOSE SERPL-MCNC: 118 MG/DL (ref 70–110)
HCT VFR BLD AUTO: 39.3 % (ref 37–48.5)
HGB BLD-MCNC: 13.1 G/DL (ref 12–16)
IMM GRANULOCYTES # BLD AUTO: 0.03 K/UL (ref 0–0.04)
IMM GRANULOCYTES NFR BLD AUTO: 0.6 % (ref 0–0.5)
LYMPHOCYTES # BLD AUTO: 0.8 K/UL (ref 1–4.8)
LYMPHOCYTES NFR BLD: 15.3 % (ref 18–48)
MAGNESIUM SERPL-MCNC: 3 MG/DL (ref 1.6–2.6)
MCH RBC QN AUTO: 30.1 PG (ref 27–31)
MCHC RBC AUTO-ENTMCNC: 33.3 G/DL (ref 32–36)
MCV RBC AUTO: 90 FL (ref 82–98)
MONOCYTES # BLD AUTO: 0.4 K/UL (ref 0.3–1)
MONOCYTES NFR BLD: 8.6 % (ref 4–15)
NEUTROPHILS # BLD AUTO: 3.4 K/UL (ref 1.8–7.7)
NEUTROPHILS NFR BLD: 68.8 % (ref 38–73)
NRBC BLD-RTO: 0 /100 WBC
PLATELET # BLD AUTO: 295 K/UL (ref 150–450)
PMV BLD AUTO: 9.3 FL (ref 9.2–12.9)
POTASSIUM SERPL-SCNC: 4.3 MMOL/L (ref 3.5–5.1)
PROT SERPL-MCNC: 5 G/DL (ref 6–8.4)
RBC # BLD AUTO: 4.35 M/UL (ref 4–5.4)
SODIUM SERPL-SCNC: 135 MMOL/L (ref 136–145)
WBC # BLD AUTO: 4.89 K/UL (ref 3.9–12.7)

## 2023-08-14 PROCEDURE — 83735 ASSAY OF MAGNESIUM: CPT | Performed by: NURSE PRACTITIONER

## 2023-08-14 PROCEDURE — 99214 OFFICE O/P EST MOD 30 MIN: CPT | Mod: S$PBB,,, | Performed by: NURSE PRACTITIONER

## 2023-08-14 PROCEDURE — 80053 COMPREHEN METABOLIC PANEL: CPT | Performed by: NURSE PRACTITIONER

## 2023-08-14 PROCEDURE — 85025 COMPLETE CBC W/AUTO DIFF WBC: CPT | Performed by: NURSE PRACTITIONER

## 2023-08-14 PROCEDURE — 99214 OFFICE O/P EST MOD 30 MIN: CPT | Performed by: NURSE PRACTITIONER

## 2023-08-14 PROCEDURE — 99214 PR OFFICE/OUTPT VISIT, EST, LEVL IV, 30-39 MIN: ICD-10-PCS | Mod: S$PBB,,, | Performed by: NURSE PRACTITIONER

## 2023-08-14 PROCEDURE — 36415 COLL VENOUS BLD VENIPUNCTURE: CPT | Performed by: NURSE PRACTITIONER

## 2023-08-14 RX ORDER — FAMOTIDINE 40 MG/1
40 TABLET, FILM COATED ORAL NIGHTLY
Qty: 30 TABLET | Refills: 1 | Status: SHIPPED | OUTPATIENT
Start: 2023-08-14 | End: 2023-09-08 | Stop reason: SDUPTHER

## 2023-08-14 RX ORDER — OMEPRAZOLE 40 MG/1
40 CAPSULE, DELAYED RELEASE ORAL DAILY
Qty: 30 CAPSULE | Refills: 11 | Status: SHIPPED | OUTPATIENT
Start: 2023-08-14 | End: 2024-08-13

## 2023-08-14 RX ORDER — FAMOTIDINE 10 MG/ML
20 INJECTION INTRAVENOUS
Status: CANCELLED | OUTPATIENT
Start: 2023-08-15

## 2023-08-14 RX ORDER — EPINEPHRINE 0.3 MG/.3ML
0.3 INJECTION SUBCUTANEOUS ONCE AS NEEDED
Status: CANCELLED | OUTPATIENT
Start: 2023-08-15

## 2023-08-14 RX ORDER — SODIUM CHLORIDE 0.9 % (FLUSH) 0.9 %
10 SYRINGE (ML) INJECTION
Status: CANCELLED | OUTPATIENT
Start: 2023-08-15

## 2023-08-14 RX ORDER — DIPHENHYDRAMINE HYDROCHLORIDE 50 MG/ML
50 INJECTION INTRAMUSCULAR; INTRAVENOUS ONCE AS NEEDED
Status: CANCELLED | OUTPATIENT
Start: 2023-08-15

## 2023-08-14 RX ORDER — HEPARIN 100 UNIT/ML
500 SYRINGE INTRAVENOUS
Status: CANCELLED | OUTPATIENT
Start: 2023-08-15

## 2023-08-15 ENCOUNTER — TREATMENT (OUTPATIENT)
Dept: RADIATION ONCOLOGY | Facility: CLINIC | Age: 68
End: 2023-08-15
Payer: MEDICARE

## 2023-08-15 ENCOUNTER — INFUSION (OUTPATIENT)
Dept: INFUSION THERAPY | Facility: HOSPITAL | Age: 68
End: 2023-08-15
Attending: INTERNAL MEDICINE
Payer: MEDICARE

## 2023-08-15 VITALS
BODY MASS INDEX: 24.15 KG/M2 | OXYGEN SATURATION: 98 % | TEMPERATURE: 98 F | RESPIRATION RATE: 18 BRPM | WEIGHT: 136.31 LBS | HEIGHT: 63 IN | SYSTOLIC BLOOD PRESSURE: 106 MMHG | DIASTOLIC BLOOD PRESSURE: 63 MMHG | HEART RATE: 72 BPM

## 2023-08-15 DIAGNOSIS — C34.90 MALIGNANT NEOPLASM OF UNSPECIFIED PART OF UNSPECIFIED BRONCHUS OR LUNG: Primary | ICD-10-CM

## 2023-08-15 PROCEDURE — 63600175 PHARM REV CODE 636 W HCPCS: Performed by: NURSE PRACTITIONER

## 2023-08-15 PROCEDURE — 96417 CHEMO IV INFUS EACH ADDL SEQ: CPT

## 2023-08-15 PROCEDURE — 77014 PR  CT GUIDANCE PLACEMENT RAD THERAPY FIELDS: CPT | Mod: S$GLB,,, | Performed by: RADIOLOGY

## 2023-08-15 PROCEDURE — 96367 TX/PROPH/DG ADDL SEQ IV INF: CPT

## 2023-08-15 PROCEDURE — G6015 PR RADN TX DELIVERY,  INTENS MOD, 1+ FIELDS PER TX: ICD-10-PCS | Mod: S$GLB,,, | Performed by: RADIOLOGY

## 2023-08-15 PROCEDURE — A4216 STERILE WATER/SALINE, 10 ML: HCPCS | Performed by: NURSE PRACTITIONER

## 2023-08-15 PROCEDURE — 77427 PR CHG RADIATION,MANGEMENT,5 TX'S: ICD-10-PCS | Mod: S$GLB,,, | Performed by: RADIOLOGY

## 2023-08-15 PROCEDURE — 96413 CHEMO IV INFUSION 1 HR: CPT

## 2023-08-15 PROCEDURE — 25000003 PHARM REV CODE 250: Performed by: NURSE PRACTITIONER

## 2023-08-15 PROCEDURE — 96375 TX/PRO/DX INJ NEW DRUG ADDON: CPT

## 2023-08-15 PROCEDURE — 77427 RADIATION TX MANAGEMENT X5: CPT | Mod: S$GLB,,, | Performed by: RADIOLOGY

## 2023-08-15 PROCEDURE — G6015 RADIATION TX DELIVERY IMRT: HCPCS | Mod: S$GLB,,, | Performed by: RADIOLOGY

## 2023-08-15 PROCEDURE — 77014 PR  CT GUIDANCE PLACEMENT RAD THERAPY FIELDS: ICD-10-PCS | Mod: S$GLB,,, | Performed by: RADIOLOGY

## 2023-08-15 RX ORDER — SODIUM CHLORIDE 0.9 % (FLUSH) 0.9 %
10 SYRINGE (ML) INJECTION
Status: DISCONTINUED | OUTPATIENT
Start: 2023-08-15 | End: 2023-08-15 | Stop reason: HOSPADM

## 2023-08-15 RX ORDER — HEPARIN 100 UNIT/ML
500 SYRINGE INTRAVENOUS
Status: DISCONTINUED | OUTPATIENT
Start: 2023-08-15 | End: 2023-08-15 | Stop reason: HOSPADM

## 2023-08-15 RX ORDER — EPINEPHRINE 0.3 MG/.3ML
0.3 INJECTION SUBCUTANEOUS ONCE AS NEEDED
Status: DISCONTINUED | OUTPATIENT
Start: 2023-08-15 | End: 2023-08-15 | Stop reason: HOSPADM

## 2023-08-15 RX ORDER — FAMOTIDINE 10 MG/ML
20 INJECTION INTRAVENOUS
Status: COMPLETED | OUTPATIENT
Start: 2023-08-15 | End: 2023-08-15

## 2023-08-15 RX ORDER — DIPHENHYDRAMINE HYDROCHLORIDE 50 MG/ML
50 INJECTION INTRAMUSCULAR; INTRAVENOUS ONCE AS NEEDED
Status: DISCONTINUED | OUTPATIENT
Start: 2023-08-15 | End: 2023-08-15 | Stop reason: HOSPADM

## 2023-08-15 RX ADMIN — DIPHENHYDRAMINE HYDROCHLORIDE 50 MG: 50 INJECTION INTRAMUSCULAR; INTRAVENOUS at 09:08

## 2023-08-15 RX ADMIN — PACLITAXEL 78 MG: 6 INJECTION, SOLUTION INTRAVENOUS at 10:08

## 2023-08-15 RX ADMIN — PALONOSETRON HYDROCHLORIDE 0.25 MG: 0.25 INJECTION INTRAVENOUS at 09:08

## 2023-08-15 RX ADMIN — HEPARIN 500 UNITS: 100 SYRINGE at 12:08

## 2023-08-15 RX ADMIN — FAMOTIDINE 20 MG: 10 INJECTION INTRAVENOUS at 09:08

## 2023-08-15 RX ADMIN — SODIUM CHLORIDE, PRESERVATIVE FREE 10 ML: 5 INJECTION INTRAVENOUS at 12:08

## 2023-08-15 RX ADMIN — CARBOPLATIN 205 MG: 10 INJECTION, SOLUTION INTRAVENOUS at 11:08

## 2023-08-15 NOTE — PLAN OF CARE
Problem: Fatigue (Oncology Care)  Goal: Improved Activity Tolerance  Outcome: Ongoing, Progressing  Intervention: Promote Improved Energy  Flowsheets (Taken 8/15/2023 0906)  Fatigue Management:   fatigue-related activity identified   paced activity encouraged   frequent rest breaks encouraged  Sleep/Rest Enhancement:   noise level reduced   natural light exposure provided   regular sleep/rest pattern promoted   relaxation techniques promoted  Activity Management:   Ambulated -L4   Up in chair - L3

## 2023-08-16 ENCOUNTER — HOSPITAL ENCOUNTER (OUTPATIENT)
Dept: PULMONOLOGY | Facility: HOSPITAL | Age: 68
Discharge: HOME | End: 2023-08-16
Attending: RADIOLOGY
Payer: MEDICARE

## 2023-08-16 ENCOUNTER — PATIENT MESSAGE (OUTPATIENT)
Dept: FAMILY MEDICINE | Facility: CLINIC | Age: 68
End: 2023-08-16
Payer: MEDICARE

## 2023-08-16 DIAGNOSIS — C34.32 MALIGNANT NEOPLASM OF LOWER LOBE OF LEFT LUNG: ICD-10-CM

## 2023-08-16 PROCEDURE — 94060 EVALUATION OF WHEEZING: CPT

## 2023-08-16 PROCEDURE — 94727 GAS DIL/WSHOT DETER LNG VOL: CPT

## 2023-08-16 PROCEDURE — 94010 BREATHING CAPACITY TEST: CPT | Mod: XB

## 2023-08-16 PROCEDURE — 94729 DIFFUSING CAPACITY: CPT

## 2023-08-21 ENCOUNTER — PATIENT MESSAGE (OUTPATIENT)
Dept: HEMATOLOGY/ONCOLOGY | Facility: CLINIC | Age: 68
End: 2023-08-21

## 2023-08-22 ENCOUNTER — TREATMENT (OUTPATIENT)
Dept: RADIATION ONCOLOGY | Facility: CLINIC | Age: 68
End: 2023-08-22
Payer: MEDICARE

## 2023-08-22 ENCOUNTER — INFUSION (OUTPATIENT)
Dept: INFUSION THERAPY | Facility: HOSPITAL | Age: 68
End: 2023-08-22
Attending: INTERNAL MEDICINE
Payer: MEDICARE

## 2023-08-22 VITALS
BODY MASS INDEX: 24.75 KG/M2 | SYSTOLIC BLOOD PRESSURE: 126 MMHG | DIASTOLIC BLOOD PRESSURE: 68 MMHG | TEMPERATURE: 97 F | WEIGHT: 139.69 LBS | HEIGHT: 63 IN | RESPIRATION RATE: 18 BRPM | HEART RATE: 83 BPM

## 2023-08-22 DIAGNOSIS — C34.90 MALIGNANT NEOPLASM OF UNSPECIFIED PART OF UNSPECIFIED BRONCHUS OR LUNG: Primary | ICD-10-CM

## 2023-08-22 PROCEDURE — 77014 PR  CT GUIDANCE PLACEMENT RAD THERAPY FIELDS: ICD-10-PCS | Mod: S$GLB,,, | Performed by: RADIOLOGY

## 2023-08-22 PROCEDURE — 77427 PR CHG RADIATION,MANGEMENT,5 TX'S: ICD-10-PCS | Mod: S$GLB,,, | Performed by: RADIOLOGY

## 2023-08-22 PROCEDURE — 63600175 PHARM REV CODE 636 W HCPCS: Performed by: NURSE PRACTITIONER

## 2023-08-22 PROCEDURE — 96375 TX/PRO/DX INJ NEW DRUG ADDON: CPT

## 2023-08-22 PROCEDURE — G6015 RADIATION TX DELIVERY IMRT: HCPCS | Mod: S$GLB,,, | Performed by: RADIOLOGY

## 2023-08-22 PROCEDURE — 96417 CHEMO IV INFUS EACH ADDL SEQ: CPT

## 2023-08-22 PROCEDURE — G6015 PR RADN TX DELIVERY,  INTENS MOD, 1+ FIELDS PER TX: ICD-10-PCS | Mod: S$GLB,,, | Performed by: RADIOLOGY

## 2023-08-22 PROCEDURE — 96367 TX/PROPH/DG ADDL SEQ IV INF: CPT

## 2023-08-22 PROCEDURE — A4216 STERILE WATER/SALINE, 10 ML: HCPCS | Performed by: NURSE PRACTITIONER

## 2023-08-22 PROCEDURE — 77014 PR  CT GUIDANCE PLACEMENT RAD THERAPY FIELDS: CPT | Mod: S$GLB,,, | Performed by: RADIOLOGY

## 2023-08-22 PROCEDURE — 25000003 PHARM REV CODE 250: Performed by: NURSE PRACTITIONER

## 2023-08-22 PROCEDURE — 96413 CHEMO IV INFUSION 1 HR: CPT

## 2023-08-22 PROCEDURE — 77427 RADIATION TX MANAGEMENT X5: CPT | Mod: S$GLB,,, | Performed by: RADIOLOGY

## 2023-08-22 RX ORDER — HEPARIN 100 UNIT/ML
500 SYRINGE INTRAVENOUS
Status: DISCONTINUED | OUTPATIENT
Start: 2023-08-22 | End: 2023-08-22 | Stop reason: HOSPADM

## 2023-08-22 RX ORDER — DIPHENHYDRAMINE HYDROCHLORIDE 50 MG/ML
50 INJECTION INTRAMUSCULAR; INTRAVENOUS ONCE AS NEEDED
Status: DISCONTINUED | OUTPATIENT
Start: 2023-08-22 | End: 2023-08-22 | Stop reason: HOSPADM

## 2023-08-22 RX ORDER — DIPHENHYDRAMINE HYDROCHLORIDE 50 MG/ML
50 INJECTION INTRAMUSCULAR; INTRAVENOUS ONCE AS NEEDED
Status: CANCELLED | OUTPATIENT
Start: 2023-08-22

## 2023-08-22 RX ORDER — FAMOTIDINE 10 MG/ML
20 INJECTION INTRAVENOUS
Status: CANCELLED | OUTPATIENT
Start: 2023-08-22

## 2023-08-22 RX ORDER — SODIUM CHLORIDE 0.9 % (FLUSH) 0.9 %
10 SYRINGE (ML) INJECTION
Status: CANCELLED | OUTPATIENT
Start: 2023-08-22

## 2023-08-22 RX ORDER — EPINEPHRINE 0.3 MG/.3ML
0.3 INJECTION SUBCUTANEOUS ONCE AS NEEDED
Status: DISCONTINUED | OUTPATIENT
Start: 2023-08-22 | End: 2023-08-22 | Stop reason: HOSPADM

## 2023-08-22 RX ORDER — FAMOTIDINE 10 MG/ML
20 INJECTION INTRAVENOUS
Status: COMPLETED | OUTPATIENT
Start: 2023-08-22 | End: 2023-08-22

## 2023-08-22 RX ORDER — HEPARIN 100 UNIT/ML
500 SYRINGE INTRAVENOUS
Status: CANCELLED | OUTPATIENT
Start: 2023-08-22

## 2023-08-22 RX ORDER — EPINEPHRINE 0.3 MG/.3ML
0.3 INJECTION SUBCUTANEOUS ONCE AS NEEDED
Status: CANCELLED | OUTPATIENT
Start: 2023-08-22

## 2023-08-22 RX ORDER — SODIUM CHLORIDE 0.9 % (FLUSH) 0.9 %
10 SYRINGE (ML) INJECTION
Status: DISCONTINUED | OUTPATIENT
Start: 2023-08-22 | End: 2023-08-22 | Stop reason: HOSPADM

## 2023-08-22 RX ADMIN — FAMOTIDINE 20 MG: 10 INJECTION INTRAVENOUS at 09:08

## 2023-08-22 RX ADMIN — SODIUM CHLORIDE: 0.9 INJECTION, SOLUTION INTRAVENOUS at 09:08

## 2023-08-22 RX ADMIN — DIPHENHYDRAMINE HYDROCHLORIDE 50 MG: 50 INJECTION INTRAMUSCULAR; INTRAVENOUS at 09:08

## 2023-08-22 RX ADMIN — CARBOPLATIN 205 MG: 10 INJECTION, SOLUTION INTRAVENOUS at 11:08

## 2023-08-22 RX ADMIN — PALONOSETRON HYDROCHLORIDE 0.25 MG: 0.25 INJECTION INTRAVENOUS at 10:08

## 2023-08-22 RX ADMIN — PACLITAXEL 78 MG: 6 INJECTION, SOLUTION INTRAVENOUS at 10:08

## 2023-08-22 RX ADMIN — HEPARIN 500 UNITS: 100 SYRINGE at 12:08

## 2023-08-22 RX ADMIN — SODIUM CHLORIDE, PRESERVATIVE FREE 10 ML: 5 INJECTION INTRAVENOUS at 12:08

## 2023-08-22 NOTE — PLAN OF CARE
Problem: Infection  Goal: Absence of Infection Signs and Symptoms  8/22/2023 0947 by Gloria Boswell, RN  Outcome: Met  8/22/2023 0947 by Gloria Boswell, RN  Outcome: Ongoing, Progressing

## 2023-08-24 ENCOUNTER — OFFICE VISIT (OUTPATIENT)
Dept: PULMONOLOGY | Facility: CLINIC | Age: 68
End: 2023-08-24
Payer: MEDICARE

## 2023-08-24 VITALS
BODY MASS INDEX: 25.1 KG/M2 | SYSTOLIC BLOOD PRESSURE: 123 MMHG | DIASTOLIC BLOOD PRESSURE: 78 MMHG | OXYGEN SATURATION: 96 % | HEART RATE: 66 BPM | WEIGHT: 141.69 LBS

## 2023-08-24 DIAGNOSIS — J43.9 PULMONARY EMPHYSEMA, UNSPECIFIED EMPHYSEMA TYPE: Primary | ICD-10-CM

## 2023-08-24 DIAGNOSIS — C34.32 MALIGNANT NEOPLASM OF LOWER LOBE OF LEFT LUNG: ICD-10-CM

## 2023-08-24 PROCEDURE — 99214 PR OFFICE/OUTPT VISIT, EST, LEVL IV, 30-39 MIN: ICD-10-PCS | Mod: S$GLB,,, | Performed by: INTERNAL MEDICINE

## 2023-08-24 PROCEDURE — 99214 OFFICE O/P EST MOD 30 MIN: CPT | Mod: S$GLB,,, | Performed by: INTERNAL MEDICINE

## 2023-08-24 NOTE — PROGRESS NOTES
Office Visit    Patient Name: Aysha Moore  MRN: [id number]  : 1955      Reason for visit: COPD    HPI:     2019 - Here for evaluation of COPD.  Has been hospitalized twice for respiratory issues.  Here more recently has noted some symptoms but has been out of BREO for a week or so.  Has a h/o smoking - has quit cigarettes but is using a vape.  Has smoked about 1 PPD for about 40 years.    2019 - Here for follow up, no new issues reported.  Still vaping (d/we pt).  Reviewed PFT with pt.  Pt is currently stable on present medications with no recent increases in their symptoms or use of rescue medications.  I have reviewed the medical regimen and re-educated the pt on the role of rescue and controlling medications.  All questions answered.  Inhaler technique seems adequate.    2021 - Here for follow up, Pt is currently stable on present medications with no recent increases in their symptoms or use of rescue medications.  Since our last visit there have been no hospitalizations or ER visits for their respiratory issues and there does not seem to be anything to suggest unrecognized exacerbations.  I have reviewed the medical regimen and re-educated the pt on the role of rescue and controlling medications.  Inhaler technique and understanding seems adequate.  The patient reports no issues with any of there medications for their COPD.  Refills will be taken care of as needed.  All questions answered.  She stopped singulair - she felt that it made her short tempered and she is better since stopping it.  She had a MI recently.  Continues with some sinus infection - seen at Urgent Care given doxycycline but developed itching.  Still with symptoms.  Patient has no known corona virus exposures and has been practicing social distancing.  We have discussed the virus and precautions and all questions have been answered.    8/3/2021 - Here for follow up, Pt is currently stable on present medications with no  recent increases in their symptoms or use of rescue medications.  Since our last visit there have been no hospitalizations or ER visits for their respiratory issues and there does not seem to be anything to suggest unrecognized exacerbations.  I have reviewed the medical regimen and re-educated the pt on the role of rescue and controlling medications.  Inhaler technique and understanding seems adequate.  The patient reports no issues with any of there medications for their COPD.  Refills will be taken care of as needed.  All questions answered.  Has been taken off of lopressor due to decreased BP.  Patient has no known corona virus exposures and has been practicing social distancing.  We have discussed the virus and precautions and all questions have been answered.    2/1/2022 - Here for follow up, was sick for about 3 weeks around [place] (had known Covid exposure, had HA, sore throat, weak, cough, increased dyspnea, low grade fever).  She did 2 rapid tests which were negative but is interested in getting Covid antibodies checked.  Pt is currently stable on present medications with no recent increases in their symptoms or use of rescue medications.  Since our last visit there have been no hospitalizations or ER visits for their respiratory issues and there does not seem to be anything to suggest unrecognized exacerbations.  I have reviewed the medical regimen and re-educated the pt on the role of rescue and controlling medications.  Inhaler technique and understanding seems adequate.  The patient reports no issues with any of there medications for their COPD.  Refills will be taken care of as needed.  All questions answered.  She has been otherwise stable except for recent illness.  She has not been vaccinated for Covid.    10/5/2022 - Here for follow p, no new issues or problems reported.  We reviewed her CT scans and she will need a follow up CT in 5/2023.  Pt is currently stable on present medications with no  recent increases in their symptoms or use of rescue medications.  Since our last visit there have been no hospitalizations or ER visits for their respiratory issues and there does not seem to be anything to suggest unrecognized exacerbations.  I have reviewed the medical regimen and re-educated the pt on the role of rescue and controlling medications.  Inhaler technique and understanding seems adequate.  The patient reports no issues with any of there medications for their COPD.  Refills will be taken care of as needed.  All questions answered.  Did have Covid in early summer but was not very sick.    5/24/2023 - Here for follow up, Pt is currently stable on present medications with no recent increases in their symptoms or use of rescue medications.  Since our last visit there have been no hospitalizations or ER visits for their respiratory issues and there does not seem to be anything to suggest unrecognized exacerbations.  I have reviewed the medical regimen and re-educated the pt on the role of rescue and controlling medications.  Inhaler technique and understanding seems adequate.  The patient reports no issues with any of there medications for their COPD.  Refills will be taken care of as needed.  All questions answered.  Having issues with her sinuses and she is seeing Dr Chisholm and they are considering surgery.  Reviewed last PFT with her.  She has a skin lesion on her leg and is to see dermatology (she has a FMHx of melanoma and a prior melanoma).       6/20/2023 - Here for results - reviewed CT and PET scans with pt and daughter and all questions answered - we will proceed with Ct guided needle biopsy.    7/18/2023 - Here for biopsy results - + adenocarcinoma.  D/W pt and  and discussed PET findings (also reviewed PET scan).  At this point she needs MRI and referral to oncology.  All questions answered.    8/24/2023 - Here for follow up and so far is tolerating her therapy pretty well.  Asked her if  "she needs any help at home and she is OK.  She is eating a lot and has gained some weight.  No weakness issues.    9/6/2023 - PFT (8/16/23)  Mild obstruction (FEV1 - - 76%), improved with BD  No restriction  Moderate decrease DLCO (41%)      Low Dose Screening CT Chest    Cigarettes - 1 PPD x 40 YEARS  Still smoking -   NO  QUIT 3/2019     Date of last LD CT - 5/2022    Result - Category 1, needs repeat 5/2023    I have discussed with pt about using a screening CT of the chest due to history of cigarette smoking.  We have discussed the possible findings and possible actions as a result of these findings.  The pt would like to proceed.    COPD Flowsheet    GOLD A    Last PFT - 8/2019  FEV1- 74 % DLCO - 50 %     + LABA/LAMA    + prn ALBUTEROL    mMRC -  0 - SOB with strenuous exercise   - + 1 - SOB level ground, slight hill   -  2 - SOB walk slower or stop for breath level ground   -  3 - SOB at 100 yards or after few minutes   -  4 - SOB in house, dressing    Referral to PULMONARY REHABILITATION - NO    Tested for alpha-1-antitrypsin - NO  Result - na    Cigarette Counseling    Currently smoking 0 packs per day  40 pack years    Vaping a little    I have counseled pt for 3-5 minutes regarding cigarette cessation.  This has included the need to stop smoking as well as strategies, including but not limited to "cold turkey", CHANTIX (including risks and benefits of whitney drug), nicotine replacement and WELLBUTRIN.    Flu and Pneumonia Vaccination    I have recommended that the patient get this years influenza vaccine.  We discussed the risks and benefits of this treatment.      I reviewed patient's current pneumonia vaccine status.  Patient needs vaccination.  We have discussed the current guidelines and recommendations for pneumonia vaccination.              Past Medical History    Past Medical History:   Diagnosis Date    Allergy     Anxiety     Arthritis     CAD (coronary artery disease)     coronary stents    Chronic " back pain     lower back pain radiates to left leg    Chronic rhinitis     DAILY (dyspnea on exertion)     Hyperlipidemia     Hypertension     Lung cancer 07/01/2023    Melanoma in situ of left upper extremity     left thigh area    MI (myocardial infarction)     Seasonal allergic rhinitis 10/29/2020       Past Surgical History    Past Surgical History:   Procedure Laterality Date    BREAST SURGERY      breast reduction    CATARACT EXTRACTION, BILATERAL      coranary stent      EXCISION OF MELANOMA Left 3/30/2022    Procedure: EXCISION, MELANOMA;  Surgeon: David Mcpherson III, MD;  Location: [facility] OR;  Service: General;  Laterality: Left;    EXCISIONAL BIOPSY Left 3/30/2022    Procedure: EXCISIONAL BIOPSY;  Surgeon: David Mcpherson III, MD;  Location: [facility] OR;  Service: General;  Laterality: Left;    HYSTERECTOMY      total    INSERTION OF TUNNELED CENTRAL VENOUS CATHETER (CVC) WITH SUBCUTANEOUS PORT N/A 8/4/2023    Procedure: [id number];  Surgeon: Remi Mckeon Jr., MD;  Location: [facility] OR;  Service: General;  Laterality: N/A;    LEFT HEART CATHETERIZATION Left 10/16/2020    Procedure: Left heart cath;  Surgeon: Garrett Robins MD;  Location: [facility] CATH/EP LAB;  Service: Cardiology;  Laterality: Left;    OOPHORECTOMY      TOTAL REDUCTION MAMMOPLASTY Bilateral 2000       Medications      Current Outpatient Medications:     ammonium lactate 12 % Crea, aaa bid prn dry skin, Disp: 385 g, Rfl: 11    atorvastatin (LIPITOR) 80 MG tablet, Take 1 tablet (80 mg total) by mouth every evening., Disp: 90 tablet, Rfl: 1    azelastine (ASTELIN) 137 mcg (0.1 %) nasal spray, 1 SPRAY (137 MCG TOTAL) BY NASAL ROUTE 2 (TWO) TIMES DAILY AS NEEDED FOR RHINITIS (OR SINUSITIS)., Disp: 90 mL, Rfl: 1    busPIRone (BUSPAR) 5 MG Tab, TAKE 1 TABLET BY MOUTH TWICE A DAY (Patient taking differently: Take 5 mg by mouth every evening.), Disp: 180 tablet, Rfl: 1    calcium-vitamin D3 (OS-JOLIE 500 + D3) 500 mg-5 mcg (200 unit)  per tablet, Take 1 tablet by mouth every morning., Disp: , Rfl:     citalopram (CELEXA) 20 MG tablet, TAKE 1 TABLET BY MOUTH EVERY DAY, Disp: 90 tablet, Rfl: 3    clobetasol 0.05% (TEMOVATE) 0.05 % Oint, Apply topically 2 (two) times daily. for 14 days, Disp: 45 g, Rfl: 3    ezetimibe (ZETIA) 10 mg tablet, Take 1 tablet (10 mg total) by mouth once daily., Disp: 90 tablet, Rfl: 3    famotidine (PEPCID) 40 MG tablet, Take 1 tablet (40 mg total) by mouth every evening., Disp: 30 tablet, Rfl: 1    fluticasone propionate (FLONASE) 50 mcg/actuation nasal spray, 1 spray (50 mcg total) by Each Nostril route daily as needed for Rhinitis or Allergies., Disp: 9.9 mL, Rfl: 2    lisinopriL (PRINIVIL,ZESTRIL) 2.5 MG tablet, Take 1 tablet (2.5 mg total) by mouth every evening., Disp: 90 tablet, Rfl: 3    metoprolol succinate (TOPROL-XL) 50 MG 24 hr tablet, Take 1 tablet (50 mg total) by mouth once daily., Disp: 90 tablet, Rfl: 3    nitroGLYCERIN (NITROSTAT) 0.4 MG SL tablet, Place 1 tablet (0.4 mg total) under the tongue every 5 (five) minutes as needed for Chest pain., Disp: 30 tablet, Rfl: 0    omeprazole (PRILOSEC) 40 MG capsule, Take 1 capsule (40 mg total) by mouth once daily., Disp: 30 capsule, Rfl: 11    ondansetron (ZOFRAN) 8 MG tablet, Take 1 tablet (8 mg total) by mouth every 8 (eight) hours as needed., Disp: 30 tablet, Rfl: 5    promethazine (PHENERGAN) 25 MG tablet, Take 1 tablet (25 mg total) by mouth every 4 to 6 hours as needed., Disp: 30 tablet, Rfl: 5    tiZANidine (ZANAFLEX) 4 MG tablet, TAKE 1 TABLET (4 MG TOTAL) BY MOUTH EVERY 6 (SIX) HOURS AS NEEDED. BACK PAIN, Disp: 360 tablet, Rfl: 0    valACYclovir (VALTREX) 1000 MG tablet, TAKE 2 AT ONSET OF FEVER BLISTERS THEN REPEAT IN 12 HOURS (TOTAL 4 TABS PER EPISODE), Disp: 20 tablet, Rfl: 3    zinc 50 mg Tab, Take by mouth., Disp: , Rfl:     ALPRAZolam (XANAX) 0.25 MG tablet, Take 1 tablet (0.25 mg total) by mouth 3 (three) times daily as needed for Anxiety.  (Patient taking differently: Take 0.25 mg by mouth 2 (two) times daily as needed for Anxiety.), Disp: 30 tablet, Rfl: 1    Allergies    Review of patient's allergies indicates:   Allergen Reactions    Cephalexin      Other reaction(s): Rash    Doxycycline monohydrate Hives    Lanoxin  [digoxin]      Other reaction(s): Rash    Lansoprazole      Other reaction(s): Rash    Macrobid [nitrofurantoin monohyd/m-cryst] Rash       SocHx    Social History     Tobacco Use   Smoking Status Former    Current packs/day: 0.00    Average packs/day: 1 pack/day for 43.2 years (43.2 ttl pk-yrs)    Types: Cigarettes, Vaping with nicotine    Start date:     Quit date: 3/4/2017    Years since quittin.4   Smokeless Tobacco Never       Social History     Substance and Sexual Activity   Alcohol Use Not Currently    Alcohol/week: 0.0 standard drinks of alcohol    Comment: sometimes       Drug Use - no  Occupation - housewife  Asbestos exposure - no  Pets - dogs    FMHx    Family History   Problem Relation Age of Onset    Cancer Mother         breast, ovarian, cervical     Heart disease Mother     Breast cancer Mother 50    Ovarian cancer Mother     Cancer Father         melanoma    Stroke Sister     Heart disease Sister     Cancer Sister         leukemia    Macular degeneration Sister     Heart disease Sister     Heart disease Brother     Lung cancer Brother     Cancer Maternal Uncle     Cancer Paternal Aunt         breast    Breast cancer Paternal Aunt 60    Cancer Paternal Uncle     Heart disease Maternal Grandmother     No Known Problems Daughter     No Known Problems Son          Review of Systems  Review of Systems   Constitutional:  Negative for chills, diaphoresis, fever, malaise/fatigue and weight loss.   HENT:  Positive for congestion and tinnitus. Negative for ear discharge, ear pain, hearing loss, nosebleeds, sinus pain and sore throat.         Has had tinnitus for years (has seen ENT)   Eyes:  Negative for pain.    Respiratory:  Positive for shortness of breath and wheezing. Negative for cough, hemoptysis, sputum production and stridor.    Cardiovascular:  Negative for chest pain, palpitations, orthopnea, claudication, leg swelling and PND.        H/o PCI   Gastrointestinal:  Negative for abdominal pain, blood in stool, constipation, diarrhea, heartburn, melena, nausea and vomiting.   Genitourinary:  Negative for dysuria, flank pain, frequency, hematuria and urgency.   Musculoskeletal:  Positive for back pain, joint pain and neck pain. Negative for falls and myalgias.        Right knee meniscal injury   Skin:  Negative for itching and rash.   Neurological:  Negative for dizziness, tingling, tremors, sensory change, speech change, focal weakness, seizures, weakness and headaches.   Endo/Heme/Allergies:  Negative for environmental allergies.   Psychiatric/Behavioral:  Negative for depression, substance abuse and suicidal ideas. The patient is not nervous/anxious.        Physical Exam    Vitals:    08/24/23 1030   BP: 123/78   BP Location: Right arm   Patient Position: Sitting   BP Method: Medium (Manual)   Pulse: 66   SpO2: 96%   Weight: 64.3 kg (141 lb 11.2 oz)       Physical Exam  Vitals and nursing note reviewed.   Constitutional:       General: She is not in acute distress.     Appearance: She is well-developed. She is not diaphoretic.   HENT:      Head: Normocephalic and atraumatic.      Right Ear: External ear normal.      Left Ear: External ear normal.      Nose: Nose normal.   Eyes:      Conjunctiva/sclera: Conjunctivae normal.      Pupils: Pupils are equal, round, and reactive to light.   Neck:      Thyroid: No thyromegaly.      Vascular: No JVD.      Trachea: No tracheal deviation.   Cardiovascular:      Rate and Rhythm: Normal rate and regular rhythm.      Heart sounds: Normal heart sounds. No murmur heard.     No friction rub. No gallop.   Pulmonary:      Effort: Pulmonary effort is normal. No respiratory distress.       Breath sounds: Normal breath sounds. No stridor. No wheezing or rales.   Chest:      Chest wall: No tenderness.   Abdominal:      General: Bowel sounds are normal. There is no distension.      Palpations: Abdomen is soft.      Tenderness: There is no abdominal tenderness.   Musculoskeletal:         General: No tenderness. Normal range of motion.      Cervical back: Normal range of motion and neck supple.   Lymphadenopathy:      Cervical: No cervical adenopathy.   Skin:     General: Skin is warm and dry.   Neurological:      Mental Status: She is alert and oriented to person, place, and time.      Cranial Nerves: No cranial nerve deficit.   Psychiatric:         Behavior: Behavior normal.         Labs    Lab Results   Component Value Date    WBC 2.83 (L) 08/22/2023    HGB 12.1 08/22/2023    HCT 36.9 (L) 08/22/2023     08/22/2023       Sodium   Date Value Ref Range Status   08/22/2023 137 136 - 145 mmol/L Final     Potassium   Date Value Ref Range Status   08/22/2023 3.8 3.5 - 5.1 mmol/L Final     Chloride   Date Value Ref Range Status   08/22/2023 104 95 - 110 mmol/L Final     CO2   Date Value Ref Range Status   08/22/2023 28 23 - 29 mmol/L Final     Glucose   Date Value Ref Range Status   08/22/2023 103 70 - 110 mg/dL Final     BUN   Date Value Ref Range Status   08/22/2023 17 8 - 23 mg/dL Final     Creatinine   Date Value Ref Range Status   08/22/2023 0.6 0.5 - 1.4 mg/dL Final     Calcium   Date Value Ref Range Status   08/22/2023 9.2 8.7 - 10.5 mg/dL Final     Total Protein   Date Value Ref Range Status   08/22/2023 7.4 6.0 - 8.4 g/dL Final     Albumin   Date Value Ref Range Status   08/22/2023 3.8 3.5 - 5.2 g/dL Final     Total Bilirubin   Date Value Ref Range Status   08/22/2023 0.9 0.1 - 1.0 mg/dL Final     Comment:     For infants and newborns, interpretation of results should be based  on gestational age, weight and in agreement with clinical  observations.    Premature Infant recommended reference  ranges:  Up to 24 hours.............<8.0 mg/dL  Up to 48 hours............<12.0 mg/dL  3-5 days..................<15.0 mg/dL  6-29 days.................<15.0 mg/dL       Alkaline Phosphatase   Date Value Ref Range Status   08/22/2023 68 55 - 135 U/L Final     AST   Date Value Ref Range Status   08/22/2023 14 10 - 40 U/L Final     ALT   Date Value Ref Range Status   08/22/2023 13 10 - 44 U/L Final     Anion Gap   Date Value Ref Range Status   08/22/2023 5 (L) 8 - 16 mmol/L Final       Xrays    CT chest (5/2022)  1.  No pulmonary nodules identified.  2.  Mild/moderate emphysematous lung disease.  3.  No consolidation or pleural effusion.     LUNG RADS CATEGORY 1: NEGATIVE        Impression/Plan    Problem List Items Addressed This Visit          Pulmonary    Pulmonary emphysema - Primary  Continue present medications.  Will refill medications as needed.  Instructed patient to contact us with any issues concerning their medications (cost, reactions, etc.).  Have discussed with patient about inciting conditions which may exacerbate their disease.  We did discuss possible new therapies or de-escalation of therapy (if appropriate).  Asked patient if they were interested in pursuing pulmonary rehabilitation.  All questions answered  RTC 1 months  Patient instructed that they are to call if symptoms change or new issues develop prior to their next visit.                     Other    Personal history of tobacco use, presenting hazards to health   has quit   needs to stop vaping    Adenocarcinoma lung  Has started therapy and is doing well                          Raad Abad MD

## 2023-08-28 RX ORDER — HEPARIN 100 UNIT/ML
500 SYRINGE INTRAVENOUS
Status: CANCELLED | OUTPATIENT
Start: 2023-08-29

## 2023-08-28 RX ORDER — FAMOTIDINE 10 MG/ML
20 INJECTION INTRAVENOUS
Status: CANCELLED | OUTPATIENT
Start: 2023-08-29

## 2023-08-28 RX ORDER — SODIUM CHLORIDE 0.9 % (FLUSH) 0.9 %
10 SYRINGE (ML) INJECTION
Status: CANCELLED | OUTPATIENT
Start: 2023-08-29

## 2023-08-28 RX ORDER — EPINEPHRINE 0.3 MG/.3ML
0.3 INJECTION SUBCUTANEOUS ONCE AS NEEDED
Status: CANCELLED | OUTPATIENT
Start: 2023-08-29

## 2023-08-28 RX ORDER — DIPHENHYDRAMINE HYDROCHLORIDE 50 MG/ML
50 INJECTION INTRAMUSCULAR; INTRAVENOUS ONCE AS NEEDED
Status: CANCELLED | OUTPATIENT
Start: 2023-08-29

## 2023-08-29 ENCOUNTER — INFUSION (OUTPATIENT)
Dept: INFUSION THERAPY | Facility: HOSPITAL | Age: 68
End: 2023-08-29
Attending: INTERNAL MEDICINE
Payer: MEDICARE

## 2023-08-29 ENCOUNTER — TREATMENT (OUTPATIENT)
Dept: RADIATION ONCOLOGY | Facility: CLINIC | Age: 68
End: 2023-08-29
Payer: MEDICARE

## 2023-08-29 VITALS
SYSTOLIC BLOOD PRESSURE: 106 MMHG | BODY MASS INDEX: 24.88 KG/M2 | DIASTOLIC BLOOD PRESSURE: 65 MMHG | OXYGEN SATURATION: 98 % | RESPIRATION RATE: 16 BRPM | HEART RATE: 74 BPM | TEMPERATURE: 98 F | WEIGHT: 140.38 LBS | HEIGHT: 63 IN

## 2023-08-29 DIAGNOSIS — C34.90 MALIGNANT NEOPLASM OF UNSPECIFIED PART OF UNSPECIFIED BRONCHUS OR LUNG: Primary | ICD-10-CM

## 2023-08-29 PROCEDURE — 96417 CHEMO IV INFUS EACH ADDL SEQ: CPT

## 2023-08-29 PROCEDURE — 96367 TX/PROPH/DG ADDL SEQ IV INF: CPT

## 2023-08-29 PROCEDURE — 63600175 PHARM REV CODE 636 W HCPCS: Performed by: NURSE PRACTITIONER

## 2023-08-29 PROCEDURE — 96413 CHEMO IV INFUSION 1 HR: CPT

## 2023-08-29 PROCEDURE — 25000003 PHARM REV CODE 250: Performed by: NURSE PRACTITIONER

## 2023-08-29 PROCEDURE — 77427 PR CHG RADIATION,MANGEMENT,5 TX'S: ICD-10-PCS | Mod: S$GLB,,, | Performed by: RADIOLOGY

## 2023-08-29 PROCEDURE — 77014 PR  CT GUIDANCE PLACEMENT RAD THERAPY FIELDS: CPT | Mod: S$GLB,,, | Performed by: RADIOLOGY

## 2023-08-29 PROCEDURE — G6015 PR RADN TX DELIVERY,  INTENS MOD, 1+ FIELDS PER TX: ICD-10-PCS | Mod: S$GLB,,, | Performed by: RADIOLOGY

## 2023-08-29 PROCEDURE — A4216 STERILE WATER/SALINE, 10 ML: HCPCS | Performed by: NURSE PRACTITIONER

## 2023-08-29 PROCEDURE — 77014 PR  CT GUIDANCE PLACEMENT RAD THERAPY FIELDS: ICD-10-PCS | Mod: S$GLB,,, | Performed by: RADIOLOGY

## 2023-08-29 PROCEDURE — G6015 RADIATION TX DELIVERY IMRT: HCPCS | Mod: S$GLB,,, | Performed by: RADIOLOGY

## 2023-08-29 PROCEDURE — 77427 RADIATION TX MANAGEMENT X5: CPT | Mod: S$GLB,,, | Performed by: RADIOLOGY

## 2023-08-29 PROCEDURE — 96375 TX/PRO/DX INJ NEW DRUG ADDON: CPT

## 2023-08-29 RX ORDER — DIPHENHYDRAMINE HYDROCHLORIDE 50 MG/ML
50 INJECTION INTRAMUSCULAR; INTRAVENOUS ONCE AS NEEDED
Status: DISCONTINUED | OUTPATIENT
Start: 2023-08-29 | End: 2023-08-29 | Stop reason: HOSPADM

## 2023-08-29 RX ORDER — FAMOTIDINE 10 MG/ML
20 INJECTION INTRAVENOUS
Status: COMPLETED | OUTPATIENT
Start: 2023-08-29 | End: 2023-08-29

## 2023-08-29 RX ORDER — SODIUM CHLORIDE 0.9 % (FLUSH) 0.9 %
10 SYRINGE (ML) INJECTION
Status: DISCONTINUED | OUTPATIENT
Start: 2023-08-29 | End: 2023-08-29 | Stop reason: HOSPADM

## 2023-08-29 RX ORDER — EPINEPHRINE 0.3 MG/.3ML
0.3 INJECTION SUBCUTANEOUS ONCE AS NEEDED
Status: DISCONTINUED | OUTPATIENT
Start: 2023-08-29 | End: 2023-08-29 | Stop reason: HOSPADM

## 2023-08-29 RX ORDER — HEPARIN 100 UNIT/ML
500 SYRINGE INTRAVENOUS
Status: DISCONTINUED | OUTPATIENT
Start: 2023-08-29 | End: 2023-08-29 | Stop reason: HOSPADM

## 2023-08-29 RX ADMIN — FAMOTIDINE 20 MG: 10 INJECTION INTRAVENOUS at 09:08

## 2023-08-29 RX ADMIN — PALONOSETRON HYDROCHLORIDE 0.25 MG: 0.25 INJECTION INTRAVENOUS at 09:08

## 2023-08-29 RX ADMIN — CARBOPLATIN 205 MG: 10 INJECTION, SOLUTION INTRAVENOUS at 11:08

## 2023-08-29 RX ADMIN — DIPHENHYDRAMINE HYDROCHLORIDE 50 MG: 50 INJECTION INTRAMUSCULAR; INTRAVENOUS at 10:08

## 2023-08-29 RX ADMIN — SODIUM CHLORIDE: 9 INJECTION, SOLUTION INTRAVENOUS at 09:08

## 2023-08-29 RX ADMIN — PACLITAXEL 78 MG: 6 INJECTION, SOLUTION INTRAVENOUS at 10:08

## 2023-08-29 RX ADMIN — SODIUM CHLORIDE, PRESERVATIVE FREE 10 ML: 5 INJECTION INTRAVENOUS at 12:08

## 2023-08-29 RX ADMIN — HEPARIN 500 UNITS: 100 SYRINGE at 12:08

## 2023-08-29 NOTE — PLAN OF CARE
Problem: Fall Injury Risk  Goal: Absence of Fall and Fall-Related Injury  Outcome: Ongoing, Progressing  Intervention: Identify and Manage Contributors  Flowsheets (Taken 8/29/2023 0904)  Self-Care Promotion: independence encouraged  Medication Review/Management: medications reviewed  Intervention: Promote Injury-Free Environment  Flowsheets (Taken 8/29/2023 0904)  Safety Promotion/Fall Prevention: medications reviewed

## 2023-09-01 ENCOUNTER — TELEPHONE (OUTPATIENT)
Dept: HEMATOLOGY/ONCOLOGY | Facility: CLINIC | Age: 68
End: 2023-09-01

## 2023-09-01 NOTE — TELEPHONE ENCOUNTER
Spoke to pt and notified her that her ambry genetics test was negative. She verbalized understanding.

## 2023-09-05 ENCOUNTER — TREATMENT (OUTPATIENT)
Dept: RADIATION ONCOLOGY | Facility: CLINIC | Age: 68
End: 2023-09-05
Payer: MEDICARE

## 2023-09-05 ENCOUNTER — INFUSION (OUTPATIENT)
Dept: INFUSION THERAPY | Facility: HOSPITAL | Age: 68
End: 2023-09-05
Attending: INTERNAL MEDICINE
Payer: MEDICARE

## 2023-09-05 VITALS
HEIGHT: 63 IN | RESPIRATION RATE: 16 BRPM | OXYGEN SATURATION: 98 % | BODY MASS INDEX: 25.04 KG/M2 | HEART RATE: 80 BPM | SYSTOLIC BLOOD PRESSURE: 144 MMHG | TEMPERATURE: 97 F | DIASTOLIC BLOOD PRESSURE: 76 MMHG | WEIGHT: 141.31 LBS

## 2023-09-05 DIAGNOSIS — C34.32 MALIGNANT NEOPLASM OF LOWER LOBE OF LEFT LUNG: Primary | ICD-10-CM

## 2023-09-05 DIAGNOSIS — C34.90 MALIGNANT NEOPLASM OF UNSPECIFIED PART OF UNSPECIFIED BRONCHUS OR LUNG: Primary | ICD-10-CM

## 2023-09-05 PROCEDURE — 25000003 PHARM REV CODE 250: Performed by: NURSE PRACTITIONER

## 2023-09-05 PROCEDURE — 63600175 PHARM REV CODE 636 W HCPCS: Performed by: NURSE PRACTITIONER

## 2023-09-05 PROCEDURE — 77336 PR  RADN PHYSICS CONSULT CONTINUING: ICD-10-PCS | Mod: S$GLB,,, | Performed by: RADIOLOGY

## 2023-09-05 PROCEDURE — 77336 RADIATION PHYSICS CONSULT: CPT | Mod: S$GLB,,, | Performed by: RADIOLOGY

## 2023-09-05 PROCEDURE — 96375 TX/PRO/DX INJ NEW DRUG ADDON: CPT

## 2023-09-05 PROCEDURE — 96417 CHEMO IV INFUS EACH ADDL SEQ: CPT

## 2023-09-05 PROCEDURE — 96413 CHEMO IV INFUSION 1 HR: CPT

## 2023-09-05 PROCEDURE — 96415 CHEMO IV INFUSION ADDL HR: CPT

## 2023-09-05 PROCEDURE — 96367 TX/PROPH/DG ADDL SEQ IV INF: CPT

## 2023-09-05 PROCEDURE — 77014 PR  CT GUIDANCE PLACEMENT RAD THERAPY FIELDS: ICD-10-PCS | Mod: S$GLB,,, | Performed by: RADIOLOGY

## 2023-09-05 PROCEDURE — A4216 STERILE WATER/SALINE, 10 ML: HCPCS | Performed by: NURSE PRACTITIONER

## 2023-09-05 PROCEDURE — G6015 RADIATION TX DELIVERY IMRT: HCPCS | Mod: S$GLB,,, | Performed by: RADIOLOGY

## 2023-09-05 PROCEDURE — 77014 PR  CT GUIDANCE PLACEMENT RAD THERAPY FIELDS: CPT | Mod: S$GLB,,, | Performed by: RADIOLOGY

## 2023-09-05 PROCEDURE — G6015 PR RADN TX DELIVERY,  INTENS MOD, 1+ FIELDS PER TX: ICD-10-PCS | Mod: S$GLB,,, | Performed by: RADIOLOGY

## 2023-09-05 RX ORDER — SODIUM CHLORIDE 0.9 % (FLUSH) 0.9 %
10 SYRINGE (ML) INJECTION
Status: CANCELLED | OUTPATIENT
Start: 2023-09-05

## 2023-09-05 RX ORDER — HEPARIN 100 UNIT/ML
500 SYRINGE INTRAVENOUS
Status: CANCELLED | OUTPATIENT
Start: 2023-09-05

## 2023-09-05 RX ORDER — SODIUM CHLORIDE 0.9 % (FLUSH) 0.9 %
10 SYRINGE (ML) INJECTION
Status: DISCONTINUED | OUTPATIENT
Start: 2023-09-05 | End: 2023-09-05 | Stop reason: HOSPADM

## 2023-09-05 RX ORDER — DIPHENHYDRAMINE HYDROCHLORIDE 50 MG/ML
50 INJECTION INTRAMUSCULAR; INTRAVENOUS ONCE AS NEEDED
Status: DISCONTINUED | OUTPATIENT
Start: 2023-09-05 | End: 2023-09-05 | Stop reason: HOSPADM

## 2023-09-05 RX ORDER — EPINEPHRINE 0.3 MG/.3ML
0.3 INJECTION SUBCUTANEOUS ONCE AS NEEDED
Status: DISCONTINUED | OUTPATIENT
Start: 2023-09-05 | End: 2023-09-05 | Stop reason: HOSPADM

## 2023-09-05 RX ORDER — HEPARIN 100 UNIT/ML
500 SYRINGE INTRAVENOUS
Status: DISCONTINUED | OUTPATIENT
Start: 2023-09-05 | End: 2023-09-05 | Stop reason: HOSPADM

## 2023-09-05 RX ORDER — HYDROCODONE BITARTRATE AND ACETAMINOPHEN 5; 325 MG/1; MG/1
1 TABLET ORAL
Qty: 60 TABLET | Refills: 0 | Status: SHIPPED | OUTPATIENT
Start: 2023-09-05

## 2023-09-05 RX ORDER — DIPHENHYDRAMINE HYDROCHLORIDE 50 MG/ML
50 INJECTION INTRAMUSCULAR; INTRAVENOUS ONCE AS NEEDED
Status: CANCELLED | OUTPATIENT
Start: 2023-09-05

## 2023-09-05 RX ORDER — FAMOTIDINE 10 MG/ML
20 INJECTION INTRAVENOUS
Status: CANCELLED | OUTPATIENT
Start: 2023-09-05

## 2023-09-05 RX ORDER — EPINEPHRINE 0.3 MG/.3ML
0.3 INJECTION SUBCUTANEOUS ONCE AS NEEDED
Status: CANCELLED | OUTPATIENT
Start: 2023-09-05

## 2023-09-05 RX ORDER — FAMOTIDINE 10 MG/ML
20 INJECTION INTRAVENOUS
Status: COMPLETED | OUTPATIENT
Start: 2023-09-05 | End: 2023-09-05

## 2023-09-05 RX ADMIN — SODIUM CHLORIDE: 0.9 INJECTION, SOLUTION INTRAVENOUS at 09:09

## 2023-09-05 RX ADMIN — CARBOPLATIN 205 MG: 600 INJECTION, SOLUTION INTRAVENOUS at 12:09

## 2023-09-05 RX ADMIN — PALONOSETRON HYDROCHLORIDE 0.25 MG: 0.25 INJECTION INTRAVENOUS at 10:09

## 2023-09-05 RX ADMIN — PACLITAXEL 78 MG: 6 INJECTION, SOLUTION INTRAVENOUS at 11:09

## 2023-09-05 RX ADMIN — HEPARIN 500 UNITS: 100 SYRINGE at 01:09

## 2023-09-05 RX ADMIN — FAMOTIDINE 20 MG: 10 INJECTION INTRAVENOUS at 09:09

## 2023-09-05 RX ADMIN — SODIUM CHLORIDE, PRESERVATIVE FREE 10 ML: 5 INJECTION INTRAVENOUS at 01:09

## 2023-09-05 RX ADMIN — DIPHENHYDRAMINE HYDROCHLORIDE 50 MG: 50 INJECTION INTRAMUSCULAR; INTRAVENOUS at 09:09

## 2023-09-08 ENCOUNTER — OFFICE VISIT (OUTPATIENT)
Dept: HEMATOLOGY/ONCOLOGY | Facility: CLINIC | Age: 68
End: 2023-09-08
Payer: MEDICARE

## 2023-09-08 VITALS
TEMPERATURE: 98 F | DIASTOLIC BLOOD PRESSURE: 55 MMHG | HEIGHT: 63 IN | RESPIRATION RATE: 16 BRPM | HEART RATE: 85 BPM | BODY MASS INDEX: 25.28 KG/M2 | WEIGHT: 142.69 LBS | SYSTOLIC BLOOD PRESSURE: 111 MMHG

## 2023-09-08 DIAGNOSIS — C34.32 MALIGNANT NEOPLASM OF LOWER LOBE OF LEFT LUNG: Primary | ICD-10-CM

## 2023-09-08 DIAGNOSIS — R19.7 DIARRHEA, UNSPECIFIED TYPE: ICD-10-CM

## 2023-09-08 DIAGNOSIS — R11.0 NAUSEA: ICD-10-CM

## 2023-09-08 DIAGNOSIS — K21.9 GASTROESOPHAGEAL REFLUX DISEASE, UNSPECIFIED WHETHER ESOPHAGITIS PRESENT: ICD-10-CM

## 2023-09-08 PROCEDURE — 99214 OFFICE O/P EST MOD 30 MIN: CPT | Performed by: NURSE PRACTITIONER

## 2023-09-08 PROCEDURE — 99214 PR OFFICE/OUTPT VISIT, EST, LEVL IV, 30-39 MIN: ICD-10-PCS | Mod: S$PBB,,, | Performed by: NURSE PRACTITIONER

## 2023-09-08 PROCEDURE — 99214 OFFICE O/P EST MOD 30 MIN: CPT | Mod: S$PBB,,, | Performed by: NURSE PRACTITIONER

## 2023-09-08 RX ORDER — FAMOTIDINE 40 MG/1
40 TABLET, FILM COATED ORAL NIGHTLY
Qty: 30 TABLET | Refills: 1 | Status: SHIPPED | OUTPATIENT
Start: 2023-09-08 | End: 2023-10-05 | Stop reason: SDUPTHER

## 2023-09-08 NOTE — PROGRESS NOTES
INITIAL [facility] HEM/ONC CONSULTATION      Subjective:       Patient ID: Aysha Moore is a 68 y.o. female.    6/2/2023:  Screening CT chest:  2.6 x 2.5 x 3.2cm mass in the posterior LLL    6/20/2023:  PET scan:  35 x 21 mm lobulated pulmonary mass in the posterior left lower lobe with SUV max 11.6     FDG avid lymphadenopathy is present with index nodes outlined below:  -21 x 11 mm AP window node with SUV max 12.1 (image 103)  -21 x 12 mm posterior left hilar node with SUV max 13.7 (image 109)  -16 x 13 mm left hilar node with SUV max 14 (image 1:15)  -10 x 10 mm subcarinal node with SUV max 6.3 (image 111)    7/12/2023-Biopsy of LLL:  LEFT LOWER LOBE OF LUNG, CORE BIOPSIES:   - LUNG ADENOCARCINOMA WITH PLEOMORPHIC FEATURES.     7/21/2023:  MRI brain: no mets.     Chief Complaint: Lung cancer    Patient is s/p Cycle 5 Taxol and Carboplatin. She did have delayed diarrhea yesterday. Some intermittent nausea and fatigue. She is using Imodium for the diarrhea with relief. Using Pepcid and Prilosec which is helping.        Ms. Moore is a 66yo female who presents with an abnormal screening chest CT seen above.  This was done in early June this year and found to have a mass in the LLL of her lung.  She was referred her for further recommendations.      PMH: AMI, 2 stents placed.  She has no lung, liver, kidney disease and has had no other malignancy.        Past Medical History:   Diagnosis Date    Allergy     Anxiety     Arthritis     CAD (coronary artery disease)     coronary stents    Chronic back pain     lower back pain radiates to left leg    Chronic rhinitis     DAILY (dyspnea on exertion)     Hyperlipidemia     Hypertension     Lung cancer 07/01/2023    Melanoma in situ of left upper extremity     left thigh area    MI (myocardial infarction)     Seasonal allergic rhinitis 10/29/2020       Past Surgical History:   Procedure Laterality Date    BREAST SURGERY      breast reduction    CATARACT EXTRACTION, BILATERAL       coranary stent      EXCISION OF MELANOMA Left 3/30/2022    Procedure: EXCISION, MELANOMA;  Surgeon: David Mcpherson III, MD;  Location: [facility] OR;  Service: General;  Laterality: Left;    EXCISIONAL BIOPSY Left 3/30/2022    Procedure: EXCISIONAL BIOPSY;  Surgeon: David Mcpherson III, MD;  Location: [facility] OR;  Service: General;  Laterality: Left;    HYSTERECTOMY      total    INSERTION OF TUNNELED CENTRAL VENOUS CATHETER (CVC) WITH SUBCUTANEOUS PORT N/A 2023    Procedure: [id number];  Surgeon: Remi Mckeon Jr., MD;  Location: [facility] OR;  Service: General;  Laterality: N/A;    LEFT HEART CATHETERIZATION Left 10/16/2020    Procedure: Left heart cath;  Surgeon: Garrett Robins MD;  Location: [facility] CATH/EP LAB;  Service: Cardiology;  Laterality: Left;    OOPHORECTOMY      TOTAL REDUCTION MAMMOPLASTY Bilateral        Social History     Socioeconomic History    Marital status:    Tobacco Use    Smoking status: Former     Current packs/day: 0.00     Average packs/day: 1 pack/day for 43.2 years (43.2 ttl pk-yrs)     Types: Cigarettes, Vaping with nicotine     Start date:      Quit date: 3/4/2017     Years since quittin.5    Smokeless tobacco: Never   Substance and Sexual Activity    Alcohol use: Not Currently     Alcohol/week: 0.0 standard drinks of alcohol     Comment: sometimes    Drug use: No    Sexual activity: Yes     Partners: Male       Family History   Problem Relation Age of Onset    Cancer Mother         breast, ovarian, cervical     Heart disease Mother     Breast cancer Mother 50    Ovarian cancer Mother     Cancer Father         melanoma    Stroke Sister     Heart disease Sister     Cancer Sister         leukemia    Macular degeneration Sister     Heart disease Sister     Heart disease Brother     Lung cancer Brother     Cancer Maternal Uncle     Cancer Paternal Aunt         breast    Breast cancer Paternal Aunt 60    Cancer Paternal Uncle     Heart disease  Maternal Grandmother     No Known Problems Daughter     No Known Problems Son        Review of patient's allergies indicates:   Allergen Reactions    Cephalexin      Other reaction(s): Rash    Doxycycline monohydrate Hives    Lanoxin  [digoxin]      Other reaction(s): Rash    Lansoprazole      Other reaction(s): Rash    Macrobid [nitrofurantoin monohyd/m-cryst] Rash       Current Outpatient Medications:     ammonium lactate 12 % Crea, aaa bid prn dry skin, Disp: 385 g, Rfl: 11    atorvastatin (LIPITOR) 80 MG tablet, Take 1 tablet (80 mg total) by mouth every evening., Disp: 90 tablet, Rfl: 1    azelastine (ASTELIN) 137 mcg (0.1 %) nasal spray, 1 SPRAY (137 MCG TOTAL) BY NASAL ROUTE 2 (TWO) TIMES DAILY AS NEEDED FOR RHINITIS (OR SINUSITIS)., Disp: 90 mL, Rfl: 1    busPIRone (BUSPAR) 5 MG Tab, TAKE 1 TABLET BY MOUTH TWICE A DAY (Patient taking differently: Take 5 mg by mouth every evening.), Disp: 180 tablet, Rfl: 1    calcium-vitamin D3 (OS-JOLIE 500 + D3) 500 mg-5 mcg (200 unit) per tablet, Take 1 tablet by mouth every morning., Disp: , Rfl:     citalopram (CELEXA) 20 MG tablet, TAKE 1 TABLET BY MOUTH EVERY DAY, Disp: 90 tablet, Rfl: 3    clobetasol 0.05% (TEMOVATE) 0.05 % Oint, Apply topically 2 (two) times daily. for 14 days, Disp: 45 g, Rfl: 3    duke's soln (benadryl 30 mL, mylanta 30 mL, LIDOcaine 30 mL, nystatin 30 mL) 120mL, Take 10 mLs by mouth 4 (four) times daily., Disp: 120 mL, Rfl: 0    ezetimibe (ZETIA) 10 mg tablet, Take 1 tablet (10 mg total) by mouth once daily., Disp: 90 tablet, Rfl: 3    fluticasone propionate (FLONASE) 50 mcg/actuation nasal spray, 1 spray (50 mcg total) by Each Nostril route daily as needed for Rhinitis or Allergies., Disp: 9.9 mL, Rfl: 2    HYDROcodone-acetaminophen (NORCO) 5-325 mg per tablet, Take 1 tablet by mouth every 4 to 6 hours as needed for Pain., Disp: 60 tablet, Rfl: 0    lisinopriL (PRINIVIL,ZESTRIL) 2.5 MG tablet, Take 1 tablet (2.5 mg total) by mouth every evening.,  Disp: 90 tablet, Rfl: 3    metoprolol succinate (TOPROL-XL) 50 MG 24 hr tablet, Take 1 tablet (50 mg total) by mouth once daily., Disp: 90 tablet, Rfl: 3    nitroGLYCERIN (NITROSTAT) 0.4 MG SL tablet, Place 1 tablet (0.4 mg total) under the tongue every 5 (five) minutes as needed for Chest pain., Disp: 30 tablet, Rfl: 0    omeprazole (PRILOSEC) 40 MG capsule, Take 1 capsule (40 mg total) by mouth once daily., Disp: 30 capsule, Rfl: 11    ondansetron (ZOFRAN) 8 MG tablet, Take 1 tablet (8 mg total) by mouth every 8 (eight) hours as needed., Disp: 30 tablet, Rfl: 5    promethazine (PHENERGAN) 25 MG tablet, Take 1 tablet (25 mg total) by mouth every 4 to 6 hours as needed., Disp: 30 tablet, Rfl: 5    tiZANidine (ZANAFLEX) 4 MG tablet, TAKE 1 TABLET (4 MG TOTAL) BY MOUTH EVERY 6 (SIX) HOURS AS NEEDED. BACK PAIN, Disp: 360 tablet, Rfl: 0    valACYclovir (VALTREX) 1000 MG tablet, TAKE 2 AT ONSET OF FEVER BLISTERS THEN REPEAT IN 12 HOURS (TOTAL 4 TABS PER EPISODE), Disp: 20 tablet, Rfl: 3    zinc 50 mg Tab, Take by mouth., Disp: , Rfl:     ALPRAZolam (XANAX) 0.25 MG tablet, Take 1 tablet (0.25 mg total) by mouth 3 (three) times daily as needed for Anxiety. (Patient taking differently: Take 0.25 mg by mouth 2 (two) times daily as needed for Anxiety.), Disp: 30 tablet, Rfl: 1    famotidine (PEPCID) 40 MG tablet, Take 1 tablet (40 mg total) by mouth every evening., Disp: 30 tablet, Rfl: 1    All medications and past history have been reviewed.    Review of Systems   Constitutional:  Positive for fatigue. Negative for appetite change and unexpected weight change.   HENT:  Negative for mouth sores.    Eyes:  Negative for visual disturbance.   Respiratory:  Negative for cough and shortness of breath.    Cardiovascular:  Negative for chest pain.   Gastrointestinal:  Positive for diarrhea and nausea. Negative for abdominal pain.   Genitourinary:  Negative for frequency.   Musculoskeletal:  Negative for back pain.   Skin:  Negative  "for rash.   Neurological:  Negative for headaches.   Hematological:  Negative for adenopathy.   Psychiatric/Behavioral:  The patient is not nervous/anxious.        Objective:        BP (!) 111/55   Pulse 85   Temp 98.1 °F (36.7 °C)   Resp 16   Ht 5' 3" (1.6 m)   Wt 64.7 kg (142 lb 11.2 oz)   BMI 25.28 kg/m²     Physical Exam  Constitutional:       Appearance: Normal appearance.   HENT:      Head: Normocephalic and atraumatic.      Right Ear: External ear normal.      Left Ear: External ear normal.      Nose: Nose normal.      Mouth/Throat:      Mouth: Mucous membranes are moist.   Eyes:      General: No scleral icterus.     Pupils: Pupils are equal, round, and reactive to light.   Cardiovascular:      Rate and Rhythm: Normal rate and regular rhythm.      Heart sounds: Normal heart sounds.   Pulmonary:      Effort: Pulmonary effort is normal.      Breath sounds: Normal breath sounds.   Abdominal:      General: Abdomen is flat.   Musculoskeletal:      Right lower leg: No edema.      Left lower leg: No edema.   Skin:     General: Skin is warm and dry.   Neurological:      General: No focal deficit present.      Mental Status: She is alert and oriented to person, place, and time.   Psychiatric:         Mood and Affect: Mood normal.         Behavior: Behavior normal.         Thought Content: Thought content normal.         Judgment: Judgment normal.           Lab  Recent Results (from the past 336 hour(s))   CBC Auto Differential    Collection Time: 09/05/23  7:21 AM   Result Value Ref Range    WBC 3.03 (L) 3.90 - 12.70 K/uL    Hemoglobin 12.1 12.0 - 16.0 g/dL    Hematocrit 35.9 (L) 37.0 - 48.5 %    Platelets 167 150 - 450 K/uL   CBC Auto Differential    Collection Time: 08/28/23  5:05 PM   Result Value Ref Range    WBC 2.44 (L) 3.90 - 12.70 K/uL    Hemoglobin 12.4 12.0 - 16.0 g/dL    Hematocrit 37.0 37.0 - 48.5 %    Platelets 268 150 - 450 K/uL     CMP  Sodium   Date Value Ref Range Status   09/05/2023 137 136 - " 145 mmol/L Final     Potassium   Date Value Ref Range Status   09/05/2023 3.8 3.5 - 5.1 mmol/L Final     Chloride   Date Value Ref Range Status   09/05/2023 105 95 - 110 mmol/L Final     CO2   Date Value Ref Range Status   09/05/2023 25 23 - 29 mmol/L Final     Glucose   Date Value Ref Range Status   09/05/2023 115 (H) 70 - 110 mg/dL Final     BUN   Date Value Ref Range Status   09/05/2023 11 8 - 23 mg/dL Final     Creatinine   Date Value Ref Range Status   09/05/2023 0.6 0.5 - 1.4 mg/dL Final     Calcium   Date Value Ref Range Status   09/05/2023 8.8 8.7 - 10.5 mg/dL Final     Total Protein   Date Value Ref Range Status   09/05/2023 7.4 6.0 - 8.4 g/dL Final     Albumin   Date Value Ref Range Status   09/05/2023 3.9 3.5 - 5.2 g/dL Final     Total Bilirubin   Date Value Ref Range Status   09/05/2023 0.7 0.1 - 1.0 mg/dL Final     Comment:     For infants and newborns, interpretation of results should be based  on gestational age, weight and in agreement with clinical  observations.    Premature Infant recommended reference ranges:  Up to 24 hours.............<8.0 mg/dL  Up to 48 hours............<12.0 mg/dL  3-5 days..................<15.0 mg/dL  6-29 days.................<15.0 mg/dL       Alkaline Phosphatase   Date Value Ref Range Status   09/05/2023 67 55 - 135 U/L Final     AST   Date Value Ref Range Status   09/05/2023 14 10 - 40 U/L Final     ALT   Date Value Ref Range Status   09/05/2023 15 10 - 44 U/L Final     Anion Gap   Date Value Ref Range Status   09/05/2023 7 (L) 8 - 16 mmol/L Final     eGFR if    Date Value Ref Range Status   03/29/2022 >60.0 >60 mL/min/1.73 m^2 Final     eGFR if non    Date Value Ref Range Status   03/29/2022 >60.0 >60 mL/min/1.73 m^2 Final     Comment:     Calculation used to obtain the estimated glomerular filtration  rate (eGFR) is the CKD-EPI equation.            Specimen (24h ago, onward)      None                  All lab results and imaging  results have been reviewed and discussed with the patient.     Assessment:       1. LUNG CANCER-ADENOCARCINOMA OF THE LLL    2. Diarrhea, unspecified type    3. Nausea    4. Gastroesophageal reflux disease, unspecified whether esophagitis present          Problem List Items Addressed This Visit          Oncology    LUNG CANCER-ADENOCARCINOMA OF THE LLL - Primary     Other Visit Diagnoses       Diarrhea, unspecified type        Nausea        Gastroesophageal reflux disease, unspecified whether esophagitis present                   Cancer Staging   LUNG CANCER-ADENOCARCINOMA OF THE LLL  Staging form: Lung, AJCC 8th Edition  - Clinical: Stage IIIA (cT2a, cN2, cM0) - Signed by Franky Gaytan Jr., MD on 7/31/2023    Lung Cancer- Continue with Cycle 6 Taxol and Carboplatin  Diarrhea- Imodium PRN  Nausea- Zofran and Phenergan PRN continue [place] to treatment cycles  Personal History of Melanoma; Mother had breast and Ovarian cancer- Cancer next expanded with Inocencia Sifuentes was Negatvie  GERD- Continue Pepcid and Omeprazole daily    Plan:     Follow up in about 2 weeks (around 9/22/2023) for with Dr. Hankins.     The plan was discussed with the patient and all questions/concerns have been answered to the patient's satisfaction.    Electronically signed by: Meka Chiang, MSN, APRN, AGNP-C, OCN

## 2023-09-11 ENCOUNTER — LAB VISIT (OUTPATIENT)
Dept: LAB | Facility: HOSPITAL | Age: 68
End: 2023-09-11
Attending: NURSE PRACTITIONER
Payer: MEDICARE

## 2023-09-11 DIAGNOSIS — C34.32 MALIGNANT NEOPLASM OF LOWER LOBE OF LEFT LUNG: ICD-10-CM

## 2023-09-11 LAB
ALBUMIN SERPL BCP-MCNC: 3.9 G/DL (ref 3.5–5.2)
ALP SERPL-CCNC: 67 U/L (ref 55–135)
ALT SERPL W/O P-5'-P-CCNC: 15 U/L (ref 10–44)
ANION GAP SERPL CALC-SCNC: 8 MMOL/L (ref 8–16)
AST SERPL-CCNC: 17 U/L (ref 10–40)
BASOPHILS # BLD AUTO: 0.02 K/UL (ref 0–0.2)
BASOPHILS NFR BLD: 0.9 % (ref 0–1.9)
BILIRUB SERPL-MCNC: 1.1 MG/DL (ref 0.1–1)
BUN SERPL-MCNC: 14 MG/DL (ref 8–23)
CALCIUM SERPL-MCNC: 9.2 MG/DL (ref 8.7–10.5)
CHLORIDE SERPL-SCNC: 104 MMOL/L (ref 95–110)
CO2 SERPL-SCNC: 25 MMOL/L (ref 23–29)
CREAT SERPL-MCNC: 0.6 MG/DL (ref 0.5–1.4)
DIFFERENTIAL METHOD: ABNORMAL
EOSINOPHIL # BLD AUTO: 0.1 K/UL (ref 0–0.5)
EOSINOPHIL NFR BLD: 5.1 % (ref 0–8)
ERYTHROCYTE [DISTWIDTH] IN BLOOD BY AUTOMATED COUNT: 13.4 % (ref 11.5–14.5)
EST. GFR  (NO RACE VARIABLE): >60 ML/MIN/1.73 M^2
GLUCOSE SERPL-MCNC: 104 MG/DL (ref 70–110)
HCT VFR BLD AUTO: 33.9 % (ref 37–48.5)
HGB BLD-MCNC: 11.6 G/DL (ref 12–16)
IMM GRANULOCYTES # BLD AUTO: 0.02 K/UL (ref 0–0.04)
IMM GRANULOCYTES NFR BLD AUTO: 0.9 % (ref 0–0.5)
LYMPHOCYTES # BLD AUTO: 0.2 K/UL (ref 1–4.8)
LYMPHOCYTES NFR BLD: 8.8 % (ref 18–48)
MAGNESIUM SERPL-MCNC: 1.6 MG/DL (ref 1.6–2.6)
MCH RBC QN AUTO: 31 PG (ref 27–31)
MCHC RBC AUTO-ENTMCNC: 34.2 G/DL (ref 32–36)
MCV RBC AUTO: 91 FL (ref 82–98)
MONOCYTES # BLD AUTO: 0.2 K/UL (ref 0.3–1)
MONOCYTES NFR BLD: 11.1 % (ref 4–15)
NEUTROPHILS # BLD AUTO: 1.6 K/UL (ref 1.8–7.7)
NEUTROPHILS NFR BLD: 73.2 % (ref 38–73)
NRBC BLD-RTO: 0 /100 WBC
PLATELET # BLD AUTO: 130 K/UL (ref 150–450)
PMV BLD AUTO: 8.3 FL (ref 9.2–12.9)
POTASSIUM SERPL-SCNC: 4.1 MMOL/L (ref 3.5–5.1)
PROT SERPL-MCNC: 7 G/DL (ref 6–8.4)
RBC # BLD AUTO: 3.74 M/UL (ref 4–5.4)
SODIUM SERPL-SCNC: 137 MMOL/L (ref 136–145)
WBC # BLD AUTO: 2.17 K/UL (ref 3.9–12.7)

## 2023-09-11 PROCEDURE — 36415 COLL VENOUS BLD VENIPUNCTURE: CPT | Performed by: NURSE PRACTITIONER

## 2023-09-11 PROCEDURE — 85025 COMPLETE CBC W/AUTO DIFF WBC: CPT | Performed by: NURSE PRACTITIONER

## 2023-09-11 PROCEDURE — 80053 COMPREHEN METABOLIC PANEL: CPT | Performed by: NURSE PRACTITIONER

## 2023-09-11 PROCEDURE — 83735 ASSAY OF MAGNESIUM: CPT | Performed by: NURSE PRACTITIONER

## 2023-09-11 RX ORDER — SODIUM CHLORIDE 0.9 % (FLUSH) 0.9 %
10 SYRINGE (ML) INJECTION
Status: CANCELLED | OUTPATIENT
Start: 2023-09-12

## 2023-09-11 RX ORDER — HEPARIN 100 UNIT/ML
500 SYRINGE INTRAVENOUS
Status: CANCELLED | OUTPATIENT
Start: 2023-09-12

## 2023-09-11 RX ORDER — EPINEPHRINE 0.3 MG/.3ML
0.3 INJECTION SUBCUTANEOUS ONCE AS NEEDED
Status: CANCELLED | OUTPATIENT
Start: 2023-09-12

## 2023-09-11 RX ORDER — FAMOTIDINE 10 MG/ML
20 INJECTION INTRAVENOUS
Status: CANCELLED | OUTPATIENT
Start: 2023-09-12

## 2023-09-11 RX ORDER — DIPHENHYDRAMINE HYDROCHLORIDE 50 MG/ML
50 INJECTION INTRAMUSCULAR; INTRAVENOUS ONCE AS NEEDED
Status: CANCELLED | OUTPATIENT
Start: 2023-09-12

## 2023-09-12 ENCOUNTER — INFUSION (OUTPATIENT)
Dept: INFUSION THERAPY | Facility: HOSPITAL | Age: 68
End: 2023-09-12
Attending: INTERNAL MEDICINE
Payer: MEDICARE

## 2023-09-12 VITALS
TEMPERATURE: 98 F | WEIGHT: 140.63 LBS | BODY MASS INDEX: 24.92 KG/M2 | RESPIRATION RATE: 18 BRPM | DIASTOLIC BLOOD PRESSURE: 60 MMHG | SYSTOLIC BLOOD PRESSURE: 89 MMHG | HEIGHT: 63 IN | HEART RATE: 72 BPM

## 2023-09-12 DIAGNOSIS — C34.90 MALIGNANT NEOPLASM OF UNSPECIFIED PART OF UNSPECIFIED BRONCHUS OR LUNG: Primary | ICD-10-CM

## 2023-09-12 PROCEDURE — 63600175 PHARM REV CODE 636 W HCPCS: Performed by: NURSE PRACTITIONER

## 2023-09-12 PROCEDURE — 25000003 PHARM REV CODE 250: Performed by: NURSE PRACTITIONER

## 2023-09-12 PROCEDURE — 96367 TX/PROPH/DG ADDL SEQ IV INF: CPT

## 2023-09-12 PROCEDURE — 96413 CHEMO IV INFUSION 1 HR: CPT

## 2023-09-12 PROCEDURE — A4216 STERILE WATER/SALINE, 10 ML: HCPCS | Performed by: NURSE PRACTITIONER

## 2023-09-12 PROCEDURE — 96375 TX/PRO/DX INJ NEW DRUG ADDON: CPT

## 2023-09-12 PROCEDURE — 96417 CHEMO IV INFUS EACH ADDL SEQ: CPT

## 2023-09-12 RX ORDER — HEPARIN 100 UNIT/ML
500 SYRINGE INTRAVENOUS
Status: DISCONTINUED | OUTPATIENT
Start: 2023-09-12 | End: 2023-09-12 | Stop reason: HOSPADM

## 2023-09-12 RX ORDER — SODIUM CHLORIDE 0.9 % (FLUSH) 0.9 %
10 SYRINGE (ML) INJECTION
Status: DISCONTINUED | OUTPATIENT
Start: 2023-09-12 | End: 2023-09-12 | Stop reason: HOSPADM

## 2023-09-12 RX ORDER — FAMOTIDINE 10 MG/ML
20 INJECTION INTRAVENOUS
Status: COMPLETED | OUTPATIENT
Start: 2023-09-12 | End: 2023-09-12

## 2023-09-12 RX ORDER — DIPHENHYDRAMINE HYDROCHLORIDE 50 MG/ML
50 INJECTION INTRAMUSCULAR; INTRAVENOUS ONCE AS NEEDED
Status: DISCONTINUED | OUTPATIENT
Start: 2023-09-12 | End: 2023-09-12 | Stop reason: HOSPADM

## 2023-09-12 RX ORDER — EPINEPHRINE 0.3 MG/.3ML
0.3 INJECTION SUBCUTANEOUS ONCE AS NEEDED
Status: DISCONTINUED | OUTPATIENT
Start: 2023-09-12 | End: 2023-09-12 | Stop reason: HOSPADM

## 2023-09-12 RX ADMIN — HEPARIN 500 UNITS: 100 SYRINGE at 12:09

## 2023-09-12 RX ADMIN — PALONOSETRON HYDROCHLORIDE 0.25 MG: 0.25 INJECTION INTRAVENOUS at 09:09

## 2023-09-12 RX ADMIN — CARBOPLATIN 205 MG: 600 INJECTION, SOLUTION INTRAVENOUS at 11:09

## 2023-09-12 RX ADMIN — FAMOTIDINE 20 MG: 10 INJECTION INTRAVENOUS at 09:09

## 2023-09-12 RX ADMIN — DIPHENHYDRAMINE HYDROCHLORIDE 50 MG: 50 INJECTION INTRAMUSCULAR; INTRAVENOUS at 09:09

## 2023-09-12 RX ADMIN — PACLITAXEL 78 MG: 6 INJECTION, SOLUTION INTRAVENOUS at 10:09

## 2023-09-12 RX ADMIN — SODIUM CHLORIDE, PRESERVATIVE FREE 10 ML: 5 INJECTION INTRAVENOUS at 12:09

## 2023-09-12 NOTE — PLAN OF CARE
Problem: Fatigue  Goal: Improved Activity Tolerance  9/12/2023 0945 by Gloria Boswell, RN  Outcome: Met  9/12/2023 0945 by Gloria Boswell, RN  Outcome: Ongoing, Progressing

## 2023-09-13 PROCEDURE — 77427 RADIATION TX MANAGEMENT X5: CPT | Mod: S$GLB,,, | Performed by: RADIOLOGY

## 2023-09-13 PROCEDURE — 77427 PR CHG RADIATION,MANGEMENT,5 TX'S: ICD-10-PCS | Mod: S$GLB,,, | Performed by: RADIOLOGY

## 2023-09-13 RX ORDER — HEPARIN 100 UNIT/ML
500 SYRINGE INTRAVENOUS
OUTPATIENT
Start: 2023-09-13

## 2023-09-13 RX ORDER — SODIUM CHLORIDE 0.9 % (FLUSH) 0.9 %
10 SYRINGE (ML) INJECTION
OUTPATIENT
Start: 2023-09-13

## 2023-09-19 ENCOUNTER — TREATMENT (OUTPATIENT)
Dept: RADIATION ONCOLOGY | Facility: CLINIC | Age: 68
End: 2023-09-19
Payer: MEDICARE

## 2023-09-19 PROBLEM — E86.0 DEHYDRATION: Status: ACTIVE | Noted: 2023-09-19

## 2023-09-19 PROCEDURE — 77014 PR  CT GUIDANCE PLACEMENT RAD THERAPY FIELDS: CPT | Mod: S$GLB,,, | Performed by: RADIOLOGY

## 2023-09-19 PROCEDURE — G6015 PR RADN TX DELIVERY,  INTENS MOD, 1+ FIELDS PER TX: ICD-10-PCS | Mod: S$GLB,,, | Performed by: RADIOLOGY

## 2023-09-19 PROCEDURE — G6015 RADIATION TX DELIVERY IMRT: HCPCS | Mod: S$GLB,,, | Performed by: RADIOLOGY

## 2023-09-19 PROCEDURE — 77014 PR  CT GUIDANCE PLACEMENT RAD THERAPY FIELDS: ICD-10-PCS | Mod: S$GLB,,, | Performed by: RADIOLOGY

## 2023-09-19 RX ORDER — SODIUM CHLORIDE 0.9 % (FLUSH) 0.9 %
10 SYRINGE (ML) INJECTION
Status: CANCELLED | OUTPATIENT
Start: 2023-09-19

## 2023-09-19 RX ORDER — HEPARIN 100 UNIT/ML
500 SYRINGE INTRAVENOUS
Status: CANCELLED | OUTPATIENT
Start: 2023-09-19

## 2023-09-20 ENCOUNTER — TREATMENT (OUTPATIENT)
Dept: RADIATION ONCOLOGY | Facility: CLINIC | Age: 68
End: 2023-09-20
Payer: MEDICARE

## 2023-09-20 ENCOUNTER — OFFICE VISIT (OUTPATIENT)
Dept: HEMATOLOGY/ONCOLOGY | Facility: CLINIC | Age: 68
End: 2023-09-20
Payer: MEDICARE

## 2023-09-20 ENCOUNTER — INFUSION (OUTPATIENT)
Dept: INFUSION THERAPY | Facility: HOSPITAL | Age: 68
End: 2023-09-20
Attending: INTERNAL MEDICINE
Payer: MEDICARE

## 2023-09-20 VITALS
SYSTOLIC BLOOD PRESSURE: 123 MMHG | TEMPERATURE: 98 F | OXYGEN SATURATION: 100 % | HEART RATE: 74 BPM | DIASTOLIC BLOOD PRESSURE: 61 MMHG | BODY MASS INDEX: 24.45 KG/M2 | RESPIRATION RATE: 17 BRPM | HEIGHT: 63 IN | WEIGHT: 138 LBS

## 2023-09-20 VITALS
HEART RATE: 84 BPM | SYSTOLIC BLOOD PRESSURE: 116 MMHG | OXYGEN SATURATION: 97 % | WEIGHT: 138.81 LBS | BODY MASS INDEX: 24.59 KG/M2 | DIASTOLIC BLOOD PRESSURE: 69 MMHG | TEMPERATURE: 97 F

## 2023-09-20 DIAGNOSIS — E86.0 DEHYDRATION: ICD-10-CM

## 2023-09-20 DIAGNOSIS — E86.0 DEHYDRATION: Primary | ICD-10-CM

## 2023-09-20 DIAGNOSIS — C34.32 MALIGNANT NEOPLASM OF LOWER LOBE OF LEFT LUNG: ICD-10-CM

## 2023-09-20 PROCEDURE — 77014 PR  CT GUIDANCE PLACEMENT RAD THERAPY FIELDS: CPT | Mod: S$GLB,,, | Performed by: RADIOLOGY

## 2023-09-20 PROCEDURE — 77336 RADIATION PHYSICS CONSULT: CPT | Mod: S$GLB,,, | Performed by: RADIOLOGY

## 2023-09-20 PROCEDURE — 63600175 PHARM REV CODE 636 W HCPCS: Performed by: NURSE PRACTITIONER

## 2023-09-20 PROCEDURE — 77014 PR  CT GUIDANCE PLACEMENT RAD THERAPY FIELDS: ICD-10-PCS | Mod: S$GLB,,, | Performed by: RADIOLOGY

## 2023-09-20 PROCEDURE — 25000003 PHARM REV CODE 250: Performed by: NURSE PRACTITIONER

## 2023-09-20 PROCEDURE — 96360 HYDRATION IV INFUSION INIT: CPT

## 2023-09-20 PROCEDURE — 77336 PR  RADN PHYSICS CONSULT CONTINUING: ICD-10-PCS | Mod: S$GLB,,, | Performed by: RADIOLOGY

## 2023-09-20 PROCEDURE — G6015 PR RADN TX DELIVERY,  INTENS MOD, 1+ FIELDS PER TX: ICD-10-PCS | Mod: S$GLB,,, | Performed by: RADIOLOGY

## 2023-09-20 PROCEDURE — 99213 OFFICE O/P EST LOW 20 MIN: CPT | Mod: 25 | Performed by: INTERNAL MEDICINE

## 2023-09-20 PROCEDURE — 96361 HYDRATE IV INFUSION ADD-ON: CPT

## 2023-09-20 PROCEDURE — 99214 PR OFFICE/OUTPT VISIT, EST, LEVL IV, 30-39 MIN: ICD-10-PCS | Mod: S$PBB,,, | Performed by: INTERNAL MEDICINE

## 2023-09-20 PROCEDURE — A4216 STERILE WATER/SALINE, 10 ML: HCPCS | Performed by: NURSE PRACTITIONER

## 2023-09-20 PROCEDURE — 99214 OFFICE O/P EST MOD 30 MIN: CPT | Mod: S$PBB,,, | Performed by: INTERNAL MEDICINE

## 2023-09-20 PROCEDURE — G6015 RADIATION TX DELIVERY IMRT: HCPCS | Mod: S$GLB,,, | Performed by: RADIOLOGY

## 2023-09-20 RX ORDER — SODIUM CHLORIDE 0.9 % (FLUSH) 0.9 %
10 SYRINGE (ML) INJECTION
Status: DISCONTINUED | OUTPATIENT
Start: 2023-09-20 | End: 2023-09-20 | Stop reason: HOSPADM

## 2023-09-20 RX ORDER — HEPARIN 100 UNIT/ML
500 SYRINGE INTRAVENOUS
Status: DISCONTINUED | OUTPATIENT
Start: 2023-09-20 | End: 2023-09-20 | Stop reason: HOSPADM

## 2023-09-20 RX ADMIN — HEPARIN 500 UNITS: 100 SYRINGE at 01:09

## 2023-09-20 RX ADMIN — SODIUM CHLORIDE, PRESERVATIVE FREE 10 ML: 5 INJECTION INTRAVENOUS at 01:09

## 2023-09-20 RX ADMIN — SODIUM CHLORIDE 1000 ML: 0.9 INJECTION, SOLUTION INTRAVENOUS at 11:09

## 2023-09-20 NOTE — Clinical Note
She will begin immunotherapy next month.  Orders in.  Arrange a chemo education with Bev in the next 3 weeks.  Also need CT chest set up for around 10/20.

## 2023-09-20 NOTE — PROGRESS NOTES
PROGRESS NOTE    Subjective:       Patient ID: Aysha Moore is a 68 y.o. female.    6/2/2023:  Screening CT chest:  2.6 x 2.5 x 3.2cm mass in the posterior LLL     6/20/2023:  PET scan:  35 x 21 mm lobulated pulmonary mass in the posterior left lower lobe with SUV max 11.6      FDG avid lymphadenopathy is present with index nodes outlined below:  -21 x 11 mm AP window node with SUV max 12.1 (image 103)  -21 x 12 mm posterior left hilar node with SUV max 13.7 (image 109)  -16 x 13 mm left hilar node with SUV max 14 (image 1:15)  -10 x 10 mm subcarinal node with SUV max 6.3 (image 111)     7/12/2023-Biopsy of LLL:  LEFT LOWER LOBE OF LUNG, CORE BIOPSIES:   - LUNG ADENOCARCINOMA WITH PLEOMORPHIC FEATURES.   PDL1/TPS: 95%  ALK/BRAF/EGFR/KRAS/RET/ROS1/MET NEGATIVE     7/21/2023:  MRI brain: no mets.    Carbo/Taxol/XRT:  8/8/2023-9/12/2023     Plan: Atezolizumab 1200mg every 3 weeks x 16    Chief Complaint:  No chief complaint on file.  Stage IIII lung cancer follow up    History of Present Illness:   Aysha Moore is a 68 y.o. female who presents for follow up of lung cancer.     She will complete her radiation today and is having issues with esophagitis, dehydration and nausea at this time.         Family and Social history reviewed and is unchanged from 7/27/2023      ROS:  Review of Systems   Constitutional:  Negative for fever.   Respiratory:  Negative for shortness of breath.    Cardiovascular:  Negative for chest pain and leg swelling.   Gastrointestinal:  Negative for abdominal pain and blood in stool.   Genitourinary:  Negative for hematuria.   Skin:  Negative for rash.          Current Outpatient Medications:     ALPRAZolam (XANAX) 0.25 MG tablet, Take 1 tablet (0.25 mg total) by mouth 3 (three) times daily as needed for Anxiety. (Patient taking differently: Take 0.25 mg by mouth 2 (two) times daily as needed for Anxiety.), Disp: 30 tablet, Rfl: 1     ammonium lactate 12 % Crea, aaa bid prn dry skin, Disp: 385 g, Rfl: 11    atorvastatin (LIPITOR) 80 MG tablet, Take 1 tablet (80 mg total) by mouth every evening., Disp: 90 tablet, Rfl: 1    azelastine (ASTELIN) 137 mcg (0.1 %) nasal spray, 1 SPRAY (137 MCG TOTAL) BY NASAL ROUTE 2 (TWO) TIMES DAILY AS NEEDED FOR RHINITIS (OR SINUSITIS)., Disp: 90 mL, Rfl: 1    busPIRone (BUSPAR) 5 MG Tab, TAKE 1 TABLET BY MOUTH TWICE A DAY (Patient taking differently: Take 5 mg by mouth every evening.), Disp: 180 tablet, Rfl: 1    calcium-vitamin D3 (OS-JOLIE 500 + D3) 500 mg-5 mcg (200 unit) per tablet, Take 1 tablet by mouth every morning., Disp: , Rfl:     citalopram (CELEXA) 20 MG tablet, TAKE 1 TABLET BY MOUTH EVERY DAY, Disp: 90 tablet, Rfl: 3    clobetasol 0.05% (TEMOVATE) 0.05 % Oint, Apply topically 2 (two) times daily. for 14 days, Disp: 45 g, Rfl: 3    duke's soln (benadryl 30 mL, mylanta 30 mL, LIDOcaine 30 mL, nystatin 30 mL) 120mL, Take 10 mLs by mouth 4 (four) times daily., Disp: 120 mL, Rfl: 0    ezetimibe (ZETIA) 10 mg tablet, Take 1 tablet (10 mg total) by mouth once daily., Disp: 90 tablet, Rfl: 3    famotidine (PEPCID) 40 MG tablet, Take 1 tablet (40 mg total) by mouth every evening., Disp: 30 tablet, Rfl: 1    fluticasone propionate (FLONASE) 50 mcg/actuation nasal spray, 1 spray (50 mcg total) by Each Nostril route daily as needed for Rhinitis or Allergies., Disp: 9.9 mL, Rfl: 2    HYDROcodone-acetaminophen (NORCO) 5-325 mg per tablet, Take 1 tablet by mouth every 4 to 6 hours as needed for Pain., Disp: 60 tablet, Rfl: 0    lisinopriL (PRINIVIL,ZESTRIL) 2.5 MG tablet, Take 1 tablet (2.5 mg total) by mouth every evening., Disp: 90 tablet, Rfl: 3    metoprolol succinate (TOPROL-XL) 50 MG 24 hr tablet, Take 1 tablet (50 mg total) by mouth once daily., Disp: 90 tablet, Rfl: 3    nitroGLYCERIN (NITROSTAT) 0.4 MG SL tablet, Place 1 tablet (0.4 mg total) under the tongue every 5 (five) minutes as needed for Chest  pain., Disp: 30 tablet, Rfl: 0    omeprazole (PRILOSEC) 40 MG capsule, Take 1 capsule (40 mg total) by mouth once daily., Disp: 30 capsule, Rfl: 11    ondansetron (ZOFRAN) 8 MG tablet, Take 1 tablet (8 mg total) by mouth every 8 (eight) hours as needed., Disp: 30 tablet, Rfl: 5    promethazine (PHENERGAN) 25 MG tablet, Take 1 tablet (25 mg total) by mouth every 4 to 6 hours as needed., Disp: 30 tablet, Rfl: 5    tiZANidine (ZANAFLEX) 4 MG tablet, TAKE 1 TABLET (4 MG TOTAL) BY MOUTH EVERY 6 (SIX) HOURS AS NEEDED. BACK PAIN, Disp: 360 tablet, Rfl: 0    valACYclovir (VALTREX) 1000 MG tablet, TAKE 2 AT ONSET OF FEVER BLISTERS THEN REPEAT IN 12 HOURS (TOTAL 4 TABS PER EPISODE), Disp: 20 tablet, Rfl: 3    zinc 50 mg Tab, Take by mouth., Disp: , Rfl:   No current facility-administered medications for this visit.    Facility-Administered Medications Ordered in Other Visits:     heparin, porcine (PF) 100 unit/mL injection flush 500 Units, 500 Units, Intravenous, PRN, Meka Chiang NP-C    sodium chloride 0.9% bolus 1,000 mL 1,000 mL, 1,000 mL, Intravenous, 1 time in Clinic/HOD, Meka Chiang NP-C    sodium chloride 0.9% flush 10 mL, 10 mL, Intravenous, PRN, Meka Chiang NP-YOBANI        Objective:       Physical Examination:     /69   Pulse 84   Temp 97.2 °F (36.2 °C)   Wt 63 kg (138 lb 12.8 oz)   SpO2 97%   BMI 24.59 kg/m²     Physical Exam  Constitutional:       Appearance: Normal appearance.   HENT:      Head: Normocephalic and atraumatic.   Eyes:      General: No scleral icterus.     Conjunctiva/sclera: Conjunctivae normal.   Cardiovascular:      Rate and Rhythm: Normal rate.   Pulmonary:      Effort: Pulmonary effort is normal.   Abdominal:      General: Abdomen is flat.   Neurological:      General: No focal deficit present.      Mental Status: She is alert and oriented to person, place, and time.   Psychiatric:         Mood and Affect: Mood normal.         Behavior: Behavior normal.         Thought  Content: Thought content normal.         Judgment: Judgment normal.         Labs:   Recent Results (from the past 336 hour(s))   CBC Auto Differential    Collection Time: 09/11/23 10:16 AM   Result Value Ref Range    WBC 2.17 (L) 3.90 - 12.70 K/uL    Hemoglobin 11.6 (L) 12.0 - 16.0 g/dL    Hematocrit 33.9 (L) 37.0 - 48.5 %    Platelets 130 (L) 150 - 450 K/uL     CMP  Sodium   Date Value Ref Range Status   09/11/2023 137 136 - 145 mmol/L Final     Potassium   Date Value Ref Range Status   09/11/2023 4.1 3.5 - 5.1 mmol/L Final     Chloride   Date Value Ref Range Status   09/11/2023 104 95 - 110 mmol/L Final     CO2   Date Value Ref Range Status   09/11/2023 25 23 - 29 mmol/L Final     Glucose   Date Value Ref Range Status   09/11/2023 104 70 - 110 mg/dL Final     BUN   Date Value Ref Range Status   09/11/2023 14 8 - 23 mg/dL Final     Creatinine   Date Value Ref Range Status   09/11/2023 0.6 0.5 - 1.4 mg/dL Final     Calcium   Date Value Ref Range Status   09/11/2023 9.2 8.7 - 10.5 mg/dL Final     Total Protein   Date Value Ref Range Status   09/11/2023 7.0 6.0 - 8.4 g/dL Final     Albumin   Date Value Ref Range Status   09/11/2023 3.9 3.5 - 5.2 g/dL Final     Total Bilirubin   Date Value Ref Range Status   09/11/2023 1.1 (H) 0.1 - 1.0 mg/dL Final     Comment:     For infants and newborns, interpretation of results should be based  on gestational age, weight and in agreement with clinical  observations.    Premature Infant recommended reference ranges:  Up to 24 hours.............<8.0 mg/dL  Up to 48 hours............<12.0 mg/dL  3-5 days..................<15.0 mg/dL  6-29 days.................<15.0 mg/dL       Alkaline Phosphatase   Date Value Ref Range Status   09/11/2023 67 55 - 135 U/L Final     AST   Date Value Ref Range Status   09/11/2023 17 10 - 40 U/L Final     ALT   Date Value Ref Range Status   09/11/2023 15 10 - 44 U/L Final     Anion Gap   Date Value Ref Range Status   09/11/2023 8 8 - 16 mmol/L Final  "    eGFR if    Date Value Ref Range Status   03/29/2022 >60.0 >60 mL/min/1.73 m^2 Final     eGFR if non    Date Value Ref Range Status   03/29/2022 >60.0 >60 mL/min/1.73 m^2 Final     Comment:     Calculation used to obtain the estimated glomerular filtration  rate (eGFR) is the CKD-EPI equation.        No results found for: "CEA"  No results found for: "PSA"        Assessment/Plan:     Problem List Items Addressed This Visit       LUNG CANCER-ADENOCARCINOMA OF THE LLL     Patient has completed the radiation therapy and I discussed the next step in care which is one year of IO therapy.  I discussed the risks and benefits in detail with her and particularly the risks of autoimmune disease.  She is ok with this and wishes to proceed.  Will treat her with Atezolizumab and begin in the next few weeks.      Need CT chest in 4 weeks.          Relevant Orders    CT Chest With Contrast    Dehydration     This is likely due to esophagitis and is causing nausea.  Will arrange for her to have IVFs and IV zofran today and discussed this with her.              Discussion:     Follow up in about 4 weeks (around 10/18/2023).      Electronically signed by Raad Bee      "

## 2023-09-20 NOTE — ASSESSMENT & PLAN NOTE
This is likely due to esophagitis and is causing nausea.  Will arrange for her to have IVFs and IV zofran today and discussed this with her.

## 2023-09-20 NOTE — PLAN OF CARE
Problem: Fatigue  Goal: Improved Activity Tolerance  Outcome: Met     Problem: Fluid Volume Deficit  Goal: Fluid Balance  Outcome: Met     Problem: Fall Injury Risk  Goal: Absence of Fall and Fall-Related Injury  Outcome: Met

## 2023-09-20 NOTE — ASSESSMENT & PLAN NOTE
Patient has completed the radiation therapy and I discussed the next step in care which is one year of IO therapy.  I discussed the risks and benefits in detail with her and particularly the risks of autoimmune disease.  She is ok with this and wishes to proceed.  Will treat her with Atezolizumab and begin in the next few weeks.      Need CT chest in 4 weeks.

## 2023-09-25 ENCOUNTER — TELEPHONE (OUTPATIENT)
Dept: HEMATOLOGY/ONCOLOGY | Facility: CLINIC | Age: 68
End: 2023-09-25

## 2023-09-25 DIAGNOSIS — C34.32 MALIGNANT NEOPLASM OF LOWER LOBE OF LEFT LUNG: Primary | ICD-10-CM

## 2023-09-25 RX ORDER — TRAMADOL HYDROCHLORIDE 50 MG/1
50 TABLET ORAL EVERY 6 HOURS PRN
Qty: 30 TABLET | Refills: 2 | Status: SHIPPED | OUTPATIENT
Start: 2023-09-25

## 2023-09-25 NOTE — TELEPHONE ENCOUNTER
----- Message from Monet Levin RN sent at 9/22/2023  2:53 PM CDT -----    ----- Message -----  From: Vonda Watt  Sent: 9/22/2023  10:41 AM CDT  To: [facility] Staff    Aysha is asking if she can have tramadol sent to pharmacy she does not like taking the pain meds thanks

## 2023-09-25 NOTE — PROGRESS NOTES
FOLLOW-UP APPOINTMENT    PATIENT:   Aysha Moore  :    1955  MR#:    [id number]  DATE OF VISIT:  2023      Chief Complaint: Immunotherapy School/ Lung Cancer    HPI:   Ms. Aysha Moore presents today for chemotherapy education.  She will be starting treatment with Tecentriq for the above diagnosis.         2023    10:04 AM 2023    10:17 AM 2023    10:15 AM 2023    10:40 AM 2023    10:26 AM 2023     2:54 PM 2023     2:27 PM   Depression Patient Health Questionnaire   Over the last two weeks how often have you been bothered by little interest or pleasure in doing things Not at all Not at all Not at all Not at all Not at all Not at all Not at all   Over the last two weeks how often have you been bothered by feeling down, depressed or hopeless Not at all Not at all Not at all Not at all Not at all Not at all Not at all   PHQ-2 Total Score 0 0 0 0 0 0 0         Review of Systems   Constitutional:  Positive for appetite change, fatigue and unexpected weight change.   HENT:   Negative for hearing loss.    Respiratory:  Negative for cough and shortness of breath.    Cardiovascular:  Negative for chest pain.   Gastrointestinal:  Negative for abdominal distention, constipation, diarrhea and nausea.   Genitourinary:  Negative for dysuria.    Musculoskeletal:  Negative for arthralgias and myalgias.   Skin:  Negative for itching.   Neurological:  Positive for extremity weakness. Negative for headaches.   Hematological:  Negative for adenopathy.   Psychiatric/Behavioral:  The patient is not nervous/anxious.        Oncology History   LUNG CANCER-ADENOCARCINOMA OF THE LLL   2023 Initial Diagnosis    LUNG CANCER-ADENOCARCINOMA OF THE LLL     2023 Cancer Staged    Staging form: Lung, AJCC 8th Edition  - Clinical: Stage IIIA (cT2a, cN2, cM0)     10/23/2023 -  Chemotherapy    Treatment Summary   Plan Name: OP ATEZOLIZUMAB  Q3W  Treatment Goal: Curative  Status: Active  Start Date: 10/23/2023 (Planned)  End Date: 9/23/2024 (Planned)  Provider: Raad Hankins MD  Chemotherapy: [No matching medication found in this treatment plan]     Malignant neoplasm of unspecified part of unspecified bronchus or lung   7/27/2023 Initial Diagnosis    Malignant neoplasm of unspecified part of unspecified bronchus or lung     8/8/2023 - 9/12/2023 Chemotherapy    Treatment Summary   Plan Name: OP NSCLC PACLITAXEL + CARBOPLATIN (AUC) QW + RADIATION  Treatment Goal: Curative  Status: Inactive  Start Date: 8/8/2023  End Date: 9/12/2023  Provider: Raad Hankins MD  Chemotherapy: CARBOplatin (PARAPLATIN) 205 mg in sodium chloride 0.9% 305.5 mL chemo infusion, 205 mg (100 % of original dose 206.4 mg), Intravenous, Clinic/HOD 1 time, 6 of 6 cycles  Dose modification:   (original dose 206.4 mg, Cycle 1)  Administration: 205 mg (8/8/2023), 205 mg (8/15/2023), 205 mg (8/22/2023), 205 mg (8/29/2023), 205 mg (9/5/2023), 205 mg (9/12/2023)  PACLitaxeL (TAXOL) 45 mg/m2 = 78 mg in sodium chloride 0.9% 263 mL chemo infusion, 45 mg/m2 = 78 mg, Intravenous, Clinic/HOD 1 time, 6 of 6 cycles  Administration: 78 mg (8/8/2023), 78 mg (8/15/2023), 78 mg (8/22/2023), 78 mg (8/29/2023), 78 mg (9/5/2023), 78 mg (9/12/2023)     10/23/2023 -  Chemotherapy    Treatment Summary   Plan Name: OP ATEZOLIZUMAB Q3W  Treatment Goal: Curative  Status: Active  Start Date: 10/23/2023 (Planned)  End Date: 9/23/2024 (Planned)  Provider: Raad Hankins MD  Chemotherapy: [No matching medication found in this treatment plan]         Patient Active Problem List   Diagnosis    Hyperlipidemia    Hypertension    Coronary artery disease    Anxiety    History of heart artery stent    Pulmonary emphysema    Prediabetes    Tear of medial meniscus of right knee, current    Chronic bilateral low back pain with left-sided sciatica    BMI 24.0-24.9, adult    Personal history of tobacco use,  presenting hazards to health    Chest pain    Acute ST elevation myocardial infarction (STEMI) of inferior wall    Seasonal allergic rhinitis    Malignant melanoma of skin of left leg    Pure hypercholesterolemia    Chronic combined systolic and diastolic heart failure    Abnormal CT scan of lung    LUNG CANCER-ADENOCARCINOMA OF THE LLL    Malignant neoplasm of unspecified part of unspecified bronchus or lung    Dehydration       Past Medical History:   Diagnosis Date    Allergy     Anxiety     Arthritis     CAD (coronary artery disease)     coronary stents    Chronic back pain     lower back pain radiates to left leg    Chronic rhinitis     DAILY (dyspnea on exertion)     Hyperlipidemia     Hypertension     Lung cancer 07/01/2023    Melanoma in situ of left upper extremity     left thigh area    MI (myocardial infarction)     Seasonal allergic rhinitis 10/29/2020       Past Surgical History:   Procedure Laterality Date    BREAST SURGERY      breast reduction    CATARACT EXTRACTION, BILATERAL      coranary stent      EXCISION OF MELANOMA Left 3/30/2022    Procedure: EXCISION, MELANOMA;  Surgeon: David Mcpherson III, MD;  Location: [facility] OR;  Service: General;  Laterality: Left;    EXCISIONAL BIOPSY Left 3/30/2022    Procedure: EXCISIONAL BIOPSY;  Surgeon: David Mcpherson III, MD;  Location: [facility] OR;  Service: General;  Laterality: Left;    HYSTERECTOMY      total    INSERTION OF TUNNELED CENTRAL VENOUS CATHETER (CVC) WITH SUBCUTANEOUS PORT N/A 8/4/2023    Procedure: [id number];  Surgeon: Remi Mckeon Jr., MD;  Location: [facility] OR;  Service: General;  Laterality: N/A;    LEFT HEART CATHETERIZATION Left 10/16/2020    Procedure: Left heart cath;  Surgeon: Garrett Robins MD;  Location: [facility] CATH/EP LAB;  Service: Cardiology;  Laterality: Left;    OOPHORECTOMY      TOTAL REDUCTION MAMMOPLASTY Bilateral 2000       Social History     Socioeconomic History    Marital status:    Tobacco Use     Smoking status: Former     Current packs/day: 0.00     Average packs/day: 1 pack/day for 43.2 years (43.2 ttl pk-yrs)     Types: Cigarettes, Vaping with nicotine     Start date:      Quit date: 3/4/2017     Years since quittin.5    Smokeless tobacco: Never   Substance and Sexual Activity    Alcohol use: Not Currently     Alcohol/week: 0.0 standard drinks of alcohol     Comment: sometimes    Drug use: No    Sexual activity: Yes     Partners: Male       Family History   Problem Relation Age of Onset    Cancer Mother         breast, ovarian, cervical     Heart disease Mother     Breast cancer Mother 50    Ovarian cancer Mother     Cancer Father         melanoma    Stroke Sister     Heart disease Sister     Cancer Sister         leukemia    Macular degeneration Sister     Heart disease Sister     Heart disease Brother     Lung cancer Brother     Cancer Maternal Uncle     Cancer Paternal Aunt         breast    Breast cancer Paternal Aunt 60    Cancer Paternal Uncle     Heart disease Maternal Grandmother     No Known Problems Daughter     No Known Problems Son          Current Outpatient Medications:     ALPRAZolam (XANAX) 0.25 MG tablet, Take 1 tablet (0.25 mg total) by mouth 3 (three) times daily as needed for Anxiety. (Patient taking differently: Take 0.25 mg by mouth 2 (two) times daily as needed for Anxiety.), Disp: 30 tablet, Rfl: 1    ammonium lactate 12 % Crea, aaa bid prn dry skin, Disp: 385 g, Rfl: 11    atorvastatin (LIPITOR) 80 MG tablet, Take 1 tablet (80 mg total) by mouth every evening., Disp: 90 tablet, Rfl: 1    azelastine (ASTELIN) 137 mcg (0.1 %) nasal spray, 1 SPRAY (137 MCG TOTAL) BY NASAL ROUTE 2 (TWO) TIMES DAILY AS NEEDED FOR RHINITIS (OR SINUSITIS)., Disp: 90 mL, Rfl: 1    busPIRone (BUSPAR) 5 MG Tab, TAKE 1 TABLET BY MOUTH TWICE A DAY (Patient taking differently: Take 5 mg by mouth every evening.), Disp: 180 tablet, Rfl: 1    calcium-vitamin D3 (OS-JOLIE 500 + D3) 500 mg-5 mcg (200 unit)  per tablet, Take 1 tablet by mouth every morning., Disp: , Rfl:     citalopram (CELEXA) 20 MG tablet, TAKE 1 TABLET BY MOUTH EVERY DAY, Disp: 90 tablet, Rfl: 3    clobetasol 0.05% (TEMOVATE) 0.05 % Oint, Apply topically 2 (two) times daily. for 14 days, Disp: 45 g, Rfl: 3    duke's soln (benadryl 30 mL, mylanta 30 mL, LIDOcaine 30 mL, nystatin 30 mL) 120mL, Take 10 mLs by mouth 4 (four) times daily., Disp: 120 mL, Rfl: 0    ezetimibe (ZETIA) 10 mg tablet, Take 1 tablet (10 mg total) by mouth once daily., Disp: 90 tablet, Rfl: 3    famotidine (PEPCID) 40 MG tablet, Take 1 tablet (40 mg total) by mouth every evening., Disp: 30 tablet, Rfl: 1    fluticasone propionate (FLONASE) 50 mcg/actuation nasal spray, 1 spray (50 mcg total) by Each Nostril route daily as needed for Rhinitis or Allergies., Disp: 9.9 mL, Rfl: 2    HYDROcodone-acetaminophen (NORCO) 5-325 mg per tablet, Take 1 tablet by mouth every 4 to 6 hours as needed for Pain., Disp: 60 tablet, Rfl: 0    lisinopriL (PRINIVIL,ZESTRIL) 2.5 MG tablet, Take 1 tablet (2.5 mg total) by mouth every evening., Disp: 90 tablet, Rfl: 3    metoprolol succinate (TOPROL-XL) 50 MG 24 hr tablet, Take 1 tablet (50 mg total) by mouth once daily., Disp: 90 tablet, Rfl: 3    nitroGLYCERIN (NITROSTAT) 0.4 MG SL tablet, Place 1 tablet (0.4 mg total) under the tongue every 5 (five) minutes as needed for Chest pain., Disp: 30 tablet, Rfl: 0    omeprazole (PRILOSEC) 40 MG capsule, Take 1 capsule (40 mg total) by mouth once daily., Disp: 30 capsule, Rfl: 11    ondansetron (ZOFRAN) 8 MG tablet, Take 1 tablet (8 mg total) by mouth every 8 (eight) hours as needed., Disp: 30 tablet, Rfl: 5    promethazine (PHENERGAN) 25 MG tablet, Take 1 tablet (25 mg total) by mouth every 4 to 6 hours as needed., Disp: 30 tablet, Rfl: 5    tiZANidine (ZANAFLEX) 4 MG tablet, TAKE 1 TABLET (4 MG TOTAL) BY MOUTH EVERY 6 (SIX) HOURS AS NEEDED. BACK PAIN, Disp: 360 tablet, Rfl: 0    traMADoL (ULTRAM) 50 mg  tablet, Take 1 tablet (50 mg total) by mouth every 6 (six) hours as needed for Pain., Disp: 30 tablet, Rfl: 2    valACYclovir (VALTREX) 1000 MG tablet, TAKE 2 AT ONSET OF FEVER BLISTERS THEN REPEAT IN 12 HOURS (TOTAL 4 TABS PER EPISODE), Disp: 20 tablet, Rfl: 3    zinc 50 mg Tab, Take by mouth., Disp: , Rfl:     Review of patient's allergies indicates:   Allergen Reactions    Cephalexin      Other reaction(s): Rash    Doxycycline monohydrate Hives    Lanoxin  [digoxin]      Other reaction(s): Rash    Lansoprazole      Other reaction(s): Rash    Macrobid [nitrofurantoin monohyd/m-cryst] Rash       Physcial Examination  VITAL SIGNS:    Body surface area is 1.66 meters squared.   Pain Assessment  Vitals:    09/26/23 1002   BP: 113/62   Pulse: (!) 112   Temp: 97.7 °F (36.5 °C)   SpO2: 96%   Weight: 62.1 kg (137 lb)   PainSc:   3   PainLoc: Generalized        Wt Readings from Last 5 Encounters:   09/26/23 62.1 kg (137 lb)   09/20/23 62.6 kg (138 lb)   09/20/23 63 kg (138 lb 12.8 oz)   09/12/23 63.8 kg (140 lb 10.5 oz)   09/08/23 64.7 kg (142 lb 11.2 oz)       GENERAL:  Aysha Moore is healthy-appearing 68 y.o. female, in no distress.   EYES:   Pupils equal, round, reactive.  Conjunctivae, sclera and lids normal.  HEENT: Head normocephalic and atraumatic, without alopecia.  Oropharynx is unremarkable.  No icterus, jaundice, stomatitis, mucositis, or ulceration is noted.  Ears are clear and unremarkable.  Nose, nares, and septum are unremarkable.    NECK:   No masses.  Thyroid and trachea are normal.    BREASTS:  Deferred.  RESPIRATORY: Clear to auscultation bilaterally.  Symmetrically effortless expansion.  No wheezing and no stridor.    CV: Heart reveals regular rate and rhythm without murmur, rub, or gallops.  ABDOMEN: Soft, non-tender.  No masses, no hernias, and no rebound or rigidity are noted.  /RECTAL:  Deferred.  LYMPHATICS: No preauricular, submandibular, cervical, supraclavicular, axillary,  lymphadenopathy.  MUSCULOSKELETAL:Fair musculature, no atrophy.  No arthritic changes.  No edema or cyanosis. Back is without gross abnormal curvature.   NEUROLOGICAL: Cranial nerves II-XII grossly intact.  Motor and sensory exam intact.  SKIN:   No lesions, bruises, petechiae or rashes.  Good turgor.    PSYCHIATRIC: Patient is alert and oriented to time, place and person.  Mood and affect are appropriate.         Laboratory and Radiology   Lab Results   Component Value Date    WBC 2.17 (L) 09/11/2023    RBC 3.74 (L) 09/11/2023    HGB 11.6 (L) 09/11/2023    HCT 33.9 (L) 09/11/2023    MCV 91 09/11/2023    MCH 31.0 09/11/2023    MCHC 34.2 09/11/2023    RDW 13.4 09/11/2023     (L) 09/11/2023    MPV 8.3 (L) 09/11/2023    GRAN 1.6 (L) 09/11/2023    GRAN 73.2 (H) 09/11/2023    LYMPH 0.2 (L) 09/11/2023    LYMPH 8.8 (L) 09/11/2023    MONO 0.2 (L) 09/11/2023    MONO 11.1 09/11/2023    EOS 0.1 09/11/2023    BASO 0.02 09/11/2023    EOSINOPHIL 5.1 09/11/2023    BASOPHIL 0.9 09/11/2023     BMP  Lab Results   Component Value Date     09/11/2023    K 4.1 09/11/2023     09/11/2023    CO2 25 09/11/2023    BUN 14 09/11/2023    CREATININE 0.6 09/11/2023    CALCIUM 9.2 09/11/2023    ANIONGAP 8 09/11/2023    ESTGFRAFRICA >60.0 03/29/2022    EGFRNONAA >60.0 03/29/2022     Lab Results   Component Value Date    ALT 15 09/11/2023    AST 17 09/11/2023    ALKPHOS 67 09/11/2023    BILITOT 1.1 (H) 09/11/2023     Results for orders placed or performed in visit on 06/20/23 (from the past 2160 hour(s))   CT Biopsy Lung w/ guidance    Narrative    CMS MANDATED QUALITY DATA-CT RADIATION DOSE-436  All CT scans at this facility use dose modulation, iterative reconstruction, and or weight based dosing when appropriate, to reduce radiation dose to as low as reasonably achievable.    HISTORY: Left lower lobe mass suspicious for pulmonary neoplasm.    TECHNIQUE:  After obtaining written informed consent, which included a discussion of  the risks and benefits of the procedure to include infection and bleeding, air and blood collection around the lung requiring chest tube insertion and hospitalization, and allowing the patient the opportunity to ask questions, the patient agreed to the procedure.    The patient was placed prone on the CT fluoroscopy table. A suitable skin entrance site overlying the left lower lobe mass was localized with CT guidance. The skin was marked, and then prepped and draped in sterile fashion, with several milliliters of Lidocaine 1% injected subcutaneously to achieve adequate local anesthesia.    Following, a 19-gauge coaxial needle was advanced under intermittent CT fluoroscopic guidance into the left lower lobe mass. Two 20-gauge core biopsy samples were then obtained. The needle was removed. The patient tolerated the procedure well, with no blood loss. CT fluoroscopy time was less than 1 minute.    Total moderate conscious sedation time with supervision by the interventional radiologist and monitoring by the special procedures registered nurse was 21 minutes. The patient received Versed 2 mg and Fentanyl 100 mcg IV during the procedure.    IMPRESSION: Successful CT guided left lung mass core biopsy.    Electronically signed by:  Jorge Welch MD  7/12/2023 1:00 PM CDT Workstation: [id number]     No results found for this or any previous visit (from the past 2160 hour(s)).  No results found for this or any previous visit (from the past 2160 hour(s)).    Pathology  Pathology Results  (Last 10 years)                 09/28/21 1012  Liquid-Based Pap Smear, Screening Final result    Narrative:  Release to patient->Immediate               TITLE: PLAN OF CARE FOR THE CHEMOTHERAPY PATIENT / TEACHING PROTOCOL    PURPOSE: To involve the patient / significant other in the plan of care and to provide teaching to the significant other & patient receiving chemotherapy.    LEVEL: Independent.    CONTENT: The Plan of Care for the  chemotherapy patient is individualized and appropriate to the patients needs, strengths, limitations, & goals.  Education includes information regarding chemotherapy side effects, the treatment itself, and self-care  Activities.    GOAL / OUTCOME STANDARDS    PHYSIOLOGIC: The client will remain free or experience minimal side effects or toxicities throughout the chemotherapy treatment period.     PSYCHOLOGIC: The client/significant others will demonstrate positive coping mechanisms in relation to chemotherapy and its side effects.      COGINITIVE: The client/significant others will verbalize understanding of self-care measure to avoid/minimize side effects of the chemotherapy regimen.    EVALUATION / COMMENT KEY:    V = Audiovisual/Video  S = Successfully meets outcome  N = Needs further instruction  NA = Not applicable to the patient  P = Previous knowledge  U = Unable to comprehend  * = See progress notes          PLAN OF CARE  INFORMATION TO BE DELIVERED / NURSING INTERVENTIONS DATE EVALUATION   Assessment of client/caregiver,         knowledge of cancer diagnosis,         and chemotherapy as a treatment. 1a. Evaluate patient/caregiver learning ability    b. Plan teaching sessions with patient/caregiver according to needs and present anxiety level/ability to learn.    c. Provide Chemotherapy Education Packet,        Mouth Care Protocol,         Specific Patient Education Sheets. 09/26/2023 S   Individual chemotherapy treatment         plan. 2a. Review of Chemotherapy Education handout from [employer]            09/26/2023   S   Knowledge Deficit & Self-Management of general side effects common to all chemotherapy:  Nausea/Vomiting  b.   Diarrhea  Mouth Care  Dental care  Constipation  Hair Loss  Potential for infection  Fatigue   3a. Reinforce that the majority of side effects from chemotherapy are reversible and are  controlled both in the hospital and at home        (blood counts recover, hair grows  back).   b.  Refer to the following for reinforcement of         information post-treatment:  Mouth Care Protocol.  Bowel Protocol for constipation or diarrhea.  3.  Drug Specific Chemotherapy Information Sheets for each medication patient receiving.    09/26/2023     S     PLAN OF CARE  INFORMATION TO BE DELIVERED / NURSING INTERVENTIONS DATE EVALUATION   h. Potential for bleeding         i. Potential anemia/fatigue         j. Potential sunburn         k. Birth control measures  l. Safety measures post treatment 4.  Chemotherapy Home Care Instruction  and Safety Information Sheet.  A. patient/caregivers to thoroughly cook shellfish (shrimp, crab, etc) to decrease the chance of infection.    B.  Use sunscreen and protective clothing while in the sun.   09/26/2023      Knowledge deficit & Self Management of Drug Specific  Side Effects.    BLADDER EFFECTS        (Hemorrhagic Cystitis)                  Preventable with adequate hydration; occurs 2-3 days or more post treatment.   1.  Instruct patient to:  a.   Void at least every 2 hours; increase intake.  b.   DO NOT hold urine; go when urge is felt.  c.    Empty bladder at bedtime and on         awakening.  d.   Observe for color changes (red to tea           colored), amount and frequency changes.  e.   Notify oncologist of any abnormalities           in urine or voiding or if you cannot               drink adequate fluids.   09/26/2023   S   b.   CHANGES IN URINE        COLOR:      1.   Instruct patient:  a.   Most evident in first 2-3 voidings after           administration.  Lasts less than 24 hours.  If urine is discolored 2 or more days post- treatment, notify oncologist.      09/26/2023 S   c.    KIDNEY EFFECTS           (Nephrotoxicity)   1.  Instruct patient to:  a.   Drink 8-16 glasses of fluid/day the day   pre-treatment and 3-4 days post-treatment to maintain hydration; the best way to minimize kidney problems.  b.   Notify oncologist immediately if  unable to drink fluids or if changes are noted in urinary elimination.     09/26/2023   S   PULMONARY TOXICITY    Instruct patient to report symptoms such as shortness of breath, chest pain, shallow breathing, or chest wall discomfort to physician.  Reinforce preventative measures used by the health care team.  Baseline and periodic PFT and chest x-ray.   09/26/2023   S     PLAN OF CARE INFORMATION TO BE DELIVERED / NURSING INTERVENTIONS DATE EVALUATION   NERVE & MUSCLE EFFECTS (neurotoxocity; neuropathy, possible visual/hearing changes)        Instruct patient to:    Report numbness or tingling of the hands/feet, loss of fine motor movement (buttoning shirt, tying shoelaces), or gait changes to your oncologist.  If numbness/tingling are present:  protect feet with shoes at all times.  Use gloves for washing dishes/gardening & potholders in kitchen.       09/26/2023   S   CARDIOTOXICITY  Decreased effectiveness of             cardiac function. Effective are                  cumulative and irreversible.                                    CARDIAC ARRYTHMIAS              4   Instruct:  Heart function may be tested before treatment and perdiocally during treatment.  Notify oncologist of irregular pulse, palpitations, shortness of breath, or swelling in lower extremities/feet.          Can cause arrhythmias on infusion that resolve once infusion discontinued. Instruct nurse if any irregularity felt.    09/26/2023   S   EXTRAVASTION  Occurs when vesicants leak outside of vein and cause damage to the skin and underlying tissues.   Reinforce preventive measures used to avoid complications.  Fresh IV site or central line monitored continuously with vesicant IVP.  Continuous infusion via central line site and blood return monitored periodically around the clock.  Instruct to:  Notify nurse of any discomfort, burning, stinging, etc. at IV site during chemotherapy administration.  Notify oncologist of any redness, pain, or  swelling at IV site after discharge from hospital.   09/26/2023   S   HYPERSENSITIVITY can happen with any medication.   Instruct patient:  Nurse is with them during the initial part of treatment and will be close by to monitor.  Pre-medication ordered by the oncologist must be taken on time. If doses are missed, treatment will need to be re-scheduled.  Skin redness, itching, or hives appearing after discharge should be reported to oncologist. 09/26/2023   S       PLAN OF CARE INFORMATION TO BE DELIVERED / NURSING INTERVENTIONS DATE EVALUATION   FLU-LIKE SYNDROME      Instruct patient symptoms are hard to prevent and may include fever, shaking chills, muscle and body aches.  Taking prescribed medications from physician if needed.  Adequate fluids are important.    Reinforce the need to call if temperature is         elevated to 100.4 or more  09/26/2023   S   HAND-FOOT SYNDROME  causes painful, symmetric swelling and redness of palms and soles                  Instruct patient to report any numbness or tingling in the hands or feet.  Explain prevention techniques, such as     Use heavy moisturizers to lessen skin dryness and itching, but to avoid if skin is cracked or broken  Bathe in tepid water, use non-perfumed soap, and wash gently. Baths with oatmeal or diluted baking soda may be soothing.  Avoid tight fitting shoes and repetitive actions, such as rubbing hands or applying pressure to hands/feet.  Review measures to take should syndrome occur:  Cold compresses and elevation for          edema  Pain medications and other measures as ordered by oncologist.   4.   Syndrome resolves few weeks after therapy. 09/26/2023   S   5. DISCHARGE PLANNING /        EDUCATION 1.    Explain importance of compliance with follow- up  tests (CBC, CMP).  2.    Verify patient/caregiver know:  a.    Oncologists office phone number.  b.    Dates of follow-up appointments.  c.    Prescriptions given for nausea  3.   Review side  effects to monitor and notify          oncologist about.  4.   Reinforce the need for patient and caregivers to:  a.    Review information given.  b.    Call oncologists office with questions          or symptoms  5.   Provide [facility] [employer] make referrals if needed for financial or .   09/26/2023   S     PROGRESS NOTES: I met with the patient and her daughter today for chemotherapy education. she will be starting treatment with Tecentriq. We discussed the mechanism of action, potential side effects of this treatment as well as ways she can manage them at home. Some of these side effects include but or not limited to fever, nausea, vomiting, decreased appetite, fatigue, weakness, cytopenias, myalgia/arthralgia, constipation, diarrhea, bleeding, headache, shortness of breath, nail changes, taste change, hair thinning/loss, mood disturbances, or edema. We also discussed dietary modifications she should make although this will be discussed in more detail with the dietician. she was provided with anti-emetic medication, a copy of all of the information we discussed today as well as our contact information. she will be provided a schedule on his first day of treatment. We will obtain labs on a weekly basis and the patient will follow-up with the physician for toxicity monitoring throughout treatment. All questions were answered and an informed consent was obtained. she was reminded to certainly contact us sooner if needed.  Attached to the patients folder and discussed with the patient the 24 hour/ 7 days a week after hours telephone number for the physician.  Patient notified to call anytime 24/7 because their is a physician on call for any problems that may arise.  Patient also notified to report to [facility] if they can not get in touch with a physician after hours.  Discussed the five wishes booklet with the patient and their family.           Assessment/Plan     ICD-10-CM  ICD-9-CM   1. Malignant neoplasm of lower lobe of left lung  C34.32 162.5   2. Fatigue  R53.83 780.79          Medications Ordered:  Zofran 8mg 1 tab PO Q8h prn nausea  Phenergan 25mg PO Q4-6h prn nausea      Standing Labs Ordered:  CBC weekly  CMP weekly  TSH Monthly    Follow up in about 3 weeks (around 10/17/2023) for with Dr. Hankins and 6 weeks with me.    Total Face to Face Time: 45 minutes face to face with the patient and their family discussing the chemotherapy side-effects and when to call our office.   Electronically signed by: Meka Chiang, MSN, APRN, AGNP-C, OCN

## 2023-09-26 ENCOUNTER — OFFICE VISIT (OUTPATIENT)
Dept: HEMATOLOGY/ONCOLOGY | Facility: CLINIC | Age: 68
DRG: 809 | End: 2023-09-26
Payer: MEDICARE

## 2023-09-26 VITALS
SYSTOLIC BLOOD PRESSURE: 113 MMHG | HEART RATE: 112 BPM | TEMPERATURE: 98 F | DIASTOLIC BLOOD PRESSURE: 62 MMHG | BODY MASS INDEX: 24.27 KG/M2 | OXYGEN SATURATION: 96 % | WEIGHT: 137 LBS

## 2023-09-26 DIAGNOSIS — R53.83 FATIGUE: ICD-10-CM

## 2023-09-26 DIAGNOSIS — C34.32 MALIGNANT NEOPLASM OF LOWER LOBE OF LEFT LUNG: Primary | ICD-10-CM

## 2023-09-26 PROCEDURE — 99214 PR OFFICE/OUTPT VISIT, EST, LEVL IV, 30-39 MIN: ICD-10-PCS | Mod: S$PBB,,, | Performed by: NURSE PRACTITIONER

## 2023-09-26 PROCEDURE — 99214 OFFICE O/P EST MOD 30 MIN: CPT | Performed by: NURSE PRACTITIONER

## 2023-09-26 PROCEDURE — 99214 OFFICE O/P EST MOD 30 MIN: CPT | Mod: S$PBB,,, | Performed by: NURSE PRACTITIONER

## 2023-09-27 ENCOUNTER — HOSPITAL ENCOUNTER (INPATIENT)
Facility: HOSPITAL | Age: 68
LOS: 2 days | Discharge: HOME | DRG: 809 | End: 2023-09-29
Attending: EMERGENCY MEDICINE | Admitting: STUDENT IN AN ORGANIZED HEALTH CARE EDUCATION/TRAINING PROGRAM
Payer: MEDICARE

## 2023-09-27 ENCOUNTER — TELEPHONE (OUTPATIENT)
Dept: HEMATOLOGY/ONCOLOGY | Facility: CLINIC | Age: 68
End: 2023-09-27

## 2023-09-27 DIAGNOSIS — R50.81 NEUTROPENIC FEVER: Primary | ICD-10-CM

## 2023-09-27 DIAGNOSIS — D70.9 NEUTROPENIC FEVER: Primary | ICD-10-CM

## 2023-09-27 DIAGNOSIS — R07.9 CHEST PAIN: ICD-10-CM

## 2023-09-27 LAB
ABO + RH BLD: NORMAL
ADENOVIRUS: NOT DETECTED
ALBUMIN SERPL BCP-MCNC: 4.1 G/DL (ref 3.5–5.2)
ALP SERPL-CCNC: 79 U/L (ref 55–135)
ALT SERPL W/O P-5'-P-CCNC: 13 U/L (ref 10–44)
AMPHET+METHAMPHET UR QL: NEGATIVE
ANION GAP SERPL CALC-SCNC: 9 MMOL/L (ref 8–16)
AST SERPL-CCNC: 18 U/L (ref 10–40)
BACTERIA #/AREA URNS HPF: NEGATIVE /HPF
BARBITURATES UR QL SCN>200 NG/ML: NEGATIVE
BASOPHILS # BLD AUTO: 0.02 K/UL (ref 0–0.2)
BASOPHILS NFR BLD: 1 % (ref 0–1.9)
BENZODIAZ UR QL SCN>200 NG/ML: NEGATIVE
BILIRUB SERPL-MCNC: 0.8 MG/DL (ref 0.1–1)
BILIRUB UR QL STRIP: NEGATIVE
BLD GP AB SCN CELLS X3 SERPL QL: NORMAL
BNP SERPL-MCNC: 21 PG/ML (ref 0–99)
BORDETELLA PARAPERTUSSIS (IS1001): NOT DETECTED
BORDETELLA PERTUSSIS (PTXP): NOT DETECTED
BUN SERPL-MCNC: 12 MG/DL (ref 8–23)
BZE UR QL SCN: NEGATIVE
CALCIUM SERPL-MCNC: 9.2 MG/DL (ref 8.7–10.5)
CANNABINOIDS UR QL SCN: NEGATIVE
CHLAMYDIA PNEUMONIAE: NOT DETECTED
CHLORIDE SERPL-SCNC: 101 MMOL/L (ref 95–110)
CK SERPL-CCNC: 42 U/L (ref 20–180)
CLARITY UR: CLEAR
CO2 SERPL-SCNC: 27 MMOL/L (ref 23–29)
COLOR UR: YELLOW
CORONAVIRUS 229E, COMMON COLD VIRUS: NOT DETECTED
CORONAVIRUS HKU1, COMMON COLD VIRUS: NOT DETECTED
CORONAVIRUS NL63, COMMON COLD VIRUS: NOT DETECTED
CORONAVIRUS OC43, COMMON COLD VIRUS: NOT DETECTED
CREAT SERPL-MCNC: 0.7 MG/DL (ref 0.5–1.4)
CREAT UR-MCNC: 201.1 MG/DL (ref 15–325)
DIFFERENTIAL METHOD: ABNORMAL
EOSINOPHIL # BLD AUTO: 0.1 K/UL (ref 0–0.5)
EOSINOPHIL NFR BLD: 6.2 % (ref 0–8)
ERYTHROCYTE [DISTWIDTH] IN BLOOD BY AUTOMATED COUNT: 15.5 % (ref 11.5–14.5)
EST. GFR  (NO RACE VARIABLE): >60 ML/MIN/1.73 M^2
FLUBV RNA NPH QL NAA+NON-PROBE: NOT DETECTED
GLUCOSE SERPL-MCNC: 104 MG/DL (ref 70–110)
GLUCOSE UR QL STRIP: NEGATIVE
GROUP A STREP, MOLECULAR: NEGATIVE
HCT VFR BLD AUTO: 36.2 % (ref 37–48.5)
HGB BLD-MCNC: 11.9 G/DL (ref 12–16)
HGB UR QL STRIP: ABNORMAL
HPIV1 RNA NPH QL NAA+NON-PROBE: NOT DETECTED
HPIV2 RNA NPH QL NAA+NON-PROBE: NOT DETECTED
HPIV3 RNA NPH QL NAA+NON-PROBE: NOT DETECTED
HPIV4 RNA NPH QL NAA+NON-PROBE: NOT DETECTED
HUMAN METAPNEUMOVIRUS: NOT DETECTED
HYALINE CASTS #/AREA URNS LPF: 7 /LPF
IMM GRANULOCYTES # BLD AUTO: 0 K/UL (ref 0–0.04)
IMM GRANULOCYTES NFR BLD AUTO: 0 % (ref 0–0.5)
INFLUENZA A (SUBTYPES H1,H1-2009,H3): NOT DETECTED
INFLUENZA A, MOLECULAR: NEGATIVE
INFLUENZA B, MOLECULAR: NEGATIVE
KETONES UR QL STRIP: ABNORMAL
LACTATE SERPL-SCNC: 0.9 MMOL/L (ref 0.5–1.9)
LEUKOCYTE ESTERASE UR QL STRIP: NEGATIVE
LIPASE SERPL-CCNC: 57 U/L (ref 4–60)
LYMPHOCYTES # BLD AUTO: 0.2 K/UL (ref 1–4.8)
LYMPHOCYTES NFR BLD: 9.8 % (ref 18–48)
MAGNESIUM SERPL-MCNC: 1.7 MG/DL (ref 1.6–2.6)
MCH RBC QN AUTO: 30.9 PG (ref 27–31)
MCHC RBC AUTO-ENTMCNC: 32.9 G/DL (ref 32–36)
MCV RBC AUTO: 94 FL (ref 82–98)
MICROSCOPIC COMMENT: ABNORMAL
MONOCYTES # BLD AUTO: 0.6 K/UL (ref 0.3–1)
MONOCYTES NFR BLD: 29.5 % (ref 4–15)
MYCOPLASMA PNEUMONIAE: NOT DETECTED
NEUTROPHILS # BLD AUTO: 1 K/UL (ref 1.8–7.7)
NEUTROPHILS NFR BLD: 53.5 % (ref 38–73)
NITRITE UR QL STRIP: NEGATIVE
NRBC BLD-RTO: 0 /100 WBC
OPIATES UR QL SCN: NEGATIVE
PCP UR QL SCN>25 NG/ML: NEGATIVE
PH UR STRIP: 5 [PH] (ref 5–8)
PLATELET # BLD AUTO: 279 K/UL (ref 150–450)
PMV BLD AUTO: 10.2 FL (ref 9.2–12.9)
POTASSIUM SERPL-SCNC: 4.5 MMOL/L (ref 3.5–5.1)
PROT SERPL-MCNC: 7.8 G/DL (ref 6–8.4)
PROT UR QL STRIP: ABNORMAL
RBC # BLD AUTO: 3.85 M/UL (ref 4–5.4)
RBC #/AREA URNS HPF: 7 /HPF (ref 0–4)
RESPIRATORY INFECTION PANEL SOURCE: NORMAL
RSV RNA NPH QL NAA+NON-PROBE: NOT DETECTED
RV+EV RNA NPH QL NAA+NON-PROBE: NOT DETECTED
SARS-COV-2 RDRP RESP QL NAA+PROBE: NEGATIVE
SARS-COV-2 RNA RESP QL NAA+PROBE: NOT DETECTED
SODIUM SERPL-SCNC: 137 MMOL/L (ref 136–145)
SP GR UR STRIP: 1.03 (ref 1–1.03)
SPECIMEN OUTDATE: NORMAL
SPECIMEN SOURCE: NORMAL
SQUAMOUS #/AREA URNS HPF: 2 /HPF
TOXICOLOGY INFORMATION: NORMAL
TROPONIN I SERPL HS-MCNC: 8.5 PG/ML (ref 0–14.9)
TROPONIN I SERPL HS-MCNC: 9.4 PG/ML (ref 0–14.9)
URN SPEC COLLECT METH UR: ABNORMAL
UROBILINOGEN UR STRIP-ACNC: ABNORMAL EU/DL
WBC # BLD AUTO: 2.01 K/UL (ref 3.9–12.7)
WBC #/AREA URNS HPF: 1 /HPF (ref 0–5)

## 2023-09-27 PROCEDURE — 21400001 HC TELEMETRY ROOM

## 2023-09-27 PROCEDURE — 87040 BLOOD CULTURE FOR BACTERIA: CPT | Performed by: EMERGENCY MEDICINE

## 2023-09-27 PROCEDURE — 36415 COLL VENOUS BLD VENIPUNCTURE: CPT | Performed by: EMERGENCY MEDICINE

## 2023-09-27 PROCEDURE — 96365 THER/PROPH/DIAG IV INF INIT: CPT

## 2023-09-27 PROCEDURE — 80053 COMPREHEN METABOLIC PANEL: CPT

## 2023-09-27 PROCEDURE — 87798 DETECT AGENT NOS DNA AMP: CPT | Performed by: STUDENT IN AN ORGANIZED HEALTH CARE EDUCATION/TRAINING PROGRAM

## 2023-09-27 PROCEDURE — 80307 DRUG TEST PRSMV CHEM ANLYZR: CPT | Performed by: EMERGENCY MEDICINE

## 2023-09-27 PROCEDURE — 93005 ELECTROCARDIOGRAM TRACING: CPT | Performed by: INTERNAL MEDICINE

## 2023-09-27 PROCEDURE — 87651 STREP A DNA AMP PROBE: CPT

## 2023-09-27 PROCEDURE — 83880 ASSAY OF NATRIURETIC PEPTIDE: CPT

## 2023-09-27 PROCEDURE — 83690 ASSAY OF LIPASE: CPT

## 2023-09-27 PROCEDURE — 25000003 PHARM REV CODE 250: Performed by: EMERGENCY MEDICINE

## 2023-09-27 PROCEDURE — 93010 EKG 12-LEAD: ICD-10-PCS | Mod: ,,, | Performed by: INTERNAL MEDICINE

## 2023-09-27 PROCEDURE — 86900 BLOOD TYPING SEROLOGIC ABO: CPT | Performed by: EMERGENCY MEDICINE

## 2023-09-27 PROCEDURE — 87502 INFLUENZA DNA AMP PROBE: CPT

## 2023-09-27 PROCEDURE — 84484 ASSAY OF TROPONIN QUANT: CPT | Mod: 91

## 2023-09-27 PROCEDURE — U0002 COVID-19 LAB TEST NON-CDC: HCPCS

## 2023-09-27 PROCEDURE — 81001 URINALYSIS AUTO W/SCOPE: CPT | Performed by: EMERGENCY MEDICINE

## 2023-09-27 PROCEDURE — 83605 ASSAY OF LACTIC ACID: CPT | Performed by: EMERGENCY MEDICINE

## 2023-09-27 PROCEDURE — 85025 COMPLETE CBC W/AUTO DIFF WBC: CPT

## 2023-09-27 PROCEDURE — 93010 ELECTROCARDIOGRAM REPORT: CPT | Mod: ,,, | Performed by: INTERNAL MEDICINE

## 2023-09-27 PROCEDURE — 99285 EMERGENCY DEPT VISIT HI MDM: CPT | Mod: 25

## 2023-09-27 PROCEDURE — 96361 HYDRATE IV INFUSION ADD-ON: CPT

## 2023-09-27 PROCEDURE — 82550 ASSAY OF CK (CPK): CPT

## 2023-09-27 PROCEDURE — 83735 ASSAY OF MAGNESIUM: CPT

## 2023-09-27 PROCEDURE — 63600175 PHARM REV CODE 636 W HCPCS: Performed by: EMERGENCY MEDICINE

## 2023-09-27 RX ORDER — TALC
6 POWDER (GRAM) TOPICAL NIGHTLY PRN
Status: DISCONTINUED | OUTPATIENT
Start: 2023-09-27 | End: 2023-09-29 | Stop reason: HOSPADM

## 2023-09-27 RX ORDER — SUCRALFATE 1 G/10ML
1 SUSPENSION ORAL EVERY 6 HOURS
Status: DISCONTINUED | OUTPATIENT
Start: 2023-09-28 | End: 2023-09-29 | Stop reason: HOSPADM

## 2023-09-27 RX ORDER — SODIUM CHLORIDE 0.9 % (FLUSH) 0.9 %
10 SYRINGE (ML) INJECTION EVERY 6 HOURS PRN
Status: DISCONTINUED | OUTPATIENT
Start: 2023-09-27 | End: 2023-09-29 | Stop reason: HOSPADM

## 2023-09-27 RX ORDER — PROCHLORPERAZINE EDISYLATE 5 MG/ML
5 INJECTION INTRAMUSCULAR; INTRAVENOUS EVERY 6 HOURS PRN
Status: DISCONTINUED | OUTPATIENT
Start: 2023-09-27 | End: 2023-09-29 | Stop reason: HOSPADM

## 2023-09-27 RX ORDER — PANTOPRAZOLE SODIUM 40 MG/10ML
40 INJECTION, POWDER, LYOPHILIZED, FOR SOLUTION INTRAVENOUS DAILY
Status: DISCONTINUED | OUTPATIENT
Start: 2023-09-28 | End: 2023-09-28

## 2023-09-27 RX ORDER — LISINOPRIL 2.5 MG/1
2.5 TABLET ORAL NIGHTLY
Status: DISCONTINUED | OUTPATIENT
Start: 2023-09-27 | End: 2023-09-29 | Stop reason: HOSPADM

## 2023-09-27 RX ORDER — MUPIROCIN 20 MG/G
OINTMENT TOPICAL 2 TIMES DAILY
Status: DISCONTINUED | OUTPATIENT
Start: 2023-09-27 | End: 2023-09-29 | Stop reason: HOSPADM

## 2023-09-27 RX ORDER — SODIUM CHLORIDE 9 MG/ML
INJECTION, SOLUTION INTRAVENOUS CONTINUOUS
Status: DISCONTINUED | OUTPATIENT
Start: 2023-09-27 | End: 2023-09-29 | Stop reason: HOSPADM

## 2023-09-27 RX ORDER — PANTOPRAZOLE SODIUM 40 MG/1
40 TABLET, DELAYED RELEASE ORAL DAILY
Status: DISCONTINUED | OUTPATIENT
Start: 2023-09-28 | End: 2023-09-29 | Stop reason: HOSPADM

## 2023-09-27 RX ORDER — ENOXAPARIN SODIUM 100 MG/ML
40 INJECTION SUBCUTANEOUS EVERY 24 HOURS
Status: DISCONTINUED | OUTPATIENT
Start: 2023-09-27 | End: 2023-09-29 | Stop reason: HOSPADM

## 2023-09-27 RX ORDER — METOPROLOL SUCCINATE 50 MG/1
50 TABLET, EXTENDED RELEASE ORAL DAILY
Status: DISCONTINUED | OUTPATIENT
Start: 2023-09-28 | End: 2023-09-29 | Stop reason: HOSPADM

## 2023-09-27 RX ORDER — POLYETHYLENE GLYCOL 3350 17 G/17G
17 POWDER, FOR SOLUTION ORAL DAILY
Status: DISCONTINUED | OUTPATIENT
Start: 2023-09-28 | End: 2023-09-29 | Stop reason: HOSPADM

## 2023-09-27 RX ORDER — EZETIMIBE 10 MG/1
10 TABLET ORAL DAILY
Status: DISCONTINUED | OUTPATIENT
Start: 2023-09-28 | End: 2023-09-29 | Stop reason: HOSPADM

## 2023-09-27 RX ORDER — FLUTICASONE PROPIONATE 50 MCG
1 SPRAY, SUSPENSION (ML) NASAL DAILY PRN
Status: DISCONTINUED | OUTPATIENT
Start: 2023-09-27 | End: 2023-09-29 | Stop reason: HOSPADM

## 2023-09-27 RX ORDER — BUSPIRONE HYDROCHLORIDE 5 MG/1
5 TABLET ORAL 2 TIMES DAILY
Status: DISCONTINUED | OUTPATIENT
Start: 2023-09-27 | End: 2023-09-29 | Stop reason: HOSPADM

## 2023-09-27 RX ORDER — HYDROCODONE BITARTRATE AND ACETAMINOPHEN 5; 325 MG/1; MG/1
1 TABLET ORAL EVERY 4 HOURS PRN
Status: DISCONTINUED | OUTPATIENT
Start: 2023-09-27 | End: 2023-09-29 | Stop reason: HOSPADM

## 2023-09-27 RX ORDER — SODIUM CHLORIDE 9 MG/ML
1000 INJECTION, SOLUTION INTRAVENOUS
Status: COMPLETED | OUTPATIENT
Start: 2023-09-27 | End: 2023-09-27

## 2023-09-27 RX ORDER — CITALOPRAM 20 MG/1
20 TABLET, FILM COATED ORAL DAILY
Status: DISCONTINUED | OUTPATIENT
Start: 2023-09-28 | End: 2023-09-29 | Stop reason: HOSPADM

## 2023-09-27 RX ORDER — TRAMADOL HYDROCHLORIDE 50 MG/1
50 TABLET ORAL EVERY 6 HOURS PRN
Status: DISCONTINUED | OUTPATIENT
Start: 2023-09-27 | End: 2023-09-29 | Stop reason: HOSPADM

## 2023-09-27 RX ORDER — ACETAMINOPHEN 500 MG
1000 TABLET ORAL
Status: COMPLETED | OUTPATIENT
Start: 2023-09-27 | End: 2023-09-27

## 2023-09-27 RX ORDER — ACETAMINOPHEN 325 MG/1
650 TABLET ORAL EVERY 8 HOURS PRN
Status: DISCONTINUED | OUTPATIENT
Start: 2023-09-27 | End: 2023-09-29 | Stop reason: HOSPADM

## 2023-09-27 RX ORDER — ONDANSETRON 2 MG/ML
4 INJECTION INTRAMUSCULAR; INTRAVENOUS EVERY 8 HOURS PRN
Status: DISCONTINUED | OUTPATIENT
Start: 2023-09-27 | End: 2023-09-29 | Stop reason: HOSPADM

## 2023-09-27 RX ORDER — ATORVASTATIN CALCIUM 40 MG/1
80 TABLET, FILM COATED ORAL NIGHTLY
Status: DISCONTINUED | OUTPATIENT
Start: 2023-09-27 | End: 2023-09-29 | Stop reason: HOSPADM

## 2023-09-27 RX ORDER — FAMOTIDINE 20 MG/1
40 TABLET, FILM COATED ORAL NIGHTLY
Status: DISCONTINUED | OUTPATIENT
Start: 2023-09-27 | End: 2023-09-29 | Stop reason: HOSPADM

## 2023-09-27 RX ORDER — MORPHINE SULFATE 4 MG/ML
4 INJECTION, SOLUTION INTRAMUSCULAR; INTRAVENOUS EVERY 4 HOURS PRN
Status: DISCONTINUED | OUTPATIENT
Start: 2023-09-27 | End: 2023-09-29 | Stop reason: HOSPADM

## 2023-09-27 RX ADMIN — MEROPENEM 1 G: 1 INJECTION, POWDER, FOR SOLUTION INTRAVENOUS at 05:09

## 2023-09-27 RX ADMIN — SODIUM CHLORIDE 1000 ML: 0.9 INJECTION, SOLUTION INTRAVENOUS at 10:09

## 2023-09-27 RX ADMIN — SODIUM CHLORIDE 1000 ML: 0.9 INJECTION, SOLUTION INTRAVENOUS at 06:09

## 2023-09-27 RX ADMIN — ACETAMINOPHEN 1000 MG: 500 TABLET ORAL at 06:09

## 2023-09-27 NOTE — FIRST PROVIDER EVALUATION
"Medical screening examination initiated.  I have conducted a focused provider triage encounter, findings are as follows:    Brief history of present illness:  Generalized weakness and fever for 2 days.  Cancer patient just finished chemo 2 weeks ago.    Vitals:    09/27/23 1517   BP: 113/60   BP Location: Left arm   Patient Position: Sitting   Pulse: 106   Resp: 20   Temp: 99.6 °F (37.6 °C)   TempSrc: Oral   SpO2: (!) 92%   Weight: 62.1 kg (137 lb)   Height: 5' 3" (1.6 m)       Pertinent physical exam:  Tachycardic.  Low-grade temperature    Brief workup plan:  Cardiac workup    Preliminary workup initiated; this workup will be continued and followed by the physician or advanced practice provider that is assigned to the patient when roomed.  "

## 2023-09-27 NOTE — TELEPHONE ENCOUNTER
"Patient's daughter, Idalia, called and reported that patient was experiencing fever of 100.7-100.8 x 2 days, dizziness, and is "talking out of her head". Advised Idalia to take patient to the ER immediately for evaluation and treatment. Idalia verbalized understanding of above. Meka Chiang, MINNIE-C, made aware of above.   "

## 2023-09-28 LAB
ADENOVIRUS: NOT DETECTED
ALBUMIN SERPL BCP-MCNC: 3.4 G/DL (ref 3.5–5.2)
ALP SERPL-CCNC: 64 U/L (ref 55–135)
ALT SERPL W/O P-5'-P-CCNC: 13 U/L (ref 10–44)
ANION GAP SERPL CALC-SCNC: 3 MMOL/L (ref 8–16)
AST SERPL-CCNC: 16 U/L (ref 10–40)
BASOPHILS # BLD AUTO: ABNORMAL K/UL (ref 0–0.2)
BASOPHILS NFR BLD: 0 % (ref 0–1.9)
BILIRUB SERPL-MCNC: 0.7 MG/DL (ref 0.1–1)
BORDETELLA PARAPERTUSSIS (IS1001): NOT DETECTED
BORDETELLA PERTUSSIS (PTXP): NOT DETECTED
BUN SERPL-MCNC: 10 MG/DL (ref 8–23)
CALCIUM SERPL-MCNC: 8.4 MG/DL (ref 8.7–10.5)
CHLAMYDIA PNEUMONIAE: NOT DETECTED
CHLORIDE SERPL-SCNC: 105 MMOL/L (ref 95–110)
CO2 SERPL-SCNC: 29 MMOL/L (ref 23–29)
CORONAVIRUS 229E, COMMON COLD VIRUS: NOT DETECTED
CORONAVIRUS HKU1, COMMON COLD VIRUS: NOT DETECTED
CORONAVIRUS NL63, COMMON COLD VIRUS: NOT DETECTED
CORONAVIRUS OC43, COMMON COLD VIRUS: NOT DETECTED
CREAT SERPL-MCNC: 0.6 MG/DL (ref 0.5–1.4)
DIFFERENTIAL METHOD: ABNORMAL
EOSINOPHIL # BLD AUTO: ABNORMAL K/UL (ref 0–0.5)
EOSINOPHIL NFR BLD: 5 % (ref 0–8)
ERYTHROCYTE [DISTWIDTH] IN BLOOD BY AUTOMATED COUNT: 15.4 % (ref 11.5–14.5)
EST. GFR  (NO RACE VARIABLE): >60 ML/MIN/1.73 M^2
FLUBV RNA NPH QL NAA+NON-PROBE: NOT DETECTED
GLUCOSE SERPL-MCNC: 105 MG/DL (ref 70–110)
HCT VFR BLD AUTO: 30.4 % (ref 37–48.5)
HGB BLD-MCNC: 10.1 G/DL (ref 12–16)
HPIV1 RNA NPH QL NAA+NON-PROBE: NOT DETECTED
HPIV2 RNA NPH QL NAA+NON-PROBE: NOT DETECTED
HPIV3 RNA NPH QL NAA+NON-PROBE: NOT DETECTED
HPIV4 RNA NPH QL NAA+NON-PROBE: NOT DETECTED
HUMAN METAPNEUMOVIRUS: NOT DETECTED
IMM GRANULOCYTES # BLD AUTO: ABNORMAL K/UL (ref 0–0.04)
IMM GRANULOCYTES NFR BLD AUTO: ABNORMAL % (ref 0–0.5)
INFLUENZA A (SUBTYPES H1,H1-2009,H3): NOT DETECTED
LYMPHOCYTES # BLD AUTO: ABNORMAL K/UL (ref 1–4.8)
LYMPHOCYTES NFR BLD: 14 % (ref 18–48)
MCH RBC QN AUTO: 31.4 PG (ref 27–31)
MCHC RBC AUTO-ENTMCNC: 33.2 G/DL (ref 32–36)
MCV RBC AUTO: 94 FL (ref 82–98)
MONOCYTES # BLD AUTO: ABNORMAL K/UL (ref 0.3–1)
MONOCYTES NFR BLD: 26 % (ref 4–15)
MYCOPLASMA PNEUMONIAE: NOT DETECTED
NEUTROPHILS NFR BLD: 53 % (ref 38–73)
NEUTS BAND NFR BLD MANUAL: 2 %
NRBC BLD-RTO: 0 /100 WBC
PLATELET # BLD AUTO: 239 K/UL (ref 150–450)
PMV BLD AUTO: 9.2 FL (ref 9.2–12.9)
POTASSIUM SERPL-SCNC: 3.9 MMOL/L (ref 3.5–5.1)
PROT SERPL-MCNC: 6.2 G/DL (ref 6–8.4)
RBC # BLD AUTO: 3.22 M/UL (ref 4–5.4)
RESPIRATORY INFECTION PANEL SOURCE: NORMAL
RSV RNA NPH QL NAA+NON-PROBE: NOT DETECTED
RV+EV RNA NPH QL NAA+NON-PROBE: NOT DETECTED
SARS-COV-2 RNA RESP QL NAA+PROBE: NOT DETECTED
SODIUM SERPL-SCNC: 137 MMOL/L (ref 136–145)
WBC # BLD AUTO: 1.67 K/UL (ref 3.9–12.7)

## 2023-09-28 PROCEDURE — 25000242 PHARM REV CODE 250 ALT 637 W/ HCPCS: Performed by: STUDENT IN AN ORGANIZED HEALTH CARE EDUCATION/TRAINING PROGRAM

## 2023-09-28 PROCEDURE — 63600175 PHARM REV CODE 636 W HCPCS: Performed by: STUDENT IN AN ORGANIZED HEALTH CARE EDUCATION/TRAINING PROGRAM

## 2023-09-28 PROCEDURE — 36415 COLL VENOUS BLD VENIPUNCTURE: CPT | Performed by: STUDENT IN AN ORGANIZED HEALTH CARE EDUCATION/TRAINING PROGRAM

## 2023-09-28 PROCEDURE — 85027 COMPLETE CBC AUTOMATED: CPT | Performed by: STUDENT IN AN ORGANIZED HEALTH CARE EDUCATION/TRAINING PROGRAM

## 2023-09-28 PROCEDURE — 25000003 PHARM REV CODE 250: Performed by: STUDENT IN AN ORGANIZED HEALTH CARE EDUCATION/TRAINING PROGRAM

## 2023-09-28 PROCEDURE — 21400001 HC TELEMETRY ROOM

## 2023-09-28 PROCEDURE — 80053 COMPREHEN METABOLIC PANEL: CPT | Performed by: STUDENT IN AN ORGANIZED HEALTH CARE EDUCATION/TRAINING PROGRAM

## 2023-09-28 PROCEDURE — 87798 DETECT AGENT NOS DNA AMP: CPT | Performed by: STUDENT IN AN ORGANIZED HEALTH CARE EDUCATION/TRAINING PROGRAM

## 2023-09-28 PROCEDURE — 87086 URINE CULTURE/COLONY COUNT: CPT | Performed by: STUDENT IN AN ORGANIZED HEALTH CARE EDUCATION/TRAINING PROGRAM

## 2023-09-28 PROCEDURE — 85007 BL SMEAR W/DIFF WBC COUNT: CPT | Performed by: STUDENT IN AN ORGANIZED HEALTH CARE EDUCATION/TRAINING PROGRAM

## 2023-09-28 RX ADMIN — SUCRALFATE 1 G: 1 SUSPENSION ORAL at 12:09

## 2023-09-28 RX ADMIN — ATORVASTATIN CALCIUM 80 MG: 40 TABLET, FILM COATED ORAL at 08:09

## 2023-09-28 RX ADMIN — TRAMADOL HYDROCHLORIDE 50 MG: 50 TABLET, COATED ORAL at 08:09

## 2023-09-28 RX ADMIN — SODIUM CHLORIDE 1000 ML: 0.9 INJECTION, SOLUTION INTRAVENOUS at 12:09

## 2023-09-28 RX ADMIN — MEROPENEM 500 MG: 500 INJECTION, POWDER, FOR SOLUTION INTRAVENOUS at 03:09

## 2023-09-28 RX ADMIN — MEROPENEM 500 MG: 500 INJECTION, POWDER, FOR SOLUTION INTRAVENOUS at 09:09

## 2023-09-28 RX ADMIN — BUSPIRONE HYDROCHLORIDE 5 MG: 5 TABLET ORAL at 08:09

## 2023-09-28 RX ADMIN — POLYETHYLENE GLYCOL 3350 17 G: 17 POWDER, FOR SOLUTION ORAL at 09:09

## 2023-09-28 RX ADMIN — METOPROLOL SUCCINATE 50 MG: 50 TABLET, FILM COATED, EXTENDED RELEASE ORAL at 08:09

## 2023-09-28 RX ADMIN — BUSPIRONE HYDROCHLORIDE 5 MG: 5 TABLET ORAL at 12:09

## 2023-09-28 RX ADMIN — MEROPENEM 500 MG: 500 INJECTION, POWDER, FOR SOLUTION INTRAVENOUS at 01:09

## 2023-09-28 RX ADMIN — TRAMADOL HYDROCHLORIDE 50 MG: 50 TABLET, COATED ORAL at 12:09

## 2023-09-28 RX ADMIN — MUPIROCIN 1 G: 20 OINTMENT TOPICAL at 08:09

## 2023-09-28 RX ADMIN — EZETIMIBE 10 MG: 10 TABLET ORAL at 08:09

## 2023-09-28 RX ADMIN — FAMOTIDINE 40 MG: 20 TABLET ORAL at 08:09

## 2023-09-28 RX ADMIN — ATORVASTATIN CALCIUM 80 MG: 40 TABLET, FILM COATED ORAL at 12:09

## 2023-09-28 RX ADMIN — FLUTICASONE PROPIONATE 50 MCG: 50 SPRAY, METERED NASAL at 08:09

## 2023-09-28 RX ADMIN — ENOXAPARIN SODIUM 40 MG: 40 INJECTION SUBCUTANEOUS at 03:09

## 2023-09-28 RX ADMIN — SUCRALFATE 1 G: 1 SUSPENSION ORAL at 03:09

## 2023-09-28 RX ADMIN — ENOXAPARIN SODIUM 40 MG: 40 INJECTION SUBCUTANEOUS at 12:09

## 2023-09-28 RX ADMIN — PANTOPRAZOLE SODIUM 40 MG: 40 TABLET, DELAYED RELEASE ORAL at 06:09

## 2023-09-28 RX ADMIN — CITALOPRAM HYDROBROMIDE 20 MG: 20 TABLET ORAL at 08:09

## 2023-09-28 RX ADMIN — MUPIROCIN 1 G: 20 OINTMENT TOPICAL at 12:09

## 2023-09-28 RX ADMIN — TRAMADOL HYDROCHLORIDE 50 MG: 50 TABLET, COATED ORAL at 03:09

## 2023-09-28 NOTE — PROGRESS NOTES
[facility]  Progress Note    Patient Name: Aysha Moore  MRN: [id number]  Patient Class: IP- Inpatient   Admission Date: 9/27/2023  Length of Stay: 1 days  Attending Physician: Jason Puga MD  Primary Care Provider: Yoni Daniels MD        Subjective:     Principal Problem:Neutropenic fever        HPI:  Patient is a 68-year-old female with a history of metastatic adenocarcinoma of the lung who is currently on chemotherapy.  She is received chemotherapy and radiation treatments and has esophagitis.  She has had very poor p.o. intake over the past couple of weeks due to severe esophagitis.  She says she is gotten weaker and has become dizzy and lightheaded.  However, she feels that her esophagitis symptoms have actually been improving somewhat and she would like to begin eating more.  She came to the hospital because she started having fevers over the past 2 days and call her oncologist who recommended she come to the hospital for further evaluation given her neutropenia.  She denies any cough, congestion.  No nausea or vomiting.  No diarrhea.  No abdominal pain.  No dysuria or hematuria.  No rashes or lesions anywhere.  No signs of skin infection.  She has no changes to her medication regimen.  She feels that tramadol has been the most helpful for her pain as it does not make her nauseated.    In the ED:  T 100.2°, P 97, R 19, /59, O2 sat 93% on room air.  WBC 2, hemoglobin 11.9, platelets 279, chemistry panel unremarkable, BNP 21, troponin 8.5, lactic acid 0.9, drug screen negative, flu and COVID are negative, urinalysis without signs of infection though she is leukopenic/neutropenic, blood culture drawn, high-risk urine culture ordered.  Chest x-ray without signs of pneumonia.  Will admit for neutropenic fever, dehydration      Overview/Hospital Course:  No notes on file    Interval History:  Notes reviewed, no acute events overnight.  Patient resting comfortably  in bed.  She denies acute complaints.  States she feels much better today.  She tolerated approximately 25% for breakfast but states that that is all she wants to eat.  She has remained afebrile since admission.  Cultures remain negative.  Advised patient will discuss with her oncologist if she needs continue monitoring in the hospital vs possible discharge home this evening.  Patient verbalized understanding and appreciative of care.    Review of Systems   Constitutional:  Positive for appetite change and fatigue. Negative for chills and fever.   HENT:  Negative for congestion, sore throat and trouble swallowing.    Respiratory:  Negative for cough and shortness of breath.    Cardiovascular:  Negative for chest pain.   Gastrointestinal:  Negative for abdominal pain, diarrhea, nausea and vomiting.   Genitourinary:  Negative for difficulty urinating, dysuria and urgency.   Musculoskeletal:  Negative for arthralgias, back pain and gait problem.   Skin:  Negative for color change, pallor, rash and wound.   Neurological:  Negative for dizziness, weakness and light-headedness.   Psychiatric/Behavioral:  Negative for agitation, behavioral problems and confusion.    All other systems reviewed and are negative.    Objective:     Vital Signs (Most Recent):  Temp: 98.6 °F (37 °C) (09/28/23 0342)  Pulse: 106 (09/28/23 0826)  Resp: 20 (09/28/23 0822)  BP: 112/69 (09/28/23 0826)  SpO2: 96 % (09/27/23 2315) Vital Signs (24h Range):  Temp:  [97.6 °F (36.4 °C)-100.2 °F (37.9 °C)] 98.6 °F (37 °C)  Pulse:  [] 106  Resp:  [16-20] 20  SpO2:  [92 %-97 %] 96 %  BP: (104-155)/(53-82) 112/69     Weight: 61.7 kg (136 lb 1.6 oz)  Body mass index is 24.11 kg/m².    Intake/Output Summary (Last 24 hours) at 9/28/2023 1155  Last data filed at 9/28/2023 0343  Gross per 24 hour   Intake 120 ml   Output 500 ml   Net -380 ml         Physical Exam  Vitals and nursing note reviewed.   Constitutional:       General: She is not in acute  distress.     Appearance: Normal appearance. She is well-developed. She is not ill-appearing or diaphoretic.   HENT:      Head: Normocephalic and atraumatic.      Right Ear: External ear normal.      Left Ear: External ear normal.      Nose: Nose normal. No congestion or rhinorrhea.      Mouth/Throat:      Mouth: Mucous membranes are moist.      Pharynx: Oropharynx is clear. No oropharyngeal exudate or posterior oropharyngeal erythema.   Eyes:      General: No scleral icterus.     Conjunctiva/sclera: Conjunctivae normal.      Pupils: Pupils are equal, round, and reactive to light.   Neck:      Vascular: No JVD.   Cardiovascular:      Rate and Rhythm: Normal rate and regular rhythm.      Pulses: Normal pulses.      Heart sounds: Normal heart sounds. No murmur heard.  Pulmonary:      Effort: Pulmonary effort is normal. No respiratory distress.      Breath sounds: Normal breath sounds. No stridor. No wheezing, rhonchi or rales.   Abdominal:      General: Bowel sounds are normal. There is no distension.      Palpations: Abdomen is soft.      Tenderness: There is no abdominal tenderness.   Musculoskeletal:         General: No swelling or tenderness. Normal range of motion.      Cervical back: Normal range of motion and neck supple.   Skin:     General: Skin is warm and dry.      Capillary Refill: Capillary refill takes 2 to 3 seconds.      Coloration: Skin is not jaundiced or pale.      Findings: No erythema.   Neurological:      General: No focal deficit present.      Mental Status: She is alert and oriented to person, place, and time.      Cranial Nerves: No cranial nerve deficit.      Sensory: No sensory deficit.   Psychiatric:         Mood and Affect: Mood normal.         Behavior: Behavior normal.         Thought Content: Thought content normal.             Significant Labs: All pertinent labs within the past 24 hours have been reviewed.  CBC:   Recent Labs   Lab 09/27/23  1552 09/28/23  0351   WBC 2.01* 1.67*   HGB  11.9* 10.1*   HCT 36.2* 30.4*    239     CMP:   Recent Labs   Lab 09/27/23  1552 09/28/23  0351    137   K 4.5 3.9    105   CO2 27 29    105   BUN 12 10   CREATININE 0.7 0.6   CALCIUM 9.2 8.4*   PROT 7.8 6.2   ALBUMIN 4.1 3.4*   BILITOT 0.8 0.7   ALKPHOS 79 64   AST 18 16   ALT 13 13   ANIONGAP 9 3*       Significant Imaging: I have reviewed all pertinent imaging results/findings within the past 24 hours.      Assessment/Plan:      * Neutropenic fever  No further fever since admission  Pt reports feeling better today  resp panel neg  Cultures so far negative  Will d/w pts oncologist d/c home vs cont monitoring overnight  Cont IV abx for now      Dehydration  cont Carafate for esophagitis  Continue [facility] solution  IV fluids for hydration        LUNG CANCER-ADENOCARCINOMA OF THE LLL  Continue to follow up as an outpatient for further management [facility] oncology and radiation team  Pt to start immunotherapy next month      Hypertension  Chronic, controlled.  Latest blood pressure and vitals reviewed-     Temp:  [97.6 °F (36.4 °C)-100.2 °F (37.9 °C)]   Pulse:  []   Resp:  [16-20]   BP: (104-155)/(53-82)   SpO2:  [92 %-97 %] .   Home meds for hypertension were reviewed and noted below-  Hypertension Medications             lisinopriL (PRINIVIL,ZESTRIL) 2.5 MG tablet Take 1 tablet (2.5 mg total) by mouth every evening.    metoprolol succinate (TOPROL-XL) 50 MG 24 hr tablet Take 1 tablet (50 mg total) by mouth once daily.    nitroGLYCERIN (NITROSTAT) 0.4 MG SL tablet Place 1 tablet (0.4 mg total) under the tongue every 5 (five) minutes as needed for Chest pain.          While in the hospital, will manage blood pressure as follows; Continue home antihypertensive regimen    Will utilize p.r.n. blood pressure medication only if patient's blood pressure greater than 180/110 and she develops symptoms such as worsening chest pain or shortness of breath.        Hyperlipidemia  Chronic, stable  Cont home  statin        VTE Risk Mitigation (From admission, onward)         Ordered     IP VTE HIGH RISK PATIENT  Once         09/27/23 2329     Place sequential compression device  Until discontinued         09/27/23 2329     enoxaparin injection 40 mg  Daily         09/27/23 2329                Discharge Planning   NAKUL: 9/28/2023     Code Status: Full Code   Is the patient medically ready for discharge?:     Reason for patient still in hospital (select all that apply): Treatment and Consult recommendations                     Kaylyn Bazzi NP  Department of Hospital Medicine   [facility]

## 2023-09-28 NOTE — ASSESSMENT & PLAN NOTE
Continue to follow up as an outpatient for further management [facility] oncology and radiation team

## 2023-09-28 NOTE — ASSESSMENT & PLAN NOTE
Pt is dehydrated due to poor p.o. intake given esophagitis  Start Carafate for esophagitis  Continue [facility] solution  IV fluids for hydration  Advance diet as tolerated

## 2023-09-28 NOTE — ASSESSMENT & PLAN NOTE
Chronic, controlled.  Latest blood pressure and vitals reviewed-     Temp:  [97.6 °F (36.4 °C)-100.2 °F (37.9 °C)]   Pulse:  []   Resp:  [16-20]   BP: (104-155)/(53-82)   SpO2:  [92 %-97 %] .   Home meds for hypertension were reviewed and noted below-  Hypertension Medications             lisinopriL (PRINIVIL,ZESTRIL) 2.5 MG tablet Take 1 tablet (2.5 mg total) by mouth every evening.    metoprolol succinate (TOPROL-XL) 50 MG 24 hr tablet Take 1 tablet (50 mg total) by mouth once daily.    nitroGLYCERIN (NITROSTAT) 0.4 MG SL tablet Place 1 tablet (0.4 mg total) under the tongue every 5 (five) minutes as needed for Chest pain.          While in the hospital, will manage blood pressure as follows; Continue home antihypertensive regimen    Will utilize p.r.n. blood pressure medication only if patient's blood pressure greater than 180/110 and she develops symptoms such as worsening chest pain or shortness of breath.

## 2023-09-28 NOTE — ASSESSMENT & PLAN NOTE
Patient with neutropenic fever with moderate neutropenia, ANC is 1000  Low-grade fever here  Overall very decompensated  No clear source of her fever though she did have exposure to sick grandchildren recently who had viral upper respiratory infections  Workup thus far is negative  Continue meropenem and given neutropenic fever for now  Check respiratory viral panel  Follow urine culture and blood culture results

## 2023-09-28 NOTE — HPI
Patient is a 68-year-old female with a history of metastatic adenocarcinoma of the lung who is currently on chemotherapy.  She is received chemotherapy and radiation treatments and has esophagitis.  She has had very poor p.o. intake over the past couple of weeks due to severe esophagitis.  She says she is gotten weaker and has become dizzy and lightheaded.  However, she feels that her esophagitis symptoms have actually been improving somewhat and she would like to begin eating more.  She came to the hospital because she started having fevers over the past 2 days and call her oncologist who recommended she come to the hospital for further evaluation given her neutropenia.  She denies any cough, congestion.  No nausea or vomiting.  No diarrhea.  No abdominal pain.  No dysuria or hematuria.  No rashes or lesions anywhere.  No signs of skin infection.  She has no changes to her medication regimen.  She feels that tramadol has been the most helpful for her pain as it does not make her nauseated.    In the ED:  T 100.2°, P 97, R 19, /59, O2 sat 93% on room air.  WBC 2, hemoglobin 11.9, platelets 279, chemistry panel unremarkable, BNP 21, troponin 8.5, lactic acid 0.9, drug screen negative, flu and COVID are negative, urinalysis without signs of infection though she is leukopenic/neutropenic, blood culture drawn, high-risk urine culture ordered.  Chest x-ray without signs of pneumonia.  Will admit for neutropenic fever, dehydration

## 2023-09-28 NOTE — ASSESSMENT & PLAN NOTE
No further fever since admission  Pt reports feeling better today  resp panel neg  Cultures so far negative  Will d/w pts oncologist d/c home vs cont monitoring overnight  Cont IV abx for now

## 2023-09-28 NOTE — ED PROVIDER NOTES
Encounter Date: 9/27/2023       History     Chief Complaint   Patient presents with    Fever     X 2 DAYS    GEN WEAKNESS     X 2 DAYS     This is a 68-year-old female with stage IIIA lung cancer who presents secondary to malaise fatigue body aches chills and fever over the past 2 days.  Last chemotherapy was about 2 weeks ago.  Last radiation therapy was about a week ago.  Temperature has noted to be 101.9.  She has taken Tylenol infrequently for the fever and body aches.  She has had nausea without vomiting.  She denies abdominal pain, chest pain or shortness of breath.  She denies any headache, neck pain or stiffness.  She reports diffuse aches chills and malaise.  She denies dysuria frequency or urgency.  She was told to come to the emergency room secondary to fever and chemotherapy.  She has been lightheaded but denies syncope.  She denies any focal weakness numbness tingling or paresthesias but reports feeling weak in general.  Denies coughing, upper respiratory symptoms or sore throat.  Has chronic unchanged ongoing shortness of breath associated with her cancer but denies any sudden worsening.  Denies any pleuritic pain or discomfort.  Denies any lower extremity pain or swelling.        Review of patient's allergies indicates:   Allergen Reactions    Cephalexin      Other reaction(s): Rash    Doxycycline monohydrate Hives    Lanoxin  [digoxin]      Other reaction(s): Rash    Lansoprazole      Other reaction(s): Rash    Macrobid [nitrofurantoin monohyd/m-cryst] Rash     Past Medical History:   Diagnosis Date    Allergy     Anxiety     Arthritis     CAD (coronary artery disease)     coronary stents    Chronic back pain     lower back pain radiates to left leg    Chronic rhinitis     DAILY (dyspnea on exertion)     Hyperlipidemia     Hypertension     Lung cancer 07/01/2023    Melanoma in situ of left upper extremity     left thigh area    MI (myocardial infarction)     Seasonal allergic rhinitis 10/29/2020      Past Surgical History:   Procedure Laterality Date    BREAST SURGERY      breast reduction    CATARACT EXTRACTION, BILATERAL      coranary stent      EXCISION OF MELANOMA Left 3/30/2022    Procedure: EXCISION, MELANOMA;  Surgeon: David Mcpherson III, MD;  Location: [facility] OR;  Service: General;  Laterality: Left;    EXCISIONAL BIOPSY Left 3/30/2022    Procedure: EXCISIONAL BIOPSY;  Surgeon: David Mcpherson III, MD;  Location: [facility] OR;  Service: General;  Laterality: Left;    HYSTERECTOMY      total    INSERTION OF TUNNELED CENTRAL VENOUS CATHETER (CVC) WITH SUBCUTANEOUS PORT N/A 2023    Procedure: MCMEXAOYA-PSNJ-P-CATH;  Surgeon: Remi Mckeon Jr., MD;  Location: [facility] OR;  Service: General;  Laterality: N/A;    LEFT HEART CATHETERIZATION Left 10/16/2020    Procedure: Left heart cath;  Surgeon: Garrett Robins MD;  Location: [facility] CATH/EP LAB;  Service: Cardiology;  Laterality: Left;    OOPHORECTOMY      TOTAL REDUCTION MAMMOPLASTY Bilateral      Family History   Problem Relation Age of Onset    Cancer Mother         breast, ovarian, cervical     Heart disease Mother     Breast cancer Mother 50    Ovarian cancer Mother     Cancer Father         melanoma    Stroke Sister     Heart disease Sister     Cancer Sister         leukemia    Macular degeneration Sister     Heart disease Sister     Heart disease Brother     Lung cancer Brother     Cancer Maternal Uncle     Cancer Paternal Aunt         breast    Breast cancer Paternal Aunt 60    Cancer Paternal Uncle     Heart disease Maternal Grandmother     No Known Problems Daughter     No Known Problems Son      Social History     Tobacco Use    Smoking status: Former     Current packs/day: 0.00     Average packs/day: 1 pack/day for 43.2 years (43.2 ttl pk-yrs)     Types: Cigarettes, Vaping with nicotine     Start date:      Quit date: 3/4/2017     Years since quittin.5    Smokeless tobacco: Never   Substance Use Topics    Alcohol use: Not  Currently     Alcohol/week: 0.0 standard drinks of alcohol     Comment: sometimes    Drug use: No     Review of Systems   Constitutional:  Positive for activity change, appetite change, chills, fatigue and fever.   HENT: Negative.  Negative for congestion, ear pain, rhinorrhea, sore throat and trouble swallowing.    Eyes: Negative.  Negative for photophobia, pain, redness and visual disturbance.   Respiratory: Negative.  Negative for cough, chest tightness, shortness of breath and wheezing.    Cardiovascular: Negative.  Negative for chest pain, palpitations and leg swelling.   Gastrointestinal:  Positive for nausea. Negative for abdominal distention, abdominal pain, blood in stool, constipation, diarrhea and vomiting.   Endocrine: Negative.    Genitourinary: Negative.  Negative for decreased urine volume, difficulty urinating, dysuria, flank pain, frequency, pelvic pain and urgency.   Musculoskeletal:  Positive for myalgias. Negative for arthralgias, back pain, gait problem, neck pain and neck stiffness.   Skin: Negative.  Negative for rash.   Neurological:  Positive for light-headedness. Negative for dizziness, syncope, facial asymmetry, speech difficulty, weakness, numbness and headaches.   Hematological:  Does not bruise/bleed easily.   Psychiatric/Behavioral: Negative.  Negative for confusion.    All other systems reviewed and are negative.      Physical Exam     Initial Vitals [09/27/23 1517]   BP Pulse Resp Temp SpO2   113/60 106 20 99.6 °F (37.6 °C) (!) 92 %      MAP       --         Physical Exam    Nursing note and vitals reviewed.  Constitutional: She is cooperative. She appears ill. No distress.   HENT:   Head: Normocephalic and atraumatic.   Right Ear: Tympanic membrane normal.   Left Ear: Tympanic membrane normal.   Nose: Nose normal.   Mouth/Throat: Uvula is midline and oropharynx is clear and moist. Mucous membranes are dry. No oral lesions. No uvula swelling. No oropharyngeal exudate, posterior  oropharyngeal edema or posterior oropharyngeal erythema.   Eyes: Conjunctivae, EOM and lids are normal. Pupils are equal, round, and reactive to light. No scleral icterus.   Neck: Trachea normal and phonation normal. Neck supple. No thyroid mass and no thyromegaly present. No stridor present. No JVD present.   Normal range of motion.   Full passive range of motion without pain.     Cardiovascular:  Normal rate, regular rhythm, normal heart sounds, intact distal pulses and normal pulses.     Exam reveals no gallop, no distant heart sounds, no friction rub and no decreased pulses.       No murmur heard.  Pulmonary/Chest: Effort normal and breath sounds normal. No accessory muscle usage or stridor. No tachypnea. No respiratory distress. She has no wheezes. She has no rhonchi. She has no rales.   Abdominal: Abdomen is soft. Bowel sounds are normal. She exhibits no distension, no pulsatile midline mass and no mass. There is no abdominal tenderness. There is no rigidity and no guarding.   Musculoskeletal:         General: No tenderness or edema. Normal range of motion.      Right hand: Normal. Normal range of motion. Normal strength. Normal sensation. Normal capillary refill. Normal pulse.      Left hand: Normal. Normal range of motion. Normal strength. Normal sensation. Normal capillary refill. Normal pulse.      Cervical back: Normal, full passive range of motion without pain, normal range of motion and neck supple. No edema, erythema, rigidity or bony tenderness. No spinous process tenderness or muscular tenderness. Normal range of motion.      Thoracic back: Normal. No bony tenderness. Normal range of motion.      Lumbar back: Normal. No bony tenderness. Normal range of motion.      Right foot: Normal. Normal range of motion and normal capillary refill. No tenderness or bony tenderness. Normal pulse.      Left foot: Normal. Normal range of motion and normal capillary refill. No tenderness or bony tenderness. Normal  pulse.      Comments: Pulses are 2+ throughout, cap refill is less than 2 sec throughout, extremities are nontender throughout with full range of motion. There is no spinal tenderness to palpation.     Neurological: She is alert and oriented to person, place, and time. She has normal strength. She displays normal reflexes. No cranial nerve deficit or sensory deficit. Gait normal.   No focal deficits.   Skin: Skin is warm, dry and intact. Capillary refill takes 2 to 3 seconds. No ecchymosis, no petechiae and no rash noted. No erythema. No pallor.   Psychiatric: She has a normal mood and affect. Her speech is normal and behavior is normal. Judgment and thought content normal. Cognition and memory are normal.         ED Course   Procedures  Labs Reviewed   CBC W/ AUTO DIFFERENTIAL - Abnormal; Notable for the following components:       Result Value    WBC 2.01 (*)     RBC 3.85 (*)     Hemoglobin 11.9 (*)     Hematocrit 36.2 (*)     RDW 15.5 (*)     Gran # (ANC) 1.0 (*)     Lymph # 0.2 (*)     Lymph % 9.8 (*)     Mono % 29.5 (*)     All other components within normal limits   URINALYSIS, REFLEX TO URINE CULTURE - Abnormal; Notable for the following components:    Protein, UA Trace (*)     Ketones, UA 1+ (*)     Occult Blood UA 1+ (*)     Urobilinogen, UA 2.0-3.0 (*)     All other components within normal limits    Narrative:     Specimen Source->Urine   URINALYSIS MICROSCOPIC - Abnormal; Notable for the following components:    RBC, UA 7 (*)     Hyaline Casts, UA 7 (*)     All other components within normal limits    Narrative:     Specimen Source->Urine   GROUP A STREP, MOLECULAR   CULTURE, BLOOD   CULTURE, BLOOD   MAGNESIUM   COMPREHENSIVE METABOLIC PANEL   TROPONIN I HIGH SENSITIVITY   B-TYPE NATRIURETIC PEPTIDE   SARS-COV-2 RNA AMPLIFICATION, QUAL   INFLUENZA A AND B ANTIGEN    Narrative:     Specimen Source->Nasopharyngeal Swab   TROPONIN I HIGH SENSITIVITY   LACTIC ACID, PLASMA   LIPASE   CK   TSH   LIPASE   CK    DRUG SCREEN PANEL, URINE EMERGENCY   TYPE & SCREEN        ECG Results              EKG 12-lead (In process)  Result time 09/27/23 15:36:26      In process by Interface, Lab In [facility] (09/27/23 15:36:26)                   Narrative:    Test Reason : R07.9,    Vent. Rate : 098 BPM     Atrial Rate : 098 BPM     P-R Int : 144 ms          QRS Dur : 098 ms      QT Int : 360 ms       P-R-T Axes : 036 009 070 degrees     QTc Int : 459 ms    Normal sinus rhythm  Normal ECG  When compared with ECG of 02-AUG-2023 11:29,  No significant change was found    Referred By: [email or web address]   SELF           Confirmed By:                                   Imaging Results              X-Ray Chest AP Portable (Final result)  Result time 09/27/23 15:59:14      Final result by Jorge Welch MD (09/27/23 15:59:14)                   Narrative:    HISTORY: Chest pain.    FINDINGS: Portable chest radiograph at 1538 hours compared to 08/04/2023 shows left internal jugular injection port and central venous catheter, unchanged in position with distal tip overlying the SVC. The cardiomediastinal silhouette and pulmonary vasculature are within normal limits, with changes of prior coronary endovascular stenting.    The lungs are normally expanded, with no consolidation, large pleural effusion, or evidence of pulmonary edema. No confluent infiltrates or pneumothorax. The bones are diffusely osteopenic.    IMPRESSION: No evidence of acute cardiopulmonary disease.    Electronically signed by:  Jorge Welch MD  09/27/2023 03:59 PM CDT Workstation: [id number]                                     Medications   sodium chloride 0.9% bolus 1,000 mL 1,000 mL (1,000 mLs Intravenous New Bag 9/27/23 1858)   meropenem 1 g in sodium chloride 0.9 % 100 mL IVPB (ready to mix system) (0 g Intravenous Stopped 9/27/23 1848)   acetaminophen tablet 1,000 mg (1,000 mg Oral Given 9/27/23 1811)     Medical Decision Making  Emergent evaluation of a 68-year-old female  currently receiving chemotherapy with reports of fever malaise and fatigue.  Found to be febrile.  Patient with low blood pressure that has improved with IV fluids.  Temperature did improve with oral acetaminophen however has reoccur.  No specific focus determined as per lab workup or physical exam.  Blood cultures obtained.  Broad-spectrum IV antibiotics given.  Neutropenia noted however ANC is not blow 500.  Patient is ill-appearing however.  Lactic acid is not elevated.  Patient has had improvement with ED treatment.  I have discussed the case with the hospitalist provider who has assumed care will admit.      Amount and/or Complexity of Data Reviewed  Labs: ordered. Decision-making details documented in ED Course.  Radiology:  Decision-making details documented in ED Course.  ECG/medicine tests:  Decision-making details documented in ED Course.    Risk  OTC drugs.                               Clinical Impression:   Final diagnoses:  [R07.9] Chest pain               Avis Borden MD  09/27/23 1936

## 2023-09-28 NOTE — ASSESSMENT & PLAN NOTE
Continue to follow up as an outpatient for further management [facility] oncology and radiation team  Pt to start immunotherapy next month

## 2023-09-28 NOTE — H&P
[facility] - Emergency Dept  Hospital Medicine  History & Physical    Patient Name: Aysha Moore  MRN: [id number]  Patient Class: IP- Inpatient  Admission Date: 9/27/2023  Attending Physician: Earl Rojas MD   Primary Care Provider: Yoni Daniels MD         Patient information was obtained from patient and ER records.     Subjective:     Principal Problem:Neutropenic fever    Chief Complaint:   Chief Complaint   Patient presents with    Fever     X 2 DAYS    GEN WEAKNESS     X 2 DAYS        HPI: Patient is a 68-year-old female with a history of metastatic adenocarcinoma of the lung who is currently on chemotherapy.  She is received chemotherapy and radiation treatments and has esophagitis.  She has had very poor p.o. intake over the past couple of weeks due to severe esophagitis.  She says she is gotten weaker and has become dizzy and lightheaded.  However, she feels that her esophagitis symptoms have actually been improving somewhat and she would like to begin eating more.  She came to the hospital because she started having fevers over the past 2 days and call her oncologist who recommended she come to the hospital for further evaluation given her neutropenia.  She denies any cough, congestion.  No nausea or vomiting.  No diarrhea.  No abdominal pain.  No dysuria or hematuria.  No rashes or lesions anywhere.  No signs of skin infection.  She has no changes to her medication regimen.  She feels that tramadol has been the most helpful for her pain as it does not make her nauseated.    In the ED:  T 100.2°, P 97, R 19, /59, O2 sat 93% on room air.  WBC 2, hemoglobin 11.9, platelets 279, chemistry panel unremarkable, BNP 21, troponin 8.5, lactic acid 0.9, drug screen negative, flu and COVID are negative, urinalysis without signs of infection though she is leukopenic/neutropenic, blood culture drawn, high-risk urine culture ordered.  Chest x-ray without signs of pneumonia.  Will  admit for neutropenic fever, dehydration      Past Medical History:   Diagnosis Date    Allergy     Anxiety     Arthritis     CAD (coronary artery disease)     coronary stents    Chronic back pain     lower back pain radiates to left leg    Chronic rhinitis     DAILY (dyspnea on exertion)     Hyperlipidemia     Hypertension     Lung cancer 07/01/2023    Melanoma in situ of left upper extremity     left thigh area    MI (myocardial infarction)     Seasonal allergic rhinitis 10/29/2020       Past Surgical History:   Procedure Laterality Date    BREAST SURGERY      breast reduction    CATARACT EXTRACTION, BILATERAL      coranary stent      EXCISION OF MELANOMA Left 3/30/2022    Procedure: EXCISION, MELANOMA;  Surgeon: David Mcpherson III, MD;  Location: [facility] OR;  Service: General;  Laterality: Left;    EXCISIONAL BIOPSY Left 3/30/2022    Procedure: EXCISIONAL BIOPSY;  Surgeon: David Mcpherson III, MD;  Location: [facility] OR;  Service: General;  Laterality: Left;    HYSTERECTOMY      total    INSERTION OF TUNNELED CENTRAL VENOUS CATHETER (CVC) WITH SUBCUTANEOUS PORT N/A 8/4/2023    Procedure: [id number]-CATH;  Surgeon: Remi Mckeon Jr., MD;  Location: [facility] OR;  Service: General;  Laterality: N/A;    LEFT HEART CATHETERIZATION Left 10/16/2020    Procedure: Left heart cath;  Surgeon: Garertt Robins MD;  Location: [facility] CATH/EP LAB;  Service: Cardiology;  Laterality: Left;    OOPHORECTOMY      TOTAL REDUCTION MAMMOPLASTY Bilateral 2000       Review of patient's allergies indicates:   Allergen Reactions    Cephalexin      Other reaction(s): Rash    Doxycycline monohydrate Hives    Lanoxin  [digoxin]      Other reaction(s): Rash    Lansoprazole      Other reaction(s): Rash    Macrobid [nitrofurantoin monohyd/m-cryst] Rash       No current facility-administered medications on file prior to encounter.     Current Outpatient Medications on File Prior to Encounter   Medication Sig     ALPRAZolam (XANAX) 0.25 MG tablet Take 1 tablet (0.25 mg total) by mouth 3 (three) times daily as needed for Anxiety. (Patient taking differently: Take 0.25 mg by mouth 2 (two) times daily as needed for Anxiety.)    atorvastatin (LIPITOR) 80 MG tablet Take 1 tablet (80 mg total) by mouth every evening.    azelastine (ASTELIN) 137 mcg (0.1 %) nasal spray 1 SPRAY (137 MCG TOTAL) BY NASAL ROUTE 2 (TWO) TIMES DAILY AS NEEDED FOR RHINITIS (OR SINUSITIS).    calcium-vitamin D3 (OS-JOLIE 500 + D3) 500 mg-5 mcg (200 unit) per tablet Take 1 tablet by mouth every morning.    ezetimibe (ZETIA) 10 mg tablet Take 1 tablet (10 mg total) by mouth once daily.    famotidine (PEPCID) 40 MG tablet Take 1 tablet (40 mg total) by mouth every evening.    metoprolol succinate (TOPROL-XL) 50 MG 24 hr tablet Take 1 tablet (50 mg total) by mouth once daily.    ondansetron (ZOFRAN) 8 MG tablet Take 1 tablet (8 mg total) by mouth every 8 (eight) hours as needed.    promethazine (PHENERGAN) 25 MG tablet Take 1 tablet (25 mg total) by mouth every 4 to 6 hours as needed. (Patient taking differently: Take 25 mg by mouth every 4 to 6 hours as needed for Nausea.)    tiZANidine (ZANAFLEX) 4 MG tablet TAKE 1 TABLET (4 MG TOTAL) BY MOUTH EVERY 6 (SIX) HOURS AS NEEDED. BACK PAIN (Patient taking differently: Take 4 mg by mouth every 6 (six) hours as needed (back pain). Back pain)    traMADoL (ULTRAM) 50 mg tablet Take 1 tablet (50 mg total) by mouth every 6 (six) hours as needed for Pain.    zinc 50 mg Tab Take 1 tablet by mouth once daily.    ammonium lactate 12 % Crea aaa bid prn dry skin    busPIRone (BUSPAR) 5 MG Tab TAKE 1 TABLET BY MOUTH TWICE A DAY (Patient taking differently: Take 5 mg by mouth every evening.)    citalopram (CELEXA) 20 MG tablet TAKE 1 TABLET BY MOUTH EVERY DAY (Patient taking differently: Take 20 mg by mouth once daily.)    clobetasol 0.05% (TEMOVATE) 0.05 % Oint Apply topically 2 (two) times daily. for 14 days  (Patient taking differently: Apply 1 g topically 2 (two) times daily.)    fluticasone propionate (FLONASE) 50 mcg/actuation nasal spray 1 spray (50 mcg total) by Each Nostril route daily as needed for Rhinitis or Allergies.    HYDROcodone-acetaminophen (NORCO) 5-325 mg per tablet Take 1 tablet by mouth every 4 to 6 hours as needed for Pain.    lisinopriL (PRINIVIL,ZESTRIL) 2.5 MG tablet Take 1 tablet (2.5 mg total) by mouth every evening.    nitroGLYCERIN (NITROSTAT) 0.4 MG SL tablet Place 1 tablet (0.4 mg total) under the tongue every 5 (five) minutes as needed for Chest pain.    omeprazole (PRILOSEC) 40 MG capsule Take 1 capsule (40 mg total) by mouth once daily.    valACYclovir (VALTREX) 1000 MG tablet TAKE 2 AT ONSET OF FEVER BLISTERS THEN REPEAT IN 12 HOURS (TOTAL 4 TABS PER EPISODE) (Patient taking differently: Take 2 tablets by mouth every 12 (twelve) hours. Take 2 at onset of fever blisters then repeat in 12 hours (total 4 tabs per episode))    [DISCONTINUED] duke's soln (benadryl 30 mL, mylanta 30 mL, LIDOcaine 30 mL, nystatin 30 mL) 120mL Take 10 mLs by mouth 4 (four) times daily.     Family History       Problem Relation (Age of Onset)    Breast cancer Mother (50), Paternal Aunt (60)    Cancer Mother, Father, Sister, Maternal Uncle, Paternal Aunt, Paternal Uncle    Heart disease Mother, Sister, Sister, Brother, Maternal Grandmother    Lung cancer Brother    Macular degeneration Sister    No Known Problems Daughter, Son    Ovarian cancer Mother    Stroke Sister          Tobacco Use    Smoking status: Former     Current packs/day: 0.00     Average packs/day: 1 pack/day for 43.2 years (43.2 ttl pk-yrs)     Types: Cigarettes, Vaping with nicotine     Start date:      Quit date: 3/4/2017     Years since quittin.5    Smokeless tobacco: Never   Substance and Sexual Activity    Alcohol use: Not Currently     Alcohol/week: 0.0 standard drinks of alcohol     Comment: sometimes    Drug use: No     Sexual activity: Yes     Partners: Male     Review of Systems   All other systems reviewed and are negative.    Objective:     Vital Signs (Most Recent):  Temp: 100.2 °F (37.9 °C) (09/27/23 1750)  Pulse: 95 (09/27/23 1830)  Resp: 19 (09/27/23 1830)  BP: (!) 123/57 (09/27/23 1830)  SpO2: 95 % (09/27/23 1830) Vital Signs (24h Range):  Temp:  [99.6 °F (37.6 °C)-100.2 °F (37.9 °C)] 100.2 °F (37.9 °C)  Pulse:  [] 95  Resp:  [17-20] 19  SpO2:  [92 %-97 %] 95 %  BP: (113-155)/(54-82) 123/57     Weight: 62.1 kg (137 lb)  Body mass index is 24.27 kg/m².     Physical Exam  Constitutional:       Appearance: Normal appearance. She is normal weight.   HENT:      Head: Normocephalic and atraumatic.      Nose: Nose normal.      Mouth/Throat:      Mouth: Mucous membranes are moist.   Eyes:      Conjunctiva/sclera: Conjunctivae normal.   Cardiovascular:      Rate and Rhythm: Normal rate and regular rhythm.      Pulses: Normal pulses.      Heart sounds: Normal heart sounds. No murmur heard.     No friction rub. No gallop.   Pulmonary:      Effort: Pulmonary effort is normal.      Breath sounds: Normal breath sounds. No wheezing or rales.   Abdominal:      General: Abdomen is flat. Bowel sounds are normal. There is no distension.      Palpations: Abdomen is soft.      Tenderness: There is no abdominal tenderness. There is no guarding.   Musculoskeletal:         General: No swelling. Normal range of motion.      Cervical back: Normal range of motion and neck supple.   Skin:     General: Skin is warm and dry.   Neurological:      General: No focal deficit present.      Mental Status: She is alert.   Psychiatric:         Mood and Affect: Mood normal.         Thought Content: Thought content normal.         Judgment: Judgment normal.                Significant Labs: All pertinent labs within the past 24 hours have been reviewed.    Significant Imaging: I have reviewed all pertinent imaging results/findings within the past 24  hours.    Assessment/Plan:     * Neutropenic fever  Patient with neutropenic fever with moderate neutropenia, ANC is 1000  Low-grade fever here  Overall very decompensated  No clear source of her fever though she did have exposure to sick grandchildren recently who had viral upper respiratory infections  Workup thus far is negative  Continue meropenem and given neutropenic fever for now  Check respiratory viral panel  Follow urine culture and blood culture results      Dehydration  Pt is dehydrated due to poor p.o. intake given esophagitis  Start Carafate for esophagitis  Continue [employer] solution  IV fluids for hydration  Advance diet as tolerated      LUNG CANCER-ADENOCARCINOMA OF THE LLL  Continue to follow up as an outpatient for further management [facility] oncology and radiation team      Hypertension  coontinue home BP medications      Hyperlipidemia  lipitor         VTE Risk Mitigation (From admission, onward)    None                     Earl Rojas MD  Department of Hospital Medicine  [facility] - Emergency Dept

## 2023-09-28 NOTE — SUBJECTIVE & OBJECTIVE
Interval History:  Notes reviewed, no acute events overnight.  Patient resting comfortably in bed.  She denies acute complaints.  States she feels much better today.  She tolerated approximately 25% for breakfast but states that that is all she wants to eat.  She has remained afebrile since admission.  Cultures remain negative.  Advised patient will discuss with her oncologist if she needs continue monitoring in the hospital vs possible discharge home this evening.  Patient verbalized understanding and appreciative of care.    Review of Systems   Constitutional:  Positive for appetite change and fatigue. Negative for chills and fever.   HENT:  Negative for congestion, sore throat and trouble swallowing.    Respiratory:  Negative for cough and shortness of breath.    Cardiovascular:  Negative for chest pain.   Gastrointestinal:  Negative for abdominal pain, diarrhea, nausea and vomiting.   Genitourinary:  Negative for difficulty urinating, dysuria and urgency.   Musculoskeletal:  Negative for arthralgias, back pain and gait problem.   Skin:  Negative for color change, pallor, rash and wound.   Neurological:  Negative for dizziness, weakness and light-headedness.   Psychiatric/Behavioral:  Negative for agitation, behavioral problems and confusion.    All other systems reviewed and are negative.    Objective:     Vital Signs (Most Recent):  Temp: 98.6 °F (37 °C) (09/28/23 0342)  Pulse: 106 (09/28/23 0826)  Resp: 20 (09/28/23 0822)  BP: 112/69 (09/28/23 0826)  SpO2: 96 % (09/27/23 2315) Vital Signs (24h Range):  Temp:  [97.6 °F (36.4 °C)-100.2 °F (37.9 °C)] 98.6 °F (37 °C)  Pulse:  [] 106  Resp:  [16-20] 20  SpO2:  [92 %-97 %] 96 %  BP: (104-155)/(53-82) 112/69     Weight: 61.7 kg (136 lb 1.6 oz)  Body mass index is 24.11 kg/m².    Intake/Output Summary (Last 24 hours) at 9/28/2023 1150  Last data filed at 9/28/2023 0343  Gross per 24 hour   Intake 120 ml   Output 500 ml   Net -380 ml         Physical Exam  Vitals  and nursing note reviewed.   Constitutional:       General: She is not in acute distress.     Appearance: Normal appearance. She is well-developed. She is not ill-appearing or diaphoretic.   HENT:      Head: Normocephalic and atraumatic.      Right Ear: External ear normal.      Left Ear: External ear normal.      Nose: Nose normal. No congestion or rhinorrhea.      Mouth/Throat:      Mouth: Mucous membranes are moist.      Pharynx: Oropharynx is clear. No oropharyngeal exudate or posterior oropharyngeal erythema.   Eyes:      General: No scleral icterus.     Conjunctiva/sclera: Conjunctivae normal.      Pupils: Pupils are equal, round, and reactive to light.   Neck:      Vascular: No JVD.   Cardiovascular:      Rate and Rhythm: Normal rate and regular rhythm.      Pulses: Normal pulses.      Heart sounds: Normal heart sounds. No murmur heard.  Pulmonary:      Effort: Pulmonary effort is normal. No respiratory distress.      Breath sounds: Normal breath sounds. No stridor. No wheezing, rhonchi or rales.   Abdominal:      General: Bowel sounds are normal. There is no distension.      Palpations: Abdomen is soft.      Tenderness: There is no abdominal tenderness.   Musculoskeletal:         General: No swelling or tenderness. Normal range of motion.      Cervical back: Normal range of motion and neck supple.   Skin:     General: Skin is warm and dry.      Capillary Refill: Capillary refill takes 2 to 3 seconds.      Coloration: Skin is not jaundiced or pale.      Findings: No erythema.   Neurological:      General: No focal deficit present.      Mental Status: She is alert and oriented to person, place, and time.      Cranial Nerves: No cranial nerve deficit.      Sensory: No sensory deficit.   Psychiatric:         Mood and Affect: Mood normal.         Behavior: Behavior normal.         Thought Content: Thought content normal.             Significant Labs: All pertinent labs within the past 24 hours have been  reviewed.  CBC:   Recent Labs   Lab 09/27/23  1552 09/28/23  0351   WBC 2.01* 1.67*   HGB 11.9* 10.1*   HCT 36.2* 30.4*    239     CMP:   Recent Labs   Lab 09/27/23  1552 09/28/23  0351    137   K 4.5 3.9    105   CO2 27 29    105   BUN 12 10   CREATININE 0.7 0.6   CALCIUM 9.2 8.4*   PROT 7.8 6.2   ALBUMIN 4.1 3.4*   BILITOT 0.8 0.7   ALKPHOS 79 64   AST 18 16   ALT 13 13   ANIONGAP 9 3*       Significant Imaging: I have reviewed all pertinent imaging results/findings within the past 24 hours.

## 2023-09-28 NOTE — SUBJECTIVE & OBJECTIVE
Past Medical History:   Diagnosis Date    Allergy     Anxiety     Arthritis     CAD (coronary artery disease)     coronary stents    Chronic back pain     lower back pain radiates to left leg    Chronic rhinitis     DAILY (dyspnea on exertion)     Hyperlipidemia     Hypertension     Lung cancer 07/01/2023    Melanoma in situ of left upper extremity     left thigh area    MI (myocardial infarction)     Seasonal allergic rhinitis 10/29/2020       Past Surgical History:   Procedure Laterality Date    BREAST SURGERY      breast reduction    CATARACT EXTRACTION, BILATERAL      coranary stent      EXCISION OF MELANOMA Left 3/30/2022    Procedure: EXCISION, MELANOMA;  Surgeon: David Mcpherson III, MD;  Location: [facility] OR;  Service: General;  Laterality: Left;    EXCISIONAL BIOPSY Left 3/30/2022    Procedure: EXCISIONAL BIOPSY;  Surgeon: David Mcpherson III, MD;  Location: [facility] OR;  Service: General;  Laterality: Left;    HYSTERECTOMY      total    INSERTION OF TUNNELED CENTRAL VENOUS CATHETER (CVC) WITH SUBCUTANEOUS PORT N/A 8/4/2023    Procedure: [id number];  Surgeon: Remi Mckeon Jr., MD;  Location: [facility] OR;  Service: General;  Laterality: N/A;    LEFT HEART CATHETERIZATION Left 10/16/2020    Procedure: Left heart cath;  Surgeon: Garrett Robins MD;  Location: [facility] CATH/EP LAB;  Service: Cardiology;  Laterality: Left;    OOPHORECTOMY      TOTAL REDUCTION MAMMOPLASTY Bilateral 2000       Review of patient's allergies indicates:   Allergen Reactions    Cephalexin      Other reaction(s): Rash    Doxycycline monohydrate Hives    Lanoxin  [digoxin]      Other reaction(s): Rash    Lansoprazole      Other reaction(s): Rash    Macrobid [nitrofurantoin monohyd/m-cryst] Rash       No current facility-administered medications on file prior to encounter.     Current Outpatient Medications on File Prior to Encounter   Medication Sig    ALPRAZolam (XANAX) 0.25 MG tablet Take 1 tablet (0.25 mg total) by mouth  3 (three) times daily as needed for Anxiety. (Patient taking differently: Take 0.25 mg by mouth 2 (two) times daily as needed for Anxiety.)    atorvastatin (LIPITOR) 80 MG tablet Take 1 tablet (80 mg total) by mouth every evening.    azelastine (ASTELIN) 137 mcg (0.1 %) nasal spray 1 SPRAY (137 MCG TOTAL) BY NASAL ROUTE 2 (TWO) TIMES DAILY AS NEEDED FOR RHINITIS (OR SINUSITIS).    calcium-vitamin D3 (OS-JOLIE 500 + D3) 500 mg-5 mcg (200 unit) per tablet Take 1 tablet by mouth every morning.    ezetimibe (ZETIA) 10 mg tablet Take 1 tablet (10 mg total) by mouth once daily.    famotidine (PEPCID) 40 MG tablet Take 1 tablet (40 mg total) by mouth every evening.    metoprolol succinate (TOPROL-XL) 50 MG 24 hr tablet Take 1 tablet (50 mg total) by mouth once daily.    ondansetron (ZOFRAN) 8 MG tablet Take 1 tablet (8 mg total) by mouth every 8 (eight) hours as needed.    promethazine (PHENERGAN) 25 MG tablet Take 1 tablet (25 mg total) by mouth every 4 to 6 hours as needed. (Patient taking differently: Take 25 mg by mouth every 4 to 6 hours as needed for Nausea.)    tiZANidine (ZANAFLEX) 4 MG tablet TAKE 1 TABLET (4 MG TOTAL) BY MOUTH EVERY 6 (SIX) HOURS AS NEEDED. BACK PAIN (Patient taking differently: Take 4 mg by mouth every 6 (six) hours as needed (back pain). Back pain)    traMADoL (ULTRAM) 50 mg tablet Take 1 tablet (50 mg total) by mouth every 6 (six) hours as needed for Pain.    zinc 50 mg Tab Take 1 tablet by mouth once daily.    ammonium lactate 12 % Crea aaa bid prn dry skin    busPIRone (BUSPAR) 5 MG Tab TAKE 1 TABLET BY MOUTH TWICE A DAY (Patient taking differently: Take 5 mg by mouth every evening.)    citalopram (CELEXA) 20 MG tablet TAKE 1 TABLET BY MOUTH EVERY DAY (Patient taking differently: Take 20 mg by mouth once daily.)    clobetasol 0.05% (TEMOVATE) 0.05 % Oint Apply topically 2 (two) times daily. for 14 days (Patient taking differently: Apply 1 g topically 2 (two) times daily.)    fluticasone  propionate (FLONASE) 50 mcg/actuation nasal spray 1 spray (50 mcg total) by Each Nostril route daily as needed for Rhinitis or Allergies.    HYDROcodone-acetaminophen (NORCO) 5-325 mg per tablet Take 1 tablet by mouth every 4 to 6 hours as needed for Pain.    lisinopriL (PRINIVIL,ZESTRIL) 2.5 MG tablet Take 1 tablet (2.5 mg total) by mouth every evening.    nitroGLYCERIN (NITROSTAT) 0.4 MG SL tablet Place 1 tablet (0.4 mg total) under the tongue every 5 (five) minutes as needed for Chest pain.    omeprazole (PRILOSEC) 40 MG capsule Take 1 capsule (40 mg total) by mouth once daily.    valACYclovir (VALTREX) 1000 MG tablet TAKE 2 AT ONSET OF FEVER BLISTERS THEN REPEAT IN 12 HOURS (TOTAL 4 TABS PER EPISODE) (Patient taking differently: Take 2 tablets by mouth every 12 (twelve) hours. Take 2 at onset of fever blisters then repeat in 12 hours (total 4 tabs per episode))    [DISCONTINUED] duke's soln (benadryl 30 mL, mylanta 30 mL, LIDOcaine 30 mL, nystatin 30 mL) 120mL Take 10 mLs by mouth 4 (four) times daily.     Family History       Problem Relation (Age of Onset)    Breast cancer Mother (50), Paternal Aunt (60)    Cancer Mother, Father, Sister, Maternal Uncle, Paternal Aunt, Paternal Uncle    Heart disease Mother, Sister, Sister, Brother, Maternal Grandmother    Lung cancer Brother    Macular degeneration Sister    No Known Problems Daughter, Son    Ovarian cancer Mother    Stroke Sister          Tobacco Use    Smoking status: Former     Current packs/day: 0.00     Average packs/day: 1 pack/day for 43.2 years (43.2 ttl pk-yrs)     Types: Cigarettes, Vaping with nicotine     Start date:      Quit date: 3/4/2017     Years since quittin.5    Smokeless tobacco: Never   Substance and Sexual Activity    Alcohol use: Not Currently     Alcohol/week: 0.0 standard drinks of alcohol     Comment: sometimes    Drug use: No    Sexual activity: Yes     Partners: Male     Review of Systems   All other systems reviewed and  are negative.    Objective:     Vital Signs (Most Recent):  Temp: 100.2 °F (37.9 °C) (09/27/23 1750)  Pulse: 95 (09/27/23 1830)  Resp: 19 (09/27/23 1830)  BP: (!) 123/57 (09/27/23 1830)  SpO2: 95 % (09/27/23 1830) Vital Signs (24h Range):  Temp:  [99.6 °F (37.6 °C)-100.2 °F (37.9 °C)] 100.2 °F (37.9 °C)  Pulse:  [] 95  Resp:  [17-20] 19  SpO2:  [92 %-97 %] 95 %  BP: (113-155)/(54-82) 123/57     Weight: 62.1 kg (137 lb)  Body mass index is 24.27 kg/m².     Physical Exam  Constitutional:       Appearance: Normal appearance. She is normal weight.   HENT:      Head: Normocephalic and atraumatic.      Nose: Nose normal.      Mouth/Throat:      Mouth: Mucous membranes are moist.   Eyes:      Conjunctiva/sclera: Conjunctivae normal.   Cardiovascular:      Rate and Rhythm: Normal rate and regular rhythm.      Pulses: Normal pulses.      Heart sounds: Normal heart sounds. No murmur heard.     No friction rub. No gallop.   Pulmonary:      Effort: Pulmonary effort is normal.      Breath sounds: Normal breath sounds. No wheezing or rales.   Abdominal:      General: Abdomen is flat. Bowel sounds are normal. There is no distension.      Palpations: Abdomen is soft.      Tenderness: There is no abdominal tenderness. There is no guarding.   Musculoskeletal:         General: No swelling. Normal range of motion.      Cervical back: Normal range of motion and neck supple.   Skin:     General: Skin is warm and dry.   Neurological:      General: No focal deficit present.      Mental Status: She is alert.   Psychiatric:         Mood and Affect: Mood normal.         Thought Content: Thought content normal.         Judgment: Judgment normal.                Significant Labs: All pertinent labs within the past 24 hours have been reviewed.    Significant Imaging: I have reviewed all pertinent imaging results/findings within the past 24 hours.

## 2023-09-28 NOTE — PLAN OF CARE
[facility]  Initial Discharge Assessment       Primary Care Provider: Yoni Daniels MD    Admission Diagnosis: Chest pain [R07.9]    Admission Date: 9/27/2023  Expected Discharge Date: 9/28/2023      met with Pt at bedside to complete discharge assessment. Pt lives with spouse. Pt AAOx4s. Demographics, PCP, and insurance verified. No home health. No dialysis. Pt reports ability to complete ADLs without assistance. Pt verbalized plan to discharge home via family transport. Pt has no other needs to be addressed at this time.    Transition of Care Barriers: None    Payor: MEDICARE / Plan: MEDICARE PART A & B / Product Type: Government /     Extended Emergency Contact Information  Primary Emergency Contact: Terence Moore  Address: [address] [place] [address] [facility]  Home Phone: [phone]  Mobile Phone: [phone]  Relation: Spouse  Preferred language: English   needed? No  Secondary Emergency Contact: Sree Wayne  Address: Unknown           [place] [address] [place]  Mobile Phone: [phone]  Relation: Son  Preferred language: English   needed? No    Discharge Plan A: Home with family  Discharge Plan B: Home with family      [id number] - [place], [place] - [address]  [address] [address]  Phone: [phone] Fax: [phone]      Initial Assessment (most recent)       Adult Discharge Assessment - 09/28/23 1320          Discharge Assessment    Assessment Type Discharge Planning Assessment     Confirmed/corrected address, phone number and insurance Yes     Confirmed Demographics Correct on Facesheet     Source of Information patient     Does patient/caregiver understand observation status Yes     Communicated NAKUL with patient/caregiver Yes     Reason For Admission Neutropenic fever     People in Home spouse     Facility Arrived From: Home     Do you expect to return to  your current living situation? Yes     Do you have help at home or someone to help you manage your care at home? Yes     Who are your caregiver(s) and their phone number(s)? Terence Moore (Spouse)   [phone] (Mobile)     Prior to hospitilization cognitive status: Alert/Oriented     Current cognitive status: Alert/Oriented     Home Accessibility wheelchair accessible     Home Layout Able to live on 1st floor     Equipment Currently Used at Home none     Readmission within 30 days? No     Patient currently being followed by outpatient case management? No     Do you currently have service(s) that help you manage your care at home? No     Do you take prescription medications? Yes     Do you have prescription coverage? Yes     Coverage Payor:  MEDICARE - MEDICARE PART A & B     Do you have any problems affording any of your prescribed medications? No     Is the patient taking medications as prescribed? yes     Who is going to help you get home at discharge? Terence Moore (Spouse)   [phone] (Mobile)     How do you get to doctors appointments? car, drives self     Are you on dialysis? No     Do you take coumadin? No     DME Needed Upon Discharge  none     Discharge Plan discussed with: Patient     Transition of Care Barriers None     Discharge Plan A Home with family     Discharge Plan B Home with family        OTHER    Name(s) of People in Home Terence Moore (Spouse)   [phone] (Mobile)

## 2023-09-29 ENCOUNTER — TELEPHONE (OUTPATIENT)
Dept: HEMATOLOGY/ONCOLOGY | Facility: CLINIC | Age: 68
End: 2023-09-29

## 2023-09-29 VITALS
DIASTOLIC BLOOD PRESSURE: 62 MMHG | BODY MASS INDEX: 24.12 KG/M2 | SYSTOLIC BLOOD PRESSURE: 118 MMHG | HEART RATE: 79 BPM | RESPIRATION RATE: 18 BRPM | TEMPERATURE: 99 F | HEIGHT: 63 IN | OXYGEN SATURATION: 93 % | WEIGHT: 136.13 LBS

## 2023-09-29 LAB
ALBUMIN SERPL BCP-MCNC: 3.1 G/DL (ref 3.5–5.2)
ALP SERPL-CCNC: 64 U/L (ref 55–135)
ALT SERPL W/O P-5'-P-CCNC: 16 U/L (ref 10–44)
ANION GAP SERPL CALC-SCNC: 3 MMOL/L (ref 8–16)
ANISOCYTOSIS BLD QL SMEAR: SLIGHT
AST SERPL-CCNC: 18 U/L (ref 10–40)
BASOPHILS # BLD AUTO: 0.02 K/UL (ref 0–0.2)
BASOPHILS NFR BLD: 1.2 % (ref 0–1.9)
BILIRUB SERPL-MCNC: 0.6 MG/DL (ref 0.1–1)
BUN SERPL-MCNC: 6 MG/DL (ref 8–23)
CALCIUM SERPL-MCNC: 8.2 MG/DL (ref 8.7–10.5)
CHLORIDE SERPL-SCNC: 103 MMOL/L (ref 95–110)
CO2 SERPL-SCNC: 29 MMOL/L (ref 23–29)
CREAT SERPL-MCNC: 0.5 MG/DL (ref 0.5–1.4)
DIFFERENTIAL METHOD: ABNORMAL
EOSINOPHIL # BLD AUTO: 0.1 K/UL (ref 0–0.5)
EOSINOPHIL NFR BLD: 7 % (ref 0–8)
ERYTHROCYTE [DISTWIDTH] IN BLOOD BY AUTOMATED COUNT: 15.3 % (ref 11.5–14.5)
EST. GFR  (NO RACE VARIABLE): >60 ML/MIN/1.73 M^2
GLUCOSE SERPL-MCNC: 95 MG/DL (ref 70–110)
HCT VFR BLD AUTO: 29.3 % (ref 37–48.5)
HGB BLD-MCNC: 9.8 G/DL (ref 12–16)
IMM GRANULOCYTES # BLD AUTO: 0.01 K/UL (ref 0–0.04)
IMM GRANULOCYTES NFR BLD AUTO: 0.6 % (ref 0–0.5)
LYMPHOCYTES # BLD AUTO: 0.3 K/UL (ref 1–4.8)
LYMPHOCYTES NFR BLD: 14.6 % (ref 18–48)
MAGNESIUM SERPL-MCNC: 1.5 MG/DL (ref 1.6–2.6)
MCH RBC QN AUTO: 31.1 PG (ref 27–31)
MCHC RBC AUTO-ENTMCNC: 33.4 G/DL (ref 32–36)
MCV RBC AUTO: 93 FL (ref 82–98)
MONOCYTES # BLD AUTO: 0.6 K/UL (ref 0.3–1)
MONOCYTES NFR BLD: 33.9 % (ref 4–15)
NEUTROPHILS # BLD AUTO: 0.7 K/UL (ref 1.8–7.7)
NEUTROPHILS NFR BLD: 42.7 % (ref 38–73)
NRBC BLD-RTO: 0 /100 WBC
PLATELET # BLD AUTO: 232 K/UL (ref 150–450)
PLATELET BLD QL SMEAR: ABNORMAL
PMV BLD AUTO: 9.2 FL (ref 9.2–12.9)
POTASSIUM SERPL-SCNC: 4.6 MMOL/L (ref 3.5–5.1)
PROT SERPL-MCNC: 5.7 G/DL (ref 6–8.4)
RBC # BLD AUTO: 3.15 M/UL (ref 4–5.4)
SODIUM SERPL-SCNC: 135 MMOL/L (ref 136–145)
WBC # BLD AUTO: 1.71 K/UL (ref 3.9–12.7)

## 2023-09-29 PROCEDURE — 83735 ASSAY OF MAGNESIUM: CPT | Performed by: NURSE PRACTITIONER

## 2023-09-29 PROCEDURE — 25000003 PHARM REV CODE 250: Performed by: NURSE PRACTITIONER

## 2023-09-29 PROCEDURE — 36415 COLL VENOUS BLD VENIPUNCTURE: CPT | Performed by: STUDENT IN AN ORGANIZED HEALTH CARE EDUCATION/TRAINING PROGRAM

## 2023-09-29 PROCEDURE — 63600175 PHARM REV CODE 636 W HCPCS: Performed by: STUDENT IN AN ORGANIZED HEALTH CARE EDUCATION/TRAINING PROGRAM

## 2023-09-29 PROCEDURE — 80053 COMPREHEN METABOLIC PANEL: CPT | Performed by: STUDENT IN AN ORGANIZED HEALTH CARE EDUCATION/TRAINING PROGRAM

## 2023-09-29 PROCEDURE — 85025 COMPLETE CBC W/AUTO DIFF WBC: CPT | Performed by: STUDENT IN AN ORGANIZED HEALTH CARE EDUCATION/TRAINING PROGRAM

## 2023-09-29 PROCEDURE — 25000003 PHARM REV CODE 250: Performed by: STUDENT IN AN ORGANIZED HEALTH CARE EDUCATION/TRAINING PROGRAM

## 2023-09-29 PROCEDURE — 94761 N-INVAS EAR/PLS OXIMETRY MLT: CPT

## 2023-09-29 RX ORDER — SUCRALFATE 1 G/10ML
1 SUSPENSION ORAL
Qty: 400 ML | Refills: 0 | Status: SHIPPED | OUTPATIENT
Start: 2023-09-29 | End: 2023-10-09

## 2023-09-29 RX ORDER — LANOLIN ALCOHOL/MO/W.PET/CERES
400 CREAM (GRAM) TOPICAL ONCE
Status: COMPLETED | OUTPATIENT
Start: 2023-09-29 | End: 2023-09-29

## 2023-09-29 RX ADMIN — Medication 400 MG: at 10:09

## 2023-09-29 RX ADMIN — PANTOPRAZOLE SODIUM 40 MG: 40 TABLET, DELAYED RELEASE ORAL at 05:09

## 2023-09-29 RX ADMIN — EZETIMIBE 10 MG: 10 TABLET ORAL at 10:09

## 2023-09-29 RX ADMIN — MEROPENEM 500 MG: 500 INJECTION, POWDER, FOR SOLUTION INTRAVENOUS at 01:09

## 2023-09-29 RX ADMIN — CITALOPRAM HYDROBROMIDE 20 MG: 20 TABLET ORAL at 10:09

## 2023-09-29 RX ADMIN — BUSPIRONE HYDROCHLORIDE 5 MG: 5 TABLET ORAL at 10:09

## 2023-09-29 RX ADMIN — SODIUM CHLORIDE: 0.9 INJECTION, SOLUTION INTRAVENOUS at 01:09

## 2023-09-29 RX ADMIN — METOPROLOL SUCCINATE 50 MG: 50 TABLET, FILM COATED, EXTENDED RELEASE ORAL at 10:09

## 2023-09-29 NOTE — PLAN OF CARE
Discharge orders and chart reviewed. No other discharge needs noted at this time. Pt is clear for discharge from case management, after seen by MINNIE Patiño. Pt is discharging to home.    PCP appt scheduled and added to avs       09/29/23 0929   Final Note   Assessment Type Final Discharge Note   Anticipated Discharge Disposition Home   What phone number can be called within the next 1-3 days to see how you are doing after discharge? [phone]   Hospital Resources/Appts/Education Provided Appointments scheduled and added to AVS   Post-Acute Status   Discharge Delays None known at this time

## 2023-09-29 NOTE — PLAN OF CARE
09/29/23 0747   Patient Assessment/Suction   Level of Consciousness (AVPU) alert   Respiratory Effort Unlabored   PRE-TX-O2   Device (Oxygen Therapy) room air   SpO2 (!) 93 %   Pulse Oximetry Type Intermittent   $ Pulse Oximetry - Multiple Charge Pulse Oximetry - Multiple   Pulse 79   Resp 18

## 2023-09-29 NOTE — DISCHARGE INSTRUCTIONS
Rest and drink adequate fluids.  Take medications as prescribed.  Return to ED for any worsening symptoms or concerns.  Follow-up as directed.    Discharge Instructions, [facility]    Thank you for choosing [facility] for your medical care.      You were admitted to the hospital with Neutropenic fever.     Please note your discharge instructions, including diet/activity restrictions, follow-up appointments, and medication changes.  If you have any questions about your medical issues, prescriptions, or any other questions, please feel free to contact the [facility] Medicine Dept at [phone] and we will help.    If you are previously with Home health, outpatient PT/OT or under a therapy program, you are cleared to return to those programs.    Please direct all long term medication refills and follow up to your primary care provider, Yoni Daniels MD. Thank you again for letting us take care of your health care needs.    Please note the following discharge instructions per your discharging physician-  Kaylyn Bazzi NP

## 2023-09-29 NOTE — DISCHARGE SUMMARY
[facility] Medicine  Discharge Summary      Patient Name: Aysha Moore  MRN: [id number]  [facility]: [id number]  Patient Class: IP- Inpatient  Admission Date: 9/27/2023  Hospital Length of Stay: 2 days  Discharge Date and Time: 9/29/2023 11:36 AM  Attending Physician: No att. providers found   Discharging Provider: Kaylyn Bazzi NP  Primary Care Provider: Yoni Daniels MD    Primary Care Team: Networked reference to record PCT     HPI:   Patient is a 68-year-old female with a history of metastatic adenocarcinoma of the lung who is currently on chemotherapy.  She is received chemotherapy and radiation treatments and has esophagitis.  She has had very poor p.o. intake over the past couple of weeks due to severe esophagitis.  She says she is gotten weaker and has become dizzy and lightheaded.  However, she feels that her esophagitis symptoms have actually been improving somewhat and she would like to begin eating more.  She came to the hospital because she started having fevers over the past 2 days and call her oncologist who recommended she come to the hospital for further evaluation given her neutropenia.  She denies any cough, congestion.  No nausea or vomiting.  No diarrhea.  No abdominal pain.  No dysuria or hematuria.  No rashes or lesions anywhere.  No signs of skin infection.  She has no changes to her medication regimen.  She feels that tramadol has been the most helpful for her pain as it does not make her nauseated.    In the ED:  T 100.2°, P 97, R 19, /59, O2 sat 93% on room air.  WBC 2, hemoglobin 11.9, platelets 279, chemistry panel unremarkable, BNP 21, troponin 8.5, lactic acid 0.9, drug screen negative, flu and COVID are negative, urinalysis without signs of infection though she is leukopenic/neutropenic, blood culture drawn, high-risk urine culture ordered.  Chest x-ray without signs of pneumonia.  Will admit for neutropenic fever, dehydration      * No surgery  found *      Hospital Course:   Patient was monitored closely during her hospital stay.  She was initiated on IV meropenem.  Blood and urine cultures remained negative.  Patient remained afebrile during her stay.  Respiratory panel obtained and negative.  Patient was started on Carafate for esophagitis.  Her symptoms improved and she felt well and desired to go home.  Patient tolerated diet well.  Patient was cleared for discharge and verbalized understanding of discharge instructions and close outpatient follow-up with her oncologist.    Physical exam:   Awake alert oriented x3, pleasant, no acute distress   Heart-rhythm, no edema  Lungs-Clear to auscultation bilaterally, respirations even unlabored   Abdomen-soft nontender normoactive bowel sounds       Goals of Care Treatment Preferences:  Code Status: Full Code      Consults:     No new Assessment & Plan notes have been filed under this hospital service since the last note was generated.  Service: Hospital Medicine    Final Active Diagnoses:    Diagnosis Date Noted POA    PRINCIPAL PROBLEM:  Neutropenic fever [D70.9, R50.81] 09/27/2023 Yes    Dehydration [E86.0] 09/19/2023 Yes    LUNG CANCER-ADENOCARCINOMA OF THE LLL [C34.32] 07/27/2023 Yes    Hyperlipidemia [E78.5]  Yes    Hypertension [I10]  Yes      Problems Resolved During this Admission:       Discharged Condition: good    Disposition: Home or Self Care    Follow Up:   Follow-up Information     Barney Daniels MD. Go on 10/9/2023.    Specialty: Family Medicine  Why: hospital follow up, to recheck your symptoms  scheduled at 9AM  Contact information:  [address]  [address]  [place] [address] [phone]             Raad Hankins MD Follow up in 3 day(s).    Specialties: Hematology, Oncology, Hematology and Oncology  Why: hospital follow up, to recheck your symptoms  Contact information:  [address] [facility]  [address]  [place] [address]  [phone]                       Patient  Instructions:      Diet Cardiac     Notify your health care provider if you experience any of the following:  temperature >100.4     Notify your health care provider if you experience any of the following:  persistent nausea and vomiting or diarrhea     Notify your health care provider if you experience any of the following:  severe uncontrolled pain     Notify your health care provider if you experience any of the following:  difficulty breathing or increased cough     Notify your health care provider if you experience any of the following:  persistent dizziness, light-headedness, or visual disturbances     Notify your health care provider if you experience any of the following:  increased confusion or weakness     Activity as tolerated       Significant Diagnostic Studies: Labs:   CMP   Recent Labs   Lab 09/27/23  1552 09/28/23  0351 09/29/23  0323    137 135*   K 4.5 3.9 4.6    105 103   CO2 27 29 29    105 95   BUN 12 10 6*   CREATININE 0.7 0.6 0.5   CALCIUM 9.2 8.4* 8.2*   PROT 7.8 6.2 5.7*   ALBUMIN 4.1 3.4* 3.1*   BILITOT 0.8 0.7 0.6   ALKPHOS 79 64 64   AST 18 16 18   ALT 13 13 16   ANIONGAP 9 3* 3*   , CBC   Recent Labs   Lab 09/27/23  1552 09/28/23  0351 09/29/23  0323   WBC 2.01* 1.67* 1.71*   HGB 11.9* 10.1* 9.8*   HCT 36.2* 30.4* 29.3*    239 232    and All labs within the past 24 hours have been reviewed    Pending Diagnostic Studies:     None         Medications:  Reconciled Home Medications:      Medication List      START taking these medications    sucralfate 100 mg/mL suspension  Commonly known as: CARAFATE  Take 10 mLs (1 g total) by mouth 4 (four) times daily before meals and nightly. for 10 days        CHANGE how you take these medications    ALPRAZolam 0.25 MG tablet  Commonly known as: XANAX  Take 1 tablet (0.25 mg total) by mouth 3 (three) times daily as needed for Anxiety.  What changed: when to take this     busPIRone 5 MG Tab  Commonly known as: BUSPAR  TAKE 1  TABLET BY MOUTH TWICE A DAY  What changed: when to take this     citalopram 20 MG tablet  Commonly known as: CeleXA  TAKE 1 TABLET BY MOUTH EVERY DAY  What changed: when to take this     valACYclovir 1000 MG tablet  Commonly known as: VALTREX  TAKE 2 AT ONSET OF FEVER BLISTERS THEN REPEAT IN 12 HOURS (TOTAL 4 TABS PER EPISODE)  What changed: See the new instructions.        CONTINUE taking these medications    ammonium lactate 12 % Crea  aaa bid prn dry skin     atorvastatin 80 MG tablet  Commonly known as: LIPITOR  Take 1 tablet (80 mg total) by mouth every evening.     azelastine 137 mcg (0.1 %) nasal spray  Commonly known as: ASTELIN  1 SPRAY (137 MCG TOTAL) BY NASAL ROUTE 2 (TWO) TIMES DAILY AS NEEDED FOR RHINITIS (OR SINUSITIS).     calcium-vitamin D3 500 mg-5 mcg (200 unit) per tablet  Commonly known as: OS-JOLIE 500 + D3  Take 1 tablet by mouth every morning.     clobetasol 0.05% 0.05 % Oint  Commonly known as: TEMOVATE  Apply topically 2 (two) times daily. for 14 days     ezetimibe 10 mg tablet  Commonly known as: ZETIA  Take 1 tablet (10 mg total) by mouth once daily.     famotidine 40 MG tablet  Commonly known as: PEPCID  Take 1 tablet (40 mg total) by mouth every evening.     fluticasone propionate 50 mcg/actuation nasal spray  Commonly known as: FLONASE  1 spray (50 mcg total) by Each Nostril route daily as needed for Rhinitis or Allergies.     HYDROcodone-acetaminophen 5-325 mg per tablet  Commonly known as: NORCO  Take 1 tablet by mouth every 4 to 6 hours as needed for Pain.     lisinopriL 2.5 MG tablet  Commonly known as: PRINIVIL,ZESTRIL  Take 1 tablet (2.5 mg total) by mouth every evening.     metoprolol succinate 50 MG 24 hr tablet  Commonly known as: TOPROL-XL  Take 1 tablet (50 mg total) by mouth once daily.     nitroGLYCERIN 0.4 MG SL tablet  Commonly known as: NITROSTAT  Place 1 tablet (0.4 mg total) under the tongue every 5 (five) minutes as needed for Chest pain.     omeprazole 40 MG  capsule  Commonly known as: PRILOSEC  Take 1 capsule (40 mg total) by mouth once daily.     ondansetron 8 MG tablet  Commonly known as: ZOFRAN  Take 1 tablet (8 mg total) by mouth every 8 (eight) hours as needed.     promethazine 25 MG tablet  Commonly known as: PHENERGAN  Take 1 tablet (25 mg total) by mouth every 4 to 6 hours as needed.     tiZANidine 4 MG tablet  Commonly known as: ZANAFLEX  TAKE 1 TABLET (4 MG TOTAL) BY MOUTH EVERY 6 (SIX) HOURS AS NEEDED. BACK PAIN     traMADoL 50 mg tablet  Commonly known as: ULTRAM  Take 1 tablet (50 mg total) by mouth every 6 (six) hours as needed for Pain.     zinc 50 mg Tab  Take 1 tablet by mouth once daily.            Indwelling Lines/Drains at time of discharge:   Lines/Drains/Airways     Central Venous Catheter Line  Duration           Port A Cath Single Lumen 08/04/23 Internal Jugular Left 56 days                Time spent on the discharge of patient: 44 minutes         Kaylyn Bazzi NP  Department of Hospital Medicine  [facility]

## 2023-09-30 ENCOUNTER — HOSPITAL ENCOUNTER (INPATIENT)
Facility: HOSPITAL | Age: 68
LOS: 3 days | Discharge: HOME | DRG: 176 | End: 2023-10-04
Attending: EMERGENCY MEDICINE | Admitting: INTERNAL MEDICINE
Payer: MEDICARE

## 2023-09-30 DIAGNOSIS — I26.99 ACUTE PULMONARY EMBOLISM WITHOUT ACUTE COR PULMONALE, UNSPECIFIED PULMONARY EMBOLISM TYPE: ICD-10-CM

## 2023-09-30 DIAGNOSIS — I26.99 PULMONARY EMBOLISM, UNSPECIFIED CHRONICITY, UNSPECIFIED PULMONARY EMBOLISM TYPE, UNSPECIFIED WHETHER ACUTE COR PULMONALE PRESENT: Primary | ICD-10-CM

## 2023-09-30 DIAGNOSIS — R50.81 NEUTROPENIC FEVER: ICD-10-CM

## 2023-09-30 DIAGNOSIS — R50.9 FEVER: ICD-10-CM

## 2023-09-30 DIAGNOSIS — D70.9 NEUTROPENIC FEVER: ICD-10-CM

## 2023-09-30 DIAGNOSIS — R50.9 FUO (FEVER OF UNKNOWN ORIGIN): ICD-10-CM

## 2023-09-30 DIAGNOSIS — C34.90 METASTATIC LUNG CANCER (METASTASIS FROM LUNG TO OTHER SITE), UNSPECIFIED LATERALITY: ICD-10-CM

## 2023-09-30 PROBLEM — K20.90 ESOPHAGITIS: Status: ACTIVE | Noted: 2023-09-30

## 2023-09-30 LAB
ALBUMIN SERPL BCP-MCNC: 3.6 G/DL (ref 3.5–5.2)
ALP SERPL-CCNC: 77 U/L (ref 55–135)
ALT SERPL W/O P-5'-P-CCNC: 19 U/L (ref 10–44)
ANION GAP SERPL CALC-SCNC: 7 MMOL/L (ref 8–16)
AST SERPL-CCNC: 21 U/L (ref 10–40)
BACTERIA #/AREA URNS HPF: NEGATIVE /HPF
BACTERIA UR CULT: NO GROWTH
BASOPHILS # BLD AUTO: 0.01 K/UL (ref 0–0.2)
BASOPHILS NFR BLD: 0.5 % (ref 0–1.9)
BILIRUB SERPL-MCNC: 0.6 MG/DL (ref 0.1–1)
BILIRUB UR QL STRIP: NEGATIVE
BUN SERPL-MCNC: 9 MG/DL (ref 8–23)
CALCIUM SERPL-MCNC: 8.7 MG/DL (ref 8.7–10.5)
CHLORIDE SERPL-SCNC: 101 MMOL/L (ref 95–110)
CLARITY UR: CLEAR
CO2 SERPL-SCNC: 28 MMOL/L (ref 23–29)
COLOR UR: YELLOW
CREAT SERPL-MCNC: 0.5 MG/DL (ref 0.5–1.4)
DIFFERENTIAL METHOD: ABNORMAL
EOSINOPHIL # BLD AUTO: 0.1 K/UL (ref 0–0.5)
EOSINOPHIL NFR BLD: 5.6 % (ref 0–8)
ERYTHROCYTE [DISTWIDTH] IN BLOOD BY AUTOMATED COUNT: 15.2 % (ref 11.5–14.5)
EST. GFR  (NO RACE VARIABLE): >60 ML/MIN/1.73 M^2
GLUCOSE SERPL-MCNC: 111 MG/DL (ref 70–110)
GLUCOSE UR QL STRIP: NEGATIVE
HCT VFR BLD AUTO: 32.6 % (ref 37–48.5)
HGB BLD-MCNC: 11.4 G/DL (ref 12–16)
HGB UR QL STRIP: ABNORMAL
HYALINE CASTS #/AREA URNS LPF: 17 /LPF
IMM GRANULOCYTES # BLD AUTO: 0 K/UL (ref 0–0.04)
IMM GRANULOCYTES NFR BLD AUTO: 0 % (ref 0–0.5)
KETONES UR QL STRIP: ABNORMAL
LACTATE SERPL-SCNC: 1.2 MMOL/L (ref 0.5–1.9)
LEUKOCYTE ESTERASE UR QL STRIP: NEGATIVE
LYMPHOCYTES # BLD AUTO: 0.3 K/UL (ref 1–4.8)
LYMPHOCYTES NFR BLD: 15 % (ref 18–48)
MCH RBC QN AUTO: 31.9 PG (ref 27–31)
MCHC RBC AUTO-ENTMCNC: 35 G/DL (ref 32–36)
MCV RBC AUTO: 91 FL (ref 82–98)
MICROSCOPIC COMMENT: ABNORMAL
MONOCYTES # BLD AUTO: 0.7 K/UL (ref 0.3–1)
MONOCYTES NFR BLD: 31.3 % (ref 4–15)
NEUTROPHILS # BLD AUTO: 1 K/UL (ref 1.8–7.7)
NEUTROPHILS NFR BLD: 47.6 % (ref 38–73)
NITRITE UR QL STRIP: NEGATIVE
NRBC BLD-RTO: 0 /100 WBC
PH UR STRIP: 7 [PH] (ref 5–8)
PLATELET # BLD AUTO: 242 K/UL (ref 150–450)
PMV BLD AUTO: 9 FL (ref 9.2–12.9)
POTASSIUM SERPL-SCNC: 3.8 MMOL/L (ref 3.5–5.1)
PROT SERPL-MCNC: 6.7 G/DL (ref 6–8.4)
PROT UR QL STRIP: ABNORMAL
RBC # BLD AUTO: 3.57 M/UL (ref 4–5.4)
RBC #/AREA URNS HPF: 4 /HPF (ref 0–4)
SODIUM SERPL-SCNC: 136 MMOL/L (ref 136–145)
SP GR UR STRIP: >1.03 (ref 1–1.03)
SQUAMOUS #/AREA URNS HPF: 5 /HPF
URN SPEC COLLECT METH UR: ABNORMAL
UROBILINOGEN UR STRIP-ACNC: NEGATIVE EU/DL
WBC # BLD AUTO: 2.14 K/UL (ref 3.9–12.7)
WBC #/AREA URNS HPF: 3 /HPF (ref 0–5)

## 2023-09-30 PROCEDURE — 25000003 PHARM REV CODE 250

## 2023-09-30 PROCEDURE — 80053 COMPREHEN METABOLIC PANEL: CPT | Performed by: NURSE PRACTITIONER

## 2023-09-30 PROCEDURE — 36415 COLL VENOUS BLD VENIPUNCTURE: CPT | Performed by: NURSE PRACTITIONER

## 2023-09-30 PROCEDURE — 63600175 PHARM REV CODE 636 W HCPCS: Performed by: NURSE PRACTITIONER

## 2023-09-30 PROCEDURE — 96372 THER/PROPH/DIAG INJ SC/IM: CPT | Performed by: NURSE PRACTITIONER

## 2023-09-30 PROCEDURE — 81001 URINALYSIS AUTO W/SCOPE: CPT | Performed by: NURSE PRACTITIONER

## 2023-09-30 PROCEDURE — G0378 HOSPITAL OBSERVATION PER HR: HCPCS

## 2023-09-30 PROCEDURE — 93005 ELECTROCARDIOGRAM TRACING: CPT | Performed by: INTERNAL MEDICINE

## 2023-09-30 PROCEDURE — 83605 ASSAY OF LACTIC ACID: CPT | Performed by: NURSE PRACTITIONER

## 2023-09-30 PROCEDURE — 96375 TX/PRO/DX INJ NEW DRUG ADDON: CPT

## 2023-09-30 PROCEDURE — 93010 ELECTROCARDIOGRAM REPORT: CPT | Mod: ,,, | Performed by: INTERNAL MEDICINE

## 2023-09-30 PROCEDURE — 25500020 PHARM REV CODE 255: Performed by: EMERGENCY MEDICINE

## 2023-09-30 PROCEDURE — 63600175 PHARM REV CODE 636 W HCPCS

## 2023-09-30 PROCEDURE — 99285 EMERGENCY DEPT VISIT HI MDM: CPT | Mod: 25

## 2023-09-30 PROCEDURE — 93010 EKG 12-LEAD: ICD-10-PCS | Mod: ,,, | Performed by: INTERNAL MEDICINE

## 2023-09-30 PROCEDURE — 87040 BLOOD CULTURE FOR BACTERIA: CPT | Mod: 59 | Performed by: NURSE PRACTITIONER

## 2023-09-30 PROCEDURE — 85025 COMPLETE CBC W/AUTO DIFF WBC: CPT | Performed by: NURSE PRACTITIONER

## 2023-09-30 RX ORDER — ENOXAPARIN SODIUM 100 MG/ML
1 INJECTION SUBCUTANEOUS EVERY 12 HOURS
Status: DISCONTINUED | OUTPATIENT
Start: 2023-10-01 | End: 2023-10-02

## 2023-09-30 RX ORDER — ACETAMINOPHEN 325 MG/1
650 TABLET ORAL EVERY 8 HOURS PRN
Status: DISCONTINUED | OUTPATIENT
Start: 2023-09-30 | End: 2023-10-04 | Stop reason: HOSPADM

## 2023-09-30 RX ORDER — ONDANSETRON 2 MG/ML
4 INJECTION INTRAMUSCULAR; INTRAVENOUS EVERY 8 HOURS PRN
Status: DISCONTINUED | OUTPATIENT
Start: 2023-09-30 | End: 2023-10-04 | Stop reason: HOSPADM

## 2023-09-30 RX ORDER — PANTOPRAZOLE SODIUM 40 MG/1
40 TABLET, DELAYED RELEASE ORAL DAILY
Status: DISCONTINUED | OUTPATIENT
Start: 2023-10-01 | End: 2023-10-04 | Stop reason: HOSPADM

## 2023-09-30 RX ORDER — BUSPIRONE HYDROCHLORIDE 5 MG/1
5 TABLET ORAL NIGHTLY
Status: DISCONTINUED | OUTPATIENT
Start: 2023-09-30 | End: 2023-10-04 | Stop reason: HOSPADM

## 2023-09-30 RX ORDER — SODIUM CHLORIDE 9 MG/ML
INJECTION, SOLUTION INTRAVENOUS CONTINUOUS
Status: DISCONTINUED | OUTPATIENT
Start: 2023-09-30 | End: 2023-10-04 | Stop reason: HOSPADM

## 2023-09-30 RX ORDER — LANOLIN ALCOHOL/MO/W.PET/CERES
800 CREAM (GRAM) TOPICAL
Status: DISCONTINUED | OUTPATIENT
Start: 2023-09-30 | End: 2023-10-04 | Stop reason: HOSPADM

## 2023-09-30 RX ORDER — SODIUM,POTASSIUM PHOSPHATES 280-250MG
2 POWDER IN PACKET (EA) ORAL
Status: DISCONTINUED | OUTPATIENT
Start: 2023-09-30 | End: 2023-10-04 | Stop reason: HOSPADM

## 2023-09-30 RX ORDER — CITALOPRAM 20 MG/1
20 TABLET, FILM COATED ORAL DAILY
Status: DISCONTINUED | OUTPATIENT
Start: 2023-10-01 | End: 2023-10-04 | Stop reason: HOSPADM

## 2023-09-30 RX ORDER — TIZANIDINE 4 MG/1
4 TABLET ORAL EVERY 6 HOURS PRN
Status: DISCONTINUED | OUTPATIENT
Start: 2023-09-30 | End: 2023-10-04 | Stop reason: HOSPADM

## 2023-09-30 RX ORDER — PROMETHAZINE HYDROCHLORIDE 25 MG/1
25 TABLET ORAL EVERY 6 HOURS PRN
Status: DISCONTINUED | OUTPATIENT
Start: 2023-09-30 | End: 2023-10-04 | Stop reason: HOSPADM

## 2023-09-30 RX ORDER — EZETIMIBE 10 MG/1
10 TABLET ORAL DAILY
Status: DISCONTINUED | OUTPATIENT
Start: 2023-10-01 | End: 2023-10-04 | Stop reason: HOSPADM

## 2023-09-30 RX ORDER — POLYETHYLENE GLYCOL 3350 17 G/17G
17 POWDER, FOR SOLUTION ORAL DAILY
Status: DISCONTINUED | OUTPATIENT
Start: 2023-10-01 | End: 2023-10-04 | Stop reason: HOSPADM

## 2023-09-30 RX ORDER — ENOXAPARIN SODIUM 100 MG/ML
1 INJECTION SUBCUTANEOUS
Status: COMPLETED | OUTPATIENT
Start: 2023-09-30 | End: 2023-09-30

## 2023-09-30 RX ORDER — SODIUM CHLORIDE 0.9 % (FLUSH) 0.9 %
2 SYRINGE (ML) INJECTION
Status: DISCONTINUED | OUTPATIENT
Start: 2023-09-30 | End: 2023-10-04 | Stop reason: HOSPADM

## 2023-09-30 RX ORDER — SUCRALFATE 1 G/10ML
1 SUSPENSION ORAL
Status: DISCONTINUED | OUTPATIENT
Start: 2023-09-30 | End: 2023-10-04 | Stop reason: HOSPADM

## 2023-09-30 RX ORDER — ATORVASTATIN CALCIUM 40 MG/1
80 TABLET, FILM COATED ORAL NIGHTLY
Status: DISCONTINUED | OUTPATIENT
Start: 2023-09-30 | End: 2023-10-04 | Stop reason: HOSPADM

## 2023-09-30 RX ORDER — METOPROLOL SUCCINATE 50 MG/1
50 TABLET, EXTENDED RELEASE ORAL DAILY
Status: DISCONTINUED | OUTPATIENT
Start: 2023-10-01 | End: 2023-10-04 | Stop reason: HOSPADM

## 2023-09-30 RX ORDER — TALC
6 POWDER (GRAM) TOPICAL NIGHTLY PRN
Status: DISCONTINUED | OUTPATIENT
Start: 2023-09-30 | End: 2023-10-04 | Stop reason: HOSPADM

## 2023-09-30 RX ORDER — LISINOPRIL 2.5 MG/1
2.5 TABLET ORAL NIGHTLY
Status: DISCONTINUED | OUTPATIENT
Start: 2023-09-30 | End: 2023-09-30

## 2023-09-30 RX ADMIN — ENOXAPARIN SODIUM 60 MG: 60 INJECTION SUBCUTANEOUS at 09:09

## 2023-09-30 RX ADMIN — IOHEXOL 100 ML: 350 INJECTION, SOLUTION INTRAVENOUS at 06:09

## 2023-09-30 RX ADMIN — ONDANSETRON 4 MG: 2 INJECTION INTRAMUSCULAR; INTRAVENOUS at 10:09

## 2023-09-30 RX ADMIN — SODIUM CHLORIDE: 0.9 INJECTION, SOLUTION INTRAVENOUS at 10:09

## 2023-09-30 RX ADMIN — ACETAMINOPHEN 650 MG: 325 TABLET ORAL at 10:09

## 2023-09-30 NOTE — PROGRESS NOTES
Patient called answering service. She reported having a fever of over 101 F and feeling poorly. She reports that she was recently discharged from hospital. Recent labs showed she has a low wbc and ANC . She reports that she was not discharged on any antibiotics. I spoke to her by phone and instructed her to go to ER at [facility].

## 2023-09-30 NOTE — ED NOTES
"Pt states she was admitted here 2 days ago with a fever and was d/c home. States she started running fever again today and her oncologist suggested getting to ER. Pt has no complaints other than having a fever and "shakes" pt is in no acute distress. Pt has hx of lung cancer  "

## 2023-10-01 LAB
ALBUMIN SERPL BCP-MCNC: 3.2 G/DL (ref 3.5–5.2)
ALP SERPL-CCNC: 60 U/L (ref 55–135)
ALT SERPL W/O P-5'-P-CCNC: 17 U/L (ref 10–44)
ANION GAP SERPL CALC-SCNC: 5 MMOL/L (ref 8–16)
AST SERPL-CCNC: 16 U/L (ref 10–40)
BILIRUB SERPL-MCNC: 0.5 MG/DL (ref 0.1–1)
BUN SERPL-MCNC: 7 MG/DL (ref 8–23)
CALCIUM SERPL-MCNC: 8.5 MG/DL (ref 8.7–10.5)
CHLORIDE SERPL-SCNC: 104 MMOL/L (ref 95–110)
CO2 SERPL-SCNC: 30 MMOL/L (ref 23–29)
CREAT SERPL-MCNC: 0.5 MG/DL (ref 0.5–1.4)
ERYTHROCYTE [DISTWIDTH] IN BLOOD BY AUTOMATED COUNT: 15.3 % (ref 11.5–14.5)
EST. GFR  (NO RACE VARIABLE): >60 ML/MIN/1.73 M^2
GLUCOSE SERPL-MCNC: 98 MG/DL (ref 70–110)
HCT VFR BLD AUTO: 29.7 % (ref 37–48.5)
HGB BLD-MCNC: 10.1 G/DL (ref 12–16)
MCH RBC QN AUTO: 31.3 PG (ref 27–31)
MCHC RBC AUTO-ENTMCNC: 34 G/DL (ref 32–36)
MCV RBC AUTO: 92 FL (ref 82–98)
PLATELET # BLD AUTO: 226 K/UL (ref 150–450)
PMV BLD AUTO: 9.3 FL (ref 9.2–12.9)
POTASSIUM SERPL-SCNC: 3.5 MMOL/L (ref 3.5–5.1)
PROCALCITONIN SERPL IA-MCNC: 0.11 NG/ML (ref 0–0.5)
PROT SERPL-MCNC: 5.9 G/DL (ref 6–8.4)
RBC # BLD AUTO: 3.23 M/UL (ref 4–5.4)
SODIUM SERPL-SCNC: 139 MMOL/L (ref 136–145)
WBC # BLD AUTO: 1.62 K/UL (ref 3.9–12.7)

## 2023-10-01 PROCEDURE — 96365 THER/PROPH/DIAG IV INF INIT: CPT

## 2023-10-01 PROCEDURE — 99223 1ST HOSP IP/OBS HIGH 75: CPT | Mod: ,,, | Performed by: INTERNAL MEDICINE

## 2023-10-01 PROCEDURE — 85027 COMPLETE CBC AUTOMATED: CPT | Performed by: INTERNAL MEDICINE

## 2023-10-01 PROCEDURE — 25000003 PHARM REV CODE 250: Performed by: INTERNAL MEDICINE

## 2023-10-01 PROCEDURE — 25000003 PHARM REV CODE 250

## 2023-10-01 PROCEDURE — 87040 BLOOD CULTURE FOR BACTERIA: CPT | Performed by: INTERNAL MEDICINE

## 2023-10-01 PROCEDURE — 99223 PR INITIAL HOSPITAL CARE,LEVL III: ICD-10-PCS | Mod: ,,, | Performed by: INTERNAL MEDICINE

## 2023-10-01 PROCEDURE — 63600175 PHARM REV CODE 636 W HCPCS

## 2023-10-01 PROCEDURE — 12000002 HC ACUTE/MED SURGE SEMI-PRIVATE ROOM

## 2023-10-01 PROCEDURE — 25000003 PHARM REV CODE 250: Performed by: STUDENT IN AN ORGANIZED HEALTH CARE EDUCATION/TRAINING PROGRAM

## 2023-10-01 PROCEDURE — 96372 THER/PROPH/DIAG INJ SC/IM: CPT

## 2023-10-01 PROCEDURE — 63600175 PHARM REV CODE 636 W HCPCS: Performed by: INTERNAL MEDICINE

## 2023-10-01 PROCEDURE — 80053 COMPREHEN METABOLIC PANEL: CPT | Performed by: INTERNAL MEDICINE

## 2023-10-01 PROCEDURE — 84145 PROCALCITONIN (PCT): CPT | Performed by: INTERNAL MEDICINE

## 2023-10-01 PROCEDURE — 36415 COLL VENOUS BLD VENIPUNCTURE: CPT | Performed by: INTERNAL MEDICINE

## 2023-10-01 RX ORDER — LIDOCAINE HYDROCHLORIDE 20 MG/ML
5 SOLUTION OROPHARYNGEAL ONCE
Status: COMPLETED | OUTPATIENT
Start: 2023-10-01 | End: 2023-10-01

## 2023-10-01 RX ADMIN — CITALOPRAM HYDROBROMIDE 20 MG: 20 TABLET ORAL at 09:10

## 2023-10-01 RX ADMIN — PANTOPRAZOLE SODIUM 40 MG: 40 TABLET, DELAYED RELEASE ORAL at 08:10

## 2023-10-01 RX ADMIN — ACETAMINOPHEN 650 MG: 325 TABLET ORAL at 05:10

## 2023-10-01 RX ADMIN — POTASSIUM BICARBONATE 50 MEQ: 977.5 TABLET, EFFERVESCENT ORAL at 09:10

## 2023-10-01 RX ADMIN — Medication 6 MG: at 08:10

## 2023-10-01 RX ADMIN — MEROPENEM 2 G: 1 INJECTION, POWDER, FOR SOLUTION INTRAVENOUS at 08:10

## 2023-10-01 RX ADMIN — SUCRALFATE 1 G: 1 SUSPENSION ORAL at 11:10

## 2023-10-01 RX ADMIN — METOPROLOL SUCCINATE 50 MG: 50 TABLET, EXTENDED RELEASE ORAL at 08:10

## 2023-10-01 RX ADMIN — SUCRALFATE 1 G: 1 SUSPENSION ORAL at 06:10

## 2023-10-01 RX ADMIN — EZETIMIBE 10 MG: 10 TABLET ORAL at 08:10

## 2023-10-01 RX ADMIN — SODIUM CHLORIDE: 0.9 INJECTION, SOLUTION INTRAVENOUS at 08:10

## 2023-10-01 RX ADMIN — SUCRALFATE 1 G: 1 SUSPENSION ORAL at 08:10

## 2023-10-01 RX ADMIN — BUSPIRONE HYDROCHLORIDE 5 MG: 5 TABLET ORAL at 08:10

## 2023-10-01 RX ADMIN — ATORVASTATIN CALCIUM 80 MG: 40 TABLET, FILM COATED ORAL at 08:10

## 2023-10-01 RX ADMIN — ENOXAPARIN SODIUM 60 MG: 60 INJECTION SUBCUTANEOUS at 08:10

## 2023-10-01 RX ADMIN — PROMETHAZINE HYDROCHLORIDE 25 MG: 25 TABLET ORAL at 08:10

## 2023-10-01 RX ADMIN — ACETAMINOPHEN 650 MG: 325 TABLET ORAL at 09:10

## 2023-10-01 RX ADMIN — MEROPENEM 1 G: 1 INJECTION, POWDER, FOR SOLUTION INTRAVENOUS at 05:10

## 2023-10-01 RX ADMIN — SUCRALFATE 1 G: 1 SUSPENSION ORAL at 05:10

## 2023-10-01 RX ADMIN — LIDOCAINE HYDROCHLORIDE 5 ML: 20 SOLUTION ORAL at 07:10

## 2023-10-01 NOTE — SUBJECTIVE & OBJECTIVE
Past Medical History:   Diagnosis Date    Allergy     Anxiety     Arthritis     CAD (coronary artery disease)     coronary stents    Chronic back pain     lower back pain radiates to left leg    Chronic rhinitis     DAILY (dyspnea on exertion)     Hyperlipidemia     Hypertension     Lung cancer 07/01/2023    Melanoma in situ of left upper extremity     left thigh area    MI (myocardial infarction)     Seasonal allergic rhinitis 10/29/2020       Past Surgical History:   Procedure Laterality Date    BREAST SURGERY      breast reduction    CATARACT EXTRACTION, BILATERAL      coranary stent      EXCISION OF MELANOMA Left 3/30/2022    Procedure: EXCISION, MELANOMA;  Surgeon: David Mcpherson III, MD;  Location: [facility] OR;  Service: General;  Laterality: Left;    EXCISIONAL BIOPSY Left 3/30/2022    Procedure: EXCISIONAL BIOPSY;  Surgeon: David Mcpherson III, MD;  Location: [facility] OR;  Service: General;  Laterality: Left;    HYSTERECTOMY      total    INSERTION OF TUNNELED CENTRAL VENOUS CATHETER (CVC) WITH SUBCUTANEOUS PORT N/A 8/4/2023    Procedure: [id number]-[id number]-CATH;  Surgeon: Remi Mckeon Jr., MD;  Location: [facility] OR;  Service: General;  Laterality: N/A;    LEFT HEART CATHETERIZATION Left 10/16/2020    Procedure: Left heart cath;  Surgeon: Garrett Robins MD;  Location: [facility] CATH/EP LAB;  Service: Cardiology;  Laterality: Left;    OOPHORECTOMY      TOTAL REDUCTION MAMMOPLASTY Bilateral 2000       Review of patient's allergies indicates:   Allergen Reactions    Cephalexin      Other reaction(s): Rash    Doxycycline monohydrate Hives    Lanoxin  [digoxin]      Other reaction(s): Rash    Lansoprazole      Other reaction(s): Rash    Macrobid [nitrofurantoin monohyd/m-cryst] Rash       No current facility-administered medications on file prior to encounter.     Current Outpatient Medications on File Prior to Encounter   Medication Sig    ALPRAZolam (XANAX) 0.25 MG tablet Take 1 tablet (0.25 mg total) by mouth  3 (three) times daily as needed for Anxiety. (Patient taking differently: Take 0.25 mg by mouth 2 (two) times daily as needed for Anxiety.)    ammonium lactate 12 % Crea aaa bid prn dry skin    atorvastatin (LIPITOR) 80 MG tablet Take 1 tablet (80 mg total) by mouth every evening.    azelastine (ASTELIN) 137 mcg (0.1 %) nasal spray 1 SPRAY (137 MCG TOTAL) BY NASAL ROUTE 2 (TWO) TIMES DAILY AS NEEDED FOR RHINITIS (OR SINUSITIS).    busPIRone (BUSPAR) 5 MG Tab TAKE 1 TABLET BY MOUTH TWICE A DAY (Patient taking differently: Take 5 mg by mouth every evening.)    calcium-vitamin D3 (OS-JOLIE 500 + D3) 500 mg-5 mcg (200 unit) per tablet Take 1 tablet by mouth every morning.    citalopram (CELEXA) 20 MG tablet TAKE 1 TABLET BY MOUTH EVERY DAY (Patient taking differently: Take 20 mg by mouth once daily.)    clobetasol 0.05% (TEMOVATE) 0.05 % Oint Apply topically 2 (two) times daily. for 14 days (Patient taking differently: Apply 1 g topically 2 (two) times daily.)    ezetimibe (ZETIA) 10 mg tablet Take 1 tablet (10 mg total) by mouth once daily.    famotidine (PEPCID) 40 MG tablet Take 1 tablet (40 mg total) by mouth every evening.    fluticasone propionate (FLONASE) 50 mcg/actuation nasal spray 1 spray (50 mcg total) by Each Nostril route daily as needed for Rhinitis or Allergies.    HYDROcodone-acetaminophen (NORCO) 5-325 mg per tablet Take 1 tablet by mouth every 4 to 6 hours as needed for Pain.    lisinopriL (PRINIVIL,ZESTRIL) 2.5 MG tablet Take 1 tablet (2.5 mg total) by mouth every evening.    metoprolol succinate (TOPROL-XL) 50 MG 24 hr tablet Take 1 tablet (50 mg total) by mouth once daily.    nitroGLYCERIN (NITROSTAT) 0.4 MG SL tablet Place 1 tablet (0.4 mg total) under the tongue every 5 (five) minutes as needed for Chest pain.    omeprazole (PRILOSEC) 40 MG capsule Take 1 capsule (40 mg total) by mouth once daily.    ondansetron (ZOFRAN) 8 MG tablet Take 1 tablet (8 mg total) by mouth every 8 (eight) hours as  needed.    promethazine (PHENERGAN) 25 MG tablet Take 1 tablet (25 mg total) by mouth every 4 to 6 hours as needed. (Patient taking differently: Take 25 mg by mouth every 4 to 6 hours as needed for Nausea.)    sucralfate (CARAFATE) 100 mg/mL suspension Take 10 mLs (1 g total) by mouth 4 (four) times daily before meals and nightly. for 10 days    tiZANidine (ZANAFLEX) 4 MG tablet TAKE 1 TABLET (4 MG TOTAL) BY MOUTH EVERY 6 (SIX) HOURS AS NEEDED. BACK PAIN (Patient taking differently: Take 4 mg by mouth every 6 (six) hours as needed (back pain). Back pain)    traMADoL (ULTRAM) 50 mg tablet Take 1 tablet (50 mg total) by mouth every 6 (six) hours as needed for Pain.    valACYclovir (VALTREX) 1000 MG tablet TAKE 2 AT ONSET OF FEVER BLISTERS THEN REPEAT IN 12 HOURS (TOTAL 4 TABS PER EPISODE) (Patient taking differently: Take 2 tablets by mouth every 12 (twelve) hours. Take 2 at onset of fever blisters then repeat in 12 hours (total 4 tabs per episode))    zinc 50 mg Tab Take 1 tablet by mouth once daily.     Family History       Problem Relation (Age of Onset)    Breast cancer Mother (50), Paternal Aunt (60)    Cancer Mother, Father, Sister, Maternal Uncle, Paternal Aunt, Paternal Uncle    Heart disease Mother, Sister, Sister, Brother, Maternal Grandmother    Lung cancer Brother    Macular degeneration Sister    No Known Problems Daughter, Son    Ovarian cancer Mother    Stroke Sister          Tobacco Use    Smoking status: Former     Current packs/day: 0.00     Average packs/day: 1 pack/day for 43.2 years (43.2 ttl pk-yrs)     Types: Cigarettes, Vaping with nicotine     Start date:      Quit date: 3/4/2017     Years since quittin.5    Smokeless tobacco: Never   Substance and Sexual Activity    Alcohol use: Not Currently     Alcohol/week: 0.0 standard drinks of alcohol     Comment: sometimes    Drug use: No    Sexual activity: Yes     Partners: Male     Review of Systems   Constitutional:  Positive for fever (up  to 101 at home). Negative for activity change, appetite change, chills, diaphoresis and fatigue.   HENT:  Negative for congestion, ear discharge, ear pain, postnasal drip, rhinorrhea, sinus pressure and sore throat.    Eyes:  Negative for pain, discharge, redness and itching.   Respiratory:  Negative for cough, chest tightness and shortness of breath.    Cardiovascular:  Negative for chest pain and leg swelling.   Gastrointestinal:  Positive for diarrhea. Negative for abdominal pain, constipation, nausea and vomiting.   Genitourinary:  Negative for dysuria, frequency, hematuria and urgency.   Musculoskeletal:  Negative for back pain.   Skin:  Negative for color change, pallor and rash.   Neurological:  Negative for dizziness, syncope, facial asymmetry, weakness and light-headedness.   Psychiatric/Behavioral:  Negative for behavioral problems, confusion and hallucinations.      Objective:     Vital Signs (Most Recent):  Temp: 100.2 °F (37.9 °C) (09/30/23 1637)  Pulse: 88 (09/30/23 1817)  Resp: 20 (09/30/23 1637)  BP: (!) 123/58 (09/30/23 1800)  SpO2: 95 % (09/30/23 1817) Vital Signs (24h Range):  Temp:  [98.2 °F (36.8 °C)-100.2 °F (37.9 °C)] 100.2 °F (37.9 °C)  Pulse:  [] 88  Resp:  [18-20] 20  SpO2:  [94 %-96 %] 95 %  BP: ()/(51-72) 123/58     Weight: 61.2 kg (135 lb)  Body mass index is 23.91 kg/m².     Physical Exam  Vitals and nursing note reviewed.   Constitutional:       General: She is not in acute distress.     Appearance: Normal appearance. She is not ill-appearing, toxic-appearing or diaphoretic.   HENT:      Head: Normocephalic and atraumatic.      Nose: Nose normal.      Mouth/Throat:      Mouth: Mucous membranes are moist.   Eyes:      Extraocular Movements: Extraocular movements intact.   Cardiovascular:      Rate and Rhythm: Normal rate and regular rhythm.      Heart sounds: Normal heart sounds. No murmur heard.  Pulmonary:      Effort: Pulmonary effort is normal. No respiratory distress.       Breath sounds: Normal breath sounds. No wheezing.   Abdominal:      General: Abdomen is flat. Bowel sounds are normal.      Palpations: Abdomen is soft. There is no mass.      Tenderness: There is no abdominal tenderness. There is no guarding.      Hernia: No hernia is present.   Musculoskeletal:         General: No swelling, tenderness or deformity. Normal range of motion.      Cervical back: Normal range of motion. No rigidity or tenderness.   Skin:     General: Skin is warm and dry.      Coloration: Skin is not jaundiced.      Findings: No bruising or erythema.   Neurological:      General: No focal deficit present.      Mental Status: She is alert and oriented to person, place, and time. Mental status is at baseline.   Psychiatric:         Mood and Affect: Mood normal.         Behavior: Behavior normal.                Significant Labs: All pertinent labs within the past 24 hours have been reviewed.  CBC:   Recent Labs   Lab 09/29/23  0323 09/30/23  1700   WBC 1.71* 2.14*   HGB 9.8* 11.4*   HCT 29.3* 32.6*    242     CMP:   Recent Labs   Lab 09/29/23  0323 09/30/23  1700   * 136   K 4.6 3.8    101   CO2 29 28   GLU 95 111*   BUN 6* 9   CREATININE 0.5 0.5   CALCIUM 8.2* 8.7   PROT 5.7* 6.7   ALBUMIN 3.1* 3.6   BILITOT 0.6 0.6   ALKPHOS 64 77   AST 18 21   ALT 16 19   ANIONGAP 3* 7*     Lactic Acid:   Recent Labs   Lab 09/30/23  1700   LACTATE 1.2     Magnesium:   Recent Labs   Lab 09/29/23  0323   MG 1.5*     Urine Studies:   Recent Labs   Lab 09/30/23  1903   COLORU Yellow   APPEARANCEUA Clear   PHUR 7.0   SPECGRAV >1.030*   PROTEINUA Trace*   GLUCUA Negative   KETONESU 1+*   BILIRUBINUA Negative   OCCULTUA 1+*   NITRITE Negative   UROBILINOGEN Negative   LEUKOCYTESUR Negative   RBCUA 4   WBCUA 3   BACTERIA Negative   SQUAMEPITHEL 5   HYALINECASTS 17*       Significant Imaging: I have reviewed all pertinent imaging results/findings within the past 24 hours.

## 2023-10-01 NOTE — HPI
67 y/o female with history of HTN, HLD, CAD with previous MI, Lung cancer treated with chemo and radiation with plans to start immunotherapy soon, and anxiety presents to the ED for fever. Patient was recently discharged from the hospital yesterday for the same complaint. Patient had a full workup done including urine and blood cultures and was placed on Meropenem. Patient showed improvement and no infection was found so the patient was discharged home. Patient had a fever up to 101 at home prior to arrival at the ED along with some diarrhea last night. Patient states that she still has pain from her esophagitis and feels weak which is her baseline however she denies any chest pain, abdominal pain, increased SOB, lightheadedness, or sick contacts. Patient does state that she was not able to  her Carafate from the pharmacy and has not been eating or drinking very much since she was discharged. Code status was discussed and patient was placed as a full code at this time.     ED: Vitals showed HR 96, /51, RR 20 saturating at 94% on RA with a temperature of 100.2. Labs were significant for WBC 2.14, RBC 3.57, Hgb 11.4, HCT 32.6, and Lactic acid 1.2. CTA chest and CT abdomen showed segmental right upper lobe PE and upper lobe predominant centrilobular and subpleural emphysema, with coarse mixed interstitial and alveolar infiltrate along the mediastinal border left upper lobe suggesting reactive or infectious pneumonitis, and with mild posterior bilateral upper lobe groundglass infiltrates. Patient started on Enoxaparin 1mg/kg in the ED.

## 2023-10-01 NOTE — PLAN OF CARE
[facility]  Initial Discharge Assessment       Primary Care Provider: Yoni Daniels MD    Admission Diagnosis: Pulmonary embolism, unspecified chronicity, unspecified pulmonary embolism type, unspecified whether acute cor pulmonale present [I26.99]    Admission Date: 9/30/2023  Expected Discharge Date: TBD  Met with patient at bedside to complete assessment. No living will, POA or advance directive. No HH, HD, Coumadin or DME. Patient's spouse will bring patient home when she is discharged. No identified needs at this time.    Transition of Care Barriers: None    Payor: MEDICARE / Plan: MEDICARE PART A & B / Product Type: Government /     Extended Emergency Contact Information  Primary Emergency Contact: Terence Moore  Address: [address] [place] [address] [employer] [place]  Home Phone: [phone]  Mobile Phone: [phone]  Relation: Spouse  Preferred language: English   needed? No  Secondary Emergency Contact: Sree Wayne  Address: Unknown           [place] [address] [place]  Mobile Phone: [phone]  Relation: Son  Preferred language: English   needed? No    Discharge Plan A: Home with family  Discharge Plan B: Home with family      [id number] - [place], [place] - [address]  [address] [facility] [address]  Phone: [phone] Fax: [phone]      Initial Assessment (most recent)       Adult Discharge Assessment - 10/01/23 1452          Discharge Assessment    Assessment Type Discharge Planning Assessment     Confirmed/corrected address, phone number and insurance Yes     Confirmed Demographics Correct on Facesheet     Source of Information patient     When was your last doctors appointment? --   one year ago    Does patient/caregiver understand observation status Yes     Communicated NAKUL with patient/caregiver Date not available/Unable to determine     Reason For Admission acute pulmonary embolism      People in Home spouse     Facility Arrived From: home     Do you expect to return to your current living situation? Yes     Do you have help at home or someone to help you manage your care at home? Yes     Who are your caregiver(s) and their phone number(s)? Terence Moore/spouse [phone]     Prior to hospitilization cognitive status: Alert/Oriented;No Deficits     Current cognitive status: Alert/Oriented;No Deficits     Equipment Currently Used at Home none     Readmission within 30 days? Yes     Patient currently being followed by outpatient case management? No     Do you currently have service(s) that help you manage your care at home? No     Do you take prescription medications? Yes     Do you have prescription coverage? Yes     Coverage [employer] [place]     Do you have any problems affording any of your prescribed medications? No     Is the patient taking medications as prescribed? yes     Who is going to help you get home at discharge? Terence Moore/spouse [phone]     How do you get to doctors appointments? car, drives self     Are you on dialysis? No     Do you take coumadin? No     DME Needed Upon Discharge  none     Discharge Plan discussed with: Patient     Transition of Care Barriers None     Discharge Plan A Home with family     Discharge Plan B Home with family

## 2023-10-01 NOTE — NURSING
Nurses Note -- 4 Eyes      9/30/2023   11:52 PM      Skin assessed during: Admit      [x] No Altered Skin Integrity Present    []Prevention Measures Documented      [] Yes- Altered Skin Integrity Present or Discovered   [] LDA Added if Not in Epic (Describe Wound)   [] New Altered Skin Integrity was Present on Admit and Documented in LDA   [] Wound Image Taken    Wound Care Consulted? No    Attending Nurse:  Yisel Liu RN/Staff Member:  [id number]

## 2023-10-01 NOTE — NURSING
10/01/2023      Assumed care of patient.   Assessment and vital signs assessed.   Labs and meds reviewed.  Will continue to monitor this shift.      Pt Awake in the bed, looking at tv, call bell in reach, bed in lowest position, no pain or concerns at this time.

## 2023-10-01 NOTE — PLAN OF CARE
10/01/23 1451   TOMPKINS Message   Medicare Outpatient and Observation Notification regarding financial responsibility Given to patient/caregiver;Explained to patient/caregiver;Signed/date by patient/caregiver   Date TOMPKINS was signed 10/02/23   Time TOMPKINS was signed 1400

## 2023-10-01 NOTE — SUBJECTIVE & OBJECTIVE
Objective:     Vital Signs (Most Recent):  Temp: 98.1 °F (36.7 °C) (10/01/23 0909)  Pulse: 77 (10/01/23 0855)  Resp: 16 (10/01/23 0749)  BP: 100/60 (10/01/23 0855)  SpO2: 97 % (10/01/23 0749) Vital Signs (24h Range):  Temp:  [95.1 °F (35.1 °C)-102.2 °F (39 °C)] 98.1 °F (36.7 °C)  Pulse:  [] 77  Resp:  [16-20] 16  SpO2:  [91 %-97 %] 97 %  BP: ()/(51-87) 100/60     Weight: 63 kg (138 lb 14.2 oz)  Body mass index is 24.6 kg/m².    Intake/Output Summary (Last 24 hours) at 10/1/2023 0924  Last data filed at 10/1/2023 0904  Gross per 24 hour   Intake 1101.67 ml   Output 300 ml   Net 801.67 ml         Physical Exam  Constitutional:       General: She is not in acute distress.  HENT:      Head: Normocephalic and atraumatic.      Nose: No congestion.   Eyes:      General: No scleral icterus.        Right eye: No discharge.         Left eye: No discharge.      Extraocular Movements: Extraocular movements intact.   Cardiovascular:      Rate and Rhythm: Normal rate and regular rhythm.   Pulmonary:      Effort: Pulmonary effort is normal.      Breath sounds: Normal breath sounds.   Abdominal:      General: Abdomen is flat. Bowel sounds are normal.   Musculoskeletal:         General: No swelling or tenderness.      Cervical back: Normal range of motion and neck supple. No rigidity.   Skin:     General: Skin is warm.   Neurological:      General: No focal deficit present.      Mental Status: She is alert and oriented to person, place, and time.   Psychiatric:         Mood and Affect: Mood normal.             Significant Labs: All pertinent labs within the past 24 hours have been reviewed.  CBC:   Recent Labs   Lab 09/30/23  1700 10/01/23  0442   WBC 2.14* 1.62*   HGB 11.4* 10.1*   HCT 32.6* 29.7*    226     CMP:   Recent Labs   Lab 09/30/23  1700 10/01/23  0442    139   K 3.8 3.5    104   CO2 28 30*   * 98   BUN 9 7*   CREATININE 0.5 0.5   CALCIUM 8.7 8.5*   PROT 6.7 5.9*   ALBUMIN 3.6 3.2*    BILITOT 0.6 0.5   ALKPHOS 77 60   AST 21 16   ALT 19 17   ANIONGAP 7* 5*       Significant Imaging: I have reviewed all pertinent imaging results/findings within the past 24 hours.

## 2023-10-01 NOTE — ASSESSMENT & PLAN NOTE
Patient has lung cancer and was previously worked up for fever.   Patient had a fever of 101 at home and after talking to her oncologist came back to the ED  Patient denies chest pain or increased SOB  Patient was tachycardic to 112 on arrival to the ED    CTA chest   - segmental right upper lobe pulmonary embolus.    Patient started on Enoxaparin 1mg/kg q12h  Continue to monitor patient vitals

## 2023-10-01 NOTE — CONSULTS
Consult Note  Infectious Disease    Reason for Consult:  Fever    HPI: Aysha Moore is a 68 y.o. female with a history of metastatic adenocarcinoma of the lung, on chemotherapy, was hospitalized 9/27 -29 for radiation related esophagitis, poor oral intake, volume depletion, fever.  She was found to be neutropenic with white blood cell count of 2, negative lactic acid negative flu and COVID test, negative urinalysis, clear chest x-ray.  She was given meropenem (history of cephalexin allergy with rash) cultures returned negative, and she was given Carafate for esophagitis . She desired discharge, and on 09/29 she was discharged off antibiotics.  It may be relevant to note that she also received prophylactic Lovenox during the hospitalization..  On 09/30 she contacted Dr. Nicolas with a temperature of 101° and was asked to return to the hospital, .  She also reported some diarrhea the previous night (but she states that since the chemotherapy here bowel routine is irregular) and had not yet been able to  her Carafate.  In the ER, white blood cells were 2.1, hemoglobin 11.4, lactic acid 1.2, CTA chest and CT abdomen showed segmental right upper lobe pulmonary emboli and upper lobe predominant emphysema with mixed interstitial and alveolar infiltrates along mediastinal border left upper lobe suggesting reactive or infectious pneumonitis (this is also the site of metastatic nodes on prior PET-CT) and mild posterior bilateral upper lobe ground-glass infiltrates (extremely subtle and hard to find) it also shows a shrinking and cavitating left basilar lung mass..  She was begun on anticoagulants and antibiotics were held on admission but resumed this morning with a T-max of 102.2° last night at 10:26 p.m..  Today white blood cells are 1.6, hemoglobin is down to 10.1.  Procalcitonin is normal at 0.1.    Review of patient's allergies indicates:   Allergen Reactions    Cephalexin      Other reaction(s): Rash     Doxycycline monohydrate Hives    Lanoxin  [digoxin]      Other reaction(s): Rash    Lansoprazole      Other reaction(s): Rash    Macrobid [nitrofurantoin monohyd/m-cryst] Rash     Past Medical History:   Diagnosis Date    Allergy     Anxiety     Arthritis     CAD (coronary artery disease)     coronary stents    Chronic back pain     lower back pain radiates to left leg    Chronic rhinitis     DAILY (dyspnea on exertion)     Hyperlipidemia     Hypertension     Lung cancer 2023    Melanoma in situ of left upper extremity     left thigh area    MI (myocardial infarction)     Seasonal allergic rhinitis 10/29/2020     Past Surgical History:   Procedure Laterality Date    BREAST SURGERY      breast reduction    CATARACT EXTRACTION, BILATERAL      coranary stent      EXCISION OF MELANOMA Left 3/30/2022    Procedure: EXCISION, MELANOMA;  Surgeon: David Mcpherson III, MD;  Location: [facility] OR;  Service: General;  Laterality: Left;    EXCISIONAL BIOPSY Left 3/30/2022    Procedure: EXCISIONAL BIOPSY;  Surgeon: David Mcpherson III, MD;  Location: [facility] OR;  Service: General;  Laterality: Left;    HYSTERECTOMY      total    INSERTION OF TUNNELED CENTRAL VENOUS CATHETER (CVC) WITH SUBCUTANEOUS PORT N/A 2023    Procedure: [id number]-[id number];  Surgeon: Remi Mckeon Jr., MD;  Location: [facility] OR;  Service: General;  Laterality: N/A;    LEFT HEART CATHETERIZATION Left 10/16/2020    Procedure: Left heart cath;  Surgeon: Garrett Robins MD;  Location: [facility] CATH/EP LAB;  Service: Cardiology;  Laterality: Left;    OOPHORECTOMY      TOTAL REDUCTION MAMMOPLASTY Bilateral      Social History     Socioeconomic History    Marital status:    Tobacco Use    Smoking status: Former     Current packs/day: 0.00     Average packs/day: 1 pack/day for 43.2 years (43.2 ttl pk-yrs)     Types: Cigarettes, Vaping with nicotine     Start date:      Quit date: 3/4/2017     Years since quittin.5    Smokeless  tobacco: Never   Substance and Sexual Activity    Alcohol use: Not Currently     Alcohol/week: 0.0 standard drinks of alcohol     Comment: sometimes    Drug use: No    Sexual activity: Yes     Partners: Male     Family History   Problem Relation Age of Onset    Cancer Mother         breast, ovarian, cervical     Heart disease Mother     Breast cancer Mother 50    Ovarian cancer Mother     Cancer Father         melanoma    Stroke Sister     Heart disease Sister     Cancer Sister         leukemia    Macular degeneration Sister     Heart disease Sister     Heart disease Brother     Lung cancer Brother     Cancer Maternal Uncle     Cancer Paternal Aunt         breast    Breast cancer Paternal Aunt 60    Cancer Paternal Uncle     Heart disease Maternal Grandmother     No Known Problems Daughter     No Known Problems Son          Review of Systems:   Low-grade fever initially, 101 temperature prior to this admission  No change in vision, loss of vision or diplopia  No unusual sinus congestion, purulent nasal discharge, worsening post nasal drip or facial pain  She has no thrush but did bite her left buccal mucosa and cause an ulceration prior to last discharge.  This is still uncomfortable.  She is getting some relief from topical lidocaine prior to eating and drinking.  No chest pain, palpitations, syncope  No cough, sputum production, shortness of breath, dyspnea on exertion, pleurisy, hemoptysis  No nausea, vomiting,  a, constipation, blood in stool, or focal abd pain,  No dysphagia, odynophagia  No dysuria, hematuria,   No vaginal/  discharge, genital ulcers,   No swelling of joints, redness of joints, injuries, or new focal pain  No  dizziness, vertigo, numbness, paresthesias, neuropathy, falls but did have a little more of a frontal headache recently.  No anxiety, depression, substance abuse, sleep disturbance  No diabetes,    No bleeding,  unusual bruising  No new rashes, lesions, or wounds     No recent/prior  steroids  Outdoor activities:  Not applicable  Travel:   Implants:  Port-A-Cath left chest without redness, tenderness, drainage  Antibiotic History:  See HPI    EXAM & DIAGNOSTICS REVIEWED:   Vitals:     Temp:  [95.1 °F (35.1 °C)-102.2 °F (39 °C)]   Temp: 98.1 °F (36.7 °C) (10/01/23 0909)  Pulse: 77 (10/01/23 0855)  Resp: 16 (10/01/23 0749)  BP: 100/60 (10/01/23 0855)  SpO2: 97 % (10/01/23 0749)    Intake/Output Summary (Last 24 hours) at 10/1/2023 1124  Last data filed at 10/1/2023 0904  Gross per 24 hour   Intake 1101.67 ml   Output 300 ml   Net 801.67 ml       General:  In NAD. Alert and attentive, cooperative, comfortable, completely nontoxic  Eyes:  Anicteric, PERRL, EOMI  ENT:  No exudates, thrush, nares patent, dentition is good.  There is a 2 mm ulceration on the left buccal mucosa anteriorly.  This is not consistent with herpes simplex  Neck:  supple, no masses or adenopathy appreciated  Lungs: Clear, no consolidation, rales, wheezes, rub  Heart:  RRR, no gallop/murmur/rub noted  Abd:  Soft, NT, ND, normal BS, no masses or organomegaly appreciated.  :  Voids urine clear, but still little concentrated, no flank tenderness  Musc:  Joints without effusion, swelling, erythema, synovitis, muscle wasting.   Skin:  No rashes. No palmar or plantar lesions. No subungual petechiae  Neuro:             Alert, attentive, speech fluent, face symmetric, moves all extremities, no focal weakness. Ambulatory  Psych: Calm, cooperative  Lymphatic:     No cervical, supraclavicular, axillary, or inguinal nodes  Extrem: No edema, erythema, phlebitis, cellulitis, warm and well perfused.  Calves are soft without cord or tenderness.  She has a long scar left lateral thigh from prior malignant melanoma resection and a small biopsy site of the lower leg same size by Dermatology recently.  VAD:  Port-A-Cath left chest without redness, tenderness, drainage and he is not accessed     Isolation:  "   Wound: None      Lines/Tubes/Drains:    General Labs reviewed:  Recent Labs   Lab 09/29/23  0323 09/30/23  1700 10/01/23  0442   WBC 1.71* 2.14* 1.62*   HGB 9.8* 11.4* 10.1*   HCT 29.3* 32.6* 29.7*    242 226       Recent Labs   Lab 09/29/23  0323 09/30/23  1700 10/01/23  0442   * 136 139   K 4.6 3.8 3.5    101 104   CO2 29 28 30*   BUN 6* 9 7*   CREATININE 0.5 0.5 0.5   CALCIUM 8.2* 8.7 8.5*   PROT 5.7* 6.7 5.9*   BILITOT 0.6 0.6 0.5   ALKPHOS 64 77 60   ALT 16 19 17   AST 18 21 16     No results for input(s): "CRP" in the last 168 hours.      Micro:  Microbiology Results (last 7 days)       Procedure Component Value Units Date/Time    Blood culture [[id number]] Collected: 10/01/23 0815    Order Status: Sent Specimen: Blood Updated: 10/01/23 0842    Blood culture x two cultures. Draw prior to antibiotics. [[id number]] Collected: 09/30/23 1703    Order Status: Completed Specimen: Blood Updated: 09/30/23 2358     Blood Culture, Routine No Growth to date    Narrative:      Aerobic and anaerobic    Blood culture x two cultures. Draw prior to antibiotics. [[id number]] Collected: 09/30/23 1704    Order Status: Completed Specimen: Blood Updated: 09/30/23 2358     Blood Culture, Routine No Growth to date    Narrative:      Aerobic and anaerobic            Imaging Reviewed:   CXR   CT a chest and CT abdomen   1. Segmental right upper lobe pulmonary embolus.  2. Cavitating and spiculated posterior left lower lobe lung mass, decreased slightly in size compared to the prior chest CT exam.  3. Upper lobe predominant centrilobular and subpleural emphysema, with coarse mixed interstitial and alveolar infiltrate along the mediastinal border left upper lobe suggesting reactive or infectious pneumonitis, and with mild posterior bilateral upper lobe groundglass infiltrates.       Cardiology:    IMPRESSION & PLAN   1.  Fever, neutropenia, with normal procalcitonin, blood cultures, urinalysis, chest x-ray.  I " suspect the fever is likely from the pulmonary emboli  2.  Right upper lobe pulmonary emboli   3.  Possible radiation pneumonitis left melissa mediastinal  4.  Metastatic adenocarcinoma to left-sided mediastinal nodes      Recommendations:  CBC with differential daily  Meropenem with low threshold to switch to oral quinolone or stop antibiotics completely if blood cultures are negative tomorrow  Will repeat procalcitonin once more in the morning  Dr. Vizcarra will follow-up    Thank you  Medical Decision Making during this encounter was  [_] Low Complexity  [_] Moderate Complexity  [  xxx] High Complexity

## 2023-10-01 NOTE — ASSESSMENT & PLAN NOTE
Patient has lung cancer and was previously seen, admitted, and discharged for fever. She had a full workup including blood and urine cultures and no infectious etiology was found.    Patient had a fever of 101 at home today and after talking to her oncologist came to the ED  Patient had a temperature of 100.2 in the ED  UA showed no evidence of UTI  Continue to monitor and give Acetaminophen PRN for fever

## 2023-10-01 NOTE — H&P
[facility] - Emergency Dept  Hospital Medicine  History & Physical    Patient Name: Aysha Moore  MRN: [id number]  Patient Class: OP- Observation  Admission Date: 9/30/2023  Attending Physician: Dr. Hdz  Primary Care Provider: Yoni Daniels MD         Patient information was obtained from patient, relative(s) and ER records.     Subjective:     Principal Problem:Acute pulmonary embolism    Chief Complaint:   Chief Complaint   Patient presents with    Fever     D/C FROM [facility] YESTERDAY FOR SAME COMPLAINT        HPI: 67 y/o female with history of HTN, HLD, CAD with previous MI, Lung cancer treated with chemo and radiation with plans to start immunotherapy soon, and anxiety presents to the ED for fever. Patient was recently discharged from the hospital yesterday for the same complaint. Patient had a full workup done including urine and blood cultures and was placed on Meropenem. Patient showed improvement and no infection was found so the patient was discharged home. Patient had a fever up to 101 at home prior to arrival at the ED along with some diarrhea last night. Patient states that she still has pain from her esophagitis and feels weak which is her baseline however she denies any chest pain, abdominal pain, increased SOB, lightheadedness, or sick contacts. Patient does state that she was not able to  her Carafate from the pharmacy and has not been eating or drinking very much since she was discharged. Code status was discussed and patient was placed as a full code at this time.     ED: Vitals showed HR 96, /51, RR 20 saturating at 94% on RA with a temperature of 100.2. Labs were significant for WBC 2.14, RBC 3.57, Hgb 11.4, HCT 32.6, and Lactic acid 1.2. CTA chest and CT abdomen showed segmental right upper lobe PE and upper lobe predominant centrilobular and subpleural emphysema, with coarse mixed interstitial and alveolar infiltrate along the mediastinal border left upper lobe  suggesting reactive or infectious pneumonitis, and with mild posterior bilateral upper lobe groundglass infiltrates. Patient started on Enoxaparin 1mg/kg in the ED.       Past Medical History:   Diagnosis Date    Allergy     Anxiety     Arthritis     CAD (coronary artery disease)     coronary stents    Chronic back pain     lower back pain radiates to left leg    Chronic rhinitis     DAILY (dyspnea on exertion)     Hyperlipidemia     Hypertension     Lung cancer 07/01/2023    Melanoma in situ of left upper extremity     left thigh area    MI (myocardial infarction)     Seasonal allergic rhinitis 10/29/2020       Past Surgical History:   Procedure Laterality Date    BREAST SURGERY      breast reduction    CATARACT EXTRACTION, BILATERAL      coranary stent      EXCISION OF MELANOMA Left 3/30/2022    Procedure: EXCISION, MELANOMA;  Surgeon: David Mcpherson III, MD;  Location: [facility] OR;  Service: General;  Laterality: Left;    EXCISIONAL BIOPSY Left 3/30/2022    Procedure: EXCISIONAL BIOPSY;  Surgeon: David Mcpherson III, MD;  Location: [facility] OR;  Service: General;  Laterality: Left;    HYSTERECTOMY      total    INSERTION OF TUNNELED CENTRAL VENOUS CATHETER (CVC) WITH SUBCUTANEOUS PORT N/A 8/4/2023    Procedure: [id number];  Surgeon: Remi Mckeon Jr., MD;  Location: [facility] OR;  Service: General;  Laterality: N/A;    LEFT HEART CATHETERIZATION Left 10/16/2020    Procedure: Left heart cath;  Surgeon: Garrett Robins MD;  Location: [facility] CATH/EP LAB;  Service: Cardiology;  Laterality: Left;    OOPHORECTOMY      TOTAL REDUCTION MAMMOPLASTY Bilateral 2000       Review of patient's allergies indicates:   Allergen Reactions    Cephalexin      Other reaction(s): Rash    Doxycycline monohydrate Hives    Lanoxin  [digoxin]      Other reaction(s): Rash    Lansoprazole      Other reaction(s): Rash    Macrobid [nitrofurantoin monohyd/m-cryst] Rash       No current  facility-administered medications on file prior to encounter.     Current Outpatient Medications on File Prior to Encounter   Medication Sig    ALPRAZolam (XANAX) 0.25 MG tablet Take 1 tablet (0.25 mg total) by mouth 3 (three) times daily as needed for Anxiety. (Patient taking differently: Take 0.25 mg by mouth 2 (two) times daily as needed for Anxiety.)    ammonium lactate 12 % Crea aaa bid prn dry skin    atorvastatin (LIPITOR) 80 MG tablet Take 1 tablet (80 mg total) by mouth every evening.    azelastine (ASTELIN) 137 mcg (0.1 %) nasal spray 1 SPRAY (137 MCG TOTAL) BY NASAL ROUTE 2 (TWO) TIMES DAILY AS NEEDED FOR RHINITIS (OR SINUSITIS).    busPIRone (BUSPAR) 5 MG Tab TAKE 1 TABLET BY MOUTH TWICE A DAY (Patient taking differently: Take 5 mg by mouth every evening.)    calcium-vitamin D3 (OS-JOLIE 500 + D3) 500 mg-5 mcg (200 unit) per tablet Take 1 tablet by mouth every morning.    citalopram (CELEXA) 20 MG tablet TAKE 1 TABLET BY MOUTH EVERY DAY (Patient taking differently: Take 20 mg by mouth once daily.)    clobetasol 0.05% (TEMOVATE) 0.05 % Oint Apply topically 2 (two) times daily. for 14 days (Patient taking differently: Apply 1 g topically 2 (two) times daily.)    ezetimibe (ZETIA) 10 mg tablet Take 1 tablet (10 mg total) by mouth once daily.    famotidine (PEPCID) 40 MG tablet Take 1 tablet (40 mg total) by mouth every evening.    fluticasone propionate (FLONASE) 50 mcg/actuation nasal spray 1 spray (50 mcg total) by Each Nostril route daily as needed for Rhinitis or Allergies.    HYDROcodone-acetaminophen (NORCO) 5-325 mg per tablet Take 1 tablet by mouth every 4 to 6 hours as needed for Pain.    lisinopriL (PRINIVIL,ZESTRIL) 2.5 MG tablet Take 1 tablet (2.5 mg total) by mouth every evening.    metoprolol succinate (TOPROL-XL) 50 MG 24 hr tablet Take 1 tablet (50 mg total) by mouth once daily.    nitroGLYCERIN (NITROSTAT) 0.4 MG SL tablet Place 1 tablet (0.4 mg total) under the tongue every 5  (five) minutes as needed for Chest pain.    omeprazole (PRILOSEC) 40 MG capsule Take 1 capsule (40 mg total) by mouth once daily.    ondansetron (ZOFRAN) 8 MG tablet Take 1 tablet (8 mg total) by mouth every 8 (eight) hours as needed.    promethazine (PHENERGAN) 25 MG tablet Take 1 tablet (25 mg total) by mouth every 4 to 6 hours as needed. (Patient taking differently: Take 25 mg by mouth every 4 to 6 hours as needed for Nausea.)    sucralfate (CARAFATE) 100 mg/mL suspension Take 10 mLs (1 g total) by mouth 4 (four) times daily before meals and nightly. for 10 days    tiZANidine (ZANAFLEX) 4 MG tablet TAKE 1 TABLET (4 MG TOTAL) BY MOUTH EVERY 6 (SIX) HOURS AS NEEDED. BACK PAIN (Patient taking differently: Take 4 mg by mouth every 6 (six) hours as needed (back pain). Back pain)    traMADoL (ULTRAM) 50 mg tablet Take 1 tablet (50 mg total) by mouth every 6 (six) hours as needed for Pain.    valACYclovir (VALTREX) 1000 MG tablet TAKE 2 AT ONSET OF FEVER BLISTERS THEN REPEAT IN 12 HOURS (TOTAL 4 TABS PER EPISODE) (Patient taking differently: Take 2 tablets by mouth every 12 (twelve) hours. Take 2 at onset of fever blisters then repeat in 12 hours (total 4 tabs per episode))    zinc 50 mg Tab Take 1 tablet by mouth once daily.     Family History       Problem Relation (Age of Onset)    Breast cancer Mother (50), Paternal Aunt (60)    Cancer Mother, Father, Sister, Maternal Uncle, Paternal Aunt, Paternal Uncle    Heart disease Mother, Sister, Sister, Brother, Maternal Grandmother    Lung cancer Brother    Macular degeneration Sister    No Known Problems Daughter, Son    Ovarian cancer Mother    Stroke Sister          Tobacco Use    Smoking status: Former     Current packs/day: 0.00     Average packs/day: 1 pack/day for 43.2 years (43.2 ttl pk-yrs)     Types: Cigarettes, Vaping with nicotine     Start date:      Quit date: 3/4/2017     Years since quittin.5    Smokeless tobacco: Never   Substance and  Sexual Activity    Alcohol use: Not Currently     Alcohol/week: 0.0 standard drinks of alcohol     Comment: sometimes    Drug use: No    Sexual activity: Yes     Partners: Male     Review of Systems   Constitutional:  Positive for fever (up to 101 at home). Negative for activity change, appetite change, chills, diaphoresis and fatigue.   HENT:  Negative for congestion, ear discharge, ear pain, postnasal drip, rhinorrhea, sinus pressure and sore throat.    Eyes:  Negative for pain, discharge, redness and itching.   Respiratory:  Negative for cough, chest tightness and shortness of breath.    Cardiovascular:  Negative for chest pain and leg swelling.   Gastrointestinal:  Positive for diarrhea. Negative for abdominal pain, constipation, nausea and vomiting.   Genitourinary:  Negative for dysuria, frequency, hematuria and urgency.   Musculoskeletal:  Negative for back pain.   Skin:  Negative for color change, pallor and rash.   Neurological:  Negative for dizziness, syncope, facial asymmetry, weakness and light-headedness.   Psychiatric/Behavioral:  Negative for behavioral problems, confusion and hallucinations.      Objective:     Vital Signs (Most Recent):  Temp: 100.2 °F (37.9 °C) (09/30/23 1637)  Pulse: 88 (09/30/23 1817)  Resp: 20 (09/30/23 1637)  BP: (!) 123/58 (09/30/23 1800)  SpO2: 95 % (09/30/23 1817) Vital Signs (24h Range):  Temp:  [98.2 °F (36.8 °C)-100.2 °F (37.9 °C)] 100.2 °F (37.9 °C)  Pulse:  [] 88  Resp:  [18-20] 20  SpO2:  [94 %-96 %] 95 %  BP: ()/(51-72) 123/58     Weight: 61.2 kg (135 lb)  Body mass index is 23.91 kg/m².     Physical Exam  Vitals and nursing note reviewed.   Constitutional:       General: She is not in acute distress.     Appearance: Normal appearance. She is not ill-appearing, toxic-appearing or diaphoretic.   HENT:      Head: Normocephalic and atraumatic.      Nose: Nose normal.      Mouth/Throat:      Mouth: Mucous membranes are moist.   Eyes:      Extraocular  Movements: Extraocular movements intact.   Cardiovascular:      Rate and Rhythm: Normal rate and regular rhythm.      Heart sounds: Normal heart sounds. No murmur heard.  Pulmonary:      Effort: Pulmonary effort is normal. No respiratory distress.      Breath sounds: Normal breath sounds. No wheezing.   Abdominal:      General: Abdomen is flat. Bowel sounds are normal.      Palpations: Abdomen is soft. There is no mass.      Tenderness: There is no abdominal tenderness. There is no guarding.      Hernia: No hernia is present.   Musculoskeletal:         General: No swelling, tenderness or deformity. Normal range of motion.      Cervical back: Normal range of motion. No rigidity or tenderness.   Skin:     General: Skin is warm and dry.      Coloration: Skin is not jaundiced.      Findings: No bruising or erythema.   Neurological:      General: No focal deficit present.      Mental Status: She is alert and oriented to person, place, and time. Mental status is at baseline.   Psychiatric:         Mood and Affect: Mood normal.         Behavior: Behavior normal.                Significant Labs: All pertinent labs within the past 24 hours have been reviewed.  CBC:   Recent Labs   Lab 09/29/23  0323 09/30/23  1700   WBC 1.71* 2.14*   HGB 9.8* 11.4*   HCT 29.3* 32.6*    242     CMP:   Recent Labs   Lab 09/29/23  0323 09/30/23  1700   * 136   K 4.6 3.8    101   CO2 29 28   GLU 95 111*   BUN 6* 9   CREATININE 0.5 0.5   CALCIUM 8.2* 8.7   PROT 5.7* 6.7   ALBUMIN 3.1* 3.6   BILITOT 0.6 0.6   ALKPHOS 64 77   AST 18 21   ALT 16 19   ANIONGAP 3* 7*     Lactic Acid:   Recent Labs   Lab 09/30/23  1700   LACTATE 1.2     Magnesium:   Recent Labs   Lab 09/29/23  0323   MG 1.5*     Urine Studies:   Recent Labs   Lab 09/30/23  1903   COLORU Yellow   APPEARANCEUA Clear   PHUR 7.0   SPECGRAV >1.030*   PROTEINUA Trace*   GLUCUA Negative   KETONESU 1+*   BILIRUBINUA Negative   OCCULTUA 1+*   NITRITE Negative   UROBILINOGEN  Negative   LEUKOCYTESUR Negative   RBCUA 4   WBCUA 3   BACTERIA Negative   SQUAMEPITHEL 5   HYALINECASTS 17*       Significant Imaging: I have reviewed all pertinent imaging results/findings within the past 24 hours.    Assessment/Plan:     * Acute pulmonary embolism  Patient has lung cancer and was previously worked up for fever.   Patient had a fever of 101 at home and after talking to her oncologist came back to the ED  Patient denies chest pain or increased SOB  Patient was tachycardic to 112 on arrival to the ED    CTA chest   - segmental right upper lobe pulmonary embolus.    Patient started on Enoxaparin 1mg/kg q12h  Continue to monitor patient vitals     Esophagitis  Continue Carafate   Continue to monitor symptoms      Fever  Patient has lung cancer and was previously seen, admitted, and discharged for fever. She had a full workup including blood and urine cultures and no infectious etiology was found.    Patient had a fever of 101 at home today and after talking to her oncologist came to the ED  Patient had a temperature of 100.2 in the ED  UA showed no evidence of UTI  Continue to monitor and give Acetaminophen PRN for fever       Hypertension  Continue home regimen as tolerated  Continue to monitor patient vitals      Hyperlipidemia  Continue home regimen        VTE Risk Mitigation (From admission, onward)         Ordered     enoxaparin injection 60 mg  Every 12 hours         09/30/23 2039     enoxaparin injection 60 mg  ED 1 Time         09/30/23 1919                     On 09/30/2023, patient should be placed in hospital observation services under my care in collaboration with Dr. Hdz.      Valarie Munguia PA-C  Department of Hospital Medicine  [facility] - Emergency Dept

## 2023-10-01 NOTE — PHARMACY MED REC
"              .        Admission Medication History     The home medication history was taken by Hali Munguia.    You may go to "Admission" then "Reconcile Home Medications" tabs to review and/or act upon these items.     The home medication list has been updated by the Pharmacy department.   Please read ALL comments highlighted in yellow.   Please address this information as you see fit.    Feel free to contact us if you have any questions or require assistance.        Medications listed below were obtained from: Patient/family and Analytic software- [employer]  No current facility-administered medications on file prior to encounter.     Current Outpatient Medications on File Prior to Encounter   Medication Sig Dispense Refill    ammonium lactate 12 % Crea aaa bid prn dry skin (Patient taking differently: Apply 1 g topically 2 (two) times daily as needed. aaa bid prn dry skin) 385 g 11    atorvastatin (LIPITOR) 80 MG tablet Take 1 tablet (80 mg total) by mouth every evening. 90 tablet 1    azelastine (ASTELIN) 137 mcg (0.1 %) nasal spray 1 SPRAY (137 MCG TOTAL) BY NASAL ROUTE 2 (TWO) TIMES DAILY AS NEEDED FOR RHINITIS (OR SINUSITIS). 90 mL 1    calcium-vitamin D3 (OS-JOLIE 500 + D3) 500 mg-5 mcg (200 unit) per tablet Take 1 tablet by mouth every morning.      clobetasol 0.05% (TEMOVATE) 0.05 % Oint Apply topically 2 (two) times daily. for 14 days (Patient taking differently: Apply 1 g topically 2 (two) times daily.) 45 g 3    ezetimibe (ZETIA) 10 mg tablet Take 1 tablet (10 mg total) by mouth once daily. 90 tablet 3    famotidine (PEPCID) 40 MG tablet Take 1 tablet (40 mg total) by mouth every evening. 30 tablet 1    fluticasone propionate (FLONASE) 50 mcg/actuation nasal spray 1 spray (50 mcg total) by Each Nostril route daily as needed for Rhinitis or Allergies. 9.9 mL 2    metoprolol succinate (TOPROL-XL) 50 MG 24 hr tablet Take 1 tablet (50 mg total) by mouth once daily. 90 tablet 3    nitroGLYCERIN (NITROSTAT) " 0.4 MG SL tablet Place 1 tablet (0.4 mg total) under the tongue every 5 (five) minutes as needed for Chest pain. 30 tablet 0    omeprazole (PRILOSEC) 40 MG capsule Take 1 capsule (40 mg total) by mouth once daily. 30 capsule 11    ondansetron (ZOFRAN) 8 MG tablet Take 1 tablet (8 mg total) by mouth every 8 (eight) hours as needed. (Patient taking differently: Take 8 mg by mouth every 8 (eight) hours as needed for Nausea.) 30 tablet 5    promethazine (PHENERGAN) 25 MG tablet Take 1 tablet (25 mg total) by mouth every 4 to 6 hours as needed. (Patient taking differently: Take 25 mg by mouth every 4 to 6 hours as needed for Nausea.) 30 tablet 5    traMADoL (ULTRAM) 50 mg tablet Take 1 tablet (50 mg total) by mouth every 6 (six) hours as needed for Pain. 30 tablet 2    zinc 50 mg Tab Take 1 tablet by mouth once daily.      ALPRAZolam (XANAX) 0.25 MG tablet Take 1 tablet (0.25 mg total) by mouth 3 (three) times daily as needed for Anxiety. (Patient not taking: Reported on 10/1/2023) 30 tablet 1    busPIRone (BUSPAR) 5 MG Tab TAKE 1 TABLET BY MOUTH TWICE A DAY (Patient not taking: Reported on 10/1/2023) 180 tablet 1    citalopram (CELEXA) 20 MG tablet TAKE 1 TABLET BY MOUTH EVERY DAY (Patient not taking: Reported on 10/1/2023) 90 tablet 3    HYDROcodone-acetaminophen (NORCO) 5-325 mg per tablet Take 1 tablet by mouth every 4 to 6 hours as needed for Pain. (Patient not taking: Reported on 10/1/2023) 60 tablet 0    lisinopriL (PRINIVIL,ZESTRIL) 2.5 MG tablet Take 1 tablet (2.5 mg total) by mouth every evening. (Patient not taking: Reported on 10/1/2023) 90 tablet 3    sucralfate (CARAFATE) 100 mg/mL suspension Take 10 mLs (1 g total) by mouth 4 (four) times daily before meals and nightly. for 10 days 400 mL 0    tiZANidine (ZANAFLEX) 4 MG tablet TAKE 1 TABLET (4 MG TOTAL) BY MOUTH EVERY 6 (SIX) HOURS AS NEEDED. BACK PAIN (Patient not taking: Reported on 10/1/2023) 360 tablet 0    valACYclovir (VALTREX) 1000 MG tablet TAKE 2  AT ONSET OF FEVER BLISTERS THEN REPEAT IN 12 HOURS (TOTAL 4 TABS PER EPISODE) (Patient not taking: Reported on 10/1/2023) 20 tablet 3       Potential issues to be addressed PRIOR TO DISCHARGE  Patient reported not taking the following medications: (Xanax, Buspar, Celexa, Hydrocodone, Lisinopril, Tizanidine & Valtrex). These medications remain on the home medication list. Please address accordingly.     Hali Munguia  EXT 1924

## 2023-10-01 NOTE — ED PROVIDER NOTES
Encounter Date: 9/30/2023       History     Chief Complaint   Patient presents with    Fever     D/C FROM [facility] YESTERDAY FOR SAME COMPLAINT     Aysha Moore is a 67 y/o female with history of HTN, HLD, CAD with previous MI, Lung cancer treated with chemo and radiation with plans to start immunotherapy soon, and anxiety presenting to the ED for fever. She was discharged from the hospital yesterday for same complaint with no source of infection found. She was treated with meropenem while admitted. She reports one episode of diarrhea yesterday with return of fever today up to 101.2. Patient states that she still has pain from her esophagitis and feels weak which is her baseline however she denies any chest pain, abdominal pain, increased SOB, lightheadedness, or sick contacts. Patient does state that she was not able to  her Carafate from the pharmacy and has not been eating or drinking very much since she was discharged.        Review of patient's allergies indicates:   Allergen Reactions    Cephalexin      Other reaction(s): Rash    Doxycycline monohydrate Hives    Lanoxin  [digoxin]      Other reaction(s): Rash    Lansoprazole      Other reaction(s): Rash    Macrobid [nitrofurantoin monohyd/m-cryst] Rash     Past Medical History:   Diagnosis Date    Allergy     Anxiety     Arthritis     CAD (coronary artery disease)     coronary stents    Chronic back pain     lower back pain radiates to left leg    Chronic rhinitis     DAILY (dyspnea on exertion)     Hyperlipidemia     Hypertension     Lung cancer 07/01/2023    Melanoma in situ of left upper extremity     left thigh area    MI (myocardial infarction)     Seasonal allergic rhinitis 10/29/2020     Past Surgical History:   Procedure Laterality Date    BREAST SURGERY      breast reduction    CATARACT EXTRACTION, BILATERAL      coranary stent      EXCISION OF MELANOMA Left 3/30/2022    Procedure: EXCISION, MELANOMA;  Surgeon: David Mcpherson III, MD;  Location:  [facility] OR;  Service: General;  Laterality: Left;    EXCISIONAL BIOPSY Left 3/30/2022    Procedure: EXCISIONAL BIOPSY;  Surgeon: David Mcpherson III, MD;  Location: [facility] OR;  Service: General;  Laterality: Left;    HYSTERECTOMY      total    INSERTION OF TUNNELED CENTRAL VENOUS CATHETER (CVC) WITH SUBCUTANEOUS PORT N/A 2023    Procedure: [id number]-[id number];  Surgeon: Remi Mckeon Jr., MD;  Location: [facility] OR;  Service: General;  Laterality: N/A;    LEFT HEART CATHETERIZATION Left 10/16/2020    Procedure: Left heart cath;  Surgeon: Garrett Robins MD;  Location: [facility] CATH/EP LAB;  Service: Cardiology;  Laterality: Left;    OOPHORECTOMY      TOTAL REDUCTION MAMMOPLASTY Bilateral      Family History   Problem Relation Age of Onset    Cancer Mother         breast, ovarian, cervical     Heart disease Mother     Breast cancer Mother 50    Ovarian cancer Mother     Cancer Father         melanoma    Stroke Sister     Heart disease Sister     Cancer Sister         leukemia    Macular degeneration Sister     Heart disease Sister     Heart disease Brother     Lung cancer Brother     Cancer Maternal Uncle     Cancer Paternal Aunt         breast    Breast cancer Paternal Aunt 60    Cancer Paternal Uncle     Heart disease Maternal Grandmother     No Known Problems Daughter     No Known Problems Son      Social History     Tobacco Use    Smoking status: Former     Current packs/day: 0.00     Average packs/day: 1 pack/day for 43.2 years (43.2 ttl pk-yrs)     Types: Cigarettes, Vaping with nicotine     Start date:      Quit date: 3/4/2017     Years since quittin.5    Smokeless tobacco: Never   Substance Use Topics    Alcohol use: Not Currently     Alcohol/week: 0.0 standard drinks of alcohol     Comment: sometimes    Drug use: No     Review of Systems   Constitutional:  Positive for fatigue and fever.   HENT:  Positive for sore throat.    Respiratory:  Negative for cough and shortness of breath.     Cardiovascular:  Negative for chest pain.   Gastrointestinal:  Positive for diarrhea. Negative for nausea.   Genitourinary:  Negative for dysuria.   Musculoskeletal:  Negative for back pain.   Skin:  Negative for rash.   Neurological:  Positive for weakness (generalized).   Hematological:  Does not bruise/bleed easily.       Physical Exam     Initial Vitals [09/30/23 1617]   BP Pulse Resp Temp SpO2   96/72 (!) 112 18 98.2 °F (36.8 °C) 95 %      MAP       --         Physical Exam    Nursing note and vitals reviewed.  Constitutional: She appears well-developed and well-nourished. She is not diaphoretic. No distress.   HENT:   Head: Normocephalic and atraumatic.   Mouth/Throat: Oropharynx is clear and moist.   Eyes: Conjunctivae are normal.   Neck: Neck supple.   Cardiovascular:  Normal rate, regular rhythm, normal heart sounds and intact distal pulses.     Exam reveals no gallop and no friction rub.       No murmur heard.  Pulmonary/Chest: Breath sounds normal. No respiratory distress. She has no wheezes. She has no rhonchi. She has no rales.   Abdominal: Abdomen is soft. She exhibits no distension. There is no abdominal tenderness.   Musculoskeletal:         General: Normal range of motion.      Cervical back: Neck supple.     Neurological: She is alert and oriented to person, place, and time.   Skin: No rash noted. No erythema.   Psychiatric: Her speech is normal.         ED Course   Procedures  Labs Reviewed   CBC W/ AUTO DIFFERENTIAL - Abnormal; Notable for the following components:       Result Value    WBC 2.14 (*)     RBC 3.57 (*)     Hemoglobin 11.4 (*)     Hematocrit 32.6 (*)     MCH 31.9 (*)     RDW 15.2 (*)     MPV 9.0 (*)     Gran # (ANC) 1.0 (*)     Lymph # 0.3 (*)     Lymph % 15.0 (*)     Mono % 31.3 (*)     All other components within normal limits   COMPREHENSIVE METABOLIC PANEL - Abnormal; Notable for the following components:    Glucose 111 (*)     Anion Gap 7 (*)     All other components within  normal limits   URINALYSIS, REFLEX TO URINE CULTURE - Abnormal; Notable for the following components:    Specific Gravity, UA >1.030 (*)     Protein, UA Trace (*)     Ketones, UA 1+ (*)     Occult Blood UA 1+ (*)     All other components within normal limits    Narrative:     Specimen Source->Urine   URINALYSIS MICROSCOPIC - Abnormal; Notable for the following components:    Hyaline Casts, UA 17 (*)     All other components within normal limits    Narrative:     Specimen Source->Urine   LACTIC ACID, PLASMA        ECG Results              EKG 12-lead (Final result)  Result time 09/30/23 23:52:39      Final result by Interface, Lab In [facility] (09/30/23 23:52:39)                   Narrative:    Test Reason : R50.9,    Vent. Rate : 090 BPM     Atrial Rate : 090 BPM     P-R Int : 152 ms          QRS Dur : 100 ms      QT Int : 390 ms       P-R-T Axes : 051 -20 039 degrees     QTc Int : 477 ms    Normal sinus rhythm  Low voltage QRS  Borderline Abnormal ECG    Confirmed by Rita FRANCISCO, Jean Tuttle ([id number]) on 9/30/2023 11:52:34 PM    Referred By: [email or web address]   SELF           Confirmed By:Jean Salinas MD                                  Imaging Results              CT Abdomen Pelvis With Contrast (Final result)  Result time 09/30/23 18:59:17      Final result by Patricia Short DO (09/30/23 18:59:17)                   Narrative:    CT ABDOMEN AND PELVIS WITH INTRAVENOUS CONTRAST: 9/30/2023 6:56 PM CDT    HISTORY: 68 years  old Female with Sepsis.    COMPARISON: None available    TECHNIQUE: Axial contiguous images were obtained from the lung bases to the proximal femurs with intravenous intravenous contrast administered. Sagittal and coronal reconstructions were also obtained and reviewed. This exam was performed according to our departmental dose-optimization program, which includes automated exposure control, adjustment of the mA and/or KV according to the patient's size and/or use of iterative reconstruction  technique.    FINDINGS:    LUNG BASES: There is a nodular opacity seen at the left lower lobe measuring 2.5 x 1.9 cm..  No discrete pleural effusions are seen.  The distal segmental arteries are not well opacified bilaterally, likely due to suboptimal distal bolus. There is mild to moderate centrilobular and paraseptal emphysematous change.    LIVER: The visualized hepatic parenchyma appears mildly hypodense, which can be seen with hepatic steatosis.  The main portal vein is well opacified with contrast.    GALLBLADDER: Cholelithiasis is seen. No significant gallbladder wall thickening is seen.    SPLEEN: The spleen is normal in size. There is a hypodensity seen at the posterior aspect of the spleen measuring at least 2.9 x 2.5 cm. This is concerning for an infiltrate. There is also a smaller hypodensity present.,    PANCREAS: The pancreas is unremarkable.    ADRENAL GLANDS: The bilateral adrenal glands are unremarkable.    KIDNEYS: Both kidneys demonstrate no hydronephrosis. No renal or ureteral calculi are seen.    PELVIS: The urinary bladder is mildly distended.    STOMACH: The stomach is not well distended.    BOWEL: The small bowel loops appear unremarkable. No pericolonic inflammatory stranding is seen. There are multiple colonic diverticula without evidence to suggest acute diverticulitis.    APPENDIX:    PERITONEUM: There is no evidence of pneumoperitoneum or free fluid.    VESSELS: The IVC is unremarkable. The abdominal aorta is within normal limits of size. There is moderate atherosclerotic calcification at aorta and iliac vasculature.    LYMPH NODES: No significantly enlarged lymph nodes are seen in the abdomen or pelvis.    BONES AND SOFT TISSUES: No acute osseous abnormality is seen. Multilevel degenerative changes are seen at the lumbar spine.    IMPRESSION: There are hypodensities within the spleen concerning for splenic infarcts. These can be associated with endocarditis. The patient also has known  pulmonary emboli. Transesophageal echocardiogram may be warranted.    The nodular opacity at the left lower lobe which will need follow-up within 1 month with CT of the chest and/or PET/CT. This could be due to infection, but could also be due to a developing neoplasm.    Electronically signed by:  Patricia Short DO  09/30/2023 06:59 PM CDT Workstation: [id number]                                     CTA Chest Non-Coronary (PE Studies) (Edited Result - FINAL)  Result time 10/01/23 12:12:08      Edited Result - FINAL by Ulises Cole MD (10/01/23 12:12:08)                   Narrative:         ADDENDUM #1       Results were communicated to Dr. Tuyet Wong 6:57 PM ON 9/30/2023.    Electronically signed by:  Ulises Cole MD  10/01/2023 12:12 PM CDT Workstation: [id number]       ORIGINAL REPORT       CMS MANDATED QUALITY DATA - CT RADIATION  436 All CT scans at this facility utilize dose modulation, iterative reconstruction, and/or weight based dosing when appropriate to reduce radiation dose to as low as reasonably achievable.    CT angiogram of the pulmonary arteries, 9/30/2023 6:48 PM CDT    Clinical History:Pulmonary embolism (PE) suspected, unknown D-dimer,    Comparison Studies: 6/2/2023    Technique:    Thin section axial CT imaging was performed through the chest during IV Omnipaque contrast administration.  Coronal MIP reconstructions were performed on an independent workstation.  All images were utilized for examination interpretation, and all images were stored in the patient's permanent electronic medical record.    Findings:    CTA imaging of the pulmonary arteries was performed during the uneventful administration of nonionic intravenous contrast. Examination is of adequate diagnostic quality.    There is segmental right upper lobe pulmonary emboli. There are no central saddle type pulmonary emboli.    Cavitating spiculated nodule at the left lower lobe posteriorly measures 1.7 x 2.1 cm in size,  versus previous 2.7 x 2.7 cm in size. There are subpleural and centrilobular upper lobe predominant diffuse emphysematous changes. There is mild biapical pleural and parenchymal scarring. There is coarse alveolar and interstitial infiltrate along the mediastinal border of the left upper lobe, and at the upper lobes posteriorly bilaterally.    No pathologic mediastinal, axillary or hilar lymphadenopathy is identified. There is a mineralized stable nodule present in the right side breast.    In the upper abdomen, there is incidental cholelithiasis    No destructive pathologic bone lesion is identified.    A left-sided Leguys-w-Lfek device is in place, with its distal tip within the SVC.    Impression:    1. Segmental right upper lobe pulmonary embolus.  2. Cavitating and spiculated posterior left lower lobe lung mass, decreased slightly in size compared to the prior chest CT exam.  3. Upper lobe predominant centrilobular and subpleural emphysema, with coarse mixed interstitial and alveolar infiltrate along the mediastinal border left upper lobe suggesting reactive or infectious pneumonitis, and with mild posterior bilateral upper lobe groundglass infiltrates.    All CT scans at this facility diffuse use low dose modification, iterative reconstruction, and/or weight-based dosing when appropriate to reduce radiation dose to as low as reasonably achievable.      Electronically signed by:  Ulises Cole MD  09/30/2023 06:52 PM CDT Workstation: [id number]                      Final result by Ulises Cole MD (09/30/23 18:52:34)                   Narrative:    CMS MANDATED QUALITY DATA - CT RADIATION  436 All CT scans at this facility utilize dose modulation, iterative reconstruction, and/or weight based dosing when appropriate to reduce radiation dose to as low as reasonably achievable.    CT angiogram of the pulmonary arteries, 9/30/2023 6:48 PM CDT    Clinical History:Pulmonary embolism (PE) suspected, unknown  D-dimer,    Comparison Studies: 6/2/2023    Technique:    Thin section axial CT imaging was performed through the chest during IV Omnipaque contrast administration.  Coronal MIP reconstructions were performed on an independent workstation.  All images were utilized for examination interpretation, and all images were stored in the patient's permanent electronic medical record.    Findings:    CTA imaging of the pulmonary arteries was performed during the uneventful administration of nonionic intravenous contrast. Examination is of adequate diagnostic quality.    There is segmental right upper lobe pulmonary emboli. There are no central saddle type pulmonary emboli.    Cavitating spiculated nodule at the left lower lobe posteriorly measures 1.7 x 2.1 cm in size, versus previous 2.7 x 2.7 cm in size. There are subpleural and centrilobular upper lobe predominant diffuse emphysematous changes. There is mild biapical pleural and parenchymal scarring. There is coarse alveolar and interstitial infiltrate along the mediastinal border of the left upper lobe, and at the upper lobes posteriorly bilaterally.    No pathologic mediastinal, axillary or hilar lymphadenopathy is identified. There is a mineralized stable nodule present in the right side breast.    In the upper abdomen, there is incidental cholelithiasis    No destructive pathologic bone lesion is identified.    A left-sided Izlqko-h-Lzce device is in place, with its distal tip within the SVC.    Impression:    1. Segmental right upper lobe pulmonary embolus.  2. Cavitating and spiculated posterior left lower lobe lung mass, decreased slightly in size compared to the prior chest CT exam.  3. Upper lobe predominant centrilobular and subpleural emphysema, with coarse mixed interstitial and alveolar infiltrate along the mediastinal border left upper lobe suggesting reactive or infectious pneumonitis, and with mild posterior bilateral upper lobe groundglass  infiltrates.    All CT scans at this facility diffuse use low dose modification, iterative reconstruction, and/or weight-based dosing when appropriate to reduce radiation dose to as low as reasonably achievable.      Electronically signed by:  Ulises Cole MD  09/30/2023 06:52 PM CDT Workstation: [id number]                                     Medications   atorvastatin tablet 80 mg (80 mg Oral Given 10/1/23 2017)   busPIRone tablet 5 mg (5 mg Oral Given 10/1/23 2017)   citalopram tablet 20 mg (20 mg Oral Given 10/2/23 0838)   ezetimibe tablet 10 mg (10 mg Oral Given 10/2/23 0838)   metoprolol succinate (TOPROL-XL) 24 hr tablet 50 mg (50 mg Oral Given 10/2/23 0838)   pantoprazole EC tablet 40 mg (40 mg Oral Given 10/2/23 0838)   sucralfate 100 mg/mL suspension 1 g (1 g Oral Given 10/2/23 1108)   tiZANidine tablet 4 mg (has no administration in time range)   sodium chloride 0.9% flush 2 mL (has no administration in time range)   melatonin tablet 6 mg (6 mg Oral Given 10/1/23 2017)   0.9%  NaCl infusion ( Intravenous New Bag 10/2/23 0025)   potassium bicarbonate disintegrating tablet 50 mEq (50 mEq Oral Given 10/1/23 0909)   potassium bicarbonate disintegrating tablet 35 mEq (has no administration in time range)   potassium bicarbonate disintegrating tablet 60 mEq (has no administration in time range)   magnesium oxide tablet 800 mg (has no administration in time range)   magnesium oxide tablet 800 mg (has no administration in time range)   potassium, sodium phosphates 280-160-250 mg packet 2 packet (has no administration in time range)   potassium, sodium phosphates 280-160-250 mg packet 2 packet (has no administration in time range)   potassium, sodium phosphates 280-160-250 mg packet 2 packet (has no administration in time range)   acetaminophen tablet 650 mg (650 mg Oral Given 10/1/23 0909)   polyethylene glycol packet 17 g (17 g Oral Given 10/2/23 0839)   ondansetron injection 4 mg (4 mg Intravenous Not Given  10/1/23 1946)   promethazine tablet 25 mg (25 mg Oral Given 10/1/23 2017)   acetaminophen tablet 650 mg (650 mg Oral Given 10/1/23 1744)   meropenem 1 g in sodium chloride 0.9 % 100 mL IVPB (ready to mix system) (0 g Intravenous Stopped 10/2/23 0937)   levoFLOXacin tablet 750 mg (has no administration in time range)   mupirocin 2 % ointment (1 g Nasal Given 10/2/23 1109)   apixaban tablet 10 mg (10 mg Oral Given 10/2/23 1109)   iohexoL (OMNIPAQUE 350) injection 100 mL (100 mLs Intravenous Given 9/30/23 1821)   enoxaparin injection 60 mg (60 mg Subcutaneous Given 9/30/23 2116)   LIDOcaine HCl 2% oral solution 5 mL (5 mLs Mucous Membrane Given 10/1/23 0754)     Medical Decision Making  [facility]    Patient presents for emergent evaluation of acute fever, generalized weakness that poses a threat to life and/or bodily function.    In the ED patient found to have acute pulmonary embolus.    I ordered labs and personally reviewed them.  Labs significant for   09/30/23 17:00  WBC: 2.14 (L)  RBC: 3.57 (L)  Hemoglobin: 11.4 (L)  Hematocrit: 32.6 (L)  09/30/23 17:00  Lactate, Chandan: 1.2      I ordered EKG and personally reviewed it.  EKG significant for normal sinus rhythm, ventricular rate of 90.    I ordered CT scan and personally reviewed it and reviewed the radiologist interpretation.  CT significant for . Segmental right upper lobe pulmonary embolus.  2. Cavitating and spiculated posterior left lower lobe lung mass, decreased slightly in size compared to the prior chest CT exam.  3. Upper lobe predominant centrilobular and subpleural emphysema, with coarse mixed interstitial and alveolar infiltrate along the mediastinal border left upper lobe suggesting reactive or infectious pneumonitis, and with mild posterior bilateral upper lobe groundglass infiltrates.    Admit MDM  I discussed the patient presentation labs, ekg, X-rays, CT findings with the consultant for [facility] medicine, Valarie VORA    Patient was managed in the ED with SQ  Lovenox.    The response to treatment was patient remained stable while in the ED. .    Patient will require admission for management of new PE and symptom management/treatment plan. No evidence of sepsis on ED workup with no leukocytosis or lactic acidosis. Vitals stable.     Amount and/or Complexity of Data Reviewed  Labs: ordered.  Radiology: ordered.    Risk  Prescription drug management.              Attending Attestation:     Physician Attestation Statement for NP/PA:       Other NP/PA Attestation Additions:    History of Present Illness: I was personally available for consultation in the emergency department.  I have reviewed the chart and agree with the documentation as recorded by the nurse practitioner/physician assistant, including the assessment, treatment plan, and disposition.                                Clinical Impression:   Final diagnoses:  [R50.9] Fever  [I26.99] Pulmonary embolism, unspecified chronicity, unspecified pulmonary embolism type, unspecified whether acute cor pulmonale present (Primary)        ED Disposition Condition    Observation                 Tuyet Whiting NP  10/01/23 0130       Avis Borden MD  10/02/23 [id number]

## 2023-10-01 NOTE — PROGRESS NOTES
[facility] Medicine  Progress Note    Patient Name: Aysha Moore  MRN: [id number]  Patient Class: OP- Observation   Admission Date: 9/30/2023  Length of Stay: 0 days  Attending Physician: Brooks Hdz MD  Primary Care Provider: Yoni Daniels MD        Subjective:     Principal Problem:Acute pulmonary embolism        HPI:  67 y/o female with history of HTN, HLD, CAD with previous MI, Lung cancer treated with chemo and radiation with plans to start immunotherapy soon, and anxiety presents to the ED for fever. Patient was recently discharged from the hospital yesterday for the same complaint. Patient had a full workup done including urine and blood cultures and was placed on Meropenem. Patient showed improvement and no infection was found so the patient was discharged home. Patient had a fever up to 101 at home prior to arrival at the ED along with some diarrhea last night. Patient states that she still has pain from her esophagitis and feels weak which is her baseline however she denies any chest pain, abdominal pain, increased SOB, lightheadedness, or sick contacts. Patient does state that she was not able to  her Carafate from the pharmacy and has not been eating or drinking very much since she was discharged. Code status was discussed and patient was placed as a full code at this time.     ED: Vitals showed HR 96, /51, RR 20 saturating at 94% on RA with a temperature of 100.2. Labs were significant for WBC 2.14, RBC 3.57, Hgb 11.4, HCT 32.6, and Lactic acid 1.2. CTA chest and CT abdomen showed segmental right upper lobe PE and upper lobe predominant centrilobular and subpleural emphysema, with coarse mixed interstitial and alveolar infiltrate along the mediastinal border left upper lobe suggesting reactive or infectious pneumonitis, and with mild posterior bilateral upper lobe groundglass infiltrates. Patient started on Enoxaparin 1mg/kg in the ED.       Overview/Hospital  Course:  Fever.  Sob improving        Objective:     Vital Signs (Most Recent):  Temp: 98.1 °F (36.7 °C) (10/01/23 0909)  Pulse: 77 (10/01/23 0855)  Resp: 16 (10/01/23 0749)  BP: 100/60 (10/01/23 0855)  SpO2: 97 % (10/01/23 0749) Vital Signs (24h Range):  Temp:  [95.1 °F (35.1 °C)-102.2 °F (39 °C)] 98.1 °F (36.7 °C)  Pulse:  [] 77  Resp:  [16-20] 16  SpO2:  [91 %-97 %] 97 %  BP: ()/(51-87) 100/60     Weight: 63 kg (138 lb 14.2 oz)  Body mass index is 24.6 kg/m².    Intake/Output Summary (Last 24 hours) at 10/1/2023 0924  Last data filed at 10/1/2023 0904  Gross per 24 hour   Intake 1101.67 ml   Output 300 ml   Net 801.67 ml         Physical Exam  Constitutional:       General: She is not in acute distress.  HENT:      Head: Normocephalic and atraumatic.      Nose: No congestion.   Eyes:      General: No scleral icterus.        Right eye: No discharge.         Left eye: No discharge.      Extraocular Movements: Extraocular movements intact.   Cardiovascular:      Rate and Rhythm: Normal rate and regular rhythm.   Pulmonary:      Effort: Pulmonary effort is normal.      Breath sounds: Normal breath sounds.   Abdominal:      General: Abdomen is flat. Bowel sounds are normal.   Musculoskeletal:         General: No swelling or tenderness.      Cervical back: Normal range of motion and neck supple. No rigidity.   Skin:     General: Skin is warm.   Neurological:      General: No focal deficit present.      Mental Status: She is alert and oriented to person, place, and time.   Psychiatric:         Mood and Affect: Mood normal.             Significant Labs: All pertinent labs within the past 24 hours have been reviewed.  CBC:   Recent Labs   Lab 09/30/23  1700 10/01/23  0442   WBC 2.14* 1.62*   HGB 11.4* 10.1*   HCT 32.6* 29.7*    226     CMP:   Recent Labs   Lab 09/30/23  1700 10/01/23  0442    139   K 3.8 3.5    104   CO2 28 30*   * 98   BUN 9 7*   CREATININE 0.5 0.5   CALCIUM 8.7 8.5*    PROT 6.7 5.9*   ALBUMIN 3.6 3.2*   BILITOT 0.6 0.5   ALKPHOS 77 60   AST 21 16   ALT 19 17   ANIONGAP 7* 5*       Significant Imaging: I have reviewed all pertinent imaging results/findings within the past 24 hours.      Assessment/Plan:      * Acute pulmonary embolism  Patient has lung cancer and was previously worked up for fever.   Patient had a fever of 101 at home and after talking to her oncologist came back to the ED  Patient denies chest pain or increased SOB  Patient was tachycardic to 112 on arrival to the ED    CTA chest   - segmental right upper lobe pulmonary embolus.    Patient started on Enoxaparin 1mg/kg q12h  Continue to monitor patient vitals     Esophagitis  Continue Carafate   Continue to monitor symptoms      Fever temp 102.2  Will check blood culture and start merropenin iv  Consult ID  Patient has lung cancer and was previously seen, admitted, and discharged for fever. She had a full workup including blood and urine cultures and no infectious etiology was found.    Patient had a fever of 101 at home today and after talking to her oncologist came to the ED  Patient had a temperature of 100.2 in the ED  UA showed no evidence of UTI  Continue to monitor and give Acetaminophen PRN for fever       Hypertension  Continue home regimen as tolerated  Continue to monitor patient vitals      Hyperlipidemia  Continue home regimen        VTE Risk Mitigation (From admission, onward)         Ordered     enoxaparin injection 60 mg  Every 12 hours         09/30/23 2039                Discharge Planning   NAKUL:      Code Status: Full Code   Is the patient medically ready for discharge?:     Reason for patient still in hospital (select all that apply): Treatment                     Eugene Aguirre MD  Department of Hospital Medicine   [facility]

## 2023-10-02 ENCOUNTER — PATIENT OUTREACH (OUTPATIENT)
Dept: ADMINISTRATIVE | Facility: CLINIC | Age: 68
End: 2023-10-02
Payer: MEDICARE

## 2023-10-02 LAB
ALBUMIN SERPL BCP-MCNC: 3.3 G/DL (ref 3.5–5.2)
ALP SERPL-CCNC: 61 U/L (ref 55–135)
ALT SERPL W/O P-5'-P-CCNC: 17 U/L (ref 10–44)
ANION GAP SERPL CALC-SCNC: 9 MMOL/L (ref 8–16)
ANISOCYTOSIS BLD QL SMEAR: SLIGHT
AST SERPL-CCNC: 17 U/L (ref 10–40)
BACTERIA BLD CULT: NORMAL
BACTERIA BLD CULT: NORMAL
BASOPHILS # BLD AUTO: ABNORMAL K/UL (ref 0–0.2)
BASOPHILS NFR BLD: 1 % (ref 0–1.9)
BILIRUB SERPL-MCNC: 0.5 MG/DL (ref 0.1–1)
BUN SERPL-MCNC: 5 MG/DL (ref 8–23)
CALCIUM SERPL-MCNC: 8.4 MG/DL (ref 8.7–10.5)
CHLORIDE SERPL-SCNC: 102 MMOL/L (ref 95–110)
CO2 SERPL-SCNC: 27 MMOL/L (ref 23–29)
CREAT SERPL-MCNC: 0.5 MG/DL (ref 0.5–1.4)
DIFFERENTIAL METHOD: ABNORMAL
EOSINOPHIL # BLD AUTO: ABNORMAL K/UL (ref 0–0.5)
EOSINOPHIL NFR BLD: 4 % (ref 0–8)
ERYTHROCYTE [DISTWIDTH] IN BLOOD BY AUTOMATED COUNT: 15 % (ref 11.5–14.5)
ERYTHROCYTE [DISTWIDTH] IN BLOOD BY AUTOMATED COUNT: 15.1 % (ref 11.5–14.5)
EST. GFR  (NO RACE VARIABLE): >60 ML/MIN/1.73 M^2
GLUCOSE SERPL-MCNC: 95 MG/DL (ref 70–110)
HCT VFR BLD AUTO: 29.3 % (ref 37–48.5)
HCT VFR BLD AUTO: 31 % (ref 37–48.5)
HGB BLD-MCNC: 10.5 G/DL (ref 12–16)
HGB BLD-MCNC: 9.9 G/DL (ref 12–16)
IMM GRANULOCYTES # BLD AUTO: ABNORMAL K/UL (ref 0–0.04)
IMM GRANULOCYTES NFR BLD AUTO: ABNORMAL % (ref 0–0.5)
LYMPHOCYTES # BLD AUTO: ABNORMAL K/UL (ref 1–4.8)
LYMPHOCYTES NFR BLD: 14 % (ref 18–48)
MCH RBC QN AUTO: 31.2 PG (ref 27–31)
MCH RBC QN AUTO: 31.3 PG (ref 27–31)
MCHC RBC AUTO-ENTMCNC: 33.8 G/DL (ref 32–36)
MCHC RBC AUTO-ENTMCNC: 33.9 G/DL (ref 32–36)
MCV RBC AUTO: 92 FL (ref 82–98)
MCV RBC AUTO: 92 FL (ref 82–98)
MONOCYTES # BLD AUTO: ABNORMAL K/UL (ref 0.3–1)
MONOCYTES NFR BLD: 30 % (ref 4–15)
MRSA SCREEN BY PCR: NEGATIVE
NEUTROPHILS NFR BLD: 50 % (ref 38–73)
NEUTS BAND NFR BLD MANUAL: 1 %
NRBC BLD-RTO: 0 /100 WBC
PLATELET # BLD AUTO: 249 K/UL (ref 150–450)
PLATELET # BLD AUTO: 252 K/UL (ref 150–450)
PLATELET BLD QL SMEAR: ABNORMAL
PMV BLD AUTO: 10 FL (ref 9.2–12.9)
PMV BLD AUTO: 9.3 FL (ref 9.2–12.9)
POTASSIUM SERPL-SCNC: 4.2 MMOL/L (ref 3.5–5.1)
PROCALCITONIN SERPL IA-MCNC: 0.14 NG/ML (ref 0–0.5)
PROT SERPL-MCNC: 6 G/DL (ref 6–8.4)
RBC # BLD AUTO: 3.17 M/UL (ref 4–5.4)
RBC # BLD AUTO: 3.36 M/UL (ref 4–5.4)
SODIUM SERPL-SCNC: 138 MMOL/L (ref 136–145)
WBC # BLD AUTO: 2.12 K/UL (ref 3.9–12.7)
WBC # BLD AUTO: 2.28 K/UL (ref 3.9–12.7)

## 2023-10-02 PROCEDURE — 99233 PR SUBSEQUENT HOSPITAL CARE,LEVL III: ICD-10-PCS | Mod: FS,,, | Performed by: NURSE PRACTITIONER

## 2023-10-02 PROCEDURE — 63600175 PHARM REV CODE 636 W HCPCS: Performed by: INTERNAL MEDICINE

## 2023-10-02 PROCEDURE — 36415 COLL VENOUS BLD VENIPUNCTURE: CPT | Performed by: INTERNAL MEDICINE

## 2023-10-02 PROCEDURE — 25000003 PHARM REV CODE 250: Performed by: NURSE PRACTITIONER

## 2023-10-02 PROCEDURE — 87040 BLOOD CULTURE FOR BACTERIA: CPT | Performed by: NURSE PRACTITIONER

## 2023-10-02 PROCEDURE — 25000003 PHARM REV CODE 250: Performed by: INTERNAL MEDICINE

## 2023-10-02 PROCEDURE — 80053 COMPREHEN METABOLIC PANEL: CPT | Performed by: INTERNAL MEDICINE

## 2023-10-02 PROCEDURE — 85027 COMPLETE CBC AUTOMATED: CPT | Mod: 91 | Performed by: INTERNAL MEDICINE

## 2023-10-02 PROCEDURE — 87641 MR-STAPH DNA AMP PROBE: CPT | Performed by: NURSE PRACTITIONER

## 2023-10-02 PROCEDURE — 25000003 PHARM REV CODE 250

## 2023-10-02 PROCEDURE — 36415 COLL VENOUS BLD VENIPUNCTURE: CPT | Performed by: NURSE PRACTITIONER

## 2023-10-02 PROCEDURE — 12000002 HC ACUTE/MED SURGE SEMI-PRIVATE ROOM

## 2023-10-02 PROCEDURE — 63600175 PHARM REV CODE 636 W HCPCS: Performed by: NURSE PRACTITIONER

## 2023-10-02 PROCEDURE — 99233 SBSQ HOSP IP/OBS HIGH 50: CPT | Mod: FS,,, | Performed by: NURSE PRACTITIONER

## 2023-10-02 PROCEDURE — 86140 C-REACTIVE PROTEIN: CPT | Performed by: NURSE PRACTITIONER

## 2023-10-02 PROCEDURE — 63600175 PHARM REV CODE 636 W HCPCS

## 2023-10-02 PROCEDURE — 84145 PROCALCITONIN (PCT): CPT | Performed by: INTERNAL MEDICINE

## 2023-10-02 PROCEDURE — 85027 COMPLETE CBC AUTOMATED: CPT | Performed by: INTERNAL MEDICINE

## 2023-10-02 PROCEDURE — 85007 BL SMEAR W/DIFF WBC COUNT: CPT | Performed by: INTERNAL MEDICINE

## 2023-10-02 RX ORDER — VANCOMYCIN HCL IN 5 % DEXTROSE 1G/250ML
1000 PLASTIC BAG, INJECTION (ML) INTRAVENOUS
Status: DISCONTINUED | OUTPATIENT
Start: 2023-10-03 | End: 2023-10-03

## 2023-10-02 RX ORDER — MUPIROCIN 20 MG/G
OINTMENT TOPICAL 2 TIMES DAILY
Status: DISCONTINUED | OUTPATIENT
Start: 2023-10-02 | End: 2023-10-04 | Stop reason: HOSPADM

## 2023-10-02 RX ORDER — LEVOFLOXACIN 750 MG/1
750 TABLET ORAL DAILY
Status: DISCONTINUED | OUTPATIENT
Start: 2023-10-03 | End: 2023-10-02

## 2023-10-02 RX ADMIN — EZETIMIBE 10 MG: 10 TABLET ORAL at 08:10

## 2023-10-02 RX ADMIN — CITALOPRAM HYDROBROMIDE 20 MG: 20 TABLET ORAL at 08:10

## 2023-10-02 RX ADMIN — ONDANSETRON 4 MG: 2 INJECTION INTRAMUSCULAR; INTRAVENOUS at 08:10

## 2023-10-02 RX ADMIN — SODIUM CHLORIDE: 0.9 INJECTION, SOLUTION INTRAVENOUS at 12:10

## 2023-10-02 RX ADMIN — APIXABAN 10 MG: 5 TABLET, FILM COATED ORAL at 08:10

## 2023-10-02 RX ADMIN — MEROPENEM 1 G: 1 INJECTION, POWDER, FOR SOLUTION INTRAVENOUS at 05:10

## 2023-10-02 RX ADMIN — MEROPENEM 1 G: 1 INJECTION, POWDER, FOR SOLUTION INTRAVENOUS at 12:10

## 2023-10-02 RX ADMIN — BUSPIRONE HYDROCHLORIDE 5 MG: 5 TABLET ORAL at 08:10

## 2023-10-02 RX ADMIN — MUPIROCIN 1 G: 20 OINTMENT TOPICAL at 08:10

## 2023-10-02 RX ADMIN — ENOXAPARIN SODIUM 60 MG: 60 INJECTION SUBCUTANEOUS at 08:10

## 2023-10-02 RX ADMIN — ATORVASTATIN CALCIUM 80 MG: 40 TABLET, FILM COATED ORAL at 08:10

## 2023-10-02 RX ADMIN — SUCRALFATE 1 G: 1 SUSPENSION ORAL at 08:10

## 2023-10-02 RX ADMIN — SUCRALFATE 1 G: 1 SUSPENSION ORAL at 05:10

## 2023-10-02 RX ADMIN — VANCOMYCIN HYDROCHLORIDE 1500 MG: 1.5 INJECTION, POWDER, LYOPHILIZED, FOR SOLUTION INTRAVENOUS at 08:10

## 2023-10-02 RX ADMIN — SUCRALFATE 1 G: 1 SUSPENSION ORAL at 11:10

## 2023-10-02 RX ADMIN — METOPROLOL SUCCINATE 50 MG: 50 TABLET, EXTENDED RELEASE ORAL at 08:10

## 2023-10-02 RX ADMIN — PANTOPRAZOLE SODIUM 40 MG: 40 TABLET, DELAYED RELEASE ORAL at 08:10

## 2023-10-02 RX ADMIN — MUPIROCIN 1 G: 20 OINTMENT TOPICAL at 11:10

## 2023-10-02 RX ADMIN — POLYETHYLENE GLYCOL 3350 17 G: 17 POWDER, FOR SOLUTION ORAL at 08:10

## 2023-10-02 RX ADMIN — MEROPENEM 1 G: 1 INJECTION, POWDER, FOR SOLUTION INTRAVENOUS at 08:10

## 2023-10-02 RX ADMIN — APIXABAN 10 MG: 5 TABLET, FILM COATED ORAL at 11:10

## 2023-10-02 NOTE — PROGRESS NOTES
[facility] Medicine  Progress Note    Patient Name: Aysha Moore  MRN: [id number]  Patient Class: IP- Inpatient   Admission Date: 9/30/2023  Length of Stay: 1 days  Attending Physician: Eugene Aguirre MD  Primary Care Provider: Yoni Daniels MD        Subjective:     Principal Problem:Acute pulmonary embolism        HPI:  67 y/o female with history of HTN, HLD, CAD with previous MI, Lung cancer treated with chemo and radiation with plans to start immunotherapy soon, and anxiety presents to the ED for fever. Patient was recently discharged from the hospital yesterday for the same complaint. Patient had a full workup done including urine and blood cultures and was placed on Meropenem. Patient showed improvement and no infection was found so the patient was discharged home. Patient had a fever up to 101 at home prior to arrival at the ED along with some diarrhea last night. Patient states that she still has pain from her esophagitis and feels weak which is her baseline however she denies any chest pain, abdominal pain, increased SOB, lightheadedness, or sick contacts. Patient does state that she was not able to  her Carafate from the pharmacy and has not been eating or drinking very much since she was discharged. Code status was discussed and patient was placed as a full code at this time.     ED: Vitals showed HR 96, /51, RR 20 saturating at 94% on RA with a temperature of 100.2. Labs were significant for WBC 2.14, RBC 3.57, Hgb 11.4, HCT 32.6, and Lactic acid 1.2. CTA chest and CT abdomen showed segmental right upper lobe PE and upper lobe predominant centrilobular and subpleural emphysema, with coarse mixed interstitial and alveolar infiltrate along the mediastinal border left upper lobe suggesting reactive or infectious pneumonitis, and with mild posterior bilateral upper lobe groundglass infiltrates. Patient started on Enoxaparin 1mg/kg in the ED.        Overview/Hospital Course:  Fever resolved.  Sob resolved    Physical Exam  Constitutional:       General: She is not in acute distress.  HENT:      Head: Normocephalic and atraumatic.      Nose: No congestion.   Eyes:      General: No scleral icterus.        Right eye: No discharge.         Left eye: No discharge.      Extraocular Movements: Extraocular movements intact.   Cardiovascular:      Rate and Rhythm: Normal rate and regular rhythm.   Pulmonary:      Effort: Pulmonary effort is normal.      Breath sounds: Normal breath sounds.   Abdominal:      General: Abdomen is flat. Bowel sounds are normal.   Musculoskeletal:         General: No swelling or tenderness.      Cervical back: Normal range of motion and neck supple. No rigidity.   Skin:     General: Skin is warm.   Neurological:      General: No focal deficit present.      Mental Status: She is alert and oriented to person, place, and time.   Psychiatric:         Mood and Affect: Mood normal.          Assessment/Plan:      * Acute pulmonary embolism  Patient has lung cancer and was previously worked up for fever.   Patient had a fever of 101 at home and after talking to her oncologist came back to the ED  Patient denies chest pain or increased SOB  Patient was tachycardic to 112 on arrival to the ED    CTA chest   - segmental right upper lobe pulmonary embolus.    Stop lovenox.  Change to eliquis.   Continue to monitor patient vitals   Will d/c home tomorrow if culture neg  Esophagitis  Continue Carafate   Continue to monitor symptoms      Fever temp 102.2  Will check blood culture and start merropenin iv  Consult ID  Patient has lung cancer and was previously seen, admitted, and discharged for fever. She had a full workup including blood and urine cultures and no infectious etiology was found.    Patient had a fever of 101 at home today and after talking to her oncologist came to the ED  Patient had a temperature of 100.2 in the ED  UA showed no  evidence of UTI  Continue to monitor and give Acetaminophen PRN for fever       Hypertension  Continue home regimen as tolerated  Continue to monitor patient vitals      Hyperlipidemia  Continue home regimen        VTE Risk Mitigation (From admission, onward)           Ordered     enoxaparin injection 60 mg  Every 12 hours         09/30/23 2039                    Discharge Planning   NAKUL:      Code Status: Full Code   Is the patient medically ready for discharge?:     Reason for patient still in hospital (select all that apply): Treatment  Discharge Plan A: Home with family                  Eugene Aguirre MD  Department of Hospital Medicine   [facility]

## 2023-10-02 NOTE — TELEPHONE ENCOUNTER
----- Message from Colin Bullock sent at 8/23/2022 10:56 AM CDT -----  Type:  Sooner Appointment Request    Caller is requesting a sooner appointment.  Caller declined first available appointment listed below.  Caller will not accept being placed on the waitlist and is requesting a message be sent to doctor.    Name of Caller:  Pt  When is the first available appointment?     Symptoms:  6 month check up and Echo also  Best Call Back Number:  [phone]   Additional Information: Wants to know if he wants the Echo done before she sees him again.   Please advise -- Thank you       Detail Level: Generalized Detail Level: Zone

## 2023-10-02 NOTE — PROGRESS NOTES
Pharmacokinetic Initial Assessment: IV Vancomycin    Assessment/Plan:    Initiate intravenous vancomycin with loading dose of 1500 mg once followed by a maintenance dose of vancomycin 1000 mg IV every 12 hours  Desired empiric serum trough concentration is 10 to 20 mcg/mL  Draw vancomycin trough level 60 min prior to fourth dose on 10/4/23 at approximately 0500  Pharmacy will continue to follow and monitor vancomycin.      Please contact pharmacy at extension [phone] with any questions regarding this assessment.     Thank you for the consult,   Nereida Connors       Patient brief summary:  Aysha Moore is a 68 y.o. female initiated on antimicrobial therapy with IV Vancomycin for treatment of suspected neutropenic fever    Drug Allergies:   Review of patient's allergies indicates:   Allergen Reactions    Cephalexin      Other reaction(s): Rash    Doxycycline monohydrate Hives    Lanoxin  [digoxin]      Other reaction(s): Rash    Lansoprazole      Other reaction(s): Rash    Macrobid [nitrofurantoin monohyd/m-cryst] Rash       Actual Body Weight:   63 kg    Renal Function:   Estimated Creatinine Clearance: 96.2 mL/min (based on SCr of 0.5 mg/dL).,     Dialysis Method (if applicable):  N/A    CBC (last 72 hours):  Recent Labs   Lab Result Units 09/30/23  1700 10/01/23  0442 10/02/23  0456 10/02/23  0908   WBC K/uL 2.14* 1.62* 2.28* 2.12*   Hemoglobin g/dL 11.4* 10.1* 10.5* 9.9*   Hematocrit % 32.6* 29.7* 31.0* 29.3*   Platelets K/uL 242 226 249 252   Gran % % 47.6  --   --  50.0   Lymph % % 15.0*  --   --  14.0*   Mono % % 31.3*  --   --  30.0*   Eosinophil % % 5.6  --   --  4.0   Basophil % % 0.5  --   --  1.0   Differential Method  Automated  --   --  Manual       Metabolic Panel (last 72 hours):  Recent Labs   Lab Result Units 09/30/23  1700 09/30/23  1903 10/01/23  0442 10/02/23  0456   Sodium mmol/L 136  --  139 138   Potassium mmol/L 3.8  --  3.5 4.2   Chloride mmol/L 101  --  104 102   CO2 mmol/L 28  --  30* 27  "  Glucose mg/dL 111*  --  98 95   Glucose, UA   --  Negative  --   --    BUN mg/dL 9  --  7* 5*   Creatinine mg/dL 0.5  --  0.5 0.5   Albumin g/dL 3.6  --  3.2* 3.3*   Total Bilirubin mg/dL 0.6  --  0.5 0.5   Alkaline Phosphatase U/L 77  --  60 61   AST U/L 21  --  16 17   ALT U/L 19  --  17 17       Drug levels (last 3 results):  No results for input(s): "VANCOMYCINRA", "VANCORANDOM", "VANCOMYCINPE", "VANCOPEAK", "VANCOMYCINTR", "VANCOTROUGH" in the last 72 hours.    Microbiologic Results:  Microbiology Results (last 7 days)       Procedure Component Value Units Date/Time    Blood culture [[id number]]     Order Status: Sent Specimen: Blood     Blood culture [[id number]]     Order Status: Sent Specimen: Blood     MRSA Screen by PCR [[id number]]     Order Status: No result Specimen: Nasopharyngeal Swab from Nasal     Blood culture [[id number]] Collected: 10/01/23 0815    Order Status: Completed Specimen: Blood Updated: 10/02/23 1032     Blood Culture, Routine No Growth to date      No Growth to date    Narrative:      Blood culture 2    Blood culture x two cultures. Draw prior to antibiotics. [[id number]] Collected: 09/30/23 1703    Order Status: Completed Specimen: Blood Updated: 10/01/23 1832     Blood Culture, Routine No Growth to date      No Growth to date    Narrative:      Aerobic and anaerobic    Blood culture x two cultures. Draw prior to antibiotics. [[id number]] Collected: 09/30/23 1704    Order Status: Completed Specimen: Blood Updated: 10/01/23 1832     Blood Culture, Routine No Growth to date      No Growth to date    Narrative:      Aerobic and anaerobic            "

## 2023-10-02 NOTE — PROGRESS NOTES
[facility]  Department of Infectious Disease  Progress Note        PATIENT NAME: Aysha Moore  MRN: [id number]  TODAY'S DATE: 10/02/2023  ADMIT DATE: 9/30/2023    PRINCIPLE PROBLEM: Acute pulmonary embolism    REASON FOR CONSULT:  Fever    INTERVAL HISTORY      10/2 @ 1003:  Patient seen and examined in her room.  She is feeling better today.  She denies fevers or chills, nausea or vomiting, she is eating but appetite is poor.  She has pain to her left inner cheek where she bit herself 1 night when she was here on last hospitalization.  She is been using xylocaine and pain is under control.  She states she is been voiding well with no dysuria.  She is not sure when her last bowel movement was but says it was before she came into the hospital.  She has some generalized weakness, denies chest pain, resolved shortness of breath, no coughing.  Her last chemo was 2 weeks ago.  T-max 99° in last 24 hours, WBC 2.12, ANC 1060, platelets 252, H&H 9.9/29.3, BUN/CR 5/0.5, ALT/AST 17/17.  Procalcitonin 0.144.  9/30 blood cultures x2 sets with no growth to date and 10/1 blood culture x1 set no growth to date.  She is on IV meropenem.    Antibiotics (From admission, onward)      Start     Stop Route Frequency Ordered    10/02/23 1736  vancomycin - pharmacy to dose  (vancomycin IVPB (PEDS and ADULTS))        See Hyperspace for full Linked Orders Report.    -- IV pharmacy to manage frequency 10/02/23 1638    10/02/23 1015  mupirocin 2 % ointment         10/07/23 0859 Nasl 2 times daily 10/02/23 0904    10/01/23 1630  meropenem 1 g in sodium chloride 0.9 % 100 mL IVPB (ready to mix system)         10/02/23 2359 IV Every 8 hours (non-standard times) 10/01/23 1351            ASSESSMENT and PLAN     1. Neutropenic fever   T-max 102.2° on 09/30, 99.1 in last 24 hours, procalcitonin 0.144   9/30 blood cultures x2 sets ngtd   10/1 blood cultures x1 sets ngtd   Qtc 477   ->1060, WBC 1.67->2.12   Splenic infarcts seen  on CTA chest/abdomen/pelvis concerning for endocarditis   Procalcitonin 0.144   9/30 meropenem   10/2 vancomycin    2. Right upper lobe pulmonary emboli    CTA chest with segmental right upper lobe pulmonary emboli, most likely cause of fever     3. Possible radiation pneumonitis left melissa mediastinal    4. 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 Past medical history seasonal allergies, CAD, hypertension, hyperlipidemia and chronic back pain      RECOMMENDATIONS:      Continue meropenem 1 gram IV every 8 hours  Start vancomycin with pharmacy to dose  Obtain echocardiogram - consider ARDEN  Obtain MRSA swab  Obtain blood cultures x2 today  CRP today      Thank you for this consult. Please send [employer] secure chat with any questions.      SUBJECTIVE    Review of Systems    Constitutional:  No further fever or chills, + loss of appetite.  HEENT: Denies visual changes,ear pain, sinus congestion,  trouble swallowing, sore throat or  dental pain. + mouth pain  Neck: Denies neck pain or lumps.  Respiratory: Resolved shortness of breath, No coughing, wheezing or hemoptysis.  Cardiovascular:  Denies chest pain, palpitations or edema.  Gastrointestinal: Denies nausea, vomiting, or diarrhea.  Genitourinary:  Denies dysuria, frequency, urgency or hematuria; + constipation  Musculoskeletal:  Denies joint pain or swelling, difficulty walking.    Skin:  Denies rash or itching.  VAD: virgen cath and piv, denies problems  Neurologic:  Denies motor sensory loss, headaches or dizziness.    Psychiatric:  Denies changes in mood or behavior.    OBJECTIVE     Temp:  [97.8 °F (36.6 °C)-99.1 °F (37.3 °C)] 98.5 °F (36.9 °C)  Pulse:  [] 87  Resp:  [17-19] 19  SpO2:  [92 %-98 %] 95 %  BP: ()/(56-82) 96/61  Physical Exam    General:  Chronically ill-appearing older female, sitting up in bed awake and alert, she is in no acute distress.  Eyes: Eyes with no icterus or injection. Vision grossly normal  Ears: Hearing  grossly normal.  Nose: Nares patent  Mouth: Moist mucous membranes.  No exudates or erythema, ulceration to left buccal mucosa.   Neck: Supple, no tenderness to palpation.  Cardiovascular: Regular rate and rhythm, no murmurs, no edema.   Good peripheral pulses. Infusaport left chest.   Respiratory:  Clear to auscultation bilaterally, no tachypnea or increased work of breathing.  Gastrointestinal:  Soft with active bowel sounds, no tenderness to palpation, no distention.  Musculoskeletal:  Moves all extremities with equal strength.    Skin:  Pale, warm and dry, no obvious rashes.    Neuro:  High Oriented, conversant, follows commands.  Psych: Good mood, normal affect.    Wounds: No wounds  VAD: Infusaport left chest, PIV  Isolation: None    CBC LAST 7 DAYS  Recent Labs   Lab 09/27/23  1552 09/28/23  0351 09/29/23  0323 09/30/23  1700 10/01/23  0442 10/02/23  0456 10/02/23  0908   WBC 2.01* 1.67* 1.71* 2.14* 1.62* 2.28* 2.12*   RBC 3.85* 3.22* 3.15* 3.57* 3.23* 3.36* 3.17*   HGB 11.9* 10.1* 9.8* 11.4* 10.1* 10.5* 9.9*   HCT 36.2* 30.4* 29.3* 32.6* 29.7* 31.0* 29.3*   MCV 94 94 93 91 92 92 92   MCH 30.9 31.4* 31.1* 31.9* 31.3* 31.3* 31.2*   MCHC 32.9 33.2 33.4 35.0 34.0 33.9 33.8   RDW 15.5* 15.4* 15.3* 15.2* 15.3* 15.1* 15.0*    239 232 242 226 249 252   MPV 10.2 9.2 9.2 9.0* 9.3 9.3 10.0   GRAN 53.5  1.0* 53.0 42.7  0.7* 47.6  1.0*  --   --  50.0   LYMPH 9.8*  0.2* 14.0*  CANCELED 14.6*  0.3* 15.0*  0.3*  --   --  14.0*  CANCELED   MONO 29.5*  0.6 26.0*  CANCELED 33.9*  0.6 31.3*  0.7  --   --  30.0*  CANCELED   BASO 0.02 CANCELED 0.02 0.01  --   --  CANCELED   NRBC 0 0 0 0  --   --  0       CHEMISTRY LAST 7 DAYS  Recent Labs   Lab 09/27/23  1552 09/28/23  0351 09/29/23  0323 09/30/23  1700 10/01/23  0442 10/02/23  0456    137 135* 136 139 138   K 4.5 3.9 4.6 3.8 3.5 4.2    105 103 101 104 102   CO2 27 29 29 28 30* 27   ANIONGAP 9 3* 3* 7* 5* 9   BUN 12 10 6* 9 7* 5*   CREATININE 0.7  "0.6 0.5 0.5 0.5 0.5    105 95 111* 98 95   CALCIUM 9.2 8.4* 8.2* 8.7 8.5* 8.4*   MG 1.7  --  1.5*  --   --   --    ALBUMIN 4.1 3.4* 3.1* 3.6 3.2* 3.3*   PROT 7.8 6.2 5.7* 6.7 5.9* 6.0   ALKPHOS 79 64 64 77 60 61   ALT 13 13 16 19 17 17   AST 18 16 18 21 16 17   BILITOT 0.8 0.7 0.6 0.6 0.5 0.5       Estimated Creatinine Clearance: 96.2 mL/min (based on SCr of 0.5 mg/dL).    INFLAMMATORY/PROCAL  LAST 7 DAYS  Recent Labs   Lab 10/01/23  0442 10/02/23  0456   PROCAL 0.111 0.144     No results found for: "ESR"  No results found for: "CRP"    PRIOR  MICROBIOLOGY:    No results found for the last 90 days.        LAST 7 DAYS MICROBIOLOGY   Microbiology Results (last 7 days)       Procedure Component Value Units Date/Time    Blood culture [[id number]] Collected: 10/01/23 0815    Order Status: Completed Specimen: Blood Updated: 10/02/23 1032     Blood Culture, Routine No Growth to date      No Growth to date    Narrative:      Blood culture 2    Blood culture x two cultures. Draw prior to antibiotics. [[id number]] Collected: 09/30/23 1703    Order Status: Completed Specimen: Blood Updated: 10/01/23 1832     Blood Culture, Routine No Growth to date      No Growth to date    Narrative:      Aerobic and anaerobic    Blood culture x two cultures. Draw prior to antibiotics. [[id number]] Collected: 09/30/23 1704    Order Status: Completed Specimen: Blood Updated: 10/01/23 1832     Blood Culture, Routine No Growth to date      No Growth to date    Narrative:      Aerobic and anaerobic              CURRENT/PREVIOUS VISIT EKG  Results for orders placed or performed during the hospital encounter of 09/30/23   EKG 12-lead    Collection Time: 09/30/23  4:52 PM    Narrative    Test Reason : R50.9,    Vent. Rate : 090 BPM     Atrial Rate : 090 BPM     P-R Int : 152 ms          QRS Dur : 100 ms      QT Int : 390 ms       P-R-T Axes : 051 -20 039 degrees     QTc Int : 477 ms    Normal sinus rhythm  Low voltage QRS  Borderline Abnormal " ECG    Confirmed by Rita FRANCISCO, Jean Tuttle ([id number]) on 9/30/2023 11:52:34 PM    Referred By: [email or web address]   SELF           Confirmed By:Jean Salinas MD       Significant Labs: All pertinent labs within the past 24 hours have been reviewed.     Significant Imaging: I have reviewed all relevant and available imaging results/findings within the past 24 hours.    9/30 CT abdomen and pelvis with contrast  Cholelithiasis with no gallbladder wall thickening, hypodensities x2 in the spleen concerning for splenic infarcts - nodular opacity left lower lobe which will need follow-up in 1 month    9/30 CTA chest   1. Segmental right upper lobe pulmonary embolus.   2. Cavitating and spiculated posterior left lower lobe lung mass, decreased slightly in size compared to the prior chest CT exam.   3. Upper lobe predominant centrilobular and subpleural emphysema, with coarse mixed interstitial and alveolar infiltrate along the mediastinal border left upper lobe suggesting reactive or infectious pneumonitis, and with mild posterior bilateral upper lobe groundglass infiltrates.           America Mittal NP    Date of Service: 10/02/2023  1:48 PM

## 2023-10-02 NOTE — PLAN OF CARE
Met with patient at bedside, discussed that MD had suggested home health on d/c. Patient declined home health services at discharge, states she has a large family with a lot of support and does not feel she will home health at this time.

## 2023-10-03 ENCOUNTER — CLINICAL SUPPORT (OUTPATIENT)
Dept: CARDIOLOGY | Facility: HOSPITAL | Age: 68
DRG: 176 | End: 2023-10-03
Attending: EMERGENCY MEDICINE
Payer: MEDICARE

## 2023-10-03 VITALS — BODY MASS INDEX: 24.61 KG/M2 | HEIGHT: 63 IN | WEIGHT: 138.88 LBS

## 2023-10-03 LAB
ALBUMIN SERPL BCP-MCNC: 3.2 G/DL (ref 3.5–5.2)
ALP SERPL-CCNC: 63 U/L (ref 55–135)
ALT SERPL W/O P-5'-P-CCNC: 23 U/L (ref 10–44)
ANION GAP SERPL CALC-SCNC: 7 MMOL/L (ref 8–16)
AORTIC ROOT ANNULUS: 2.7 CM
AORTIC VALVE CUSP SEPERATION: 1.6 CM
AST SERPL-CCNC: 30 U/L (ref 10–40)
AV INDEX (PROSTH): 0.66
AV MEAN GRADIENT: 5 MMHG
AV PEAK GRADIENT: 10 MMHG
AV VALVE AREA BY VELOCITY RATIO: 1.61 CM²
AV VALVE AREA: 1.68 CM²
AV VELOCITY RATIO: 0.63
BASOPHILS # BLD AUTO: 0.01 K/UL (ref 0–0.2)
BASOPHILS NFR BLD: 0.5 % (ref 0–1.9)
BILIRUB SERPL-MCNC: 0.6 MG/DL (ref 0.1–1)
BSA FOR ECHO PROCEDURE: 1.67 M2
BUN SERPL-MCNC: 6 MG/DL (ref 8–23)
CALCIUM SERPL-MCNC: 8.3 MG/DL (ref 8.7–10.5)
CHLORIDE SERPL-SCNC: 101 MMOL/L (ref 95–110)
CO2 SERPL-SCNC: 29 MMOL/L (ref 23–29)
CREAT SERPL-MCNC: 0.5 MG/DL (ref 0.5–1.4)
CRP SERPL-MCNC: 33.3 MG/L (ref 0–8.2)
CV ECHO LV RWT: 0.45 CM
DIFFERENTIAL METHOD: ABNORMAL
DOP CALC AO PEAK VEL: 1.56 M/S
DOP CALC AO VTI: 26.4 CM
DOP CALC LVOT AREA: 2.5 CM2
DOP CALC LVOT DIAMETER: 1.8 CM
DOP CALC LVOT PEAK VEL: 0.99 M/S
DOP CALC LVOT STROKE VOLUME: 44.26 CM3
DOP CALC MV VTI: 29.6 CM
DOP CALCLVOT PEAK VEL VTI: 17.4 CM
E WAVE DECELERATION TIME: 162 MSEC
E/A RATIO: 0.57
E/E' RATIO: 8.86 M/S
ECHO LV POSTERIOR WALL: 0.94 CM (ref 0.6–1.1)
EOSINOPHIL # BLD AUTO: 0.1 K/UL (ref 0–0.5)
EOSINOPHIL NFR BLD: 5.6 % (ref 0–8)
ERYTHROCYTE [DISTWIDTH] IN BLOOD BY AUTOMATED COUNT: 15.1 % (ref 11.5–14.5)
EST. GFR  (NO RACE VARIABLE): >60 ML/MIN/1.73 M^2
FRACTIONAL SHORTENING: 19 % (ref 28–44)
GLUCOSE SERPL-MCNC: 94 MG/DL (ref 70–110)
HCT VFR BLD AUTO: 28 % (ref 37–48.5)
HGB BLD-MCNC: 9.7 G/DL (ref 12–16)
IMM GRANULOCYTES # BLD AUTO: 0.01 K/UL (ref 0–0.04)
IMM GRANULOCYTES NFR BLD AUTO: 0.5 % (ref 0–0.5)
INTERVENTRICULAR SEPTUM: 0.72 CM (ref 0.6–1.1)
IVC DIAMETER: 1.32 CM
LEFT INTERNAL DIMENSION IN SYSTOLE: 3.36 CM (ref 2.1–4)
LEFT VENTRICLE DIASTOLIC VOLUME INDEX: 46.57 ML/M2
LEFT VENTRICLE DIASTOLIC VOLUME: 77.3 ML
LEFT VENTRICLE MASS INDEX: 63 G/M2
LEFT VENTRICLE SYSTOLIC VOLUME INDEX: 27.8 ML/M2
LEFT VENTRICLE SYSTOLIC VOLUME: 46.1 ML
LEFT VENTRICULAR INTERNAL DIMENSION IN DIASTOLE: 4.17 CM (ref 3.5–6)
LEFT VENTRICULAR MASS: 105.14 G
LV LATERAL E/E' RATIO: 10.33 M/S
LV SEPTAL E/E' RATIO: 7.75 M/S
LVOT MG: 2 MMHG
LVOT MV: 0.66 CM/S
LYMPHOCYTES # BLD AUTO: 0.4 K/UL (ref 1–4.8)
LYMPHOCYTES NFR BLD: 18.8 % (ref 18–48)
MCH RBC QN AUTO: 31.5 PG (ref 27–31)
MCHC RBC AUTO-ENTMCNC: 34.6 G/DL (ref 32–36)
MCV RBC AUTO: 91 FL (ref 82–98)
MONOCYTES # BLD AUTO: 0.5 K/UL (ref 0.3–1)
MONOCYTES NFR BLD: 25.4 % (ref 4–15)
MV MEAN GRADIENT: 3 MMHG
MV PEAK A VEL: 1.08 M/S
MV PEAK E VEL: 0.62 M/S
MV PEAK GRADIENT: 7 MMHG
MV VALVE AREA BY CONTINUITY EQUATION: 1.5 CM2
NEUTROPHILS # BLD AUTO: 1.1 K/UL (ref 1.8–7.7)
NEUTROPHILS NFR BLD: 49.2 % (ref 38–73)
NRBC BLD-RTO: 0 /100 WBC
PISA TR MAX VEL: 2.29 M/S
PLATELET # BLD AUTO: 238 K/UL (ref 150–450)
PLATELET BLD QL SMEAR: ABNORMAL
PMV BLD AUTO: 9.3 FL (ref 9.2–12.9)
POTASSIUM SERPL-SCNC: 3.7 MMOL/L (ref 3.5–5.1)
PROT SERPL-MCNC: 5.9 G/DL (ref 6–8.4)
PV MV: 0.75 M/S
PV PEAK GRADIENT: 5 MMHG
PV PEAK VELOCITY: 1.12 M/S
RA PRESSURE ESTIMATED: 3 MMHG
RBC # BLD AUTO: 3.08 M/UL (ref 4–5.4)
RIGHT VENTRICULAR END-DIASTOLIC DIMENSION: 2.13 CM
RV TB RVSP: 5 MMHG
RV TISSUE DOPPLER FREE WALL SYSTOLIC VELOCITY 1 (APICAL 4 CHAMBER VIEW): 12.7 CM/S
SODIUM SERPL-SCNC: 137 MMOL/L (ref 136–145)
TDI LATERAL: 0.06 M/S
TDI SEPTAL: 0.08 M/S
TDI: 0.07 M/S
TR MAX PG: 21 MMHG
TRICUSPID ANNULAR PLANE SYSTOLIC EXCURSION: 1.68 CM
TV REST PULMONARY ARTERY PRESSURE: 24 MMHG
WBC # BLD AUTO: 2.13 K/UL (ref 3.9–12.7)
Z-SCORE OF LEFT VENTRICULAR DIMENSION IN END DIASTOLE: -1.09
Z-SCORE OF LEFT VENTRICULAR DIMENSION IN END SYSTOLE: 1.21

## 2023-10-03 PROCEDURE — 36415 COLL VENOUS BLD VENIPUNCTURE: CPT | Performed by: INTERNAL MEDICINE

## 2023-10-03 PROCEDURE — 85025 COMPLETE CBC W/AUTO DIFF WBC: CPT | Performed by: NURSE PRACTITIONER

## 2023-10-03 PROCEDURE — 25000003 PHARM REV CODE 250

## 2023-10-03 PROCEDURE — 99233 SBSQ HOSP IP/OBS HIGH 50: CPT | Mod: FS,,, | Performed by: NURSE PRACTITIONER

## 2023-10-03 PROCEDURE — 25000003 PHARM REV CODE 250: Performed by: NURSE PRACTITIONER

## 2023-10-03 PROCEDURE — 93306 ECHO (CUPID ONLY): ICD-10-PCS | Mod: 26,,, | Performed by: INTERNAL MEDICINE

## 2023-10-03 PROCEDURE — 12000002 HC ACUTE/MED SURGE SEMI-PRIVATE ROOM

## 2023-10-03 PROCEDURE — 93306 TTE W/DOPPLER COMPLETE: CPT | Mod: 26,,, | Performed by: INTERNAL MEDICINE

## 2023-10-03 PROCEDURE — 93306 TTE W/DOPPLER COMPLETE: CPT

## 2023-10-03 PROCEDURE — 99233 PR SUBSEQUENT HOSPITAL CARE,LEVL III: ICD-10-PCS | Mod: FS,,, | Performed by: NURSE PRACTITIONER

## 2023-10-03 PROCEDURE — 80053 COMPREHEN METABOLIC PANEL: CPT | Performed by: INTERNAL MEDICINE

## 2023-10-03 PROCEDURE — 63600175 PHARM REV CODE 636 W HCPCS: Performed by: NURSE PRACTITIONER

## 2023-10-03 PROCEDURE — 25000003 PHARM REV CODE 250: Performed by: INTERNAL MEDICINE

## 2023-10-03 RX ORDER — LEVOFLOXACIN 750 MG/1
750 TABLET ORAL DAILY
Status: DISCONTINUED | OUTPATIENT
Start: 2023-10-04 | End: 2023-10-04

## 2023-10-03 RX ADMIN — EZETIMIBE 10 MG: 10 TABLET ORAL at 09:10

## 2023-10-03 RX ADMIN — POLYETHYLENE GLYCOL 3350 17 G: 17 POWDER, FOR SOLUTION ORAL at 09:10

## 2023-10-03 RX ADMIN — MEROPENEM 1 G: 1 INJECTION, POWDER, FOR SOLUTION INTRAVENOUS at 04:10

## 2023-10-03 RX ADMIN — BUSPIRONE HYDROCHLORIDE 5 MG: 5 TABLET ORAL at 09:10

## 2023-10-03 RX ADMIN — MEROPENEM 1 G: 1 INJECTION, POWDER, FOR SOLUTION INTRAVENOUS at 12:10

## 2023-10-03 RX ADMIN — MEROPENEM 1 G: 1 INJECTION, POWDER, FOR SOLUTION INTRAVENOUS at 09:10

## 2023-10-03 RX ADMIN — MUPIROCIN 1 G: 20 OINTMENT TOPICAL at 09:10

## 2023-10-03 RX ADMIN — VANCOMYCIN HYDROCHLORIDE 1000 MG: 1 INJECTION, POWDER, LYOPHILIZED, FOR SOLUTION INTRAVENOUS at 09:10

## 2023-10-03 RX ADMIN — APIXABAN 10 MG: 5 TABLET, FILM COATED ORAL at 09:10

## 2023-10-03 RX ADMIN — METOPROLOL SUCCINATE 50 MG: 50 TABLET, EXTENDED RELEASE ORAL at 09:10

## 2023-10-03 RX ADMIN — CITALOPRAM HYDROBROMIDE 20 MG: 20 TABLET ORAL at 09:10

## 2023-10-03 RX ADMIN — ATORVASTATIN CALCIUM 80 MG: 40 TABLET, FILM COATED ORAL at 09:10

## 2023-10-03 RX ADMIN — PANTOPRAZOLE SODIUM 40 MG: 40 TABLET, DELAYED RELEASE ORAL at 09:10

## 2023-10-03 NOTE — PROGRESS NOTES
Therapy with Vancomycin complete and / or consult / order discontinued by Dr Vizcarra on 10/3 @ 1001   Pharmacy will sign off, please re-consult as needed.    Thank you for allowing us to participate in this patient's care.  Robert Moreau 10/3/2023 10:03 AM  Dept of Pharmacy  Ext [phone]

## 2023-10-03 NOTE — PROGRESS NOTES
[facility]  Progress Note    Patient Name: Aysha Moore  MRN: [id number]  Patient Class: IP- Inpatient   Admission Date: 9/30/2023  Length of Stay: 2 days  Attending Physician: Eugene Aguirre MD  Primary Care Provider: Yoni Daniels MD        Subjective:     Principal Problem:Acute pulmonary embolism        HPI:  67 y/o female with history of HTN, HLD, CAD with previous MI, Lung cancer treated with chemo and radiation with plans to start immunotherapy soon, and anxiety presents to the ED for fever. Patient was recently discharged from the hospital yesterday for the same complaint. Patient had a full workup done including urine and blood cultures and was placed on Meropenem. Patient showed improvement and no infection was found so the patient was discharged home. Patient had a fever up to 101 at home prior to arrival at the ED along with some diarrhea last night. Patient states that she still has pain from her esophagitis and feels weak which is her baseline however she denies any chest pain, abdominal pain, increased SOB, lightheadedness, or sick contacts. Patient does state that she was not able to  her Carafate from the pharmacy and has not been eating or drinking very much since she was discharged. Code status was discussed and patient was placed as a full code at this time.     ED: Vitals showed HR 96, /51, RR 20 saturating at 94% on RA with a temperature of 100.2. Labs were significant for WBC 2.14, RBC 3.57, Hgb 11.4, HCT 32.6, and Lactic acid 1.2. CTA chest and CT abdomen showed segmental right upper lobe PE and upper lobe predominant centrilobular and subpleural emphysema, with coarse mixed interstitial and alveolar infiltrate along the mediastinal border left upper lobe suggesting reactive or infectious pneumonitis, and with mild posterior bilateral upper lobe groundglass infiltrates. Patient started on Enoxaparin 1mg/kg in the ED.        Overview/Hospital Course:  Fever resolved.  Sob resolved    Physical Exam  Constitutional:       General: She is not in acute distress.  HENT:      Head: Normocephalic and atraumatic.      Nose: No congestion.   Eyes:      General: No scleral icterus.        Right eye: No discharge.         Left eye: No discharge.      Extraocular Movements: Extraocular movements intact.   Cardiovascular:      Rate and Rhythm: Normal rate and regular rhythm.   Pulmonary:      Effort: Pulmonary effort is normal.      Breath sounds: Normal breath sounds.   Abdominal:      General: Abdomen is flat. Bowel sounds are normal.   Musculoskeletal:         General: No swelling or tenderness.      Cervical back: Normal range of motion and neck supple. No rigidity.   Skin:     General: Skin is warm.   Neurological:      General: No focal deficit present.      Mental Status: She is alert and oriented to person, place, and time.   Psychiatric:         Mood and Affect: Mood normal.        CT scan abdomen and pelvic IMPRESSION: There are hypodensities within the spleen concerning for splenic infarcts. These can be associated with endocarditis. The patient also has known pulmonary emboli. Transesophageal echocardiogram may be warranted.     The nodular opacity at the left lower lobe which will need follow-up within 1 month with CT of the chest and/or PET/CT. This could be due to infection, but could also be due to a developing neoplasm.    Assessment/Plan:      * Acute pulmonary embolism  Patient has lung cancer and was previously worked up for fever.   Patient had a fever of 101 at home and after talking to her oncologist came back to the ED  Patient denies chest pain or increased SOB  Patient was tachycardic to 112 on arrival to the ED    CTA chest   - segmental right upper lobe pulmonary embolus.    Stop lovenox.  Change to eliquis.   Continue to monitor patient vitals   Will d/c home tomorrow if culture neg  Esophagitis  Continue Carafate    Continue to monitor symptoms      Fever temp 102.2 with spleen infarct need to rule out endocarditis  Will check blood culture and start merropenin iv  ID input appreciated  Patient has lung cancer and was previously seen, admitted, and discharged for fever. She had a full workup including blood and urine cultures and no infectious etiology was found.    Patient had a fever of 101 at home today and after talking to her oncologist came to the ED  Patient had a temperature of 100.2 in the ED  UA showed no evidence of UTI  Continue to monitor and give Acetaminophen PRN for fever       Hypertension  Continue home regimen as tolerated  Continue to monitor patient vitals      Hyperlipidemia  Continue home regimen        VTE Risk Mitigation (From admission, onward)           Ordered     apixaban tablet 10 mg  2 times daily         10/02/23 0925                    Discharge Planning   NAKUL: 10/3/2023     Code Status: Full Code   Is the patient medically ready for discharge?:     Reason for patient still in hospital (select all that apply): Treatment  Discharge Plan A: Home with family                  Eugene Aguirre MD  Department of Hospital Medicine   [facility]

## 2023-10-03 NOTE — PROGRESS NOTES
[facility]  Department of Infectious Disease  Progress Note        PATIENT NAME: Aysha Moore  MRN: [id number]  TODAY'S DATE: 10/03/2023  ADMIT DATE: 9/30/2023    PRINCIPLE PROBLEM: Acute pulmonary embolism    REASON FOR CONSULT:  Fever    INTERVAL HISTORY     10/3@1146:  Patient seen and examined.  She is sitting up in bed awake and looks somewhat better today.  She still has no appetite.  She is not had a bowel movement since prior to being admitted to the hospital.  She denies fevers or chills, night sweats shortness of breath or coughing, chest pain, abdominal pain, nausea or vomiting.  T-max in the last 24 hours 99.1.  WBC 2.13, neutrophils 49.2%, ANC 1048, BUN/CR 6/0.5, ALT/AST 23/30.  CRP 33.3, procalcitonin 0.144 and MRSA screen negative.  10/2 blood cultures pending, 10/1 blood culture x1 set no growth to date, 9/30 blood cultures x2 no growth to date.  9/28 urine culture negative.  She continues on meropenem.    10/2 @ 1003:  Patient seen and examined in her room.  She is feeling better today.  She denies fevers or chills, nausea or vomiting, she is eating but appetite is poor.  She has pain to her left inner cheek where she bit herself 1 night when she was here on last hospitalization.  She is been using xylocaine and pain is under control.  She states she is been voiding well with no dysuria.  She is not sure when her last bowel movement was but says it was before she came into the hospital.  She has some generalized weakness, denies chest pain, resolved shortness of breath, no coughing.  Her last chemo was 2 weeks ago.  T-max 99° in last 24 hours, WBC 2.12, ANC 1060, platelets 252, H&H 9.9/29.3, BUN/CR 5/0.5, ALT/AST 17/17.  Procalcitonin 0.144.  9/30 blood cultures x2 sets with no growth to date and 10/1 blood culture x1 set no growth to date.  She is on IV meropenem.    Antibiotics (From admission, onward)      Start     Stop Route Frequency Ordered    10/04/23 0900  levoFLOXacin  tablet 750 mg         -- Oral Daily 10/03/23 1702    10/02/23 1700  meropenem 1 g in sodium chloride 0.9 % 100 mL IVPB (ready to mix system)         10/04/23 0114 IV Every 8 hours (non-standard times) 10/02/23 1702    10/02/23 1015  mupirocin 2 % ointment         10/07/23 0859 Nasl 2 times daily 10/02/23 0904            ASSESSMENT and PLAN     1. Neutropenic fever   T-max 102.2° on 09/30, 99.1 in last 24 hours, procalcitonin 0.144   9/30 blood cultures x2 sets ngtd   10/1 blood cultures x1 sets ngtd   Qtc 477   ->1060, WBC 1.67->2.12    Procalcitonin 0.144   9/30-10/3 meropenem   10/2-10/3  vancomycin, MRSA screen negative    2. 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 Possible radiation pneumonitis left melissa mediastinal    4. 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 Past medical history seasonal allergies, CAD, hypertension, hyperlipidemia and chronic back pain      RECOMMENDATIONS:      Give last dose of meropenem today at 5:00 p.m.  Start levofloxacin 750 mg oral tomorrow    INFECTIOUS DISEASE DISCHARGE RECOMMENDATIONS  When patient is ready to discharge per attending recommend the following:    Levofloxacin 750 mg oral once daily until neutropenia resolved   No vitamin/mineral supplements or dairy products 2 hours before or after levofloxacin  Follow-up with Oncology and PCP      Infectious Disease will sign off.  Thank you for this consult. Please send [employer] secure chat with any questions.      SUBJECTIVE    Review of Systems    Constitutional:  No further fever or chills, + poor appetite.  HEENT: Denies visual changes,ear pain, sinus congestion,  trouble swallowing, or sore improving mouth pain  Neck: Denies neck pain or lumps.  Respiratory:  Resolved shortness of breath, No coughing, wheezing or hemoptysis.  Cardiovascular:  Denies chest pain, palpitations or edema.  Gastrointestinal: Denies nausea, vomiting, or diarrhea.  Genitourinary:  Denies dysuria, frequency, urgency or hematuria; + constipation  Musculoskeletal:  Denies joint pain or swelling, difficulty walking.    Skin:  Denies rash or itching.  VAD: virgen cath and piv, denies problems  Neurologic:  Denies motor sensory loss, headaches or dizziness.  Psychiatric:  Denies changes in mood or behavior.    OBJECTIVE     Temp:  [97.3 °F (36.3 °C)-98.3 °F (36.8 °C)] 97.6 °F (36.4 °C)  Pulse:  [76-80] 78  Resp:  [17-19] 18  SpO2:  [92 %-100 %] 95 %  BP: ()/(49-69) 92/49  Physical Exam    General:  Chronically ill-appearing older female, sitting up in bed awake and alert, in no acute distress.  Eyes: Eyes with no icterus or injection. Vision grossly normal throat.  Ears: Hearing grossly normal.  Nose: Nares patent  Mouth: Moist mucous membranes.  No exudates or erythema, ulceration to left buccal mucosa.   Neck: Supple, no tenderness to palpation.  Cardiovascular: Regular rate and rhythm, no murmurs, no edema.   Good peripheral pulses. Infusaport left chest.   Respiratory:  Clear to auscultation bilaterally, no tachypnea or increased work of breathing.  Gastrointestinal:  Soft with active bowel sounds, no tenderness to palpation, no distention.  Musculoskeletal:  Moves all extremities with equal strength.    Skin:  Pale, warm and dry, no obvious rashes.    Neuro:  High Oriented, conversant, follows commands.  Psych: Good mood, normal affect.    Wounds: No wounds  VAD: Infusaport left chest, PIV  Isolation: None    CBC LAST 7 DAYS  Recent Labs   Lab 09/27/23  1552 09/28/23  0351 09/29/23  0323 09/30/23  1700 10/01/23  0442 10/02/23  0456 10/02/23  0908 10/03/23  0617   WBC 2.01* 1.67* 1.71* 2.14* 1.62* 2.28* 2.12* 2.13*   RBC 3.85* 3.22* 3.15* 3.57* 3.23* 3.36* 3.17* 3.08*   HGB 11.9* 10.1* 9.8*  "11.4* 10.1* 10.5* 9.9* 9.7*   HCT 36.2* 30.4* 29.3* 32.6* 29.7* 31.0* 29.3* 28.0*   MCV 94 94 93 91 92 92 92 91   MCH 30.9 31.4* 31.1* 31.9* 31.3* 31.3* 31.2* 31.5*   MCHC 32.9 33.2 33.4 35.0 34.0 33.9 33.8 34.6   RDW 15.5* 15.4* 15.3* 15.2* 15.3* 15.1* 15.0* 15.1*    239 232 242 226 249 252 238   MPV 10.2 9.2 9.2 9.0* 9.3 9.3 10.0 9.3   GRAN 53.5  1.0* 53.0 42.7  0.7* 47.6  1.0*  --   --  50.0 49.2  1.1*   LYMPH 9.8*  0.2* 14.0*  CANCELED 14.6*  0.3* 15.0*  0.3*  --   --  14.0*  CANCELED 18.8  0.4*   MONO 29.5*  0.6 26.0*  CANCELED 33.9*  0.6 31.3*  0.7  --   --  30.0*  CANCELED 25.4*  0.5   BASO 0.02 CANCELED 0.02 0.01  --   --  CANCELED 0.01   NRBC 0 0 0 0  --   --  0 0         CHEMISTRY LAST 7 DAYS  Recent Labs   Lab 09/27/23  1552 09/28/23  0351 09/29/23  0323 09/30/23  1700 10/01/23  0442 10/02/23  0456 10/03/23  0617    137 135* 136 139 138 137   K 4.5 3.9 4.6 3.8 3.5 4.2 3.7    105 103 101 104 102 101   CO2 27 29 29 28 30* 27 29   ANIONGAP 9 3* 3* 7* 5* 9 7*   BUN 12 10 6* 9 7* 5* 6*   CREATININE 0.7 0.6 0.5 0.5 0.5 0.5 0.5    105 95 111* 98 95 94   CALCIUM 9.2 8.4* 8.2* 8.7 8.5* 8.4* 8.3*   MG 1.7  --  1.5*  --   --   --   --    ALBUMIN 4.1 3.4* 3.1* 3.6 3.2* 3.3* 3.2*   PROT 7.8 6.2 5.7* 6.7 5.9* 6.0 5.9*   ALKPHOS 79 64 64 77 60 61 63   ALT 13 13 16 19 17 17 23   AST 18 16 18 21 16 17 30   BILITOT 0.8 0.7 0.6 0.6 0.5 0.5 0.6         Estimated Creatinine Clearance: 96.2 mL/min (based on SCr of 0.5 mg/dL).    INFLAMMATORY/PROCAL  LAST 7 DAYS  Recent Labs   Lab 10/01/23  0442 10/02/23  0456 10/02/23  1850   PROCAL 0.111 0.144  --    CRP  --   --  33.3*       No results found for: "ESR"  CRP   Date Value Ref Range Status   10/02/2023 33.3 (H) 0.0 - 8.2 mg/L Final       PRIOR  MICROBIOLOGY:    Susceptibility data from last 90 days.  Collected Specimen Info Organism   09/27/23 Blood No growth after 5 days.   09/27/23 Blood No growth after 5 days.           LAST 7 DAYS " MICROBIOLOGY   Microbiology Results (last 7 days)       Procedure Component Value Units Date/Time    Blood culture [[id number]] Collected: 10/01/23 0815    Order Status: Completed Specimen: Blood Updated: 10/03/23 1032     Blood Culture, Routine No Growth to date      No Growth to date      No Growth to date    Narrative:      Blood culture 2    Blood culture [[id number]] Collected: 10/02/23 1901    Order Status: Completed Specimen: Blood from Antecubital, Left Updated: 10/03/23 0158     Blood Culture, Routine No Growth to date    Blood culture [[id number]] Collected: 10/02/23 1901    Order Status: Completed Specimen: Blood Updated: 10/03/23 0158     Blood Culture, Routine No Growth to date    MRSA Screen by PCR [[id number]] Collected: 10/02/23 2000    Order Status: Completed Specimen: Nasopharyngeal Swab from Nasal Updated: 10/02/23 2136     MRSA SCREEN BY PCR Negative    Blood culture x two cultures. Draw prior to antibiotics. [[id number]] Collected: 09/30/23 1703    Order Status: Completed Specimen: Blood Updated: 10/02/23 1832     Blood Culture, Routine No Growth to date      No Growth to date      No Growth to date    Narrative:      Aerobic and anaerobic    Blood culture x two cultures. Draw prior to antibiotics. [[id number]] Collected: 09/30/23 1704    Order Status: Completed Specimen: Blood Updated: 10/02/23 1832     Blood Culture, Routine No Growth to date      No Growth to date      No Growth to date    Narrative:      Aerobic and anaerobic              CURRENT/PREVIOUS VISIT EKG  Results for orders placed or performed during the hospital encounter of 09/30/23   EKG 12-lead    Collection Time: 09/30/23  4:52 PM    Narrative    Test Reason : R50.9,    Vent. Rate : 090 BPM     Atrial Rate : 090 BPM     P-R Int : 152 ms          QRS Dur : 100 ms      QT Int : 390 ms       P-R-T Axes : 051 -20 039 degrees     QTc Int : 477 ms    Normal sinus rhythm  Low voltage QRS  Borderline Abnormal ECG    Confirmed by  Rita FRANCISCO, Jean Tuttle ([id number]) on 9/30/2023 11:52:34 PM    Referred By: [email or web address]   SELF           Confirmed By:Jean Salinas MD       Significant Labs: All pertinent labs within the past 24 hours have been reviewed.     Significant Imaging: I have reviewed all relevant and available imaging results/findings within the past 24 hours.    9/30 CT abdomen and pelvis with contrast  Cholelithiasis with no gallbladder wall thickening, hypodensities x2 in the spleen concerning for splenic infarcts - nodular opacity left lower lobe which will need follow-up in 1 month    9/30 CTA chest   1. Segmental right upper lobe pulmonary embolus.   2. Cavitating and spiculated posterior left lower lobe lung mass, decreased slightly in size compared to the prior chest CT exam.   3. Upper lobe predominant centrilobular and subpleural emphysema, with coarse mixed interstitial and alveolar infiltrate along the mediastinal border left upper lobe suggesting reactive or infectious pneumonitis, and with mild posterior bilateral upper lobe groundglass infiltrates.       Echocardiogram 10/02/2023  Left Ventricle The left ventricle is normal in size. Normal wall thickness. regional wall motion abnormalities present. There is low normal systolic function with a visually estimated ejection fraction of 50 - 55%. Grade I diastolic dysfunction.   Right Ventricle Normal right ventricular cavity size. Wall thickness is normal. Right ventricle wall motion  is normal. Systolic function is normal.   Left Atrium Normal left atrial size.   Right Atrium Normal right atrial size.   Aortic Valve The aortic valve is structurally normal. There is normal leaflet mobility. Aortic valve peak velocity is 1.56 m/s. Mean gradient is 5 mmHg. There is no significant regurgitation.   Mitral Valve The mitral valve is structurally normal. There is normal leaflet mobility. The mean pressure gradient across the mitral valve is 3 mmHg at a heart rate of  bpm. There is  trace regurgitation.   Tricuspid Valve The tricuspid valve is structurally normal. There is normal leaflet mobility. There is trace regurgitation.   Pulmonic Valve The pulmonic valve is structurally normal. There is normal leaflet mobility. There is no significant regurgitation.   IVC/SVC Normal venous pressure at 3 mmHg.   Ascending Aorta Aortic root is normal in size. Ascending aorta is normal.   Pericardium and Other Findings There is no pericardial effusion.   Pulmonary Artery The estimated pulmonary artery systolic pressure is 24 mmHg.         America Mittal NP    Date of Service: 10/03/2023  1:48 PM

## 2023-10-04 VITALS
HEIGHT: 63 IN | SYSTOLIC BLOOD PRESSURE: 119 MMHG | HEART RATE: 80 BPM | DIASTOLIC BLOOD PRESSURE: 53 MMHG | OXYGEN SATURATION: 96 % | RESPIRATION RATE: 18 BRPM | BODY MASS INDEX: 24.61 KG/M2 | TEMPERATURE: 98 F | WEIGHT: 138.88 LBS

## 2023-10-04 DIAGNOSIS — C43.72 MALIGNANT MELANOMA OF SKIN OF LEFT LEG: Primary | ICD-10-CM

## 2023-10-04 DIAGNOSIS — I26.99 ACUTE PULMONARY EMBOLISM WITHOUT ACUTE COR PULMONALE, UNSPECIFIED PULMONARY EMBOLISM TYPE: ICD-10-CM

## 2023-10-04 LAB
ALBUMIN SERPL BCP-MCNC: 3.4 G/DL (ref 3.5–5.2)
ALP SERPL-CCNC: 72 U/L (ref 55–135)
ALT SERPL W/O P-5'-P-CCNC: 41 U/L (ref 10–44)
ANION GAP SERPL CALC-SCNC: 6 MMOL/L (ref 8–16)
AST SERPL-CCNC: 46 U/L (ref 10–40)
BASOPHILS # BLD AUTO: 0.02 K/UL (ref 0–0.2)
BASOPHILS NFR BLD: 0.8 % (ref 0–1.9)
BILIRUB SERPL-MCNC: 0.7 MG/DL (ref 0.1–1)
BUN SERPL-MCNC: 6 MG/DL (ref 8–23)
CALCIUM SERPL-MCNC: 8.7 MG/DL (ref 8.7–10.5)
CHLORIDE SERPL-SCNC: 100 MMOL/L (ref 95–110)
CO2 SERPL-SCNC: 31 MMOL/L (ref 23–29)
CREAT SERPL-MCNC: 0.6 MG/DL (ref 0.5–1.4)
DIFFERENTIAL METHOD: ABNORMAL
EOSINOPHIL # BLD AUTO: 0.2 K/UL (ref 0–0.5)
EOSINOPHIL NFR BLD: 6 % (ref 0–8)
ERYTHROCYTE [DISTWIDTH] IN BLOOD BY AUTOMATED COUNT: 15 % (ref 11.5–14.5)
EST. GFR  (NO RACE VARIABLE): >60 ML/MIN/1.73 M^2
GLUCOSE SERPL-MCNC: 106 MG/DL (ref 70–110)
HCT VFR BLD AUTO: 30.7 % (ref 37–48.5)
HGB BLD-MCNC: 10.6 G/DL (ref 12–16)
IMM GRANULOCYTES # BLD AUTO: 0.01 K/UL (ref 0–0.04)
IMM GRANULOCYTES NFR BLD AUTO: 0.4 % (ref 0–0.5)
LYMPHOCYTES # BLD AUTO: 0.6 K/UL (ref 1–4.8)
LYMPHOCYTES NFR BLD: 21.1 % (ref 18–48)
MCH RBC QN AUTO: 31.5 PG (ref 27–31)
MCHC RBC AUTO-ENTMCNC: 34.5 G/DL (ref 32–36)
MCV RBC AUTO: 91 FL (ref 82–98)
MONOCYTES # BLD AUTO: 0.8 K/UL (ref 0.3–1)
MONOCYTES NFR BLD: 28.7 % (ref 4–15)
NEUTROPHILS # BLD AUTO: 1.1 K/UL (ref 1.8–7.7)
NEUTROPHILS NFR BLD: 43 % (ref 38–73)
NRBC BLD-RTO: 0 /100 WBC
PLATELET # BLD AUTO: 281 K/UL (ref 150–450)
PLATELET BLD QL SMEAR: ABNORMAL
PMV BLD AUTO: 9.6 FL (ref 9.2–12.9)
POTASSIUM SERPL-SCNC: 4.1 MMOL/L (ref 3.5–5.1)
PROT SERPL-MCNC: 6.3 G/DL (ref 6–8.4)
RBC # BLD AUTO: 3.36 M/UL (ref 4–5.4)
SODIUM SERPL-SCNC: 137 MMOL/L (ref 136–145)
WBC # BLD AUTO: 2.65 K/UL (ref 3.9–12.7)

## 2023-10-04 PROCEDURE — 25000003 PHARM REV CODE 250: Performed by: INTERNAL MEDICINE

## 2023-10-04 PROCEDURE — 85025 COMPLETE CBC W/AUTO DIFF WBC: CPT | Performed by: NURSE PRACTITIONER

## 2023-10-04 PROCEDURE — 80053 COMPREHEN METABOLIC PANEL: CPT | Performed by: INTERNAL MEDICINE

## 2023-10-04 PROCEDURE — 25000003 PHARM REV CODE 250

## 2023-10-04 PROCEDURE — 36415 COLL VENOUS BLD VENIPUNCTURE: CPT | Performed by: INTERNAL MEDICINE

## 2023-10-04 RX ORDER — LEVOFLOXACIN 750 MG/1
750 TABLET ORAL DAILY
Qty: 10 TABLET | Refills: 0 | Status: SHIPPED | OUTPATIENT
Start: 2023-10-05 | End: 2023-11-16

## 2023-10-04 RX ORDER — LEVOFLOXACIN 750 MG/1
750 TABLET ORAL DAILY
Status: DISCONTINUED | OUTPATIENT
Start: 2023-10-04 | End: 2023-10-04 | Stop reason: HOSPADM

## 2023-10-04 RX ADMIN — CITALOPRAM HYDROBROMIDE 20 MG: 20 TABLET ORAL at 08:10

## 2023-10-04 RX ADMIN — APIXABAN 10 MG: 5 TABLET, FILM COATED ORAL at 08:10

## 2023-10-04 RX ADMIN — PANTOPRAZOLE SODIUM 40 MG: 40 TABLET, DELAYED RELEASE ORAL at 08:10

## 2023-10-04 RX ADMIN — MUPIROCIN 1 G: 20 OINTMENT TOPICAL at 08:10

## 2023-10-04 RX ADMIN — METOPROLOL SUCCINATE 50 MG: 50 TABLET, EXTENDED RELEASE ORAL at 08:10

## 2023-10-04 RX ADMIN — EZETIMIBE 10 MG: 10 TABLET ORAL at 08:10

## 2023-10-04 RX ADMIN — POLYETHYLENE GLYCOL 3350 17 G: 17 POWDER, FOR SOLUTION ORAL at 08:10

## 2023-10-04 RX ADMIN — LEVOFLOXACIN 750 MG: 750 TABLET, FILM COATED ORAL at 08:10

## 2023-10-04 NOTE — DISCHARGE SUMMARY
[facility]  Discharge Summary      Patient Name: Aysha Moore  MRN: [id number]  Admission Date: 9/30/2023  Hospital Length of Stay: 3 days  Discharge Date and Time:  10/04/2023 8:59 AM  Attending Physician: Eugene Aguirre MD   Discharging Provider: Eugene Aguirre MD  Discharge Provider Team: Networked reference to record PCT   Primary Care Provider: Yoni Daniels MD        HPI:    69 y/o female with history of HTN, HLD, CAD with previous MI, Lung cancer treated with chemo and radiation with plans to start immunotherapy soon, and anxiety presents to the ED for fever. Patient was recently discharged from the hospital yesterday for the same complaint. Patient had a full workup done including urine and blood cultures and was placed on Meropenem. Patient showed improvement and no infection was found so the patient was discharged home. Patient had a fever up to 101 at home prior to arrival at the ED along with some diarrhea last night. Patient states that she still has pain from her esophagitis and feels weak which is her baseline however she denies any chest pain, abdominal pain, increased SOB, lightheadedness, or sick contacts. Patient does state that she was not able to  her Carafate from the pharmacy and has not been eating or drinking very much since she was discharged. Code status was discussed and patient was placed as a full code at this time.      ED: Vitals showed HR 96, /51, RR 20 saturating at 94% on RA with a temperature of 100.2. Labs were significant for WBC 2.14, RBC 3.57, Hgb 11.4, HCT 32.6, and Lactic acid 1.2. CTA chest and CT abdomen showed segmental right upper lobe PE and upper lobe predominant centrilobular and subpleural emphysema, with coarse mixed interstitial and alveolar infiltrate along the mediastinal border left upper lobe suggesting reactive or infectious pneumonitis, and with mild posterior bilateral upper lobe groundglass infiltrates.  Patient started on Enoxaparin 1mg/kg in the ED.        * No surgery found *      Hospital Course:   Patient admitted to the hospital and the patient found to have acute PE.  Patient initially started on IV anticoagulation.  Her shortness but improved.  And the patient transition to oral Eliquis 10 yesterday but her insurance does not approve.  We will start the patient on Xarelto 15 mg p.o. b.i.d. for 21 day and then 20 mg p.o. daily.  Resolved after IV antibiotic started.  Id were consulted and ID want to continue with Levaquin 750 mg p.o. daily for 10 more days.  Patient also found to have suspect spleen infarction on CT scan.  Patient will continue with oral Eliquis.  Advised patient needs to repeat the CT scan to out cancer metastasized.  With Oncology.  Patient needs to continue follow up with CBC next week.  Echo negative for vegetation  Summary echo October 3, 2022         Left Ventricle: The left ventricle is normal in size. Normal wall thickness. regional wall motion abnormalities present. There is low normal systolic function with a visually estimated ejection fraction of 50 - 55%. Grade I diastolic dysfunction.    Right Ventricle: Normal right ventricular cavity size. Wall thickness is normal. Right ventricle wall motion  is normal. Systolic function is normal.    IVC/SVC: Normal venous pressure at 3 mmHg.    CT scan abdominal and pelvis IV contrast.  IMPRESSION: There are hypodensities within the spleen concerning for splenic infarcts. These can be associated with endocarditis. The patient also has known pulmonary emboli. Transesophageal echocardiogram may be warranted.     The nodular opacity at the left lower lobe which will need follow-up within 1 month with CT of the chest and/or PET/CT. This could be due to infection, but could also be due to a developing neoplasm.     Electronically signed by:  Patricia Short DO  09/30/2023 06:59 PM CDT Workstation: [id number]    Physical Exam  Constitutional:        General: She is not in acute distress.  HENT:      Head: Normocephalic and atraumatic.      Nose: No congestion.   Eyes:      General: No scleral icterus.        Right eye: No discharge.         Left eye: No discharge.      Extraocular Movements: Extraocular movements intact.   Cardiovascular:      Rate and Rhythm: Normal rate and regular rhythm.   Pulmonary:      Effort: Pulmonary effort is normal.      Breath sounds: Normal breath sounds.   Abdominal:      General: Abdomen is flat. Bowel sounds are normal.   Musculoskeletal:         General: No swelling or tenderness.      Cervical back: Normal range of motion and neck supple. No rigidity.   Skin:     General: Skin is warm.   Neurological:      General: No focal deficit present.      Mental Status: She is alert and oriented to person, place, and time.   Psychiatric:         Mood and Affect: Mood normal.     Consults (From admission, onward)          Status Ordering Provider     Inpatient consult to Infectious Diseases  Once        Provider:  Danielle Jimenez MD    Completed LEXIE ALLEN            Final Active Diagnoses:    Diagnosis Date Noted POA    PRINCIPAL PROBLEM:  Acute pulmonary embolism [I26.99] 09/30/2023 Yes    FUO (fever of unknown origin) [R50.9] 09/30/2023 Yes    Esophagitis [K20.90] 09/30/2023 Yes    Metastatic lung cancer (metastasis from lung to other site), unspecified laterality [C34.90] 07/27/2023 Yes    Hyperlipidemia [E78.5]  Yes    Hypertension [I10]  Yes      Problems Resolved During this Admission:      Discharged Condition: fair    Disposition: Home or Self Care    Follow Up:    Patient Instructions:   No discharge procedures on file.  Medications:  Reconciled Home Medications:      Medication List        START taking these medications      Xarelto 15 mg p.o. b.i.d. for 21 days and then 20 mg daily     citalopram 20 MG tablet  Commonly known as: CeleXA  TAKE 1 TABLET BY MOUTH EVERY DAY     levoFLOXacin 750 MG tablet  Commonly known  as: LEVAQUIN  Take 1 tablet (750 mg total) by mouth once daily.  Start taking on: October 5, 2023            CHANGE how you take these medications      ammonium lactate 12 % Crea  aaa bid prn dry skin  What changed:   how much to take  how to take this  when to take this  reasons to take this            CONTINUE taking these medications      ALPRAZolam 0.25 MG tablet  Commonly known as: XANAX  Take 1 tablet (0.25 mg total) by mouth 3 (three) times daily as needed for Anxiety.     atorvastatin 80 MG tablet  Commonly known as: LIPITOR  Take 1 tablet (80 mg total) by mouth every evening.     azelastine 137 mcg (0.1 %) nasal spray  Commonly known as: ASTELIN  1 SPRAY (137 MCG TOTAL) BY NASAL ROUTE 2 (TWO) TIMES DAILY AS NEEDED FOR RHINITIS (OR SINUSITIS).     calcium-vitamin D3 500 mg-5 mcg (200 unit) per tablet  Commonly known as: OS-JOLIE 500 + D3  Take 1 tablet by mouth every morning.     clobetasol 0.05% 0.05 % Oint  Commonly known as: TEMOVATE  Apply topically 2 (two) times daily. for 14 days     ezetimibe 10 mg tablet  Commonly known as: ZETIA  Take 1 tablet (10 mg total) by mouth once daily.     famotidine 40 MG tablet  Commonly known as: PEPCID  Take 1 tablet (40 mg total) by mouth every evening.     fluticasone propionate 50 mcg/actuation nasal spray  Commonly known as: FLONASE  1 spray (50 mcg total) by Each Nostril route daily as needed for Rhinitis or Allergies.     metoprolol succinate 50 MG 24 hr tablet  Commonly known as: TOPROL-XL  Take 1 tablet (50 mg total) by mouth once daily.     nitroGLYCERIN 0.4 MG SL tablet  Commonly known as: NITROSTAT  Place 1 tablet (0.4 mg total) under the tongue every 5 (five) minutes as needed for Chest pain.     omeprazole 40 MG capsule  Commonly known as: PRILOSEC  Take 1 capsule (40 mg total) by mouth once daily.     ondansetron 8 MG tablet  Commonly known as: ZOFRAN  Take 1 tablet (8 mg total) by mouth every 8 (eight) hours as needed.     promethazine 25 MG  tablet  Commonly known as: PHENERGAN  Take 1 tablet (25 mg total) by mouth every 4 to 6 hours as needed.     sucralfate 100 mg/mL suspension  Commonly known as: CARAFATE  Take 10 mLs (1 g total) by mouth 4 (four) times daily before meals and nightly. for 10 days     traMADoL 50 mg tablet  Commonly known as: ULTRAM  Take 1 tablet (50 mg total) by mouth every 6 (six) hours as needed for Pain.     zinc 50 mg Tab  Take 1 tablet by mouth once daily.            ASK your doctor about these medications      busPIRone 5 MG Tab  Commonly known as: BUSPAR  TAKE 1 TABLET BY MOUTH TWICE A DAY     HYDROcodone-acetaminophen 5-325 mg per tablet  Commonly known as: NORCO  Take 1 tablet by mouth every 4 to 6 hours as needed for Pain.     lisinopriL 2.5 MG tablet  Commonly known as: PRINIVIL,ZESTRIL  Take 1 tablet (2.5 mg total) by mouth every evening.     tiZANidine 4 MG tablet  Commonly known as: ZANAFLEX  TAKE 1 TABLET (4 MG TOTAL) BY MOUTH EVERY 6 (SIX) HOURS AS NEEDED. BACK PAIN     valACYclovir 1000 MG tablet  Commonly known as: VALTREX  TAKE 2 AT ONSET OF FEVER BLISTERS THEN REPEAT IN 12 HOURS (TOTAL 4 TABS PER EPISODE)              Significant Diagnostic Studies: Labs: CMP   Recent Labs   Lab 10/03/23  0617 10/04/23  0707    137   K 3.7 4.1    100   CO2 29 31*   GLU 94 106   BUN 6* 6*   CREATININE 0.5 0.6   CALCIUM 8.3* 8.7   PROT 5.9* 6.3   ALBUMIN 3.2* 3.4*   BILITOT 0.6 0.7   ALKPHOS 63 72   AST 30 46*   ALT 23 41   ANIONGAP 7* 6*    and CBC   Recent Labs   Lab 10/02/23  0908 10/03/23  0617 10/04/23  0707   WBC 2.12* 2.13* 2.65*   HGB 9.9* 9.7* 10.6*   HCT 29.3* 28.0* 30.7*    238 281       Pending Diagnostic Studies:       None          Indwelling Lines/Drains at time of discharge:   Lines/Drains/Airways       Central Venous Catheter Line  Duration             Port A Cath Single Lumen 08/04/23 Internal Jugular Left 61 days                    Time spent on the discharge of patient: 31minutes         [place]  ASHISH Aguirre MD  Department of Hospital Medicine  [facility]

## 2023-10-04 NOTE — PLAN OF CARE
Discharge orders and chart reviewed with no further post-acute discharge needs identified at this time.  At this time, patient is cleared for discharge from Case Management standpoint.        10/04/23 0913   Final Note   Assessment Type Final Discharge Note   Anticipated Discharge Disposition Home   Hospital Resources/Appts/Education Provided Appointments scheduled and added to AVS   Post-Acute Status   Coverage [employer]   Discharge Delays None known at this time

## 2023-10-05 DIAGNOSIS — K21.9 GASTROESOPHAGEAL REFLUX DISEASE, UNSPECIFIED WHETHER ESOPHAGITIS PRESENT: ICD-10-CM

## 2023-10-05 LAB
BACTERIA BLD CULT: NORMAL
BACTERIA BLD CULT: NORMAL

## 2023-10-05 RX ORDER — FAMOTIDINE 40 MG/1
40 TABLET, FILM COATED ORAL NIGHTLY
Qty: 30 TABLET | Refills: 1 | Status: SHIPPED | OUTPATIENT
Start: 2023-10-05 | End: 2023-11-08

## 2023-10-06 LAB — BACTERIA BLD CULT: NORMAL

## 2023-10-07 LAB
BACTERIA BLD CULT: NORMAL
BACTERIA BLD CULT: NORMAL

## 2023-10-09 ENCOUNTER — OFFICE VISIT (OUTPATIENT)
Dept: HEMATOLOGY/ONCOLOGY | Facility: CLINIC | Age: 68
End: 2023-10-09
Payer: MEDICARE

## 2023-10-09 VITALS
HEART RATE: 114 BPM | BODY MASS INDEX: 24.63 KG/M2 | RESPIRATION RATE: 18 BRPM | DIASTOLIC BLOOD PRESSURE: 64 MMHG | SYSTOLIC BLOOD PRESSURE: 112 MMHG | TEMPERATURE: 98 F | WEIGHT: 139 LBS | HEIGHT: 63 IN

## 2023-10-09 DIAGNOSIS — I26.99 ACUTE PULMONARY EMBOLISM WITHOUT ACUTE COR PULMONALE, UNSPECIFIED PULMONARY EMBOLISM TYPE: ICD-10-CM

## 2023-10-09 DIAGNOSIS — D73.89 SPLENIC LESION: ICD-10-CM

## 2023-10-09 DIAGNOSIS — M54.42 CHRONIC BILATERAL LOW BACK PAIN WITH LEFT-SIDED SCIATICA: ICD-10-CM

## 2023-10-09 DIAGNOSIS — G89.29 CHRONIC BILATERAL LOW BACK PAIN WITH LEFT-SIDED SCIATICA: ICD-10-CM

## 2023-10-09 DIAGNOSIS — C34.32 MALIGNANT NEOPLASM OF LOWER LOBE OF LEFT LUNG: ICD-10-CM

## 2023-10-09 PROCEDURE — 99214 PR OFFICE/OUTPT VISIT, EST, LEVL IV, 30-39 MIN: ICD-10-PCS | Mod: S$PBB,,, | Performed by: INTERNAL MEDICINE

## 2023-10-09 PROCEDURE — 99214 OFFICE O/P EST MOD 30 MIN: CPT | Mod: S$PBB,,, | Performed by: INTERNAL MEDICINE

## 2023-10-09 PROCEDURE — 99214 OFFICE O/P EST MOD 30 MIN: CPT | Performed by: INTERNAL MEDICINE

## 2023-10-09 RX ORDER — TIZANIDINE 4 MG/1
4 TABLET ORAL EVERY 6 HOURS PRN
Qty: 360 TABLET | Refills: 0 | Status: SHIPPED | OUTPATIENT
Start: 2023-10-09 | End: 2024-01-08

## 2023-10-09 NOTE — TELEPHONE ENCOUNTER
No care due was identified.  [facility] Embedded Care Due Messages. Reference number: [id number].   10/09/2023 11:33:32 AM CDT   Isotretinoin Pregnancy And Lactation Text: This medication is Pregnancy Category X and is considered extremely dangerous during pregnancy. It is unknown if it is excreted in breast milk.

## 2023-10-09 NOTE — ASSESSMENT & PLAN NOTE
Patient is doing well at this time and her recent CT scans show the tumor getting smaller at this time.  Her MS LNs are also all normal in size.  Will proceed with Atezo in 2 weeks as planned but will have her back in the office prior to beginning this therapy.

## 2023-10-09 NOTE — ASSESSMENT & PLAN NOTE
This is a new finding and she is on xarelto at this time.  Will continue therapy and discussed this today in the office.  She is breathing better now.

## 2023-10-09 NOTE — ASSESSMENT & PLAN NOTE
Unsure of the cause of this.  Echo was negative and if this is clot then the xarelto should help with this.  Will continue to monitor with scans.

## 2023-10-09 NOTE — PROGRESS NOTES
PROGRESS NOTE    Subjective:       Patient ID: Aysha Moore is a 68 y.o. female.    6/2/2023:  Screening CT chest:  2.6 x 2.5 x 3.2cm mass in the posterior LLL     6/20/2023:  PET scan:  35 x 21 mm lobulated pulmonary mass in the posterior left lower lobe with SUV max 11.6      FDG avid lymphadenopathy is present with index nodes outlined below:  -21 x 11 mm AP window node with SUV max 12.1 (image 103)  -21 x 12 mm posterior left hilar node with SUV max 13.7 (image 109)  -16 x 13 mm left hilar node with SUV max 14 (image 1:15)  -10 x 10 mm subcarinal node with SUV max 6.3 (image 111)     7/12/2023-Biopsy of LLL:  LEFT LOWER LOBE OF LUNG, CORE BIOPSIES:   - LUNG ADENOCARCINOMA WITH PLEOMORPHIC FEATURES.   PDL1/TPS: 95%  ALK/BRAF/EGFR/KRAS/RET/ROS1/MET NEGATIVE     7/21/2023:  MRI brain: no mets.    Carbo/Taxol/XRT:  8/8/2023-9/12/2023     Plan: Atezolizumab 1200mg every 3 weeks x 16    9/30/2023-CTA Chest:  1. Segmental right upper lobe pulmonary embolus.  2. Cavitating and spiculated posterior left lower lobe lung mass, decreased slightly in size compared to the prior chest CT exam.  3. Upper lobe predominant centrilobular and subpleural emphysema, with coarse mixed interstitial and alveolar infiltrate along the mediastinal border left upper lobe suggesting reactive or infectious pneumonitis, and with mild posterior bilateral upper lobe groundglass infiltrates.    10/4/2023  Admitted for 3 days with fever to 101, new Dx of PE and splenic infarcts. Echo normal. Started on Xarelto and abx.     9/3/2023-CT abd/p:  IMPRESSION: There are hypodensities within the spleen concerning for splenic infarcts. These can be associated with endocarditis. The patient also has known pulmonary emboli. Transesophageal echocardiogram may be warranted.    10/3/2023-  TTE normal    Atezolizumab  Cycle 1: 10/23/2023-due    Chief Complaint:  No chief complaint on file.  Stage IIII  lung cancer follow up    History of Present Illness:   Aysha Moore is a 68 y.o. female who presents for follow up of lung cancer.     Patient admitted with new PE, fever and splenic infarcts.  Started on Xarelto and is feeling better.  She is on abx as well.  Still quite weak however.         Family and Social history reviewed and is unchanged from 7/27/2023      ROS:  Review of Systems   Constitutional:  Negative for fever.   Respiratory:  Negative for shortness of breath.    Cardiovascular:  Negative for chest pain and leg swelling.   Gastrointestinal:  Negative for abdominal pain and blood in stool.   Genitourinary:  Negative for hematuria.   Skin:  Negative for rash.          Current Outpatient Medications:     ammonium lactate 12 % Crea, aaa bid prn dry skin (Patient taking differently: Apply 1 g topically 2 (two) times daily as needed. aaa bid prn dry skin), Disp: 385 g, Rfl: 11    atorvastatin (LIPITOR) 80 MG tablet, Take 1 tablet (80 mg total) by mouth every evening., Disp: 90 tablet, Rfl: 1    azelastine (ASTELIN) 137 mcg (0.1 %) nasal spray, 1 SPRAY (137 MCG TOTAL) BY NASAL ROUTE 2 (TWO) TIMES DAILY AS NEEDED FOR RHINITIS (OR SINUSITIS)., Disp: 90 mL, Rfl: 1    calcium-vitamin D3 (OS-JOLIE 500 + D3) 500 mg-5 mcg (200 unit) per tablet, Take 1 tablet by mouth every morning., Disp: , Rfl:     citalopram (CELEXA) 20 MG tablet, TAKE 1 TABLET BY MOUTH EVERY DAY, Disp: 90 tablet, Rfl: 3    clobetasol 0.05% (TEMOVATE) 0.05 % Oint, Apply topically 2 (two) times daily. for 14 days (Patient taking differently: Apply 1 g topically 2 (two) times daily.), Disp: 45 g, Rfl: 3    ezetimibe (ZETIA) 10 mg tablet, Take 1 tablet (10 mg total) by mouth once daily., Disp: 90 tablet, Rfl: 3    famotidine (PEPCID) 40 MG tablet, Take 1 tablet (40 mg total) by mouth every evening., Disp: 30 tablet, Rfl: 1    fluticasone propionate (FLONASE) 50 mcg/actuation nasal spray, 1 spray (50 mcg total) by Each Nostril route daily as needed  for Rhinitis or Allergies., Disp: 9.9 mL, Rfl: 2    levoFLOXacin (LEVAQUIN) 750 MG tablet, Take 1 tablet (750 mg total) by mouth once daily., Disp: 10 tablet, Rfl: 0    metoprolol succinate (TOPROL-XL) 50 MG 24 hr tablet, Take 1 tablet (50 mg total) by mouth once daily., Disp: 90 tablet, Rfl: 3    nitroGLYCERIN (NITROSTAT) 0.4 MG SL tablet, Place 1 tablet (0.4 mg total) under the tongue every 5 (five) minutes as needed for Chest pain., Disp: 30 tablet, Rfl: 0    omeprazole (PRILOSEC) 40 MG capsule, Take 1 capsule (40 mg total) by mouth once daily., Disp: 30 capsule, Rfl: 11    ondansetron (ZOFRAN) 8 MG tablet, Take 1 tablet (8 mg total) by mouth every 8 (eight) hours as needed. (Patient taking differently: Take 8 mg by mouth every 8 (eight) hours as needed for Nausea.), Disp: 30 tablet, Rfl: 5    promethazine (PHENERGAN) 25 MG tablet, Take 1 tablet (25 mg total) by mouth every 4 to 6 hours as needed. (Patient taking differently: Take 25 mg by mouth every 4 to 6 hours as needed for Nausea.), Disp: 30 tablet, Rfl: 5    rivaroxaban (XARELTO) 15 mg Tab, Take 1 tablet (15 mg total) by mouth daily with dinner or evening meal., Disp: 42 tablet, Rfl: 0    [START ON 10/26/2023] rivaroxaban (XARELTO) 20 mg Tab, Take 1 tablet (20 mg total) by mouth daily with dinner or evening meal. for 22 days, Disp: 30 tablet, Rfl: 0    sucralfate (CARAFATE) 100 mg/mL suspension, Take 10 mLs (1 g total) by mouth 4 (four) times daily before meals and nightly. for 10 days, Disp: 400 mL, Rfl: 0    traMADoL (ULTRAM) 50 mg tablet, Take 1 tablet (50 mg total) by mouth every 6 (six) hours as needed for Pain., Disp: 30 tablet, Rfl: 2    zinc 50 mg Tab, Take 1 tablet by mouth once daily., Disp: , Rfl:     ALPRAZolam (XANAX) 0.25 MG tablet, Take 1 tablet (0.25 mg total) by mouth 3 (three) times daily as needed for Anxiety. (Patient not taking: Reported on 10/1/2023), Disp: 30 tablet, Rfl: 1    busPIRone (BUSPAR) 5 MG Tab, TAKE 1 TABLET BY MOUTH TWICE  "A DAY (Patient not taking: Reported on 10/1/2023), Disp: 180 tablet, Rfl: 1    HYDROcodone-acetaminophen (NORCO) 5-325 mg per tablet, Take 1 tablet by mouth every 4 to 6 hours as needed for Pain. (Patient not taking: Reported on 10/1/2023), Disp: 60 tablet, Rfl: 0    lisinopriL (PRINIVIL,ZESTRIL) 2.5 MG tablet, Take 1 tablet (2.5 mg total) by mouth every evening. (Patient not taking: Reported on 10/1/2023), Disp: 90 tablet, Rfl: 3    tiZANidine (ZANAFLEX) 4 MG tablet, TAKE 1 TABLET (4 MG TOTAL) BY MOUTH EVERY 6 (SIX) HOURS AS NEEDED. BACK PAIN (Patient not taking: Reported on 10/1/2023), Disp: 360 tablet, Rfl: 0    valACYclovir (VALTREX) 1000 MG tablet, TAKE 2 AT ONSET OF FEVER BLISTERS THEN REPEAT IN 12 HOURS (TOTAL 4 TABS PER EPISODE) (Patient not taking: Reported on 10/1/2023), Disp: 20 tablet, Rfl: 3        Objective:       Physical Examination:     /64   Pulse (!) 114   Temp 97.8 °F (36.6 °C)   Resp 18   Ht 5' 3" (1.6 m)   Wt 63 kg (139 lb)   BMI 24.62 kg/m²     Physical Exam  Constitutional:       Appearance: Normal appearance.   HENT:      Head: Normocephalic and atraumatic.   Eyes:      General: No scleral icterus.     Conjunctiva/sclera: Conjunctivae normal.   Cardiovascular:      Rate and Rhythm: Normal rate.   Pulmonary:      Effort: Pulmonary effort is normal.   Abdominal:      General: Abdomen is flat.   Neurological:      General: No focal deficit present.      Mental Status: She is alert and oriented to person, place, and time.   Psychiatric:         Mood and Affect: Mood normal.         Behavior: Behavior normal.         Thought Content: Thought content normal.         Judgment: Judgment normal.         Labs:   Recent Results (from the past 336 hour(s))   CBC auto differential    Collection Time: 10/04/23  7:07 AM   Result Value Ref Range    WBC 2.65 (L) 3.90 - 12.70 K/uL    Hemoglobin 10.6 (L) 12.0 - 16.0 g/dL    Hematocrit 30.7 (L) 37.0 - 48.5 %    Platelets 281 150 - 450 K/uL   CBC " auto differential    Collection Time: 10/03/23  6:17 AM   Result Value Ref Range    WBC 2.13 (L) 3.90 - 12.70 K/uL    Hemoglobin 9.7 (L) 12.0 - 16.0 g/dL    Hematocrit 28.0 (L) 37.0 - 48.5 %    Platelets 238 150 - 450 K/uL   CBC Auto Differential    Collection Time: 10/02/23  9:08 AM   Result Value Ref Range    WBC 2.12 (L) 3.90 - 12.70 K/uL    Hemoglobin 9.9 (L) 12.0 - 16.0 g/dL    Hematocrit 29.3 (L) 37.0 - 48.5 %    Platelets 252 150 - 450 K/uL     CMP  Sodium   Date Value Ref Range Status   10/04/2023 137 136 - 145 mmol/L Final     Potassium   Date Value Ref Range Status   10/04/2023 4.1 3.5 - 5.1 mmol/L Final     Chloride   Date Value Ref Range Status   10/04/2023 100 95 - 110 mmol/L Final     CO2   Date Value Ref Range Status   10/04/2023 31 (H) 23 - 29 mmol/L Final     Glucose   Date Value Ref Range Status   10/04/2023 106 70 - 110 mg/dL Final     BUN   Date Value Ref Range Status   10/04/2023 6 (L) 8 - 23 mg/dL Final     Creatinine   Date Value Ref Range Status   10/04/2023 0.6 0.5 - 1.4 mg/dL Final     Calcium   Date Value Ref Range Status   10/04/2023 8.7 8.7 - 10.5 mg/dL Final     Total Protein   Date Value Ref Range Status   10/04/2023 6.3 6.0 - 8.4 g/dL Final     Albumin   Date Value Ref Range Status   10/04/2023 3.4 (L) 3.5 - 5.2 g/dL Final     Total Bilirubin   Date Value Ref Range Status   10/04/2023 0.7 0.1 - 1.0 mg/dL Final     Comment:     For infants and newborns, interpretation of results should be based  on gestational age, weight and in agreement with clinical  observations.    Premature Infant recommended reference ranges:  Up to 24 hours.............<8.0 mg/dL  Up to 48 hours............<12.0 mg/dL  3-5 days..................<15.0 mg/dL  6-29 days.................<15.0 mg/dL       Alkaline Phosphatase   Date Value Ref Range Status   10/04/2023 72 55 - 135 U/L Final     AST   Date Value Ref Range Status   10/04/2023 46 (H) 10 - 40 U/L Final     ALT   Date Value Ref Range Status   10/04/2023  "41 10 - 44 U/L Final     Anion Gap   Date Value Ref Range Status   10/04/2023 6 (L) 8 - 16 mmol/L Final     eGFR if    Date Value Ref Range Status   03/29/2022 >60.0 >60 mL/min/1.73 m^2 Final     eGFR if non    Date Value Ref Range Status   03/29/2022 >60.0 >60 mL/min/1.73 m^2 Final     Comment:     Calculation used to obtain the estimated glomerular filtration  rate (eGFR) is the CKD-EPI equation.        No results found for: "CEA"  No results found for: "PSA"        Assessment/Plan:     Problem List Items Addressed This Visit       Splenic lesion     Unsure of the cause of this.  Echo was negative and if this is clot then the xarelto should help with this.  Will continue to monitor with scans.           LUNG CANCER-ADENOCARCINOMA OF THE LLL     Patient is doing well at this time and her recent CT scans show the tumor getting smaller at this time.  Her MS LNs are also all normal in size.  Will proceed with Atezo in 2 weeks as planned but will have her back in the office prior to beginning this therapy.          Acute pulmonary embolism     This is a new finding and she is on xarelto at this time.  Will continue therapy and discussed this today in the office.  She is breathing better now.           Discussion:     Follow up in about 2 weeks (around 10/23/2023) for for NP visit to see how she is doing prior to starting atezolizumab.      Electronically signed by Raad Bee      "

## 2023-10-10 ENCOUNTER — CLINICAL SUPPORT (OUTPATIENT)
Dept: RADIATION ONCOLOGY | Facility: CLINIC | Age: 68
End: 2023-10-10
Payer: MEDICARE

## 2023-10-10 DIAGNOSIS — F41.9 ANXIETY: ICD-10-CM

## 2023-10-10 DIAGNOSIS — C34.32 MALIGNANT NEOPLASM OF LOWER LOBE OF LEFT LUNG: Primary | ICD-10-CM

## 2023-10-10 RX ORDER — CITALOPRAM 20 MG/1
TABLET, FILM COATED ORAL
Qty: 90 TABLET | Refills: 1 | Status: SHIPPED | OUTPATIENT
Start: 2023-10-10 | End: 2024-02-07

## 2023-10-10 NOTE — TELEPHONE ENCOUNTER
No care due was identified.  [facility] Embedded Care Due Messages. Reference number: [id number].   10/10/2023 12:20:48 AM CDT

## 2023-10-10 NOTE — PROGRESS NOTES
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  Treatment well tolerated with expected fatigue and radiation esophagitis.    Contacted patient today for 3 week checkup.  After completion of radiotherapy patient was hospitalized with pulmonary embolus and splenic infarcts, on Xarelto.  Now discharged reporting fatigue that is slowly resolving.  Radiation esophagitis has significantly improved and she is p.o. tolerant with minimal restrictions.  Denies fever, chills, chest pain or shortness of breath.    9/30/23 CTA C  Impression:  1. Segmental right upper lobe pulmonary embolus.  2. Cavitating and spiculated posterior left lower lobe lung mass, decreased slightly in size compared to the prior chest CT exam.  3. Upper lobe predominant centrilobular and subpleural emphysema, with coarse mixed interstitial and alveolar infiltrate along the mediastinal border left upper lobe suggesting reactive or infectious pneumonitis, and with mild posterior bilateral upper lobe groundglass infiltrates.    9/30/23 CT AP  IMPRESSION:   There are hypodensities within the spleen concerning for splenic infarcts. These can be associated with endocarditis. The patient also has known pulmonary emboli. Transesophageal echocardiogram may be warranted.   The nodular opacity at the left lower lobe which will need follow-up within 1 month with CT of the chest and/or PET/CT. This could be due to infection, but could also be due to a developing neoplasm.    A/P  1.  Good tolerance of chemoradiation therapy with expected esophagitis which is resolving.  Continue soft, bland, lukewarm foods and advance diet as tolerated.  2.  Follow-up with Dr. Hankins; on [place].  Imaging schedule per Medical Oncology.  Good radiographic response thus far.  3.  Return to clinic 6mos.

## 2023-10-11 NOTE — TELEPHONE ENCOUNTER
She is seeing heme oncology regularly and did see Dru amaya recently.  I do not want to overload her.    There is an interaction between the citalopram and the Levaquin she was given October 5th.  They should not be taken together.  She should hold the citalopram until she completes the antibiotic.

## 2023-10-11 NOTE — TELEPHONE ENCOUNTER
Spoke with patient.  Notified Celexa sent to [facility]   She will hold medication until finished antibiotic

## 2023-10-16 ENCOUNTER — OFFICE VISIT (OUTPATIENT)
Dept: HEMATOLOGY/ONCOLOGY | Facility: CLINIC | Age: 68
End: 2023-10-16
Payer: MEDICARE

## 2023-10-16 ENCOUNTER — LAB VISIT (OUTPATIENT)
Dept: LAB | Facility: HOSPITAL | Age: 68
End: 2023-10-16
Attending: NURSE PRACTITIONER
Payer: MEDICARE

## 2023-10-16 ENCOUNTER — OUTPATIENT CASE MANAGEMENT (OUTPATIENT)
Dept: ADMINISTRATIVE | Facility: OTHER | Age: 68
End: 2023-10-16
Payer: MEDICARE

## 2023-10-16 VITALS
BODY MASS INDEX: 22.68 KG/M2 | RESPIRATION RATE: 18 BRPM | SYSTOLIC BLOOD PRESSURE: 110 MMHG | WEIGHT: 128 LBS | TEMPERATURE: 98 F | DIASTOLIC BLOOD PRESSURE: 61 MMHG | HEART RATE: 122 BPM | OXYGEN SATURATION: 96 % | HEIGHT: 63 IN

## 2023-10-16 DIAGNOSIS — C34.32 MALIGNANT NEOPLASM OF LOWER LOBE OF LEFT LUNG: Primary | ICD-10-CM

## 2023-10-16 DIAGNOSIS — R53.83 FATIGUE, UNSPECIFIED TYPE: ICD-10-CM

## 2023-10-16 DIAGNOSIS — C34.32 MALIGNANT NEOPLASM OF LOWER LOBE OF LEFT LUNG: ICD-10-CM

## 2023-10-16 LAB
ALBUMIN SERPL BCP-MCNC: 4 G/DL (ref 3.5–5.2)
ALP SERPL-CCNC: 69 U/L (ref 55–135)
ALT SERPL W/O P-5'-P-CCNC: 13 U/L (ref 10–44)
ANION GAP SERPL CALC-SCNC: 10 MMOL/L (ref 8–16)
AST SERPL-CCNC: 20 U/L (ref 10–40)
BASOPHILS # BLD AUTO: 0.06 K/UL (ref 0–0.2)
BASOPHILS NFR BLD: 1.1 % (ref 0–1.9)
BILIRUB SERPL-MCNC: 1.1 MG/DL (ref 0.1–1)
BUN SERPL-MCNC: 11 MG/DL (ref 8–23)
CALCIUM SERPL-MCNC: 9.6 MG/DL (ref 8.7–10.5)
CHLORIDE SERPL-SCNC: 104 MMOL/L (ref 95–110)
CO2 SERPL-SCNC: 26 MMOL/L (ref 23–29)
CREAT SERPL-MCNC: 0.6 MG/DL (ref 0.5–1.4)
DIFFERENTIAL METHOD: ABNORMAL
EOSINOPHIL # BLD AUTO: 0.7 K/UL (ref 0–0.5)
EOSINOPHIL NFR BLD: 12.4 % (ref 0–8)
ERYTHROCYTE [DISTWIDTH] IN BLOOD BY AUTOMATED COUNT: 15.6 % (ref 11.5–14.5)
EST. GFR  (NO RACE VARIABLE): >60 ML/MIN/1.73 M^2
GLUCOSE SERPL-MCNC: 168 MG/DL (ref 70–110)
HCT VFR BLD AUTO: 37.1 % (ref 37–48.5)
HGB BLD-MCNC: 12.2 G/DL (ref 12–16)
IMM GRANULOCYTES # BLD AUTO: 0.02 K/UL (ref 0–0.04)
IMM GRANULOCYTES NFR BLD AUTO: 0.4 % (ref 0–0.5)
LYMPHOCYTES # BLD AUTO: 1 K/UL (ref 1–4.8)
LYMPHOCYTES NFR BLD: 16.6 % (ref 18–48)
MCH RBC QN AUTO: 30.7 PG (ref 27–31)
MCHC RBC AUTO-ENTMCNC: 32.9 G/DL (ref 32–36)
MCV RBC AUTO: 94 FL (ref 82–98)
MONOCYTES # BLD AUTO: 0.9 K/UL (ref 0.3–1)
MONOCYTES NFR BLD: 15.8 % (ref 4–15)
NEUTROPHILS # BLD AUTO: 3.1 K/UL (ref 1.8–7.7)
NEUTROPHILS NFR BLD: 53.7 % (ref 38–73)
NRBC BLD-RTO: 0 /100 WBC
PLATELET # BLD AUTO: 387 K/UL (ref 150–450)
PMV BLD AUTO: 8.9 FL (ref 9.2–12.9)
POTASSIUM SERPL-SCNC: 4 MMOL/L (ref 3.5–5.1)
PROT SERPL-MCNC: 7.3 G/DL (ref 6–8.4)
RBC # BLD AUTO: 3.97 M/UL (ref 4–5.4)
SODIUM SERPL-SCNC: 140 MMOL/L (ref 136–145)
T4 FREE SERPL-MCNC: 1.18 NG/DL (ref 0.71–1.51)
TSH SERPL DL<=0.005 MIU/L-ACNC: 0.06 UIU/ML (ref 0.34–5.6)
WBC # BLD AUTO: 5.71 K/UL (ref 3.9–12.7)

## 2023-10-16 PROCEDURE — 36415 COLL VENOUS BLD VENIPUNCTURE: CPT | Performed by: NURSE PRACTITIONER

## 2023-10-16 PROCEDURE — 99215 OFFICE O/P EST HI 40 MIN: CPT | Mod: S$PBB,,, | Performed by: NURSE PRACTITIONER

## 2023-10-16 PROCEDURE — 84443 ASSAY THYROID STIM HORMONE: CPT | Performed by: NURSE PRACTITIONER

## 2023-10-16 PROCEDURE — 99215 OFFICE O/P EST HI 40 MIN: CPT | Performed by: NURSE PRACTITIONER

## 2023-10-16 PROCEDURE — 85025 COMPLETE CBC W/AUTO DIFF WBC: CPT | Performed by: NURSE PRACTITIONER

## 2023-10-16 PROCEDURE — 84439 ASSAY OF FREE THYROXINE: CPT | Performed by: NURSE PRACTITIONER

## 2023-10-16 PROCEDURE — 99215 PR OFFICE/OUTPT VISIT, EST, LEVL V, 40-54 MIN: ICD-10-PCS | Mod: S$PBB,,, | Performed by: NURSE PRACTITIONER

## 2023-10-16 PROCEDURE — 80053 COMPREHEN METABOLIC PANEL: CPT | Performed by: NURSE PRACTITIONER

## 2023-10-16 RX ORDER — DIPHENHYDRAMINE HYDROCHLORIDE 50 MG/ML
50 INJECTION INTRAMUSCULAR; INTRAVENOUS ONCE AS NEEDED
Status: CANCELLED | OUTPATIENT
Start: 2023-10-23

## 2023-10-16 RX ORDER — EPINEPHRINE 0.3 MG/.3ML
0.3 INJECTION SUBCUTANEOUS ONCE AS NEEDED
Status: CANCELLED | OUTPATIENT
Start: 2023-10-23

## 2023-10-16 RX ORDER — SODIUM CHLORIDE 0.9 % (FLUSH) 0.9 %
10 SYRINGE (ML) INJECTION
Status: CANCELLED | OUTPATIENT
Start: 2023-10-23

## 2023-10-16 RX ORDER — HEPARIN 100 UNIT/ML
500 SYRINGE INTRAVENOUS
Status: CANCELLED | OUTPATIENT
Start: 2023-10-23

## 2023-10-16 NOTE — PROGRESS NOTES
SW contacted patient regarding referral. SW explained to patient reason for referral to assist with social needs. Patient declined having any needs. SW will close case as no needs.

## 2023-10-16 NOTE — PROGRESS NOTES
FOLLOW-UP APPOINTMENT    PATIENT:   Aysha Moore  :    1955  MR#:    [id number]  DATE OF VISIT:  10/16/2023      Chief Complaint: Chemo School/ Lung Cancer    HPI:   Ms. Aysha Moore presents today for chemotherapy education.  She will be starting treatment with Tecentriq for the above diagnosis.         10/16/2023     1:16 PM 10/9/2023     1:02 PM 2023    10:04 AM 2023    10:17 AM 2023    10:15 AM 2023    10:40 AM 2023    10:26 AM   Depression Patient Health Questionnaire   Over the last two weeks how often have you been bothered by little interest or pleasure in doing things Not at all Not at all Not at all Not at all Not at all Not at all Not at all   Over the last two weeks how often have you been bothered by feeling down, depressed or hopeless Not at all Not at all Not at all Not at all Not at all Not at all Not at all   PHQ-2 Total Score 0 0 0 0 0 0 0         Review of Systems   Constitutional:  Positive for appetite change, fatigue and unexpected weight change.   HENT:   Negative for hearing loss.    Respiratory:  Positive for cough and shortness of breath.    Cardiovascular:  Negative for chest pain, leg swelling and palpitations.   Gastrointestinal:  Negative for abdominal distention, constipation, diarrhea and nausea.   Genitourinary:  Negative for dysuria.    Musculoskeletal:  Positive for arthralgias and myalgias.   Skin:  Negative for itching.   Psychiatric/Behavioral:  The patient is not nervous/anxious.        Oncology History   LUNG CANCER-ADENOCARCINOMA OF THE LLL   2023 Initial Diagnosis    LUNG CANCER-ADENOCARCINOMA OF THE LLL     2023 Cancer Staged    Staging form: Lung, AJCC 8th Edition  - Clinical: Stage IIIA (cT2a, cN2, cM0)     10/23/2023 -  Chemotherapy    Treatment Summary   Plan Name: OP ATEZOLIZUMAB Q3W  Treatment Goal: Curative  Status: Active  Start Date: 10/23/2023 (Planned)  End Date: 2024 (Planned)  Provider: Raad Hankins,  MD  Chemotherapy: [No matching medication found in this treatment plan]     Metastatic lung cancer (metastasis from lung to other site), unspecified laterality   7/27/2023 Initial Diagnosis    Malignant neoplasm of unspecified part of unspecified bronchus or lung     8/8/2023 - 9/12/2023 Chemotherapy    Treatment Summary   Plan Name: OP NSCLC PACLITAXEL + CARBOPLATIN (AUC) QW + RADIATION  Treatment Goal: Curative  Status: Inactive  Start Date: 8/8/2023  End Date: 9/12/2023  Provider: Raad Hankins MD  Chemotherapy: CARBOplatin (PARAPLATIN) 205 mg in sodium chloride 0.9% 305.5 mL chemo infusion, 205 mg (100 % of original dose 206.4 mg), Intravenous, Clinic/HOD 1 time, 6 of 6 cycles  Dose modification:   (original dose 206.4 mg, Cycle 1)  Administration: 205 mg (8/8/2023), 205 mg (8/15/2023), 205 mg (8/22/2023), 205 mg (8/29/2023), 205 mg (9/5/2023), 205 mg (9/12/2023)  PACLitaxeL (TAXOL) 45 mg/m2 = 78 mg in sodium chloride 0.9% 263 mL chemo infusion, 45 mg/m2 = 78 mg, Intravenous, Clinic/HOD 1 time, 6 of 6 cycles  Administration: 78 mg (8/8/2023), 78 mg (8/15/2023), 78 mg (8/22/2023), 78 mg (8/29/2023), 78 mg (9/5/2023), 78 mg (9/12/2023)     10/23/2023 -  Chemotherapy    Treatment Summary   Plan Name: OP ATEZOLIZUMAB Q3W  Treatment Goal: Curative  Status: Active  Start Date: 10/23/2023 (Planned)  End Date: 9/23/2024 (Planned)  Provider: Raad Hankins MD  Chemotherapy: [No matching medication found in this treatment plan]         Patient Active Problem List   Diagnosis    Hyperlipidemia    Hypertension    Coronary artery disease    Anxiety    History of heart artery stent    Pulmonary emphysema    Prediabetes    Tear of medial meniscus of right knee, current    Chronic bilateral low back pain with left-sided sciatica    BMI 24.0-24.9, adult    Personal history of tobacco use, presenting hazards to health    Chest pain    Acute ST elevation myocardial infarction (STEMI) of inferior wall     Seasonal allergic rhinitis    Malignant melanoma of skin of left leg    Pure hypercholesterolemia    Chronic combined systolic and diastolic heart failure    Abnormal CT scan of lung    LUNG CANCER-ADENOCARCINOMA OF THE LLL    Metastatic lung cancer (metastasis from lung to other site), unspecified laterality    Dehydration    Neutropenic fever    Acute pulmonary embolism    FUO (fever of unknown origin)    Esophagitis    Splenic lesion       Past Medical History:   Diagnosis Date    Allergy     Anxiety     Arthritis     CAD (coronary artery disease)     coronary stents    Chronic back pain     lower back pain radiates to left leg    Chronic rhinitis     DAILY (dyspnea on exertion)     Hyperlipidemia     Hypertension     Lung cancer 07/01/2023    Melanoma in situ of left upper extremity     left thigh area    MI (myocardial infarction)     Seasonal allergic rhinitis 10/29/2020       Past Surgical History:   Procedure Laterality Date    BREAST SURGERY      breast reduction    CATARACT EXTRACTION, BILATERAL      coranary stent      EXCISION OF MELANOMA Left 3/30/2022    Procedure: EXCISION, MELANOMA;  Surgeon: David Mcpherson III, MD;  Location: [facility] OR;  Service: General;  Laterality: Left;    EXCISIONAL BIOPSY Left 3/30/2022    Procedure: EXCISIONAL BIOPSY;  Surgeon: David Mcpherson III, MD;  Location: [facility] OR;  Service: General;  Laterality: Left;    HYSTERECTOMY      total    INSERTION OF TUNNELED CENTRAL VENOUS CATHETER (CVC) WITH SUBCUTANEOUS PORT N/A 8/4/2023    Procedure: [id number];  Surgeon: Remi Mckeon Jr., MD;  Location: [facility] OR;  Service: General;  Laterality: N/A;    LEFT HEART CATHETERIZATION Left 10/16/2020    Procedure: Left heart cath;  Surgeon: Garrett Robins MD;  Location: [facility] CATH/EP LAB;  Service: Cardiology;  Laterality: Left;    OOPHORECTOMY      TOTAL REDUCTION MAMMOPLASTY Bilateral 2000       Social History     Socioeconomic  History    Marital status:    Tobacco Use    Smoking status: Former     Current packs/day: 0.00     Average packs/day: 1 pack/day for 43.2 years (43.2 ttl pk-yrs)     Types: Cigarettes, Vaping with nicotine     Start date:      Quit date: 3/4/2017     Years since quittin.6    Smokeless tobacco: Never   Substance and Sexual Activity    Alcohol use: Not Currently     Alcohol/week: 0.0 standard drinks of alcohol     Comment: sometimes    Drug use: No    Sexual activity: Yes     Partners: Male       Family History   Problem Relation Age of Onset    Cancer Mother         breast, ovarian, cervical     Heart disease Mother     Breast cancer Mother 50    Ovarian cancer Mother     Cancer Father         melanoma    Stroke Sister     Heart disease Sister     Cancer Sister         leukemia    Macular degeneration Sister     Heart disease Sister     Heart disease Brother     Lung cancer Brother     Cancer Maternal Uncle     Cancer Paternal Aunt         breast    Breast cancer Paternal Aunt 60    Cancer Paternal Uncle     Heart disease Maternal Grandmother     No Known Problems Daughter     No Known Problems Son          Current Outpatient Medications:     ammonium lactate 12 % Crea, aaa bid prn dry skin (Patient taking differently: Apply 1 g topically 2 (two) times daily as needed. aaa bid prn dry skin), Disp: 385 g, Rfl: 11    atorvastatin (LIPITOR) 80 MG tablet, Take 1 tablet (80 mg total) by mouth every evening., Disp: 90 tablet, Rfl: 1    azelastine (ASTELIN) 137 mcg (0.1 %) nasal spray, 1 SPRAY (137 MCG TOTAL) BY NASAL ROUTE 2 (TWO) TIMES DAILY AS NEEDED FOR RHINITIS (OR SINUSITIS)., Disp: 90 mL, Rfl: 1    citalopram (CELEXA) 20 MG tablet, TAKE 1 TABLET BY MOUTH EVERY DAY, Disp: 90 tablet, Rfl: 1    clobetasol 0.05% (TEMOVATE) 0.05 % Oint, Apply topically 2 (two) times daily. for 14 days (Patient taking differently: Apply 1 g topically 2 (two) times daily.), Disp: 45 g, Rfl: 3     ezetimibe (ZETIA) 10 mg tablet, Take 1 tablet (10 mg total) by mouth once daily., Disp: 90 tablet, Rfl: 3    famotidine (PEPCID) 40 MG tablet, Take 1 tablet (40 mg total) by mouth every evening., Disp: 30 tablet, Rfl: 1    metoprolol succinate (TOPROL-XL) 50 MG 24 hr tablet, Take 1 tablet (50 mg total) by mouth once daily., Disp: 90 tablet, Rfl: 3    nitroGLYCERIN (NITROSTAT) 0.4 MG SL tablet, Place 1 tablet (0.4 mg total) under the tongue every 5 (five) minutes as needed for Chest pain., Disp: 30 tablet, Rfl: 0    omeprazole (PRILOSEC) 40 MG capsule, Take 1 capsule (40 mg total) by mouth once daily., Disp: 30 capsule, Rfl: 11    ondansetron (ZOFRAN) 8 MG tablet, Take 1 tablet (8 mg total) by mouth every 8 (eight) hours as needed. (Patient taking differently: Take 8 mg by mouth every 8 (eight) hours as needed for Nausea.), Disp: 30 tablet, Rfl: 5    promethazine (PHENERGAN) 25 MG tablet, Take 1 tablet (25 mg total) by mouth every 4 to 6 hours as needed. (Patient taking differently: Take 25 mg by mouth every 4 to 6 hours as needed for Nausea.), Disp: 30 tablet, Rfl: 5    rivaroxaban (XARELTO) 15 mg Tab, Take 1 tablet (15 mg total) by mouth daily with dinner or evening meal., Disp: 42 tablet, Rfl: 0    [START ON 10/26/2023] rivaroxaban (XARELTO) 20 mg Tab, Take 1 tablet (20 mg total) by mouth daily with dinner or evening meal. for 22 days, Disp: 30 tablet, Rfl: 0    traMADoL (ULTRAM) 50 mg tablet, Take 1 tablet (50 mg total) by mouth every 6 (six) hours as needed for Pain., Disp: 30 tablet, Rfl: 2    zinc 50 mg Tab, Take 1 tablet by mouth once daily., Disp: , Rfl:     ALPRAZolam (XANAX) 0.25 MG tablet, Take 1 tablet (0.25 mg total) by mouth 3 (three) times daily as needed for Anxiety. (Patient not taking: Reported on 10/1/2023), Disp: 30 tablet, Rfl: 1    busPIRone (BUSPAR) 5 MG Tab, TAKE 1 TABLET BY MOUTH TWICE A DAY (Patient not taking: Reported on 10/1/2023), Disp: 180 tablet, Rfl: 1     "calcium-vitamin D3 (OS-JOLIE 500 + D3) 500 mg-5 mcg (200 unit) per tablet, Take 1 tablet by mouth every morning., Disp: , Rfl:     fluticasone propionate (FLONASE) 50 mcg/actuation nasal spray, 1 spray (50 mcg total) by Each Nostril route daily as needed for Rhinitis or Allergies. (Patient not taking: Reported on 10/16/2023), Disp: 9.9 mL, Rfl: 2    HYDROcodone-acetaminophen (NORCO) 5-325 mg per tablet, Take 1 tablet by mouth every 4 to 6 hours as needed for Pain. (Patient not taking: Reported on 10/1/2023), Disp: 60 tablet, Rfl: 0    levoFLOXacin (LEVAQUIN) 750 MG tablet, Take 1 tablet (750 mg total) by mouth once daily. (Patient not taking: Reported on 10/16/2023), Disp: 10 tablet, Rfl: 0    lisinopriL (PRINIVIL,ZESTRIL) 2.5 MG tablet, Take 1 tablet (2.5 mg total) by mouth every evening. (Patient not taking: Reported on 10/1/2023), Disp: 90 tablet, Rfl: 3    tiZANidine (ZANAFLEX) 4 MG tablet, TAKE 1 TABLET BY MOUTH EVERY 6 HOURS AS NEEDED FOR BACK PAIN (Patient not taking: Reported on 10/16/2023), Disp: 360 tablet, Rfl: 0    valACYclovir (VALTREX) 1000 MG tablet, TAKE 2 AT ONSET OF FEVER BLISTERS THEN REPEAT IN 12 HOURS (TOTAL 4 TABS PER EPISODE) (Patient not taking: Reported on 10/1/2023), Disp: 20 tablet, Rfl: 3    Review of patient's allergies indicates:   Allergen Reactions    Cephalexin      Other reaction(s): Rash    Doxycycline monohydrate Hives    Lanoxin  [digoxin]      Other reaction(s): Rash    Lansoprazole      Other reaction(s): Rash    Macrobid [nitrofurantoin monohyd/m-cryst] Rash       Physcial Examination  VITAL SIGNS:    Body surface area is 1.61 meters squared.   Pain Assessment  Vitals:    10/16/23 1309   BP: 110/61   Pulse: (!) 122   Resp: 18   Temp: 97.5 °F (36.4 °C)   SpO2: 96%   Weight: 58.1 kg (128 lb)   Height: 5' 3" (1.6 m)   PainSc:   4   PainLoc: Foot       Wt Readings from Last 5 Encounters:   10/16/23 58.1 kg (128 lb)   10/09/23 63 kg (139 lb)   09/30/23 63 kg (138 lb 14.2 " oz)   10/03/23 63 kg (138 lb 14.2 oz)   09/27/23 61.7 kg (136 lb 1.6 oz)       GENERAL:  Aysha Moore is healthy-appearing 68 y.o. female, in no distress.   EYES:   Pupils equal, round, reactive.  Conjunctivae, sclera and lids normal.  HEENT: Head normocephalic and atraumatic, without alopecia.  Oropharynx is unremarkable.  No icterus, jaundice, stomatitis, mucositis, or ulceration is noted.  Ears are clear and unremarkable.  Nose, nares, and septum are unremarkable.    NECK:   No masses.  Thyroid and trachea are normal.    BREASTS:  Deferred.  RESPIRATORY: Clear to auscultation bilaterally.  Symmetrically effortless expansion.  No wheezing and no stridor.    CV: Heart reveals regular rate and rhythm without murmur, rub, or gallops.  ABDOMEN: Soft, non-tender.  No masses, no hernias, and no rebound or rigidity are noted.  /RECTAL:  Deferred.  LYMPHATICS: No preauricular, submandibular, cervical, supraclavicular, axillary, lymphadenopathy.  MUSCULOSKELETAL:Fair musculature, no atrophy.  No arthritic changes.  No edema or cyanosis. Back is without gross abnormal curvature.   NEUROLOGICAL: Cranial nerves II-XII grossly intact.  Motor and sensory exam intact.  SKIN:   No lesions, bruises, petechiae or rashes.  Good turgor.    PSYCHIATRIC: Patient is alert and oriented to time, place and person.  Mood and affect are appropriate.         Laboratory and Radiology   Lab Results   Component Value Date    WBC 5.71 10/16/2023    RBC 3.97 (L) 10/16/2023    HGB 12.2 10/16/2023    HCT 37.1 10/16/2023    MCV 94 10/16/2023    MCH 30.7 10/16/2023    MCHC 32.9 10/16/2023    RDW 15.6 (H) 10/16/2023     10/16/2023    MPV 8.9 (L) 10/16/2023    GRAN 3.1 10/16/2023    GRAN 53.7 10/16/2023    LYMPH 1.0 10/16/2023    LYMPH 16.6 (L) 10/16/2023    MONO 0.9 10/16/2023    MONO 15.8 (H) 10/16/2023    EOS 0.7 (H) 10/16/2023    BASO 0.06 10/16/2023    EOSINOPHIL 12.4 (H) 10/16/2023    BASOPHIL 1.1 10/16/2023     BMP  Lab Results    Component Value Date     10/16/2023    K 4.0 10/16/2023     10/16/2023    CO2 26 10/16/2023    BUN 11 10/16/2023    CREATININE 0.6 10/16/2023    CALCIUM 9.6 10/16/2023    ANIONGAP 10 10/16/2023    ESTGFRAFRICA >60.0 03/29/2022    EGFRNONAA >60.0 03/29/2022     Lab Results   Component Value Date    ALT 13 10/16/2023    AST 20 10/16/2023    ALKPHOS 69 10/16/2023    BILITOT 1.1 (H) 10/16/2023     Results for orders placed or performed during the hospital encounter of 09/30/23 (from the past 2160 hour(s))   CT Abdomen Pelvis With Contrast    Narrative    CT ABDOMEN AND PELVIS WITH INTRAVENOUS CONTRAST: 9/30/2023 6:56 PM CDT    HISTORY: 68 years  old Female with Sepsis.    COMPARISON: None available    TECHNIQUE: Axial contiguous images were obtained from the lung bases to the proximal femurs with intravenous intravenous contrast administered. Sagittal and coronal reconstructions were also obtained and reviewed. This exam was performed according to our departmental dose-optimization program, which includes automated exposure control, adjustment of the mA and/or KV according to the patient's size and/or use of iterative reconstruction technique.    FINDINGS:    LUNG BASES: There is a nodular opacity seen at the left lower lobe measuring 2.5 x 1.9 cm..  No discrete pleural effusions are seen.  The distal segmental arteries are not well opacified bilaterally, likely due to suboptimal distal bolus. There is mild to moderate centrilobular and paraseptal emphysematous change.    LIVER: The visualized hepatic parenchyma appears mildly hypodense, which can be seen with hepatic steatosis.  The main portal vein is well opacified with contrast.    GALLBLADDER: Cholelithiasis is seen. No significant gallbladder wall thickening is seen.    SPLEEN: The spleen is normal in size. There is a hypodensity seen at the posterior aspect of the spleen measuring at least 2.9 x 2.5 cm. This is concerning for an infiltrate. There is  also a smaller hypodensity present.,    PANCREAS: The pancreas is unremarkable.    ADRENAL GLANDS: The bilateral adrenal glands are unremarkable.    KIDNEYS: Both kidneys demonstrate no hydronephrosis. No renal or ureteral calculi are seen.    PELVIS: The urinary bladder is mildly distended.    STOMACH: The stomach is not well distended.    BOWEL: The small bowel loops appear unremarkable. No pericolonic inflammatory stranding is seen. There are multiple colonic diverticula without evidence to suggest acute diverticulitis.    APPENDIX:    PERITONEUM: There is no evidence of pneumoperitoneum or free fluid.    VESSELS: The IVC is unremarkable. The abdominal aorta is within normal limits of size. There is moderate atherosclerotic calcification at aorta and iliac vasculature.    LYMPH NODES: No significantly enlarged lymph nodes are seen in the abdomen or pelvis.    BONES AND SOFT TISSUES: No acute osseous abnormality is seen. Multilevel degenerative changes are seen at the lumbar spine.    IMPRESSION: There are hypodensities within the spleen concerning for splenic infarcts. These can be associated with endocarditis. The patient also has known pulmonary emboli. Transesophageal echocardiogram may be warranted.    The nodular opacity at the left lower lobe which will need follow-up within 1 month with CT of the chest and/or PET/CT. This could be due to infection, but could also be due to a developing neoplasm.    Electronically signed by:  Patricia Short DO  09/30/2023 06:59 PM CDT Workstation: [id number]   CTA Chest Non-Coronary (PE Studies)    Narrative     ADDENDUM #1       Results were communicated to Dr. Tuyet Wong 6:57 PM ON 9/30/2023.    Electronically signed by:  Ulises Cole MD  10/01/2023 12:12 PM CDT Workstation: [id number]       ORIGINAL REPORT       CMS MANDATED QUALITY DATA - CT RADIATION  436 All CT scans at this facility utilize dose modulation, iterative reconstruction, and/or weight based dosing  when appropriate to reduce radiation dose to as low as reasonably achievable.    CT angiogram of the pulmonary arteries, 9/30/2023 6:48 PM CDT    Clinical History:Pulmonary embolism (PE) suspected, unknown D-dimer,    Comparison Studies: 6/2/2023    Technique:    Thin section axial CT imaging was performed through the chest during IV Omnipaque contrast administration.  Coronal MIP reconstructions were performed on an independent workstation.  All images were utilized for examination interpretation, and all images were stored in the patient's permanent electronic medical record.    Findings:    CTA imaging of the pulmonary arteries was performed during the uneventful administration of nonionic intravenous contrast. Examination is of adequate diagnostic quality.    There is segmental right upper lobe pulmonary emboli. There are no central saddle type pulmonary emboli.    Cavitating spiculated nodule at the left lower lobe posteriorly measures 1.7 x 2.1 cm in size, versus previous 2.7 x 2.7 cm in size. There are subpleural and centrilobular upper lobe predominant diffuse emphysematous changes. There is mild biapical pleural and parenchymal scarring. There is coarse alveolar and interstitial infiltrate along the mediastinal border of the left upper lobe, and at the upper lobes posteriorly bilaterally.    No pathologic mediastinal, axillary or hilar lymphadenopathy is identified. There is a mineralized stable nodule present in the right side breast.    In the upper abdomen, there is incidental cholelithiasis    No destructive pathologic bone lesion is identified.    A left-sided Ckvtrz-[id number]-Cija device is in place, with its distal tip within the SVC.    Impression:    1. Segmental right upper lobe pulmonary embolus.  2. Cavitating and spiculated posterior left lower lobe lung mass, decreased slightly in size compared to the prior chest CT exam.  3. Upper lobe predominant centrilobular and subpleural emphysema, with coarse  mixed interstitial and alveolar infiltrate along the mediastinal border left upper lobe suggesting reactive or infectious pneumonitis, and with mild posterior bilateral upper lobe groundglass infiltrates.    All CT scans at this facility diffuse use low dose modification, iterative reconstruction, and/or weight-based dosing when appropriate to reduce radiation dose to as low as reasonably achievable.      Electronically signed by:  Ulises Cole MD  09/30/2023 06:52 PM CDT Workstation: [id number]     No results found for this or any previous visit (from the past 2160 hour(s)).  No results found for this or any previous visit (from the past 2160 hour(s)).    Pathology  Pathology Results  (Last 10 years)                 09/28/21 1012  Liquid-Based Pap Smear, Screening Final result    Narrative:  Release to patient->Immediate               TITLE: PLAN OF CARE FOR THE CHEMOTHERAPY PATIENT / TEACHING PROTOCOL    PURPOSE: To involve the patient / significant other in the plan of care and to provide teaching to the significant other & patient receiving chemotherapy.    LEVEL: Independent.    CONTENT: The Plan of Care for the chemotherapy patient is individualized and appropriate to the patients needs, strengths, limitations, & goals.  Education includes information regarding chemotherapy side effects, the treatment itself, and self-care  Activities.    GOAL / OUTCOME STANDARDS    PHYSIOLOGIC: The client will remain free or experience minimal side effects or toxicities throughout the chemotherapy treatment period.     PSYCHOLOGIC: The client/significant others will demonstrate positive coping mechanisms in relation to chemotherapy and its side effects.      COGINITIVE: The client/significant others will verbalize understanding of self-care measure to avoid/minimize side effects of the chemotherapy regimen.    EVALUATION / COMMENT KEY:    V = Audiovisual/Video  S = Successfully meets outcome  N = Needs further  instruction  NA = Not applicable to the patient  P = Previous knowledge  U = Unable to comprehend  * = See progress notes          PLAN OF CARE  INFORMATION TO BE DELIVERED / NURSING INTERVENTIONS DATE EVALUATION   Assessment of client/caregiver,         knowledge of cancer diagnosis,         and chemotherapy as a treatment. 1a. Evaluate patient/caregiver learning ability    b. Plan teaching sessions with patient/caregiver according to needs and present anxiety level/ability to learn.    c. Provide Chemotherapy Education Packet,        Mouth Care Protocol,         Specific Patient Education Sheets. 10/16/2023 S   Individual chemotherapy treatment         plan. 2a. Review of Chemotherapy Education handout from [facility]            10/16/2023   S   Knowledge Deficit & Self-Management of general side effects common to all chemotherapy:  Nausea/Vomiting  b.   Diarrhea  Mouth Care  Dental care  Constipation  Hair Loss  Potential for infection  Fatigue   3a. Reinforce that the majority of side effects from chemotherapy are reversible and are  controlled both in the hospital and at home        (blood counts recover, hair grows back).   b.  Refer to the following for reinforcement of         information post-treatment:  Mouth Care Protocol.  Bowel Protocol for constipation or diarrhea.  3.  Drug Specific Chemotherapy Information Sheets for each medication patient receiving.    10/16/2023     S     PLAN OF CARE  INFORMATION TO BE DELIVERED / NURSING INTERVENTIONS DATE EVALUATION   h. Potential for bleeding         i. Potential anemia/fatigue         j. Potential sunburn         k. Birth control measures  l. Safety measures post treatment 4.  Chemotherapy Home Care Instruction  and Safety Information Sheet.  A. patient/caregivers to thoroughly cook shellfish (shrimp, crab, etc) to decrease the chance of infection.    B.  Use sunscreen and protective clothing while in the sun.   10/16/2023      Knowledge deficit & Self  Management of Drug Specific  Side Effects.    BLADDER EFFECTS        (Hemorrhagic Cystitis)                  Preventable with adequate hydration; occurs 2-3 days or more post treatment.   1.  Instruct patient to:  a.   Void at least every 2 hours; increase intake.  b.   DO NOT hold urine; go when urge is felt.  c.    Empty bladder at bedtime and on         awakening.  d.   Observe for color changes (red to tea           colored), amount and frequency changes.  e.   Notify oncologist of any abnormalities           in urine or voiding or if you cannot               drink adequate fluids.   10/16/2023   S   b.   CHANGES IN URINE        COLOR:      1.   Instruct patient:  a.   Most evident in first 2-3 voidings after           administration.  Lasts less than 24 hours.  If urine is discolored 2 or more days post- treatment, notify oncologist.      10/16/2023 S   c.    KIDNEY EFFECTS           (Nephrotoxicity)   1.  Instruct patient to:  a.   Drink 8-16 glasses of fluid/day the day   pre-treatment and 3-4 days post-treatment to maintain hydration; the best way to minimize kidney problems.  b.   Notify oncologist immediately if unable to drink fluids or if changes are noted in urinary elimination.     10/16/2023   S   PULMONARY TOXICITY    Instruct patient to report symptoms such as shortness of breath, chest pain, shallow breathing, or chest wall discomfort to physician.  Reinforce preventative measures used by the health care team.  Baseline and periodic PFT and chest x-ray.   10/16/2023   S     PLAN OF CARE INFORMATION TO BE DELIVERED / NURSING INTERVENTIONS DATE EVALUATION   NERVE & MUSCLE EFFECTS (neurotoxocity; neuropathy, possible visual/hearing changes)        Instruct patient to:    Report numbness or tingling of the hands/feet, loss of fine motor movement (buttoning shirt, tying shoelaces), or gait changes to your oncologist.  If numbness/tingling are present:  protect feet with shoes at all times.  Use gloves  for washing dishes/gardening & potholders in kitchen.       10/16/2023   S   CARDIOTOXICITY  Decreased effectiveness of             cardiac function. Effective are                  cumulative and irreversible.                                    CARDIAC ARRYTHMIAS              4   Instruct:  Heart function may be tested before treatment and perdiocally during treatment.  Notify oncologist of irregular pulse, palpitations, shortness of breath, or swelling in lower extremities/feet.          Can cause arrhythmias on infusion that resolve once infusion discontinued. Instruct nurse if any irregularity felt.    10/16/2023   S   EXTRAVASTION  Occurs when vesicants leak outside of vein and cause damage to the skin and underlying tissues.   Reinforce preventive measures used to avoid complications.  Fresh IV site or central line monitored continuously with vesicant IVP.  Continuous infusion via central line site and blood return monitored periodically around the clock.  Instruct to:  Notify nurse of any discomfort, burning, stinging, etc. at IV site during chemotherapy administration.  Notify oncologist of any redness, pain, or swelling at IV site after discharge from hospital.   10/16/2023   S   HYPERSENSITIVITY can happen with any medication.   Instruct patient:  Nurse is with them during the initial part of treatment and will be close by to monitor.  Pre-medication ordered by the oncologist must be taken on time. If doses are missed, treatment will need to be re-scheduled.  Skin redness, itching, or hives appearing after discharge should be reported to oncologist. 10/16/2023   S       PLAN OF CARE INFORMATION TO BE DELIVERED / NURSING INTERVENTIONS DATE EVALUATION   FLU-LIKE SYNDROME      Instruct patient symptoms are hard to prevent and may include fever, shaking chills, muscle and body aches.  Taking prescribed medications from physician if needed.  Adequate fluids are important.    Reinforce the need to call if  temperature is         elevated to 100.4 or more  10/16/2023   S   HAND-FOOT SYNDROME  causes painful, symmetric swelling and redness of palms and soles                  Instruct patient to report any numbness or tingling in the hands or feet.  Explain prevention techniques, such as     Use heavy moisturizers to lessen skin dryness and itching, but to avoid if skin is cracked or broken  Bathe in tepid water, use non-perfumed soap, and wash gently. Baths with oatmeal or diluted baking soda may be soothing.  Avoid tight fitting shoes and repetitive actions, such as rubbing hands or applying pressure to hands/feet.  Review measures to take should syndrome occur:  Cold compresses and elevation for          edema  Pain medications and other measures as ordered by oncologist.   4.   Syndrome resolves few weeks after therapy. 10/16/2023   S   5. DISCHARGE PLANNING /        EDUCATION 1.    Explain importance of compliance with follow- up  tests (CBC, CMP).  2.    Verify patient/caregiver know:  a.    Oncologists office phone number.  b.    Dates of follow-up appointments.  c.    Prescriptions given for nausea  3.   Review side effects to monitor and notify          oncologist about.  4.   Reinforce the need for patient and caregivers to:  a.    Review information given.  b.    Call oncologists office with questions          or symptoms  5.   Provide [facility] [place] Brochure make referrals if needed for financial or .   10/16/2023   S     PROGRESS NOTES: I met with the patient and her daughter today for chemotherapy education. she will be starting treatment with Tecentriq . We discussed the mechanism of action, potential side effects of this treatment as well as ways she can manage them at home. Some of these side effects include but or not limited to fever, nausea, vomiting, decreased appetite, fatigue, weakness, cytopenias, myalgia/arthralgia, constipation, diarrhea, bleeding, headache, shortness of  breath, nail changes, taste change, hair thinning/loss, mood disturbances, or edema. We also discussed dietary modifications she should make although this will be discussed in more detail with the dietician. she was provided with anti-emetic medication, a copy of all of the information we discussed today as well as our contact information. she will be provided a schedule on his first day of treatment. We will obtain labs on a weekly basis and the patient will follow-up with the physician for toxicity monitoring throughout treatment. All questions were answered and an informed consent was obtained. she was reminded to certainly contact us sooner if needed.  Attached to the patients folder and discussed with the patient the 24 hour/ 7 days a week after hours telephone number for the physician.  Patient notified to call anytime 24/7 because their is a physician on call for any problems that may arise.  Patient also notified to report to [facility] if they can not get in touch with a physician after hours.  Discussed the five wishes booklet with the patient and their family.           Assessment/Plan     ICD-10-CM ICD-9-CM   1. Malignant neoplasm of lower lobe of left lung  C34.32 162.5   2. Fatigue, unspecified type  R53.83 780.79          Medications Ordered:  Zofran 8mg 1 tab PO Q8h prn nausea  Phenergan 25mg PO Q4-6h prn nausea    Standing Labs Ordered:  CBC weekly  CMP weekly  TSH Monthly    Follow up in about 2 weeks (around 10/30/2023) for with Dr. Hankins and 5 weeks with me.      Total Face to Face Time: 60 minutes face to face with the patient and their family discussing the chemotherapy side-effects and when to call our office.   Electronically signed by: Meka Chiang, MSN, APRN, AGNP-C, OCN

## 2023-10-23 ENCOUNTER — INFUSION (OUTPATIENT)
Dept: INFUSION THERAPY | Facility: HOSPITAL | Age: 68
End: 2023-10-23
Attending: INTERNAL MEDICINE
Payer: MEDICARE

## 2023-10-23 ENCOUNTER — DOCUMENTATION ONLY (OUTPATIENT)
Dept: HEMATOLOGY/ONCOLOGY | Facility: CLINIC | Age: 68
End: 2023-10-23

## 2023-10-23 VITALS
HEART RATE: 81 BPM | RESPIRATION RATE: 16 BRPM | WEIGHT: 128.38 LBS | HEIGHT: 63 IN | TEMPERATURE: 98 F | BODY MASS INDEX: 22.75 KG/M2 | SYSTOLIC BLOOD PRESSURE: 98 MMHG | OXYGEN SATURATION: 96 % | DIASTOLIC BLOOD PRESSURE: 64 MMHG

## 2023-10-23 DIAGNOSIS — C34.90 METASTATIC LUNG CANCER (METASTASIS FROM LUNG TO OTHER SITE), UNSPECIFIED LATERALITY: ICD-10-CM

## 2023-10-23 DIAGNOSIS — C34.32 MALIGNANT NEOPLASM OF LOWER LOBE OF LEFT LUNG: Primary | ICD-10-CM

## 2023-10-23 PROCEDURE — 25000003 PHARM REV CODE 250: Performed by: NURSE PRACTITIONER

## 2023-10-23 PROCEDURE — A4216 STERILE WATER/SALINE, 10 ML: HCPCS | Performed by: NURSE PRACTITIONER

## 2023-10-23 PROCEDURE — 63600175 PHARM REV CODE 636 W HCPCS: Performed by: NURSE PRACTITIONER

## 2023-10-23 PROCEDURE — 96413 CHEMO IV INFUSION 1 HR: CPT

## 2023-10-23 RX ORDER — SODIUM CHLORIDE 0.9 % (FLUSH) 0.9 %
10 SYRINGE (ML) INJECTION
Status: DISCONTINUED | OUTPATIENT
Start: 2023-10-23 | End: 2023-10-23 | Stop reason: HOSPADM

## 2023-10-23 RX ORDER — DIPHENHYDRAMINE HYDROCHLORIDE 50 MG/ML
50 INJECTION INTRAMUSCULAR; INTRAVENOUS ONCE AS NEEDED
Status: DISCONTINUED | OUTPATIENT
Start: 2023-10-23 | End: 2023-10-23 | Stop reason: HOSPADM

## 2023-10-23 RX ORDER — HEPARIN 100 UNIT/ML
500 SYRINGE INTRAVENOUS
Status: DISCONTINUED | OUTPATIENT
Start: 2023-10-23 | End: 2023-10-23 | Stop reason: HOSPADM

## 2023-10-23 RX ORDER — EPINEPHRINE 0.3 MG/.3ML
0.3 INJECTION SUBCUTANEOUS ONCE AS NEEDED
Status: DISCONTINUED | OUTPATIENT
Start: 2023-10-23 | End: 2023-10-23 | Stop reason: HOSPADM

## 2023-10-23 RX ADMIN — SODIUM CHLORIDE, PRESERVATIVE FREE 10 ML: 5 INJECTION INTRAVENOUS at 12:10

## 2023-10-23 RX ADMIN — HEPARIN 500 UNITS: 100 SYRINGE at 12:10

## 2023-10-23 RX ADMIN — ATEZOLIZUMAB 1200 MG: 1200 INJECTION, SOLUTION INTRAVENOUS at 11:10

## 2023-10-23 NOTE — PLAN OF CARE
Problem: Fatigue  Goal: Improved Activity Tolerance  Outcome: Ongoing, Progressing  Intervention: Promote Improved Energy  Flowsheets (Taken 10/23/2023 1246)  Fatigue Management:   activity schedule adjusted   fatigue-related activity identified   frequent rest breaks encouraged   paced activity encouraged  Activity Management: Ambulated -L4

## 2023-10-23 NOTE — PROGRESS NOTES
CHEMO SCHOOL- NUTRITION    Aysha Moore is a 68 y.o. female with lung cancer. Pt will be receiving tecentriq. I met with Mrs. Moore for chemo school today. Pt will be beginning a new regimen of tecenetriq after hospitalization for PE after her last treatment. She reports feeling much better since hospitalization where she lost 12#. She has been eating three meals daily and has Boost at home to supplement. She denies having any nutrition related questions or concerns at the current time.     CW: 128#.     Food Insecurity:  Patient is worried whether their food would run out before they have more money to buy more: Never  The food they bought just didn't last and they didn't have money to buy more: Never      Plan:   Discussed importance of maintaining wt & staying hydrated.   Continue small, frequent meals and snacks  Continue ONS at least once daily  Provided RD contact info & encouraged pt to call with any questions/concerns.   Will f/u as needed.       Electronically signed by: Kaur Rudolph MBA, RDN, LDN

## 2023-10-24 ENCOUNTER — HOSPITAL ENCOUNTER (OUTPATIENT)
Dept: RADIOLOGY | Facility: HOSPITAL | Age: 68
Discharge: HOME | End: 2023-10-24
Attending: INTERNAL MEDICINE
Payer: MEDICARE

## 2023-10-24 DIAGNOSIS — C34.32 MALIGNANT NEOPLASM OF LOWER LOBE OF LEFT LUNG: ICD-10-CM

## 2023-11-07 ENCOUNTER — OFFICE VISIT (OUTPATIENT)
Dept: HEMATOLOGY/ONCOLOGY | Facility: CLINIC | Age: 68
End: 2023-11-07
Payer: MEDICARE

## 2023-11-07 ENCOUNTER — LAB VISIT (OUTPATIENT)
Dept: LAB | Facility: HOSPITAL | Age: 68
End: 2023-11-07
Attending: NURSE PRACTITIONER
Payer: MEDICARE

## 2023-11-07 VITALS
RESPIRATION RATE: 18 BRPM | HEART RATE: 83 BPM | SYSTOLIC BLOOD PRESSURE: 116 MMHG | DIASTOLIC BLOOD PRESSURE: 54 MMHG | HEIGHT: 63 IN | WEIGHT: 129 LBS | BODY MASS INDEX: 22.86 KG/M2 | TEMPERATURE: 98 F

## 2023-11-07 DIAGNOSIS — R53.81 DEBILITY: ICD-10-CM

## 2023-11-07 DIAGNOSIS — C34.32 MALIGNANT NEOPLASM OF LOWER LOBE OF LEFT LUNG: ICD-10-CM

## 2023-11-07 DIAGNOSIS — R53.83 FATIGUE, UNSPECIFIED TYPE: ICD-10-CM

## 2023-11-07 DIAGNOSIS — I26.99 ACUTE PULMONARY EMBOLISM WITHOUT ACUTE COR PULMONALE, UNSPECIFIED PULMONARY EMBOLISM TYPE: ICD-10-CM

## 2023-11-07 LAB
ALBUMIN SERPL BCP-MCNC: 4.1 G/DL (ref 3.5–5.2)
ALP SERPL-CCNC: 96 U/L (ref 55–135)
ALT SERPL W/O P-5'-P-CCNC: 21 U/L (ref 10–44)
ANION GAP SERPL CALC-SCNC: 9 MMOL/L (ref 8–16)
AST SERPL-CCNC: 28 U/L (ref 10–40)
BASOPHILS # BLD AUTO: 0.04 K/UL (ref 0–0.2)
BASOPHILS NFR BLD: 0.6 % (ref 0–1.9)
BILIRUB SERPL-MCNC: 1.7 MG/DL (ref 0.1–1)
BUN SERPL-MCNC: 13 MG/DL (ref 8–23)
CALCIUM SERPL-MCNC: 9.5 MG/DL (ref 8.7–10.5)
CHLORIDE SERPL-SCNC: 100 MMOL/L (ref 95–110)
CO2 SERPL-SCNC: 27 MMOL/L (ref 23–29)
CREAT SERPL-MCNC: 0.8 MG/DL (ref 0.5–1.4)
DIFFERENTIAL METHOD: ABNORMAL
EOSINOPHIL # BLD AUTO: 0.3 K/UL (ref 0–0.5)
EOSINOPHIL NFR BLD: 4.6 % (ref 0–8)
ERYTHROCYTE [DISTWIDTH] IN BLOOD BY AUTOMATED COUNT: 13.8 % (ref 11.5–14.5)
EST. GFR  (NO RACE VARIABLE): >60 ML/MIN/1.73 M^2
GLUCOSE SERPL-MCNC: 111 MG/DL (ref 70–110)
HCT VFR BLD AUTO: 39 % (ref 37–48.5)
HGB BLD-MCNC: 12.9 G/DL (ref 12–16)
IMM GRANULOCYTES # BLD AUTO: 0.02 K/UL (ref 0–0.04)
IMM GRANULOCYTES NFR BLD AUTO: 0.3 % (ref 0–0.5)
LYMPHOCYTES # BLD AUTO: 0.7 K/UL (ref 1–4.8)
LYMPHOCYTES NFR BLD: 10.7 % (ref 18–48)
MCH RBC QN AUTO: 30.9 PG (ref 27–31)
MCHC RBC AUTO-ENTMCNC: 33.1 G/DL (ref 32–36)
MCV RBC AUTO: 93 FL (ref 82–98)
MONOCYTES # BLD AUTO: 0.8 K/UL (ref 0.3–1)
MONOCYTES NFR BLD: 12.8 % (ref 4–15)
NEUTROPHILS # BLD AUTO: 4.5 K/UL (ref 1.8–7.7)
NEUTROPHILS NFR BLD: 71 % (ref 38–73)
NRBC BLD-RTO: 0 /100 WBC
PLATELET # BLD AUTO: 197 K/UL (ref 150–450)
PMV BLD AUTO: 10.8 FL (ref 9.2–12.9)
POTASSIUM SERPL-SCNC: 4.5 MMOL/L (ref 3.5–5.1)
PROT SERPL-MCNC: 7.3 G/DL (ref 6–8.4)
RBC # BLD AUTO: 4.18 M/UL (ref 4–5.4)
SODIUM SERPL-SCNC: 136 MMOL/L (ref 136–145)
WBC # BLD AUTO: 6.35 K/UL (ref 3.9–12.7)

## 2023-11-07 PROCEDURE — 99214 OFFICE O/P EST MOD 30 MIN: CPT | Performed by: INTERNAL MEDICINE

## 2023-11-07 PROCEDURE — 80053 COMPREHEN METABOLIC PANEL: CPT | Performed by: NURSE PRACTITIONER

## 2023-11-07 PROCEDURE — 99214 OFFICE O/P EST MOD 30 MIN: CPT | Mod: S$PBB,,, | Performed by: INTERNAL MEDICINE

## 2023-11-07 PROCEDURE — 99214 PR OFFICE/OUTPT VISIT, EST, LEVL IV, 30-39 MIN: ICD-10-PCS | Mod: S$PBB,,, | Performed by: INTERNAL MEDICINE

## 2023-11-07 PROCEDURE — 36415 COLL VENOUS BLD VENIPUNCTURE: CPT | Performed by: NURSE PRACTITIONER

## 2023-11-07 PROCEDURE — 85025 COMPLETE CBC W/AUTO DIFF WBC: CPT | Performed by: NURSE PRACTITIONER

## 2023-11-07 NOTE — ASSESSMENT & PLAN NOTE
She is now on atezo and doing ok with this.  No sign of AI disease.  Will continue therapy for now.  Discussed this today.

## 2023-11-07 NOTE — PROGRESS NOTES
PROGRESS NOTE    Subjective:       Patient ID: Aysha Moore is a 68 y.o. female.    6/2/2023:  Screening CT chest:  2.6 x 2.5 x 3.2cm mass in the posterior LLL     6/20/2023:  PET scan:  35 x 21 mm lobulated pulmonary mass in the posterior left lower lobe with SUV max 11.6      FDG avid lymphadenopathy is present with index nodes outlined below:  -21 x 11 mm AP window node with SUV max 12.1 (image 103)  -21 x 12 mm posterior left hilar node with SUV max 13.7 (image 109)  -16 x 13 mm left hilar node with SUV max 14 (image 1:15)  -10 x 10 mm subcarinal node with SUV max 6.3 (image 111)     7/12/2023-Biopsy of LLL:  LEFT LOWER LOBE OF LUNG, CORE BIOPSIES:   - LUNG ADENOCARCINOMA WITH PLEOMORPHIC FEATURES.   PDL1/TPS: 95%  ALK/BRAF/EGFR/KRAS/RET/ROS1/MET NEGATIVE     7/21/2023:  MRI brain: no mets.    Carbo/Taxol/XRT:  8/8/2023-9/12/2023     Plan: Atezolizumab 1200mg every 3 weeks x 16    9/30/2023-CTA Chest:  1. Segmental right upper lobe pulmonary embolus.  2. Cavitating and spiculated posterior left lower lobe lung mass, decreased slightly in size compared to the prior chest CT exam.  3. Upper lobe predominant centrilobular and subpleural emphysema, with coarse mixed interstitial and alveolar infiltrate along the mediastinal border left upper lobe suggesting reactive or infectious pneumonitis, and with mild posterior bilateral upper lobe groundglass infiltrates.    10/4/2023  Admitted for 3 days with fever to 101, new Dx of PE and splenic infarcts. Echo normal. Started on Xarelto and abx.     9/3/2023-CT abd/p:  IMPRESSION: There are hypodensities within the spleen concerning for splenic infarcts. These can be associated with endocarditis. The patient also has known pulmonary emboli. Transesophageal echocardiogram may be warranted.    10/3/2023-  TTE normal    Atezolizumab  Cycle 1: 10/23/2023  Cycle 2: 11/13/2023-due    Chief Complaint:  No chief complaint  on file.  Stage IIII lung cancer, pulmonary embolism, splenic infarct follow up    History of Present Illness:   Aysha Moore is a 68 y.o. female who presents for follow up of lung cancer.     Patient had her first cycle of Atezolizumab and feels she did well with this.      She does continue to be profoundly fatigued since getting out of the hospital.  She is not sob at rest but is easily winded with minimal activity.   Also complained of fever over the weekend up to 101 but his has resolved.     She continues on Xarleto as of today, 11/7/2023 and doing ok with this.       Family and Social history reviewed and is unchanged from 7/27/2023      ROS:  Review of Systems   Constitutional:  Negative for fever.   Respiratory:  Negative for shortness of breath.    Cardiovascular:  Negative for chest pain and leg swelling.   Gastrointestinal:  Negative for abdominal pain and blood in stool.   Genitourinary:  Negative for hematuria.   Skin:  Negative for rash.          Current Outpatient Medications:     ammonium lactate 12 % Crea, aaa bid prn dry skin (Patient taking differently: Apply 1 g topically 2 (two) times daily as needed. aaa bid prn dry skin), Disp: 385 g, Rfl: 11    atorvastatin (LIPITOR) 80 MG tablet, Take 1 tablet (80 mg total) by mouth every evening., Disp: 90 tablet, Rfl: 1    azelastine (ASTELIN) 137 mcg (0.1 %) nasal spray, 1 SPRAY (137 MCG TOTAL) BY NASAL ROUTE 2 (TWO) TIMES DAILY AS NEEDED FOR RHINITIS (OR SINUSITIS)., Disp: 90 mL, Rfl: 1    busPIRone (BUSPAR) 5 MG Tab, TAKE 1 TABLET BY MOUTH TWICE A DAY, Disp: 180 tablet, Rfl: 1    calcium-vitamin D3 (OS-JOLIE 500 + D3) 500 mg-5 mcg (200 unit) per tablet, Take 1 tablet by mouth every morning., Disp: , Rfl:     citalopram (CELEXA) 20 MG tablet, TAKE 1 TABLET BY MOUTH EVERY DAY, Disp: 90 tablet, Rfl: 1    clobetasol 0.05% (TEMOVATE) 0.05 % Oint, Apply topically 2 (two) times daily. for 14 days (Patient taking differently: Apply 1 g topically 2 (two)  times daily.), Disp: 45 g, Rfl: 3    ezetimibe (ZETIA) 10 mg tablet, Take 1 tablet (10 mg total) by mouth once daily., Disp: 90 tablet, Rfl: 3    famotidine (PEPCID) 40 MG tablet, Take 1 tablet (40 mg total) by mouth every evening., Disp: 30 tablet, Rfl: 1    fluticasone propionate (FLONASE) 50 mcg/actuation nasal spray, 1 spray (50 mcg total) by Each Nostril route daily as needed for Rhinitis or Allergies., Disp: 9.9 mL, Rfl: 2    HYDROcodone-acetaminophen (NORCO) 5-325 mg per tablet, Take 1 tablet by mouth every 4 to 6 hours as needed for Pain., Disp: 60 tablet, Rfl: 0    levoFLOXacin (LEVAQUIN) 750 MG tablet, Take 1 tablet (750 mg total) by mouth once daily., Disp: 10 tablet, Rfl: 0    lisinopriL (PRINIVIL,ZESTRIL) 2.5 MG tablet, Take 1 tablet (2.5 mg total) by mouth every evening., Disp: 90 tablet, Rfl: 3    metoprolol succinate (TOPROL-XL) 50 MG 24 hr tablet, Take 1 tablet (50 mg total) by mouth once daily., Disp: 90 tablet, Rfl: 3    nitroGLYCERIN (NITROSTAT) 0.4 MG SL tablet, Place 1 tablet (0.4 mg total) under the tongue every 5 (five) minutes as needed for Chest pain., Disp: 30 tablet, Rfl: 0    omeprazole (PRILOSEC) 40 MG capsule, Take 1 capsule (40 mg total) by mouth once daily., Disp: 30 capsule, Rfl: 11    ondansetron (ZOFRAN) 8 MG tablet, Take 1 tablet (8 mg total) by mouth every 8 (eight) hours as needed. (Patient taking differently: Take 8 mg by mouth every 8 (eight) hours as needed for Nausea.), Disp: 30 tablet, Rfl: 5    promethazine (PHENERGAN) 25 MG tablet, Take 1 tablet (25 mg total) by mouth every 4 to 6 hours as needed. (Patient taking differently: Take 25 mg by mouth every 4 to 6 hours as needed for Nausea.), Disp: 30 tablet, Rfl: 5    rivaroxaban (XARELTO) 15 mg Tab, Take 1 tablet (15 mg total) by mouth daily with dinner or evening meal., Disp: 42 tablet, Rfl: 0    rivaroxaban (XARELTO) 20 mg Tab, Take 1 tablet (20 mg total) by mouth daily with dinner or evening meal. for 22 days, Disp: 30  "tablet, Rfl: 0    tiZANidine (ZANAFLEX) 4 MG tablet, TAKE 1 TABLET BY MOUTH EVERY 6 HOURS AS NEEDED FOR BACK PAIN, Disp: 360 tablet, Rfl: 0    traMADoL (ULTRAM) 50 mg tablet, Take 1 tablet (50 mg total) by mouth every 6 (six) hours as needed for Pain., Disp: 30 tablet, Rfl: 2    valACYclovir (VALTREX) 1000 MG tablet, TAKE 2 AT ONSET OF FEVER BLISTERS THEN REPEAT IN 12 HOURS (TOTAL 4 TABS PER EPISODE), Disp: 20 tablet, Rfl: 3    zinc 50 mg Tab, Take 1 tablet by mouth once daily., Disp: , Rfl:     ALPRAZolam (XANAX) 0.25 MG tablet, Take 1 tablet (0.25 mg total) by mouth 3 (three) times daily as needed for Anxiety. (Patient not taking: Reported on 10/1/2023), Disp: 30 tablet, Rfl: 1        Objective:       Physical Examination:     BP (!) 116/54   Pulse 83   Temp 98 °F (36.7 °C)   Resp 18   Ht 5' 3" (1.6 m)   Wt 58.5 kg (129 lb)   BMI 22.85 kg/m²     Physical Exam  Constitutional:       Appearance: Normal appearance.   HENT:      Head: Normocephalic and atraumatic.   Eyes:      General: No scleral icterus.     Conjunctiva/sclera: Conjunctivae normal.   Cardiovascular:      Rate and Rhythm: Normal rate.   Pulmonary:      Effort: Pulmonary effort is normal.   Abdominal:      General: Abdomen is flat.   Neurological:      General: No focal deficit present.      Mental Status: She is alert and oriented to person, place, and time.   Psychiatric:         Mood and Affect: Mood normal.         Behavior: Behavior normal.         Thought Content: Thought content normal.         Judgment: Judgment normal.         Labs:   No results found for this or any previous visit (from the past 336 hour(s)).    CMP  Sodium   Date Value Ref Range Status   10/16/2023 140 136 - 145 mmol/L Final     Potassium   Date Value Ref Range Status   10/16/2023 4.0 3.5 - 5.1 mmol/L Final     Chloride   Date Value Ref Range Status   10/16/2023 104 95 - 110 mmol/L Final     CO2   Date Value Ref Range Status   10/16/2023 26 23 - 29 mmol/L Final " "    Glucose   Date Value Ref Range Status   10/16/2023 168 (H) 70 - 110 mg/dL Final     BUN   Date Value Ref Range Status   10/16/2023 11 8 - 23 mg/dL Final     Creatinine   Date Value Ref Range Status   10/16/2023 0.6 0.5 - 1.4 mg/dL Final     Calcium   Date Value Ref Range Status   10/16/2023 9.6 8.7 - 10.5 mg/dL Final     Total Protein   Date Value Ref Range Status   10/16/2023 7.3 6.0 - 8.4 g/dL Final     Albumin   Date Value Ref Range Status   10/16/2023 4.0 3.5 - 5.2 g/dL Final     Total Bilirubin   Date Value Ref Range Status   10/16/2023 1.1 (H) 0.1 - 1.0 mg/dL Final     Comment:     For infants and newborns, interpretation of results should be based  on gestational age, weight and in agreement with clinical  observations.    Premature Infant recommended reference ranges:  Up to 24 hours.............<8.0 mg/dL  Up to 48 hours............<12.0 mg/dL  3-5 days..................<15.0 mg/dL  6-29 days.................<15.0 mg/dL       Alkaline Phosphatase   Date Value Ref Range Status   10/16/2023 69 55 - 135 U/L Final     AST   Date Value Ref Range Status   10/16/2023 20 10 - 40 U/L Final     ALT   Date Value Ref Range Status   10/16/2023 13 10 - 44 U/L Final     Anion Gap   Date Value Ref Range Status   10/16/2023 10 8 - 16 mmol/L Final     eGFR if    Date Value Ref Range Status   03/29/2022 >60.0 >60 mL/min/1.73 m^2 Final     eGFR if non    Date Value Ref Range Status   03/29/2022 >60.0 >60 mL/min/1.73 m^2 Final     Comment:     Calculation used to obtain the estimated glomerular filtration  rate (eGFR) is the CKD-EPI equation.        No results found for: "CEA"  No results found for: "PSA"        Assessment/Plan:     Problem List Items Addressed This Visit       LUNG CANCER-ADENOCARCINOMA OF THE LLL     She is now on atezo and doing ok with this.  No sign of AI disease.  Will continue therapy for now.  Discussed this today.          Relevant Orders    CT Chest Abdomen Pelvis " With IV Contrast (XPD)    Debility     I had a long discussion with her and her  today.  I don't know why she has such an odd constelation of symptoms with severe fatigue, FUO, splenic infarcts etc.  Will scan again now to see if any of this has changed and will have her back with me after.  She can continue the atezo for now but need to watch this close.      If all looks ok then will need PT for chemo debility.          Acute pulmonary embolism     Patient is doing ok on the xarelto.  Will continue this therapy moving forward.           Discussion:     Follow up in about 1 week (around 11/14/2023) for with NP and 3 weeks with me.      Electronically signed by Raad Bee

## 2023-11-07 NOTE — ASSESSMENT & PLAN NOTE
I had a long discussion with her and her  today.  I don't know why she has such an odd constelation of symptoms with severe fatigue, FUO, splenic infarcts etc.  Will scan again now to see if any of this has changed and will have her back with me after.  She can continue the atezo for now but need to watch this close.      If all looks ok then will need PT for chemo debility.

## 2023-11-09 DIAGNOSIS — R09.81 SINUS CONGESTION: ICD-10-CM

## 2023-11-10 ENCOUNTER — HOSPITAL ENCOUNTER (OUTPATIENT)
Dept: RADIOLOGY | Facility: HOSPITAL | Age: 68
Discharge: HOME | End: 2023-11-10
Attending: INTERNAL MEDICINE
Payer: MEDICARE

## 2023-11-10 DIAGNOSIS — C34.32 MALIGNANT NEOPLASM OF LOWER LOBE OF LEFT LUNG: ICD-10-CM

## 2023-11-10 PROCEDURE — 74177 CT ABD & PELVIS W/CONTRAST: CPT | Mod: TC

## 2023-11-10 PROCEDURE — 71260 CT THORAX DX C+: CPT | Mod: TC

## 2023-11-10 PROCEDURE — 25500020 PHARM REV CODE 255: Performed by: INTERNAL MEDICINE

## 2023-11-10 RX ORDER — EPINEPHRINE 0.3 MG/.3ML
0.3 INJECTION SUBCUTANEOUS ONCE AS NEEDED
Status: CANCELLED | OUTPATIENT
Start: 2023-11-13

## 2023-11-10 RX ORDER — DIPHENHYDRAMINE HYDROCHLORIDE 50 MG/ML
50 INJECTION INTRAMUSCULAR; INTRAVENOUS ONCE AS NEEDED
Status: CANCELLED | OUTPATIENT
Start: 2023-11-13

## 2023-11-10 RX ORDER — HEPARIN 100 UNIT/ML
500 SYRINGE INTRAVENOUS
Status: CANCELLED | OUTPATIENT
Start: 2023-11-13

## 2023-11-10 RX ORDER — SODIUM CHLORIDE 0.9 % (FLUSH) 0.9 %
10 SYRINGE (ML) INJECTION
Status: CANCELLED | OUTPATIENT
Start: 2023-11-13

## 2023-11-10 RX ADMIN — IOHEXOL 100 ML: 350 INJECTION, SOLUTION INTRAVENOUS at 09:11

## 2023-11-13 ENCOUNTER — INFUSION (OUTPATIENT)
Dept: INFUSION THERAPY | Facility: HOSPITAL | Age: 68
End: 2023-11-13
Attending: INTERNAL MEDICINE
Payer: MEDICARE

## 2023-11-13 VITALS
HEART RATE: 85 BPM | WEIGHT: 129.38 LBS | OXYGEN SATURATION: 96 % | SYSTOLIC BLOOD PRESSURE: 94 MMHG | BODY MASS INDEX: 22.93 KG/M2 | TEMPERATURE: 97 F | HEIGHT: 63 IN | RESPIRATION RATE: 15 BRPM | DIASTOLIC BLOOD PRESSURE: 53 MMHG

## 2023-11-13 DIAGNOSIS — C34.32 MALIGNANT NEOPLASM OF LOWER LOBE OF LEFT LUNG: Primary | ICD-10-CM

## 2023-11-13 DIAGNOSIS — R53.83 FATIGUE, UNSPECIFIED TYPE: ICD-10-CM

## 2023-11-13 DIAGNOSIS — C34.90 METASTATIC LUNG CANCER (METASTASIS FROM LUNG TO OTHER SITE), UNSPECIFIED LATERALITY: ICD-10-CM

## 2023-11-13 LAB
T4 FREE SERPL-MCNC: 0.96 NG/DL (ref 0.71–1.51)
TSH SERPL DL<=0.005 MIU/L-ACNC: 0.3 UIU/ML (ref 0.34–5.6)

## 2023-11-13 PROCEDURE — A4216 STERILE WATER/SALINE, 10 ML: HCPCS | Performed by: NURSE PRACTITIONER

## 2023-11-13 PROCEDURE — 84443 ASSAY THYROID STIM HORMONE: CPT | Performed by: NURSE PRACTITIONER

## 2023-11-13 PROCEDURE — 25000003 PHARM REV CODE 250: Performed by: NURSE PRACTITIONER

## 2023-11-13 PROCEDURE — 96413 CHEMO IV INFUSION 1 HR: CPT

## 2023-11-13 PROCEDURE — 84439 ASSAY OF FREE THYROXINE: CPT | Performed by: NURSE PRACTITIONER

## 2023-11-13 PROCEDURE — 63600175 PHARM REV CODE 636 W HCPCS: Performed by: NURSE PRACTITIONER

## 2023-11-13 RX ORDER — SODIUM CHLORIDE 0.9 % (FLUSH) 0.9 %
10 SYRINGE (ML) INJECTION
Status: DISCONTINUED | OUTPATIENT
Start: 2023-11-13 | End: 2023-11-13 | Stop reason: HOSPADM

## 2023-11-13 RX ORDER — DIPHENHYDRAMINE HYDROCHLORIDE 50 MG/ML
50 INJECTION INTRAMUSCULAR; INTRAVENOUS ONCE AS NEEDED
Status: DISCONTINUED | OUTPATIENT
Start: 2023-11-13 | End: 2023-11-13 | Stop reason: HOSPADM

## 2023-11-13 RX ORDER — HEPARIN 100 UNIT/ML
500 SYRINGE INTRAVENOUS
Status: DISCONTINUED | OUTPATIENT
Start: 2023-11-13 | End: 2023-11-13 | Stop reason: HOSPADM

## 2023-11-13 RX ORDER — AZELASTINE 1 MG/ML
SPRAY, METERED NASAL
Qty: 90 ML | Refills: 0 | Status: SHIPPED | OUTPATIENT
Start: 2023-11-13 | End: 2024-02-06

## 2023-11-13 RX ORDER — EPINEPHRINE 0.3 MG/.3ML
0.3 INJECTION SUBCUTANEOUS ONCE AS NEEDED
Status: DISCONTINUED | OUTPATIENT
Start: 2023-11-13 | End: 2023-11-13 | Stop reason: HOSPADM

## 2023-11-13 RX ADMIN — ATEZOLIZUMAB 1200 MG: 1200 INJECTION, SOLUTION INTRAVENOUS at 12:11

## 2023-11-13 RX ADMIN — SODIUM CHLORIDE: 0.9 INJECTION, SOLUTION INTRAVENOUS at 12:11

## 2023-11-13 RX ADMIN — HEPARIN 500 UNITS: 100 SYRINGE at 01:11

## 2023-11-13 RX ADMIN — SODIUM CHLORIDE, PRESERVATIVE FREE 10 ML: 5 INJECTION INTRAVENOUS at 01:11

## 2023-11-13 NOTE — PLAN OF CARE
Problem: Fall Injury Risk  Goal: Absence of Fall and Fall-Related Injury  Outcome: Ongoing, Progressing  Intervention: Identify and Manage Contributors  Flowsheets (Taken 11/13/2023 1125)  Self-Care Promotion:   independence encouraged   safe use of adaptive equipment encouraged   BADL personal objects within reach  Medication Review/Management: medications reviewed  Intervention: Promote Injury-Free Environment  Flowsheets (Taken 11/13/2023 1125)  Safety Promotion/Fall Prevention: medications reviewed

## 2023-11-16 ENCOUNTER — TELEPHONE (OUTPATIENT)
Dept: HEMATOLOGY/ONCOLOGY | Facility: CLINIC | Age: 68
End: 2023-11-16

## 2023-11-16 ENCOUNTER — LAB VISIT (OUTPATIENT)
Dept: LAB | Facility: HOSPITAL | Age: 68
End: 2023-11-16
Attending: NURSE PRACTITIONER
Payer: MEDICARE

## 2023-11-16 DIAGNOSIS — C34.32 MALIGNANT NEOPLASM OF LOWER LOBE OF LEFT LUNG: ICD-10-CM

## 2023-11-16 DIAGNOSIS — R53.83 FATIGUE, UNSPECIFIED TYPE: ICD-10-CM

## 2023-11-16 DIAGNOSIS — J18.9 PNEUMONIA DUE TO INFECTIOUS ORGANISM, UNSPECIFIED LATERALITY, UNSPECIFIED PART OF LUNG: Primary | ICD-10-CM

## 2023-11-16 LAB
ALBUMIN SERPL BCP-MCNC: 4 G/DL (ref 3.5–5.2)
ALP SERPL-CCNC: 94 U/L (ref 55–135)
ALT SERPL W/O P-5'-P-CCNC: 12 U/L (ref 10–44)
ANION GAP SERPL CALC-SCNC: 10 MMOL/L (ref 8–16)
AST SERPL-CCNC: 15 U/L (ref 10–40)
BASOPHILS # BLD AUTO: 0.03 K/UL (ref 0–0.2)
BASOPHILS NFR BLD: 0.7 % (ref 0–1.9)
BILIRUB SERPL-MCNC: 1.5 MG/DL (ref 0.1–1)
BUN SERPL-MCNC: 7 MG/DL (ref 8–23)
CALCIUM SERPL-MCNC: 9.9 MG/DL (ref 8.7–10.5)
CHLORIDE SERPL-SCNC: 103 MMOL/L (ref 95–110)
CO2 SERPL-SCNC: 25 MMOL/L (ref 23–29)
CREAT SERPL-MCNC: 0.6 MG/DL (ref 0.5–1.4)
DIFFERENTIAL METHOD: ABNORMAL
EOSINOPHIL # BLD AUTO: 0.4 K/UL (ref 0–0.5)
EOSINOPHIL NFR BLD: 8.6 % (ref 0–8)
ERYTHROCYTE [DISTWIDTH] IN BLOOD BY AUTOMATED COUNT: 13.5 % (ref 11.5–14.5)
EST. GFR  (NO RACE VARIABLE): >60 ML/MIN/1.73 M^2
GLUCOSE SERPL-MCNC: 112 MG/DL (ref 70–110)
HCT VFR BLD AUTO: 35.6 % (ref 37–48.5)
HGB BLD-MCNC: 11.7 G/DL (ref 12–16)
IMM GRANULOCYTES # BLD AUTO: 0.01 K/UL (ref 0–0.04)
IMM GRANULOCYTES NFR BLD AUTO: 0.2 % (ref 0–0.5)
LYMPHOCYTES # BLD AUTO: 1.2 K/UL (ref 1–4.8)
LYMPHOCYTES NFR BLD: 28.5 % (ref 18–48)
MCH RBC QN AUTO: 31.2 PG (ref 27–31)
MCHC RBC AUTO-ENTMCNC: 32.9 G/DL (ref 32–36)
MCV RBC AUTO: 95 FL (ref 82–98)
MONOCYTES # BLD AUTO: 0.7 K/UL (ref 0.3–1)
MONOCYTES NFR BLD: 16.4 % (ref 4–15)
NEUTROPHILS # BLD AUTO: 2 K/UL (ref 1.8–7.7)
NEUTROPHILS NFR BLD: 45.6 % (ref 38–73)
NRBC BLD-RTO: 0 /100 WBC
PLATELET # BLD AUTO: 332 K/UL (ref 150–450)
PMV BLD AUTO: 8.5 FL (ref 9.2–12.9)
POTASSIUM SERPL-SCNC: 3.7 MMOL/L (ref 3.5–5.1)
PROT SERPL-MCNC: 6.9 G/DL (ref 6–8.4)
RBC # BLD AUTO: 3.75 M/UL (ref 4–5.4)
SODIUM SERPL-SCNC: 138 MMOL/L (ref 136–145)
TSH SERPL DL<=0.005 MIU/L-ACNC: 0.96 UIU/ML (ref 0.34–5.6)
WBC # BLD AUTO: 4.28 K/UL (ref 3.9–12.7)

## 2023-11-16 PROCEDURE — 84443 ASSAY THYROID STIM HORMONE: CPT | Performed by: NURSE PRACTITIONER

## 2023-11-16 PROCEDURE — 85025 COMPLETE CBC W/AUTO DIFF WBC: CPT | Performed by: NURSE PRACTITIONER

## 2023-11-16 PROCEDURE — 80053 COMPREHEN METABOLIC PANEL: CPT | Performed by: NURSE PRACTITIONER

## 2023-11-16 PROCEDURE — 36415 COLL VENOUS BLD VENIPUNCTURE: CPT | Performed by: NURSE PRACTITIONER

## 2023-11-16 RX ORDER — LEVOFLOXACIN 750 MG/1
750 TABLET ORAL DAILY
Qty: 10 TABLET | Refills: 0 | Status: SHIPPED | OUTPATIENT
Start: 2023-11-16 | End: 2024-02-07

## 2023-11-16 NOTE — TELEPHONE ENCOUNTER
Left patient a message regarding the results of the CT. Nirali sent to [facility].

## 2023-11-16 NOTE — TELEPHONE ENCOUNTER
----- Message from Raad Hankins MD sent at 11/15/2023 12:32 PM CST -----  Call patient,  see how she is feeling.  CT scan not showing any clear new problems.  Maybe pneumonia but doubtful on this.  Would give 10 days of levaquin just to be sure.

## 2023-11-17 ENCOUNTER — TELEPHONE (OUTPATIENT)
Dept: HEMATOLOGY/ONCOLOGY | Facility: CLINIC | Age: 68
End: 2023-11-17

## 2023-11-17 DIAGNOSIS — J18.9 PNEUMONIA DUE TO INFECTIOUS ORGANISM, UNSPECIFIED LATERALITY, UNSPECIFIED PART OF LUNG: Primary | ICD-10-CM

## 2023-11-17 RX ORDER — AZITHROMYCIN 250 MG/1
TABLET, FILM COATED ORAL
Qty: 6 TABLET | Refills: 0 | Status: SHIPPED | OUTPATIENT
Start: 2023-11-17 | End: 2023-11-22

## 2023-11-17 NOTE — TELEPHONE ENCOUNTER
Spoke to pt and notified her that we will send in a Zpak to her pharmacy instead of the Levaquin. I advised her to go the ER if she starts feeling SOB or if her O2 levels drop. Pt verbalized understanding.

## 2023-11-17 NOTE — TELEPHONE ENCOUNTER
----- Message from IRAIS Cohen sent at 11/17/2023 12:15 PM CST -----  Can you give her  a Z pack and add Levaquin to her allergies.    Meka  ----- Message -----  From: Kristie Jimenez RN  Sent: 11/17/2023  10:38 AM CST  To: IRAIS Cohen      ----- Message -----  From: Clara Ferrer  Sent: 11/17/2023  10:36 AM CST  To: Андрей Ackerman Staff    The patient called to let you know she cannot take Levaquin. She said she had a reaction to it several months ago when it was prescribed for her. She said she a lot of muscle pain and could not walk. # [phone]

## 2023-11-22 ENCOUNTER — LAB VISIT (OUTPATIENT)
Dept: LAB | Facility: HOSPITAL | Age: 68
End: 2023-11-22
Attending: NURSE PRACTITIONER
Payer: MEDICARE

## 2023-11-22 DIAGNOSIS — R53.83 FATIGUE, UNSPECIFIED TYPE: ICD-10-CM

## 2023-11-22 DIAGNOSIS — C34.32 MALIGNANT NEOPLASM OF LOWER LOBE OF LEFT LUNG: ICD-10-CM

## 2023-11-22 LAB
ALBUMIN SERPL BCP-MCNC: 4 G/DL (ref 3.5–5.2)
ALP SERPL-CCNC: 79 U/L (ref 55–135)
ALT SERPL W/O P-5'-P-CCNC: 10 U/L (ref 10–44)
ANION GAP SERPL CALC-SCNC: 8 MMOL/L (ref 8–16)
AST SERPL-CCNC: 13 U/L (ref 10–40)
BASOPHILS # BLD AUTO: 0.05 K/UL (ref 0–0.2)
BASOPHILS NFR BLD: 1 % (ref 0–1.9)
BILIRUB SERPL-MCNC: 1.3 MG/DL (ref 0.1–1)
BUN SERPL-MCNC: 9 MG/DL (ref 8–23)
CALCIUM SERPL-MCNC: 9.6 MG/DL (ref 8.7–10.5)
CHLORIDE SERPL-SCNC: 105 MMOL/L (ref 95–110)
CO2 SERPL-SCNC: 26 MMOL/L (ref 23–29)
CREAT SERPL-MCNC: 0.6 MG/DL (ref 0.5–1.4)
DIFFERENTIAL METHOD: ABNORMAL
EOSINOPHIL # BLD AUTO: 0.5 K/UL (ref 0–0.5)
EOSINOPHIL NFR BLD: 10.1 % (ref 0–8)
ERYTHROCYTE [DISTWIDTH] IN BLOOD BY AUTOMATED COUNT: 13.2 % (ref 11.5–14.5)
EST. GFR  (NO RACE VARIABLE): >60 ML/MIN/1.73 M^2
GLUCOSE SERPL-MCNC: 106 MG/DL (ref 70–110)
HCT VFR BLD AUTO: 38.2 % (ref 37–48.5)
HGB BLD-MCNC: 12.3 G/DL (ref 12–16)
IMM GRANULOCYTES # BLD AUTO: 0.01 K/UL (ref 0–0.04)
IMM GRANULOCYTES NFR BLD AUTO: 0.2 % (ref 0–0.5)
LYMPHOCYTES # BLD AUTO: 1.2 K/UL (ref 1–4.8)
LYMPHOCYTES NFR BLD: 23.1 % (ref 18–48)
MCH RBC QN AUTO: 30.9 PG (ref 27–31)
MCHC RBC AUTO-ENTMCNC: 32.2 G/DL (ref 32–36)
MCV RBC AUTO: 96 FL (ref 82–98)
MONOCYTES # BLD AUTO: 0.7 K/UL (ref 0.3–1)
MONOCYTES NFR BLD: 13.4 % (ref 4–15)
NEUTROPHILS # BLD AUTO: 2.7 K/UL (ref 1.8–7.7)
NEUTROPHILS NFR BLD: 52.2 % (ref 38–73)
NRBC BLD-RTO: 0 /100 WBC
PLATELET # BLD AUTO: 306 K/UL (ref 150–450)
PMV BLD AUTO: 8.6 FL (ref 9.2–12.9)
POTASSIUM SERPL-SCNC: 3.9 MMOL/L (ref 3.5–5.1)
PROT SERPL-MCNC: 6.9 G/DL (ref 6–8.4)
RBC # BLD AUTO: 3.98 M/UL (ref 4–5.4)
SODIUM SERPL-SCNC: 139 MMOL/L (ref 136–145)
WBC # BLD AUTO: 5.15 K/UL (ref 3.9–12.7)

## 2023-11-22 PROCEDURE — 80053 COMPREHEN METABOLIC PANEL: CPT | Performed by: NURSE PRACTITIONER

## 2023-11-22 PROCEDURE — 85025 COMPLETE CBC W/AUTO DIFF WBC: CPT | Performed by: NURSE PRACTITIONER

## 2023-11-22 PROCEDURE — 36415 COLL VENOUS BLD VENIPUNCTURE: CPT | Performed by: NURSE PRACTITIONER

## 2023-11-27 ENCOUNTER — TELEPHONE (OUTPATIENT)
Dept: FAMILY MEDICINE | Facility: CLINIC | Age: 68
End: 2023-11-27
Payer: MEDICARE

## 2023-11-27 NOTE — TELEPHONE ENCOUNTER
Spoke with patient   She said she finished her Chemo now she is doing immunotherapy  She is over due for annual and lasting labs.   Appointment is scheduled on 3/12/2024     1 st available with Dr Daniels   She has been added to the cancellation list    She is requesting fasting labs   Please advise         ----- Message from Fabián Castellanos sent at 11/27/2023  1:53 PM CST -----  Regarding: FW: advice  Contact: ROMINA STANLEY [[id number]]    ----- Message -----  From: Fabián Castellanos  Sent: 11/27/2023   1:33 PM CST  To: Frances Colon Staff  Subject: advice                                           Type: Needs Medical Advice  Who Called:  Romina    Symptoms (please be specific):  na    How long has patient had these symptoms:  esperanza    Pharmacy name and phone #:  na    Best Call Back Number: [phone] (home)     Additional Information: asking about annual and labs being late because of chemo [place]. Please call to advise.

## 2023-11-27 NOTE — TELEPHONE ENCOUNTER
The only labs that would be due that she has not had recently were lipid panel and an A1c.  She has had several nearly complete sets of labs just in November.  They may not have been fasting, the glucose levels were in the prediabetic range.  Her last lipid panel in 2022 was very good and would not even need to be repeated this year.

## 2023-11-28 ENCOUNTER — OFFICE VISIT (OUTPATIENT)
Dept: PULMONOLOGY | Facility: CLINIC | Age: 68
End: 2023-11-28
Payer: MEDICARE

## 2023-11-28 VITALS — WEIGHT: 130.38 LBS | BODY MASS INDEX: 23.1 KG/M2 | DIASTOLIC BLOOD PRESSURE: 80 MMHG | SYSTOLIC BLOOD PRESSURE: 102 MMHG

## 2023-11-28 DIAGNOSIS — J43.9 PULMONARY EMPHYSEMA, UNSPECIFIED EMPHYSEMA TYPE: Primary | ICD-10-CM

## 2023-11-28 DIAGNOSIS — C34.32 MALIGNANT NEOPLASM OF LOWER LOBE OF LEFT LUNG: ICD-10-CM

## 2023-11-28 DIAGNOSIS — Z87.891 PERSONAL HISTORY OF TOBACCO USE, PRESENTING HAZARDS TO HEALTH: ICD-10-CM

## 2023-11-28 DIAGNOSIS — R91.8 ABNORMAL CT SCAN OF LUNG: ICD-10-CM

## 2023-11-28 PROCEDURE — 99214 OFFICE O/P EST MOD 30 MIN: CPT | Mod: S$GLB,,, | Performed by: INTERNAL MEDICINE

## 2023-11-28 PROCEDURE — 99214 PR OFFICE/OUTPT VISIT, EST, LEVL IV, 30-39 MIN: ICD-10-PCS | Mod: S$GLB,,, | Performed by: INTERNAL MEDICINE

## 2023-11-28 RX ORDER — AMOXICILLIN AND CLAVULANATE POTASSIUM 875; 125 MG/1; MG/1
1 TABLET, FILM COATED ORAL 2 TIMES DAILY
Qty: 14 TABLET | Refills: 0 | Status: SHIPPED | OUTPATIENT
Start: 2023-11-28 | End: 2023-12-12

## 2023-11-28 RX ORDER — ALBUTEROL SULFATE 90 UG/1
2 AEROSOL, METERED RESPIRATORY (INHALATION) EVERY 6 HOURS PRN
Qty: 18 G | Refills: 5 | Status: SHIPPED | OUTPATIENT
Start: 2023-11-28

## 2023-11-28 RX ORDER — PREDNISONE 10 MG/1
TABLET ORAL
Qty: 30 TABLET | Refills: 0 | Status: SHIPPED | OUTPATIENT
Start: 2023-11-28 | End: 2024-02-07

## 2023-11-28 RX ORDER — UMECLIDINIUM BROMIDE AND VILANTEROL TRIFENATATE 62.5; 25 UG/1; UG/1
1 POWDER RESPIRATORY (INHALATION) DAILY
Qty: 1 EACH | Refills: 5 | Status: SHIPPED | OUTPATIENT
Start: 2023-11-28

## 2023-11-28 NOTE — PROGRESS NOTES
PROGRESS NOTE    Subjective:       Patient ID: Aysha Moore is a 68 y.o. female.    6/2/2023:  Screening CT chest:  2.6 x 2.5 x 3.2cm mass in the posterior LLL     6/20/2023:  PET scan:  35 x 21 mm lobulated pulmonary mass in the posterior left lower lobe with SUV max 11.6      FDG avid lymphadenopathy is present with index nodes outlined below:  -21 x 11 mm AP window node with SUV max 12.1 (image 103)  -21 x 12 mm posterior left hilar node with SUV max 13.7 (image 109)  -16 x 13 mm left hilar node with SUV max 14 (image 1:15)  -10 x 10 mm subcarinal node with SUV max 6.3 (image 111)     7/12/2023-Biopsy of LLL:  LEFT LOWER LOBE OF LUNG, CORE BIOPSIES:   - LUNG ADENOCARCINOMA WITH PLEOMORPHIC FEATURES.   PDL1/TPS: 95%  ALK/BRAF/EGFR/KRAS/RET/ROS1/MET NEGATIVE     7/21/2023:  MRI brain: no mets.    Carbo/Taxol/XRT:  8/8/2023-9/12/2023     Plan: Atezolizumab 1200mg every 3 weeks x 16    9/30/2023-CTA Chest:  1. Segmental right upper lobe pulmonary embolus.  2. Cavitating and spiculated posterior left lower lobe lung mass, decreased slightly in size compared to the prior chest CT exam.  3. Upper lobe predominant centrilobular and subpleural emphysema, with coarse mixed interstitial and alveolar infiltrate along the mediastinal border left upper lobe suggesting reactive or infectious pneumonitis, and with mild posterior bilateral upper lobe groundglass infiltrates.    10/4/2023  Admitted for 3 days with fever to 101, new Dx of PE and splenic infarcts. Echo normal. Started on Xarelto and abx.     9/3/2023-CT abd/p:  IMPRESSION: There are hypodensities within the spleen concerning for splenic infarcts. These can be associated with endocarditis. The patient also has known pulmonary emboli. Transesophageal echocardiogram may be warranted.    10/3/2023-  TTE normal    Atezolizumab  Cycle 1: 10/23/2023  Cycle 2: 11/13/2023  Cycle 3:  12/4/2023- Due    Chief  Complaint:  Stage IIII lung cancer, pulmonary embolism, splenic infarct follow up    History of Present Illness:   Aysha Moore is a 68 y.o. female who presents for follow up of lung cancer.     Patient had her second cycle of Atezolizumab and feels she did well with this.      She is feeling better today with her fatigue. She does continue to have SOB and saw her pulm yesterday with Dr. Abad and he gave her Amoxicillin and new inhalers.      She continues on Xarleto as of today, 11/29/2023 and doing ok with this.       Family and Social history reviewed and is unchanged from 7/27/2023      ROS:  Review of Systems   Constitutional:  Positive for fatigue. Negative for fever.   Respiratory:  Positive for cough and shortness of breath.    Cardiovascular:  Negative for chest pain and leg swelling.   Gastrointestinal:  Negative for abdominal pain and blood in stool.   Genitourinary:  Negative for hematuria.   Skin:  Negative for rash.          Current Outpatient Medications:     albuterol (PROVENTIL/VENTOLIN HFA) 90 mcg/actuation inhaler, Inhale 2 puffs into the lungs every 6 (six) hours as needed. Rescue, Disp: 18 g, Rfl: 5    ALPRAZolam (XANAX) 0.25 MG tablet, Take 1 tablet (0.25 mg total) by mouth 3 (three) times daily as needed for Anxiety., Disp: 30 tablet, Rfl: 1    ammonium lactate 12 % Crea, aaa bid prn dry skin (Patient taking differently: Apply 1 g topically 2 (two) times daily as needed. aaa bid prn dry skin), Disp: 385 g, Rfl: 11    amoxicillin-clavulanate 875-125mg (AUGMENTIN) 875-125 mg per tablet, Take 1 tablet by mouth 2 (two) times daily., Disp: 14 tablet, Rfl: 0    atorvastatin (LIPITOR) 80 MG tablet, Take 1 tablet (80 mg total) by mouth every evening., Disp: 90 tablet, Rfl: 1    azelastine (ASTELIN) 137 mcg (0.1 %) nasal spray, USE 1 SPRAY IN EACH NOSTRIL 2 TIMES DAILY AS NEEDED FOR RHINITIS OR SINUSITIS, Disp: 90 mL, Rfl: 0    busPIRone (BUSPAR) 5 MG Tab, TAKE 1 TABLET BY MOUTH TWICE A DAY,  Disp: 180 tablet, Rfl: 1    calcium-vitamin D3 (OS-JOLIE 500 + D3) 500 mg-5 mcg (200 unit) per tablet, Take 1 tablet by mouth every morning., Disp: , Rfl:     citalopram (CELEXA) 20 MG tablet, TAKE 1 TABLET BY MOUTH EVERY DAY, Disp: 90 tablet, Rfl: 1    clobetasol 0.05% (TEMOVATE) 0.05 % Oint, Apply topically 2 (two) times daily. for 14 days (Patient taking differently: Apply 1 g topically 2 (two) times daily.), Disp: 45 g, Rfl: 3    ezetimibe (ZETIA) 10 mg tablet, Take 1 tablet (10 mg total) by mouth once daily., Disp: 90 tablet, Rfl: 3    famotidine (PEPCID) 40 MG tablet, TAKE 1 TABLET BY MOUTH EVERY DAY IN THE EVENING, Disp: 90 tablet, Rfl: 1    fluticasone propionate (FLONASE) 50 mcg/actuation nasal spray, 1 spray (50 mcg total) by Each Nostril route daily as needed for Rhinitis or Allergies., Disp: 9.9 mL, Rfl: 2    HYDROcodone-acetaminophen (NORCO) 5-325 mg per tablet, Take 1 tablet by mouth every 4 to 6 hours as needed for Pain., Disp: 60 tablet, Rfl: 0    levoFLOXacin (LEVAQUIN) 750 MG tablet, Take 1 tablet (750 mg total) by mouth once daily., Disp: 10 tablet, Rfl: 0    lisinopriL (PRINIVIL,ZESTRIL) 2.5 MG tablet, Take 1 tablet (2.5 mg total) by mouth every evening., Disp: 90 tablet, Rfl: 3    metoprolol succinate (TOPROL-XL) 50 MG 24 hr tablet, Take 1 tablet (50 mg total) by mouth once daily., Disp: 90 tablet, Rfl: 3    nitroGLYCERIN (NITROSTAT) 0.4 MG SL tablet, Place 1 tablet (0.4 mg total) under the tongue every 5 (five) minutes as needed for Chest pain., Disp: 30 tablet, Rfl: 0    omeprazole (PRILOSEC) 40 MG capsule, Take 1 capsule (40 mg total) by mouth once daily., Disp: 30 capsule, Rfl: 11    ondansetron (ZOFRAN) 8 MG tablet, Take 1 tablet (8 mg total) by mouth every 8 (eight) hours as needed. (Patient taking differently: Take 8 mg by mouth every 8 (eight) hours as needed for Nausea.), Disp: 30 tablet, Rfl: 5    predniSONE (DELTASONE) 10 MG tablet, Take 3 a day x 5 days then 2 a day x 5 days then 1 a  day x 5 days, Disp: 30 tablet, Rfl: 0    promethazine (PHENERGAN) 25 MG tablet, Take 1 tablet (25 mg total) by mouth every 4 to 6 hours as needed. (Patient taking differently: Take 25 mg by mouth every 4 to 6 hours as needed for Nausea.), Disp: 30 tablet, Rfl: 5    rivaroxaban (XARELTO) 15 mg Tab, Take 1 tablet (15 mg total) by mouth daily with dinner or evening meal., Disp: 42 tablet, Rfl: 0    tiZANidine (ZANAFLEX) 4 MG tablet, TAKE 1 TABLET BY MOUTH EVERY 6 HOURS AS NEEDED FOR BACK PAIN, Disp: 360 tablet, Rfl: 0    traMADoL (ULTRAM) 50 mg tablet, Take 1 tablet (50 mg total) by mouth every 6 (six) hours as needed for Pain., Disp: 30 tablet, Rfl: 2    umeclidinium-vilanteroL (ANORO ELLIPTA) 62.5-25 mcg/actuation DsDv, Inhale 1 puff into the lungs once daily. Controller, Disp: 1 each, Rfl: 5    valACYclovir (VALTREX) 1000 MG tablet, TAKE 2 AT ONSET OF FEVER BLISTERS THEN REPEAT IN 12 HOURS (TOTAL 4 TABS PER EPISODE), Disp: 20 tablet, Rfl: 3    zinc 50 mg Tab, Take 1 tablet by mouth once daily., Disp: , Rfl:         Objective:       Physical Examination:     BP (!) 100/53   Pulse 92   Temp 97.3 °F (36.3 °C)   Resp 16   Wt 59.5 kg (131 lb 3.2 oz)   BMI 23.24 kg/m²     Physical Exam  Constitutional:       Appearance: Normal appearance.   HENT:      Head: Normocephalic and atraumatic.      Nose: Nose normal.      Mouth/Throat:      Mouth: Mucous membranes are moist.   Eyes:      General: No scleral icterus.     Conjunctiva/sclera: Conjunctivae normal.   Cardiovascular:      Rate and Rhythm: Normal rate.   Pulmonary:      Effort: Pulmonary effort is normal.   Abdominal:      General: Abdomen is flat.   Skin:     General: Skin is warm and dry.   Neurological:      General: No focal deficit present.      Mental Status: She is alert and oriented to person, place, and time.   Psychiatric:         Mood and Affect: Mood normal.         Behavior: Behavior normal.         Thought Content: Thought content normal.          Judgment: Judgment normal.         Labs:   Recent Results (from the past 336 hour(s))   CBC Auto Differential    Collection Time: 11/29/23  1:39 PM   Result Value Ref Range    WBC 5.23 3.90 - 12.70 K/uL    Hemoglobin 12.7 12.0 - 16.0 g/dL    Hematocrit 39.2 37.0 - 48.5 %    Platelets 282 150 - 450 K/uL   CBC Auto Differential    Collection Time: 11/22/23 11:44 AM   Result Value Ref Range    WBC 5.15 3.90 - 12.70 K/uL    Hemoglobin 12.3 12.0 - 16.0 g/dL    Hematocrit 38.2 37.0 - 48.5 %    Platelets 306 150 - 450 K/uL   CBC Auto Differential    Collection Time: 11/16/23  9:25 AM   Result Value Ref Range    WBC 4.28 3.90 - 12.70 K/uL    Hemoglobin 11.7 (L) 12.0 - 16.0 g/dL    Hematocrit 35.6 (L) 37.0 - 48.5 %    Platelets 332 150 - 450 K/uL       CMP  Sodium   Date Value Ref Range Status   11/29/2023 140 136 - 145 mmol/L Final     Potassium   Date Value Ref Range Status   11/29/2023 4.1 3.5 - 5.1 mmol/L Final     Chloride   Date Value Ref Range Status   11/29/2023 106 95 - 110 mmol/L Final     CO2   Date Value Ref Range Status   11/29/2023 27 23 - 29 mmol/L Final     Glucose   Date Value Ref Range Status   11/29/2023 111 (H) 70 - 110 mg/dL Final     BUN   Date Value Ref Range Status   11/29/2023 10 8 - 23 mg/dL Final     Creatinine   Date Value Ref Range Status   11/29/2023 0.6 0.5 - 1.4 mg/dL Final     Calcium   Date Value Ref Range Status   11/29/2023 9.3 8.7 - 10.5 mg/dL Final     Total Protein   Date Value Ref Range Status   11/29/2023 7.0 6.0 - 8.4 g/dL Final     Albumin   Date Value Ref Range Status   11/29/2023 4.0 3.5 - 5.2 g/dL Final     Total Bilirubin   Date Value Ref Range Status   11/29/2023 1.0 0.1 - 1.0 mg/dL Final     Comment:     For infants and newborns, interpretation of results should be based  on gestational age, weight and in agreement with clinical  observations.    Premature Infant recommended reference ranges:  Up to 24 hours.............<8.0 mg/dL  Up to 48 hours............<12.0 mg/dL  3-5  "days..................<15.0 mg/dL  6-29 days.................<15.0 mg/dL       Alkaline Phosphatase   Date Value Ref Range Status   11/29/2023 78 55 - 135 U/L Final     AST   Date Value Ref Range Status   11/29/2023 15 10 - 40 U/L Final     ALT   Date Value Ref Range Status   11/29/2023 14 10 - 44 U/L Final     Anion Gap   Date Value Ref Range Status   11/29/2023 7 (L) 8 - 16 mmol/L Final     eGFR if    Date Value Ref Range Status   03/29/2022 >60.0 >60 mL/min/1.73 m^2 Final     eGFR if non    Date Value Ref Range Status   03/29/2022 >60.0 >60 mL/min/1.73 m^2 Final     Comment:     Calculation used to obtain the estimated glomerular filtration  rate (eGFR) is the CKD-EPI equation.        No results found for: "CEA"    Assessment/Plan:     Problem List Items Addressed This Visit          Psychiatric    Anxiety    Relevant Medications    ALPRAZolam (XANAX) 0.25 MG tablet       Hematology    Acute pulmonary embolism - Primary    Relevant Orders    CTA Chest Non-Coronary (PE Studies)       Oncology    LUNG CANCER-ADENOCARCINOMA OF THE LLL    Metastatic lung cancer (metastasis from lung to other site), unspecified laterality     Lung Cancer- Continue Tecentriq.  2. PE- Continue Xarelto  3. SOB- Start Amoxicillin and repeat CTA in 2-3 weeks  4. Anxiety- Xanax PRN    Discussion:     Follow up in about 3 weeks (around 12/20/2023) for with Dr. Hankins and 6 weeks with me.      Electronically signed by Meka Chiang, MSN, APRN, AGNP-C [facility]      "

## 2023-11-28 NOTE — PROGRESS NOTES
Office Visit    Patient Name: Aysha Moore  MRN: [id number]  : 1955      Reason for visit: COPD    HPI:     2019 - Here for evaluation of COPD.  Has been hospitalized twice for respiratory issues.  Here more recently has noted some symptoms but has been out of BREO for a week or so.  Has a h/o smoking - has quit cigarettes but is using a vape.  Has smoked about 1 PPD for about 40 years.    2019 - Here for follow up, no new issues reported.  Still vaping (d/we pt).  Reviewed PFT with pt.  Pt is currently stable on present medications with no recent increases in their symptoms or use of rescue medications.  I have reviewed the medical regimen and re-educated the pt on the role of rescue and controlling medications.  All questions answered.  Inhaler technique seems adequate.    2021 - Here for follow up, Pt is currently stable on present medications with no recent increases in their symptoms or use of rescue medications.  Since our last visit there have been no hospitalizations or ER visits for their respiratory issues and there does not seem to be anything to suggest unrecognized exacerbations.  I have reviewed the medical regimen and re-educated the pt on the role of rescue and controlling medications.  Inhaler technique and understanding seems adequate.  The patient reports no issues with any of there medications for their COPD.  Refills will be taken care of as needed.  All questions answered.  She stopped singulair - she felt that it made her short tempered and she is better since stopping it.  She had a MI recently.  Continues with some sinus infection - seen at Urgent Care given doxycycline but developed itching.  Still with symptoms.  Patient has no known corona virus exposures and has been practicing social distancing.  We have discussed the virus and precautions and all questions have been answered.    8/3/2021 - Here for follow up, Pt is currently stable on present medications with no  recent increases in their symptoms or use of rescue medications.  Since our last visit there have been no hospitalizations or ER visits for their respiratory issues and there does not seem to be anything to suggest unrecognized exacerbations.  I have reviewed the medical regimen and re-educated the pt on the role of rescue and controlling medications.  Inhaler technique and understanding seems adequate.  The patient reports no issues with any of there medications for their COPD.  Refills will be taken care of as needed.  All questions answered.  Has been taken off of lopressor due to decreased BP.  Patient has no known corona virus exposures and has been practicing social distancing.  We have discussed the virus and precautions and all questions have been answered.    2/1/2022 - Here for follow up, was sick for about 3 weeks around [place] (had known Covid exposure, had HA, sore throat, weak, cough, increased dyspnea, low grade fever).  She did 2 rapid tests which were negative but is interested in getting Covid antibodies checked.  Pt is currently stable on present medications with no recent increases in their symptoms or use of rescue medications.  Since our last visit there have been no hospitalizations or ER visits for their respiratory issues and there does not seem to be anything to suggest unrecognized exacerbations.  I have reviewed the medical regimen and re-educated the pt on the role of rescue and controlling medications.  Inhaler technique and understanding seems adequate.  The patient reports no issues with any of there medications for their COPD.  Refills will be taken care of as needed.  All questions answered.  She has been otherwise stable except for recent illness.  She has not been vaccinated for Covid.    10/5/2022 - Here for follow p, no new issues or problems reported.  We reviewed her CT scans and she will need a follow up CT in 5/2023.  Pt is currently stable on present medications with no  recent increases in their symptoms or use of rescue medications.  Since our last visit there have been no hospitalizations or ER visits for their respiratory issues and there does not seem to be anything to suggest unrecognized exacerbations.  I have reviewed the medical regimen and re-educated the pt on the role of rescue and controlling medications.  Inhaler technique and understanding seems adequate.  The patient reports no issues with any of there medications for their COPD.  Refills will be taken care of as needed.  All questions answered.  Did have Covid in early summer but was not very sick.    5/24/2023 - Here for follow up, Pt is currently stable on present medications with no recent increases in their symptoms or use of rescue medications.  Since our last visit there have been no hospitalizations or ER visits for their respiratory issues and there does not seem to be anything to suggest unrecognized exacerbations.  I have reviewed the medical regimen and re-educated the pt on the role of rescue and controlling medications.  Inhaler technique and understanding seems adequate.  The patient reports no issues with any of there medications for their COPD.  Refills will be taken care of as needed.  All questions answered.  Having issues with her sinuses and she is seeing Dr Chisholm and they are considering surgery.  Reviewed last PFT with her.  She has a skin lesion on her leg and is to see dermatology (she has a FMHx of melanoma and a prior melanoma).       6/20/2023 - Here for results - reviewed CT and PET scans with pt and daughter and all questions answered - we will proceed with Ct guided needle biopsy.    7/18/2023 - Here for biopsy results - + adenocarcinoma.  D/W pt and  and discussed PET findings (also reviewed PET scan).  At this point she needs MRI and referral to oncology.  All questions answered.    8/24/2023 - Here for follow up and so far is tolerating her therapy pretty well.  Asked her if  "she needs any help at home and she is OK.  She is eating a lot and has gained some weight.  No weakness issues.    9/6/2023 - PFT (8/16/23)  Mild obstruction (FEV1 - - 76%), improved with BD  No restriction  Moderate decrease DLCO (41%)    11/28/2023 - Here for follow up, was hospitalized with PE and is on treatments.  Last Ct scan reviewed (see below).  She has completed XRT and initial chemo and is on immunotherapy.  She has noted some increased SOB, DAILY for " a while".  No other new issues.    Low Dose Screening CT Chest    Cigarettes - 1 PPD x 40 YEARS  Still smoking -   NO  QUIT 3/2019     Date of last LD CT - 5/2022    Result - Category 1, needs repeat 5/2023    I have discussed with pt about using a screening CT of the chest due to history of cigarette smoking.  We have discussed the possible findings and possible actions as a result of these findings.  The pt would like to proceed.    COPD Flowsheet    GOLD A    Last PFT - 8/2019  FEV1- 74 % DLCO - 50 %     + LABA/LAMA    + prn ALBUTEROL    mMRC -  0 - SOB with strenuous exercise   - + 1 - SOB level ground, slight hill   -  2 - SOB walk slower or stop for breath level ground   -  3 - SOB at 100 yards or after few minutes   -  4 - SOB in house, dressing    Referral to PULMONARY REHABILITATION - NO    Tested for alpha-1-antitrypsin - NO  Result - na    Cigarette Counseling    Currently smoking 0 packs per day  40 pack years    Vaping a little    I have counseled pt for 3-5 minutes regarding cigarette cessation.  This has included the need to stop smoking as well as strategies, including but not limited to "cold turkey", CHANTIX (including risks and benefits of whitney drug), nicotine replacement and WELLBUTRIN.    Flu and Pneumonia Vaccination    I have recommended that the patient get this years influenza vaccine.  We discussed the risks and benefits of this treatment.      I reviewed patient's current pneumonia vaccine status.  Patient needs vaccination.  We " have discussed the current guidelines and recommendations for pneumonia vaccination.              Past Medical History    Past Medical History:   Diagnosis Date    Allergy     Anxiety     Arthritis     CAD (coronary artery disease)     coronary stents    Chronic back pain     lower back pain radiates to left leg    Chronic rhinitis     DAILY (dyspnea on exertion)     Hyperlipidemia     Hypertension     Lung cancer 07/01/2023    Melanoma in situ of left upper extremity     left thigh area    MI (myocardial infarction)     Seasonal allergic rhinitis 10/29/2020       Past Surgical History    Past Surgical History:   Procedure Laterality Date    BREAST SURGERY      breast reduction    CATARACT EXTRACTION, BILATERAL      coranary stent      EXCISION OF MELANOMA Left 3/30/2022    Procedure: EXCISION, MELANOMA;  Surgeon: David Mcpherson III, MD;  Location: [facility] OR;  Service: General;  Laterality: Left;    EXCISIONAL BIOPSY Left 3/30/2022    Procedure: EXCISIONAL BIOPSY;  Surgeon: David Mcpherson III, MD;  Location: [facility] OR;  Service: General;  Laterality: Left;    HYSTERECTOMY      total    INSERTION OF TUNNELED CENTRAL VENOUS CATHETER (CVC) WITH SUBCUTANEOUS PORT N/A 8/4/2023    Procedure: [id number];  Surgeon: Remi Mckeon Jr., MD;  Location: [facility] OR;  Service: General;  Laterality: N/A;    LEFT HEART CATHETERIZATION Left 10/16/2020    Procedure: Left heart cath;  Surgeon: Garrett Robins MD;  Location: [facility] CATH/EP LAB;  Service: Cardiology;  Laterality: Left;    OOPHORECTOMY      TOTAL REDUCTION MAMMOPLASTY Bilateral 2000       Medications      Current Outpatient Medications:     ammonium lactate 12 % Crea, aaa bid prn dry skin (Patient taking differently: Apply 1 g topically 2 (two) times daily as needed. aaa bid prn dry skin), Disp: 385 g, Rfl: 11    atorvastatin (LIPITOR) 80 MG tablet, Take 1 tablet (80 mg total) by mouth every evening., Disp: 90 tablet, Rfl: 1    azelastine (ASTELIN) 137  mcg (0.1 %) nasal spray, USE 1 SPRAY IN EACH NOSTRIL 2 TIMES DAILY AS NEEDED FOR RHINITIS OR SINUSITIS, Disp: 90 mL, Rfl: 0    busPIRone (BUSPAR) 5 MG Tab, TAKE 1 TABLET BY MOUTH TWICE A DAY, Disp: 180 tablet, Rfl: 1    calcium-vitamin D3 (OS-JOLIE 500 + D3) 500 mg-5 mcg (200 unit) per tablet, Take 1 tablet by mouth every morning., Disp: , Rfl:     citalopram (CELEXA) 20 MG tablet, TAKE 1 TABLET BY MOUTH EVERY DAY, Disp: 90 tablet, Rfl: 1    clobetasol 0.05% (TEMOVATE) 0.05 % Oint, Apply topically 2 (two) times daily. for 14 days (Patient taking differently: Apply 1 g topically 2 (two) times daily.), Disp: 45 g, Rfl: 3    ezetimibe (ZETIA) 10 mg tablet, Take 1 tablet (10 mg total) by mouth once daily., Disp: 90 tablet, Rfl: 3    famotidine (PEPCID) 40 MG tablet, TAKE 1 TABLET BY MOUTH EVERY DAY IN THE EVENING, Disp: 90 tablet, Rfl: 1    fluticasone propionate (FLONASE) 50 mcg/actuation nasal spray, 1 spray (50 mcg total) by Each Nostril route daily as needed for Rhinitis or Allergies., Disp: 9.9 mL, Rfl: 2    HYDROcodone-acetaminophen (NORCO) 5-325 mg per tablet, Take 1 tablet by mouth every 4 to 6 hours as needed for Pain., Disp: 60 tablet, Rfl: 0    levoFLOXacin (LEVAQUIN) 750 MG tablet, Take 1 tablet (750 mg total) by mouth once daily., Disp: 10 tablet, Rfl: 0    lisinopriL (PRINIVIL,ZESTRIL) 2.5 MG tablet, Take 1 tablet (2.5 mg total) by mouth every evening., Disp: 90 tablet, Rfl: 3    metoprolol succinate (TOPROL-XL) 50 MG 24 hr tablet, Take 1 tablet (50 mg total) by mouth once daily., Disp: 90 tablet, Rfl: 3    nitroGLYCERIN (NITROSTAT) 0.4 MG SL tablet, Place 1 tablet (0.4 mg total) under the tongue every 5 (five) minutes as needed for Chest pain., Disp: 30 tablet, Rfl: 0    omeprazole (PRILOSEC) 40 MG capsule, Take 1 capsule (40 mg total) by mouth once daily., Disp: 30 capsule, Rfl: 11    ondansetron (ZOFRAN) 8 MG tablet, Take 1 tablet (8 mg total) by mouth every 8 (eight) hours as needed. (Patient taking  differently: Take 8 mg by mouth every 8 (eight) hours as needed for Nausea.), Disp: 30 tablet, Rfl: 5    promethazine (PHENERGAN) 25 MG tablet, Take 1 tablet (25 mg total) by mouth every 4 to 6 hours as needed. (Patient taking differently: Take 25 mg by mouth every 4 to 6 hours as needed for Nausea.), Disp: 30 tablet, Rfl: 5    rivaroxaban (XARELTO) 15 mg Tab, Take 1 tablet (15 mg total) by mouth daily with dinner or evening meal., Disp: 42 tablet, Rfl: 0    tiZANidine (ZANAFLEX) 4 MG tablet, TAKE 1 TABLET BY MOUTH EVERY 6 HOURS AS NEEDED FOR BACK PAIN, Disp: 360 tablet, Rfl: 0    traMADoL (ULTRAM) 50 mg tablet, Take 1 tablet (50 mg total) by mouth every 6 (six) hours as needed for Pain., Disp: 30 tablet, Rfl: 2    valACYclovir (VALTREX) 1000 MG tablet, TAKE 2 AT ONSET OF FEVER BLISTERS THEN REPEAT IN 12 HOURS (TOTAL 4 TABS PER EPISODE), Disp: 20 tablet, Rfl: 3    zinc 50 mg Tab, Take 1 tablet by mouth once daily., Disp: , Rfl:     ALPRAZolam (XANAX) 0.25 MG tablet, Take 1 tablet (0.25 mg total) by mouth 3 (three) times daily as needed for Anxiety. (Patient not taking: Reported on 10/1/2023), Disp: 30 tablet, Rfl: 1    Allergies    Review of patient's allergies indicates:   Allergen Reactions    Cephalexin      Other reaction(s): Rash    Doxycycline monohydrate Hives    Lanoxin  [digoxin]      Other reaction(s): Rash    Lansoprazole      Other reaction(s): Rash    Macrobid [nitrofurantoin monohyd/m-cryst] Rash       SocHx    Social History     Tobacco Use   Smoking Status Former    Current packs/day: 0.00    Average packs/day: 1 pack/day for 43.2 years (43.2 ttl pk-yrs)    Types: Cigarettes, Vaping with nicotine    Start date:     Quit date: 3/4/2017    Years since quittin.7   Smokeless Tobacco Never       Social History     Substance and Sexual Activity   Alcohol Use Not Currently    Alcohol/week: 0.0 standard drinks of alcohol    Comment: sometimes       Drug Use - no  Occupation - housewife  Asbestos  exposure - no  Pets - dogs    FMHx    Family History   Problem Relation Age of Onset    Cancer Mother         breast, ovarian, cervical     Heart disease Mother     Breast cancer Mother 50    Ovarian cancer Mother     Cancer Father         melanoma    Stroke Sister     Heart disease Sister     Cancer Sister         leukemia    Macular degeneration Sister     Heart disease Sister     Heart disease Brother     Lung cancer Brother     Cancer Maternal Uncle     Cancer Paternal Aunt         breast    Breast cancer Paternal Aunt 60    Cancer Paternal Uncle     Heart disease Maternal Grandmother     No Known Problems Daughter     No Known Problems Son          Review of Systems  Review of Systems   Constitutional:  Negative for chills, diaphoresis, fever, malaise/fatigue and weight loss.   HENT:  Positive for congestion and tinnitus. Negative for ear discharge, ear pain, hearing loss, nosebleeds, sinus pain and sore throat.         Has had tinnitus for years (has seen ENT)   Eyes:  Negative for pain.   Respiratory:  Positive for shortness of breath and wheezing. Negative for cough, hemoptysis, sputum production and stridor.    Cardiovascular:  Negative for chest pain, palpitations, orthopnea, claudication, leg swelling and PND.        H/o PCI   Gastrointestinal:  Negative for abdominal pain, blood in stool, constipation, diarrhea, heartburn, melena, nausea and vomiting.   Genitourinary:  Negative for dysuria, flank pain, frequency, hematuria and urgency.   Musculoskeletal:  Positive for back pain, joint pain and neck pain. Negative for falls and myalgias.        Right knee meniscal injury   Skin:  Negative for itching and rash.   Neurological:  Negative for dizziness, tingling, tremors, sensory change, speech change, focal weakness, seizures, weakness and headaches.   Endo/Heme/Allergies:  Negative for environmental allergies.   Psychiatric/Behavioral:  Negative for depression, substance abuse and suicidal ideas. The  patient is not nervous/anxious.        Physical Exam    Vitals:    11/28/23 1124   BP: 102/80   BP Location: Left arm   Patient Position: Sitting   BP Method: Medium (Manual)   Weight: 59.1 kg (130 lb 6.4 oz)       Physical Exam  Vitals and nursing note reviewed.   Constitutional:       General: She is not in acute distress.     Appearance: She is well-developed. She is ill-appearing. She is not toxic-appearing or diaphoretic.   HENT:      Head: Normocephalic and atraumatic.      Right Ear: External ear normal.      Left Ear: External ear normal.      Nose: Nose normal. No congestion or rhinorrhea.      Mouth/Throat:      Mouth: Mucous membranes are moist.      Pharynx: Oropharynx is clear. No oropharyngeal exudate.   Eyes:      General: No scleral icterus.        Right eye: No discharge.         Left eye: No discharge.      Extraocular Movements: Extraocular movements intact.      Conjunctiva/sclera: Conjunctivae normal.      Pupils: Pupils are equal, round, and reactive to light.   Neck:      Thyroid: No thyromegaly.      Vascular: No carotid bruit or JVD.      Trachea: No tracheal deviation.   Cardiovascular:      Rate and Rhythm: Normal rate and regular rhythm.      Heart sounds: Normal heart sounds. No murmur heard.     No friction rub. No gallop.   Pulmonary:      Effort: Pulmonary effort is normal. No respiratory distress.      Breath sounds: No stridor. Rales (left base) present. No wheezing or rhonchi.   Chest:      Chest wall: No tenderness.   Abdominal:      General: Bowel sounds are normal. There is no distension.      Palpations: Abdomen is soft.      Tenderness: There is no abdominal tenderness. There is no guarding.   Musculoskeletal:         General: No tenderness. Normal range of motion.      Cervical back: Normal range of motion and neck supple. No rigidity or tenderness.      Right lower leg: No edema.      Left lower leg: No edema.   Lymphadenopathy:      Cervical: No cervical adenopathy.    Skin:     General: Skin is warm and dry.   Neurological:      General: No focal deficit present.      Mental Status: She is alert and oriented to person, place, and time. Mental status is at baseline.      Cranial Nerves: No cranial nerve deficit.      Motor: No weakness.   Psychiatric:         Mood and Affect: Mood normal.         Behavior: Behavior normal.         Thought Content: Thought content normal.         Judgment: Judgment normal.         Labs    Lab Results   Component Value Date    WBC 5.15 11/22/2023    HGB 12.3 11/22/2023    HCT 38.2 11/22/2023     11/22/2023       Sodium   Date Value Ref Range Status   11/22/2023 139 136 - 145 mmol/L Final     Potassium   Date Value Ref Range Status   11/22/2023 3.9 3.5 - 5.1 mmol/L Final     Chloride   Date Value Ref Range Status   11/22/2023 105 95 - 110 mmol/L Final     CO2   Date Value Ref Range Status   11/22/2023 26 23 - 29 mmol/L Final     Glucose   Date Value Ref Range Status   11/22/2023 106 70 - 110 mg/dL Final     BUN   Date Value Ref Range Status   11/22/2023 9 8 - 23 mg/dL Final     Creatinine   Date Value Ref Range Status   11/22/2023 0.6 0.5 - 1.4 mg/dL Final     Calcium   Date Value Ref Range Status   11/22/2023 9.6 8.7 - 10.5 mg/dL Final     Total Protein   Date Value Ref Range Status   11/22/2023 6.9 6.0 - 8.4 g/dL Final     Albumin   Date Value Ref Range Status   11/22/2023 4.0 3.5 - 5.2 g/dL Final     Total Bilirubin   Date Value Ref Range Status   11/22/2023 1.3 (H) 0.1 - 1.0 mg/dL Final     Comment:     For infants and newborns, interpretation of results should be based  on gestational age, weight and in agreement with clinical  observations.    Premature Infant recommended reference ranges:  Up to 24 hours.............<8.0 mg/dL  Up to 48 hours............<12.0 mg/dL  3-5 days..................<15.0 mg/dL  6-29 days.................<15.0 mg/dL       Alkaline Phosphatase   Date Value Ref Range Status   11/22/2023 79 55 - 135 U/L Final      AST   Date Value Ref Range Status   11/22/2023 13 10 - 40 U/L Final     ALT   Date Value Ref Range Status   11/22/2023 10 10 - 44 U/L Final     Anion Gap   Date Value Ref Range Status   11/22/2023 8 8 - 16 mmol/L Final       Xrays    CT chest (5/2022)  1.  No pulmonary nodules identified.  2.  Mild/moderate emphysematous lung disease.  3.  No consolidation or pleural effusion.     LUNG RADS CATEGORY 1: NEGATIVE    CT chest (11/10/23)  1. Nodular density in the posterior left lower lobe, essentially unchanged from the previous exam when accounting for technique.  2. Scattered patchy airspace opacities throughout the left lung, increased in size/distribution from the previous exam, the differential includes posttreatment/radiation change, atelectasis/scarring, infection/pneumonia, noting malignancy is not excluded and interval follow-up would be warranted.  3. Enlarging left cervical/supraclavicular lymphadenopathy.  4. Small wedge-shaped hypodensities in the spleen, in keeping with small splenic infarcts, similar in distribution the previous exam      Impression/Plan    Problem List Items Addressed This Visit          Pulmonary    Pulmonary emphysema - Primary  Continue present medications.  Will refill medications as needed.  Instructed patient to contact us with any issues concerning their medications (cost, reactions, etc.).  Have discussed with patient about inciting conditions which may exacerbate their disease.  We did discuss possible new therapies or de-escalation of therapy (if appropriate).  Asked patient if they were interested in pursuing pulmonary rehabilitation.  All questions answered  RTC 1 months  Patient instructed that they are to call if symptoms change or new issues develop prior to their next visit.  Will treat with augmentin and steroid taper and have her call me next week and we will see how she is doing                     Other    Personal history of tobacco use, presenting hazards to  health   has quit      Adenocarcinoma lung  As above                          Raad Abad MD

## 2023-11-28 NOTE — TELEPHONE ENCOUNTER
Spoke with patient.   Per Dr Daniels's message. No need for labs prior to appointment on 3/12/2024  Instructed to call back if any problems.   Verbalizes understanding

## 2023-11-29 ENCOUNTER — LAB VISIT (OUTPATIENT)
Dept: LAB | Facility: HOSPITAL | Age: 68
End: 2023-11-29
Attending: NURSE PRACTITIONER
Payer: MEDICARE

## 2023-11-29 ENCOUNTER — OFFICE VISIT (OUTPATIENT)
Dept: HEMATOLOGY/ONCOLOGY | Facility: CLINIC | Age: 68
End: 2023-11-29
Payer: MEDICARE

## 2023-11-29 VITALS
TEMPERATURE: 97 F | BODY MASS INDEX: 23.24 KG/M2 | HEART RATE: 92 BPM | DIASTOLIC BLOOD PRESSURE: 53 MMHG | RESPIRATION RATE: 16 BRPM | SYSTOLIC BLOOD PRESSURE: 100 MMHG | WEIGHT: 131.19 LBS

## 2023-11-29 DIAGNOSIS — I26.99 ACUTE PULMONARY EMBOLISM WITHOUT ACUTE COR PULMONALE, UNSPECIFIED PULMONARY EMBOLISM TYPE: Primary | ICD-10-CM

## 2023-11-29 DIAGNOSIS — C34.32 MALIGNANT NEOPLASM OF LOWER LOBE OF LEFT LUNG: ICD-10-CM

## 2023-11-29 DIAGNOSIS — R53.83 FATIGUE, UNSPECIFIED TYPE: ICD-10-CM

## 2023-11-29 DIAGNOSIS — C34.90 METASTATIC LUNG CANCER (METASTASIS FROM LUNG TO OTHER SITE), UNSPECIFIED LATERALITY: ICD-10-CM

## 2023-11-29 DIAGNOSIS — F41.9 ANXIETY: ICD-10-CM

## 2023-11-29 LAB
ALBUMIN SERPL BCP-MCNC: 4 G/DL (ref 3.5–5.2)
ALP SERPL-CCNC: 78 U/L (ref 55–135)
ALT SERPL W/O P-5'-P-CCNC: 14 U/L (ref 10–44)
ANION GAP SERPL CALC-SCNC: 7 MMOL/L (ref 8–16)
AST SERPL-CCNC: 15 U/L (ref 10–40)
BASOPHILS # BLD AUTO: 0.03 K/UL (ref 0–0.2)
BASOPHILS NFR BLD: 0.6 % (ref 0–1.9)
BILIRUB SERPL-MCNC: 1 MG/DL (ref 0.1–1)
BUN SERPL-MCNC: 10 MG/DL (ref 8–23)
CALCIUM SERPL-MCNC: 9.3 MG/DL (ref 8.7–10.5)
CHLORIDE SERPL-SCNC: 106 MMOL/L (ref 95–110)
CO2 SERPL-SCNC: 27 MMOL/L (ref 23–29)
CREAT SERPL-MCNC: 0.6 MG/DL (ref 0.5–1.4)
DIFFERENTIAL METHOD: ABNORMAL
EOSINOPHIL # BLD AUTO: 0.5 K/UL (ref 0–0.5)
EOSINOPHIL NFR BLD: 9.4 % (ref 0–8)
ERYTHROCYTE [DISTWIDTH] IN BLOOD BY AUTOMATED COUNT: 13.1 % (ref 11.5–14.5)
EST. GFR  (NO RACE VARIABLE): >60 ML/MIN/1.73 M^2
GLUCOSE SERPL-MCNC: 111 MG/DL (ref 70–110)
HCT VFR BLD AUTO: 39.2 % (ref 37–48.5)
HGB BLD-MCNC: 12.7 G/DL (ref 12–16)
IMM GRANULOCYTES # BLD AUTO: 0.01 K/UL (ref 0–0.04)
IMM GRANULOCYTES NFR BLD AUTO: 0.2 % (ref 0–0.5)
LYMPHOCYTES # BLD AUTO: 1.1 K/UL (ref 1–4.8)
LYMPHOCYTES NFR BLD: 21.6 % (ref 18–48)
MCH RBC QN AUTO: 31.1 PG (ref 27–31)
MCHC RBC AUTO-ENTMCNC: 32.4 G/DL (ref 32–36)
MCV RBC AUTO: 96 FL (ref 82–98)
MONOCYTES # BLD AUTO: 0.7 K/UL (ref 0.3–1)
MONOCYTES NFR BLD: 13.6 % (ref 4–15)
NEUTROPHILS # BLD AUTO: 2.9 K/UL (ref 1.8–7.7)
NEUTROPHILS NFR BLD: 54.6 % (ref 38–73)
NRBC BLD-RTO: 0 /100 WBC
PLATELET # BLD AUTO: 282 K/UL (ref 150–450)
PMV BLD AUTO: 8.8 FL (ref 9.2–12.9)
POTASSIUM SERPL-SCNC: 4.1 MMOL/L (ref 3.5–5.1)
PROT SERPL-MCNC: 7 G/DL (ref 6–8.4)
RBC # BLD AUTO: 4.09 M/UL (ref 4–5.4)
SODIUM SERPL-SCNC: 140 MMOL/L (ref 136–145)
WBC # BLD AUTO: 5.23 K/UL (ref 3.9–12.7)

## 2023-11-29 PROCEDURE — 85025 COMPLETE CBC W/AUTO DIFF WBC: CPT | Performed by: NURSE PRACTITIONER

## 2023-11-29 PROCEDURE — 80053 COMPREHEN METABOLIC PANEL: CPT | Performed by: NURSE PRACTITIONER

## 2023-11-29 PROCEDURE — 99215 OFFICE O/P EST HI 40 MIN: CPT | Performed by: NURSE PRACTITIONER

## 2023-11-29 PROCEDURE — 36415 COLL VENOUS BLD VENIPUNCTURE: CPT | Performed by: NURSE PRACTITIONER

## 2023-11-29 PROCEDURE — 99214 OFFICE O/P EST MOD 30 MIN: CPT | Mod: S$PBB,,, | Performed by: NURSE PRACTITIONER

## 2023-11-29 PROCEDURE — 99214 PR OFFICE/OUTPT VISIT, EST, LEVL IV, 30-39 MIN: ICD-10-PCS | Mod: S$PBB,,, | Performed by: NURSE PRACTITIONER

## 2023-11-29 RX ORDER — ALPRAZOLAM 0.25 MG/1
0.25 TABLET ORAL 3 TIMES DAILY PRN
Qty: 30 TABLET | Refills: 1 | Status: SHIPPED | OUTPATIENT
Start: 2023-11-29 | End: 2024-02-07

## 2023-12-01 RX ORDER — SODIUM CHLORIDE 0.9 % (FLUSH) 0.9 %
10 SYRINGE (ML) INJECTION
Status: CANCELLED | OUTPATIENT
Start: 2023-12-04

## 2023-12-01 RX ORDER — DIPHENHYDRAMINE HYDROCHLORIDE 50 MG/ML
50 INJECTION INTRAMUSCULAR; INTRAVENOUS ONCE AS NEEDED
Status: CANCELLED | OUTPATIENT
Start: 2023-12-04

## 2023-12-01 RX ORDER — EPINEPHRINE 0.3 MG/.3ML
0.3 INJECTION SUBCUTANEOUS ONCE AS NEEDED
Status: CANCELLED | OUTPATIENT
Start: 2023-12-04

## 2023-12-01 RX ORDER — HEPARIN 100 UNIT/ML
500 SYRINGE INTRAVENOUS
Status: CANCELLED | OUTPATIENT
Start: 2023-12-04

## 2023-12-04 ENCOUNTER — INFUSION (OUTPATIENT)
Dept: INFUSION THERAPY | Facility: HOSPITAL | Age: 68
End: 2023-12-04
Attending: INTERNAL MEDICINE
Payer: MEDICARE

## 2023-12-04 VITALS
DIASTOLIC BLOOD PRESSURE: 68 MMHG | RESPIRATION RATE: 18 BRPM | WEIGHT: 133.69 LBS | HEART RATE: 76 BPM | BODY MASS INDEX: 23.69 KG/M2 | HEIGHT: 63 IN | SYSTOLIC BLOOD PRESSURE: 107 MMHG | TEMPERATURE: 98 F | OXYGEN SATURATION: 94 %

## 2023-12-04 DIAGNOSIS — C34.32 MALIGNANT NEOPLASM OF LOWER LOBE OF LEFT LUNG: Primary | ICD-10-CM

## 2023-12-04 DIAGNOSIS — C34.90 METASTATIC LUNG CANCER (METASTASIS FROM LUNG TO OTHER SITE), UNSPECIFIED LATERALITY: ICD-10-CM

## 2023-12-04 PROCEDURE — A4216 STERILE WATER/SALINE, 10 ML: HCPCS | Performed by: NURSE PRACTITIONER

## 2023-12-04 PROCEDURE — 96413 CHEMO IV INFUSION 1 HR: CPT

## 2023-12-04 PROCEDURE — 63600175 PHARM REV CODE 636 W HCPCS: Mod: JZ,JG | Performed by: NURSE PRACTITIONER

## 2023-12-04 PROCEDURE — 25000003 PHARM REV CODE 250: Performed by: NURSE PRACTITIONER

## 2023-12-04 RX ORDER — SODIUM CHLORIDE 0.9 % (FLUSH) 0.9 %
10 SYRINGE (ML) INJECTION
Status: DISCONTINUED | OUTPATIENT
Start: 2023-12-04 | End: 2023-12-04 | Stop reason: HOSPADM

## 2023-12-04 RX ORDER — HEPARIN 100 UNIT/ML
500 SYRINGE INTRAVENOUS
Status: DISCONTINUED | OUTPATIENT
Start: 2023-12-04 | End: 2023-12-04 | Stop reason: HOSPADM

## 2023-12-04 RX ORDER — DIPHENHYDRAMINE HYDROCHLORIDE 50 MG/ML
50 INJECTION INTRAMUSCULAR; INTRAVENOUS ONCE AS NEEDED
Status: DISCONTINUED | OUTPATIENT
Start: 2023-12-04 | End: 2023-12-04 | Stop reason: HOSPADM

## 2023-12-04 RX ORDER — EPINEPHRINE 0.3 MG/.3ML
0.3 INJECTION SUBCUTANEOUS ONCE AS NEEDED
Status: DISCONTINUED | OUTPATIENT
Start: 2023-12-04 | End: 2023-12-04 | Stop reason: HOSPADM

## 2023-12-04 RX ADMIN — HEPARIN 500 UNITS: 100 SYRINGE at 12:12

## 2023-12-04 RX ADMIN — ATEZOLIZUMAB 1200 MG: 1200 INJECTION, SOLUTION INTRAVENOUS at 11:12

## 2023-12-04 RX ADMIN — SODIUM CHLORIDE, PRESERVATIVE FREE 10 ML: 5 INJECTION INTRAVENOUS at 12:12

## 2023-12-07 ENCOUNTER — LAB VISIT (OUTPATIENT)
Dept: LAB | Facility: HOSPITAL | Age: 68
End: 2023-12-07
Attending: NURSE PRACTITIONER
Payer: MEDICARE

## 2023-12-07 DIAGNOSIS — C34.32 MALIGNANT NEOPLASM OF LOWER LOBE OF LEFT LUNG: ICD-10-CM

## 2023-12-07 DIAGNOSIS — R53.83 FATIGUE, UNSPECIFIED TYPE: ICD-10-CM

## 2023-12-07 LAB
ALBUMIN SERPL BCP-MCNC: 4.2 G/DL (ref 3.5–5.2)
ALP SERPL-CCNC: 67 U/L (ref 55–135)
ALT SERPL W/O P-5'-P-CCNC: 13 U/L (ref 10–44)
ANION GAP SERPL CALC-SCNC: 9 MMOL/L (ref 8–16)
AST SERPL-CCNC: 13 U/L (ref 10–40)
BASOPHILS # BLD AUTO: 0.01 K/UL (ref 0–0.2)
BASOPHILS NFR BLD: 0.2 % (ref 0–1.9)
BILIRUB SERPL-MCNC: 0.9 MG/DL (ref 0.1–1)
BUN SERPL-MCNC: 24 MG/DL (ref 8–23)
CALCIUM SERPL-MCNC: 9.4 MG/DL (ref 8.7–10.5)
CHLORIDE SERPL-SCNC: 104 MMOL/L (ref 95–110)
CO2 SERPL-SCNC: 25 MMOL/L (ref 23–29)
CREAT SERPL-MCNC: 0.9 MG/DL (ref 0.5–1.4)
DIFFERENTIAL METHOD: ABNORMAL
EOSINOPHIL # BLD AUTO: 0.1 K/UL (ref 0–0.5)
EOSINOPHIL NFR BLD: 1.6 % (ref 0–8)
ERYTHROCYTE [DISTWIDTH] IN BLOOD BY AUTOMATED COUNT: 13.4 % (ref 11.5–14.5)
EST. GFR  (NO RACE VARIABLE): >60 ML/MIN/1.73 M^2
GLUCOSE SERPL-MCNC: 99 MG/DL (ref 70–110)
HCT VFR BLD AUTO: 38.7 % (ref 37–48.5)
HGB BLD-MCNC: 12.7 G/DL (ref 12–16)
IMM GRANULOCYTES # BLD AUTO: 0.03 K/UL (ref 0–0.04)
IMM GRANULOCYTES NFR BLD AUTO: 0.5 % (ref 0–0.5)
LYMPHOCYTES # BLD AUTO: 1.9 K/UL (ref 1–4.8)
LYMPHOCYTES NFR BLD: 29.4 % (ref 18–48)
MCH RBC QN AUTO: 31.1 PG (ref 27–31)
MCHC RBC AUTO-ENTMCNC: 32.8 G/DL (ref 32–36)
MCV RBC AUTO: 95 FL (ref 82–98)
MONOCYTES # BLD AUTO: 0.9 K/UL (ref 0.3–1)
MONOCYTES NFR BLD: 13.7 % (ref 4–15)
NEUTROPHILS # BLD AUTO: 3.5 K/UL (ref 1.8–7.7)
NEUTROPHILS NFR BLD: 54.6 % (ref 38–73)
NRBC BLD-RTO: 0 /100 WBC
PLATELET # BLD AUTO: 296 K/UL (ref 150–450)
PMV BLD AUTO: 8.5 FL (ref 9.2–12.9)
POTASSIUM SERPL-SCNC: 3.8 MMOL/L (ref 3.5–5.1)
PROT SERPL-MCNC: 7.1 G/DL (ref 6–8.4)
RBC # BLD AUTO: 4.08 M/UL (ref 4–5.4)
SODIUM SERPL-SCNC: 138 MMOL/L (ref 136–145)
WBC # BLD AUTO: 6.43 K/UL (ref 3.9–12.7)

## 2023-12-07 PROCEDURE — 36415 COLL VENOUS BLD VENIPUNCTURE: CPT | Performed by: NURSE PRACTITIONER

## 2023-12-07 PROCEDURE — 80053 COMPREHEN METABOLIC PANEL: CPT | Performed by: NURSE PRACTITIONER

## 2023-12-07 PROCEDURE — 85025 COMPLETE CBC W/AUTO DIFF WBC: CPT | Performed by: NURSE PRACTITIONER

## 2023-12-11 ENCOUNTER — HOSPITAL ENCOUNTER (OUTPATIENT)
Dept: RADIOLOGY | Facility: HOSPITAL | Age: 68
Discharge: HOME | End: 2023-12-11
Attending: NURSE PRACTITIONER
Payer: MEDICARE

## 2023-12-11 DIAGNOSIS — I26.99 ACUTE PULMONARY EMBOLISM WITHOUT ACUTE COR PULMONALE, UNSPECIFIED PULMONARY EMBOLISM TYPE: ICD-10-CM

## 2023-12-11 PROCEDURE — 25500020 PHARM REV CODE 255: Performed by: NURSE PRACTITIONER

## 2023-12-11 PROCEDURE — 71275 CT ANGIOGRAPHY CHEST: CPT | Mod: TC

## 2023-12-11 RX ADMIN — IOHEXOL 100 ML: 350 INJECTION, SOLUTION INTRAVENOUS at 05:12

## 2023-12-12 ENCOUNTER — OFFICE VISIT (OUTPATIENT)
Dept: HEMATOLOGY/ONCOLOGY | Facility: CLINIC | Age: 68
End: 2023-12-12
Payer: MEDICARE

## 2023-12-12 ENCOUNTER — TELEPHONE (OUTPATIENT)
Dept: HEMATOLOGY/ONCOLOGY | Facility: CLINIC | Age: 68
End: 2023-12-12

## 2023-12-12 VITALS
DIASTOLIC BLOOD PRESSURE: 55 MMHG | SYSTOLIC BLOOD PRESSURE: 101 MMHG | WEIGHT: 136 LBS | HEIGHT: 63 IN | OXYGEN SATURATION: 94 % | TEMPERATURE: 99 F | RESPIRATION RATE: 18 BRPM | BODY MASS INDEX: 24.1 KG/M2 | HEART RATE: 89 BPM

## 2023-12-12 DIAGNOSIS — I26.99 ACUTE PULMONARY EMBOLISM WITHOUT ACUTE COR PULMONALE, UNSPECIFIED PULMONARY EMBOLISM TYPE: ICD-10-CM

## 2023-12-12 DIAGNOSIS — C34.32 MALIGNANT NEOPLASM OF LOWER LOBE OF LEFT LUNG: Primary | ICD-10-CM

## 2023-12-12 PROCEDURE — 99214 PR OFFICE/OUTPT VISIT, EST, LEVL IV, 30-39 MIN: ICD-10-PCS | Mod: S$PBB,,, | Performed by: INTERNAL MEDICINE

## 2023-12-12 PROCEDURE — 99214 OFFICE O/P EST MOD 30 MIN: CPT | Mod: S$PBB,,, | Performed by: INTERNAL MEDICINE

## 2023-12-12 PROCEDURE — 99214 OFFICE O/P EST MOD 30 MIN: CPT | Performed by: INTERNAL MEDICINE

## 2023-12-12 NOTE — PROGRESS NOTES
Kristie,     I believe she start abx last week. See how she is feeling. Looks like possible pneumonia.    Meka

## 2023-12-12 NOTE — TELEPHONE ENCOUNTER
Spoke to pt and notified her of CTA results. She verbalized understanding and said she is actually on her way to see Dr. Hankins now.

## 2023-12-12 NOTE — TELEPHONE ENCOUNTER
----- Message from IRAIS Cohen sent at 12/12/2023  9:19 AM CST -----  Kristie,     I believe she start abx last week. See how she is feeling. Looks like possible pneumonia.    Meka

## 2023-12-12 NOTE — ASSESSMENT & PLAN NOTE
Patient has continued sob and new CTA shows some uptake concerning for inflammation.  Doubt infection at this time but am concerned about possible pneumonitis from IO therapy.  She has had 3 treatments and this could be the problem.  Also possible this this is radiation pneumonitis.  Will hold on the therapy and ask Dr. Abad his opinion on this matter.

## 2023-12-12 NOTE — PROGRESS NOTES
PROGRESS NOTE    Subjective:       Patient ID: Aysha Moore is a 68 y.o. female.    6/2/2023:  Screening CT chest:  2.6 x 2.5 x 3.2cm mass in the posterior LLL     6/20/2023:  PET scan:  35 x 21 mm lobulated pulmonary mass in the posterior left lower lobe with SUV max 11.6      FDG avid lymphadenopathy is present with index nodes outlined below:  -21 x 11 mm AP window node with SUV max 12.1 (image 103)  -21 x 12 mm posterior left hilar node with SUV max 13.7 (image 109)  -16 x 13 mm left hilar node with SUV max 14 (image 1:15)  -10 x 10 mm subcarinal node with SUV max 6.3 (image 111)     7/12/2023-Biopsy of LLL:  LEFT LOWER LOBE OF LUNG, CORE BIOPSIES:   - LUNG ADENOCARCINOMA WITH PLEOMORPHIC FEATURES.   PDL1/TPS: 95%  ALK/BRAF/EGFR/KRAS/RET/ROS1/MET NEGATIVE     7/21/2023:  MRI brain: no mets.    Carbo/Taxol/XRT:  8/8/2023-9/12/2023     Plan: Atezolizumab 1200mg every 3 weeks x 16    9/30/2023-CTA Chest:  1. Segmental right upper lobe pulmonary embolus.  2. Cavitating and spiculated posterior left lower lobe lung mass, decreased slightly in size compared to the prior chest CT exam.  3. Upper lobe predominant centrilobular and subpleural emphysema, with coarse mixed interstitial and alveolar infiltrate along the mediastinal border left upper lobe suggesting reactive or infectious pneumonitis, and with mild posterior bilateral upper lobe groundglass infiltrates.    10/4/2023  Admitted for 3 days with fever to 101, new Dx of PE and splenic infarcts. Echo normal. Started on Xarelto and abx.     9/3/2023-CT abd/p:  IMPRESSION: There are hypodensities within the spleen concerning for splenic infarcts. These can be associated with endocarditis. The patient also has known pulmonary emboli. Transesophageal echocardiogram may be warranted.    10/3/2023-  TTE normal    Atezolizumab  Cycle 1: 10/23/2023  Cycle 2: 11/13/2023  Cycle 3: 12/4/2023  Cycle  4: 12/25/2023-due    11/10/2023-CT CAP  IMPRESSION:  1. Nodular density in the posterior left lower lobe, essentially unchanged from the previous exam when accounting for technique.     2. Scattered patchy airspace opacities throughout the left lung, increased in size/distribution from the previous exam, the differential includes posttreatment/radiation change, atelectasis/scarring, infection/pneumonia, noting malignancy is not excluded and interval follow-up would be warranted.     3. Enlarging left cervical/supraclavicular lymphadenopathy.     4. Small wedge-shaped hypodensities in the spleen, in keeping with small splenic infarcts, similar in distribution the previous exam.    12/11/2023-CTA Chest  No PE-see report, ? pneumonia    Chief Complaint:  No chief complaint on file.  Stage IIII lung cancer, pulmonary embolism, splenic infarct follow up    History of Present Illness:   Aysha Moore is a 68 y.o. female who presents for follow up of lung cancer.     Patient had her first cycle of Atezolizumab and feels she did well with this.      She does continue to be profoundly fatigued since getting out of the hospital.  She is not sob at rest but is easily winded with minimal activity.   Also complained of fever over the weekend up to 101 but his has resolved.     She continues on Xarleto as of today, 11/7/2023 and doing ok with this.       Family and Social history reviewed and is unchanged from 7/27/2023      ROS:  Review of Systems   Constitutional:  Negative for fever.   Respiratory:  Negative for shortness of breath.    Cardiovascular:  Negative for chest pain and leg swelling.   Gastrointestinal:  Negative for abdominal pain and blood in stool.   Genitourinary:  Negative for hematuria.   Skin:  Negative for rash.          Current Outpatient Medications:     albuterol (PROVENTIL/VENTOLIN HFA) 90 mcg/actuation inhaler, Inhale 2 puffs into the lungs every 6 (six) hours as needed. Rescue, Disp: 18 g, Rfl: 5     ALPRAZolam (XANAX) 0.25 MG tablet, Take 1 tablet (0.25 mg total) by mouth 3 (three) times daily as needed for Anxiety., Disp: 30 tablet, Rfl: 1    ammonium lactate 12 % Crea, aaa bid prn dry skin (Patient taking differently: Apply 1 g topically 2 (two) times daily as needed. aaa bid prn dry skin), Disp: 385 g, Rfl: 11    atorvastatin (LIPITOR) 80 MG tablet, Take 1 tablet (80 mg total) by mouth every evening., Disp: 90 tablet, Rfl: 1    azelastine (ASTELIN) 137 mcg (0.1 %) nasal spray, USE 1 SPRAY IN EACH NOSTRIL 2 TIMES DAILY AS NEEDED FOR RHINITIS OR SINUSITIS, Disp: 90 mL, Rfl: 0    busPIRone (BUSPAR) 5 MG Tab, TAKE 1 TABLET BY MOUTH TWICE A DAY, Disp: 180 tablet, Rfl: 1    calcium-vitamin D3 (OS-JOLIE 500 + D3) 500 mg-5 mcg (200 unit) per tablet, Take 1 tablet by mouth every morning., Disp: , Rfl:     citalopram (CELEXA) 20 MG tablet, TAKE 1 TABLET BY MOUTH EVERY DAY, Disp: 90 tablet, Rfl: 1    clobetasol 0.05% (TEMOVATE) 0.05 % Oint, Apply topically 2 (two) times daily. for 14 days (Patient taking differently: Apply 1 g topically 2 (two) times daily.), Disp: 45 g, Rfl: 3    ezetimibe (ZETIA) 10 mg tablet, Take 1 tablet (10 mg total) by mouth once daily., Disp: 90 tablet, Rfl: 3    famotidine (PEPCID) 40 MG tablet, TAKE 1 TABLET BY MOUTH EVERY DAY IN THE EVENING, Disp: 90 tablet, Rfl: 1    fluticasone propionate (FLONASE) 50 mcg/actuation nasal spray, 1 spray (50 mcg total) by Each Nostril route daily as needed for Rhinitis or Allergies., Disp: 9.9 mL, Rfl: 2    HYDROcodone-acetaminophen (NORCO) 5-325 mg per tablet, Take 1 tablet by mouth every 4 to 6 hours as needed for Pain., Disp: 60 tablet, Rfl: 0    levoFLOXacin (LEVAQUIN) 750 MG tablet, Take 1 tablet (750 mg total) by mouth once daily., Disp: 10 tablet, Rfl: 0    lisinopriL (PRINIVIL,ZESTRIL) 2.5 MG tablet, Take 1 tablet (2.5 mg total) by mouth every evening., Disp: 90 tablet, Rfl: 3    metoprolol succinate (TOPROL-XL) 50 MG 24 hr tablet, Take 1 tablet (50 mg  "total) by mouth once daily., Disp: 90 tablet, Rfl: 3    nitroGLYCERIN (NITROSTAT) 0.4 MG SL tablet, Place 1 tablet (0.4 mg total) under the tongue every 5 (five) minutes as needed for Chest pain., Disp: 30 tablet, Rfl: 0    omeprazole (PRILOSEC) 40 MG capsule, Take 1 capsule (40 mg total) by mouth once daily., Disp: 30 capsule, Rfl: 11    ondansetron (ZOFRAN) 8 MG tablet, Take 1 tablet (8 mg total) by mouth every 8 (eight) hours as needed. (Patient taking differently: Take 8 mg by mouth every 8 (eight) hours as needed for Nausea.), Disp: 30 tablet, Rfl: 5    predniSONE (DELTASONE) 10 MG tablet, Take 3 a day x 5 days then 2 a day x 5 days then 1 a day x 5 days, Disp: 30 tablet, Rfl: 0    promethazine (PHENERGAN) 25 MG tablet, Take 1 tablet (25 mg total) by mouth every 4 to 6 hours as needed. (Patient taking differently: Take 25 mg by mouth every 4 to 6 hours as needed for Nausea.), Disp: 30 tablet, Rfl: 5    rivaroxaban (XARELTO) 15 mg Tab, Take 1 tablet (15 mg total) by mouth daily with dinner or evening meal., Disp: 42 tablet, Rfl: 0    tiZANidine (ZANAFLEX) 4 MG tablet, TAKE 1 TABLET BY MOUTH EVERY 6 HOURS AS NEEDED FOR BACK PAIN, Disp: 360 tablet, Rfl: 0    traMADoL (ULTRAM) 50 mg tablet, Take 1 tablet (50 mg total) by mouth every 6 (six) hours as needed for Pain., Disp: 30 tablet, Rfl: 2    umeclidinium-vilanteroL (ANORO ELLIPTA) 62.5-25 mcg/actuation DsDv, Inhale 1 puff into the lungs once daily. Controller, Disp: 1 each, Rfl: 5    valACYclovir (VALTREX) 1000 MG tablet, TAKE 2 AT ONSET OF FEVER BLISTERS THEN REPEAT IN 12 HOURS (TOTAL 4 TABS PER EPISODE), Disp: 20 tablet, Rfl: 3    zinc 50 mg Tab, Take 1 tablet by mouth once daily., Disp: , Rfl:   No current facility-administered medications for this visit.        Objective:       Physical Examination:     BP (!) 101/55   Pulse 89   Temp 98.7 °F (37.1 °C)   Resp 18   Ht 5' 3" (1.6 m)   Wt 61.7 kg (136 lb)   SpO2 (!) 94%   BMI 24.09 kg/m²     Physical " Exam  Constitutional:       Appearance: Normal appearance.   HENT:      Head: Normocephalic and atraumatic.   Eyes:      General: No scleral icterus.     Conjunctiva/sclera: Conjunctivae normal.   Cardiovascular:      Rate and Rhythm: Normal rate.   Pulmonary:      Effort: Pulmonary effort is normal.   Abdominal:      General: Abdomen is flat.   Neurological:      General: No focal deficit present.      Mental Status: She is alert and oriented to person, place, and time.   Psychiatric:         Mood and Affect: Mood normal.         Behavior: Behavior normal.         Thought Content: Thought content normal.         Judgment: Judgment normal.         Labs:   Recent Results (from the past 336 hour(s))   CBC Auto Differential    Collection Time: 12/07/23  2:07 PM   Result Value Ref Range    WBC 6.43 3.90 - 12.70 K/uL    Hemoglobin 12.7 12.0 - 16.0 g/dL    Hematocrit 38.7 37.0 - 48.5 %    Platelets 296 150 - 450 K/uL   CBC Auto Differential    Collection Time: 11/29/23  1:39 PM   Result Value Ref Range    WBC 5.23 3.90 - 12.70 K/uL    Hemoglobin 12.7 12.0 - 16.0 g/dL    Hematocrit 39.2 37.0 - 48.5 %    Platelets 282 150 - 450 K/uL       CMP  Sodium   Date Value Ref Range Status   12/07/2023 138 136 - 145 mmol/L Final     Potassium   Date Value Ref Range Status   12/07/2023 3.8 3.5 - 5.1 mmol/L Final     Chloride   Date Value Ref Range Status   12/07/2023 104 95 - 110 mmol/L Final     CO2   Date Value Ref Range Status   12/07/2023 25 23 - 29 mmol/L Final     Glucose   Date Value Ref Range Status   12/07/2023 99 70 - 110 mg/dL Final     BUN   Date Value Ref Range Status   12/07/2023 24 (H) 8 - 23 mg/dL Final     Creatinine   Date Value Ref Range Status   12/07/2023 0.9 0.5 - 1.4 mg/dL Final     Calcium   Date Value Ref Range Status   12/07/2023 9.4 8.7 - 10.5 mg/dL Final     Total Protein   Date Value Ref Range Status   12/07/2023 7.1 6.0 - 8.4 g/dL Final     Albumin   Date Value Ref Range Status   12/07/2023 4.2 3.5 - 5.2  "g/dL Final     Total Bilirubin   Date Value Ref Range Status   12/07/2023 0.9 0.1 - 1.0 mg/dL Final     Comment:     For infants and newborns, interpretation of results should be based  on gestational age, weight and in agreement with clinical  observations.    Premature Infant recommended reference ranges:  Up to 24 hours.............<8.0 mg/dL  Up to 48 hours............<12.0 mg/dL  3-5 days..................<15.0 mg/dL  6-29 days.................<15.0 mg/dL       Alkaline Phosphatase   Date Value Ref Range Status   12/07/2023 67 55 - 135 U/L Final     AST   Date Value Ref Range Status   12/07/2023 13 10 - 40 U/L Final     ALT   Date Value Ref Range Status   12/07/2023 13 10 - 44 U/L Final     Anion Gap   Date Value Ref Range Status   12/07/2023 9 8 - 16 mmol/L Final     eGFR if    Date Value Ref Range Status   03/29/2022 >60.0 >60 mL/min/1.73 m^2 Final     eGFR if non    Date Value Ref Range Status   03/29/2022 >60.0 >60 mL/min/1.73 m^2 Final     Comment:     Calculation used to obtain the estimated glomerular filtration  rate (eGFR) is the CKD-EPI equation.        No results found for: "CEA"  No results found for: "PSA"        Assessment/Plan:     Problem List Items Addressed This Visit       LUNG CANCER-ADENOCARCINOMA OF THE LLL - Primary     Patient has continued sob and new CTA shows some uptake concerning for inflammation.  Doubt infection at this time but am concerned about possible pneumonitis from IO therapy.  She has had 3 treatments and this could be the problem.  Also possible this this is radiation pneumonitis.  Will hold on the therapy and ask Dr. Abad his opinion on this matter.           Acute pulmonary embolism     Will continue on anticoagulation.  PE resolved on recent scan.           Discussion:     Follow up in about 4 weeks (around 1/9/2024).      Electronically signed by Raad Bee      "

## 2023-12-14 ENCOUNTER — TELEPHONE (OUTPATIENT)
Dept: HEMATOLOGY/ONCOLOGY | Facility: CLINIC | Age: 68
End: 2023-12-14

## 2023-12-14 NOTE — TELEPHONE ENCOUNTER
----- Message from Raad Hankins MD sent at 12/14/2023 12:21 PM CST -----  Kristie,  call Ms. Moore.  I spoke to Dr. Abad.  Thinking this is more likely infection then pneumonitis in the lung but still need to hold her therapy till I see her again in one month.  Will discuss further.   ----- Message -----  From: Raad Abad MD  Sent: 12/13/2023   4:26 PM CST  To: Raad Hankins MD    I looked at this - it is pretty focal so makes me want to think there is some infection - at this point let's see how she does clinically.  The pneumonitis diagnosis is always a little difficult particularly with smaller infiltrates like this    ----- Message -----  From: Raad Hankins MD  Sent: 12/12/2023   2:53 PM CST  To: MD Samia Henderson, wondering your thoughts here.      She had a new CTA done yesterday.  Continues to be sob and I don't know if this is radiation pneumonitis vs autoimmune from the immunotherapy.  The IO therapy appears the be working well and hate to have to stop it but will if I have to.  Appreciate your thoughts here.     Juan

## 2023-12-14 NOTE — TELEPHONE ENCOUNTER
Spoke to pt and notified her that Dr. Hankins spoke to Dr. Abad and they are thinking that this is infection in the lung. Per Dr. Hankins, therapy will still be held for at least another month until he sees pt again. He will discuss this more with her then. Pt verbalized understanding.

## 2023-12-21 ENCOUNTER — TELEPHONE (OUTPATIENT)
Dept: INFUSION THERAPY | Facility: HOSPITAL | Age: 68
End: 2023-12-21

## 2023-12-28 ENCOUNTER — OFFICE VISIT (OUTPATIENT)
Dept: PULMONOLOGY | Facility: CLINIC | Age: 68
End: 2023-12-28
Payer: MEDICARE

## 2023-12-28 VITALS
HEART RATE: 80 BPM | DIASTOLIC BLOOD PRESSURE: 80 MMHG | OXYGEN SATURATION: 98 % | WEIGHT: 138 LBS | SYSTOLIC BLOOD PRESSURE: 118 MMHG | BODY MASS INDEX: 24.45 KG/M2

## 2023-12-28 DIAGNOSIS — J43.9 PULMONARY EMPHYSEMA, UNSPECIFIED EMPHYSEMA TYPE: Primary | ICD-10-CM

## 2023-12-28 DIAGNOSIS — Z87.891 PERSONAL HISTORY OF TOBACCO USE, PRESENTING HAZARDS TO HEALTH: ICD-10-CM

## 2023-12-28 DIAGNOSIS — R91.8 ABNORMAL CT SCAN OF LUNG: ICD-10-CM

## 2023-12-28 DIAGNOSIS — C34.32 MALIGNANT NEOPLASM OF LOWER LOBE OF LEFT LUNG: ICD-10-CM

## 2023-12-28 PROCEDURE — 99214 PR OFFICE/OUTPT VISIT, EST, LEVL IV, 30-39 MIN: ICD-10-PCS | Mod: S$GLB,,, | Performed by: INTERNAL MEDICINE

## 2023-12-28 PROCEDURE — 99214 OFFICE O/P EST MOD 30 MIN: CPT | Mod: S$GLB,,, | Performed by: INTERNAL MEDICINE

## 2023-12-28 NOTE — PROGRESS NOTES
Office Visit    Patient Name: Aysha Moore  MRN: [id number]  : 1955      Reason for visit: COPD    HPI:     2019 - Here for evaluation of COPD.  Has been hospitalized twice for respiratory issues.  Here more recently has noted some symptoms but has been out of BREO for a week or so.  Has a h/o smoking - has quit cigarettes but is using a vape.  Has smoked about 1 PPD for about 40 years.    2019 - Here for follow up, no new issues reported.  Still vaping (d/we pt).  Reviewed PFT with pt.  Pt is currently stable on present medications with no recent increases in their symptoms or use of rescue medications.  I have reviewed the medical regimen and re-educated the pt on the role of rescue and controlling medications.  All questions answered.  Inhaler technique seems adequate.    2021 - Here for follow up, Pt is currently stable on present medications with no recent increases in their symptoms or use of rescue medications.  Since our last visit there have been no hospitalizations or ER visits for their respiratory issues and there does not seem to be anything to suggest unrecognized exacerbations.  I have reviewed the medical regimen and re-educated the pt on the role of rescue and controlling medications.  Inhaler technique and understanding seems adequate.  The patient reports no issues with any of there medications for their COPD.  Refills will be taken care of as needed.  All questions answered.  She stopped singulair - she felt that it made her short tempered and she is better since stopping it.  She had a MI recently.  Continues with some sinus infection - seen at Urgent Care given doxycycline but developed itching.  Still with symptoms.  Patient has no known corona virus exposures and has been practicing social distancing.  We have discussed the virus and precautions and all questions have been answered.    8/3/2021 - Here for follow up, Pt is currently stable on present medications with no  recent increases in their symptoms or use of rescue medications.  Since our last visit there have been no hospitalizations or ER visits for their respiratory issues and there does not seem to be anything to suggest unrecognized exacerbations.  I have reviewed the medical regimen and re-educated the pt on the role of rescue and controlling medications.  Inhaler technique and understanding seems adequate.  The patient reports no issues with any of there medications for their COPD.  Refills will be taken care of as needed.  All questions answered.  Has been taken off of lopressor due to decreased BP.  Patient has no known corona virus exposures and has been practicing social distancing.  We have discussed the virus and precautions and all questions have been answered.    2/1/2022 - Here for follow up, was sick for about 3 weeks around [place] (had known Covid exposure, had HA, sore throat, weak, cough, increased dyspnea, low grade fever).  She did 2 rapid tests which were negative but is interested in getting Covid antibodies checked.  Pt is currently stable on present medications with no recent increases in their symptoms or use of rescue medications.  Since our last visit there have been no hospitalizations or ER visits for their respiratory issues and there does not seem to be anything to suggest unrecognized exacerbations.  I have reviewed the medical regimen and re-educated the pt on the role of rescue and controlling medications.  Inhaler technique and understanding seems adequate.  The patient reports no issues with any of there medications for their COPD.  Refills will be taken care of as needed.  All questions answered.  She has been otherwise stable except for recent illness.  She has not been vaccinated for Covid.    10/5/2022 - Here for follow p, no new issues or problems reported.  We reviewed her CT scans and she will need a follow up CT in 5/2023.  Pt is currently stable on present medications with no  recent increases in their symptoms or use of rescue medications.  Since our last visit there have been no hospitalizations or ER visits for their respiratory issues and there does not seem to be anything to suggest unrecognized exacerbations.  I have reviewed the medical regimen and re-educated the pt on the role of rescue and controlling medications.  Inhaler technique and understanding seems adequate.  The patient reports no issues with any of there medications for their COPD.  Refills will be taken care of as needed.  All questions answered.  Did have Covid in early summer but was not very sick.    5/24/2023 - Here for follow up, Pt is currently stable on present medications with no recent increases in their symptoms or use of rescue medications.  Since our last visit there have been no hospitalizations or ER visits for their respiratory issues and there does not seem to be anything to suggest unrecognized exacerbations.  I have reviewed the medical regimen and re-educated the pt on the role of rescue and controlling medications.  Inhaler technique and understanding seems adequate.  The patient reports no issues with any of there medications for their COPD.  Refills will be taken care of as needed.  All questions answered.  Having issues with her sinuses and she is seeing Dr Chisholm and they are considering surgery.  Reviewed last PFT with her.  She has a skin lesion on her leg and is to see dermatology (she has a FMHx of melanoma and a prior melanoma).       6/20/2023 - Here for results - reviewed CT and PET scans with pt and daughter and all questions answered - we will proceed with Ct guided needle biopsy.    7/18/2023 - Here for biopsy results - + adenocarcinoma.  D/W pt and  and discussed PET findings (also reviewed PET scan).  At this point she needs MRI and referral to oncology.  All questions answered.    8/24/2023 - Here for follow up and so far is tolerating her therapy pretty well.  Asked her if  "she needs any help at home and she is OK.  She is eating a lot and has gained some weight.  No weakness issues.    9/6/2023 - PFT (8/16/23)  Mild obstruction (FEV1 - - 76%), improved with BD  No restriction  Moderate decrease DLCO (41%)    11/28/2023 - Here for follow up, was hospitalized with PE and is on treatments.  Last Ct scan reviewed (see below).  She has completed XRT and initial chemo and is on immunotherapy.  She has noted some increased SOB, DAILY for " a while".  No other new issues.    12/28/2023 - Here for follow up, feels OK but still with some exertional SOB.  No f,c,ns, cough, sputum.  We reviewed CT scans from 11/2023 and 12/2023 and I think the later one actually is better.  Her immunotherapy was held this month.    Low Dose Screening CT Chest    Cigarettes - 1 PPD x 40 YEARS  Still smoking -   NO  QUIT 3/2019     Date of last LD CT - 5/2022    Result - Category 1, needs repeat 5/2023    I have discussed with pt about using a screening CT of the chest due to history of cigarette smoking.  We have discussed the possible findings and possible actions as a result of these findings.  The pt would like to proceed.    COPD Flowsheet    GOLD A    Last PFT - 8/2019  FEV1- 74 % DLCO - 50 %     + LABA/LAMA    + prn ALBUTEROL    mMRC -  0 - SOB with strenuous exercise   - + 1 - SOB level ground, slight hill   -  2 - SOB walk slower or stop for breath level ground   -  3 - SOB at 100 yards or after few minutes   -  4 - SOB in house, dressing    Referral to PULMONARY REHABILITATION - NO    Tested for alpha-1-antitrypsin - NO  Result - na    Cigarette Counseling    Currently smoking 0 packs per day  40 pack years    Vaping a little    I have counseled pt for 3-5 minutes regarding cigarette cessation.  This has included the need to stop smoking as well as strategies, including but not limited to "cold turkey", CHANTIX (including risks and benefits of whitney drug), nicotine replacement and WELLBUTRIN.    Flu and " Pneumonia Vaccination    I have recommended that the patient get this years influenza vaccine.  We discussed the risks and benefits of this treatment.      I reviewed patient's current pneumonia vaccine status.  Patient needs vaccination.  We have discussed the current guidelines and recommendations for pneumonia vaccination.              Past Medical History    Past Medical History:   Diagnosis Date    Allergy     Anxiety     Arthritis     CAD (coronary artery disease)     coronary stents    Chronic back pain     lower back pain radiates to left leg    Chronic rhinitis     DAILY (dyspnea on exertion)     Hyperlipidemia     Hypertension     Lung cancer 07/01/2023    Melanoma in situ of left upper extremity     left thigh area    MI (myocardial infarction)     Seasonal allergic rhinitis 10/29/2020       Past Surgical History    Past Surgical History:   Procedure Laterality Date    BREAST SURGERY      breast reduction    CATARACT EXTRACTION, BILATERAL      coranary stent      EXCISION OF MELANOMA Left 3/30/2022    Procedure: EXCISION, MELANOMA;  Surgeon: David Mcpherson III, MD;  Location: [facility] OR;  Service: General;  Laterality: Left;    EXCISIONAL BIOPSY Left 3/30/2022    Procedure: EXCISIONAL BIOPSY;  Surgeon: David Mcpherson III, MD;  Location: [facility] OR;  Service: General;  Laterality: Left;    HYSTERECTOMY      total    INSERTION OF TUNNELED CENTRAL VENOUS CATHETER (CVC) WITH SUBCUTANEOUS PORT N/A 8/4/2023    Procedure: [id number];  Surgeon: Remi Mckeon Jr., MD;  Location: [facility] OR;  Service: General;  Laterality: N/A;    LEFT HEART CATHETERIZATION Left 10/16/2020    Procedure: Left heart cath;  Surgeon: Garrett Robins MD;  Location: [facility] CATH/EP LAB;  Service: Cardiology;  Laterality: Left;    OOPHORECTOMY      TOTAL REDUCTION MAMMOPLASTY Bilateral 2000       Medications      Current Outpatient Medications:     albuterol (PROVENTIL/VENTOLIN HFA) 90 mcg/actuation inhaler, Inhale 2  puffs into the lungs every 6 (six) hours as needed. Rescue, Disp: 18 g, Rfl: 5    ALPRAZolam (XANAX) 0.25 MG tablet, Take 1 tablet (0.25 mg total) by mouth 3 (three) times daily as needed for Anxiety., Disp: 30 tablet, Rfl: 1    ammonium lactate 12 % Crea, aaa bid prn dry skin (Patient taking differently: Apply 1 g topically 2 (two) times daily as needed. aaa bid prn dry skin), Disp: 385 g, Rfl: 11    atorvastatin (LIPITOR) 80 MG tablet, Take 1 tablet (80 mg total) by mouth every evening., Disp: 90 tablet, Rfl: 1    azelastine (ASTELIN) 137 mcg (0.1 %) nasal spray, USE 1 SPRAY IN EACH NOSTRIL 2 TIMES DAILY AS NEEDED FOR RHINITIS OR SINUSITIS, Disp: 90 mL, Rfl: 0    busPIRone (BUSPAR) 5 MG Tab, TAKE 1 TABLET BY MOUTH TWICE A DAY, Disp: 180 tablet, Rfl: 1    calcium-vitamin D3 (OS-JOLIE 500 + D3) 500 mg-5 mcg (200 unit) per tablet, Take 1 tablet by mouth every morning., Disp: , Rfl:     citalopram (CELEXA) 20 MG tablet, TAKE 1 TABLET BY MOUTH EVERY DAY, Disp: 90 tablet, Rfl: 1    clobetasol 0.05% (TEMOVATE) 0.05 % Oint, Apply topically 2 (two) times daily. for 14 days (Patient taking differently: Apply 1 g topically 2 (two) times daily.), Disp: 45 g, Rfl: 3    ezetimibe (ZETIA) 10 mg tablet, Take 1 tablet (10 mg total) by mouth once daily., Disp: 90 tablet, Rfl: 3    famotidine (PEPCID) 40 MG tablet, TAKE 1 TABLET BY MOUTH EVERY DAY IN THE EVENING, Disp: 90 tablet, Rfl: 1    fluticasone propionate (FLONASE) 50 mcg/actuation nasal spray, 1 spray (50 mcg total) by Each Nostril route daily as needed for Rhinitis or Allergies., Disp: 9.9 mL, Rfl: 2    HYDROcodone-acetaminophen (NORCO) 5-325 mg per tablet, Take 1 tablet by mouth every 4 to 6 hours as needed for Pain., Disp: 60 tablet, Rfl: 0    levoFLOXacin (LEVAQUIN) 750 MG tablet, Take 1 tablet (750 mg total) by mouth once daily., Disp: 10 tablet, Rfl: 0    lisinopriL (PRINIVIL,ZESTRIL) 2.5 MG tablet, Take 1 tablet (2.5 mg total) by mouth every evening., Disp: 90 tablet,  Rfl: 3    metoprolol succinate (TOPROL-XL) 50 MG 24 hr tablet, Take 1 tablet (50 mg total) by mouth once daily., Disp: 90 tablet, Rfl: 3    nitroGLYCERIN (NITROSTAT) 0.4 MG SL tablet, Place 1 tablet (0.4 mg total) under the tongue every 5 (five) minutes as needed for Chest pain., Disp: 30 tablet, Rfl: 0    omeprazole (PRILOSEC) 40 MG capsule, Take 1 capsule (40 mg total) by mouth once daily., Disp: 30 capsule, Rfl: 11    ondansetron (ZOFRAN) 8 MG tablet, Take 1 tablet (8 mg total) by mouth every 8 (eight) hours as needed. (Patient taking differently: Take 8 mg by mouth every 8 (eight) hours as needed for Nausea.), Disp: 30 tablet, Rfl: 5    predniSONE (DELTASONE) 10 MG tablet, Take 3 a day x 5 days then 2 a day x 5 days then 1 a day x 5 days, Disp: 30 tablet, Rfl: 0    promethazine (PHENERGAN) 25 MG tablet, Take 1 tablet (25 mg total) by mouth every 4 to 6 hours as needed. (Patient taking differently: Take 25 mg by mouth every 4 to 6 hours as needed for Nausea.), Disp: 30 tablet, Rfl: 5    rivaroxaban (XARELTO) 15 mg Tab, Take 1 tablet (15 mg total) by mouth daily with dinner or evening meal., Disp: 42 tablet, Rfl: 0    tiZANidine (ZANAFLEX) 4 MG tablet, TAKE 1 TABLET BY MOUTH EVERY 6 HOURS AS NEEDED FOR BACK PAIN, Disp: 360 tablet, Rfl: 0    traMADoL (ULTRAM) 50 mg tablet, Take 1 tablet (50 mg total) by mouth every 6 (six) hours as needed for Pain., Disp: 30 tablet, Rfl: 2    umeclidinium-vilanteroL (ANORO ELLIPTA) 62.5-25 mcg/actuation DsDv, Inhale 1 puff into the lungs once daily. Controller, Disp: 1 each, Rfl: 5    valACYclovir (VALTREX) 1000 MG tablet, TAKE 2 AT ONSET OF FEVER BLISTERS THEN REPEAT IN 12 HOURS (TOTAL 4 TABS PER EPISODE), Disp: 20 tablet, Rfl: 3    zinc 50 mg Tab, Take 1 tablet by mouth once daily., Disp: , Rfl:     Allergies    Review of patient's allergies indicates:   Allergen Reactions    Cephalexin      Other reaction(s): Rash    Doxycycline monohydrate Hives    Lanoxin  [digoxin]      Other  reaction(s): Rash    Lansoprazole      Other reaction(s): Rash    Macrobid [nitrofurantoin monohyd/m-cryst] Rash       SocHx    Social History     Tobacco Use   Smoking Status Former    Current packs/day: 0.00    Average packs/day: 1 pack/day for 43.2 years (43.2 ttl pk-yrs)    Types: Cigarettes, Vaping with nicotine    Start date:     Quit date: 3/4/2017    Years since quittin.8   Smokeless Tobacco Never       Social History     Substance and Sexual Activity   Alcohol Use Not Currently    Alcohol/week: 0.0 standard drinks of alcohol    Comment: sometimes       Drug Use - no  Occupation - housewife  Asbestos exposure - no  Pets - dogs    FMHx    Family History   Problem Relation Age of Onset    Cancer Mother         breast, ovarian, cervical     Heart disease Mother     Breast cancer Mother 50    Ovarian cancer Mother     Cancer Father         melanoma    Stroke Sister     Heart disease Sister     Cancer Sister         leukemia    Macular degeneration Sister     Heart disease Sister     Heart disease Brother     Lung cancer Brother     Cancer Maternal Uncle     Cancer Paternal Aunt         breast    Breast cancer Paternal Aunt 60    Cancer Paternal Uncle     Heart disease Maternal Grandmother     No Known Problems Daughter     No Known Problems Son          Review of Systems  Review of Systems   Constitutional:  Negative for chills, diaphoresis, fever, malaise/fatigue and weight loss.   HENT:  Positive for congestion and tinnitus. Negative for ear discharge, ear pain, hearing loss, nosebleeds, sinus pain and sore throat.         Has had tinnitus for years (has seen ENT)   Eyes:  Negative for pain.   Respiratory:  Positive for shortness of breath and wheezing. Negative for cough, hemoptysis, sputum production and stridor.    Cardiovascular:  Negative for chest pain, palpitations, orthopnea, claudication, leg swelling and PND.        H/o PCI   Gastrointestinal:  Negative for abdominal pain, blood in stool,  constipation, diarrhea, heartburn, melena, nausea and vomiting.   Genitourinary:  Negative for dysuria, flank pain, frequency, hematuria and urgency.   Musculoskeletal:  Positive for back pain, joint pain and neck pain. Negative for falls and myalgias.        Right knee meniscal injury   Skin:  Negative for itching and rash.   Neurological:  Negative for dizziness, tingling, tremors, sensory change, speech change, focal weakness, seizures, weakness and headaches.   Endo/Heme/Allergies:  Negative for environmental allergies.   Psychiatric/Behavioral:  Negative for depression, substance abuse and suicidal ideas. The patient is not nervous/anxious.        Physical Exam    Vitals:    12/28/23 1031   BP: 118/80   BP Location: Left arm   Patient Position: Sitting   BP Method: Medium (Manual)   Pulse: 80   SpO2: 98%   Weight: 62.6 kg (138 lb)       Physical Exam  Vitals and nursing note reviewed.   Constitutional:       General: She is not in acute distress.     Appearance: She is well-developed. She is ill-appearing. She is not toxic-appearing or diaphoretic.   HENT:      Head: Normocephalic and atraumatic.      Right Ear: External ear normal.      Left Ear: External ear normal.      Nose: Nose normal. No congestion or rhinorrhea.      Mouth/Throat:      Mouth: Mucous membranes are moist.      Pharynx: Oropharynx is clear. No oropharyngeal exudate.   Eyes:      General: No scleral icterus.        Right eye: No discharge.         Left eye: No discharge.      Extraocular Movements: Extraocular movements intact.      Conjunctiva/sclera: Conjunctivae normal.      Pupils: Pupils are equal, round, and reactive to light.   Neck:      Thyroid: No thyromegaly.      Vascular: No carotid bruit or JVD.      Trachea: No tracheal deviation.   Cardiovascular:      Rate and Rhythm: Normal rate and regular rhythm.      Heart sounds: Normal heart sounds. No murmur heard.     No friction rub. No gallop.   Pulmonary:      Effort: Pulmonary  effort is normal. No respiratory distress.      Breath sounds: No stridor. Rales (left base) present. No wheezing or rhonchi.   Chest:      Chest wall: No tenderness.   Abdominal:      General: Bowel sounds are normal. There is no distension.      Palpations: Abdomen is soft.      Tenderness: There is no abdominal tenderness. There is no guarding.   Musculoskeletal:         General: No tenderness. Normal range of motion.      Cervical back: Normal range of motion and neck supple. No rigidity or tenderness.      Right lower leg: No edema.      Left lower leg: No edema.   Lymphadenopathy:      Cervical: No cervical adenopathy.   Skin:     General: Skin is warm and dry.   Neurological:      General: No focal deficit present.      Mental Status: She is alert and oriented to person, place, and time. Mental status is at baseline.      Cranial Nerves: No cranial nerve deficit.      Motor: No weakness.   Psychiatric:         Mood and Affect: Mood normal.         Behavior: Behavior normal.         Thought Content: Thought content normal.         Judgment: Judgment normal.         Labs    Lab Results   Component Value Date    WBC 6.43 12/07/2023    HGB 12.7 12/07/2023    HCT 38.7 12/07/2023     12/07/2023       Sodium   Date Value Ref Range Status   12/07/2023 138 136 - 145 mmol/L Final     Potassium   Date Value Ref Range Status   12/07/2023 3.8 3.5 - 5.1 mmol/L Final     Chloride   Date Value Ref Range Status   12/07/2023 104 95 - 110 mmol/L Final     CO2   Date Value Ref Range Status   12/07/2023 25 23 - 29 mmol/L Final     Glucose   Date Value Ref Range Status   12/07/2023 99 70 - 110 mg/dL Final     BUN   Date Value Ref Range Status   12/07/2023 24 (H) 8 - 23 mg/dL Final     Creatinine   Date Value Ref Range Status   12/07/2023 0.9 0.5 - 1.4 mg/dL Final     Calcium   Date Value Ref Range Status   12/07/2023 9.4 8.7 - 10.5 mg/dL Final     Total Protein   Date Value Ref Range Status   12/07/2023 7.1 6.0 - 8.4 g/dL  Final     Albumin   Date Value Ref Range Status   12/07/2023 4.2 3.5 - 5.2 g/dL Final     Total Bilirubin   Date Value Ref Range Status   12/07/2023 0.9 0.1 - 1.0 mg/dL Final     Comment:     For infants and newborns, interpretation of results should be based  on gestational age, weight and in agreement with clinical  observations.    Premature Infant recommended reference ranges:  Up to 24 hours.............<8.0 mg/dL  Up to 48 hours............<12.0 mg/dL  3-5 days..................<15.0 mg/dL  6-29 days.................<15.0 mg/dL       Alkaline Phosphatase   Date Value Ref Range Status   12/07/2023 67 55 - 135 U/L Final     AST   Date Value Ref Range Status   12/07/2023 13 10 - 40 U/L Final     ALT   Date Value Ref Range Status   12/07/2023 13 10 - 44 U/L Final     Anion Gap   Date Value Ref Range Status   12/07/2023 9 8 - 16 mmol/L Final       Xrays    CT chest (5/2022)  1.  No pulmonary nodules identified.  2.  Mild/moderate emphysematous lung disease.  3.  No consolidation or pleural effusion.     LUNG RADS CATEGORY 1: NEGATIVE    CT chest (11/10/23)  1. Nodular density in the posterior left lower lobe, essentially unchanged from the previous exam when accounting for technique.  2. Scattered patchy airspace opacities throughout the left lung, increased in size/distribution from the previous exam, the differential includes posttreatment/radiation change, atelectasis/scarring, infection/pneumonia, noting malignancy is not excluded and interval follow-up would be warranted.  3. Enlarging left cervical/supraclavicular lymphadenopathy.  4. Small wedge-shaped hypodensities in the spleen, in keeping with small splenic infarcts, similar in distribution the previous exam    CT chest (12/11/23)  There is no CT evidence of acute pulmonary thromboembolism. There are ill-defined groundglass opacities in the left upper lobe that are new since the most recent previous CT chest dated 11/10/2023, and are suspicious for  pneumonia. Patchy areas of consolidation in the left lower lobe shown on the previous CT are somewhat improved. Significant residual atelectasis and/or scarring persists in the left lower lobe. Moderate emphysema is present.     Impression/Plan    Problem List Items Addressed This Visit          Pulmonary    Pulmonary emphysema - Primary  Continue present medications.  Will refill medications as needed.  Instructed patient to contact us with any issues concerning their medications (cost, reactions, etc.).  Have discussed with patient about inciting conditions which may exacerbate their disease.  We did discuss possible new therapies or de-escalation of therapy (if appropriate).  Asked patient if they were interested in pursuing pulmonary rehabilitation.  All questions answered  RTC 1 months  Patient instructed that they are to call if symptoms change or new issues develop prior to their next visit.                     Other    Personal history of tobacco use, presenting hazards to health   has quit      Adenocarcinoma lung  As above  CT findings as above - out of an abundance of caution will repeat CT early January and hopefully we can restart immunotherapy next month.                          Raad Abad MD

## 2023-12-28 NOTE — Clinical Note
Patient to discontinue Restasis and begin Nathan Loyola. Provided patient a sample. If patient likes the Nathan Rosase she can call us and we can call her in a prescription. Please see my note I think that the newer CT scan looks better (not a new finding in [facility]).  I will recheck a ct in a few weeks and I think we should be able to resume her immunotherapy.

## 2024-01-01 PROBLEM — I26.99 ACUTE PULMONARY EMBOLISM: Status: RESOLVED | Noted: 2023-09-30 | Resolved: 2024-01-01

## 2024-01-04 ENCOUNTER — LAB VISIT (OUTPATIENT)
Dept: LAB | Facility: HOSPITAL | Age: 69
End: 2024-01-04
Attending: NURSE PRACTITIONER
Payer: MEDICARE

## 2024-01-04 DIAGNOSIS — R53.83 FATIGUE, UNSPECIFIED TYPE: ICD-10-CM

## 2024-01-04 DIAGNOSIS — C34.32 MALIGNANT NEOPLASM OF LOWER LOBE OF LEFT LUNG: ICD-10-CM

## 2024-01-04 LAB
ALBUMIN SERPL BCP-MCNC: 4.1 G/DL (ref 3.5–5.2)
ALP SERPL-CCNC: 66 U/L (ref 55–135)
ALT SERPL W/O P-5'-P-CCNC: 18 U/L (ref 10–44)
ANION GAP SERPL CALC-SCNC: 7 MMOL/L (ref 8–16)
AST SERPL-CCNC: 16 U/L (ref 10–40)
BASOPHILS # BLD AUTO: 0.02 K/UL (ref 0–0.2)
BASOPHILS NFR BLD: 0.4 % (ref 0–1.9)
BILIRUB SERPL-MCNC: 0.8 MG/DL (ref 0.1–1)
BUN SERPL-MCNC: 19 MG/DL (ref 8–23)
CALCIUM SERPL-MCNC: 9 MG/DL (ref 8.7–10.5)
CHLORIDE SERPL-SCNC: 106 MMOL/L (ref 95–110)
CO2 SERPL-SCNC: 27 MMOL/L (ref 23–29)
CREAT SERPL-MCNC: 0.8 MG/DL (ref 0.5–1.4)
DIFFERENTIAL METHOD BLD: ABNORMAL
EOSINOPHIL # BLD AUTO: 0.1 K/UL (ref 0–0.5)
EOSINOPHIL NFR BLD: 2.1 % (ref 0–8)
ERYTHROCYTE [DISTWIDTH] IN BLOOD BY AUTOMATED COUNT: 12.7 % (ref 11.5–14.5)
EST. GFR  (NO RACE VARIABLE): >60 ML/MIN/1.73 M^2
GLUCOSE SERPL-MCNC: 116 MG/DL (ref 70–110)
HCT VFR BLD AUTO: 42 % (ref 37–48.5)
HGB BLD-MCNC: 14 G/DL (ref 12–16)
IMM GRANULOCYTES # BLD AUTO: 0.01 K/UL (ref 0–0.04)
IMM GRANULOCYTES NFR BLD AUTO: 0.2 % (ref 0–0.5)
LYMPHOCYTES # BLD AUTO: 0.9 K/UL (ref 1–4.8)
LYMPHOCYTES NFR BLD: 19.4 % (ref 18–48)
MCH RBC QN AUTO: 30.6 PG (ref 27–31)
MCHC RBC AUTO-ENTMCNC: 33.3 G/DL (ref 32–36)
MCV RBC AUTO: 92 FL (ref 82–98)
MONOCYTES # BLD AUTO: 0.7 K/UL (ref 0.3–1)
MONOCYTES NFR BLD: 14.7 % (ref 4–15)
NEUTROPHILS # BLD AUTO: 3.1 K/UL (ref 1.8–7.7)
NEUTROPHILS NFR BLD: 63.2 % (ref 38–73)
NRBC BLD-RTO: 0 /100 WBC
PLATELET # BLD AUTO: 246 K/UL (ref 150–450)
PMV BLD AUTO: 9.1 FL (ref 9.2–12.9)
POTASSIUM SERPL-SCNC: 3.6 MMOL/L (ref 3.5–5.1)
PROT SERPL-MCNC: 6.9 G/DL (ref 6–8.4)
RBC # BLD AUTO: 4.57 M/UL (ref 4–5.4)
SODIUM SERPL-SCNC: 140 MMOL/L (ref 136–145)
WBC # BLD AUTO: 4.84 K/UL (ref 3.9–12.7)

## 2024-01-04 PROCEDURE — 36415 COLL VENOUS BLD VENIPUNCTURE: CPT | Performed by: NURSE PRACTITIONER

## 2024-01-04 PROCEDURE — 80053 COMPREHEN METABOLIC PANEL: CPT | Performed by: NURSE PRACTITIONER

## 2024-01-04 PROCEDURE — 85025 COMPLETE CBC W/AUTO DIFF WBC: CPT | Performed by: NURSE PRACTITIONER

## 2024-01-07 DIAGNOSIS — G89.29 CHRONIC BILATERAL LOW BACK PAIN WITH LEFT-SIDED SCIATICA: ICD-10-CM

## 2024-01-07 DIAGNOSIS — M54.42 CHRONIC BILATERAL LOW BACK PAIN WITH LEFT-SIDED SCIATICA: ICD-10-CM

## 2024-01-07 NOTE — TELEPHONE ENCOUNTER
No care due was identified.  [facility] Embedded Care Due Messages. Reference number: [id number].   1/07/2024 7:31:17 AM CST

## 2024-01-08 ENCOUNTER — TELEPHONE (OUTPATIENT)
Dept: PULMONOLOGY | Facility: CLINIC | Age: 69
End: 2024-01-08

## 2024-01-08 DIAGNOSIS — R91.8 ABNORMAL CT SCAN OF LUNG: Primary | ICD-10-CM

## 2024-01-08 RX ORDER — TIZANIDINE 4 MG/1
4 TABLET ORAL EVERY 6 HOURS PRN
Qty: 360 TABLET | Refills: 0 | Status: SHIPPED | OUTPATIENT
Start: 2024-01-08

## 2024-01-08 NOTE — TELEPHONE ENCOUNTER
----- Message from Isaac Padilla sent at 1/8/2024  1:27 PM CST -----  Regarding: Ct Scan Orders  Contact: [phone]  The patient called stating she was to have CT Scan orders placed and she has not heard from anyone to schedule it. Please check to see if orders re placed. Thanks in advance for your assistance.

## 2024-01-08 NOTE — TELEPHONE ENCOUNTER
The patient called stating she was to have CT Scan orders placed and she has not heard from anyone to scheduling.  She said you had discussed it with her on her last visit 12/28/2024. Please advise

## 2024-01-17 ENCOUNTER — HOSPITAL ENCOUNTER (OUTPATIENT)
Dept: RADIOLOGY | Facility: HOSPITAL | Age: 69
Discharge: HOME | End: 2024-01-17
Attending: INTERNAL MEDICINE
Payer: MEDICARE

## 2024-01-17 DIAGNOSIS — R91.8 ABNORMAL CT SCAN OF LUNG: ICD-10-CM

## 2024-01-17 PROCEDURE — 71250 CT THORAX DX C-: CPT | Mod: TC,PO

## 2024-01-19 ENCOUNTER — OFFICE VISIT (OUTPATIENT)
Dept: HEMATOLOGY/ONCOLOGY | Facility: CLINIC | Age: 69
End: 2024-01-19
Payer: MEDICARE

## 2024-01-19 VITALS
HEART RATE: 101 BPM | HEIGHT: 63 IN | WEIGHT: 140.19 LBS | RESPIRATION RATE: 16 BRPM | TEMPERATURE: 98 F | SYSTOLIC BLOOD PRESSURE: 109 MMHG | DIASTOLIC BLOOD PRESSURE: 61 MMHG | BODY MASS INDEX: 24.84 KG/M2

## 2024-01-19 DIAGNOSIS — J06.9 VIRAL UPPER RESPIRATORY TRACT INFECTION: ICD-10-CM

## 2024-01-19 DIAGNOSIS — C34.32 MALIGNANT NEOPLASM OF LOWER LOBE OF LEFT LUNG: Primary | ICD-10-CM

## 2024-01-19 PROCEDURE — 99214 OFFICE O/P EST MOD 30 MIN: CPT | Performed by: INTERNAL MEDICINE

## 2024-01-19 PROCEDURE — 99214 OFFICE O/P EST MOD 30 MIN: CPT | Mod: S$PBB,,, | Performed by: INTERNAL MEDICINE

## 2024-01-19 NOTE — ASSESSMENT & PLAN NOTE
Patient likely has viral infection and will test for Covid tonight.  She will call and let me know if this is positive and if so, I will start her on Paxlovid.  Will check against her other medications prior to doing so.  Will also delay the start of her Atezo for another week to get over this.

## 2024-01-19 NOTE — PROGRESS NOTES
PROGRESS NOTE    Subjective:       Patient ID: Aysha Moore is a 68 y.o. female.    6/2/2023:  Screening CT chest:  2.6 x 2.5 x 3.2cm mass in the posterior LLL     6/20/2023:  PET scan:  35 x 21 mm lobulated pulmonary mass in the posterior left lower lobe with SUV max 11.6      FDG avid lymphadenopathy is present with index nodes outlined below:  -21 x 11 mm AP window node with SUV max 12.1 (image 103)  -21 x 12 mm posterior left hilar node with SUV max 13.7 (image 109)  -16 x 13 mm left hilar node with SUV max 14 (image 1:15)  -10 x 10 mm subcarinal node with SUV max 6.3 (image 111)     7/12/2023-Biopsy of LLL:  LEFT LOWER LOBE OF LUNG, CORE BIOPSIES:   - LUNG ADENOCARCINOMA WITH PLEOMORPHIC FEATURES.   PDL1/TPS: 95%  ALK/BRAF/EGFR/KRAS/RET/ROS1/MET NEGATIVE     7/21/2023:  MRI brain: no mets.    Carbo/Taxol/XRT:  8/8/2023-9/12/2023     Plan: Atezolizumab 1200mg every 3 weeks x 16    9/30/2023-CTA Chest:  1. Segmental right upper lobe pulmonary embolus.  2. Cavitating and spiculated posterior left lower lobe lung mass, decreased slightly in size compared to the prior chest CT exam.  3. Upper lobe predominant centrilobular and subpleural emphysema, with coarse mixed interstitial and alveolar infiltrate along the mediastinal border left upper lobe suggesting reactive or infectious pneumonitis, and with mild posterior bilateral upper lobe groundglass infiltrates.    10/4/2023  Admitted for 3 days with fever to 101, new Dx of PE and splenic infarcts. Echo normal. Started on Xarelto and abx.     9/3/2023-CT abd/p:  IMPRESSION: There are hypodensities within the spleen concerning for splenic infarcts. These can be associated with endocarditis. The patient also has known pulmonary emboli. Transesophageal echocardiogram may be warranted.    10/3/2023-  TTE normal    Atezolizumab  Cycle 1: 10/23/2023  Cycle 2: 11/13/2023  Cycle 3: 12/4/2023  Cycle  4: 12/25/2023-due    11/10/2023-CT CAP  IMPRESSION:  1. Nodular density in the posterior left lower lobe, essentially unchanged from the previous exam when accounting for technique.     2. Scattered patchy airspace opacities throughout the left lung, increased in size/distribution from the previous exam, the differential includes posttreatment/radiation change, atelectasis/scarring, infection/pneumonia, noting malignancy is not excluded and interval follow-up would be warranted.     3. Enlarging left cervical/supraclavicular lymphadenopathy.     4. Small wedge-shaped hypodensities in the spleen, in keeping with small splenic infarcts, similar in distribution the previous exam.    12/11/2023-CTA Chest  No PE-see report, ? Pneumonia    1/17/2023-CT chest:  1. Left upper lobe paramediastinal mass is stable due to radiation exposure chest with evidence of an prominent. Mediastinal scarring.  2. Advanced emphysematous changes along with evidence of abnormality scarring the left are probably proteinaceous bullous cavitary fibrotic focus and fibrotic changes in the left lower lobe is stable.  3. Small spiculated nodule in the right lower lobe 9 x 8 mm stable. Recommend follow-up within 6 months.       Chief Complaint:  No chief complaint on file.  Stage IIII lung cancer, pulmonary embolism, splenic infarct follow up    History of Present Illness:   Aysha Moore is a 68 y.o. female who presents for follow up of lung cancer.     Ms. Moore remains on break from Atezolizumab.  Scan done and spoke with Dr. Lemus who feels she can resume but now patient is feeling sick with cough and congestion and sore throat.      She continues on Xarleto as of today, 11/7/2023 and doing ok with this.       Family and Social history reviewed and is unchanged from 7/27/2023      ROS:  Review of Systems   Constitutional:  Negative for fever.   Respiratory:  Negative for shortness of breath.    Cardiovascular:  Negative for chest pain  and leg swelling.   Gastrointestinal:  Negative for abdominal pain and blood in stool.   Genitourinary:  Negative for hematuria.   Skin:  Negative for rash.          Current Outpatient Medications:     albuterol (PROVENTIL/VENTOLIN HFA) 90 mcg/actuation inhaler, Inhale 2 puffs into the lungs every 6 (six) hours as needed. Rescue, Disp: 18 g, Rfl: 5    ammonium lactate 12 % Crea, aaa bid prn dry skin (Patient taking differently: Apply 1 g topically 2 (two) times daily as needed. aaa bid prn dry skin), Disp: 385 g, Rfl: 11    atorvastatin (LIPITOR) 80 MG tablet, Take 1 tablet (80 mg total) by mouth every evening., Disp: 90 tablet, Rfl: 1    azelastine (ASTELIN) 137 mcg (0.1 %) nasal spray, USE 1 SPRAY IN EACH NOSTRIL 2 TIMES DAILY AS NEEDED FOR RHINITIS OR SINUSITIS, Disp: 90 mL, Rfl: 0    busPIRone (BUSPAR) 5 MG Tab, TAKE 1 TABLET BY MOUTH TWICE A DAY, Disp: 180 tablet, Rfl: 1    calcium-vitamin D3 (OS-JOLIE 500 + D3) 500 mg-5 mcg (200 unit) per tablet, Take 1 tablet by mouth every morning., Disp: , Rfl:     citalopram (CELEXA) 20 MG tablet, TAKE 1 TABLET BY MOUTH EVERY DAY, Disp: 90 tablet, Rfl: 1    ezetimibe (ZETIA) 10 mg tablet, Take 1 tablet (10 mg total) by mouth once daily., Disp: 90 tablet, Rfl: 3    famotidine (PEPCID) 40 MG tablet, TAKE 1 TABLET BY MOUTH EVERY DAY IN THE EVENING, Disp: 90 tablet, Rfl: 1    fluticasone propionate (FLONASE) 50 mcg/actuation nasal spray, 1 spray (50 mcg total) by Each Nostril route daily as needed for Rhinitis or Allergies., Disp: 9.9 mL, Rfl: 2    HYDROcodone-acetaminophen (NORCO) 5-325 mg per tablet, Take 1 tablet by mouth every 4 to 6 hours as needed for Pain., Disp: 60 tablet, Rfl: 0    levoFLOXacin (LEVAQUIN) 750 MG tablet, Take 1 tablet (750 mg total) by mouth once daily., Disp: 10 tablet, Rfl: 0    lisinopriL (PRINIVIL,ZESTRIL) 2.5 MG tablet, Take 1 tablet (2.5 mg total) by mouth every evening., Disp: 90 tablet, Rfl: 3    metoprolol succinate (TOPROL-XL) 50 MG 24 hr  tablet, Take 1 tablet (50 mg total) by mouth once daily., Disp: 90 tablet, Rfl: 3    nitroGLYCERIN (NITROSTAT) 0.4 MG SL tablet, Place 1 tablet (0.4 mg total) under the tongue every 5 (five) minutes as needed for Chest pain., Disp: 30 tablet, Rfl: 0    omeprazole (PRILOSEC) 40 MG capsule, Take 1 capsule (40 mg total) by mouth once daily., Disp: 30 capsule, Rfl: 11    ondansetron (ZOFRAN) 8 MG tablet, Take 1 tablet (8 mg total) by mouth every 8 (eight) hours as needed. (Patient taking differently: Take 8 mg by mouth every 8 (eight) hours as needed for Nausea.), Disp: 30 tablet, Rfl: 5    predniSONE (DELTASONE) 10 MG tablet, Take 3 a day x 5 days then 2 a day x 5 days then 1 a day x 5 days, Disp: 30 tablet, Rfl: 0    promethazine (PHENERGAN) 25 MG tablet, Take 1 tablet (25 mg total) by mouth every 4 to 6 hours as needed. (Patient taking differently: Take 25 mg by mouth every 4 to 6 hours as needed for Nausea.), Disp: 30 tablet, Rfl: 5    rivaroxaban (XARELTO) 15 mg Tab, Take 1 tablet (15 mg total) by mouth daily with dinner or evening meal., Disp: 42 tablet, Rfl: 0    tiZANidine (ZANAFLEX) 4 MG tablet, TAKE 1 TABLET BY MOUTH EVERY 6 HOURS AS NEEDED FOR BACK PAIN, Disp: 360 tablet, Rfl: 0    traMADoL (ULTRAM) 50 mg tablet, Take 1 tablet (50 mg total) by mouth every 6 (six) hours as needed for Pain., Disp: 30 tablet, Rfl: 2    umeclidinium-vilanteroL (ANORO ELLIPTA) 62.5-25 mcg/actuation DsDv, Inhale 1 puff into the lungs once daily. Controller, Disp: 1 each, Rfl: 5    valACYclovir (VALTREX) 1000 MG tablet, TAKE 2 AT ONSET OF FEVER BLISTERS THEN REPEAT IN 12 HOURS (TOTAL 4 TABS PER EPISODE), Disp: 20 tablet, Rfl: 3    zinc 50 mg Tab, Take 1 tablet by mouth once daily., Disp: , Rfl:     ALPRAZolam (XANAX) 0.25 MG tablet, Take 1 tablet (0.25 mg total) by mouth 3 (three) times daily as needed for Anxiety., Disp: 30 tablet, Rfl: 1    clobetasol 0.05% (TEMOVATE) 0.05 % Oint, Apply topically 2 (two) times daily. for 14 days  "(Patient taking differently: Apply 1 g topically 2 (two) times daily.), Disp: 45 g, Rfl: 3        Objective:       Physical Examination:     /61   Pulse 101   Temp 97.8 °F (36.6 °C)   Resp 16   Ht 5' 3" (1.6 m)   Wt 63.6 kg (140 lb 3.2 oz)   BMI 24.84 kg/m²     Physical Exam  Constitutional:       Appearance: Normal appearance.   HENT:      Head: Normocephalic and atraumatic.   Eyes:      General: No scleral icterus.     Conjunctiva/sclera: Conjunctivae normal.   Cardiovascular:      Rate and Rhythm: Normal rate.   Pulmonary:      Effort: Pulmonary effort is normal.   Abdominal:      General: Abdomen is flat.   Neurological:      General: No focal deficit present.      Mental Status: She is alert and oriented to person, place, and time.   Psychiatric:         Mood and Affect: Mood normal.         Behavior: Behavior normal.         Thought Content: Thought content normal.         Judgment: Judgment normal.         Labs:   No results found for this or any previous visit (from the past 336 hour(s)).      CMP  Sodium   Date Value Ref Range Status   01/04/2024 140 136 - 145 mmol/L Final     Potassium   Date Value Ref Range Status   01/04/2024 3.6 3.5 - 5.1 mmol/L Final     Chloride   Date Value Ref Range Status   01/04/2024 106 95 - 110 mmol/L Final     CO2   Date Value Ref Range Status   01/04/2024 27 23 - 29 mmol/L Final     Glucose   Date Value Ref Range Status   01/04/2024 116 (H) 70 - 110 mg/dL Final     BUN   Date Value Ref Range Status   01/04/2024 19 8 - 23 mg/dL Final     Creatinine   Date Value Ref Range Status   01/04/2024 0.8 0.5 - 1.4 mg/dL Final     Calcium   Date Value Ref Range Status   01/04/2024 9.0 8.7 - 10.5 mg/dL Final     Total Protein   Date Value Ref Range Status   01/04/2024 6.9 6.0 - 8.4 g/dL Final     Albumin   Date Value Ref Range Status   01/04/2024 4.1 3.5 - 5.2 g/dL Final     Total Bilirubin   Date Value Ref Range Status   01/04/2024 0.8 0.1 - 1.0 mg/dL Final     Comment:     " "For infants and newborns, interpretation of results should be based  on gestational age, weight and in agreement with clinical  observations.    Premature Infant recommended reference ranges:  Up to 24 hours.............<8.0 mg/dL  Up to 48 hours............<12.0 mg/dL  3-5 days..................<15.0 mg/dL  6-29 days.................<15.0 mg/dL       Alkaline Phosphatase   Date Value Ref Range Status   01/04/2024 66 55 - 135 U/L Final     AST   Date Value Ref Range Status   01/04/2024 16 10 - 40 U/L Final     ALT   Date Value Ref Range Status   01/04/2024 18 10 - 44 U/L Final     Anion Gap   Date Value Ref Range Status   01/04/2024 7 (L) 8 - 16 mmol/L Final     eGFR if    Date Value Ref Range Status   03/29/2022 >60.0 >60 mL/min/1.73 m^2 Final     eGFR if non    Date Value Ref Range Status   03/29/2022 >60.0 >60 mL/min/1.73 m^2 Final     Comment:     Calculation used to obtain the estimated glomerular filtration  rate (eGFR) is the CKD-EPI equation.        No results found for: "CEA"  No results found for: "PSA"        Assessment/Plan:     Problem List Items Addressed This Visit       Viral upper respiratory tract infection     Patient likely has viral infection and will test for Covid tonight.  She will call and let me know if this is positive and if so, I will start her on Paxlovid.  Will check against her other medications prior to doing so.  Will also delay the start of her Atezo for another week to get over this.          LUNG CANCER-ADENOCARCINOMA OF THE LLL - Primary     Patient is doing ok and the scan shows no new growth.  The IO therapy has been on hold and I discussed with Dr Abad who feels she can resume therapy.  This does not appear to be AI pneumonitis.  Will begin again on 1/29.            Discussion:     Follow up in about 4 weeks (around 2/16/2024).      Electronically signed by Raad Bee      "

## 2024-01-19 NOTE — ASSESSMENT & PLAN NOTE
Patient is doing ok and the scan shows no new growth.  The IO therapy has been on hold and I discussed with Dr Abad who feels she can resume therapy.  This does not appear to be AI pneumonitis.  Will begin again on 1/29.

## 2024-01-26 RX ORDER — EPINEPHRINE 0.3 MG/.3ML
0.3 INJECTION SUBCUTANEOUS ONCE AS NEEDED
Status: CANCELLED | OUTPATIENT
Start: 2024-01-29

## 2024-01-26 RX ORDER — SODIUM CHLORIDE 0.9 % (FLUSH) 0.9 %
10 SYRINGE (ML) INJECTION
Status: CANCELLED | OUTPATIENT
Start: 2024-01-29

## 2024-01-26 RX ORDER — DIPHENHYDRAMINE HYDROCHLORIDE 50 MG/ML
50 INJECTION INTRAMUSCULAR; INTRAVENOUS ONCE AS NEEDED
Status: CANCELLED | OUTPATIENT
Start: 2024-01-29

## 2024-01-26 RX ORDER — HEPARIN 100 UNIT/ML
500 SYRINGE INTRAVENOUS
Status: CANCELLED | OUTPATIENT
Start: 2024-01-29

## 2024-01-29 ENCOUNTER — INFUSION (OUTPATIENT)
Dept: INFUSION THERAPY | Facility: HOSPITAL | Age: 69
End: 2024-01-29
Attending: INTERNAL MEDICINE
Payer: MEDICARE

## 2024-01-29 VITALS
OXYGEN SATURATION: 95 % | WEIGHT: 140.13 LBS | DIASTOLIC BLOOD PRESSURE: 65 MMHG | HEIGHT: 63 IN | HEART RATE: 75 BPM | RESPIRATION RATE: 18 BRPM | SYSTOLIC BLOOD PRESSURE: 106 MMHG | BODY MASS INDEX: 24.83 KG/M2 | TEMPERATURE: 97 F

## 2024-01-29 DIAGNOSIS — R53.83 FATIGUE, UNSPECIFIED TYPE: ICD-10-CM

## 2024-01-29 DIAGNOSIS — C34.32 MALIGNANT NEOPLASM OF LOWER LOBE OF LEFT LUNG: ICD-10-CM

## 2024-01-29 DIAGNOSIS — C34.90 METASTATIC LUNG CANCER (METASTASIS FROM LUNG TO OTHER SITE), UNSPECIFIED LATERALITY: Primary | ICD-10-CM

## 2024-01-29 DIAGNOSIS — J32.9 SINUSITIS, UNSPECIFIED CHRONICITY, UNSPECIFIED LOCATION: Primary | ICD-10-CM

## 2024-01-29 LAB
ALBUMIN SERPL BCP-MCNC: 3.7 G/DL (ref 3.5–5.2)
ALP SERPL-CCNC: 76 U/L (ref 55–135)
ALT SERPL W/O P-5'-P-CCNC: 11 U/L (ref 10–44)
ANION GAP SERPL CALC-SCNC: 10 MMOL/L (ref 8–16)
AST SERPL-CCNC: 14 U/L (ref 10–40)
BASOPHILS # BLD AUTO: 0.02 K/UL (ref 0–0.2)
BASOPHILS NFR BLD: 0.4 % (ref 0–1.9)
BILIRUB SERPL-MCNC: 1.3 MG/DL (ref 0.1–1)
BUN SERPL-MCNC: 9 MG/DL (ref 8–23)
CALCIUM SERPL-MCNC: 9.2 MG/DL (ref 8.7–10.5)
CHLORIDE SERPL-SCNC: 107 MMOL/L (ref 95–110)
CO2 SERPL-SCNC: 22 MMOL/L (ref 23–29)
CREAT SERPL-MCNC: 0.7 MG/DL (ref 0.5–1.4)
DIFFERENTIAL METHOD BLD: ABNORMAL
EOSINOPHIL # BLD AUTO: 0.6 K/UL (ref 0–0.5)
EOSINOPHIL NFR BLD: 9.9 % (ref 0–8)
ERYTHROCYTE [DISTWIDTH] IN BLOOD BY AUTOMATED COUNT: 12.4 % (ref 11.5–14.5)
EST. GFR  (NO RACE VARIABLE): >60 ML/MIN/1.73 M^2
GLUCOSE SERPL-MCNC: 104 MG/DL (ref 70–110)
HCT VFR BLD AUTO: 37.3 % (ref 37–48.5)
HGB BLD-MCNC: 12.9 G/DL (ref 12–16)
IMM GRANULOCYTES # BLD AUTO: 0.02 K/UL (ref 0–0.04)
IMM GRANULOCYTES NFR BLD AUTO: 0.4 % (ref 0–0.5)
LYMPHOCYTES # BLD AUTO: 1.1 K/UL (ref 1–4.8)
LYMPHOCYTES NFR BLD: 20 % (ref 18–48)
MAGNESIUM SERPL-MCNC: 1.7 MG/DL (ref 1.6–2.6)
MCH RBC QN AUTO: 31.1 PG (ref 27–31)
MCHC RBC AUTO-ENTMCNC: 34.6 G/DL (ref 32–36)
MCV RBC AUTO: 90 FL (ref 82–98)
MONOCYTES # BLD AUTO: 1.1 K/UL (ref 0.3–1)
MONOCYTES NFR BLD: 19 % (ref 4–15)
NEUTROPHILS # BLD AUTO: 2.8 K/UL (ref 1.8–7.7)
NEUTROPHILS NFR BLD: 50.3 % (ref 38–73)
NRBC BLD-RTO: 0 /100 WBC
PLATELET # BLD AUTO: 265 K/UL (ref 150–450)
PMV BLD AUTO: 9.2 FL (ref 9.2–12.9)
POTASSIUM SERPL-SCNC: 4 MMOL/L (ref 3.5–5.1)
PROT SERPL-MCNC: 6.5 G/DL (ref 6–8.4)
RBC # BLD AUTO: 4.15 M/UL (ref 4–5.4)
SODIUM SERPL-SCNC: 139 MMOL/L (ref 136–145)
TSH SERPL DL<=0.005 MIU/L-ACNC: 0.34 UIU/ML (ref 0.34–5.6)
WBC # BLD AUTO: 5.54 K/UL (ref 3.9–12.7)

## 2024-01-29 PROCEDURE — 96413 CHEMO IV INFUSION 1 HR: CPT

## 2024-01-29 PROCEDURE — 83735 ASSAY OF MAGNESIUM: CPT | Performed by: NURSE PRACTITIONER

## 2024-01-29 PROCEDURE — 25000003 PHARM REV CODE 250: Performed by: NURSE PRACTITIONER

## 2024-01-29 PROCEDURE — 85025 COMPLETE CBC W/AUTO DIFF WBC: CPT | Performed by: NURSE PRACTITIONER

## 2024-01-29 PROCEDURE — A4216 STERILE WATER/SALINE, 10 ML: HCPCS | Performed by: NURSE PRACTITIONER

## 2024-01-29 PROCEDURE — 63600175 PHARM REV CODE 636 W HCPCS: Mod: JZ,JG | Performed by: NURSE PRACTITIONER

## 2024-01-29 PROCEDURE — 80053 COMPREHEN METABOLIC PANEL: CPT | Performed by: NURSE PRACTITIONER

## 2024-01-29 PROCEDURE — 84443 ASSAY THYROID STIM HORMONE: CPT | Performed by: NURSE PRACTITIONER

## 2024-01-29 RX ORDER — EPINEPHRINE 0.3 MG/.3ML
0.3 INJECTION SUBCUTANEOUS ONCE AS NEEDED
Status: DISCONTINUED | OUTPATIENT
Start: 2024-01-29 | End: 2024-01-29 | Stop reason: HOSPADM

## 2024-01-29 RX ORDER — HEPARIN 100 UNIT/ML
500 SYRINGE INTRAVENOUS
Status: DISCONTINUED | OUTPATIENT
Start: 2024-01-29 | End: 2024-01-29 | Stop reason: HOSPADM

## 2024-01-29 RX ORDER — SODIUM CHLORIDE 0.9 % (FLUSH) 0.9 %
10 SYRINGE (ML) INJECTION
Status: DISCONTINUED | OUTPATIENT
Start: 2024-01-29 | End: 2024-01-29 | Stop reason: HOSPADM

## 2024-01-29 RX ORDER — AMOXICILLIN AND CLAVULANATE POTASSIUM 875; 125 MG/1; MG/1
1 TABLET, FILM COATED ORAL 2 TIMES DAILY
Qty: 20 TABLET | Refills: 0 | Status: SHIPPED | OUTPATIENT
Start: 2024-01-29

## 2024-01-29 RX ORDER — DIPHENHYDRAMINE HYDROCHLORIDE 50 MG/ML
50 INJECTION INTRAMUSCULAR; INTRAVENOUS ONCE AS NEEDED
Status: DISCONTINUED | OUTPATIENT
Start: 2024-01-29 | End: 2024-01-29 | Stop reason: HOSPADM

## 2024-01-29 RX ADMIN — ATEZOLIZUMAB 1200 MG: 1200 INJECTION, SOLUTION INTRAVENOUS at 12:01

## 2024-01-29 RX ADMIN — HEPARIN 500 UNITS: 100 SYRINGE at 12:01

## 2024-01-29 RX ADMIN — SODIUM CHLORIDE, PRESERVATIVE FREE 10 ML: 5 INJECTION INTRAVENOUS at 12:01

## 2024-01-29 NOTE — PLAN OF CARE
Problem: Fatigue  Goal: Improved Activity Tolerance  Outcome: Ongoing, Progressing  Intervention: Promote Improved Energy  Flowsheets (Taken 1/29/2024 1154)  Fatigue Management: frequent rest breaks encouraged

## 2024-01-29 NOTE — NURSING
1130 pt arrived for Tecentriq infusion. Pt noted to be SOB when walking  and states she has been having an upper respiratory infection since last week. Covid home test was negative. Pt states she is still having a lot of coughing with green mucus. Pt denies any fevers. MARIBELL Chiang NP notifed and order given to still treat today and will call in antibiotics for pt to take  Pt stated understanding

## 2024-02-06 DIAGNOSIS — R09.81 SINUS CONGESTION: ICD-10-CM

## 2024-02-06 RX ORDER — AZELASTINE 1 MG/ML
SPRAY, METERED NASAL
Qty: 90 ML | Refills: 0 | Status: SHIPPED | OUTPATIENT
Start: 2024-02-06 | End: 2024-05-02

## 2024-02-06 NOTE — TELEPHONE ENCOUNTER
Refill Routing Note   Medication(s) are not appropriate for processing by [facility] for the following reason(s):        Patient not seen by provider within 15 months  ED/Hospital Visit since last OV with provider    [facility] action(s):  Defer               Appointments  past 12m or future 3m with PCP    Date Provider   Last Visit   8/24/2022 Yoni Daniels MD   Next Visit   2/7/2024 Yoni Daniels MD   ED visits in past 90 days: 0        Note composed:3:27 PM 02/06/2024

## 2024-02-06 NOTE — TELEPHONE ENCOUNTER
No care due was identified.  [facility] Embedded Care Due Messages. Reference number: [id number].   2/06/2024 11:33:02 AM CST

## 2024-02-07 ENCOUNTER — OFFICE VISIT (OUTPATIENT)
Dept: FAMILY MEDICINE | Facility: CLINIC | Age: 69
End: 2024-02-07
Attending: FAMILY MEDICINE
Payer: MEDICARE

## 2024-02-07 ENCOUNTER — PATIENT MESSAGE (OUTPATIENT)
Dept: FAMILY MEDICINE | Facility: CLINIC | Age: 69
End: 2024-02-07

## 2024-02-07 VITALS
SYSTOLIC BLOOD PRESSURE: 128 MMHG | OXYGEN SATURATION: 96 % | DIASTOLIC BLOOD PRESSURE: 78 MMHG | HEART RATE: 90 BPM | TEMPERATURE: 98 F | HEIGHT: 63 IN | WEIGHT: 138.31 LBS | BODY MASS INDEX: 24.51 KG/M2

## 2024-02-07 DIAGNOSIS — J30.2 SEASONAL ALLERGIC RHINITIS, UNSPECIFIED TRIGGER: ICD-10-CM

## 2024-02-07 DIAGNOSIS — Z95.5 HISTORY OF HEART ARTERY STENT: ICD-10-CM

## 2024-02-07 DIAGNOSIS — F43.21 SITUATIONAL DEPRESSION: ICD-10-CM

## 2024-02-07 DIAGNOSIS — J43.9 PULMONARY EMPHYSEMA, UNSPECIFIED EMPHYSEMA TYPE: ICD-10-CM

## 2024-02-07 DIAGNOSIS — I25.10 CORONARY ARTERY DISEASE INVOLVING NATIVE CORONARY ARTERY OF NATIVE HEART WITHOUT ANGINA PECTORIS: ICD-10-CM

## 2024-02-07 DIAGNOSIS — C34.90 METASTATIC LUNG CANCER (METASTASIS FROM LUNG TO OTHER SITE), UNSPECIFIED LATERALITY: ICD-10-CM

## 2024-02-07 DIAGNOSIS — E78.00 PURE HYPERCHOLESTEROLEMIA: ICD-10-CM

## 2024-02-07 DIAGNOSIS — F41.9 ANXIETY: ICD-10-CM

## 2024-02-07 DIAGNOSIS — E11.9 TYPE 2 DIABETES MELLITUS WITHOUT COMPLICATION: ICD-10-CM

## 2024-02-07 DIAGNOSIS — C43.72 MALIGNANT MELANOMA OF SKIN OF LEFT LEG: ICD-10-CM

## 2024-02-07 DIAGNOSIS — B02.29 OTHER POSTHERPETIC NERVOUS SYSTEM INVOLVEMENT: ICD-10-CM

## 2024-02-07 DIAGNOSIS — C34.32 MALIGNANT NEOPLASM OF LOWER LOBE OF LEFT LUNG: Primary | ICD-10-CM

## 2024-02-07 DIAGNOSIS — R73.03 PREDIABETES: ICD-10-CM

## 2024-02-07 DIAGNOSIS — I50.42 CHRONIC COMBINED SYSTOLIC AND DIASTOLIC HEART FAILURE: ICD-10-CM

## 2024-02-07 DIAGNOSIS — I10 PRIMARY HYPERTENSION: ICD-10-CM

## 2024-02-07 PROBLEM — D70.9 NEUTROPENIC FEVER: Status: RESOLVED | Noted: 2023-09-27 | Resolved: 2024-02-07

## 2024-02-07 PROBLEM — R50.81 NEUTROPENIC FEVER: Status: RESOLVED | Noted: 2023-09-27 | Resolved: 2024-02-07

## 2024-02-07 PROCEDURE — 99213 OFFICE O/P EST LOW 20 MIN: CPT | Mod: PBBFAC,PN | Performed by: FAMILY MEDICINE

## 2024-02-07 PROCEDURE — 99999 PR PBB SHADOW E&M-EST. PATIENT-LVL III: CPT | Mod: PBBFAC,,, | Performed by: FAMILY MEDICINE

## 2024-02-07 PROCEDURE — 99214 OFFICE O/P EST MOD 30 MIN: CPT | Mod: S$PBB,,, | Performed by: FAMILY MEDICINE

## 2024-02-07 RX ORDER — VALACYCLOVIR HYDROCHLORIDE 1 G/1
1000 TABLET, FILM COATED ORAL 3 TIMES DAILY
Qty: 30 TABLET | Refills: 0 | Status: SHIPPED | OUTPATIENT
Start: 2024-02-07 | End: 2024-02-17

## 2024-02-07 RX ORDER — CITALOPRAM 20 MG/1
20 TABLET, FILM COATED ORAL DAILY
Qty: 90 TABLET | Refills: 1 | Status: SHIPPED | OUTPATIENT
Start: 2024-02-07

## 2024-02-07 NOTE — PROGRESS NOTES
Subjective:       Patient ID: Aysha Moore is a 68 y.o. female.    Chief Complaint: Annual Exam    68-year-old female with a history of pulmonary emphysema and smoking with adenocarcinoma of the left lower lung and metastases to the right lung as well as melanoma excision from the left lower extremity under the care of Hematology-Oncology has just finished her chemotherapy and will be starting immunotherapy in the near future.  She has additional history of hypertension, hyperlipidemia, prediabetes, mild systolic and diastolic heart failure, chronic bilateral low back pain with left-sided sciatica anxiety and seasonal allergic rhinitis.  She has a history of depression and was taking citalopram 20 mg daily but she discontinued that last April and did well up until recently.  Recently her grandson join the [employer] out of high school and she is fearful for his safety and would like to go back on the citalopram again.  She also is complaining of a strange discomfort in her left side just below the costal margin.  She says it feels superficial, not deep tissue and is a strange sensation in the skin with no visible rash.  She did have the 1st of two shingles vaccines but apparently did not get the 2nd.  On examination there is no rash at this time but I would be concerned about a zoster prodrome.  She has no discomfort on the right side only the left.    Past Medical History:  No date: Allergy  No date: Anxiety  No date: Arthritis  No date: CAD (coronary artery disease)      Comment:  coronary stents  No date: Chronic back pain      Comment:  lower back pain radiates to left leg  No date: Chronic rhinitis  No date: DAILY (dyspnea on exertion)  No date: Hyperlipidemia  No date: Hypertension  07/01/2023: Lung cancer  No date: Melanoma in situ of left upper extremity      Comment:  left thigh area  No date: MI (myocardial infarction)  10/29/2020: Seasonal allergic rhinitis  '  Past Surgical History:  No date:  BREAST SURGERY      Comment:  breast reduction  No date: CATARACT EXTRACTION, BILATERAL  No date: coranary stent  3/30/2022: EXCISION OF MELANOMA; Left      Comment:  Procedure: EXCISION, MELANOMA;  Surgeon: David Mcpherson III, MD;  Location: [facility] OR;  Service: General;                 Laterality: Left;  3/30/2022: EXCISIONAL BIOPSY; Left      Comment:  Procedure: EXCISIONAL BIOPSY;  Surgeon: David Mcpherson III, MD;  Location: [facility] OR;  Service: General;                 Laterality: Left;  No date: HYSTERECTOMY      Comment:  total  8/4/2023: INSERTION OF TUNNELED CENTRAL VENOUS CATHETER (CVC) WITH   SUBCUTANEOUS PORT; N/A      Comment:  Procedure: [id number];  Surgeon: Remi Mckeon Jr., MD;  Location: [facility] OR;  Service: General;                Laterality: N/A;  10/16/2020: LEFT HEART CATHETERIZATION; Left      Comment:  Procedure: Left heart cath;  Surgeon: Garrett Robins MD;  Location: [facility] CATH/EP LAB;  Service:                Cardiology;  Laterality: Left;  No date: OOPHORECTOMY  2000: TOTAL REDUCTION MAMMOPLASTY; Bilateral    Review of patient's family history indicates:  Problem: Cancer      Relation: Mother          Age of Onset: (Not Specified)          Comment: breast, ovarian, cervical   Problem: Heart disease      Relation: Mother          Age of Onset: (Not Specified)  Problem: Breast cancer      Relation: Mother          Age of Onset: 50  Problem: Ovarian cancer      Relation: Mother          Age of Onset: (Not Specified)  Problem: Cancer      Relation: Father          Age of Onset: (Not Specified)          Comment: melanoma  Problem: Stroke      Relation: Sister          Age of Onset: (Not Specified)  Problem: Heart disease      Relation: Sister          Age of Onset: (Not Specified)  Problem: Cancer      Relation: Sister          Age of Onset: (Not Specified)          Comment: leukemia  Problem: Macular  degeneration      Relation: Sister          Age of Onset: (Not Specified)  Problem: Heart disease      Relation: Sister          Age of Onset: (Not Specified)  Problem: Heart disease      Relation: Brother          Age of Onset: (Not Specified)  Problem: Lung cancer      Relation: Brother          Age of Onset: (Not Specified)  Problem: Cancer      Relation: Maternal Uncle          Age of Onset: (Not Specified)  Problem: Cancer      Relation: Paternal Aunt          Age of Onset: (Not Specified)          Comment: breast  Problem: Breast cancer      Relation: Paternal Aunt          Age of Onset: 60  Problem: Cancer      Relation: Paternal Uncle          Age of Onset: (Not Specified)  Problem: Heart disease      Relation: Maternal Grandmother          Age of Onset: (Not Specified)  Problem: No Known Problems      Relation: Daughter          Age of Onset: (Not Specified)  Problem: No Known Problems      Relation: Son          Age of Onset: (Not Specified)    Social History    Tobacco Use      Smoking status: Former        Packs/day: 0.00        Years: 1 pack/day for 43.2 years (43.2 ttl pk-yrs)        Types: Cigarettes, Vaping with nicotine        Start date:         Quit date: 3/4/2017        Years since quittin.9      Smokeless tobacco: Never    Alcohol use: Not Currently      Alcohol/week: 0.0 standard drinks of alcohol      Comment: sometimes    Drug use: No    Current Outpatient Medications on File Prior to Visit:  albuterol (PROVENTIL/VENTOLIN HFA) 90 mcg/actuation inhaler, Inhale 2 puffs into the lungs every 6 (six) hours as needed. Rescue, Disp: 18 g, Rfl: 5  ALPRAZolam (XANAX) 0.25 MG tablet, Take 1 tablet (0.25 mg total) by mouth 3 (three) times daily as needed for Anxiety., Disp: 30 tablet, Rfl: 1  ammonium lactate 12 % Crea, aaa bid prn dry skin (Patient taking differently: Apply 1 g topically 2 (two) times daily as needed. aaa bid prn dry skin), Disp: 385 g, Rfl: 11  amoxicillin-clavulanate  875-125mg (AUGMENTIN) 875-125 mg per tablet, Take 1 tablet by mouth 2 (two) times daily., Disp: 20 tablet, Rfl: 0  atorvastatin (LIPITOR) 80 MG tablet, Take 1 tablet (80 mg total) by mouth every evening., Disp: 90 tablet, Rfl: 1  azelastine (ASTELIN) 137 mcg (0.1 %) nasal spray, USE 1 SPRAY IN EACH NOSTRIL 2 TIMES DAILY AS NEEDED FOR RHINITIS OR SINUSITIS, Disp: 90 mL, Rfl: 0  calcium-vitamin D3 (OS-JOLIE 500 + D3) 500 mg-5 mcg (200 unit) per tablet, Take 1 tablet by mouth every morning., Disp: , Rfl:   ezetimibe (ZETIA) 10 mg tablet, Take 1 tablet (10 mg total) by mouth once daily., Disp: 90 tablet, Rfl: 3  famotidine (PEPCID) 40 MG tablet, TAKE 1 TABLET BY MOUTH EVERY DAY IN THE EVENING, Disp: 90 tablet, Rfl: 1  fluticasone propionate (FLONASE) 50 mcg/actuation nasal spray, 1 spray (50 mcg total) by Each Nostril route daily as needed for Rhinitis or Allergies., Disp: 9.9 mL, Rfl: 2  HYDROcodone-acetaminophen (NORCO) 5-325 mg per tablet, Take 1 tablet by mouth every 4 to 6 hours as needed for Pain., Disp: 60 tablet, Rfl: 0  metoprolol succinate (TOPROL-XL) 50 MG 24 hr tablet, Take 1 tablet (50 mg total) by mouth once daily., Disp: 90 tablet, Rfl: 3  omeprazole (PRILOSEC) 40 MG capsule, Take 1 capsule (40 mg total) by mouth once daily., Disp: 30 capsule, Rfl: 11  ondansetron (ZOFRAN) 8 MG tablet, Take 1 tablet (8 mg total) by mouth every 8 (eight) hours as needed. (Patient taking differently: Take 8 mg by mouth every 8 (eight) hours as needed for Nausea.), Disp: 30 tablet, Rfl: 5  rivaroxaban (XARELTO) 15 mg Tab, Take 1 tablet (15 mg total) by mouth daily with dinner or evening meal., Disp: 42 tablet, Rfl: 0  tiZANidine (ZANAFLEX) 4 MG tablet, TAKE 1 TABLET BY MOUTH EVERY 6 HOURS AS NEEDED FOR BACK PAIN, Disp: 360 tablet, Rfl: 0  traMADoL (ULTRAM) 50 mg tablet, Take 1 tablet (50 mg total) by mouth every 6 (six) hours as needed for Pain., Disp: 30 tablet, Rfl: 2  umeclidinium-vilanteroL (ANORO ELLIPTA) 62.5-25  mcg/actuation DsDv, Inhale 1 puff into the lungs once daily. Controller, Disp: 1 each, Rfl: 5  valACYclovir (VALTREX) 1000 MG tablet, TAKE 2 AT ONSET OF FEVER BLISTERS THEN REPEAT IN 12 HOURS (TOTAL 4 TABS PER EPISODE), Disp: 20 tablet, Rfl: 3  clobetasol 0.05% (TEMOVATE) 0.05 % Oint, Apply topically 2 (two) times daily. for 14 days (Patient taking differently: Apply 1 g topically 2 (two) times daily.), Disp: 45 g, Rfl: 3  nitroGLYCERIN (NITROSTAT) 0.4 MG SL tablet, Place 1 tablet (0.4 mg total) under the tongue every 5 (five) minutes as needed for Chest pain. (Patient not taking: Reported on 2/7/2024), Disp: 30 tablet, Rfl: 0  promethazine (PHENERGAN) 25 MG tablet, Take 1 tablet (25 mg total) by mouth every 4 to 6 hours as needed. (Patient not taking: Reported on 2/7/2024), Disp: 30 tablet, Rfl: 5  zinc 50 mg Tab, Take 1 tablet by mouth once daily., Disp: , Rfl:   [DISCONTINUED] busPIRone (BUSPAR) 5 MG Tab, TAKE 1 TABLET BY MOUTH TWICE A DAY, Disp: 180 tablet, Rfl: 1  [DISCONTINUED] citalopram (CELEXA) 20 MG tablet, TAKE 1 TABLET BY MOUTH EVERY DAY, Disp: 90 tablet, Rfl: 1  [DISCONTINUED] levoFLOXacin (LEVAQUIN) 750 MG tablet, Take 1 tablet (750 mg total) by mouth once daily., Disp: 10 tablet, Rfl: 0  [DISCONTINUED] lisinopriL (PRINIVIL,ZESTRIL) 2.5 MG tablet, Take 1 tablet (2.5 mg total) by mouth every evening. (Patient not taking: Reported on 2/7/2024), Disp: 90 tablet, Rfl: 3  [DISCONTINUED] predniSONE (DELTASONE) 10 MG tablet, Take 3 a day x 5 days then 2 a day x 5 days then 1 a day x 5 days, Disp: 30 tablet, Rfl: 0    No current facility-administered medications on file prior to visit.          Review of Systems   Constitutional:  Negative for chills, diaphoresis, fatigue, fever and unexpected weight change.   HENT:  Positive for congestion, postnasal drip and rhinorrhea. Negative for ear pain, hearing loss, sinus pressure, sneezing, sore throat, tinnitus and trouble swallowing.    Eyes:  Negative for itching  and visual disturbance.   Respiratory:  Negative for cough, chest tightness, shortness of breath and wheezing.    Cardiovascular:  Negative for chest pain, palpitations and leg swelling.   Gastrointestinal:  Negative for abdominal pain, blood in stool, constipation, diarrhea, nausea and vomiting.   Genitourinary:  Negative for dysuria, frequency and hematuria.   Musculoskeletal:  Negative for arthralgias, back pain, joint swelling and myalgias.   Neurological:  Positive for numbness (Atypical sensation left subcostal area from midline to midline with no visible rash and no right-sided involvement). Negative for dizziness and headaches.   Hematological:  Negative for adenopathy.   Psychiatric/Behavioral:  Positive for dysphoric mood. Negative for sleep disturbance. The patient is nervous/anxious.        Objective:      Physical Exam  Vitals and nursing note reviewed.   Constitutional:       General: She is not in acute distress.     Appearance: Normal appearance. She is well-developed and normal weight. She is not ill-appearing, toxic-appearing or diaphoretic.      Comments: Good blood pressure control  Normal pulse with regular rhythm  Normal weight with a BMI of 24.5 she is down 7.5 lb from her last visit with me August 24, 2022   HENT:      Head: Normocephalic and atraumatic.      Right Ear: Tympanic membrane, ear canal and external ear normal. There is no impacted cerumen.      Left Ear: Ear canal and external ear normal. There is no impacted cerumen.      Nose: Congestion and rhinorrhea present.      Mouth/Throat:      Pharynx: No oropharyngeal exudate or posterior oropharyngeal erythema.   Eyes:      General: No scleral icterus.        Right eye: No discharge.         Left eye: No discharge.      Conjunctiva/sclera: Conjunctivae normal.      Pupils: Pupils are equal, round, and reactive to light.   Neck:      Thyroid: No thyromegaly.      Vascular: No carotid bruit or JVD.   Cardiovascular:      Rate and  Rhythm: Normal rate and regular rhythm.      Heart sounds: Normal heart sounds. No murmur heard.     No friction rub. No gallop.   Pulmonary:      Effort: Pulmonary effort is normal. No respiratory distress.      Breath sounds: Normal breath sounds. No stridor. No wheezing, rhonchi or rales.   Chest:      Chest wall: No tenderness.   Abdominal:      General: Bowel sounds are normal. There is no distension.      Palpations: Abdomen is soft. There is no mass.      Tenderness: There is no abdominal tenderness. There is no guarding or rebound.      Hernia: No hernia is present.   Musculoskeletal:         General: No swelling, tenderness or deformity. Normal range of motion.      Cervical back: Normal range of motion and neck supple. No rigidity or tenderness.      Right lower leg: No edema.      Left lower leg: No edema.   Lymphadenopathy:      Cervical: No cervical adenopathy.   Skin:     General: Skin is warm and dry.      Coloration: Skin is not jaundiced or pale.      Findings: No bruising, erythema, lesion or rash.      Comments: No visible rash, plaques, or vesicles in the left subcostal area   Neurological:      General: No focal deficit present.      Mental Status: She is alert and oriented to person, place, and time. Mental status is at baseline.      Cranial Nerves: No cranial nerve deficit.      Sensory: No sensory deficit.      Motor: No weakness.      Coordination: Coordination normal.      Gait: Gait normal.      Deep Tendon Reflexes: Reflexes are normal and symmetric. Reflexes normal.   Psychiatric:         Attention and Perception: Attention normal.         Mood and Affect: Mood is anxious and depressed.         Speech: Speech normal.         Behavior: Behavior normal. Behavior is cooperative.         Thought Content: Thought content normal.         Cognition and Memory: Cognition normal.         Judgment: Judgment normal.         Assessment:       1. LUNG CANCER-ADENOCARCINOMA OF THE LLL    2.  Metastatic lung cancer (metastasis from lung to other site), unspecified laterality    3. Malignant melanoma of skin of left leg    4. Coronary artery disease involving native coronary artery of native heart without angina pectoris    5. Primary hypertension    6. Pure hypercholesterolemia    7. Seasonal allergic rhinitis, unspecified trigger    8. History of heart artery stent    9. 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 Anxiety    12. Situational depression    13. Pulmonary emphysema, unspecified emphysema type    14. Chronic combined systolic and diastolic heart failure    15. BMI 24.0-24.9, adult        Plan:       1. LUNG CANCER-ADENOCARCINOMA OF THE LLL  Status post radiation therapy and chemotherapy now getting ready to start immunotherapy    2. Metastatic lung cancer (metastasis from lung to other site), unspecified laterality  See above    3. Malignant melanoma of skin of left leg  No sign of recurrence post excision    4. Coronary artery disease involving native coronary artery of native heart without angina pectoris  Asymptomatic    5. Primary hypertension  Good control no changes needed    6. Pure hypercholesterolemia  Await lipid panel to be done with her next round of lab for Oncology  - Lipid Panel; Future    7. Seasonal allergic rhinitis, unspecified trigger  Suggest she use some saline spray to help loosen up secretions    8. History of heart artery stent  Asymptomatic    9. 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  She will watch for a rash and fill the Valtrex at the earliest possible sign  - valACYclovir (VALTREX) 1000 MG tablet; Take 1 tablet (1,000 mg total) by mouth 3 (three) times daily. for 10 days  Dispense: 30 tablet; Refill: 0    11. Anxiety  Resume citalopram  - citalopram (CELEXA) 20 MG tablet; Take 1 tablet (20 mg total) by mouth once daily.  Dispense: 90 tablet;  Refill: 1    12. Situational depression  Resume citalopram  - citalopram (CELEXA) 20 MG tablet; Take 1 tablet (20 mg total) by mouth once daily.  Dispense: 90 tablet; Refill: 1    13. Pulmonary emphysema, unspecified emphysema type  Stable followed by Dr. Ruby    14. Chronic combined systolic and diastolic heart failure  Low normal ejection fraction 50 to 55% with stage I diastolic dysfunction    15. BMI 24.0-24.9, adult  Good weight for age, no changes needed

## 2024-02-08 ENCOUNTER — OFFICE VISIT (OUTPATIENT)
Dept: PULMONOLOGY | Facility: CLINIC | Age: 69
End: 2024-02-08
Payer: MEDICARE

## 2024-02-08 ENCOUNTER — LAB VISIT (OUTPATIENT)
Dept: LAB | Facility: HOSPITAL | Age: 69
End: 2024-02-08
Attending: FAMILY MEDICINE
Payer: MEDICARE

## 2024-02-08 VITALS
OXYGEN SATURATION: 95 % | BODY MASS INDEX: 24.45 KG/M2 | HEART RATE: 105 BPM | DIASTOLIC BLOOD PRESSURE: 80 MMHG | WEIGHT: 138 LBS | SYSTOLIC BLOOD PRESSURE: 125 MMHG

## 2024-02-08 DIAGNOSIS — C34.32 MALIGNANT NEOPLASM OF LOWER LOBE OF LEFT LUNG: ICD-10-CM

## 2024-02-08 DIAGNOSIS — J43.9 PULMONARY EMPHYSEMA, UNSPECIFIED EMPHYSEMA TYPE: Primary | ICD-10-CM

## 2024-02-08 DIAGNOSIS — R73.03 PREDIABETES: ICD-10-CM

## 2024-02-08 DIAGNOSIS — Z87.891 PERSONAL HISTORY OF TOBACCO USE, PRESENTING HAZARDS TO HEALTH: ICD-10-CM

## 2024-02-08 DIAGNOSIS — E78.00 PURE HYPERCHOLESTEROLEMIA: ICD-10-CM

## 2024-02-08 PROBLEM — E11.9 NEW ONSET TYPE 2 DIABETES MELLITUS: Status: ACTIVE | Noted: 2024-02-08

## 2024-02-08 LAB
CHOLEST SERPL-MCNC: 167 MG/DL (ref 120–199)
CHOLEST/HDLC SERPL: 4.1 {RATIO} (ref 2–5)
ESTIMATED AVG GLUCOSE: 143 MG/DL (ref 68–131)
HBA1C MFR BLD: 6.6 % (ref 4.5–6.2)
HDLC SERPL-MCNC: 41 MG/DL (ref 40–75)
HDLC SERPL: 24.6 % (ref 20–50)
LDLC SERPL CALC-MCNC: 82.6 MG/DL (ref 63–159)
NONHDLC SERPL-MCNC: 126 MG/DL
TRIGL SERPL-MCNC: 217 MG/DL (ref 30–150)

## 2024-02-08 PROCEDURE — 83036 HEMOGLOBIN GLYCOSYLATED A1C: CPT | Performed by: FAMILY MEDICINE

## 2024-02-08 PROCEDURE — 99214 OFFICE O/P EST MOD 30 MIN: CPT | Mod: S$GLB,,, | Performed by: INTERNAL MEDICINE

## 2024-02-08 PROCEDURE — 80061 LIPID PANEL: CPT | Performed by: FAMILY MEDICINE

## 2024-02-08 PROCEDURE — 36415 COLL VENOUS BLD VENIPUNCTURE: CPT | Performed by: FAMILY MEDICINE

## 2024-02-08 NOTE — PROGRESS NOTES
Office Visit    Patient Name: Aysha Moore  MRN: [id number]  : 1955      Reason for visit: COPD    HPI:     2019 - Here for evaluation of COPD.  Has been hospitalized twice for respiratory issues.  Here more recently has noted some symptoms but has been out of BREO for a week or so.  Has a h/o smoking - has quit cigarettes but is using a vape.  Has smoked about 1 PPD for about 40 years.    2019 - Here for follow up, no new issues reported.  Still vaping (d/we pt).  Reviewed PFT with pt.  Pt is currently stable on present medications with no recent increases in their symptoms or use of rescue medications.  I have reviewed the medical regimen and re-educated the pt on the role of rescue and controlling medications.  All questions answered.  Inhaler technique seems adequate.    2021 - Here for follow up, Pt is currently stable on present medications with no recent increases in their symptoms or use of rescue medications.  Since our last visit there have been no hospitalizations or ER visits for their respiratory issues and there does not seem to be anything to suggest unrecognized exacerbations.  I have reviewed the medical regimen and re-educated the pt on the role of rescue and controlling medications.  Inhaler technique and understanding seems adequate.  The patient reports no issues with any of there medications for their COPD.  Refills will be taken care of as needed.  All questions answered.  She stopped singulair - she felt that it made her short tempered and she is better since stopping it.  She had a MI recently.  Continues with some sinus infection - seen at Urgent Care given doxycycline but developed itching.  Still with symptoms.  Patient has no known corona virus exposures and has been practicing social distancing.  We have discussed the virus and precautions and all questions have been answered.    8/3/2021 - Here for follow up, Pt is currently stable on present medications with no  recent increases in their symptoms or use of rescue medications.  Since our last visit there have been no hospitalizations or ER visits for their respiratory issues and there does not seem to be anything to suggest unrecognized exacerbations.  I have reviewed the medical regimen and re-educated the pt on the role of rescue and controlling medications.  Inhaler technique and understanding seems adequate.  The patient reports no issues with any of there medications for their COPD.  Refills will be taken care of as needed.  All questions answered.  Has been taken off of lopressor due to decreased BP.  Patient has no known corona virus exposures and has been practicing social distancing.  We have discussed the virus and precautions and all questions have been answered.    2/1/2022 - Here for follow up, was sick for about 3 weeks around [place] (had known Covid exposure, had HA, sore throat, weak, cough, increased dyspnea, low grade fever).  She did 2 rapid tests which were negative but is interested in getting Covid antibodies checked.  Pt is currently stable on present medications with no recent increases in their symptoms or use of rescue medications.  Since our last visit there have been no hospitalizations or ER visits for their respiratory issues and there does not seem to be anything to suggest unrecognized exacerbations.  I have reviewed the medical regimen and re-educated the pt on the role of rescue and controlling medications.  Inhaler technique and understanding seems adequate.  The patient reports no issues with any of there medications for their COPD.  Refills will be taken care of as needed.  All questions answered.  She has been otherwise stable except for recent illness.  She has not been vaccinated for Covid.    10/5/2022 - Here for follow p, no new issues or problems reported.  We reviewed her CT scans and she will need a follow up CT in 5/2023.  Pt is currently stable on present medications with no  recent increases in their symptoms or use of rescue medications.  Since our last visit there have been no hospitalizations or ER visits for their respiratory issues and there does not seem to be anything to suggest unrecognized exacerbations.  I have reviewed the medical regimen and re-educated the pt on the role of rescue and controlling medications.  Inhaler technique and understanding seems adequate.  The patient reports no issues with any of there medications for their COPD.  Refills will be taken care of as needed.  All questions answered.  Did have Covid in early summer but was not very sick.    5/24/2023 - Here for follow up, Pt is currently stable on present medications with no recent increases in their symptoms or use of rescue medications.  Since our last visit there have been no hospitalizations or ER visits for their respiratory issues and there does not seem to be anything to suggest unrecognized exacerbations.  I have reviewed the medical regimen and re-educated the pt on the role of rescue and controlling medications.  Inhaler technique and understanding seems adequate.  The patient reports no issues with any of there medications for their COPD.  Refills will be taken care of as needed.  All questions answered.  Having issues with her sinuses and she is seeing Dr Chisholm and they are considering surgery.  Reviewed last PFT with her.  She has a skin lesion on her leg and is to see dermatology (she has a FMHx of melanoma and a prior melanoma).       6/20/2023 - Here for results - reviewed CT and PET scans with pt and daughter and all questions answered - we will proceed with Ct guided needle biopsy.    7/18/2023 - Here for biopsy results - + adenocarcinoma.  D/W pt and  and discussed PET findings (also reviewed PET scan).  At this point she needs MRI and referral to oncology.  All questions answered.    8/24/2023 - Here for follow up and so far is tolerating her therapy pretty well.  Asked her if  "she needs any help at home and she is OK.  She is eating a lot and has gained some weight.  No weakness issues.    9/6/2023 - PFT (8/16/23)  Mild obstruction (FEV1 - - 76%), improved with BD  No restriction  Moderate decrease DLCO (41%)    11/28/2023 - Here for follow up, was hospitalized with PE and is on treatments.  Last Ct scan reviewed (see below).  She has completed XRT and initial chemo and is on immunotherapy.  She has noted some increased SOB, DAILY for " a while".  No other new issues.    12/28/2023 - Here for follow up, feels OK but still with some exertional SOB.  No f,c,ns, cough, sputum.  We reviewed CT scans from 11/2023 and 12/2023 and I think the later one actually is better.  Her immunotherapy was held this month.    2/8/2024 - Here for follow up, was sick last week and started on antibiotics and is feeling better but still with some cough.  No fevers.  Any sputum now is white.      Low Dose Screening CT Chest    Cigarettes - 1 PPD x 40 YEARS  Still smoking -   NO  QUIT 3/2019     Date of last LD CT - 5/2022    Result - Category 1, needs repeat 5/2023    I have discussed with pt about using a screening CT of the chest due to history of cigarette smoking.  We have discussed the possible findings and possible actions as a result of these findings.  The pt would like to proceed.    COPD Flowsheet    GOLD A    Last PFT - 8/2019  FEV1- 74 % DLCO - 50 %     + LABA/LAMA    + prn ALBUTEROL    mMRC -  0 - SOB with strenuous exercise   - + 1 - SOB level ground, slight hill   -  2 - SOB walk slower or stop for breath level ground   -  3 - SOB at 100 yards or after few minutes   -  4 - SOB in house, dressing    Referral to PULMONARY REHABILITATION - NO    Tested for alpha-1-antitrypsin - NO  Result - na    Cigarette Counseling    Currently smoking 0 packs per day  40 pack years    Vaping a little    I have counseled pt for 3-5 minutes regarding cigarette cessation.  This has included the need to stop smoking as " "well as strategies, including but not limited to "cold turkey", CHANTIX (including risks and benefits of whitney drug), nicotine replacement and WELLBUTRIN.    Flu and Pneumonia Vaccination    I have recommended that the patient get this years influenza vaccine.  We discussed the risks and benefits of this treatment.      I reviewed patient's current pneumonia vaccine status.  Patient needs vaccination.  We have discussed the current guidelines and recommendations for pneumonia vaccination.              Past Medical History    Past Medical History:   Diagnosis Date    Allergy     Anxiety     Arthritis     CAD (coronary artery disease)     coronary stents    Chronic back pain     lower back pain radiates to left leg    Chronic rhinitis     DAILY (dyspnea on exertion)     Hyperlipidemia     Hypertension     Lung cancer 07/01/2023    Melanoma in situ of left upper extremity     left thigh area    MI (myocardial infarction)     Seasonal allergic rhinitis 10/29/2020       Past Surgical History    Past Surgical History:   Procedure Laterality Date    BREAST SURGERY      breast reduction    CATARACT EXTRACTION, BILATERAL      coranary stent      EXCISION OF MELANOMA Left 3/30/2022    Procedure: EXCISION, MELANOMA;  Surgeon: David Mcpherson III, MD;  Location: [facility] OR;  Service: General;  Laterality: Left;    EXCISIONAL BIOPSY Left 3/30/2022    Procedure: EXCISIONAL BIOPSY;  Surgeon: David Mcpherson III, MD;  Location: [facility] OR;  Service: General;  Laterality: Left;    HYSTERECTOMY      total    INSERTION OF TUNNELED CENTRAL VENOUS CATHETER (CVC) WITH SUBCUTANEOUS PORT N/A 8/4/2023    Procedure: [id number]-[id number];  Surgeon: Remi Mckeon Jr., MD;  Location: [facility] OR;  Service: General;  Laterality: N/A;    LEFT HEART CATHETERIZATION Left 10/16/2020    Procedure: Left heart cath;  Surgeon: Garrett Robins MD;  Location: [facility] CATH/EP LAB;  Service: Cardiology;  Laterality: Left;    OOPHORECTOMY      TOTAL " REDUCTION MAMMOPLASTY Bilateral 2000       Medications      Current Outpatient Medications:     albuterol (PROVENTIL/VENTOLIN HFA) 90 mcg/actuation inhaler, Inhale 2 puffs into the lungs every 6 (six) hours as needed. Rescue, Disp: 18 g, Rfl: 5    ammonium lactate 12 % Crea, aaa bid prn dry skin (Patient taking differently: Apply 1 g topically 2 (two) times daily as needed. aaa bid prn dry skin), Disp: 385 g, Rfl: 11    amoxicillin-clavulanate 875-125mg (AUGMENTIN) 875-125 mg per tablet, Take 1 tablet by mouth 2 (two) times daily., Disp: 20 tablet, Rfl: 0    atorvastatin (LIPITOR) 80 MG tablet, Take 1 tablet (80 mg total) by mouth every evening., Disp: 90 tablet, Rfl: 1    azelastine (ASTELIN) 137 mcg (0.1 %) nasal spray, USE 1 SPRAY IN EACH NOSTRIL 2 TIMES DAILY AS NEEDED FOR RHINITIS OR SINUSITIS, Disp: 90 mL, Rfl: 0    calcium-vitamin D3 (OS-JOLIE 500 + D3) 500 mg-5 mcg (200 unit) per tablet, Take 1 tablet by mouth every morning., Disp: , Rfl:     citalopram (CELEXA) 20 MG tablet, Take 1 tablet (20 mg total) by mouth once daily., Disp: 90 tablet, Rfl: 1    ezetimibe (ZETIA) 10 mg tablet, Take 1 tablet (10 mg total) by mouth once daily., Disp: 90 tablet, Rfl: 3    famotidine (PEPCID) 40 MG tablet, TAKE 1 TABLET BY MOUTH EVERY DAY IN THE EVENING, Disp: 90 tablet, Rfl: 1    fluticasone propionate (FLONASE) 50 mcg/actuation nasal spray, 1 spray (50 mcg total) by Each Nostril route daily as needed for Rhinitis or Allergies., Disp: 9.9 mL, Rfl: 2    HYDROcodone-acetaminophen (NORCO) 5-325 mg per tablet, Take 1 tablet by mouth every 4 to 6 hours as needed for Pain., Disp: 60 tablet, Rfl: 0    metoprolol succinate (TOPROL-XL) 50 MG 24 hr tablet, Take 1 tablet (50 mg total) by mouth once daily., Disp: 90 tablet, Rfl: 3    omeprazole (PRILOSEC) 40 MG capsule, Take 1 capsule (40 mg total) by mouth once daily., Disp: 30 capsule, Rfl: 11    ondansetron (ZOFRAN) 8 MG tablet, Take 1 tablet (8 mg total) by mouth every 8 (eight)  hours as needed. (Patient taking differently: Take 8 mg by mouth every 8 (eight) hours as needed for Nausea.), Disp: 30 tablet, Rfl: 5    rivaroxaban (XARELTO) 15 mg Tab, Take 1 tablet (15 mg total) by mouth daily with dinner or evening meal., Disp: 42 tablet, Rfl: 0    tiZANidine (ZANAFLEX) 4 MG tablet, TAKE 1 TABLET BY MOUTH EVERY 6 HOURS AS NEEDED FOR BACK PAIN, Disp: 360 tablet, Rfl: 0    traMADoL (ULTRAM) 50 mg tablet, Take 1 tablet (50 mg total) by mouth every 6 (six) hours as needed for Pain., Disp: 30 tablet, Rfl: 2    umeclidinium-vilanteroL (ANORO ELLIPTA) 62.5-25 mcg/actuation DsDv, Inhale 1 puff into the lungs once daily. Controller, Disp: 1 each, Rfl: 5    valACYclovir (VALTREX) 1000 MG tablet, TAKE 2 AT ONSET OF FEVER BLISTERS THEN REPEAT IN 12 HOURS (TOTAL 4 TABS PER EPISODE), Disp: 20 tablet, Rfl: 3    valACYclovir (VALTREX) 1000 MG tablet, Take 1 tablet (1,000 mg total) by mouth 3 (three) times daily. for 10 days, Disp: 30 tablet, Rfl: 0    zinc 50 mg Tab, Take 1 tablet by mouth once daily., Disp: , Rfl:     ALPRAZolam (XANAX) 0.25 MG tablet, Take 1 tablet (0.25 mg total) by mouth 3 (three) times daily as needed for Anxiety., Disp: 30 tablet, Rfl: 1    clobetasol 0.05% (TEMOVATE) 0.05 % Oint, Apply topically 2 (two) times daily. for 14 days (Patient taking differently: Apply 1 g topically 2 (two) times daily.), Disp: 45 g, Rfl: 3    nitroGLYCERIN (NITROSTAT) 0.4 MG SL tablet, Place 1 tablet (0.4 mg total) under the tongue every 5 (five) minutes as needed for Chest pain. (Patient not taking: Reported on 2/7/2024), Disp: 30 tablet, Rfl: 0    promethazine (PHENERGAN) 25 MG tablet, Take 1 tablet (25 mg total) by mouth every 4 to 6 hours as needed. (Patient not taking: Reported on 2/7/2024), Disp: 30 tablet, Rfl: 5    Allergies    Review of patient's allergies indicates:   Allergen Reactions    Cephalexin      Other reaction(s): Rash    Doxycycline monohydrate Hives    Lanoxin  [digoxin]      Other  reaction(s): Rash    Lansoprazole      Other reaction(s): Rash    Macrobid [nitrofurantoin monohyd/m-cryst] Rash       SocHx    Social History     Tobacco Use   Smoking Status Former    Current packs/day: 0.00    Average packs/day: 1 pack/day for 43.2 years (43.2 ttl pk-yrs)    Types: Cigarettes, Vaping with nicotine    Start date:     Quit date: 3/4/2017    Years since quittin.9   Smokeless Tobacco Never       Social History     Substance and Sexual Activity   Alcohol Use Not Currently    Alcohol/week: 0.0 standard drinks of alcohol    Comment: sometimes       Drug Use - no  Occupation - housewife  Asbestos exposure - no  Pets - dogs    FMHx    Family History   Problem Relation Age of Onset    Cancer Mother         breast, ovarian, cervical     Heart disease Mother     Breast cancer Mother 50    Ovarian cancer Mother     Cancer Father         melanoma    Stroke Sister     Heart disease Sister     Cancer Sister         leukemia    Macular degeneration Sister     Heart disease Sister     Heart disease Brother     Lung cancer Brother     Cancer Maternal Uncle     Cancer Paternal Aunt         breast    Breast cancer Paternal Aunt 60    Cancer Paternal Uncle     Heart disease Maternal Grandmother     No Known Problems Daughter     No Known Problems Son          Review of Systems  Review of Systems   Constitutional:  Negative for chills, diaphoresis, fever, malaise/fatigue and weight loss.   HENT:  Positive for congestion and tinnitus. Negative for ear discharge, ear pain, hearing loss, nosebleeds, sinus pain and sore throat.         Has had tinnitus for years (has seen ENT)   Eyes:  Negative for pain.   Respiratory:  Positive for shortness of breath and wheezing. Negative for cough, hemoptysis, sputum production and stridor.    Cardiovascular:  Negative for chest pain, palpitations, orthopnea, claudication, leg swelling and PND.        H/o PCI   Gastrointestinal:  Negative for abdominal pain, blood in stool,  constipation, diarrhea, heartburn, melena, nausea and vomiting.   Genitourinary:  Negative for dysuria, flank pain, frequency, hematuria and urgency.   Musculoskeletal:  Positive for back pain, joint pain and neck pain. Negative for falls and myalgias.        Right knee meniscal injury   Skin:  Negative for itching and rash.   Neurological:  Negative for dizziness, tingling, tremors, sensory change, speech change, focal weakness, seizures, weakness and headaches.   Endo/Heme/Allergies:  Negative for environmental allergies.   Psychiatric/Behavioral:  Negative for depression, substance abuse and suicidal ideas. The patient is not nervous/anxious.        Physical Exam    Vitals:    02/08/24 1314   BP: 125/80   BP Location: Left arm   Patient Position: Sitting   BP Method: Medium (Manual)   Pulse: 105   SpO2: 95%   Weight: 62.6 kg (138 lb)       Physical Exam  Vitals and nursing note reviewed.   Constitutional:       General: She is not in acute distress.     Appearance: She is well-developed. She is ill-appearing. She is not toxic-appearing or diaphoretic.   HENT:      Head: Normocephalic and atraumatic.      Right Ear: External ear normal.      Left Ear: External ear normal.      Nose: Nose normal. No congestion or rhinorrhea.      Mouth/Throat:      Mouth: Mucous membranes are moist.      Pharynx: Oropharynx is clear. No oropharyngeal exudate.   Eyes:      General: No scleral icterus.        Right eye: No discharge.         Left eye: No discharge.      Extraocular Movements: Extraocular movements intact.      Conjunctiva/sclera: Conjunctivae normal.      Pupils: Pupils are equal, round, and reactive to light.   Neck:      Thyroid: No thyromegaly.      Vascular: No carotid bruit or JVD.      Trachea: No tracheal deviation.   Cardiovascular:      Rate and Rhythm: Normal rate and regular rhythm.      Heart sounds: Normal heart sounds. No murmur heard.     No friction rub. No gallop.   Pulmonary:      Effort: Pulmonary  effort is normal. No respiratory distress.      Breath sounds: No stridor. Rales (left base) present. No wheezing or rhonchi.   Chest:      Chest wall: No tenderness.   Abdominal:      General: Bowel sounds are normal. There is no distension.      Palpations: Abdomen is soft.      Tenderness: There is no abdominal tenderness. There is no guarding.   Musculoskeletal:         General: No tenderness. Normal range of motion.      Cervical back: Normal range of motion and neck supple. No rigidity or tenderness.      Right lower leg: No edema.      Left lower leg: No edema.   Lymphadenopathy:      Cervical: No cervical adenopathy.   Skin:     General: Skin is warm and dry.   Neurological:      General: No focal deficit present.      Mental Status: She is alert and oriented to person, place, and time. Mental status is at baseline.      Cranial Nerves: No cranial nerve deficit.      Motor: No weakness.   Psychiatric:         Mood and Affect: Mood normal.         Behavior: Behavior normal.         Thought Content: Thought content normal.         Judgment: Judgment normal.         Labs    Lab Results   Component Value Date    WBC 3.91 02/08/2024    HGB 13.8 02/08/2024    HCT 40.8 02/08/2024     02/08/2024       Sodium   Date Value Ref Range Status   02/08/2024 142 136 - 145 mmol/L Final     Potassium   Date Value Ref Range Status   02/08/2024 4.0 3.5 - 5.1 mmol/L Final     Chloride   Date Value Ref Range Status   02/08/2024 105 95 - 110 mmol/L Final     CO2   Date Value Ref Range Status   02/08/2024 27 23 - 29 mmol/L Final     Glucose   Date Value Ref Range Status   02/08/2024 94 70 - 110 mg/dL Final     BUN   Date Value Ref Range Status   02/08/2024 13 8 - 23 mg/dL Final     Creatinine   Date Value Ref Range Status   02/08/2024 0.8 0.5 - 1.4 mg/dL Final     Calcium   Date Value Ref Range Status   02/08/2024 9.4 8.7 - 10.5 mg/dL Final     Total Protein   Date Value Ref Range Status   02/08/2024 7.2 6.0 - 8.4 g/dL Final      Albumin   Date Value Ref Range Status   02/08/2024 4.1 3.5 - 5.2 g/dL Final     Total Bilirubin   Date Value Ref Range Status   02/08/2024 0.9 0.1 - 1.0 mg/dL Final     Comment:     For infants and newborns, interpretation of results should be based  on gestational age, weight and in agreement with clinical  observations.    Premature Infant recommended reference ranges:  Up to 24 hours.............<8.0 mg/dL  Up to 48 hours............<12.0 mg/dL  3-5 days..................<15.0 mg/dL  6-29 days.................<15.0 mg/dL       Alkaline Phosphatase   Date Value Ref Range Status   02/08/2024 79 55 - 135 U/L Final     AST   Date Value Ref Range Status   02/08/2024 17 10 - 40 U/L Final     ALT   Date Value Ref Range Status   02/08/2024 14 10 - 44 U/L Final     Anion Gap   Date Value Ref Range Status   02/08/2024 10 8 - 16 mmol/L Final       Xrays    CT chest (5/2022)  1.  No pulmonary nodules identified.  2.  Mild/moderate emphysematous lung disease.  3.  No consolidation or pleural effusion.     LUNG RADS CATEGORY 1: NEGATIVE    CT chest (11/10/23)  1. Nodular density in the posterior left lower lobe, essentially unchanged from the previous exam when accounting for technique.  2. Scattered patchy airspace opacities throughout the left lung, increased in size/distribution from the previous exam, the differential includes posttreatment/radiation change, atelectasis/scarring, infection/pneumonia, noting malignancy is not excluded and interval follow-up would be warranted.  3. Enlarging left cervical/supraclavicular lymphadenopathy.  4. Small wedge-shaped hypodensities in the spleen, in keeping with small splenic infarcts, similar in distribution the previous exam    CT chest (12/11/23)  There is no CT evidence of acute pulmonary thromboembolism. There are ill-defined groundglass opacities in the left upper lobe that are new since the most recent previous CT chest dated 11/10/2023, and are suspicious for pneumonia.  Patchy areas of consolidation in the left lower lobe shown on the previous CT are somewhat improved. Significant residual atelectasis and/or scarring persists in the left lower lobe. Moderate emphysema is present.     Impression/Plan    Problem List Items Addressed This Visit          Pulmonary    Pulmonary emphysema - Primary  Continue present medications.  Will refill medications as needed.  Instructed patient to contact us with any issues concerning their medications (cost, reactions, etc.).  Have discussed with patient about inciting conditions which may exacerbate their disease.  We did discuss possible new therapies or de-escalation of therapy (if appropriate).  Asked patient if they were interested in pursuing pulmonary rehabilitation.  All questions answered  RTC 3 months  Patient instructed that they are to call if symptoms change or new issues develop prior to their next visit.  Prednisone taper to try and settle cough down                     Other    Personal history of tobacco use, presenting hazards to health   has quit      Adenocarcinoma lung  As above  CT findings as above - out of an abundance of caution will repeat CT early January and hopefully we can restart immunotherapy next month.                          Raad Abad MD

## 2024-02-09 RX ORDER — PREDNISONE 10 MG/1
TABLET ORAL
Qty: 18 TABLET | Refills: 0 | Status: SHIPPED | OUTPATIENT
Start: 2024-02-09

## 2024-02-12 ENCOUNTER — TELEPHONE (OUTPATIENT)
Dept: FAMILY MEDICINE | Facility: CLINIC | Age: 69
End: 2024-02-12
Payer: MEDICARE

## 2024-02-12 NOTE — TELEPHONE ENCOUNTER
----- Message from Jeanne Boeckelman, MA sent at 2/12/2024 10:18 AM CST -----  I called the patient in regards to her lab results, patient states that she has an appointment with her oncologist on Thursday and would like to speak with them before deciding on doing anything about the changes yours suggesting, patient has stated that she will reach back out to us after that appointment to let us know what she decides

## 2024-02-13 NOTE — PROGRESS NOTES
PROGRESS NOTE    Subjective:       Patient ID: Aysha Moore is a 68 y.o. female.    6/2/2023:  Screening CT chest:  2.6 x 2.5 x 3.2cm mass in the posterior LLL     6/20/2023:  PET scan:  35 x 21 mm lobulated pulmonary mass in the posterior left lower lobe with SUV max 11.6      FDG avid lymphadenopathy is present with index nodes outlined below:  -21 x 11 mm AP window node with SUV max 12.1 (image 103)  -21 x 12 mm posterior left hilar node with SUV max 13.7 (image 109)  -16 x 13 mm left hilar node with SUV max 14 (image 1:15)  -10 x 10 mm subcarinal node with SUV max 6.3 (image 111)     7/12/2023-Biopsy of LLL:  LEFT LOWER LOBE OF LUNG, CORE BIOPSIES:   - LUNG ADENOCARCINOMA WITH PLEOMORPHIC FEATURES.   PDL1/TPS: 95%  ALK/BRAF/EGFR/KRAS/RET/ROS1/MET NEGATIVE     7/21/2023:  MRI brain: no mets.    Carbo/Taxol/XRT:  8/8/2023-9/12/2023     Plan: Atezolizumab 1200mg every 3 weeks x 16    9/30/2023-CTA Chest:  1. Segmental right upper lobe pulmonary embolus.  2. Cavitating and spiculated posterior left lower lobe lung mass, decreased slightly in size compared to the prior chest CT exam.  3. Upper lobe predominant centrilobular and subpleural emphysema, with coarse mixed interstitial and alveolar infiltrate along the mediastinal border left upper lobe suggesting reactive or infectious pneumonitis, and with mild posterior bilateral upper lobe groundglass infiltrates.    10/4/2023  Admitted for 3 days with fever to 101, new Dx of PE and splenic infarcts. Echo normal. Started on Xarelto and abx.     9/3/2023-CT abd/p:  IMPRESSION: There are hypodensities within the spleen concerning for splenic infarcts. These can be associated with endocarditis. The patient also has known pulmonary emboli. Transesophageal echocardiogram may be warranted.    10/3/2023-  TTE normal    Atezolizumab  Cycle 1: 10/23/2023  Cycle 2: 11/13/2023  Cycle 3: 12/4/2023  Delay due to ?  Pulmonary issues.  Cleared by pulm  Cycle 4:  1/29/2024  Cycle 5: 2/19/2024-due    11/10/2023-CT CAP  IMPRESSION:  1. Nodular density in the posterior left lower lobe, essentially unchanged from the previous exam when accounting for technique.     2. Scattered patchy airspace opacities throughout the left lung, increased in size/distribution from the previous exam, the differential includes posttreatment/radiation change, atelectasis/scarring, infection/pneumonia, noting malignancy is not excluded and interval follow-up would be warranted.     3. Enlarging left cervical/supraclavicular lymphadenopathy.     4. Small wedge-shaped hypodensities in the spleen, in keeping with small splenic infarcts, similar in distribution the previous exam.    12/11/2023-CTA Chest  No PE-see report, ? Pneumonia    1/17/2023-CT chest:  1. Left upper lobe paramediastinal mass is stable due to radiation exposure chest with evidence of an prominent. Mediastinal scarring.  2. Advanced emphysematous changes along with evidence of abnormality scarring the left are probably proteinaceous bullous cavitary fibrotic focus and fibrotic changes in the left lower lobe is stable.  3. Small spiculated nodule in the right lower lobe 9 x 8 mm stable. Recommend follow-up within 6 months.       Chief Complaint:  No chief complaint on file.  Stage IIII lung cancer, pulmonary embolism, splenic infarct follow up    History of Present Illness:   Aysha Moore is a 68 y.o. female who presents for follow up of lung cancer.     Ms. Moore is feeling much better now after 10 days of abx.  Breathing is good and congestion improved.     She continues on Xarleto as of today, 2/14/2024 and doing ok with this.       Family and Social history reviewed and is unchanged from 7/27/2023      ROS:  Review of Systems   Constitutional:  Negative for fever.   Respiratory:  Negative for shortness of breath.    Cardiovascular:  Negative for chest pain and leg swelling.    Gastrointestinal:  Negative for abdominal pain and blood in stool.   Genitourinary:  Negative for hematuria.   Skin:  Negative for rash.          Current Outpatient Medications:     albuterol (PROVENTIL/VENTOLIN HFA) 90 mcg/actuation inhaler, Inhale 2 puffs into the lungs every 6 (six) hours as needed. Rescue, Disp: 18 g, Rfl: 5    ammonium lactate 12 % Crea, aaa bid prn dry skin (Patient taking differently: Apply 1 g topically 2 (two) times daily as needed. aaa bid prn dry skin), Disp: 385 g, Rfl: 11    amoxicillin-clavulanate 875-125mg (AUGMENTIN) 875-125 mg per tablet, Take 1 tablet by mouth 2 (two) times daily., Disp: 20 tablet, Rfl: 0    atorvastatin (LIPITOR) 80 MG tablet, Take 1 tablet (80 mg total) by mouth every evening., Disp: 90 tablet, Rfl: 1    azelastine (ASTELIN) 137 mcg (0.1 %) nasal spray, USE 1 SPRAY IN EACH NOSTRIL 2 TIMES DAILY AS NEEDED FOR RHINITIS OR SINUSITIS, Disp: 90 mL, Rfl: 0    calcium-vitamin D3 (OS-JOLIE 500 + D3) 500 mg-5 mcg (200 unit) per tablet, Take 1 tablet by mouth every morning., Disp: , Rfl:     citalopram (CELEXA) 20 MG tablet, Take 1 tablet (20 mg total) by mouth once daily., Disp: 90 tablet, Rfl: 1    ezetimibe (ZETIA) 10 mg tablet, Take 1 tablet (10 mg total) by mouth once daily., Disp: 90 tablet, Rfl: 3    famotidine (PEPCID) 40 MG tablet, TAKE 1 TABLET BY MOUTH EVERY DAY IN THE EVENING, Disp: 90 tablet, Rfl: 1    fluticasone propionate (FLONASE) 50 mcg/actuation nasal spray, 1 spray (50 mcg total) by Each Nostril route daily as needed for Rhinitis or Allergies., Disp: 9.9 mL, Rfl: 2    HYDROcodone-acetaminophen (NORCO) 5-325 mg per tablet, Take 1 tablet by mouth every 4 to 6 hours as needed for Pain., Disp: 60 tablet, Rfl: 0    metoprolol succinate (TOPROL-XL) 50 MG 24 hr tablet, Take 1 tablet (50 mg total) by mouth once daily., Disp: 90 tablet, Rfl: 3    nitroGLYCERIN (NITROSTAT) 0.4 MG SL tablet, Place 1 tablet (0.4 mg total) under the tongue every 5 (five) minutes as  needed for Chest pain., Disp: 30 tablet, Rfl: 0    omeprazole (PRILOSEC) 40 MG capsule, Take 1 capsule (40 mg total) by mouth once daily., Disp: 30 capsule, Rfl: 11    ondansetron (ZOFRAN) 8 MG tablet, Take 1 tablet (8 mg total) by mouth every 8 (eight) hours as needed. (Patient taking differently: Take 8 mg by mouth every 8 (eight) hours as needed for Nausea.), Disp: 30 tablet, Rfl: 5    predniSONE (DELTASONE) 10 MG tablet, Take 3 a day x 3 days then 2 a day x 3 days then 1 a day x 3 days, Disp: 18 tablet, Rfl: 0    promethazine (PHENERGAN) 25 MG tablet, Take 1 tablet (25 mg total) by mouth every 4 to 6 hours as needed., Disp: 30 tablet, Rfl: 5    rivaroxaban (XARELTO) 15 mg Tab, Take 1 tablet (15 mg total) by mouth daily with dinner or evening meal., Disp: 42 tablet, Rfl: 0    tiZANidine (ZANAFLEX) 4 MG tablet, TAKE 1 TABLET BY MOUTH EVERY 6 HOURS AS NEEDED FOR BACK PAIN, Disp: 360 tablet, Rfl: 0    traMADoL (ULTRAM) 50 mg tablet, Take 1 tablet (50 mg total) by mouth every 6 (six) hours as needed for Pain., Disp: 30 tablet, Rfl: 2    umeclidinium-vilanteroL (ANORO ELLIPTA) 62.5-25 mcg/actuation DsDv, Inhale 1 puff into the lungs once daily. Controller, Disp: 1 each, Rfl: 5    valACYclovir (VALTREX) 1000 MG tablet, TAKE 2 AT ONSET OF FEVER BLISTERS THEN REPEAT IN 12 HOURS (TOTAL 4 TABS PER EPISODE), Disp: 20 tablet, Rfl: 3    valACYclovir (VALTREX) 1000 MG tablet, Take 1 tablet (1,000 mg total) by mouth 3 (three) times daily. for 10 days, Disp: 30 tablet, Rfl: 0    zinc 50 mg Tab, Take 1 tablet by mouth once daily., Disp: , Rfl:     ALPRAZolam (XANAX) 0.25 MG tablet, Take 1 tablet (0.25 mg total) by mouth 3 (three) times daily as needed for Anxiety., Disp: 30 tablet, Rfl: 1    clobetasol 0.05% (TEMOVATE) 0.05 % Oint, Apply topically 2 (two) times daily. for 14 days (Patient taking differently: Apply 1 g topically 2 (two) times daily.), Disp: 45 g, Rfl: 3        Objective:       Physical Examination:     BP (!)  "100/55   Pulse 66   Temp 97.9 °F (36.6 °C)   Resp 18   Ht 5' 3" (1.6 m)   Wt 63 kg (139 lb)   BMI 24.62 kg/m²     Physical Exam  Constitutional:       Appearance: Normal appearance.   HENT:      Head: Normocephalic and atraumatic.   Eyes:      General: No scleral icterus.     Conjunctiva/sclera: Conjunctivae normal.   Cardiovascular:      Rate and Rhythm: Normal rate.   Pulmonary:      Effort: Pulmonary effort is normal.   Abdominal:      General: Abdomen is flat.   Neurological:      General: No focal deficit present.      Mental Status: She is alert and oriented to person, place, and time.   Psychiatric:         Mood and Affect: Mood normal.         Behavior: Behavior normal.         Thought Content: Thought content normal.         Judgment: Judgment normal.         Labs:   Recent Results (from the past 336 hour(s))   CBC Auto Differential    Collection Time: 02/08/24  8:45 AM   Result Value Ref Range    WBC 3.91 3.90 - 12.70 K/uL    Hemoglobin 13.8 12.0 - 16.0 g/dL    Hematocrit 40.8 37.0 - 48.5 %    Platelets 311 150 - 450 K/uL         CMP  Sodium   Date Value Ref Range Status   02/08/2024 142 136 - 145 mmol/L Final     Potassium   Date Value Ref Range Status   02/08/2024 4.0 3.5 - 5.1 mmol/L Final     Chloride   Date Value Ref Range Status   02/08/2024 105 95 - 110 mmol/L Final     CO2   Date Value Ref Range Status   02/08/2024 27 23 - 29 mmol/L Final     Glucose   Date Value Ref Range Status   02/08/2024 94 70 - 110 mg/dL Final     BUN   Date Value Ref Range Status   02/08/2024 13 8 - 23 mg/dL Final     Creatinine   Date Value Ref Range Status   02/08/2024 0.8 0.5 - 1.4 mg/dL Final     Calcium   Date Value Ref Range Status   02/08/2024 9.4 8.7 - 10.5 mg/dL Final     Total Protein   Date Value Ref Range Status   02/08/2024 7.2 6.0 - 8.4 g/dL Final     Albumin   Date Value Ref Range Status   02/08/2024 4.1 3.5 - 5.2 g/dL Final     Total Bilirubin   Date Value Ref Range Status   02/08/2024 0.9 0.1 - 1.0 " "mg/dL Final     Comment:     For infants and newborns, interpretation of results should be based  on gestational age, weight and in agreement with clinical  observations.    Premature Infant recommended reference ranges:  Up to 24 hours.............<8.0 mg/dL  Up to 48 hours............<12.0 mg/dL  3-5 days..................<15.0 mg/dL  6-29 days.................<15.0 mg/dL       Alkaline Phosphatase   Date Value Ref Range Status   02/08/2024 79 55 - 135 U/L Final     AST   Date Value Ref Range Status   02/08/2024 17 10 - 40 U/L Final     ALT   Date Value Ref Range Status   02/08/2024 14 10 - 44 U/L Final     Anion Gap   Date Value Ref Range Status   02/08/2024 10 8 - 16 mmol/L Final     eGFR if    Date Value Ref Range Status   03/29/2022 >60.0 >60 mL/min/1.73 m^2 Final     eGFR if non    Date Value Ref Range Status   03/29/2022 >60.0 >60 mL/min/1.73 m^2 Final     Comment:     Calculation used to obtain the estimated glomerular filtration  rate (eGFR) is the CKD-EPI equation.        No results found for: "CEA"  No results found for: "PSA"        Assessment/Plan:     Problem List Items Addressed This Visit       Viral upper respiratory tract infection     This seems to have improved/resolved now.  Will continue to monitor.           LUNG CANCER-ADENOCARCINOMA OF THE LLL - Primary     Patient is doing much better now and her apparent infection has resolved.  She is doing ok on the atezo again as well.  Will continue therapy and continue to see her in close follow up.            Discussion:     Follow up in about 3 weeks (around 3/6/2024) for for NP and 6 with me.      Electronically signed by Raad Bee      "

## 2024-02-14 ENCOUNTER — OFFICE VISIT (OUTPATIENT)
Dept: HEMATOLOGY/ONCOLOGY | Facility: CLINIC | Age: 69
End: 2024-02-14
Payer: MEDICARE

## 2024-02-14 VITALS
SYSTOLIC BLOOD PRESSURE: 100 MMHG | HEIGHT: 63 IN | TEMPERATURE: 98 F | BODY MASS INDEX: 24.63 KG/M2 | HEART RATE: 66 BPM | WEIGHT: 139 LBS | DIASTOLIC BLOOD PRESSURE: 55 MMHG | RESPIRATION RATE: 18 BRPM

## 2024-02-14 DIAGNOSIS — C34.32 MALIGNANT NEOPLASM OF LOWER LOBE OF LEFT LUNG: Primary | ICD-10-CM

## 2024-02-14 DIAGNOSIS — J06.9 VIRAL UPPER RESPIRATORY TRACT INFECTION: ICD-10-CM

## 2024-02-14 PROCEDURE — 99214 OFFICE O/P EST MOD 30 MIN: CPT | Performed by: INTERNAL MEDICINE

## 2024-02-14 PROCEDURE — 99214 OFFICE O/P EST MOD 30 MIN: CPT | Mod: S$PBB,,, | Performed by: INTERNAL MEDICINE

## 2024-02-14 NOTE — ASSESSMENT & PLAN NOTE
Patient is doing much better now and her apparent infection has resolved.  She is doing ok on the atezo again as well.  Will continue therapy and continue to see her in close follow up.

## 2024-02-16 RX ORDER — DIPHENHYDRAMINE HYDROCHLORIDE 50 MG/ML
50 INJECTION INTRAMUSCULAR; INTRAVENOUS ONCE AS NEEDED
Status: CANCELLED | OUTPATIENT
Start: 2024-02-19

## 2024-02-16 RX ORDER — SODIUM CHLORIDE 0.9 % (FLUSH) 0.9 %
10 SYRINGE (ML) INJECTION
Status: CANCELLED | OUTPATIENT
Start: 2024-02-19

## 2024-02-16 RX ORDER — HEPARIN 100 UNIT/ML
500 SYRINGE INTRAVENOUS
Status: CANCELLED | OUTPATIENT
Start: 2024-02-19

## 2024-02-16 RX ORDER — EPINEPHRINE 0.3 MG/.3ML
0.3 INJECTION SUBCUTANEOUS ONCE AS NEEDED
Status: CANCELLED | OUTPATIENT
Start: 2024-02-19

## 2024-02-19 ENCOUNTER — TELEPHONE (OUTPATIENT)
Dept: HEMATOLOGY/ONCOLOGY | Facility: CLINIC | Age: 69
End: 2024-02-19

## 2024-02-19 ENCOUNTER — INFUSION (OUTPATIENT)
Dept: INFUSION THERAPY | Facility: HOSPITAL | Age: 69
End: 2024-02-19
Attending: INTERNAL MEDICINE
Payer: MEDICARE

## 2024-02-19 VITALS
BODY MASS INDEX: 25.14 KG/M2 | HEART RATE: 60 BPM | HEIGHT: 63 IN | WEIGHT: 141.88 LBS | TEMPERATURE: 98 F | DIASTOLIC BLOOD PRESSURE: 82 MMHG | SYSTOLIC BLOOD PRESSURE: 155 MMHG | RESPIRATION RATE: 16 BRPM | OXYGEN SATURATION: 97 %

## 2024-02-19 DIAGNOSIS — C34.90 METASTATIC LUNG CANCER (METASTASIS FROM LUNG TO OTHER SITE), UNSPECIFIED LATERALITY: ICD-10-CM

## 2024-02-19 DIAGNOSIS — C34.32 MALIGNANT NEOPLASM OF LOWER LOBE OF LEFT LUNG: Primary | ICD-10-CM

## 2024-02-19 PROCEDURE — 63600175 PHARM REV CODE 636 W HCPCS: Mod: JZ,JG | Performed by: INTERNAL MEDICINE

## 2024-02-19 PROCEDURE — 96413 CHEMO IV INFUSION 1 HR: CPT

## 2024-02-19 PROCEDURE — A4216 STERILE WATER/SALINE, 10 ML: HCPCS | Performed by: INTERNAL MEDICINE

## 2024-02-19 PROCEDURE — 25000003 PHARM REV CODE 250: Performed by: INTERNAL MEDICINE

## 2024-02-19 RX ORDER — HEPARIN 100 UNIT/ML
500 SYRINGE INTRAVENOUS
Status: DISCONTINUED | OUTPATIENT
Start: 2024-02-19 | End: 2024-02-19 | Stop reason: HOSPADM

## 2024-02-19 RX ORDER — SODIUM CHLORIDE 0.9 % (FLUSH) 0.9 %
10 SYRINGE (ML) INJECTION
Status: DISCONTINUED | OUTPATIENT
Start: 2024-02-19 | End: 2024-02-19 | Stop reason: HOSPADM

## 2024-02-19 RX ORDER — EPINEPHRINE 0.3 MG/.3ML
0.3 INJECTION SUBCUTANEOUS ONCE AS NEEDED
Status: DISCONTINUED | OUTPATIENT
Start: 2024-02-19 | End: 2024-02-19 | Stop reason: HOSPADM

## 2024-02-19 RX ORDER — DIPHENHYDRAMINE HYDROCHLORIDE 50 MG/ML
50 INJECTION INTRAMUSCULAR; INTRAVENOUS ONCE AS NEEDED
Status: DISCONTINUED | OUTPATIENT
Start: 2024-02-19 | End: 2024-02-19 | Stop reason: HOSPADM

## 2024-02-19 RX ADMIN — ATEZOLIZUMAB 1200 MG: 1200 INJECTION, SOLUTION INTRAVENOUS at 12:02

## 2024-02-19 RX ADMIN — HEPARIN 500 UNITS: 100 SYRINGE at 01:02

## 2024-02-19 RX ADMIN — SODIUM CHLORIDE, PRESERVATIVE FREE 10 ML: 5 INJECTION INTRAVENOUS at 01:02

## 2024-02-19 NOTE — TELEPHONE ENCOUNTER
----- Message from IRAIS Cohen sent at 2/19/2024  2:26 PM CST -----  Increase oral k intake through foods and repeat labs next week    Meka

## 2024-02-19 NOTE — PLAN OF CARE
Problem: Fatigue  Goal: Improved Activity Tolerance  Outcome: Ongoing, Progressing  Intervention: Promote Improved Energy  Flowsheets (Taken 2/19/2024 1137)  Fatigue Management:   activity schedule adjusted   fatigue-related activity identified   frequent rest breaks encouraged   paced activity encouraged  Activity Management: Ambulated -L4

## 2024-02-19 NOTE — TELEPHONE ENCOUNTER
Patient made aware of above, potassium rich foods reviewed with patient. Patient verbalized understanding.

## 2024-02-20 DIAGNOSIS — E78.5 HYPERLIPIDEMIA, UNSPECIFIED HYPERLIPIDEMIA TYPE: ICD-10-CM

## 2024-02-25 ENCOUNTER — PATIENT MESSAGE (OUTPATIENT)
Dept: ADMINISTRATIVE | Facility: HOSPITAL | Age: 69
End: 2024-02-25
Payer: MEDICARE

## 2024-02-28 RX ORDER — ATORVASTATIN CALCIUM 80 MG/1
80 TABLET, FILM COATED ORAL NIGHTLY
Qty: 90 TABLET | Refills: 1 | Status: SHIPPED | OUTPATIENT
Start: 2024-02-28

## 2024-02-28 NOTE — TELEPHONE ENCOUNTER
NN spoke with pt at .  Pt alert and able to answer questions appropriately. Pt O2 sat 93% on  3 L currently, no home O2 use. COPD action plan reviewed. Deep breathing exercises encouraged. Patient states he would like a referral to Dr. Timbo MD in [place] office.  No new concerns or questions voiced at this time.  NN will continue to follow as needed.       Per current GOLD Standards, please consider: No LAMA in place, LABA/ICS in place (Symbicort), Outpatient PFT, Rehab as appropriate, Palliative Care consult, Annual LDCT per current screening guidelines (age 50-80 years old, smoking history of 20 pack years or more or has quit within past 15 years)       zan[facility]lex sent to [facility]    used

## 2024-02-29 ENCOUNTER — PATIENT MESSAGE (OUTPATIENT)
Dept: ADMINISTRATIVE | Facility: HOSPITAL | Age: 69
End: 2024-02-29
Payer: MEDICARE

## 2024-02-29 ENCOUNTER — PATIENT OUTREACH (OUTPATIENT)
Dept: ADMINISTRATIVE | Facility: HOSPITAL | Age: 69
End: 2024-02-29
Payer: MEDICARE

## 2024-02-29 DIAGNOSIS — Z78.0 POST-MENOPAUSAL: ICD-10-CM

## 2024-02-29 DIAGNOSIS — Z12.31 ENCOUNTER FOR SCREENING MAMMOGRAM FOR MALIGNANT NEOPLASM OF BREAST: Primary | ICD-10-CM

## 2024-02-29 NOTE — PROGRESS NOTES
Population Health Chart Review & Patient Outreach Details      Additional [facility] Health Notes:      CAMPAIGN- Preventative Care Screenin         Updates Reque      Health Maintenance Topics Overdue:    Health Maintenance Due   Topic Date Due    COVID-19 Vaccine (1) Never done    DEXA Scan  01/11/2014    RSV Vaccine (Age 60+ and Pregnant patients) (1 - 1-dose 60+ series) Never done    Pneumococcal Vaccines (Age 65+) (2 of 2 - PCV) 11/17/2015    Shingles Vaccine (2 of 2) 07/23/2019    Influenza Vaccine (1) 09/01/2023    Mammogram  03/02/2024         Health Maintenance Topic(s) Outreach Outcomes & Actions Taken:    Breast Cancer Screening - Outreach Outcomes & Actions Taken  : Mammogram Order Placed and Mammogram Screening Scheduled    Osteoporosis Screening - Outreach Outcomes & Actions Taken  : Dexa Order Placed and Dexa Appointment Scheduled

## 2024-03-04 NOTE — PROGRESS NOTES
PROGRESS NOTE    Subjective:       Patient ID: Aysha Moore is a 68 y.o. female.    6/2/2023:  Screening CT chest:  2.6 x 2.5 x 3.2cm mass in the posterior LLL     6/20/2023:  PET scan:  35 x 21 mm lobulated pulmonary mass in the posterior left lower lobe with SUV max 11.6      FDG avid lymphadenopathy is present with index nodes outlined below:  -21 x 11 mm AP window node with SUV max 12.1 (image 103)  -21 x 12 mm posterior left hilar node with SUV max 13.7 (image 109)  -16 x 13 mm left hilar node with SUV max 14 (image 1:15)  -10 x 10 mm subcarinal node with SUV max 6.3 (image 111)     7/12/2023-Biopsy of LLL:  LEFT LOWER LOBE OF LUNG, CORE BIOPSIES:   - LUNG ADENOCARCINOMA WITH PLEOMORPHIC FEATURES.   PDL1/TPS: 95%  ALK/BRAF/EGFR/KRAS/RET/ROS1/MET NEGATIVE     7/21/2023:  MRI brain: no mets.    Carbo/Taxol/XRT:  8/8/2023-9/12/2023     Plan: Atezolizumab 1200mg every 3 weeks x 16    9/30/2023-CTA Chest:  1. Segmental right upper lobe pulmonary embolus.  2. Cavitating and spiculated posterior left lower lobe lung mass, decreased slightly in size compared to the prior chest CT exam.  3. Upper lobe predominant centrilobular and subpleural emphysema, with coarse mixed interstitial and alveolar infiltrate along the mediastinal border left upper lobe suggesting reactive or infectious pneumonitis, and with mild posterior bilateral upper lobe groundglass infiltrates.    10/4/2023  Admitted for 3 days with fever to 101, new Dx of PE and splenic infarcts. Echo normal. Started on Xarelto and abx.     9/3/2023-CT abd/p:  IMPRESSION: There are hypodensities within the spleen concerning for splenic infarcts. These can be associated with endocarditis. The patient also has known pulmonary emboli. Transesophageal echocardiogram may be warranted.    10/3/2023-  TTE normal    Atezolizumab  Cycle 1: 10/23/2023  Cycle 2: 11/13/2023  Cycle 3: 12/4/2023  Delay due to ?  Pulmonary issues.  Cleared by pulm  Cycle 4:  1/29/2024  Cycle 5: 2/19/2024  Cycle 6: 3/11/2024- Due    11/10/2023-CT CAP  IMPRESSION:  1. Nodular density in the posterior left lower lobe, essentially unchanged from the previous exam when accounting for technique.     2. Scattered patchy airspace opacities throughout the left lung, increased in size/distribution from the previous exam, the differential includes posttreatment/radiation change, atelectasis/scarring, infection/pneumonia, noting malignancy is not excluded and interval follow-up would be warranted.     3. Enlarging left cervical/supraclavicular lymphadenopathy.     4. Small wedge-shaped hypodensities in the spleen, in keeping with small splenic infarcts, similar in distribution the previous exam.    12/11/2023-CTA Chest  No PE-see report, ? Pneumonia    1/17/2023-CT chest:  1. Left upper lobe paramediastinal mass is stable due to radiation exposure chest with evidence of an prominent. Mediastinal scarring.  2. Advanced emphysematous changes along with evidence of abnormality scarring the left are probably proteinaceous bullous cavitary fibrotic focus and fibrotic changes in the left lower lobe is stable.  3. Small spiculated nodule in the right lower lobe 9 x 8 mm stable. Recommend follow-up within 6 months.       Chief Complaint:  Stage III lung cancer, pulmonary embolism, splenic infarct follow up    History of Present Illness:   Aysha Moore is a 68 y.o. female who presents for follow up of lung cancer.     Ms. Moore continues on  Breathing is good and congestion improved.     She did have diarrhea for 2-3 days possible viral infection.     She does have fluid in the ears but is not taking any medication.    She continues on Xarleto as of today, 3/6/2024 and doing ok with this.     Family and Social history reviewed and is unchanged from 7/27/2023      ROS:  Review of Systems   Constitutional:  Positive for fatigue. Negative for fever.    Respiratory:  Negative for shortness of breath.    Cardiovascular:  Negative for chest pain and leg swelling.   Gastrointestinal:  Positive for diarrhea. Negative for abdominal pain and blood in stool.   Genitourinary:  Negative for hematuria.   Skin:  Negative for rash.          Current Outpatient Medications:     albuterol (PROVENTIL/VENTOLIN HFA) 90 mcg/actuation inhaler, Inhale 2 puffs into the lungs every 6 (six) hours as needed. Rescue, Disp: 18 g, Rfl: 5    ALPRAZolam (XANAX) 0.25 MG tablet, Take 1 tablet (0.25 mg total) by mouth 3 (three) times daily as needed for Anxiety., Disp: 30 tablet, Rfl: 1    ammonium lactate 12 % Crea, aaa bid prn dry skin (Patient taking differently: Apply 1 g topically 2 (two) times daily as needed. aaa bid prn dry skin), Disp: 385 g, Rfl: 11    amoxicillin-clavulanate 875-125mg (AUGMENTIN) 875-125 mg per tablet, Take 1 tablet by mouth 2 (two) times daily., Disp: 20 tablet, Rfl: 0    atorvastatin (LIPITOR) 80 MG tablet, TAKE 1 TABLET BY MOUTH EVERY EVENING, Disp: 90 tablet, Rfl: 1    azelastine (ASTELIN) 137 mcg (0.1 %) nasal spray, USE 1 SPRAY IN EACH NOSTRIL 2 TIMES DAILY AS NEEDED FOR RHINITIS OR SINUSITIS, Disp: 90 mL, Rfl: 0    calcium-vitamin D3 (OS-JOLIE 500 + D3) 500 mg-5 mcg (200 unit) per tablet, Take 1 tablet by mouth every morning., Disp: , Rfl:     citalopram (CELEXA) 20 MG tablet, Take 1 tablet (20 mg total) by mouth once daily., Disp: 90 tablet, Rfl: 1    clobetasol 0.05% (TEMOVATE) 0.05 % Oint, Apply topically 2 (two) times daily. for 14 days (Patient taking differently: Apply 1 g topically 2 (two) times daily.), Disp: 45 g, Rfl: 3    ezetimibe (ZETIA) 10 mg tablet, Take 1 tablet (10 mg total) by mouth once daily., Disp: 90 tablet, Rfl: 3    famotidine (PEPCID) 40 MG tablet, TAKE 1 TABLET BY MOUTH EVERY DAY IN THE EVENING, Disp: 90 tablet, Rfl: 1    fluticasone propionate (FLONASE) 50 mcg/actuation nasal spray, 1 spray (50 mcg total) by Each Nostril route daily as  needed for Rhinitis or Allergies., Disp: 9.9 mL, Rfl: 2    HYDROcodone-acetaminophen (NORCO) 5-325 mg per tablet, Take 1 tablet by mouth every 4 to 6 hours as needed for Pain., Disp: 60 tablet, Rfl: 0    metoprolol succinate (TOPROL-XL) 50 MG 24 hr tablet, Take 1 tablet (50 mg total) by mouth once daily., Disp: 90 tablet, Rfl: 3    nitroGLYCERIN (NITROSTAT) 0.4 MG SL tablet, Place 1 tablet (0.4 mg total) under the tongue every 5 (five) minutes as needed for Chest pain., Disp: 30 tablet, Rfl: 0    omeprazole (PRILOSEC) 40 MG capsule, Take 1 capsule (40 mg total) by mouth once daily., Disp: 30 capsule, Rfl: 11    ondansetron (ZOFRAN) 8 MG tablet, Take 1 tablet (8 mg total) by mouth every 8 (eight) hours as needed. (Patient taking differently: Take 8 mg by mouth every 8 (eight) hours as needed for Nausea.), Disp: 30 tablet, Rfl: 5    predniSONE (DELTASONE) 10 MG tablet, Take 3 a day x 3 days then 2 a day x 3 days then 1 a day x 3 days, Disp: 18 tablet, Rfl: 0    promethazine (PHENERGAN) 25 MG tablet, Take 1 tablet (25 mg total) by mouth every 4 to 6 hours as needed., Disp: 30 tablet, Rfl: 5    rivaroxaban (XARELTO) 15 mg Tab, Take 1 tablet (15 mg total) by mouth daily with dinner or evening meal., Disp: 42 tablet, Rfl: 0    tiZANidine (ZANAFLEX) 4 MG tablet, TAKE 1 TABLET BY MOUTH EVERY 6 HOURS AS NEEDED FOR BACK PAIN, Disp: 360 tablet, Rfl: 0    traMADoL (ULTRAM) 50 mg tablet, Take 1 tablet (50 mg total) by mouth every 6 (six) hours as needed for Pain., Disp: 30 tablet, Rfl: 2    umeclidinium-vilanteroL (ANORO ELLIPTA) 62.5-25 mcg/actuation DsDv, Inhale 1 puff into the lungs once daily. Controller, Disp: 1 each, Rfl: 5    valACYclovir (VALTREX) 1000 MG tablet, TAKE 2 AT ONSET OF FEVER BLISTERS THEN REPEAT IN 12 HOURS (TOTAL 4 TABS PER EPISODE), Disp: 20 tablet, Rfl: 3    zinc 50 mg Tab, Take 1 tablet by mouth once daily., Disp: , Rfl:         Objective:       Physical Examination:     BP (!) 97/57   Pulse 107   Temp  97.9 °F (36.6 °C)   Resp 17   Wt 63.3 kg (139 lb 8 oz)   BMI 24.71 kg/m²     Physical Exam  Constitutional:       Appearance: Normal appearance.   HENT:      Head: Normocephalic and atraumatic.      Nose: Nose normal.      Mouth/Throat:      Mouth: Mucous membranes are moist.   Eyes:      General: No scleral icterus.     Conjunctiva/sclera: Conjunctivae normal.   Cardiovascular:      Rate and Rhythm: Normal rate.   Pulmonary:      Effort: Pulmonary effort is normal.   Abdominal:      General: Abdomen is flat.   Skin:     General: Skin is warm and dry.   Neurological:      General: No focal deficit present.      Mental Status: She is alert and oriented to person, place, and time.   Psychiatric:         Mood and Affect: Mood normal.         Behavior: Behavior normal.         Thought Content: Thought content normal.         Judgment: Judgment normal.         Labs:   Recent Results (from the past 336 hour(s))   CBC Auto Differential    Collection Time: 03/06/24  1:44 PM   Result Value Ref Range    WBC 4.76 3.90 - 12.70 K/uL    Hemoglobin 13.9 12.0 - 16.0 g/dL    Hematocrit 41.9 37.0 - 48.5 %    Platelets 267 150 - 450 K/uL           CMP  Sodium   Date Value Ref Range Status   02/19/2024 142 136 - 145 mmol/L Final     Potassium   Date Value Ref Range Status   02/19/2024 3.4 (L) 3.5 - 5.1 mmol/L Final     Chloride   Date Value Ref Range Status   02/19/2024 106 95 - 110 mmol/L Final     CO2   Date Value Ref Range Status   02/19/2024 28 23 - 29 mmol/L Final     Glucose   Date Value Ref Range Status   02/19/2024 104 70 - 110 mg/dL Final     BUN   Date Value Ref Range Status   02/19/2024 18 8 - 23 mg/dL Final     Creatinine   Date Value Ref Range Status   02/19/2024 0.7 0.5 - 1.4 mg/dL Final     Calcium   Date Value Ref Range Status   02/19/2024 9.0 8.7 - 10.5 mg/dL Final     Total Protein   Date Value Ref Range Status   02/19/2024 5.9 (L) 6.0 - 8.4 g/dL Final     Albumin   Date Value Ref Range Status   02/19/2024 3.9 3.5  "- 5.2 g/dL Final     Total Bilirubin   Date Value Ref Range Status   02/19/2024 1.0 0.1 - 1.0 mg/dL Final     Comment:     For infants and newborns, interpretation of results should be based  on gestational age, weight and in agreement with clinical  observations.    Premature Infant recommended reference ranges:  Up to 24 hours.............<8.0 mg/dL  Up to 48 hours............<12.0 mg/dL  3-5 days..................<15.0 mg/dL  6-29 days.................<15.0 mg/dL       Alkaline Phosphatase   Date Value Ref Range Status   02/19/2024 61 55 - 135 U/L Final     AST   Date Value Ref Range Status   02/19/2024 14 10 - 40 U/L Final     ALT   Date Value Ref Range Status   02/19/2024 16 10 - 44 U/L Final     Anion Gap   Date Value Ref Range Status   02/19/2024 8 8 - 16 mmol/L Final     eGFR if    Date Value Ref Range Status   03/29/2022 >60.0 >60 mL/min/1.73 m^2 Final     eGFR if non    Date Value Ref Range Status   03/29/2022 >60.0 >60 mL/min/1.73 m^2 Final     Comment:     Calculation used to obtain the estimated glomerular filtration  rate (eGFR) is the CKD-EPI equation.        No results found for: "CEA"  No results found for: "PSA"        Assessment/Plan:     Problem List Items Addressed This Visit          ENT    Seasonal allergic rhinitis       Oncology    LUNG CANCER-ADENOCARCINOMA OF THE LLL - Primary       GI    Diarrhea       Other    Fatigue     Other Visit Diagnoses       Hypotension, unspecified hypotension type              Lung cancer- Continue Tecentriq  2. Fatigue- Encourage exercise  3. Diarrhea- resolved  4. Hypotension- Encouraged Increased fluid intake  5. Allergic Rhinitis- Flonase and Astelin BID and start Claritin    Discussion:     Follow up in about 3 weeks (around 3/27/2024) for with Dr. Hankins and 6 weeks with me.      Electronically signed by Meka Chiang, MSN, APRN, AGNP-C, OCN      "

## 2024-03-06 ENCOUNTER — OFFICE VISIT (OUTPATIENT)
Dept: HEMATOLOGY/ONCOLOGY | Facility: CLINIC | Age: 69
End: 2024-03-06
Payer: MEDICARE

## 2024-03-06 ENCOUNTER — LAB VISIT (OUTPATIENT)
Dept: LAB | Facility: HOSPITAL | Age: 69
End: 2024-03-06
Attending: NURSE PRACTITIONER
Payer: MEDICARE

## 2024-03-06 VITALS
WEIGHT: 139.5 LBS | TEMPERATURE: 98 F | RESPIRATION RATE: 17 BRPM | HEART RATE: 107 BPM | DIASTOLIC BLOOD PRESSURE: 57 MMHG | SYSTOLIC BLOOD PRESSURE: 97 MMHG | BODY MASS INDEX: 24.71 KG/M2

## 2024-03-06 DIAGNOSIS — R53.83 FATIGUE, UNSPECIFIED TYPE: ICD-10-CM

## 2024-03-06 DIAGNOSIS — I95.9 HYPOTENSION, UNSPECIFIED HYPOTENSION TYPE: ICD-10-CM

## 2024-03-06 DIAGNOSIS — J30.1 SEASONAL ALLERGIC RHINITIS DUE TO POLLEN: ICD-10-CM

## 2024-03-06 DIAGNOSIS — R19.7 DIARRHEA, UNSPECIFIED TYPE: ICD-10-CM

## 2024-03-06 DIAGNOSIS — C34.32 MALIGNANT NEOPLASM OF LOWER LOBE OF LEFT LUNG: Primary | ICD-10-CM

## 2024-03-06 DIAGNOSIS — C34.32 MALIGNANT NEOPLASM OF LOWER LOBE OF LEFT LUNG: ICD-10-CM

## 2024-03-06 LAB
ALBUMIN SERPL BCP-MCNC: 4.5 G/DL (ref 3.5–5.2)
ALP SERPL-CCNC: 86 U/L (ref 55–135)
ALT SERPL W/O P-5'-P-CCNC: 16 U/L (ref 10–44)
ANION GAP SERPL CALC-SCNC: 10 MMOL/L (ref 8–16)
AST SERPL-CCNC: 18 U/L (ref 10–40)
BASOPHILS # BLD AUTO: 0.01 K/UL (ref 0–0.2)
BASOPHILS NFR BLD: 0.2 % (ref 0–1.9)
BILIRUB SERPL-MCNC: 1.5 MG/DL (ref 0.1–1)
BUN SERPL-MCNC: 15 MG/DL (ref 8–23)
CALCIUM SERPL-MCNC: 9.6 MG/DL (ref 8.7–10.5)
CHLORIDE SERPL-SCNC: 103 MMOL/L (ref 95–110)
CO2 SERPL-SCNC: 25 MMOL/L (ref 23–29)
CREAT SERPL-MCNC: 0.8 MG/DL (ref 0.5–1.4)
DIFFERENTIAL METHOD BLD: ABNORMAL
EOSINOPHIL # BLD AUTO: 0.4 K/UL (ref 0–0.5)
EOSINOPHIL NFR BLD: 8.8 % (ref 0–8)
ERYTHROCYTE [DISTWIDTH] IN BLOOD BY AUTOMATED COUNT: 13.2 % (ref 11.5–14.5)
EST. GFR  (NO RACE VARIABLE): >60 ML/MIN/1.73 M^2
GLUCOSE SERPL-MCNC: 123 MG/DL (ref 70–110)
HCT VFR BLD AUTO: 41.9 % (ref 37–48.5)
HGB BLD-MCNC: 13.9 G/DL (ref 12–16)
IMM GRANULOCYTES # BLD AUTO: 0.02 K/UL (ref 0–0.04)
IMM GRANULOCYTES NFR BLD AUTO: 0.4 % (ref 0–0.5)
LYMPHOCYTES # BLD AUTO: 0.9 K/UL (ref 1–4.8)
LYMPHOCYTES NFR BLD: 18.9 % (ref 18–48)
MCH RBC QN AUTO: 31.5 PG (ref 27–31)
MCHC RBC AUTO-ENTMCNC: 33.2 G/DL (ref 32–36)
MCV RBC AUTO: 95 FL (ref 82–98)
MONOCYTES # BLD AUTO: 0.6 K/UL (ref 0.3–1)
MONOCYTES NFR BLD: 13.4 % (ref 4–15)
NEUTROPHILS # BLD AUTO: 2.8 K/UL (ref 1.8–7.7)
NEUTROPHILS NFR BLD: 58.3 % (ref 38–73)
NRBC BLD-RTO: 0 /100 WBC
PLATELET # BLD AUTO: 267 K/UL (ref 150–450)
PMV BLD AUTO: 9 FL (ref 9.2–12.9)
POTASSIUM SERPL-SCNC: 4.3 MMOL/L (ref 3.5–5.1)
PROT SERPL-MCNC: 7.4 G/DL (ref 6–8.4)
RBC # BLD AUTO: 4.41 M/UL (ref 4–5.4)
SODIUM SERPL-SCNC: 138 MMOL/L (ref 136–145)
WBC # BLD AUTO: 4.76 K/UL (ref 3.9–12.7)

## 2024-03-06 PROCEDURE — 36415 COLL VENOUS BLD VENIPUNCTURE: CPT | Performed by: NURSE PRACTITIONER

## 2024-03-06 PROCEDURE — 99214 OFFICE O/P EST MOD 30 MIN: CPT | Performed by: NURSE PRACTITIONER

## 2024-03-06 PROCEDURE — 99214 OFFICE O/P EST MOD 30 MIN: CPT | Mod: S$PBB,,, | Performed by: NURSE PRACTITIONER

## 2024-03-06 PROCEDURE — 80053 COMPREHEN METABOLIC PANEL: CPT | Performed by: NURSE PRACTITIONER

## 2024-03-06 PROCEDURE — 85025 COMPLETE CBC W/AUTO DIFF WBC: CPT | Performed by: NURSE PRACTITIONER

## 2024-03-10 RX ORDER — HEPARIN 100 UNIT/ML
500 SYRINGE INTRAVENOUS
Status: CANCELLED | OUTPATIENT
Start: 2024-03-11

## 2024-03-10 RX ORDER — SODIUM CHLORIDE 0.9 % (FLUSH) 0.9 %
10 SYRINGE (ML) INJECTION
Status: CANCELLED | OUTPATIENT
Start: 2024-03-11

## 2024-03-10 RX ORDER — EPINEPHRINE 0.3 MG/.3ML
0.3 INJECTION SUBCUTANEOUS ONCE AS NEEDED
Status: CANCELLED | OUTPATIENT
Start: 2024-03-11

## 2024-03-10 RX ORDER — DIPHENHYDRAMINE HYDROCHLORIDE 50 MG/ML
50 INJECTION INTRAMUSCULAR; INTRAVENOUS ONCE AS NEEDED
Status: CANCELLED | OUTPATIENT
Start: 2024-03-11

## 2024-03-11 ENCOUNTER — INFUSION (OUTPATIENT)
Dept: INFUSION THERAPY | Facility: HOSPITAL | Age: 69
End: 2024-03-11
Attending: INTERNAL MEDICINE
Payer: MEDICARE

## 2024-03-11 VITALS
HEART RATE: 72 BPM | BODY MASS INDEX: 24.98 KG/M2 | OXYGEN SATURATION: 97 % | TEMPERATURE: 98 F | RESPIRATION RATE: 15 BRPM | SYSTOLIC BLOOD PRESSURE: 133 MMHG | WEIGHT: 141 LBS | HEIGHT: 63 IN | DIASTOLIC BLOOD PRESSURE: 77 MMHG

## 2024-03-11 DIAGNOSIS — C34.32 MALIGNANT NEOPLASM OF LOWER LOBE OF LEFT LUNG: Primary | ICD-10-CM

## 2024-03-11 DIAGNOSIS — C34.90 METASTATIC LUNG CANCER (METASTASIS FROM LUNG TO OTHER SITE), UNSPECIFIED LATERALITY: ICD-10-CM

## 2024-03-11 PROCEDURE — 25000003 PHARM REV CODE 250: Performed by: NURSE PRACTITIONER

## 2024-03-11 PROCEDURE — A4216 STERILE WATER/SALINE, 10 ML: HCPCS | Performed by: NURSE PRACTITIONER

## 2024-03-11 PROCEDURE — 63600175 PHARM REV CODE 636 W HCPCS: Mod: JZ,JG | Performed by: NURSE PRACTITIONER

## 2024-03-11 PROCEDURE — 96413 CHEMO IV INFUSION 1 HR: CPT

## 2024-03-11 RX ORDER — DIPHENHYDRAMINE HYDROCHLORIDE 50 MG/ML
50 INJECTION INTRAMUSCULAR; INTRAVENOUS ONCE AS NEEDED
Status: DISCONTINUED | OUTPATIENT
Start: 2024-03-11 | End: 2024-03-11 | Stop reason: HOSPADM

## 2024-03-11 RX ORDER — HEPARIN 100 UNIT/ML
500 SYRINGE INTRAVENOUS
Status: DISCONTINUED | OUTPATIENT
Start: 2024-03-11 | End: 2024-03-11 | Stop reason: HOSPADM

## 2024-03-11 RX ORDER — EPINEPHRINE 0.3 MG/.3ML
0.3 INJECTION SUBCUTANEOUS ONCE AS NEEDED
Status: DISCONTINUED | OUTPATIENT
Start: 2024-03-11 | End: 2024-03-11 | Stop reason: HOSPADM

## 2024-03-11 RX ORDER — SODIUM CHLORIDE 0.9 % (FLUSH) 0.9 %
10 SYRINGE (ML) INJECTION
Status: DISCONTINUED | OUTPATIENT
Start: 2024-03-11 | End: 2024-03-11 | Stop reason: HOSPADM

## 2024-03-11 RX ADMIN — SODIUM CHLORIDE, PRESERVATIVE FREE 10 ML: 5 INJECTION INTRAVENOUS at 11:03

## 2024-03-11 RX ADMIN — HEPARIN 500 UNITS: 100 SYRINGE at 11:03

## 2024-03-11 RX ADMIN — ATEZOLIZUMAB 1200 MG: 1200 INJECTION, SOLUTION INTRAVENOUS at 11:03

## 2024-03-11 NOTE — PLAN OF CARE
Problem: Fatigue  Goal: Improved Activity Tolerance  Outcome: Ongoing, Progressing  Intervention: Promote Improved Energy  Flowsheets (Taken 3/11/2024 1120)  Fatigue Management:   fatigue-related activity identified   frequent rest breaks encouraged   paced activity encouraged  Sleep/Rest Enhancement:   regular sleep/rest pattern promoted   relaxation techniques promoted  Activity Management: Ambulated -L4

## 2024-03-27 ENCOUNTER — OFFICE VISIT (OUTPATIENT)
Dept: HEMATOLOGY/ONCOLOGY | Facility: CLINIC | Age: 69
End: 2024-03-27
Payer: MEDICARE

## 2024-03-27 VITALS
SYSTOLIC BLOOD PRESSURE: 94 MMHG | HEART RATE: 70 BPM | DIASTOLIC BLOOD PRESSURE: 51 MMHG | BODY MASS INDEX: 25.19 KG/M2 | RESPIRATION RATE: 17 BRPM | TEMPERATURE: 98 F | WEIGHT: 142.19 LBS

## 2024-03-27 DIAGNOSIS — C34.32 MALIGNANT NEOPLASM OF LOWER LOBE OF LEFT LUNG: Primary | ICD-10-CM

## 2024-03-27 DIAGNOSIS — D73.89 SPLENIC LESION: ICD-10-CM

## 2024-03-27 PROCEDURE — 99214 OFFICE O/P EST MOD 30 MIN: CPT | Performed by: INTERNAL MEDICINE

## 2024-03-27 PROCEDURE — 99214 OFFICE O/P EST MOD 30 MIN: CPT | Mod: S$PBB,,, | Performed by: INTERNAL MEDICINE

## 2024-03-27 NOTE — ASSESSMENT & PLAN NOTE
Patient is doing well on the Atezo and has no sign of AI disease.  Will continue therapy and continue monitoring moving forward.  No new issues.

## 2024-03-27 NOTE — PROGRESS NOTES
PROGRESS NOTE    Subjective:       Patient ID: Aysha Moore is a 68 y.o. female.    6/2/2023:  Screening CT chest:  2.6 x 2.5 x 3.2cm mass in the posterior LLL     6/20/2023:  PET scan:  35 x 21 mm lobulated pulmonary mass in the posterior left lower lobe with SUV max 11.6      FDG avid lymphadenopathy is present with index nodes outlined below:  -21 x 11 mm AP window node with SUV max 12.1 (image 103)  -21 x 12 mm posterior left hilar node with SUV max 13.7 (image 109)  -16 x 13 mm left hilar node with SUV max 14 (image 1:15)  -10 x 10 mm subcarinal node with SUV max 6.3 (image 111)     7/12/2023-Biopsy of LLL:  LEFT LOWER LOBE OF LUNG, CORE BIOPSIES:   - LUNG ADENOCARCINOMA WITH PLEOMORPHIC FEATURES.   PDL1/TPS: 95%  ALK/BRAF/EGFR/KRAS/RET/ROS1/MET NEGATIVE     7/21/2023:  MRI brain: no mets.    Carbo/Taxol/XRT:  8/8/2023-9/12/2023     Plan: Atezolizumab 1200mg every 3 weeks x 16    9/30/2023-CTA Chest:  1. Segmental right upper lobe pulmonary embolus.  2. Cavitating and spiculated posterior left lower lobe lung mass, decreased slightly in size compared to the prior chest CT exam.  3. Upper lobe predominant centrilobular and subpleural emphysema, with coarse mixed interstitial and alveolar infiltrate along the mediastinal border left upper lobe suggesting reactive or infectious pneumonitis, and with mild posterior bilateral upper lobe groundglass infiltrates.    10/4/2023  Admitted for 3 days with fever to 101, new Dx of PE and splenic infarcts. Echo normal. Started on Xarelto and abx.     9/3/2023-CT abd/p:  IMPRESSION: There are hypodensities within the spleen concerning for splenic infarcts. These can be associated with endocarditis. The patient also has known pulmonary emboli. Transesophageal echocardiogram may be warranted.    10/3/2023-  TTE normal    Atezolizumab  Cycle 1: 10/23/2023  Cycle 2: 11/13/2023  Cycle 3: 12/4/2023  Delay due to ?  Pulmonary issues.  Cleared by pulm  Cycle 4:  1/29/2024  Cycle 5: 2/19/2024  Cycle 6: 3/11/2024  Cycle 7: 4/1/2024-due      11/10/2023-CT CAP  IMPRESSION:  1. Nodular density in the posterior left lower lobe, essentially unchanged from the previous exam when accounting for technique.     2. Scattered patchy airspace opacities throughout the left lung, increased in size/distribution from the previous exam, the differential includes posttreatment/radiation change, atelectasis/scarring, infection/pneumonia, noting malignancy is not excluded and interval follow-up would be warranted.     3. Enlarging left cervical/supraclavicular lymphadenopathy.     4. Small wedge-shaped hypodensities in the spleen, in keeping with small splenic infarcts, similar in distribution the previous exam.    12/11/2023-CTA Chest  No PE-see report, ? Pneumonia    1/17/2023-CT chest:  1. Left upper lobe paramediastinal mass is stable due to radiation exposure chest with evidence of an prominent. Mediastinal scarring.  2. Advanced emphysematous changes along with evidence of abnormality scarring the left are probably proteinaceous bullous cavitary fibrotic focus and fibrotic changes in the left lower lobe is stable.  3. Small spiculated nodule in the right lower lobe 9 x 8 mm stable. Recommend follow-up within 6 months.       Chief Complaint:  No chief complaint on file.  Stage IIII lung cancer, pulmonary embolism, splenic infarct follow up    History of Present Illness:   Aysha Moore is a 68 y.o. female who presents for follow up of lung cancer.     Ms. Moore is feeling much better now after 10 days of abx.  Breathing is good and congestion improved.     She continues on Xarleto as of today, 3/27/2024 and doing ok with this.       Family and Social history reviewed and is unchanged from 7/27/2023      ROS:  Review of Systems   Constitutional:  Negative for fever.   Respiratory:  Negative for shortness of breath.    Cardiovascular:   Negative for chest pain and leg swelling.   Gastrointestinal:  Negative for abdominal pain and blood in stool.   Genitourinary:  Negative for hematuria.   Skin:  Negative for rash.          Current Outpatient Medications:     albuterol (PROVENTIL/VENTOLIN HFA) 90 mcg/actuation inhaler, Inhale 2 puffs into the lungs every 6 (six) hours as needed. Rescue, Disp: 18 g, Rfl: 5    ALPRAZolam (XANAX) 0.25 MG tablet, Take 1 tablet (0.25 mg total) by mouth 3 (three) times daily as needed for Anxiety., Disp: 30 tablet, Rfl: 1    ammonium lactate 12 % Crea, aaa bid prn dry skin (Patient taking differently: Apply 1 g topically 2 (two) times daily as needed. aaa bid prn dry skin), Disp: 385 g, Rfl: 11    amoxicillin-clavulanate 875-125mg (AUGMENTIN) 875-125 mg per tablet, Take 1 tablet by mouth 2 (two) times daily., Disp: 20 tablet, Rfl: 0    atorvastatin (LIPITOR) 80 MG tablet, TAKE 1 TABLET BY MOUTH EVERY EVENING, Disp: 90 tablet, Rfl: 1    azelastine (ASTELIN) 137 mcg (0.1 %) nasal spray, USE 1 SPRAY IN EACH NOSTRIL 2 TIMES DAILY AS NEEDED FOR RHINITIS OR SINUSITIS, Disp: 90 mL, Rfl: 0    calcium-vitamin D3 (OS-JOLIE 500 + D3) 500 mg-5 mcg (200 unit) per tablet, Take 1 tablet by mouth every morning., Disp: , Rfl:     citalopram (CELEXA) 20 MG tablet, Take 1 tablet (20 mg total) by mouth once daily., Disp: 90 tablet, Rfl: 1    clobetasol 0.05% (TEMOVATE) 0.05 % Oint, Apply topically 2 (two) times daily. for 14 days (Patient taking differently: Apply 1 g topically 2 (two) times daily.), Disp: 45 g, Rfl: 3    ezetimibe (ZETIA) 10 mg tablet, Take 1 tablet (10 mg total) by mouth once daily., Disp: 90 tablet, Rfl: 3    famotidine (PEPCID) 40 MG tablet, TAKE 1 TABLET BY MOUTH EVERY DAY IN THE EVENING, Disp: 90 tablet, Rfl: 1    fluticasone propionate (FLONASE) 50 mcg/actuation nasal spray, 1 spray (50 mcg total) by Each Nostril route daily as needed for Rhinitis or Allergies., Disp: 9.9 mL, Rfl: 2    HYDROcodone-acetaminophen (NORCO)  5-325 mg per tablet, Take 1 tablet by mouth every 4 to 6 hours as needed for Pain., Disp: 60 tablet, Rfl: 0    metoprolol succinate (TOPROL-XL) 50 MG 24 hr tablet, Take 1 tablet (50 mg total) by mouth once daily., Disp: 90 tablet, Rfl: 3    nitroGLYCERIN (NITROSTAT) 0.4 MG SL tablet, Place 1 tablet (0.4 mg total) under the tongue every 5 (five) minutes as needed for Chest pain., Disp: 30 tablet, Rfl: 0    omeprazole (PRILOSEC) 40 MG capsule, Take 1 capsule (40 mg total) by mouth once daily., Disp: 30 capsule, Rfl: 11    ondansetron (ZOFRAN) 8 MG tablet, Take 1 tablet (8 mg total) by mouth every 8 (eight) hours as needed. (Patient taking differently: Take 8 mg by mouth every 8 (eight) hours as needed for Nausea.), Disp: 30 tablet, Rfl: 5    predniSONE (DELTASONE) 10 MG tablet, Take 3 a day x 3 days then 2 a day x 3 days then 1 a day x 3 days, Disp: 18 tablet, Rfl: 0    promethazine (PHENERGAN) 25 MG tablet, Take 1 tablet (25 mg total) by mouth every 4 to 6 hours as needed., Disp: 30 tablet, Rfl: 5    rivaroxaban (XARELTO) 15 mg Tab, Take 1 tablet (15 mg total) by mouth daily with dinner or evening meal., Disp: 42 tablet, Rfl: 0    tiZANidine (ZANAFLEX) 4 MG tablet, TAKE 1 TABLET BY MOUTH EVERY 6 HOURS AS NEEDED FOR BACK PAIN, Disp: 360 tablet, Rfl: 0    traMADoL (ULTRAM) 50 mg tablet, Take 1 tablet (50 mg total) by mouth every 6 (six) hours as needed for Pain., Disp: 30 tablet, Rfl: 2    umeclidinium-vilanteroL (ANORO ELLIPTA) 62.5-25 mcg/actuation DsDv, Inhale 1 puff into the lungs once daily. Controller, Disp: 1 each, Rfl: 5    valACYclovir (VALTREX) 1000 MG tablet, TAKE 2 AT ONSET OF FEVER BLISTERS THEN REPEAT IN 12 HOURS (TOTAL 4 TABS PER EPISODE), Disp: 20 tablet, Rfl: 3    zinc 50 mg Tab, Take 1 tablet by mouth once daily., Disp: , Rfl:         Objective:       Physical Examination:     BP (!) 94/51   Pulse 70   Temp 97.8 °F (36.6 °C)   Resp 17   Wt 64.5 kg (142 lb 3.2 oz)   BMI 25.19 kg/m²     Physical  Exam  Constitutional:       Appearance: Normal appearance.   HENT:      Head: Normocephalic and atraumatic.   Eyes:      General: No scleral icterus.     Conjunctiva/sclera: Conjunctivae normal.   Cardiovascular:      Rate and Rhythm: Normal rate.   Pulmonary:      Effort: Pulmonary effort is normal.   Abdominal:      General: Abdomen is flat.   Neurological:      General: No focal deficit present.      Mental Status: She is alert and oriented to person, place, and time.   Psychiatric:         Mood and Affect: Mood normal.         Behavior: Behavior normal.         Thought Content: Thought content normal.         Judgment: Judgment normal.         Labs:   No results found for this or any previous visit (from the past 336 hour(s)).        CMP  Sodium   Date Value Ref Range Status   03/06/2024 138 136 - 145 mmol/L Final     Potassium   Date Value Ref Range Status   03/06/2024 4.3 3.5 - 5.1 mmol/L Final     Chloride   Date Value Ref Range Status   03/06/2024 103 95 - 110 mmol/L Final     CO2   Date Value Ref Range Status   03/06/2024 25 23 - 29 mmol/L Final     Glucose   Date Value Ref Range Status   03/06/2024 123 (H) 70 - 110 mg/dL Final     BUN   Date Value Ref Range Status   03/06/2024 15 8 - 23 mg/dL Final     Creatinine   Date Value Ref Range Status   03/06/2024 0.8 0.5 - 1.4 mg/dL Final     Calcium   Date Value Ref Range Status   03/06/2024 9.6 8.7 - 10.5 mg/dL Final     Total Protein   Date Value Ref Range Status   03/06/2024 7.4 6.0 - 8.4 g/dL Final     Albumin   Date Value Ref Range Status   03/06/2024 4.5 3.5 - 5.2 g/dL Final     Total Bilirubin   Date Value Ref Range Status   03/06/2024 1.5 (H) 0.1 - 1.0 mg/dL Final     Comment:     For infants and newborns, interpretation of results should be based  on gestational age, weight and in agreement with clinical  observations.    Premature Infant recommended reference ranges:  Up to 24 hours.............<8.0 mg/dL  Up to 48 hours............<12.0 mg/dL  3-5  "days..................<15.0 mg/dL  6-29 days.................<15.0 mg/dL       Alkaline Phosphatase   Date Value Ref Range Status   03/06/2024 86 55 - 135 U/L Final     AST   Date Value Ref Range Status   03/06/2024 18 10 - 40 U/L Final     ALT   Date Value Ref Range Status   03/06/2024 16 10 - 44 U/L Final     Anion Gap   Date Value Ref Range Status   03/06/2024 10 8 - 16 mmol/L Final     eGFR if    Date Value Ref Range Status   03/29/2022 >60.0 >60 mL/min/1.73 m^2 Final     eGFR if non    Date Value Ref Range Status   03/29/2022 >60.0 >60 mL/min/1.73 m^2 Final     Comment:     Calculation used to obtain the estimated glomerular filtration  rate (eGFR) is the CKD-EPI equation.        No results found for: "CEA"  No results found for: "PSA"        Assessment/Plan:     Problem List Items Addressed This Visit       Splenic lesion     She continues on Xarelto therapy and has done well.  Will continue this for now given the continued ongoing clot risk and discussed this today.          LUNG CANCER-ADENOCARCINOMA OF THE LLL - Primary     Patient is doing well on the Atezo and has no sign of AI disease.  Will continue therapy and continue monitoring moving forward.  No new issues.          Relevant Orders    CT Chest Abdomen Pelvis With IV Contrast (XPD) Routine Oral Contrast     Discussion:     Follow up in about 3 weeks (around 4/17/2024) for for NP visit and 6 with me.      Electronically signed by Raad Bee      "

## 2024-03-27 NOTE — ASSESSMENT & PLAN NOTE
She continues on Xarelto therapy and has done well.  Will continue this for now given the continued ongoing clot risk and discussed this today.

## 2024-03-28 ENCOUNTER — LAB VISIT (OUTPATIENT)
Dept: LAB | Facility: HOSPITAL | Age: 69
End: 2024-03-28
Attending: NURSE PRACTITIONER
Payer: MEDICARE

## 2024-03-28 DIAGNOSIS — R53.83 FATIGUE: ICD-10-CM

## 2024-03-28 DIAGNOSIS — R53.83 FATIGUE, UNSPECIFIED TYPE: ICD-10-CM

## 2024-03-28 DIAGNOSIS — C34.32 MALIGNANT NEOPLASM OF LOWER LOBE OF LEFT LUNG: ICD-10-CM

## 2024-03-28 LAB
ALBUMIN SERPL BCP-MCNC: 4.4 G/DL (ref 3.5–5.2)
ALP SERPL-CCNC: 75 U/L (ref 55–135)
ALT SERPL W/O P-5'-P-CCNC: 15 U/L (ref 10–44)
ANION GAP SERPL CALC-SCNC: 10 MMOL/L (ref 8–16)
AST SERPL-CCNC: 17 U/L (ref 10–40)
BASOPHILS # BLD AUTO: 0.02 K/UL (ref 0–0.2)
BASOPHILS NFR BLD: 0.3 % (ref 0–1.9)
BILIRUB SERPL-MCNC: 1.7 MG/DL (ref 0.1–1)
BUN SERPL-MCNC: 14 MG/DL (ref 8–23)
CALCIUM SERPL-MCNC: 9.2 MG/DL (ref 8.7–10.5)
CHLORIDE SERPL-SCNC: 104 MMOL/L (ref 95–110)
CO2 SERPL-SCNC: 25 MMOL/L (ref 23–29)
CREAT SERPL-MCNC: 0.9 MG/DL (ref 0.5–1.4)
DIFFERENTIAL METHOD BLD: ABNORMAL
EOSINOPHIL # BLD AUTO: 0.2 K/UL (ref 0–0.5)
EOSINOPHIL NFR BLD: 2.7 % (ref 0–8)
ERYTHROCYTE [DISTWIDTH] IN BLOOD BY AUTOMATED COUNT: 12.7 % (ref 11.5–14.5)
EST. GFR  (NO RACE VARIABLE): >60 ML/MIN/1.73 M^2
GLUCOSE SERPL-MCNC: 109 MG/DL (ref 70–110)
HCT VFR BLD AUTO: 38.2 % (ref 37–48.5)
HGB BLD-MCNC: 13 G/DL (ref 12–16)
IMM GRANULOCYTES # BLD AUTO: 0.02 K/UL (ref 0–0.04)
IMM GRANULOCYTES NFR BLD AUTO: 0.3 % (ref 0–0.5)
LYMPHOCYTES # BLD AUTO: 1.1 K/UL (ref 1–4.8)
LYMPHOCYTES NFR BLD: 19.4 % (ref 18–48)
MAGNESIUM SERPL-MCNC: 1.6 MG/DL (ref 1.6–2.6)
MCH RBC QN AUTO: 31.8 PG (ref 27–31)
MCHC RBC AUTO-ENTMCNC: 34 G/DL (ref 32–36)
MCV RBC AUTO: 93 FL (ref 82–98)
MONOCYTES # BLD AUTO: 0.7 K/UL (ref 0.3–1)
MONOCYTES NFR BLD: 12.7 % (ref 4–15)
NEUTROPHILS # BLD AUTO: 3.8 K/UL (ref 1.8–7.7)
NEUTROPHILS NFR BLD: 64.6 % (ref 38–73)
NRBC BLD-RTO: 0 /100 WBC
PLATELET # BLD AUTO: 257 K/UL (ref 150–450)
PMV BLD AUTO: 9.1 FL (ref 9.2–12.9)
POTASSIUM SERPL-SCNC: 3.8 MMOL/L (ref 3.5–5.1)
PROT SERPL-MCNC: 7.2 G/DL (ref 6–8.4)
RBC # BLD AUTO: 4.09 M/UL (ref 4–5.4)
SODIUM SERPL-SCNC: 139 MMOL/L (ref 136–145)
TSH SERPL DL<=0.005 MIU/L-ACNC: 0.96 UIU/ML (ref 0.34–5.6)
WBC # BLD AUTO: 5.83 K/UL (ref 3.9–12.7)

## 2024-03-28 PROCEDURE — 83735 ASSAY OF MAGNESIUM: CPT | Performed by: NURSE PRACTITIONER

## 2024-03-28 PROCEDURE — 84443 ASSAY THYROID STIM HORMONE: CPT | Performed by: NURSE PRACTITIONER

## 2024-03-28 PROCEDURE — 36415 COLL VENOUS BLD VENIPUNCTURE: CPT | Performed by: NURSE PRACTITIONER

## 2024-03-28 PROCEDURE — 80053 COMPREHEN METABOLIC PANEL: CPT | Performed by: NURSE PRACTITIONER

## 2024-03-28 PROCEDURE — 85025 COMPLETE CBC W/AUTO DIFF WBC: CPT | Performed by: NURSE PRACTITIONER

## 2024-03-28 RX ORDER — SODIUM CHLORIDE 0.9 % (FLUSH) 0.9 %
10 SYRINGE (ML) INJECTION
Status: CANCELLED | OUTPATIENT
Start: 2024-04-01

## 2024-03-28 RX ORDER — HEPARIN 100 UNIT/ML
500 SYRINGE INTRAVENOUS
Status: CANCELLED | OUTPATIENT
Start: 2024-04-01

## 2024-03-28 RX ORDER — DIPHENHYDRAMINE HYDROCHLORIDE 50 MG/ML
50 INJECTION INTRAMUSCULAR; INTRAVENOUS ONCE AS NEEDED
Status: CANCELLED | OUTPATIENT
Start: 2024-04-01

## 2024-03-28 RX ORDER — EPINEPHRINE 0.3 MG/.3ML
0.3 INJECTION SUBCUTANEOUS ONCE AS NEEDED
Status: CANCELLED | OUTPATIENT
Start: 2024-04-01

## 2024-04-01 ENCOUNTER — INFUSION (OUTPATIENT)
Dept: INFUSION THERAPY | Facility: HOSPITAL | Age: 69
End: 2024-04-01
Attending: INTERNAL MEDICINE
Payer: MEDICARE

## 2024-04-01 ENCOUNTER — OFFICE VISIT (OUTPATIENT)
Dept: DERMATOLOGY | Facility: CLINIC | Age: 69
End: 2024-04-01
Payer: MEDICARE

## 2024-04-01 VITALS
BODY MASS INDEX: 25.09 KG/M2 | TEMPERATURE: 98 F | OXYGEN SATURATION: 98 % | HEIGHT: 63 IN | SYSTOLIC BLOOD PRESSURE: 103 MMHG | WEIGHT: 141.63 LBS | HEART RATE: 56 BPM | RESPIRATION RATE: 17 BRPM | DIASTOLIC BLOOD PRESSURE: 53 MMHG

## 2024-04-01 DIAGNOSIS — C34.32 MALIGNANT NEOPLASM OF LOWER LOBE OF LEFT LUNG: Primary | ICD-10-CM

## 2024-04-01 DIAGNOSIS — Z85.820 HISTORY OF MELANOMA: ICD-10-CM

## 2024-04-01 DIAGNOSIS — D36.10 NEUROFIBROMA: ICD-10-CM

## 2024-04-01 DIAGNOSIS — L90.5 SCAR: ICD-10-CM

## 2024-04-01 DIAGNOSIS — D22.9 MULTIPLE BENIGN NEVI: ICD-10-CM

## 2024-04-01 DIAGNOSIS — C34.90 METASTATIC LUNG CANCER (METASTASIS FROM LUNG TO OTHER SITE), UNSPECIFIED LATERALITY: ICD-10-CM

## 2024-04-01 DIAGNOSIS — L82.1 SEBORRHEIC KERATOSIS: ICD-10-CM

## 2024-04-01 DIAGNOSIS — R21 RASH: Primary | ICD-10-CM

## 2024-04-01 PROCEDURE — 63600175 PHARM REV CODE 636 W HCPCS: Performed by: NURSE PRACTITIONER

## 2024-04-01 PROCEDURE — 25000003 PHARM REV CODE 250: Performed by: NURSE PRACTITIONER

## 2024-04-01 PROCEDURE — A4216 STERILE WATER/SALINE, 10 ML: HCPCS | Performed by: NURSE PRACTITIONER

## 2024-04-01 PROCEDURE — 99204 OFFICE O/P NEW MOD 45 MIN: CPT | Mod: AQ,S$GLB,, | Performed by: STUDENT IN AN ORGANIZED HEALTH CARE EDUCATION/TRAINING PROGRAM

## 2024-04-01 PROCEDURE — 96413 CHEMO IV INFUSION 1 HR: CPT

## 2024-04-01 RX ORDER — KETOCONAZOLE 20 MG/ML
SHAMPOO, SUSPENSION TOPICAL
Qty: 120 ML | Refills: 1 | Status: SHIPPED | OUTPATIENT
Start: 2024-04-01 | End: 2024-04-30 | Stop reason: SDUPTHER

## 2024-04-01 RX ORDER — KETOCONAZOLE 20 MG/G
CREAM TOPICAL 2 TIMES DAILY
Qty: 60 G | Refills: 1 | Status: SHIPPED | OUTPATIENT
Start: 2024-04-01

## 2024-04-01 RX ORDER — EPINEPHRINE 0.3 MG/.3ML
0.3 INJECTION SUBCUTANEOUS ONCE AS NEEDED
Status: DISCONTINUED | OUTPATIENT
Start: 2024-04-01 | End: 2024-04-01 | Stop reason: HOSPADM

## 2024-04-01 RX ORDER — TRIAMCINOLONE ACETONIDE 0.25 MG/G
CREAM TOPICAL 2 TIMES DAILY
Qty: 80 G | Refills: 1 | Status: SHIPPED | OUTPATIENT
Start: 2024-04-01

## 2024-04-01 RX ORDER — HEPARIN 100 UNIT/ML
500 SYRINGE INTRAVENOUS
Status: DISCONTINUED | OUTPATIENT
Start: 2024-04-01 | End: 2024-04-01 | Stop reason: HOSPADM

## 2024-04-01 RX ORDER — DIPHENHYDRAMINE HYDROCHLORIDE 50 MG/ML
50 INJECTION INTRAMUSCULAR; INTRAVENOUS ONCE AS NEEDED
Status: DISCONTINUED | OUTPATIENT
Start: 2024-04-01 | End: 2024-04-01 | Stop reason: HOSPADM

## 2024-04-01 RX ORDER — SODIUM CHLORIDE 0.9 % (FLUSH) 0.9 %
10 SYRINGE (ML) INJECTION
Status: DISCONTINUED | OUTPATIENT
Start: 2024-04-01 | End: 2024-04-01 | Stop reason: HOSPADM

## 2024-04-01 RX ADMIN — SODIUM CHLORIDE, PRESERVATIVE FREE 10 ML: 5 INJECTION INTRAVENOUS at 12:04

## 2024-04-01 RX ADMIN — HEPARIN 500 UNITS: 100 SYRINGE at 12:04

## 2024-04-01 RX ADMIN — ATEZOLIZUMAB 1200 MG: 1200 INJECTION, SOLUTION INTRAVENOUS at 11:04

## 2024-04-01 NOTE — PROGRESS NOTES
Subjective:      Patient ID:  Aysha Moore is a 68 y.o. female who presents for   Chief Complaint   Patient presents with    Spot     scalp     LOV 9/7/23 - [facility]     Patient here today for skin check TBSE   Complains of spot on scalp that itches  Also complains of flakiness on face    Currently being treated for lung cancer - completed radiation and chemo. On immunotherapy-ATEZOLIZUMAB Q3W  Today is 4th or 5th immunotherapy infusion, rash started on face about 1 month ago.   Derm Hx:  MM, left thigh, 2022 [facility]     EFT THIGH, SHAVE:   - MALIGNANT MELANOMA.     CASE SUMMARY: MALIGNANT MELANOMA:   HISTOLOGIC TYPE:   Superficial spreading type   SURGICAL MARGIN STATUS (SPECIFY PERIPHERAL AND/ OR DEEP;        SPECIFY IN SITU OR INVASIVE):     Lesion extends close to a peripheral edge in these planes of section   MEASURED BRESLOW THICKNESS (ROUND TO NEAREST 0.1 MM):   0.5 mm   DERMAL MITOTIC RATE (PER SQUARE MM):   0   ULCERATION (NOT IDENTIFIED OR PRESENT):   Not identified   GROWTH PHASE (RADIAL OR VERTICAL):   Vertical   LEVEL OF INVASION (LAY'S):   III   MICROSATELLITOSIS (NOT IDENTIFIED OR PRESENT):   Not identified   LYMPHOVASCULAR INVASION (NOT IDENTIFIED OR PRESENT):   Not identified   HOST CELL REACTION:   Non-Brisk   TUMOR REGRESSION:   Present, involving less than 75% of the lesion   ASSOCIATED NEVUS (NOT IDENTIFIED OR PRESENT):   Not identified   TNM CODE:   pT1a         Review of Systems   Constitutional:  Negative for fever, chills and fatigue.   Respiratory:  Negative for cough and shortness of breath.    Gastrointestinal:  Negative for nausea and vomiting.   Skin:  Positive for activity-related sunscreen use. Negative for daily sunscreen use.   Hematologic/Lymphatic: Bruises/bleeds easily.       Objective:   Physical Exam   Constitutional: She appears well-developed and well-nourished. No distress.   Musculoskeletal: Lymphadenopathy:      Lower Body: No right inguinal adenopathy. No left  inguinal adenopathy.     Lymphadenopathy: No inguinal adenopathy noted on the right or left side.   Neurological: She is alert and oriented to person, place, and time. She is not disoriented.   Psychiatric: She has a normal mood and affect.   Skin:   Areas Examined (abnormalities noted in diagram):   Scalp / Hair Palpated and Inspected  Head / Face Inspection Performed  Neck Inspection Performed  Chest / Axilla Inspection Performed  Abdomen Inspection Performed  Genitals / Buttocks / Groin Inspection Performed  Back Inspection Performed  RUE Inspected  LUE Inspection Performed  RLE Inspected  LLE Inspection Performed  Nails and Digits Inspection Performed                     Diagram Legend     Erythematous scaling macule/papule c/w actinic keratosis       Vascular papule c/w angioma      Pigmented verrucoid papule/plaque c/w seborrheic keratosis      Yellow umbilicated papule c/w sebaceous hyperplasia      Irregularly shaped tan macule c/w lentigo     1-2 mm smooth white papules consistent with Milia      Movable subcutaneous cyst with punctum c/w epidermal inclusion cyst      Subcutaneous movable cyst c/w pilar cyst      Firm pink to brown papule c/w dermatofibroma      Pedunculated fleshy papule(s) c/w skin tag(s)      Evenly pigmented macule c/w junctional nevus     Mildly variegated pigmented, slightly irregular-bordered macule c/w mildly atypical nevus      Flesh colored to evenly pigmented papule c/w intradermal nevus       Pink pearly papule/plaque c/w basal cell carcinoma      Erythematous hyperkeratotic cursted plaque c/w SCC      Surgical scar with no sign of skin cancer recurrence      Open and closed comedones      Inflammatory papules and pustules      Verrucoid papule consistent consistent with wart     Erythematous eczematous patches and plaques     Dystrophic onycholytic nail with subungual debris c/w onychomycosis     Umbilicated papule    Erythematous-base heme-crusted tan verrucoid plaque  consistent with inflamed seborrheic keratosis     Erythematous Silvery Scaling Plaque c/w Psoriasis     See annotation                Assessment / Plan:        Rash  -     ketoconazole (NIZORAL) 2 % cream; Apply topically 2 (two) times daily.  Dispense: 60 g; Refill: 1  -     ketoconazole (NIZORAL) 2 % shampoo; Apply topically twice a week.  Dispense: 120 mL; Refill: 1  -     triamcinolone acetonide 0.025% (KENALOG) 0.025 % cream; Apply topically 2 (two) times daily.  Dispense: 80 g; Refill: 1  Started immunotherapy within the past 3-4 months, new psoriasiform erythematous eruption appeared about 4 weeks ago  Dx: seb derm vs. Eczema vs. Less likely CTD, possible relationship to new immunotherapy (PDL1 inhibitor)?  Recheck and consider biopsy if not improved in 4 weeks at f/u  Multiple benign nevi  History of melanoma  Scar  Careful dermoscopy evaluation of nevi performed with none identified as needing biopsy today  Monitor for new mole or moles that are becoming bigger, darker, irritated, or developing irregular borders.   Area of previous melanoma examined. Site well healed with no signs of recurrence.  Total body skin examination performed today including at least 12 points as noted in physical examination. No lesions suspicious for malignancy noted.  Patient instructed in importance in daily broad spectrum sun protection of at least spf 30. Mineral sunscreen ingredients preferred (Zinc +/- Titanium) and can be found OTC.   Recommend Elta MD for daily use on face and neck.  Patient encouraged to wear hat for all outdoor exposure.   Also discussed sun avoidance and use of protective clothing.    Seborrheic keratosis  These are benign inherited growths without a malignant potential. Reassurance given to patient. No treatment is necessary.     Neurofibroma  Benign, reassurance           4 weeks for rash  No follow-ups on file.

## 2024-04-08 DIAGNOSIS — M54.42 CHRONIC BILATERAL LOW BACK PAIN WITH LEFT-SIDED SCIATICA: ICD-10-CM

## 2024-04-08 DIAGNOSIS — G89.29 CHRONIC BILATERAL LOW BACK PAIN WITH LEFT-SIDED SCIATICA: ICD-10-CM

## 2024-04-08 RX ORDER — TIZANIDINE 4 MG/1
4 TABLET ORAL EVERY 6 HOURS PRN
Qty: 360 TABLET | Refills: 0 | Status: SHIPPED | OUTPATIENT
Start: 2024-04-08

## 2024-04-09 ENCOUNTER — HOSPITAL ENCOUNTER (OUTPATIENT)
Dept: RADIOLOGY | Facility: HOSPITAL | Age: 69
Discharge: HOME | End: 2024-04-09
Attending: INTERNAL MEDICINE
Payer: MEDICARE

## 2024-04-09 DIAGNOSIS — C34.32 MALIGNANT NEOPLASM OF LOWER LOBE OF LEFT LUNG: ICD-10-CM

## 2024-04-09 PROCEDURE — 71260 CT THORAX DX C+: CPT | Mod: 26,,, | Performed by: RADIOLOGY

## 2024-04-09 PROCEDURE — 74177 CT ABD & PELVIS W/CONTRAST: CPT | Mod: 26,,, | Performed by: RADIOLOGY

## 2024-04-09 PROCEDURE — 74177 CT ABD & PELVIS W/CONTRAST: CPT | Mod: TC

## 2024-04-09 PROCEDURE — 25500020 PHARM REV CODE 255: Performed by: INTERNAL MEDICINE

## 2024-04-09 RX ADMIN — IOHEXOL 100 ML: 350 INJECTION, SOLUTION INTRAVENOUS at 03:04

## 2024-04-10 ENCOUNTER — CLINICAL SUPPORT (OUTPATIENT)
Dept: RADIATION ONCOLOGY | Facility: CLINIC | Age: 69
End: 2024-04-10
Payer: MEDICARE

## 2024-04-10 DIAGNOSIS — C34.32 MALIGNANT NEOPLASM OF LOWER LOBE OF LEFT LUNG: Primary | ICD-10-CM

## 2024-04-10 PROCEDURE — 99441 PR PHYSICIAN TELEPHONE EVALUATION 5-10 MIN: CPT | Mod: 95,,, | Performed by: RADIOLOGY

## 2024-04-10 NOTE — PROGRESS NOTES
Aysha Moore  [id number]  1955  4/10/2024  No referring provider defined for this encounter.    DIAGNOSIS: Cancer Staging   LUNG CANCER-ADENOCARCINOMA OF THE LLL  Staging form: Lung, AJCC 8th Edition  - Clinical: Stage IIIA (cT2a, cN2, cM0) - Signed by Franky Gaytan Jr., MD on 7/31/2023    REASON FOR VISIT: Routine scheduled follow-up.    The patient location is:  home (weather)  Visit type: Virtual visit with audio ONLY  The reason for the audio only service rather than synchronous audio and video virtual visit was related to technical difficulties or patient preference/necessity.    HISTORY OF PRESENT ILLNESS:   Aysha Moore is a 67 y.o. female former smoker (43pk-yr; quit x 6yrs) with history of melanoma/NMSCa's who presents with abnormal low-dose CT chest noting a 2.6 x 2.5 x 3.2 cm left lower lobe pleural based mass with follow-up PET-CT confirming SUV 11.6 with additional small GGO and right lower lobe measuring 1.1 cm with SUV 1.3 and FDG avid lymphadenopathy in the AP window, left hilum, subcarinal space, SUV 6.3-14.  CT-guided biopsy returned adenocarcinoma, lung primary with pleomorphic features.  MRI brain was negative for disease.    She follows with Dr. Abad he was referred to Dr. Bee who recommended chemoradiation therapy followed by immune therapy.  [facility] to [facility].    9/23 PFTs  FEV1 1.48  DLCO 41%    Completed carbo Taxol sensitized radiotherapy to left lower lobe ending September 20, 2023. Treatment well tolerated with expected fatigue and radiation esophagitis.   After completion of radiotherapy patient was hospitalized with pulmonary embolus and splenic infarcts, on Xarelto.  Now discharged reporting fatigue that is slowly resolving.  Radiation esophagitis has significantly improved and she is p.o. tolerant with minimal restrictions.  Denies fever, chills, chest pain or shortness of breath.    INTERVAL HISTORY:   Denies fever, chills, chest pain,  shortness of breath, cough or hemoptysis.  Denies pain or discomfort with swallowing.  Reports fatigue associated with immune therapy that self resolves.    Review of systems otherwise negative unless indicated in HPI/interval history.    Past Medical History:   Diagnosis Date    Allergy     Anxiety     Arthritis     CAD (coronary artery disease)     coronary stents    Chronic back pain     lower back pain radiates to left leg    Chronic rhinitis     DAILY (dyspnea on exertion)     Hyperlipidemia     Hypertension     Lung cancer 07/01/2023    Melanoma in situ of left upper extremity     left thigh area    MI (myocardial infarction)     New onset type 2 diabetes mellitus 02/08/2024    Seasonal allergic rhinitis 10/29/2020     Past Surgical History:   Procedure Laterality Date    BREAST SURGERY      breast reduction    CATARACT EXTRACTION, BILATERAL      coranary stent      EXCISION OF MELANOMA Left 3/30/2022    Procedure: EXCISION, MELANOMA;  Surgeon: David Mcpherson III, MD;  Location: [facility] OR;  Service: General;  Laterality: Left;    EXCISIONAL BIOPSY Left 3/30/2022    Procedure: EXCISIONAL BIOPSY;  Surgeon: David Mcpherson III, MD;  Location: [facility] OR;  Service: General;  Laterality: Left;    HYSTERECTOMY      total    INSERTION OF TUNNELED CENTRAL VENOUS CATHETER (CVC) WITH SUBCUTANEOUS PORT N/A 8/4/2023    Procedure: [id number];  Surgeon: Remi Mckeon Jr., MD;  Location: [facility] OR;  Service: General;  Laterality: N/A;    LEFT HEART CATHETERIZATION Left 10/16/2020    Procedure: Left heart cath;  Surgeon: Garrett Robins MD;  Location: [facility] CATH/EP LAB;  Service: Cardiology;  Laterality: Left;    OOPHORECTOMY      TOTAL REDUCTION MAMMOPLASTY Bilateral 2000     Social History     Socioeconomic History    Marital status:    Tobacco Use    Smoking status: Former     Current packs/day: 0.00     Average packs/day: 1 pack/day for 43.2 years (43.2 ttl pk-yrs)     Types: Cigarettes, Vaping  with nicotine     Start date:      Quit date: 3/4/2017     Years since quittin.1    Smokeless tobacco: Never   Substance and Sexual Activity    Alcohol use: Not Currently     Alcohol/week: 0.0 standard drinks of alcohol     Comment: sometimes    Drug use: No    Sexual activity: Yes     Partners: Male     Family History   Problem Relation Age of Onset    Cancer Mother         breast, ovarian, cervical     Heart disease Mother     Breast cancer Mother 50    Ovarian cancer Mother     Cancer Father         melanoma    Stroke Sister     Heart disease Sister     Cancer Sister         leukemia    Macular degeneration Sister     Heart disease Sister     Heart disease Brother     Lung cancer Brother     Cancer Maternal Uncle     Cancer Paternal Aunt         breast    Breast cancer Paternal Aunt 60    Cancer Paternal Uncle     Heart disease Maternal Grandmother     No Known Problems Daughter     No Known Problems Son      Medication List with Changes/Refills   Current Medications    ALBUTEROL (PROVENTIL/VENTOLIN HFA) 90 MCG/ACTUATION INHALER    Inhale 2 puffs into the lungs every 6 (six) hours as needed. Rescue    ALPRAZOLAM (XANAX) 0.25 MG TABLET    Take 1 tablet (0.25 mg total) by mouth 3 (three) times daily as needed for Anxiety.    AMMONIUM LACTATE 12 % CREA    aaa bid prn dry skin    AMOXICILLIN-CLAVULANATE 875-125MG (AUGMENTIN) 875-125 MG PER TABLET    Take 1 tablet by mouth 2 (two) times daily.    ATORVASTATIN (LIPITOR) 80 MG TABLET    TAKE 1 TABLET BY MOUTH EVERY EVENING    AZELASTINE (ASTELIN) 137 MCG (0.1 %) NASAL SPRAY    USE 1 SPRAY IN EACH NOSTRIL 2 TIMES DAILY AS NEEDED FOR RHINITIS OR SINUSITIS    CALCIUM-VITAMIN D3 (OS-JOLIE 500 + D3) 500 MG-5 MCG (200 UNIT) PER TABLET    Take 1 tablet by mouth every morning.    CITALOPRAM (CELEXA) 20 MG TABLET    Take 1 tablet (20 mg total) by mouth once daily.    CLOBETASOL 0.05% (TEMOVATE) 0.05 % OINT    Apply topically 2 (two) times daily. for 14 days    EZETIMIBE  (ZETIA) 10 MG TABLET    Take 1 tablet (10 mg total) by mouth once daily.    FAMOTIDINE (PEPCID) 40 MG TABLET    TAKE 1 TABLET BY MOUTH EVERY DAY IN THE EVENING    FLUTICASONE PROPIONATE (FLONASE) 50 MCG/ACTUATION NASAL SPRAY    1 spray (50 mcg total) by Each Nostril route daily as needed for Rhinitis or Allergies.    HYDROCODONE-ACETAMINOPHEN (NORCO) 5-325 MG PER TABLET    Take 1 tablet by mouth every 4 to 6 hours as needed for Pain.    KETOCONAZOLE (NIZORAL) 2 % CREAM    Apply topically 2 (two) times daily.    KETOCONAZOLE (NIZORAL) 2 % SHAMPOO    Apply topically twice a week.    METOPROLOL SUCCINATE (TOPROL-XL) 50 MG 24 HR TABLET    Take 1 tablet (50 mg total) by mouth once daily.    NITROGLYCERIN (NITROSTAT) 0.4 MG SL TABLET    Place 1 tablet (0.4 mg total) under the tongue every 5 (five) minutes as needed for Chest pain.    OMEPRAZOLE (PRILOSEC) 40 MG CAPSULE    Take 1 capsule (40 mg total) by mouth once daily.    ONDANSETRON (ZOFRAN) 8 MG TABLET    Take 1 tablet (8 mg total) by mouth every 8 (eight) hours as needed.    PREDNISONE (DELTASONE) 10 MG TABLET    Take 3 a day x 3 days then 2 a day x 3 days then 1 a day x 3 days    PROMETHAZINE (PHENERGAN) 25 MG TABLET    Take 1 tablet (25 mg total) by mouth every 4 to 6 hours as needed.    RIVAROXABAN (XARELTO) 15 MG TAB    Take 1 tablet (15 mg total) by mouth daily with dinner or evening meal.    TIZANIDINE (ZANAFLEX) 4 MG TABLET    TAKE 1 TABLET BY MOUTH EVERY 6 HOURS AS NEEDED FOR BACK PAIN    TRAMADOL (ULTRAM) 50 MG TABLET    Take 1 tablet (50 mg total) by mouth every 6 (six) hours as needed for Pain.    TRIAMCINOLONE ACETONIDE 0.025% (KENALOG) 0.025 % CREAM    Apply topically 2 (two) times daily.    UMECLIDINIUM-VILANTEROL (ANORO ELLIPTA) 62.5-25 MCG/ACTUATION DSDV    Inhale 1 puff into the lungs once daily. Controller    VALACYCLOVIR (VALTREX) 1000 MG TABLET    TAKE 2 AT ONSET OF FEVER BLISTERS THEN REPEAT IN 12 HOURS (TOTAL 4 TABS PER EPISODE)    ZINC 50 MG  TAB    Take 1 tablet by mouth once daily.     Review of patient's allergies indicates:   Allergen Reactions    Cephalexin      Other reaction(s): Rash    Doxycycline monohydrate Hives    Lanoxin  [digoxin]      Other reaction(s): Rash    Lansoprazole      Other reaction(s): Rash    Macrobid [nitrofurantoin monohyd/m-cryst] Rash       PHYSICAL EXAM:   (telemed visit)    ANCILLARY DATA:   4/9/24 CT C/AP  Impression:  Interval improvement in previously demonstrated bilateral pulmonary parenchymal opacities.  Stable appearance of a borderline prominent precarinal lymph node.  Small lymph nodes near the aortic arch branches appear minimally larger on the current examination but are not pathologically enlarged by CT criteria.  Short-term follow-up is recommended.  Previously demonstrated left supraclavicular adenopathy has resolved.  No evidence of metastatic disease within the abdomen or pelvis.  Incidental findings are as noted above.    ASSESSMENT: 68 y.o. female with stage IIIA, [id number] adenocarcinoma with pleomorphic features of LLL  PLAN:     - RT well tolerated with complete recovery.  - CT stable; no evidence of progressive disease  - studies: CT vs PETs per med/onc IO schedule (q3m advised)  - follow up with Dr. Hankins; on tecentriq and near completion of Xarelto  - follow up with Dr. Abad  - Return to clinic 6mos.    All questions answered and contact information provided. Patient understands free to call us anytime with any questions or concerns regarding radiation therapy.    I have personally evaluated this patient with a moderate to high complexity diagnosis.     Length of phone call: 10 minutes  Total time: 20 minutes dedicated to reviewing/interpreting pertinent laboratory/imaging/pathology as well as follow-up with concurrent consultants; reviewing and performing history; counseling patient on continuing oncologic recommendations; documentation in the electronic medical record including ordering of  additional tests and/or radiation treatment protocol; and coordination of care with physicians with referrals placed as appropriate.    Each patient to whom he or she provides medical services by telemedicine is:  (1) informed of the relationship between the physician and patient and the respective role of any other health care provider with respect to management of the patient; and (2) notified that he or she may decline to receive medical services by telemedicine and may withdraw from such care at any time. Patient verbally consented to receive this service via voice-only telephone call.    This service was not originating from a related E/M service provided within the previous 7 days nor will  to an E/M service or procedure within the next 24 hours or my soonest available appointment.  Prevailing standard of care was able to be met in this audio-only visit.      PHYSICIAN: Franky Gaytan Jr, MD

## 2024-04-17 ENCOUNTER — OFFICE VISIT (OUTPATIENT)
Dept: HEMATOLOGY/ONCOLOGY | Facility: CLINIC | Age: 69
End: 2024-04-17
Payer: MEDICARE

## 2024-04-17 VITALS
WEIGHT: 141 LBS | DIASTOLIC BLOOD PRESSURE: 45 MMHG | BODY MASS INDEX: 24.98 KG/M2 | TEMPERATURE: 98 F | HEART RATE: 79 BPM | SYSTOLIC BLOOD PRESSURE: 95 MMHG | RESPIRATION RATE: 15 BRPM

## 2024-04-17 DIAGNOSIS — C34.90 METASTATIC LUNG CANCER (METASTASIS FROM LUNG TO OTHER SITE), UNSPECIFIED LATERALITY: ICD-10-CM

## 2024-04-17 DIAGNOSIS — C34.32 MALIGNANT NEOPLASM OF LOWER LOBE OF LEFT LUNG: Primary | ICD-10-CM

## 2024-04-17 PROCEDURE — 99214 OFFICE O/P EST MOD 30 MIN: CPT | Mod: S$PBB,,, | Performed by: NURSE PRACTITIONER

## 2024-04-17 PROCEDURE — 99212 OFFICE O/P EST SF 10 MIN: CPT | Performed by: NURSE PRACTITIONER

## 2024-04-17 NOTE — PROGRESS NOTES
PROGRESS NOTE    Subjective:       Patient ID: Aysha Moore is a 68 y.o. female.    6/2/2023:  Screening CT chest:  2.6 x 2.5 x 3.2cm mass in the posterior LLL     6/20/2023:  PET scan:  35 x 21 mm lobulated pulmonary mass in the posterior left lower lobe with SUV max 11.6      FDG avid lymphadenopathy is present with index nodes outlined below:  -21 x 11 mm AP window node with SUV max 12.1 (image 103)  -21 x 12 mm posterior left hilar node with SUV max 13.7 (image 109)  -16 x 13 mm left hilar node with SUV max 14 (image 1:15)  -10 x 10 mm subcarinal node with SUV max 6.3 (image 111)     7/12/2023-Biopsy of LLL:  LEFT LOWER LOBE OF LUNG, CORE BIOPSIES:   - LUNG ADENOCARCINOMA WITH PLEOMORPHIC FEATURES.   PDL1/TPS: 95%  ALK/BRAF/EGFR/KRAS/RET/ROS1/MET NEGATIVE     7/21/2023:  MRI brain: no mets.    Carbo/Taxol/XRT:  8/8/2023-9/12/2023     Plan: Atezolizumab 1200mg every 3 weeks x 16    9/30/2023-CTA Chest:  1. Segmental right upper lobe pulmonary embolus.  2. Cavitating and spiculated posterior left lower lobe lung mass, decreased slightly in size compared to the prior chest CT exam.  3. Upper lobe predominant centrilobular and subpleural emphysema, with coarse mixed interstitial and alveolar infiltrate along the mediastinal border left upper lobe suggesting reactive or infectious pneumonitis, and with mild posterior bilateral upper lobe groundglass infiltrates.    10/4/2023  Admitted for 3 days with fever to 101, new Dx of PE and splenic infarcts. Echo normal. Started on Xarelto and abx.     9/3/2023-CT abd/p:  IMPRESSION: There are hypodensities within the spleen concerning for splenic infarcts. These can be associated with endocarditis. The patient also has known pulmonary emboli. Transesophageal echocardiogram may be warranted.    10/3/2023-  TTE normal    Atezolizumab  Cycle 1: 10/23/2023  Cycle 2: 11/13/2023  Cycle 3: 12/4/2023  Delay due to ?  Pulmonary issues.  Cleared by pulm  Cycle 4:  1/29/2024  Cycle 5: 2/19/2024  Cycle 6: 3/11/2024  Cycle 7: 4/1/2024-due      11/10/2023-CT CAP  IMPRESSION:  1. Nodular density in the posterior left lower lobe, essentially unchanged from the previous exam when accounting for technique.     2. Scattered patchy airspace opacities throughout the left lung, increased in size/distribution from the previous exam, the differential includes posttreatment/radiation change, atelectasis/scarring, infection/pneumonia, noting malignancy is not excluded and interval follow-up would be warranted.     3. Enlarging left cervical/supraclavicular lymphadenopathy.     4. Small wedge-shaped hypodensities in the spleen, in keeping with small splenic infarcts, similar in distribution the previous exam.    12/11/2023-CTA Chest  No PE-see report, ? Pneumonia    1/17/2023-CT chest:  1. Left upper lobe paramediastinal mass is stable due to radiation exposure chest with evidence of an prominent. Mediastinal scarring.  2. Advanced emphysematous changes along with evidence of abnormality scarring the left are probably proteinaceous bullous cavitary fibrotic focus and fibrotic changes in the left lower lobe is stable.  3. Small spiculated nodule in the right lower lobe 9 x 8 mm stable. Recommend follow-up within 6 months.       Chief Complaint:  Stage IIII lung cancer, pulmonary embolism, splenic infarct follow up    History of Present Illness:   Aysha Moore is a 68 y.o. female who presents for follow up of lung cancer.     Ms. Moore here and continues on Atezo maint. She is tolerating treatment well. Discussed the improvement on the CT scan.     She did have a rash on her head and saw derm who out her on 2 topicals and this has resolved.    She continues on Xarelto as of today, 4/17/2024 will finish this bottle and d/c d/t 6 mths of treatment.      Family and Social history reviewed and is unchanged from 7/27/2023      ROS:  Review of  Systems   Constitutional:  Positive for fatigue. Negative for fever.   Respiratory:  Negative for shortness of breath.    Cardiovascular:  Negative for chest pain and leg swelling.   Gastrointestinal:  Negative for abdominal pain and blood in stool.   Genitourinary:  Negative for hematuria.   Skin:  Negative for rash.          Current Outpatient Medications:     albuterol (PROVENTIL/VENTOLIN HFA) 90 mcg/actuation inhaler, Inhale 2 puffs into the lungs every 6 (six) hours as needed. Rescue, Disp: 18 g, Rfl: 5    ALPRAZolam (XANAX) 0.25 MG tablet, Take 1 tablet (0.25 mg total) by mouth 3 (three) times daily as needed for Anxiety., Disp: 30 tablet, Rfl: 1    ammonium lactate 12 % Crea, aaa bid prn dry skin (Patient taking differently: Apply 1 g topically 2 (two) times daily as needed. aaa bid prn dry skin), Disp: 385 g, Rfl: 11    amoxicillin-clavulanate 875-125mg (AUGMENTIN) 875-125 mg per tablet, Take 1 tablet by mouth 2 (two) times daily., Disp: 20 tablet, Rfl: 0    atorvastatin (LIPITOR) 80 MG tablet, TAKE 1 TABLET BY MOUTH EVERY EVENING, Disp: 90 tablet, Rfl: 1    azelastine (ASTELIN) 137 mcg (0.1 %) nasal spray, USE 1 SPRAY IN EACH NOSTRIL 2 TIMES DAILY AS NEEDED FOR RHINITIS OR SINUSITIS, Disp: 90 mL, Rfl: 0    calcium-vitamin D3 (OS-JOLIE 500 + D3) 500 mg-5 mcg (200 unit) per tablet, Take 1 tablet by mouth every morning., Disp: , Rfl:     citalopram (CELEXA) 20 MG tablet, Take 1 tablet (20 mg total) by mouth once daily., Disp: 90 tablet, Rfl: 1    clobetasol 0.05% (TEMOVATE) 0.05 % Oint, Apply topically 2 (two) times daily. for 14 days (Patient taking differently: Apply 1 g topically 2 (two) times daily.), Disp: 45 g, Rfl: 3    ezetimibe (ZETIA) 10 mg tablet, Take 1 tablet (10 mg total) by mouth once daily., Disp: 90 tablet, Rfl: 3    famotidine (PEPCID) 40 MG tablet, TAKE 1 TABLET BY MOUTH EVERY DAY IN THE EVENING, Disp: 90 tablet, Rfl: 1    fluticasone propionate (FLONASE) 50 mcg/actuation nasal spray, 1 spray  (50 mcg total) by Each Nostril route daily as needed for Rhinitis or Allergies., Disp: 9.9 mL, Rfl: 2    HYDROcodone-acetaminophen (NORCO) 5-325 mg per tablet, Take 1 tablet by mouth every 4 to 6 hours as needed for Pain., Disp: 60 tablet, Rfl: 0    ketoconazole (NIZORAL) 2 % cream, Apply topically 2 (two) times daily., Disp: 60 g, Rfl: 1    ketoconazole (NIZORAL) 2 % shampoo, Apply topically twice a week., Disp: 120 mL, Rfl: 1    metoprolol succinate (TOPROL-XL) 50 MG 24 hr tablet, Take 1 tablet (50 mg total) by mouth once daily., Disp: 90 tablet, Rfl: 3    nitroGLYCERIN (NITROSTAT) 0.4 MG SL tablet, Place 1 tablet (0.4 mg total) under the tongue every 5 (five) minutes as needed for Chest pain., Disp: 30 tablet, Rfl: 0    omeprazole (PRILOSEC) 40 MG capsule, Take 1 capsule (40 mg total) by mouth once daily., Disp: 30 capsule, Rfl: 11    ondansetron (ZOFRAN) 8 MG tablet, Take 1 tablet (8 mg total) by mouth every 8 (eight) hours as needed. (Patient taking differently: Take 8 mg by mouth every 8 (eight) hours as needed for Nausea.), Disp: 30 tablet, Rfl: 5    predniSONE (DELTASONE) 10 MG tablet, Take 3 a day x 3 days then 2 a day x 3 days then 1 a day x 3 days, Disp: 18 tablet, Rfl: 0    promethazine (PHENERGAN) 25 MG tablet, Take 1 tablet (25 mg total) by mouth every 4 to 6 hours as needed., Disp: 30 tablet, Rfl: 5    rivaroxaban (XARELTO) 15 mg Tab, Take 1 tablet (15 mg total) by mouth daily with dinner or evening meal., Disp: 42 tablet, Rfl: 0    tiZANidine (ZANAFLEX) 4 MG tablet, TAKE 1 TABLET BY MOUTH EVERY 6 HOURS AS NEEDED FOR BACK PAIN, Disp: 360 tablet, Rfl: 0    traMADoL (ULTRAM) 50 mg tablet, Take 1 tablet (50 mg total) by mouth every 6 (six) hours as needed for Pain., Disp: 30 tablet, Rfl: 2    triamcinolone acetonide 0.025% (KENALOG) 0.025 % cream, Apply topically 2 (two) times daily., Disp: 80 g, Rfl: 1    umeclidinium-vilanteroL (ANORO ELLIPTA) 62.5-25 mcg/actuation DsDv, Inhale 1 puff into the lungs  once daily. Controller, Disp: 1 each, Rfl: 5    valACYclovir (VALTREX) 1000 MG tablet, TAKE 2 AT ONSET OF FEVER BLISTERS THEN REPEAT IN 12 HOURS (TOTAL 4 TABS PER EPISODE), Disp: 20 tablet, Rfl: 3    zinc 50 mg Tab, Take 1 tablet by mouth once daily., Disp: , Rfl:         Objective:       Physical Examination:     BP (!) 95/45   Pulse 79   Temp 97.8 °F (36.6 °C)   Resp 15   Wt 64 kg (141 lb)   BMI 24.98 kg/m²     Physical Exam  Constitutional:       Appearance: Normal appearance.   HENT:      Head: Normocephalic and atraumatic.      Right Ear: External ear normal.      Left Ear: External ear normal.      Nose: Nose normal.      Mouth/Throat:      Mouth: Mucous membranes are moist.   Eyes:      General: No scleral icterus.     Conjunctiva/sclera: Conjunctivae normal.   Cardiovascular:      Rate and Rhythm: Normal rate.   Pulmonary:      Effort: Pulmonary effort is normal.   Abdominal:      General: Abdomen is flat.   Neurological:      General: No focal deficit present.      Mental Status: She is alert and oriented to person, place, and time.   Psychiatric:         Mood and Affect: Mood normal.         Behavior: Behavior normal.         Thought Content: Thought content normal.         Judgment: Judgment normal.         Labs:   No results found for this or any previous visit (from the past 336 hour(s)).        CMP  Sodium   Date Value Ref Range Status   03/28/2024 139 136 - 145 mmol/L Final     Potassium   Date Value Ref Range Status   03/28/2024 3.8 3.5 - 5.1 mmol/L Final     Chloride   Date Value Ref Range Status   03/28/2024 104 95 - 110 mmol/L Final     CO2   Date Value Ref Range Status   03/28/2024 25 23 - 29 mmol/L Final     Glucose   Date Value Ref Range Status   03/28/2024 109 70 - 110 mg/dL Final     BUN   Date Value Ref Range Status   03/28/2024 14 8 - 23 mg/dL Final     Creatinine   Date Value Ref Range Status   03/28/2024 0.9 0.5 - 1.4 mg/dL Final     Calcium   Date Value Ref Range Status   03/28/2024  "9.2 8.7 - 10.5 mg/dL Final     Total Protein   Date Value Ref Range Status   03/28/2024 7.2 6.0 - 8.4 g/dL Final     Albumin   Date Value Ref Range Status   03/28/2024 4.4 3.5 - 5.2 g/dL Final     Total Bilirubin   Date Value Ref Range Status   03/28/2024 1.7 (H) 0.1 - 1.0 mg/dL Final     Comment:     For infants and newborns, interpretation of results should be based  on gestational age, weight and in agreement with clinical  observations.    Premature Infant recommended reference ranges:  Up to 24 hours.............<8.0 mg/dL  Up to 48 hours............<12.0 mg/dL  3-5 days..................<15.0 mg/dL  6-29 days.................<15.0 mg/dL       Alkaline Phosphatase   Date Value Ref Range Status   03/28/2024 75 55 - 135 U/L Final     AST   Date Value Ref Range Status   03/28/2024 17 10 - 40 U/L Final     ALT   Date Value Ref Range Status   03/28/2024 15 10 - 44 U/L Final     Anion Gap   Date Value Ref Range Status   03/28/2024 10 8 - 16 mmol/L Final     eGFR if    Date Value Ref Range Status   03/29/2022 >60.0 >60 mL/min/1.73 m^2 Final     eGFR if non    Date Value Ref Range Status   03/29/2022 >60.0 >60 mL/min/1.73 m^2 Final     Comment:     Calculation used to obtain the estimated glomerular filtration  rate (eGFR) is the CKD-EPI equation.        No results found for: "CEA"  No results found for: "PSA"        Assessment/Plan:     Problem List Items Addressed This Visit          Oncology    LUNG CANCER-ADENOCARCINOMA OF THE LLL - Primary    Metastatic lung cancer (metastasis from lung to other site), unspecified laterality     Lung Cancer- Continue on Atezo and MD visits every 6 weeks.  2.    Pulmonary Embolism- Finish Xarelto bottle and then can d/c has been 6 mths since dx      Discussion:     Follow up in about 6 weeks (around 5/29/2024) for with Dr. Hankins and 12 weeks with me.      Electronically signed by Meka Chiang, MSN, APRN, AGNP-C, OCN        "

## 2024-04-18 ENCOUNTER — LAB VISIT (OUTPATIENT)
Dept: LAB | Facility: HOSPITAL | Age: 69
End: 2024-04-18
Attending: NURSE PRACTITIONER
Payer: MEDICARE

## 2024-04-18 DIAGNOSIS — R53.83 FATIGUE, UNSPECIFIED TYPE: ICD-10-CM

## 2024-04-18 DIAGNOSIS — C34.32 MALIGNANT NEOPLASM OF LOWER LOBE OF LEFT LUNG: ICD-10-CM

## 2024-04-18 LAB
ALBUMIN SERPL BCP-MCNC: 4.1 G/DL (ref 3.5–5.2)
ALP SERPL-CCNC: 73 U/L (ref 55–135)
ALT SERPL W/O P-5'-P-CCNC: 14 U/L (ref 10–44)
ANION GAP SERPL CALC-SCNC: 6 MMOL/L (ref 8–16)
AST SERPL-CCNC: 16 U/L (ref 10–40)
BASOPHILS # BLD AUTO: 0.02 K/UL (ref 0–0.2)
BASOPHILS NFR BLD: 0.5 % (ref 0–1.9)
BILIRUB SERPL-MCNC: 1.2 MG/DL (ref 0.1–1)
BUN SERPL-MCNC: 12 MG/DL (ref 8–23)
CALCIUM SERPL-MCNC: 9.5 MG/DL (ref 8.7–10.5)
CHLORIDE SERPL-SCNC: 106 MMOL/L (ref 95–110)
CO2 SERPL-SCNC: 30 MMOL/L (ref 23–29)
CREAT SERPL-MCNC: 0.8 MG/DL (ref 0.5–1.4)
DIFFERENTIAL METHOD BLD: ABNORMAL
EOSINOPHIL # BLD AUTO: 0.2 K/UL (ref 0–0.5)
EOSINOPHIL NFR BLD: 4.6 % (ref 0–8)
ERYTHROCYTE [DISTWIDTH] IN BLOOD BY AUTOMATED COUNT: 12.2 % (ref 11.5–14.5)
EST. GFR  (NO RACE VARIABLE): >60 ML/MIN/1.73 M^2
GLUCOSE SERPL-MCNC: 119 MG/DL (ref 70–110)
HCT VFR BLD AUTO: 37.9 % (ref 37–48.5)
HGB BLD-MCNC: 12.8 G/DL (ref 12–16)
IMM GRANULOCYTES # BLD AUTO: 0.01 K/UL (ref 0–0.04)
IMM GRANULOCYTES NFR BLD AUTO: 0.3 % (ref 0–0.5)
LYMPHOCYTES # BLD AUTO: 1.1 K/UL (ref 1–4.8)
LYMPHOCYTES NFR BLD: 27.9 % (ref 18–48)
MCH RBC QN AUTO: 31.9 PG (ref 27–31)
MCHC RBC AUTO-ENTMCNC: 33.8 G/DL (ref 32–36)
MCV RBC AUTO: 95 FL (ref 82–98)
MONOCYTES # BLD AUTO: 0.5 K/UL (ref 0.3–1)
MONOCYTES NFR BLD: 13.6 % (ref 4–15)
NEUTROPHILS # BLD AUTO: 2.1 K/UL (ref 1.8–7.7)
NEUTROPHILS NFR BLD: 53.1 % (ref 38–73)
NRBC BLD-RTO: 0 /100 WBC
PLATELET # BLD AUTO: 227 K/UL (ref 150–450)
PMV BLD AUTO: 8.9 FL (ref 9.2–12.9)
POTASSIUM SERPL-SCNC: 4.2 MMOL/L (ref 3.5–5.1)
PROT SERPL-MCNC: 6.7 G/DL (ref 6–8.4)
RBC # BLD AUTO: 4.01 M/UL (ref 4–5.4)
SODIUM SERPL-SCNC: 142 MMOL/L (ref 136–145)
WBC # BLD AUTO: 3.91 K/UL (ref 3.9–12.7)

## 2024-04-18 PROCEDURE — 80053 COMPREHEN METABOLIC PANEL: CPT | Performed by: NURSE PRACTITIONER

## 2024-04-18 PROCEDURE — 85025 COMPLETE CBC W/AUTO DIFF WBC: CPT | Performed by: NURSE PRACTITIONER

## 2024-04-18 PROCEDURE — 36415 COLL VENOUS BLD VENIPUNCTURE: CPT | Performed by: NURSE PRACTITIONER

## 2024-04-19 ENCOUNTER — TELEPHONE (OUTPATIENT)
Dept: HEMATOLOGY/ONCOLOGY | Facility: CLINIC | Age: 69
End: 2024-04-19

## 2024-04-19 RX ORDER — SODIUM CHLORIDE 0.9 % (FLUSH) 0.9 %
10 SYRINGE (ML) INJECTION
Status: CANCELLED | OUTPATIENT
Start: 2024-04-22

## 2024-04-19 RX ORDER — HEPARIN 100 UNIT/ML
500 SYRINGE INTRAVENOUS
Status: CANCELLED | OUTPATIENT
Start: 2024-04-22

## 2024-04-19 RX ORDER — EPINEPHRINE 0.3 MG/.3ML
0.3 INJECTION SUBCUTANEOUS ONCE AS NEEDED
Status: CANCELLED | OUTPATIENT
Start: 2024-04-22

## 2024-04-19 RX ORDER — DIPHENHYDRAMINE HYDROCHLORIDE 50 MG/ML
50 INJECTION INTRAMUSCULAR; INTRAVENOUS ONCE AS NEEDED
Status: CANCELLED | OUTPATIENT
Start: 2024-04-22

## 2024-04-22 ENCOUNTER — INFUSION (OUTPATIENT)
Dept: INFUSION THERAPY | Facility: HOSPITAL | Age: 69
End: 2024-04-22
Attending: INTERNAL MEDICINE
Payer: MEDICARE

## 2024-04-22 VITALS
TEMPERATURE: 98 F | HEIGHT: 63 IN | RESPIRATION RATE: 16 BRPM | HEART RATE: 63 BPM | DIASTOLIC BLOOD PRESSURE: 57 MMHG | BODY MASS INDEX: 24.98 KG/M2 | OXYGEN SATURATION: 98 % | SYSTOLIC BLOOD PRESSURE: 103 MMHG | WEIGHT: 141 LBS

## 2024-04-22 DIAGNOSIS — C34.90 METASTATIC LUNG CANCER (METASTASIS FROM LUNG TO OTHER SITE), UNSPECIFIED LATERALITY: ICD-10-CM

## 2024-04-22 DIAGNOSIS — C34.32 MALIGNANT NEOPLASM OF LOWER LOBE OF LEFT LUNG: Primary | ICD-10-CM

## 2024-04-22 PROCEDURE — A4216 STERILE WATER/SALINE, 10 ML: HCPCS | Performed by: NURSE PRACTITIONER

## 2024-04-22 PROCEDURE — 25000003 PHARM REV CODE 250: Performed by: NURSE PRACTITIONER

## 2024-04-22 PROCEDURE — 96413 CHEMO IV INFUSION 1 HR: CPT

## 2024-04-22 PROCEDURE — 63600175 PHARM REV CODE 636 W HCPCS: Mod: JZ,JG | Performed by: NURSE PRACTITIONER

## 2024-04-22 RX ORDER — HEPARIN 100 UNIT/ML
500 SYRINGE INTRAVENOUS
Status: DISCONTINUED | OUTPATIENT
Start: 2024-04-22 | End: 2024-04-22 | Stop reason: HOSPADM

## 2024-04-22 RX ORDER — SODIUM CHLORIDE 0.9 % (FLUSH) 0.9 %
10 SYRINGE (ML) INJECTION
Status: DISCONTINUED | OUTPATIENT
Start: 2024-04-22 | End: 2024-04-22 | Stop reason: HOSPADM

## 2024-04-22 RX ORDER — DIPHENHYDRAMINE HYDROCHLORIDE 50 MG/ML
50 INJECTION INTRAMUSCULAR; INTRAVENOUS ONCE AS NEEDED
Status: DISCONTINUED | OUTPATIENT
Start: 2024-04-22 | End: 2024-04-22 | Stop reason: HOSPADM

## 2024-04-22 RX ORDER — EPINEPHRINE 0.3 MG/.3ML
0.3 INJECTION SUBCUTANEOUS ONCE AS NEEDED
Status: DISCONTINUED | OUTPATIENT
Start: 2024-04-22 | End: 2024-04-22 | Stop reason: HOSPADM

## 2024-04-22 RX ADMIN — HEPARIN 500 UNITS: 100 SYRINGE at 12:04

## 2024-04-22 RX ADMIN — SODIUM CHLORIDE, PRESERVATIVE FREE 10 ML: 5 INJECTION INTRAVENOUS at 12:04

## 2024-04-22 RX ADMIN — ATEZOLIZUMAB 1200 MG: 1200 INJECTION, SOLUTION INTRAVENOUS at 11:04

## 2024-04-22 NOTE — PLAN OF CARE
Problem: Fatigue  Goal: Improved Activity Tolerance  Outcome: Ongoing, Progressing  Intervention: Promote Improved Energy  Flowsheets (Taken 4/22/2024 1118)  Fatigue Management:   activity assistance provided   activity schedule adjusted   fatigue-related activity identified   frequent rest breaks encouraged   paced activity encouraged  Activity Management: Ambulated -L4

## 2024-04-30 ENCOUNTER — OFFICE VISIT (OUTPATIENT)
Dept: DERMATOLOGY | Facility: CLINIC | Age: 69
End: 2024-04-30
Payer: MEDICARE

## 2024-04-30 DIAGNOSIS — L21.9 SEBORRHEIC DERMATITIS: ICD-10-CM

## 2024-04-30 DIAGNOSIS — L82.1 SEBORRHEIC KERATOSIS: Primary | ICD-10-CM

## 2024-04-30 PROCEDURE — 99213 OFFICE O/P EST LOW 20 MIN: CPT | Mod: AQ,S$GLB,, | Performed by: STUDENT IN AN ORGANIZED HEALTH CARE EDUCATION/TRAINING PROGRAM

## 2024-04-30 RX ORDER — KETOCONAZOLE 20 MG/ML
SHAMPOO, SUSPENSION TOPICAL
Qty: 120 ML | Refills: 6 | Status: SHIPPED | OUTPATIENT
Start: 2024-05-02

## 2024-04-30 NOTE — PROGRESS NOTES
Subjective:      Patient ID:  Aysha Moore is a 68 y.o. female who presents for   Chief Complaint   Patient presents with    Seborrheic Dermatitis     LOV 4/1/24    Patient here today for f/u on seb derm. Patient states it has gotten better, still has some flaking. Has been applying triamcinolone and ketoconazole cream at least once a day, has not been consistent with twice daily. Has been washing hair with ketoconazole shampoo once a week.     Currently being treated for lung cancer - completed radiation and chemo. On immunotherapy-ATEZOLIZUMAB Q3W  Today is 4th or 5th immunotherapy infusion, rash started on face about 1 month ago.   Derm Hx:  MM, left thigh, 2022 [place]                 Review of Systems   Constitutional:  Negative for fever, chills and fatigue.   Respiratory:  Negative for cough and shortness of breath.    Gastrointestinal:  Negative for nausea and vomiting.   Skin:  Positive for activity-related sunscreen use. Negative for daily sunscreen use.   Hematologic/Lymphatic: Bruises/bleeds easily.       Objective:   Physical Exam   Constitutional: She appears well-developed and well-nourished.   Neurological: She is alert and oriented to person, place, and time.   Psychiatric: She has a normal mood and affect.   Skin:   Areas Examined (abnormalities noted in diagram):   Scalp / Hair Palpated and Inspected            Diagram Legend     Erythematous scaling macule/papule c/w actinic keratosis       Vascular papule c/w angioma      Pigmented verrucoid papule/plaque c/w seborrheic keratosis      Yellow umbilicated papule c/w sebaceous hyperplasia      Irregularly shaped tan macule c/w lentigo     1-2 mm smooth white papules consistent with Milia      Movable subcutaneous cyst with punctum c/w epidermal inclusion cyst      Subcutaneous movable cyst c/w pilar cyst      Firm pink to brown papule c/w dermatofibroma      Pedunculated fleshy papule(s) c/w skin tag(s)      Evenly pigmented macule c/w  junctional nevus     Mildly variegated pigmented, slightly irregular-bordered macule c/w mildly atypical nevus      Flesh colored to evenly pigmented papule c/w intradermal nevus       Pink pearly papule/plaque c/w basal cell carcinoma      Erythematous hyperkeratotic cursted plaque c/w SCC      Surgical scar with no sign of skin cancer recurrence      Open and closed comedones      Inflammatory papules and pustules      Verrucoid papule consistent consistent with wart     Erythematous eczematous patches and plaques     Dystrophic onycholytic nail with subungual debris c/w onychomycosis     Umbilicated papule    Erythematous-base heme-crusted tan verrucoid plaque consistent with inflamed seborrheic keratosis     Erythematous Silvery Scaling Plaque c/w Psoriasis     See annotation                Today            Assessment / Plan:        Seborrheic keratosis  These are benign inherited growths without a malignant potential. Reassurance given to patient. No treatment is necessary.     Seborrheic dermatitis  -     ketoconazole (NIZORAL) 2 % shampoo; Apply topically twice a week.  Dispense: 120 mL; Refill: 6  Greatly improved  Clear except for 1 area on left temple  Has been using cream once daily  Recommend increasing keto shampoo use to twice weekly, ok to use daily as a face wash  Mix keto + tac 0.025% creams and apply to AA BID x 10-14 days as needed for flares, can treat left temple for 1 more week.           No follow-ups on file.

## 2024-05-02 DIAGNOSIS — R09.81 SINUS CONGESTION: ICD-10-CM

## 2024-05-02 RX ORDER — AZELASTINE 1 MG/ML
SPRAY, METERED NASAL
Qty: 90 ML | Refills: 3 | Status: SHIPPED | OUTPATIENT
Start: 2024-05-02

## 2024-05-02 NOTE — TELEPHONE ENCOUNTER
No care due was identified.  [facility] Embedded Care Due Messages. Reference number: [id number].   5/02/2024 1:31:25 PM CDT

## 2024-05-10 RX ORDER — EPINEPHRINE 0.3 MG/.3ML
0.3 INJECTION SUBCUTANEOUS ONCE AS NEEDED
Status: CANCELLED | OUTPATIENT
Start: 2024-05-13

## 2024-05-10 RX ORDER — DIPHENHYDRAMINE HYDROCHLORIDE 50 MG/ML
50 INJECTION INTRAMUSCULAR; INTRAVENOUS ONCE AS NEEDED
Status: CANCELLED | OUTPATIENT
Start: 2024-05-13

## 2024-05-10 RX ORDER — HEPARIN 100 UNIT/ML
500 SYRINGE INTRAVENOUS
Status: CANCELLED | OUTPATIENT
Start: 2024-05-13

## 2024-05-10 RX ORDER — SODIUM CHLORIDE 0.9 % (FLUSH) 0.9 %
10 SYRINGE (ML) INJECTION
Status: CANCELLED | OUTPATIENT
Start: 2024-05-13

## 2024-05-11 ENCOUNTER — LAB VISIT (OUTPATIENT)
Dept: LAB | Facility: HOSPITAL | Age: 69
End: 2024-05-11
Attending: PHYSICAL MEDICINE & REHABILITATION
Payer: MEDICARE

## 2024-05-11 DIAGNOSIS — R53.83 FATIGUE, UNSPECIFIED TYPE: ICD-10-CM

## 2024-05-11 DIAGNOSIS — C34.32 MALIGNANT NEOPLASM OF LOWER LOBE OF LEFT LUNG: ICD-10-CM

## 2024-05-11 DIAGNOSIS — R53.83 FATIGUE: ICD-10-CM

## 2024-05-11 LAB
ALBUMIN SERPL BCP-MCNC: 4.4 G/DL (ref 3.5–5.2)
ALP SERPL-CCNC: 85 U/L (ref 55–135)
ALT SERPL W/O P-5'-P-CCNC: 17 U/L (ref 10–44)
ANION GAP SERPL CALC-SCNC: 8 MMOL/L (ref 8–16)
AST SERPL-CCNC: 17 U/L (ref 10–40)
BASOPHILS # BLD AUTO: 0.03 K/UL (ref 0–0.2)
BASOPHILS NFR BLD: 0.4 % (ref 0–1.9)
BILIRUB SERPL-MCNC: 1.5 MG/DL (ref 0.1–1)
BUN SERPL-MCNC: 13 MG/DL (ref 8–23)
CALCIUM SERPL-MCNC: 9.5 MG/DL (ref 8.7–10.5)
CHLORIDE SERPL-SCNC: 103 MMOL/L (ref 95–110)
CO2 SERPL-SCNC: 27 MMOL/L (ref 23–29)
CREAT SERPL-MCNC: 0.8 MG/DL (ref 0.5–1.4)
DIFFERENTIAL METHOD BLD: ABNORMAL
EOSINOPHIL # BLD AUTO: 0.2 K/UL (ref 0–0.5)
EOSINOPHIL NFR BLD: 2.9 % (ref 0–8)
ERYTHROCYTE [DISTWIDTH] IN BLOOD BY AUTOMATED COUNT: 11.9 % (ref 11.5–14.5)
EST. GFR  (NO RACE VARIABLE): >60 ML/MIN/1.73 M^2
GLUCOSE SERPL-MCNC: 101 MG/DL (ref 70–110)
HCT VFR BLD AUTO: 40.7 % (ref 37–48.5)
HGB BLD-MCNC: 13.7 G/DL (ref 12–16)
IMM GRANULOCYTES # BLD AUTO: 0.01 K/UL (ref 0–0.04)
IMM GRANULOCYTES NFR BLD AUTO: 0.1 % (ref 0–0.5)
LYMPHOCYTES # BLD AUTO: 1.4 K/UL (ref 1–4.8)
LYMPHOCYTES NFR BLD: 20.6 % (ref 18–48)
MAGNESIUM SERPL-MCNC: 1.7 MG/DL (ref 1.6–2.6)
MCH RBC QN AUTO: 32 PG (ref 27–31)
MCHC RBC AUTO-ENTMCNC: 33.7 G/DL (ref 32–36)
MCV RBC AUTO: 95 FL (ref 82–98)
MONOCYTES # BLD AUTO: 0.7 K/UL (ref 0.3–1)
MONOCYTES NFR BLD: 9.6 % (ref 4–15)
NEUTROPHILS # BLD AUTO: 4.5 K/UL (ref 1.8–7.7)
NEUTROPHILS NFR BLD: 66.4 % (ref 38–73)
NRBC BLD-RTO: 0 /100 WBC
PLATELET # BLD AUTO: 231 K/UL (ref 150–450)
PMV BLD AUTO: 9 FL (ref 9.2–12.9)
POTASSIUM SERPL-SCNC: 4.5 MMOL/L (ref 3.5–5.1)
PROT SERPL-MCNC: 7 G/DL (ref 6–8.4)
RBC # BLD AUTO: 4.28 M/UL (ref 4–5.4)
SODIUM SERPL-SCNC: 138 MMOL/L (ref 136–145)
TSH SERPL DL<=0.005 MIU/L-ACNC: 0.34 UIU/ML (ref 0.34–5.6)
WBC # BLD AUTO: 6.8 K/UL (ref 3.9–12.7)

## 2024-05-11 PROCEDURE — 85025 COMPLETE CBC W/AUTO DIFF WBC: CPT | Performed by: NURSE PRACTITIONER

## 2024-05-11 PROCEDURE — 83735 ASSAY OF MAGNESIUM: CPT | Performed by: NURSE PRACTITIONER

## 2024-05-11 PROCEDURE — 36415 COLL VENOUS BLD VENIPUNCTURE: CPT | Performed by: NURSE PRACTITIONER

## 2024-05-11 PROCEDURE — 80053 COMPREHEN METABOLIC PANEL: CPT | Performed by: NURSE PRACTITIONER

## 2024-05-11 PROCEDURE — 84443 ASSAY THYROID STIM HORMONE: CPT | Performed by: NURSE PRACTITIONER

## 2024-05-13 ENCOUNTER — INFUSION (OUTPATIENT)
Dept: INFUSION THERAPY | Facility: HOSPITAL | Age: 69
End: 2024-05-13
Attending: INTERNAL MEDICINE
Payer: MEDICARE

## 2024-05-13 VITALS
RESPIRATION RATE: 18 BRPM | SYSTOLIC BLOOD PRESSURE: 98 MMHG | WEIGHT: 143 LBS | HEIGHT: 63 IN | HEART RATE: 94 BPM | OXYGEN SATURATION: 97 % | TEMPERATURE: 98 F | DIASTOLIC BLOOD PRESSURE: 59 MMHG | BODY MASS INDEX: 25.34 KG/M2

## 2024-05-13 DIAGNOSIS — C34.32 MALIGNANT NEOPLASM OF LOWER LOBE OF LEFT LUNG: Primary | ICD-10-CM

## 2024-05-13 DIAGNOSIS — C34.90 METASTATIC LUNG CANCER (METASTASIS FROM LUNG TO OTHER SITE), UNSPECIFIED LATERALITY: ICD-10-CM

## 2024-05-13 PROCEDURE — A4216 STERILE WATER/SALINE, 10 ML: HCPCS | Performed by: INTERNAL MEDICINE

## 2024-05-13 PROCEDURE — 63600175 PHARM REV CODE 636 W HCPCS: Performed by: INTERNAL MEDICINE

## 2024-05-13 PROCEDURE — 25000003 PHARM REV CODE 250: Performed by: INTERNAL MEDICINE

## 2024-05-13 PROCEDURE — 96413 CHEMO IV INFUSION 1 HR: CPT

## 2024-05-13 RX ORDER — SODIUM CHLORIDE 0.9 % (FLUSH) 0.9 %
10 SYRINGE (ML) INJECTION
Status: DISCONTINUED | OUTPATIENT
Start: 2024-05-13 | End: 2024-05-13 | Stop reason: HOSPADM

## 2024-05-13 RX ORDER — HEPARIN 100 UNIT/ML
500 SYRINGE INTRAVENOUS
Status: DISCONTINUED | OUTPATIENT
Start: 2024-05-13 | End: 2024-05-13 | Stop reason: HOSPADM

## 2024-05-13 RX ORDER — EPINEPHRINE 0.3 MG/.3ML
0.3 INJECTION SUBCUTANEOUS ONCE AS NEEDED
Status: DISCONTINUED | OUTPATIENT
Start: 2024-05-13 | End: 2024-05-13 | Stop reason: HOSPADM

## 2024-05-13 RX ORDER — DIPHENHYDRAMINE HYDROCHLORIDE 50 MG/ML
50 INJECTION INTRAMUSCULAR; INTRAVENOUS ONCE AS NEEDED
Status: DISCONTINUED | OUTPATIENT
Start: 2024-05-13 | End: 2024-05-13 | Stop reason: HOSPADM

## 2024-05-13 RX ADMIN — HEPARIN 500 UNITS: 100 SYRINGE at 12:05

## 2024-05-13 RX ADMIN — ATEZOLIZUMAB 1200 MG: 1200 INJECTION, SOLUTION INTRAVENOUS at 11:05

## 2024-05-13 RX ADMIN — SODIUM CHLORIDE, PRESERVATIVE FREE 10 ML: 5 INJECTION INTRAVENOUS at 12:05

## 2024-05-13 NOTE — PLAN OF CARE
Problem: Fall Injury Risk  Goal: Absence of Fall and Fall-Related Injury  Outcome: Progressing  Intervention: Identify and Manage Contributors  Flowsheets (Taken 5/13/2024 1120)  Self-Care Promotion: independence encouraged  Medication Review/Management: medications reviewed  Intervention: Promote Injury-Free Environment  Flowsheets (Taken 5/13/2024 1120)  Safety Promotion/Fall Prevention: medications reviewed

## 2024-05-28 DIAGNOSIS — F41.9 ANXIETY: ICD-10-CM

## 2024-05-28 RX ORDER — ALPRAZOLAM 0.25 MG/1
0.25 TABLET ORAL 3 TIMES DAILY PRN
Qty: 30 TABLET | Refills: 1 | Status: SHIPPED | OUTPATIENT
Start: 2024-05-28 | End: 2024-06-27

## 2024-05-31 RX ORDER — SODIUM CHLORIDE 0.9 % (FLUSH) 0.9 %
10 SYRINGE (ML) INJECTION
Status: CANCELLED | OUTPATIENT
Start: 2024-06-03

## 2024-05-31 RX ORDER — HEPARIN 100 UNIT/ML
500 SYRINGE INTRAVENOUS
Status: CANCELLED | OUTPATIENT
Start: 2024-06-03

## 2024-05-31 RX ORDER — EPINEPHRINE 0.3 MG/.3ML
0.3 INJECTION SUBCUTANEOUS ONCE AS NEEDED
Status: CANCELLED | OUTPATIENT
Start: 2024-06-03

## 2024-05-31 RX ORDER — DIPHENHYDRAMINE HYDROCHLORIDE 50 MG/ML
50 INJECTION INTRAMUSCULAR; INTRAVENOUS ONCE AS NEEDED
Status: CANCELLED | OUTPATIENT
Start: 2024-06-03

## 2024-05-31 RX ORDER — EZETIMIBE 10 MG/1
10 TABLET ORAL
Qty: 90 TABLET | Refills: 0 | Status: SHIPPED | OUTPATIENT
Start: 2024-05-31

## 2024-06-03 ENCOUNTER — OFFICE VISIT (OUTPATIENT)
Facility: CLINIC | Age: 69
End: 2024-06-03
Payer: MEDICARE

## 2024-06-03 ENCOUNTER — TELEPHONE (OUTPATIENT)
Dept: INFUSION THERAPY | Facility: HOSPITAL | Age: 69
End: 2024-06-03

## 2024-06-03 ENCOUNTER — INFUSION (OUTPATIENT)
Dept: INFUSION THERAPY | Facility: HOSPITAL | Age: 69
End: 2024-06-03
Attending: INTERNAL MEDICINE
Payer: MEDICARE

## 2024-06-03 ENCOUNTER — LAB VISIT (OUTPATIENT)
Dept: LAB | Facility: HOSPITAL | Age: 69
End: 2024-06-03
Attending: INTERNAL MEDICINE
Payer: MEDICARE

## 2024-06-03 VITALS
DIASTOLIC BLOOD PRESSURE: 50 MMHG | SYSTOLIC BLOOD PRESSURE: 92 MMHG | RESPIRATION RATE: 14 BRPM | HEIGHT: 63 IN | HEART RATE: 73 BPM | BODY MASS INDEX: 25.57 KG/M2 | WEIGHT: 144.31 LBS | TEMPERATURE: 98 F | OXYGEN SATURATION: 94 %

## 2024-06-03 VITALS
BODY MASS INDEX: 25.57 KG/M2 | OXYGEN SATURATION: 95 % | HEIGHT: 63 IN | RESPIRATION RATE: 15 BRPM | WEIGHT: 144.31 LBS | TEMPERATURE: 97 F | SYSTOLIC BLOOD PRESSURE: 105 MMHG | DIASTOLIC BLOOD PRESSURE: 51 MMHG | HEART RATE: 73 BPM

## 2024-06-03 DIAGNOSIS — C34.32 MALIGNANT NEOPLASM OF LOWER LOBE OF LEFT LUNG: Primary | ICD-10-CM

## 2024-06-03 DIAGNOSIS — I26.99 OTHER ACUTE PULMONARY EMBOLISM WITHOUT ACUTE COR PULMONALE: ICD-10-CM

## 2024-06-03 DIAGNOSIS — C34.90 METASTATIC LUNG CANCER (METASTASIS FROM LUNG TO OTHER SITE), UNSPECIFIED LATERALITY: ICD-10-CM

## 2024-06-03 DIAGNOSIS — C34.90 METASTATIC LUNG CANCER (METASTASIS FROM LUNG TO OTHER SITE), UNSPECIFIED LATERALITY: Primary | ICD-10-CM

## 2024-06-03 LAB
ALBUMIN SERPL BCP-MCNC: 4.1 G/DL (ref 3.5–5.2)
ALP SERPL-CCNC: 75 U/L (ref 55–135)
ALT SERPL W/O P-5'-P-CCNC: 13 U/L (ref 10–44)
ANION GAP SERPL CALC-SCNC: 6 MMOL/L (ref 8–16)
AST SERPL-CCNC: 13 U/L (ref 10–40)
BASOPHILS # BLD AUTO: 0.03 K/UL (ref 0–0.2)
BASOPHILS NFR BLD: 0.6 % (ref 0–1.9)
BILIRUB SERPL-MCNC: 0.9 MG/DL (ref 0.1–1)
BUN SERPL-MCNC: 15 MG/DL (ref 8–23)
CALCIUM SERPL-MCNC: 8.9 MG/DL (ref 8.7–10.5)
CHLORIDE SERPL-SCNC: 108 MMOL/L (ref 95–110)
CO2 SERPL-SCNC: 27 MMOL/L (ref 23–29)
CREAT SERPL-MCNC: 0.7 MG/DL (ref 0.5–1.4)
DIFFERENTIAL METHOD BLD: ABNORMAL
EOSINOPHIL # BLD AUTO: 0.2 K/UL (ref 0–0.5)
EOSINOPHIL NFR BLD: 4.4 % (ref 0–8)
ERYTHROCYTE [DISTWIDTH] IN BLOOD BY AUTOMATED COUNT: 11.9 % (ref 11.5–14.5)
EST. GFR  (NO RACE VARIABLE): >60 ML/MIN/1.73 M^2
GLUCOSE SERPL-MCNC: 105 MG/DL (ref 70–110)
HCT VFR BLD AUTO: 38.9 % (ref 37–48.5)
HGB BLD-MCNC: 13.1 G/DL (ref 12–16)
IMM GRANULOCYTES # BLD AUTO: 0.01 K/UL (ref 0–0.04)
IMM GRANULOCYTES NFR BLD AUTO: 0.2 % (ref 0–0.5)
LYMPHOCYTES # BLD AUTO: 2 K/UL (ref 1–4.8)
LYMPHOCYTES NFR BLD: 38.7 % (ref 18–48)
MAGNESIUM SERPL-MCNC: 1.7 MG/DL (ref 1.6–2.6)
MCH RBC QN AUTO: 31.2 PG (ref 27–31)
MCHC RBC AUTO-ENTMCNC: 33.7 G/DL (ref 32–36)
MCV RBC AUTO: 93 FL (ref 82–98)
MONOCYTES # BLD AUTO: 0.9 K/UL (ref 0.3–1)
MONOCYTES NFR BLD: 18.2 % (ref 4–15)
NEUTROPHILS # BLD AUTO: 2 K/UL (ref 1.8–7.7)
NEUTROPHILS NFR BLD: 37.9 % (ref 38–73)
NRBC BLD-RTO: 0 /100 WBC
PLATELET # BLD AUTO: 262 K/UL (ref 150–450)
PMV BLD AUTO: 9.4 FL (ref 9.2–12.9)
POTASSIUM SERPL-SCNC: 4 MMOL/L (ref 3.5–5.1)
PROT SERPL-MCNC: 6.8 G/DL (ref 6–8.4)
RBC # BLD AUTO: 4.2 M/UL (ref 4–5.4)
SODIUM SERPL-SCNC: 141 MMOL/L (ref 136–145)
WBC # BLD AUTO: 5.17 K/UL (ref 3.9–12.7)

## 2024-06-03 PROCEDURE — 63600175 PHARM REV CODE 636 W HCPCS: Performed by: NURSE PRACTITIONER

## 2024-06-03 PROCEDURE — A4216 STERILE WATER/SALINE, 10 ML: HCPCS | Performed by: NURSE PRACTITIONER

## 2024-06-03 PROCEDURE — 99999 PR PBB SHADOW E&M-EST. PATIENT-LVL III: CPT | Mod: PBBFAC,,, | Performed by: INTERNAL MEDICINE

## 2024-06-03 PROCEDURE — 96413 CHEMO IV INFUSION 1 HR: CPT

## 2024-06-03 PROCEDURE — 99213 OFFICE O/P EST LOW 20 MIN: CPT | Mod: PBBFAC,PN | Performed by: INTERNAL MEDICINE

## 2024-06-03 PROCEDURE — 80053 COMPREHEN METABOLIC PANEL: CPT | Performed by: INTERNAL MEDICINE

## 2024-06-03 PROCEDURE — 83735 ASSAY OF MAGNESIUM: CPT | Performed by: INTERNAL MEDICINE

## 2024-06-03 PROCEDURE — 25000003 PHARM REV CODE 250: Performed by: NURSE PRACTITIONER

## 2024-06-03 PROCEDURE — 99215 OFFICE O/P EST HI 40 MIN: CPT | Mod: S$PBB,,, | Performed by: INTERNAL MEDICINE

## 2024-06-03 PROCEDURE — 85025 COMPLETE CBC W/AUTO DIFF WBC: CPT | Performed by: INTERNAL MEDICINE

## 2024-06-03 PROCEDURE — 36415 COLL VENOUS BLD VENIPUNCTURE: CPT | Performed by: INTERNAL MEDICINE

## 2024-06-03 RX ORDER — DIPHENHYDRAMINE HYDROCHLORIDE 50 MG/ML
50 INJECTION INTRAMUSCULAR; INTRAVENOUS ONCE AS NEEDED
Status: DISCONTINUED | OUTPATIENT
Start: 2024-06-03 | End: 2024-06-03 | Stop reason: HOSPADM

## 2024-06-03 RX ORDER — SODIUM CHLORIDE 0.9 % (FLUSH) 0.9 %
10 SYRINGE (ML) INJECTION
Status: DISCONTINUED | OUTPATIENT
Start: 2024-06-03 | End: 2024-06-03 | Stop reason: HOSPADM

## 2024-06-03 RX ORDER — HEPARIN 100 UNIT/ML
500 SYRINGE INTRAVENOUS
Status: DISCONTINUED | OUTPATIENT
Start: 2024-06-03 | End: 2024-06-03 | Stop reason: HOSPADM

## 2024-06-03 RX ORDER — EPINEPHRINE 0.3 MG/.3ML
0.3 INJECTION SUBCUTANEOUS ONCE AS NEEDED
Status: DISCONTINUED | OUTPATIENT
Start: 2024-06-03 | End: 2024-06-03 | Stop reason: HOSPADM

## 2024-06-03 RX ADMIN — HEPARIN 500 UNITS: 100 SYRINGE at 12:06

## 2024-06-03 RX ADMIN — ATEZOLIZUMAB 1200 MG: 1200 INJECTION, SOLUTION INTRAVENOUS at 11:06

## 2024-06-03 RX ADMIN — SODIUM CHLORIDE, PRESERVATIVE FREE 10 ML: 5 INJECTION INTRAVENOUS at 12:06

## 2024-06-03 NOTE — ASSESSMENT & PLAN NOTE
Patient continues on Atezo and is doing ok with this.  No clear sign of AI disease at this time but will need continued close monitoring for this.  Continue weekly labs.  Continue therapy, labs allowing.  Will continue close follow up. Plan imaging again for July this year.

## 2024-06-03 NOTE — PROGRESS NOTES
PROGRESS NOTE    Subjective:       Patient ID: Aysha Moore is a 68 y.o. female.    6/2/2023:  Screening CT chest:  2.6 x 2.5 x 3.2cm mass in the posterior LLL     6/20/2023:  PET scan:  35 x 21 mm lobulated pulmonary mass in the posterior left lower lobe with SUV max 11.6      FDG avid lymphadenopathy is present with index nodes outlined below:  -21 x 11 mm AP window node with SUV max 12.1 (image 103)  -21 x 12 mm posterior left hilar node with SUV max 13.7 (image 109)  -16 x 13 mm left hilar node with SUV max 14 (image 1:15)  -10 x 10 mm subcarinal node with SUV max 6.3 (image 111)     7/12/2023-Biopsy of LLL:  LEFT LOWER LOBE OF LUNG, CORE BIOPSIES:   - LUNG ADENOCARCINOMA WITH PLEOMORPHIC FEATURES.   PDL1/TPS: 95%  ALK/BRAF/EGFR/KRAS/RET/ROS1/MET NEGATIVE     7/21/2023:  MRI brain: no mets.    Carbo/Taxol/XRT:  8/8/2023-9/12/2023     Plan: Atezolizumab 1200mg every 3 weeks x 16    9/30/2023-CTA Chest:  1. Segmental right upper lobe pulmonary embolus.  2. Cavitating and spiculated posterior left lower lobe lung mass, decreased slightly in size compared to the prior chest CT exam.  3. Upper lobe predominant centrilobular and subpleural emphysema, with coarse mixed interstitial and alveolar infiltrate along the mediastinal border left upper lobe suggesting reactive or infectious pneumonitis, and with mild posterior bilateral upper lobe groundglass infiltrates.    10/4/2023  Admitted for 3 days with fever to 101, new Dx of PE and splenic infarcts. Echo normal. Started on Xarelto and abx.     9/3/2023-CT abd/p:  IMPRESSION: There are hypodensities within the spleen concerning for splenic infarcts. These can be associated with endocarditis. The patient also has known pulmonary emboli. Transesophageal echocardiogram may be warranted.    10/3/2023-  TTE normal    Atezolizumab  Cycle 1: 10/23/2023  Cycle 2: 11/13/2023  Cycle 3: 12/4/2023  Delay due to ?  Pulmonary issues.  Cleared by pulm  Cycle 4:  1/29/2024  Cycle 5: 2/19/2024  Cycle 6: 3/11/2024  Cycle 7: 4/1/2024  Cycle 8: 4/22/2024  Cycle 9: 5/13/2024  Cycle 10: 6/3/2024-due      11/10/2023-CT CAP  IMPRESSION:  1. Nodular density in the posterior left lower lobe, essentially unchanged from the previous exam when accounting for technique.     2. Scattered patchy airspace opacities throughout the left lung, increased in size/distribution from the previous exam, the differential includes posttreatment/radiation change, atelectasis/scarring, infection/pneumonia, noting malignancy is not excluded and interval follow-up would be warranted.     3. Enlarging left cervical/supraclavicular lymphadenopathy.     4. Small wedge-shaped hypodensities in the spleen, in keeping with small splenic infarcts, similar in distribution the previous exam.    12/11/2023-CTA Chest  No PE-see report, ? Pneumonia    1/17/2023-CT chest:  1. Left upper lobe paramediastinal mass is stable due to radiation exposure chest with evidence of an prominent. Mediastinal scarring.  2. Advanced emphysematous changes along with evidence of abnormality scarring the left are probably proteinaceous bullous cavitary fibrotic focus and fibrotic changes in the left lower lobe is stable.  3. Small spiculated nodule in the right lower lobe 9 x 8 mm stable. Recommend follow-up within 6 months.     4/9/2024-CT CAP w/con  Improvement--see report    Chief Complaint:  Lung Cancer  Stage IIII lung cancer, pulmonary embolism, splenic infarct follow up    History of Present Illness:   Aysha Mooer is a 68 y.o. female who presents for follow up of lung cancer.     Ms. Moore is doing ok today.  Does have continued fatigue.  No new complaints at this time. No significant change in breathing, does have DAILY.  No diarrhea.     History of PE 9/2023  Xarelto given 9/2023-5/2024      Family and Social history reviewed and is unchanged from 7/27/2023      ROS:  Review of  Systems   Constitutional:  Negative for fever.   Respiratory:  Negative for shortness of breath.    Cardiovascular:  Negative for chest pain and leg swelling.   Gastrointestinal:  Negative for abdominal pain and blood in stool.   Genitourinary:  Negative for hematuria.   Skin:  Negative for rash.          Current Outpatient Medications:     albuterol (PROVENTIL/VENTOLIN HFA) 90 mcg/actuation inhaler, Inhale 2 puffs into the lungs every 6 (six) hours as needed. Rescue, Disp: 18 g, Rfl: 5    ALPRAZolam (XANAX) 0.25 MG tablet, Take 1 tablet (0.25 mg total) by mouth 3 (three) times daily as needed for Anxiety., Disp: 30 tablet, Rfl: 1    ammonium lactate 12 % Crea, aaa bid prn dry skin (Patient taking differently: Apply 1 g topically 2 (two) times daily as needed. aaa bid prn dry skin), Disp: 385 g, Rfl: 11    amoxicillin-clavulanate 875-125mg (AUGMENTIN) 875-125 mg per tablet, Take 1 tablet by mouth 2 (two) times daily., Disp: 20 tablet, Rfl: 0    atorvastatin (LIPITOR) 80 MG tablet, TAKE 1 TABLET BY MOUTH EVERY EVENING, Disp: 90 tablet, Rfl: 1    azelastine (ASTELIN) 137 mcg (0.1 %) nasal spray, USE 1 SPRAY IN EACH NOSTRIL 2 TIMES DAILY AS NEEDED FOR RHINITIS OR SINUSITIS, Disp: 90 mL, Rfl: 3    calcium-vitamin D3 (OS-JOLIE 500 + D3) 500 mg-5 mcg (200 unit) per tablet, Take 1 tablet by mouth every morning., Disp: , Rfl:     citalopram (CELEXA) 20 MG tablet, Take 1 tablet (20 mg total) by mouth once daily., Disp: 90 tablet, Rfl: 1    ezetimibe (ZETIA) 10 mg tablet, TAKE 1 TABLET BY MOUTH EVERY DAY, Disp: 90 tablet, Rfl: 0    famotidine (PEPCID) 40 MG tablet, TAKE 1 TABLET BY MOUTH EVERY DAY IN THE EVENING, Disp: 90 tablet, Rfl: 1    fluticasone propionate (FLONASE) 50 mcg/actuation nasal spray, 1 spray (50 mcg total) by Each Nostril route daily as needed for Rhinitis or Allergies., Disp: 9.9 mL, Rfl: 2    HYDROcodone-acetaminophen (NORCO) 5-325 mg per tablet, Take 1 tablet by mouth every 4 to 6 hours as needed for Pain.,  Disp: 60 tablet, Rfl: 0    ketoconazole (NIZORAL) 2 % cream, Apply topically 2 (two) times daily., Disp: 60 g, Rfl: 1    ketoconazole (NIZORAL) 2 % shampoo, Apply topically twice a week., Disp: 120 mL, Rfl: 6    lisinopriL (PRINIVIL,ZESTRIL) 2.5 MG tablet, Take 2.5 mg by mouth., Disp: , Rfl:     metoprolol succinate (TOPROL-XL) 50 MG 24 hr tablet, Take 1 tablet (50 mg total) by mouth once daily., Disp: 90 tablet, Rfl: 3    nitroGLYCERIN (NITROSTAT) 0.4 MG SL tablet, Place 1 tablet (0.4 mg total) under the tongue every 5 (five) minutes as needed for Chest pain., Disp: 30 tablet, Rfl: 0    omeprazole (PRILOSEC) 40 MG capsule, Take 1 capsule (40 mg total) by mouth once daily., Disp: 30 capsule, Rfl: 11    ondansetron (ZOFRAN) 8 MG tablet, Take 1 tablet (8 mg total) by mouth every 8 (eight) hours as needed. (Patient taking differently: Take 8 mg by mouth every 8 (eight) hours as needed for Nausea.), Disp: 30 tablet, Rfl: 5    predniSONE (DELTASONE) 10 MG tablet, Take 3 a day x 3 days then 2 a day x 3 days then 1 a day x 3 days, Disp: 18 tablet, Rfl: 0    promethazine (PHENERGAN) 25 MG tablet, Take 1 tablet (25 mg total) by mouth every 4 to 6 hours as needed., Disp: 30 tablet, Rfl: 5    rivaroxaban (XARELTO) 15 mg Tab, Take 1 tablet (15 mg total) by mouth daily with dinner or evening meal., Disp: 42 tablet, Rfl: 0    tiZANidine (ZANAFLEX) 4 MG tablet, TAKE 1 TABLET BY MOUTH EVERY 6 HOURS AS NEEDED FOR BACK PAIN, Disp: 360 tablet, Rfl: 0    traMADoL (ULTRAM) 50 mg tablet, Take 1 tablet (50 mg total) by mouth every 6 (six) hours as needed for Pain., Disp: 30 tablet, Rfl: 2    triamcinolone acetonide 0.025% (KENALOG) 0.025 % cream, Apply topically 2 (two) times daily., Disp: 80 g, Rfl: 1    umeclidinium-vilanteroL (ANORO ELLIPTA) 62.5-25 mcg/actuation DsDv, Inhale 1 puff into the lungs once daily. Controller, Disp: 1 each, Rfl: 5    valACYclovir (VALTREX) 1000 MG tablet, TAKE 2 AT ONSET OF FEVER BLISTERS THEN REPEAT IN 12  "HOURS (TOTAL 4 TABS PER EPISODE), Disp: 20 tablet, Rfl: 3    zinc 50 mg Tab, Take 1 tablet by mouth once daily., Disp: , Rfl:     clobetasol 0.05% (TEMOVATE) 0.05 % Oint, Apply topically 2 (two) times daily. for 14 days (Patient taking differently: Apply 1 g topically 2 (two) times daily.), Disp: 45 g, Rfl: 3        Objective:       Physical Examination:     BP (!) 92/50 (BP Location: Right arm, Patient Position: Sitting, BP Method: Medium (Automatic))   Pulse 73   Temp 97.7 °F (36.5 °C) (Temporal)   Resp 14   Ht 5' 3" (1.6 m)   Wt 65.5 kg (144 lb 4.8 oz)   SpO2 (!) 94%   BMI 25.56 kg/m²     Physical Exam  Constitutional:       Appearance: Normal appearance.   HENT:      Head: Normocephalic and atraumatic.   Eyes:      General: No scleral icterus.     Conjunctiva/sclera: Conjunctivae normal.   Cardiovascular:      Rate and Rhythm: Normal rate.   Pulmonary:      Effort: Pulmonary effort is normal.   Abdominal:      General: Abdomen is flat.   Neurological:      General: No focal deficit present.      Mental Status: She is alert and oriented to person, place, and time.   Psychiatric:         Mood and Affect: Mood normal.         Behavior: Behavior normal.         Thought Content: Thought content normal.         Judgment: Judgment normal.         Labs:   Recent Results (from the past 336 hour(s))   CBC W/ AUTO DIFFERENTIAL    Collection Time: 06/03/24 10:07 AM   Result Value Ref Range    WBC 5.17 3.90 - 12.70 K/uL    Hemoglobin 13.1 12.0 - 16.0 g/dL    Hematocrit 38.9 37.0 - 48.5 %    Platelets 262 150 - 450 K/uL           CMP  Sodium   Date Value Ref Range Status   06/03/2024 141 136 - 145 mmol/L Final     Potassium   Date Value Ref Range Status   06/03/2024 4.0 3.5 - 5.1 mmol/L Final     Chloride   Date Value Ref Range Status   06/03/2024 108 95 - 110 mmol/L Final     CO2   Date Value Ref Range Status   06/03/2024 27 23 - 29 mmol/L Final     Glucose   Date Value Ref Range Status   06/03/2024 105 70 - 110 mg/dL " "Final     BUN   Date Value Ref Range Status   06/03/2024 15 8 - 23 mg/dL Final     Creatinine   Date Value Ref Range Status   06/03/2024 0.7 0.5 - 1.4 mg/dL Final     Calcium   Date Value Ref Range Status   06/03/2024 8.9 8.7 - 10.5 mg/dL Final     Total Protein   Date Value Ref Range Status   06/03/2024 6.8 6.0 - 8.4 g/dL Final     Albumin   Date Value Ref Range Status   06/03/2024 4.1 3.5 - 5.2 g/dL Final     Total Bilirubin   Date Value Ref Range Status   06/03/2024 0.9 0.1 - 1.0 mg/dL Final     Comment:     For infants and newborns, interpretation of results should be based  on gestational age, weight and in agreement with clinical  observations.    Premature Infant recommended reference ranges:  Up to 24 hours.............<8.0 mg/dL  Up to 48 hours............<12.0 mg/dL  3-5 days..................<15.0 mg/dL  6-29 days.................<15.0 mg/dL       Alkaline Phosphatase   Date Value Ref Range Status   06/03/2024 75 55 - 135 U/L Final     AST   Date Value Ref Range Status   06/03/2024 13 10 - 40 U/L Final     ALT   Date Value Ref Range Status   06/03/2024 13 10 - 44 U/L Final     Anion Gap   Date Value Ref Range Status   06/03/2024 6 (L) 8 - 16 mmol/L Final     eGFR if    Date Value Ref Range Status   03/29/2022 >60.0 >60 mL/min/1.73 m^2 Final     eGFR if non    Date Value Ref Range Status   03/29/2022 >60.0 >60 mL/min/1.73 m^2 Final     Comment:     Calculation used to obtain the estimated glomerular filtration  rate (eGFR) is the CKD-EPI equation.        No results found for: "CEA"  No results found for: "PSA"        Assessment/Plan:     Problem List Items Addressed This Visit       Other pulmonary embolism without acute cor pulmonale     This was a first time blood clotting event and likey due to cancer which appears to be under very good control.  She stopped the Xarelto on her own 3 weeks ago.  Will give her a trial of being off this given the above.  Discussed this today " in detail.           LUNG CANCER-ADENOCARCINOMA OF THE LLL - Primary     Patient continues on Atezo and is doing ok with this.  No clear sign of AI disease at this time but will need continued close monitoring for this.  Continue weekly labs.  Continue therapy, labs allowing.  Will continue close follow up. Plan imaging again for July this year.          Relevant Orders    CT Chest Abdomen Pelvis With IV Contrast (XPD) Routine Oral Contrast       Discussion:     Follow up in about 3 weeks (around 6/24/2024) for NP visit and 6 with me.      Electronically signed by Raad Bee

## 2024-06-03 NOTE — ASSESSMENT & PLAN NOTE
This was a first time blood clotting event and likey due to cancer which appears to be under very good control.  She stopped the Xarelto on her own 3 weeks ago.  Will give her a trial of being off this given the above.  Discussed this today in detail.

## 2024-06-03 NOTE — PLAN OF CARE
Problem: Fatigue  Goal: Improved Activity Tolerance  Outcome: Progressing  Intervention: Promote Improved Energy  Flowsheets (Taken 6/3/2024 1123)  Fatigue Management:   fatigue-related activity identified   frequent rest breaks encouraged   paced activity encouraged  Sleep/Rest Enhancement:   regular sleep/rest pattern promoted   relaxation techniques promoted  Activity Management: Ambulated -L4  Environmental Support: rest periods encouraged

## 2024-06-03 NOTE — TELEPHONE ENCOUNTER
LVM for pt regarding labs needed prior to infusion appt today at 1100 for tecentriq. Call back number left in voicemail. Notified MD office.

## 2024-06-05 ENCOUNTER — PATIENT MESSAGE (OUTPATIENT)
Dept: DERMATOLOGY | Facility: CLINIC | Age: 69
End: 2024-06-05
Payer: MEDICARE

## 2024-06-14 ENCOUNTER — HOSPITAL ENCOUNTER (OUTPATIENT)
Dept: RADIOLOGY | Facility: HOSPITAL | Age: 69
Discharge: HOME | End: 2024-06-14
Attending: INTERNAL MEDICINE
Payer: MEDICARE

## 2024-06-14 DIAGNOSIS — C34.32 MALIGNANT NEOPLASM OF LOWER LOBE OF LEFT LUNG: ICD-10-CM

## 2024-06-14 PROCEDURE — 74177 CT ABD & PELVIS W/CONTRAST: CPT | Mod: TC,PO

## 2024-06-14 PROCEDURE — 25500020 PHARM REV CODE 255: Mod: PO | Performed by: INTERNAL MEDICINE

## 2024-06-14 PROCEDURE — 71260 CT THORAX DX C+: CPT | Mod: 26,,, | Performed by: RADIOLOGY

## 2024-06-14 PROCEDURE — 74177 CT ABD & PELVIS W/CONTRAST: CPT | Mod: 26,,, | Performed by: RADIOLOGY

## 2024-06-14 RX ADMIN — IOHEXOL 100 ML: 350 INJECTION, SOLUTION INTRAVENOUS at 03:06

## 2024-06-17 ENCOUNTER — TELEPHONE (OUTPATIENT)
Facility: CLINIC | Age: 69
End: 2024-06-17
Payer: MEDICARE

## 2024-06-17 NOTE — TELEPHONE ENCOUNTER
Called patient regarding CT scan, per Dr. Hankins CT looks Ok and there is not sign of cancer, patient stated understanding.

## 2024-06-21 ENCOUNTER — TELEPHONE (OUTPATIENT)
Dept: INFUSION THERAPY | Facility: HOSPITAL | Age: 69
End: 2024-06-21

## 2024-06-21 NOTE — TELEPHONE ENCOUNTER
LVM for pt to get labs over the weekend prior to appt on Monday 6/24/24 call back number left in message

## 2024-06-22 RX ORDER — EPINEPHRINE 0.3 MG/.3ML
0.3 INJECTION SUBCUTANEOUS ONCE AS NEEDED
Status: CANCELLED | OUTPATIENT
Start: 2024-06-24

## 2024-06-22 RX ORDER — HEPARIN 100 UNIT/ML
500 SYRINGE INTRAVENOUS
Status: CANCELLED | OUTPATIENT
Start: 2024-06-24

## 2024-06-22 RX ORDER — DIPHENHYDRAMINE HYDROCHLORIDE 50 MG/ML
50 INJECTION INTRAMUSCULAR; INTRAVENOUS ONCE AS NEEDED
Status: CANCELLED | OUTPATIENT
Start: 2024-06-24

## 2024-06-22 RX ORDER — SODIUM CHLORIDE 0.9 % (FLUSH) 0.9 %
10 SYRINGE (ML) INJECTION
Status: CANCELLED | OUTPATIENT
Start: 2024-06-24

## 2024-06-24 ENCOUNTER — LAB VISIT (OUTPATIENT)
Dept: LAB | Facility: HOSPITAL | Age: 69
End: 2024-06-24
Attending: INTERNAL MEDICINE
Payer: MEDICARE

## 2024-06-24 ENCOUNTER — INFUSION (OUTPATIENT)
Dept: INFUSION THERAPY | Facility: HOSPITAL | Age: 69
End: 2024-06-24
Attending: INTERNAL MEDICINE
Payer: MEDICARE

## 2024-06-24 VITALS
HEART RATE: 69 BPM | BODY MASS INDEX: 25.37 KG/M2 | DIASTOLIC BLOOD PRESSURE: 70 MMHG | OXYGEN SATURATION: 96 % | RESPIRATION RATE: 17 BRPM | SYSTOLIC BLOOD PRESSURE: 103 MMHG | WEIGHT: 143.19 LBS | HEIGHT: 63 IN | TEMPERATURE: 98 F

## 2024-06-24 DIAGNOSIS — C34.32 MALIGNANT NEOPLASM OF LOWER LOBE OF LEFT LUNG: Primary | ICD-10-CM

## 2024-06-24 DIAGNOSIS — C34.90 METASTATIC LUNG CANCER (METASTASIS FROM LUNG TO OTHER SITE), UNSPECIFIED LATERALITY: ICD-10-CM

## 2024-06-24 LAB
ALBUMIN SERPL BCP-MCNC: 4.3 G/DL (ref 3.5–5.2)
ALP SERPL-CCNC: 87 U/L (ref 55–135)
ALT SERPL W/O P-5'-P-CCNC: 14 U/L (ref 10–44)
ANION GAP SERPL CALC-SCNC: 7 MMOL/L (ref 8–16)
AST SERPL-CCNC: 15 U/L (ref 10–40)
BASOPHILS # BLD AUTO: 0.03 K/UL (ref 0–0.2)
BASOPHILS NFR BLD: 0.7 % (ref 0–1.9)
BILIRUB SERPL-MCNC: 1 MG/DL (ref 0.1–1)
BUN SERPL-MCNC: 14 MG/DL (ref 8–23)
CALCIUM SERPL-MCNC: 9 MG/DL (ref 8.7–10.5)
CHLORIDE SERPL-SCNC: 107 MMOL/L (ref 95–110)
CO2 SERPL-SCNC: 27 MMOL/L (ref 23–29)
CREAT SERPL-MCNC: 0.8 MG/DL (ref 0.5–1.4)
DIFFERENTIAL METHOD BLD: ABNORMAL
EOSINOPHIL # BLD AUTO: 0.3 K/UL (ref 0–0.5)
EOSINOPHIL NFR BLD: 5.6 % (ref 0–8)
ERYTHROCYTE [DISTWIDTH] IN BLOOD BY AUTOMATED COUNT: 11.9 % (ref 11.5–14.5)
EST. GFR  (NO RACE VARIABLE): >60 ML/MIN/1.73 M^2
GLUCOSE SERPL-MCNC: 105 MG/DL (ref 70–110)
HCT VFR BLD AUTO: 41.5 % (ref 37–48.5)
HGB BLD-MCNC: 13.5 G/DL (ref 12–16)
IMM GRANULOCYTES # BLD AUTO: 0.01 K/UL (ref 0–0.04)
IMM GRANULOCYTES NFR BLD AUTO: 0.2 % (ref 0–0.5)
LYMPHOCYTES # BLD AUTO: 1.6 K/UL (ref 1–4.8)
LYMPHOCYTES NFR BLD: 35.4 % (ref 18–48)
MAGNESIUM SERPL-MCNC: 1.8 MG/DL (ref 1.6–2.6)
MCH RBC QN AUTO: 30.9 PG (ref 27–31)
MCHC RBC AUTO-ENTMCNC: 32.5 G/DL (ref 32–36)
MCV RBC AUTO: 95 FL (ref 82–98)
MONOCYTES # BLD AUTO: 0.7 K/UL (ref 0.3–1)
MONOCYTES NFR BLD: 15.9 % (ref 4–15)
NEUTROPHILS # BLD AUTO: 1.9 K/UL (ref 1.8–7.7)
NEUTROPHILS NFR BLD: 42.2 % (ref 38–73)
NRBC BLD-RTO: 0 /100 WBC
PLATELET # BLD AUTO: 256 K/UL (ref 150–450)
PMV BLD AUTO: 9.2 FL (ref 9.2–12.9)
POTASSIUM SERPL-SCNC: 4 MMOL/L (ref 3.5–5.1)
PROT SERPL-MCNC: 7.2 G/DL (ref 6–8.4)
RBC # BLD AUTO: 4.37 M/UL (ref 4–5.4)
SODIUM SERPL-SCNC: 141 MMOL/L (ref 136–145)
WBC # BLD AUTO: 4.46 K/UL (ref 3.9–12.7)

## 2024-06-24 PROCEDURE — 96413 CHEMO IV INFUSION 1 HR: CPT

## 2024-06-24 PROCEDURE — 25000003 PHARM REV CODE 250: Performed by: INTERNAL MEDICINE

## 2024-06-24 PROCEDURE — 85025 COMPLETE CBC W/AUTO DIFF WBC: CPT | Performed by: INTERNAL MEDICINE

## 2024-06-24 PROCEDURE — 83735 ASSAY OF MAGNESIUM: CPT | Performed by: INTERNAL MEDICINE

## 2024-06-24 PROCEDURE — 36415 COLL VENOUS BLD VENIPUNCTURE: CPT | Performed by: INTERNAL MEDICINE

## 2024-06-24 PROCEDURE — 63600175 PHARM REV CODE 636 W HCPCS: Performed by: INTERNAL MEDICINE

## 2024-06-24 PROCEDURE — 80053 COMPREHEN METABOLIC PANEL: CPT | Performed by: INTERNAL MEDICINE

## 2024-06-24 RX ORDER — DIPHENHYDRAMINE HYDROCHLORIDE 50 MG/ML
50 INJECTION INTRAMUSCULAR; INTRAVENOUS ONCE AS NEEDED
Status: DISCONTINUED | OUTPATIENT
Start: 2024-06-24 | End: 2024-06-24 | Stop reason: HOSPADM

## 2024-06-24 RX ORDER — EPINEPHRINE 0.3 MG/.3ML
0.3 INJECTION SUBCUTANEOUS ONCE AS NEEDED
Status: DISCONTINUED | OUTPATIENT
Start: 2024-06-24 | End: 2024-06-24 | Stop reason: HOSPADM

## 2024-06-24 RX ORDER — SODIUM CHLORIDE 0.9 % (FLUSH) 0.9 %
10 SYRINGE (ML) INJECTION
Status: DISCONTINUED | OUTPATIENT
Start: 2024-06-24 | End: 2024-06-24 | Stop reason: HOSPADM

## 2024-06-24 RX ORDER — HEPARIN 100 UNIT/ML
500 SYRINGE INTRAVENOUS
Status: DISCONTINUED | OUTPATIENT
Start: 2024-06-24 | End: 2024-06-24 | Stop reason: HOSPADM

## 2024-06-24 RX ADMIN — HEPARIN 500 UNITS: 100 SYRINGE at 11:06

## 2024-06-24 RX ADMIN — ATEZOLIZUMAB 1200 MG: 1200 INJECTION, SOLUTION INTRAVENOUS at 11:06

## 2024-06-24 NOTE — NURSING
Pt c/o sore throat today from possible sinuses. Notified MINNIE Chiang who ordered pt can try taking Flonase and Zyrtec at home for relief. Educated this to pt who stated understanding.

## 2024-06-24 NOTE — PLAN OF CARE
Problem: Fatigue  Goal: Improved Activity Tolerance  Outcome: Progressing  Intervention: Promote Improved Energy  Flowsheets (Taken 6/24/2024 1117)  Fatigue Management:   fatigue-related activity identified   frequent rest breaks encouraged   paced activity encouraged  Sleep/Rest Enhancement:   regular sleep/rest pattern promoted   relaxation techniques promoted  Activity Management: Ambulated -L4  Environmental Support: rest periods encouraged

## 2024-07-07 DIAGNOSIS — M54.42 CHRONIC BILATERAL LOW BACK PAIN WITH LEFT-SIDED SCIATICA: ICD-10-CM

## 2024-07-07 DIAGNOSIS — G89.29 CHRONIC BILATERAL LOW BACK PAIN WITH LEFT-SIDED SCIATICA: ICD-10-CM

## 2024-07-07 NOTE — TELEPHONE ENCOUNTER
No care due was identified.  [facility] Embedded Care Due Messages. Reference number: [id number].   7/07/2024 12:32:08 PM CDT

## 2024-07-08 RX ORDER — TIZANIDINE 4 MG/1
4 TABLET ORAL EVERY 6 HOURS PRN
Qty: 360 TABLET | Refills: 0 | Status: SHIPPED | OUTPATIENT
Start: 2024-07-08

## 2024-07-08 NOTE — TELEPHONE ENCOUNTER
Refill Routing Note   Medication(s) are not appropriate for processing by [facility] for the following reason(s):        Outside of protocol    [facility] action(s):  Route               Appointments  past 12m or future 3m with PCP    Date Provider   Last Visit   2/7/2024 Yoni Daniels MD   Next Visit   Visit date not found Yoni Daniels MD   ED visits in past 90 days: 0        Note composed:9:16 AM 07/08/2024

## 2024-07-12 ENCOUNTER — LAB VISIT (OUTPATIENT)
Dept: LAB | Facility: HOSPITAL | Age: 69
End: 2024-07-12
Attending: INTERNAL MEDICINE
Payer: MEDICARE

## 2024-07-12 DIAGNOSIS — C34.90 METASTATIC LUNG CANCER (METASTASIS FROM LUNG TO OTHER SITE), UNSPECIFIED LATERALITY: ICD-10-CM

## 2024-07-12 LAB
ALBUMIN SERPL BCP-MCNC: 4.4 G/DL (ref 3.5–5.2)
ALP SERPL-CCNC: 85 U/L (ref 55–135)
ALT SERPL W/O P-5'-P-CCNC: 14 U/L (ref 10–44)
ANION GAP SERPL CALC-SCNC: 9 MMOL/L (ref 8–16)
AST SERPL-CCNC: 16 U/L (ref 10–40)
BASOPHILS # BLD AUTO: 0.02 K/UL (ref 0–0.2)
BASOPHILS NFR BLD: 0.4 % (ref 0–1.9)
BILIRUB SERPL-MCNC: 1.5 MG/DL (ref 0.1–1)
BUN SERPL-MCNC: 10 MG/DL (ref 8–23)
CALCIUM SERPL-MCNC: 9.3 MG/DL (ref 8.7–10.5)
CHLORIDE SERPL-SCNC: 104 MMOL/L (ref 95–110)
CO2 SERPL-SCNC: 26 MMOL/L (ref 23–29)
CREAT SERPL-MCNC: 0.7 MG/DL (ref 0.5–1.4)
DIFFERENTIAL METHOD BLD: ABNORMAL
EOSINOPHIL # BLD AUTO: 0.1 K/UL (ref 0–0.5)
EOSINOPHIL NFR BLD: 1.8 % (ref 0–8)
ERYTHROCYTE [DISTWIDTH] IN BLOOD BY AUTOMATED COUNT: 11.9 % (ref 11.5–14.5)
EST. GFR  (NO RACE VARIABLE): >60 ML/MIN/1.73 M^2
GLUCOSE SERPL-MCNC: 99 MG/DL (ref 70–110)
HCT VFR BLD AUTO: 38.7 % (ref 37–48.5)
HGB BLD-MCNC: 13 G/DL (ref 12–16)
IMM GRANULOCYTES # BLD AUTO: 0.01 K/UL (ref 0–0.04)
IMM GRANULOCYTES NFR BLD AUTO: 0.2 % (ref 0–0.5)
LYMPHOCYTES # BLD AUTO: 1.3 K/UL (ref 1–4.8)
LYMPHOCYTES NFR BLD: 28.8 % (ref 18–48)
MAGNESIUM SERPL-MCNC: 1.8 MG/DL (ref 1.6–2.6)
MCH RBC QN AUTO: 31.4 PG (ref 27–31)
MCHC RBC AUTO-ENTMCNC: 33.6 G/DL (ref 32–36)
MCV RBC AUTO: 94 FL (ref 82–98)
MONOCYTES # BLD AUTO: 0.5 K/UL (ref 0.3–1)
MONOCYTES NFR BLD: 11.2 % (ref 4–15)
NEUTROPHILS # BLD AUTO: 2.6 K/UL (ref 1.8–7.7)
NEUTROPHILS NFR BLD: 57.6 % (ref 38–73)
NRBC BLD-RTO: 0 /100 WBC
PLATELET # BLD AUTO: 249 K/UL (ref 150–450)
PMV BLD AUTO: 8.9 FL (ref 9.2–12.9)
POTASSIUM SERPL-SCNC: 3.9 MMOL/L (ref 3.5–5.1)
PROT SERPL-MCNC: 7.3 G/DL (ref 6–8.4)
RBC # BLD AUTO: 4.14 M/UL (ref 4–5.4)
SODIUM SERPL-SCNC: 139 MMOL/L (ref 136–145)
WBC # BLD AUTO: 4.45 K/UL (ref 3.9–12.7)

## 2024-07-12 PROCEDURE — 80053 COMPREHEN METABOLIC PANEL: CPT | Performed by: INTERNAL MEDICINE

## 2024-07-12 PROCEDURE — 36415 COLL VENOUS BLD VENIPUNCTURE: CPT | Performed by: INTERNAL MEDICINE

## 2024-07-12 PROCEDURE — 83735 ASSAY OF MAGNESIUM: CPT | Performed by: INTERNAL MEDICINE

## 2024-07-12 PROCEDURE — 85025 COMPLETE CBC W/AUTO DIFF WBC: CPT | Performed by: INTERNAL MEDICINE

## 2024-07-14 RX ORDER — DIPHENHYDRAMINE HYDROCHLORIDE 50 MG/ML
50 INJECTION INTRAMUSCULAR; INTRAVENOUS ONCE AS NEEDED
Status: CANCELLED | OUTPATIENT
Start: 2024-07-15

## 2024-07-14 RX ORDER — EPINEPHRINE 0.3 MG/.3ML
0.3 INJECTION SUBCUTANEOUS ONCE AS NEEDED
Status: CANCELLED | OUTPATIENT
Start: 2024-07-15

## 2024-07-14 RX ORDER — SODIUM CHLORIDE 0.9 % (FLUSH) 0.9 %
10 SYRINGE (ML) INJECTION
Status: CANCELLED | OUTPATIENT
Start: 2024-07-15

## 2024-07-14 RX ORDER — HEPARIN 100 UNIT/ML
500 SYRINGE INTRAVENOUS
Status: CANCELLED | OUTPATIENT
Start: 2024-07-15

## 2024-07-15 ENCOUNTER — OFFICE VISIT (OUTPATIENT)
Facility: CLINIC | Age: 69
End: 2024-07-15
Payer: MEDICARE

## 2024-07-15 ENCOUNTER — INFUSION (OUTPATIENT)
Dept: INFUSION THERAPY | Facility: HOSPITAL | Age: 69
End: 2024-07-15
Attending: INTERNAL MEDICINE
Payer: MEDICARE

## 2024-07-15 VITALS
HEART RATE: 112 BPM | DIASTOLIC BLOOD PRESSURE: 67 MMHG | BODY MASS INDEX: 25.35 KG/M2 | OXYGEN SATURATION: 95 % | SYSTOLIC BLOOD PRESSURE: 120 MMHG | RESPIRATION RATE: 20 BRPM | TEMPERATURE: 98 F | WEIGHT: 143.13 LBS

## 2024-07-15 VITALS
WEIGHT: 143.13 LBS | SYSTOLIC BLOOD PRESSURE: 120 MMHG | BODY MASS INDEX: 25.36 KG/M2 | RESPIRATION RATE: 20 BRPM | HEART RATE: 112 BPM | OXYGEN SATURATION: 95 % | HEIGHT: 63 IN | TEMPERATURE: 98 F | DIASTOLIC BLOOD PRESSURE: 67 MMHG

## 2024-07-15 DIAGNOSIS — Z76.89 ESTABLISHING CARE WITH NEW DOCTOR, ENCOUNTER FOR: Primary | ICD-10-CM

## 2024-07-15 DIAGNOSIS — R53.83 FATIGUE: ICD-10-CM

## 2024-07-15 DIAGNOSIS — F32.A DEPRESSION, UNSPECIFIED DEPRESSION TYPE: ICD-10-CM

## 2024-07-15 DIAGNOSIS — C34.32 MALIGNANT NEOPLASM OF LOWER LOBE OF LEFT LUNG: Primary | ICD-10-CM

## 2024-07-15 DIAGNOSIS — C34.90 METASTATIC LUNG CANCER (METASTASIS FROM LUNG TO OTHER SITE), UNSPECIFIED LATERALITY: ICD-10-CM

## 2024-07-15 LAB — TSH SERPL DL<=0.005 MIU/L-ACNC: 0.63 UIU/ML (ref 0.34–5.6)

## 2024-07-15 PROCEDURE — 84443 ASSAY THYROID STIM HORMONE: CPT | Performed by: NURSE PRACTITIONER

## 2024-07-15 PROCEDURE — 99999 PR PBB SHADOW E&M-EST. PATIENT-LVL III: CPT | Mod: PBBFAC,,, | Performed by: NURSE PRACTITIONER

## 2024-07-15 PROCEDURE — 63600175 PHARM REV CODE 636 W HCPCS: Performed by: INTERNAL MEDICINE

## 2024-07-15 PROCEDURE — G2211 COMPLEX E/M VISIT ADD ON: HCPCS | Mod: S$PBB,,, | Performed by: NURSE PRACTITIONER

## 2024-07-15 PROCEDURE — A4216 STERILE WATER/SALINE, 10 ML: HCPCS | Performed by: INTERNAL MEDICINE

## 2024-07-15 PROCEDURE — 96413 CHEMO IV INFUSION 1 HR: CPT

## 2024-07-15 PROCEDURE — 25000003 PHARM REV CODE 250: Performed by: INTERNAL MEDICINE

## 2024-07-15 PROCEDURE — 99215 OFFICE O/P EST HI 40 MIN: CPT | Mod: S$PBB,,, | Performed by: NURSE PRACTITIONER

## 2024-07-15 PROCEDURE — 99213 OFFICE O/P EST LOW 20 MIN: CPT | Mod: PBBFAC,PN | Performed by: NURSE PRACTITIONER

## 2024-07-15 RX ORDER — HEPARIN 100 UNIT/ML
500 SYRINGE INTRAVENOUS
Status: DISCONTINUED | OUTPATIENT
Start: 2024-07-15 | End: 2024-07-15 | Stop reason: HOSPADM

## 2024-07-15 RX ORDER — DIPHENHYDRAMINE HYDROCHLORIDE 50 MG/ML
50 INJECTION INTRAMUSCULAR; INTRAVENOUS ONCE AS NEEDED
Status: DISCONTINUED | OUTPATIENT
Start: 2024-07-15 | End: 2024-07-15 | Stop reason: HOSPADM

## 2024-07-15 RX ORDER — EPINEPHRINE 0.3 MG/.3ML
0.3 INJECTION SUBCUTANEOUS ONCE AS NEEDED
Status: DISCONTINUED | OUTPATIENT
Start: 2024-07-15 | End: 2024-07-15 | Stop reason: HOSPADM

## 2024-07-15 RX ORDER — CITALOPRAM 40 MG/1
40 TABLET, FILM COATED ORAL DAILY
Qty: 30 TABLET | Refills: 11 | Status: SHIPPED | OUTPATIENT
Start: 2024-07-15 | End: 2025-07-15

## 2024-07-15 RX ORDER — SODIUM CHLORIDE 0.9 % (FLUSH) 0.9 %
10 SYRINGE (ML) INJECTION
Status: DISCONTINUED | OUTPATIENT
Start: 2024-07-15 | End: 2024-07-15 | Stop reason: HOSPADM

## 2024-07-15 RX ADMIN — ATEZOLIZUMAB 1200 MG: 1200 INJECTION, SOLUTION INTRAVENOUS at 01:07

## 2024-07-15 RX ADMIN — HEPARIN 500 UNITS: 100 SYRINGE at 01:07

## 2024-07-15 RX ADMIN — Medication 10 ML: at 01:07

## 2024-07-15 NOTE — PROGRESS NOTES
PROGRESS NOTE    Subjective:       Patient ID: Aysha Moore is a 68 y.o. female.    6/2/2023:  Screening CT chest:  2.6 x 2.5 x 3.2cm mass in the posterior LLL     6/20/2023:  PET scan:  35 x 21 mm lobulated pulmonary mass in the posterior left lower lobe with SUV max 11.6      FDG avid lymphadenopathy is present with index nodes outlined below:  -21 x 11 mm AP window node with SUV max 12.1 (image 103)  -21 x 12 mm posterior left hilar node with SUV max 13.7 (image 109)  -16 x 13 mm left hilar node with SUV max 14 (image 1:15)  -10 x 10 mm subcarinal node with SUV max 6.3 (image 111)     7/12/2023-Biopsy of LLL:  LEFT LOWER LOBE OF LUNG, CORE BIOPSIES:   - LUNG ADENOCARCINOMA WITH PLEOMORPHIC FEATURES.   PDL1/TPS: 95%  ALK/BRAF/EGFR/KRAS/RET/ROS1/MET NEGATIVE     7/21/2023:  MRI brain: no mets.    Carbo/Taxol/XRT:  8/8/2023-9/12/2023     Plan: Atezolizumab 1200mg every 3 weeks x 16    9/30/2023-CTA Chest:  1. Segmental right upper lobe pulmonary embolus.  2. Cavitating and spiculated posterior left lower lobe lung mass, decreased slightly in size compared to the prior chest CT exam.  3. Upper lobe predominant centrilobular and subpleural emphysema, with coarse mixed interstitial and alveolar infiltrate along the mediastinal border left upper lobe suggesting reactive or infectious pneumonitis, and with mild posterior bilateral upper lobe groundglass infiltrates.    10/4/2023  Admitted for 3 days with fever to 101, new Dx of PE and splenic infarcts. Echo normal. Started on Xarelto and abx.     9/3/2023-CT abd/p:  IMPRESSION: There are hypodensities within the spleen concerning for splenic infarcts. These can be associated with endocarditis. The patient also has known pulmonary emboli. Transesophageal echocardiogram may be warranted.    10/3/2023-  TTE normal    Atezolizumab  Cycle 1: 10/23/2023  Cycle 2: 11/13/2023  Cycle 3: 12/4/2023  Delay due to ?  Pulmonary issues.  Cleared by pulm  Cycle 4:  1/29/2024  Cycle 5: 2/19/2024  Cycle 6: 3/11/2024  Cycle 7: 4/1/2024  Cycle 8: 4/22/2024  Cycle 9: 5/13/2024  Cycle 10: 6/3/2024-due      11/10/2023-CT CAP  IMPRESSION:  1. Nodular density in the posterior left lower lobe, essentially unchanged from the previous exam when accounting for technique.     2. Scattered patchy airspace opacities throughout the left lung, increased in size/distribution from the previous exam, the differential includes posttreatment/radiation change, atelectasis/scarring, infection/pneumonia, noting malignancy is not excluded and interval follow-up would be warranted.     3. Enlarging left cervical/supraclavicular lymphadenopathy.     4. Small wedge-shaped hypodensities in the spleen, in keeping with small splenic infarcts, similar in distribution the previous exam.    12/11/2023-CTA Chest  No PE-see report, ? Pneumonia    1/17/2023-CT chest:  1. Left upper lobe paramediastinal mass is stable due to radiation exposure chest with evidence of an prominent. Mediastinal scarring.  2. Advanced emphysematous changes along with evidence of abnormality scarring the left are probably proteinaceous bullous cavitary fibrotic focus and fibrotic changes in the left lower lobe is stable.  3. Small spiculated nodule in the right lower lobe 9 x 8 mm stable. Recommend follow-up within 6 months.     4/9/2024-CT CAP w/con  Improvement--see report    Chief Complaint:  Lung Cancer  Stage IIII lung cancer, pulmonary embolism, splenic infarct follow up    History of Present Illness:   Aysha Moore is a 68 y.o. female who presents for follow up of lung cancer.     Ms. Moore continues to have increasing fatigue. She denies any CP, diarrhea constipation or nausea.    She does have increasing depression. He Celexa was previously at 40mg and was managing it well. Her PCP decreased her down to 20mg and she feels like the fatigue and depression are  worsening.    History of PE 9/2023  Xarelto given 9/2023-5/2024      Family and Social history reviewed and is unchanged from 7/27/2023      ROS:  Review of Systems   Constitutional:  Positive for fatigue. Negative for fever.   Respiratory:  Negative for shortness of breath.    Cardiovascular:  Negative for chest pain and leg swelling.   Gastrointestinal:  Negative for abdominal pain and blood in stool.   Genitourinary:  Negative for hematuria.   Skin:  Negative for rash.          Current Outpatient Medications:     albuterol (PROVENTIL/VENTOLIN HFA) 90 mcg/actuation inhaler, Inhale 2 puffs into the lungs every 6 (six) hours as needed. Rescue, Disp: 18 g, Rfl: 5    ALPRAZolam (XANAX) 0.25 MG tablet, Take 1 tablet (0.25 mg total) by mouth 3 (three) times daily as needed for Anxiety., Disp: 30 tablet, Rfl: 1    ammonium lactate 12 % Crea, aaa bid prn dry skin (Patient taking differently: Apply 1 g topically 2 (two) times daily as needed. aaa bid prn dry skin), Disp: 385 g, Rfl: 11    amoxicillin-clavulanate 875-125mg (AUGMENTIN) 875-125 mg per tablet, Take 1 tablet by mouth 2 (two) times daily., Disp: 20 tablet, Rfl: 0    atorvastatin (LIPITOR) 80 MG tablet, TAKE 1 TABLET BY MOUTH EVERY EVENING, Disp: 90 tablet, Rfl: 1    azelastine (ASTELIN) 137 mcg (0.1 %) nasal spray, USE 1 SPRAY IN EACH NOSTRIL 2 TIMES DAILY AS NEEDED FOR RHINITIS OR SINUSITIS, Disp: 90 mL, Rfl: 3    calcium-vitamin D3 (OS-JOLIE 500 + D3) 500 mg-5 mcg (200 unit) per tablet, Take 1 tablet by mouth every morning., Disp: , Rfl:     citalopram (CELEXA) 40 MG tablet, Take 1 tablet (40 mg total) by mouth once daily., Disp: 30 tablet, Rfl: 11    clobetasol 0.05% (TEMOVATE) 0.05 % Oint, Apply topically 2 (two) times daily. for 14 days (Patient taking differently: Apply 1 g topically 2 (two) times daily.), Disp: 45 g, Rfl: 3    ezetimibe (ZETIA) 10 mg tablet, TAKE 1 TABLET BY MOUTH EVERY DAY, Disp: 90 tablet, Rfl: 0    famotidine (PEPCID) 40 MG tablet, TAKE  1 TABLET BY MOUTH EVERY DAY IN THE EVENING, Disp: 90 tablet, Rfl: 1    fluticasone propionate (FLONASE) 50 mcg/actuation nasal spray, 1 spray (50 mcg total) by Each Nostril route daily as needed for Rhinitis or Allergies., Disp: 9.9 mL, Rfl: 2    HYDROcodone-acetaminophen (NORCO) 5-325 mg per tablet, Take 1 tablet by mouth every 4 to 6 hours as needed for Pain., Disp: 60 tablet, Rfl: 0    ketoconazole (NIZORAL) 2 % cream, Apply topically 2 (two) times daily., Disp: 60 g, Rfl: 1    ketoconazole (NIZORAL) 2 % shampoo, Apply topically twice a week., Disp: 120 mL, Rfl: 6    lisinopriL (PRINIVIL,ZESTRIL) 2.5 MG tablet, Take 2.5 mg by mouth., Disp: , Rfl:     metoprolol succinate (TOPROL-XL) 50 MG 24 hr tablet, Take 1 tablet (50 mg total) by mouth once daily., Disp: 90 tablet, Rfl: 3    nitroGLYCERIN (NITROSTAT) 0.4 MG SL tablet, Place 1 tablet (0.4 mg total) under the tongue every 5 (five) minutes as needed for Chest pain., Disp: 30 tablet, Rfl: 0    omeprazole (PRILOSEC) 40 MG capsule, Take 1 capsule (40 mg total) by mouth once daily., Disp: 30 capsule, Rfl: 11    ondansetron (ZOFRAN) 8 MG tablet, Take 1 tablet (8 mg total) by mouth every 8 (eight) hours as needed. (Patient taking differently: Take 8 mg by mouth every 8 (eight) hours as needed for Nausea.), Disp: 30 tablet, Rfl: 5    predniSONE (DELTASONE) 10 MG tablet, Take 3 a day x 3 days then 2 a day x 3 days then 1 a day x 3 days, Disp: 18 tablet, Rfl: 0    promethazine (PHENERGAN) 25 MG tablet, Take 1 tablet (25 mg total) by mouth every 4 to 6 hours as needed., Disp: 30 tablet, Rfl: 5    rivaroxaban (XARELTO) 15 mg Tab, Take 1 tablet (15 mg total) by mouth daily with dinner or evening meal., Disp: 42 tablet, Rfl: 0    tiZANidine (ZANAFLEX) 4 MG tablet, TAKE 1 TABLET BY MOUTH EVERY 6 HOURS AS NEEDED FOR BACK PAIN, Disp: 360 tablet, Rfl: 0    traMADoL (ULTRAM) 50 mg tablet, Take 1 tablet (50 mg total) by mouth every 6 (six) hours as needed for Pain., Disp: 30  tablet, Rfl: 2    triamcinolone acetonide 0.025% (KENALOG) 0.025 % cream, Apply topically 2 (two) times daily., Disp: 80 g, Rfl: 1    umeclidinium-vilanteroL (ANORO ELLIPTA) 62.5-25 mcg/actuation DsDv, Inhale 1 puff into the lungs once daily. Controller, Disp: 1 each, Rfl: 5    valACYclovir (VALTREX) 1000 MG tablet, TAKE 2 AT ONSET OF FEVER BLISTERS THEN REPEAT IN 12 HOURS (TOTAL 4 TABS PER EPISODE), Disp: 20 tablet, Rfl: 3    zinc 50 mg Tab, Take 1 tablet by mouth once daily., Disp: , Rfl:   No current facility-administered medications for this visit.        Objective:       Physical Examination:     /67 (BP Location: Right forearm, Patient Position: Sitting)   Pulse (!) 112   Temp 97.8 °F (36.6 °C)   Resp 20   Wt 64.9 kg (143 lb 1.6 oz)   SpO2 95%   BMI 25.35 kg/m²     Physical Exam  Constitutional:       Appearance: Normal appearance.   HENT:      Head: Normocephalic and atraumatic.      Right Ear: External ear normal.      Left Ear: External ear normal.      Nose: Nose normal.      Mouth/Throat:      Mouth: Mucous membranes are moist.   Eyes:      General: No scleral icterus.     Conjunctiva/sclera: Conjunctivae normal.   Cardiovascular:      Rate and Rhythm: Normal rate.   Pulmonary:      Effort: Pulmonary effort is normal.   Abdominal:      General: Abdomen is flat.   Skin:     General: Skin is warm and dry.   Neurological:      General: No focal deficit present.      Mental Status: She is alert and oriented to person, place, and time.   Psychiatric:         Mood and Affect: Mood normal.         Behavior: Behavior normal.         Thought Content: Thought content normal.         Judgment: Judgment normal.         Labs:   Recent Results (from the past 336 hour(s))   CBC W/ AUTO DIFFERENTIAL    Collection Time: 07/12/24  4:24 PM   Result Value Ref Range    WBC 4.45 3.90 - 12.70 K/uL    Hemoglobin 13.0 12.0 - 16.0 g/dL    Hematocrit 38.7 37.0 - 48.5 %    Platelets 249 150 - 450 K/uL              CMP  Sodium   Date Value Ref Range Status   07/12/2024 139 136 - 145 mmol/L Final     Potassium   Date Value Ref Range Status   07/12/2024 3.9 3.5 - 5.1 mmol/L Final     Chloride   Date Value Ref Range Status   07/12/2024 104 95 - 110 mmol/L Final     CO2   Date Value Ref Range Status   07/12/2024 26 23 - 29 mmol/L Final     Glucose   Date Value Ref Range Status   07/12/2024 99 70 - 110 mg/dL Final     BUN   Date Value Ref Range Status   07/12/2024 10 8 - 23 mg/dL Final     Creatinine   Date Value Ref Range Status   07/12/2024 0.7 0.5 - 1.4 mg/dL Final     Calcium   Date Value Ref Range Status   07/12/2024 9.3 8.7 - 10.5 mg/dL Final     Total Protein   Date Value Ref Range Status   07/12/2024 7.3 6.0 - 8.4 g/dL Final     Albumin   Date Value Ref Range Status   07/12/2024 4.4 3.5 - 5.2 g/dL Final     Total Bilirubin   Date Value Ref Range Status   07/12/2024 1.5 (H) 0.1 - 1.0 mg/dL Final     Comment:     For infants and newborns, interpretation of results should be based  on gestational age, weight and in agreement with clinical  observations.    Premature Infant recommended reference ranges:  Up to 24 hours.............<8.0 mg/dL  Up to 48 hours............<12.0 mg/dL  3-5 days..................<15.0 mg/dL  6-29 days.................<15.0 mg/dL       Alkaline Phosphatase   Date Value Ref Range Status   07/12/2024 85 55 - 135 U/L Final     AST   Date Value Ref Range Status   07/12/2024 16 10 - 40 U/L Final     ALT   Date Value Ref Range Status   07/12/2024 14 10 - 44 U/L Final     Anion Gap   Date Value Ref Range Status   07/12/2024 9 8 - 16 mmol/L Final     eGFR if    Date Value Ref Range Status   03/29/2022 >60.0 >60 mL/min/1.73 m^2 Final     eGFR if non    Date Value Ref Range Status   03/29/2022 >60.0 >60 mL/min/1.73 m^2 Final     Comment:     Calculation used to obtain the estimated glomerular filtration  rate (eGFR) is the CKD-EPI equation.        No results found  "for: "CEA"  No results found for: "PSA"        Assessment/Plan:     Problem List Items Addressed This Visit    None  Visit Diagnoses       Establishing care with new doctor, encounter for    -  Primary    Relevant Orders    Ambulatory referral/consult to Family Practice    Depression, unspecified depression type        Relevant Medications    citalopram (CELEXA) 40 MG tablet          Lung Cancer- continue Tecentriq  2. Est care with new provider- Dr. Daniels retiring  3. Depression- Increase Celexa to 40mg daily    Discussion:     Follow up in about 3 weeks (around 8/5/2024) for with Dr. Hankins and 6 weeks with me.      Electronically signed by Meka Chiang, MSN, APRN, AGNP-C, OCN          "

## 2024-07-22 PROBLEM — I26.99 OTHER PULMONARY EMBOLISM WITHOUT ACUTE COR PULMONALE: Status: RESOLVED | Noted: 2023-09-30 | Resolved: 2024-07-22

## 2024-08-02 RX ORDER — SODIUM CHLORIDE 0.9 % (FLUSH) 0.9 %
10 SYRINGE (ML) INJECTION
OUTPATIENT
Start: 2024-08-05

## 2024-08-02 RX ORDER — EPINEPHRINE 0.3 MG/.3ML
0.3 INJECTION SUBCUTANEOUS ONCE AS NEEDED
OUTPATIENT
Start: 2024-08-05

## 2024-08-02 RX ORDER — DIPHENHYDRAMINE HYDROCHLORIDE 50 MG/ML
50 INJECTION INTRAMUSCULAR; INTRAVENOUS ONCE AS NEEDED
OUTPATIENT
Start: 2024-08-05

## 2024-08-02 RX ORDER — HEPARIN 100 UNIT/ML
500 SYRINGE INTRAVENOUS
OUTPATIENT
Start: 2024-08-05

## 2024-08-05 ENCOUNTER — INFUSION (OUTPATIENT)
Dept: INFUSION THERAPY | Facility: HOSPITAL | Age: 69
End: 2024-08-05
Attending: INTERNAL MEDICINE
Payer: MEDICARE

## 2024-08-05 ENCOUNTER — LAB VISIT (OUTPATIENT)
Dept: LAB | Facility: HOSPITAL | Age: 69
End: 2024-08-05
Attending: INTERNAL MEDICINE
Payer: MEDICARE

## 2024-08-05 ENCOUNTER — TELEPHONE (OUTPATIENT)
Dept: INFUSION THERAPY | Facility: HOSPITAL | Age: 69
End: 2024-08-05

## 2024-08-05 VITALS
RESPIRATION RATE: 16 BRPM | WEIGHT: 142.5 LBS | HEART RATE: 66 BPM | TEMPERATURE: 98 F | BODY MASS INDEX: 25.25 KG/M2 | HEIGHT: 63 IN | SYSTOLIC BLOOD PRESSURE: 103 MMHG | DIASTOLIC BLOOD PRESSURE: 64 MMHG | OXYGEN SATURATION: 98 %

## 2024-08-05 DIAGNOSIS — C34.90 METASTATIC LUNG CANCER (METASTASIS FROM LUNG TO OTHER SITE), UNSPECIFIED LATERALITY: ICD-10-CM

## 2024-08-05 DIAGNOSIS — C34.32 MALIGNANT NEOPLASM OF LOWER LOBE OF LEFT LUNG: Primary | ICD-10-CM

## 2024-08-05 LAB
ALBUMIN SERPL BCP-MCNC: 4.2 G/DL (ref 3.5–5.2)
ALP SERPL-CCNC: 81 U/L (ref 55–135)
ALT SERPL W/O P-5'-P-CCNC: 13 U/L (ref 10–44)
ANION GAP SERPL CALC-SCNC: 9 MMOL/L (ref 8–16)
AST SERPL-CCNC: 15 U/L (ref 10–40)
BASOPHILS # BLD AUTO: 0.02 K/UL (ref 0–0.2)
BASOPHILS NFR BLD: 0.3 % (ref 0–1.9)
BILIRUB SERPL-MCNC: 1.5 MG/DL (ref 0.1–1)
BUN SERPL-MCNC: 12 MG/DL (ref 8–23)
CALCIUM SERPL-MCNC: 9 MG/DL (ref 8.7–10.5)
CHLORIDE SERPL-SCNC: 107 MMOL/L (ref 95–110)
CO2 SERPL-SCNC: 25 MMOL/L (ref 23–29)
CREAT SERPL-MCNC: 0.9 MG/DL (ref 0.5–1.4)
DIFFERENTIAL METHOD BLD: NORMAL
EOSINOPHIL # BLD AUTO: 0 K/UL (ref 0–0.5)
EOSINOPHIL NFR BLD: 0.7 % (ref 0–8)
ERYTHROCYTE [DISTWIDTH] IN BLOOD BY AUTOMATED COUNT: 12.1 % (ref 11.5–14.5)
EST. GFR  (NO RACE VARIABLE): >60 ML/MIN/1.73 M^2
GLUCOSE SERPL-MCNC: 127 MG/DL (ref 70–110)
HCT VFR BLD AUTO: 40 % (ref 37–48.5)
HGB BLD-MCNC: 13.2 G/DL (ref 12–16)
IMM GRANULOCYTES # BLD AUTO: 0.01 K/UL (ref 0–0.04)
IMM GRANULOCYTES NFR BLD AUTO: 0.2 % (ref 0–0.5)
LYMPHOCYTES # BLD AUTO: 1.6 K/UL (ref 1–4.8)
LYMPHOCYTES NFR BLD: 26.9 % (ref 18–48)
MAGNESIUM SERPL-MCNC: 1.7 MG/DL (ref 1.6–2.6)
MCH RBC QN AUTO: 30.9 PG (ref 27–31)
MCHC RBC AUTO-ENTMCNC: 33 G/DL (ref 32–36)
MCV RBC AUTO: 94 FL (ref 82–98)
MONOCYTES # BLD AUTO: 0.8 K/UL (ref 0.3–1)
MONOCYTES NFR BLD: 14.1 % (ref 4–15)
NEUTROPHILS # BLD AUTO: 3.3 K/UL (ref 1.8–7.7)
NEUTROPHILS NFR BLD: 57.8 % (ref 38–73)
NRBC BLD-RTO: 0 /100 WBC
PLATELET # BLD AUTO: 247 K/UL (ref 150–450)
PMV BLD AUTO: 9.4 FL (ref 9.2–12.9)
POTASSIUM SERPL-SCNC: 4.3 MMOL/L (ref 3.5–5.1)
PROT SERPL-MCNC: 6.7 G/DL (ref 6–8.4)
RBC # BLD AUTO: 4.27 M/UL (ref 4–5.4)
SODIUM SERPL-SCNC: 141 MMOL/L (ref 136–145)
WBC # BLD AUTO: 5.76 K/UL (ref 3.9–12.7)

## 2024-08-05 PROCEDURE — 63600175 PHARM REV CODE 636 W HCPCS: Mod: JZ,JG | Performed by: INTERNAL MEDICINE

## 2024-08-05 PROCEDURE — 25000003 PHARM REV CODE 250: Performed by: INTERNAL MEDICINE

## 2024-08-05 PROCEDURE — 36415 COLL VENOUS BLD VENIPUNCTURE: CPT | Performed by: INTERNAL MEDICINE

## 2024-08-05 PROCEDURE — 96413 CHEMO IV INFUSION 1 HR: CPT

## 2024-08-05 PROCEDURE — 83735 ASSAY OF MAGNESIUM: CPT | Performed by: INTERNAL MEDICINE

## 2024-08-05 PROCEDURE — 85025 COMPLETE CBC W/AUTO DIFF WBC: CPT | Performed by: INTERNAL MEDICINE

## 2024-08-05 PROCEDURE — 80053 COMPREHEN METABOLIC PANEL: CPT | Performed by: INTERNAL MEDICINE

## 2024-08-05 PROCEDURE — A4216 STERILE WATER/SALINE, 10 ML: HCPCS | Performed by: INTERNAL MEDICINE

## 2024-08-05 RX ORDER — SODIUM CHLORIDE 0.9 % (FLUSH) 0.9 %
10 SYRINGE (ML) INJECTION
Status: DISCONTINUED | OUTPATIENT
Start: 2024-08-05 | End: 2024-08-05 | Stop reason: HOSPADM

## 2024-08-05 RX ORDER — HEPARIN 100 UNIT/ML
500 SYRINGE INTRAVENOUS
Status: DISCONTINUED | OUTPATIENT
Start: 2024-08-05 | End: 2024-08-05 | Stop reason: HOSPADM

## 2024-08-05 RX ORDER — DIPHENHYDRAMINE HYDROCHLORIDE 50 MG/ML
50 INJECTION INTRAMUSCULAR; INTRAVENOUS ONCE AS NEEDED
Status: DISCONTINUED | OUTPATIENT
Start: 2024-08-05 | End: 2024-08-05 | Stop reason: HOSPADM

## 2024-08-05 RX ORDER — EPINEPHRINE 0.3 MG/.3ML
0.3 INJECTION SUBCUTANEOUS ONCE AS NEEDED
Status: DISCONTINUED | OUTPATIENT
Start: 2024-08-05 | End: 2024-08-05 | Stop reason: HOSPADM

## 2024-08-05 RX ADMIN — ATEZOLIZUMAB 1200 MG: 1200 INJECTION, SOLUTION INTRAVENOUS at 11:08

## 2024-08-05 RX ADMIN — HEPARIN 500 UNITS: 100 SYRINGE at 12:08

## 2024-08-05 RX ADMIN — SODIUM CHLORIDE, PRESERVATIVE FREE 10 ML: 5 INJECTION INTRAVENOUS at 12:08

## 2024-08-16 ENCOUNTER — PATIENT MESSAGE (OUTPATIENT)
Dept: FAMILY MEDICINE | Facility: CLINIC | Age: 69
End: 2024-08-16
Payer: MEDICARE

## 2024-08-22 NOTE — PROGRESS NOTES
PROGRESS NOTE    Subjective:       Patient ID: Aysha Moore is a 69 y.o. female.    6/2/2023:  Screening CT chest:  2.6 x 2.5 x 3.2cm mass in the posterior LLL     6/20/2023:  PET scan:  35 x 21 mm lobulated pulmonary mass in the posterior left lower lobe with SUV max 11.6      FDG avid lymphadenopathy is present with index nodes outlined below:  -21 x 11 mm AP window node with SUV max 12.1 (image 103)  -21 x 12 mm posterior left hilar node with SUV max 13.7 (image 109)  -16 x 13 mm left hilar node with SUV max 14 (image 1:15)  -10 x 10 mm subcarinal node with SUV max 6.3 (image 111)     7/12/2023-Biopsy of LLL:  LEFT LOWER LOBE OF LUNG, CORE BIOPSIES:   - LUNG ADENOCARCINOMA WITH PLEOMORPHIC FEATURES.   PDL1/TPS: 95%  ALK/BRAF/EGFR/KRAS/RET/ROS1/MET NEGATIVE     7/21/2023:  MRI brain: no mets.    Carbo/Taxol/XRT:  8/8/2023-9/12/2023     Plan: Atezolizumab 1200mg every 3 weeks x 16    9/30/2023-CTA Chest:  1. Segmental right upper lobe pulmonary embolus.  2. Cavitating and spiculated posterior left lower lobe lung mass, decreased slightly in size compared to the prior chest CT exam.  3. Upper lobe predominant centrilobular and subpleural emphysema, with coarse mixed interstitial and alveolar infiltrate along the mediastinal border left upper lobe suggesting reactive or infectious pneumonitis, and with mild posterior bilateral upper lobe groundglass infiltrates.    10/4/2023  Admitted for 3 days with fever to 101, new Dx of PE and splenic infarcts. Echo normal. Started on Xarelto and abx.     9/3/2023-CT abd/p:  IMPRESSION: There are hypodensities within the spleen concerning for splenic infarcts. These can be associated with endocarditis. The patient also has known pulmonary emboli. Transesophageal echocardiogram may be warranted.    10/3/2023-  TTE normal    Atezolizumab  Cycle 1: 10/23/2023  Cycle 2: 11/13/2023  Cycle 3: 12/4/2023  Delay due to ?  Pulmonary issues.  Cleared by pulm  Cycle 4:  1/29/2024  Cycle 5: 2/19/2024  Cycle 6: 3/11/2024  Cycle 7: 4/1/2024  Cycle 8: 4/22/2024  Cycle 9: 5/13/2024  Cycle 10: 6/3/2024-due      11/10/2023-CT CAP  IMPRESSION:  1. Nodular density in the posterior left lower lobe, essentially unchanged from the previous exam when accounting for technique.     2. Scattered patchy airspace opacities throughout the left lung, increased in size/distribution from the previous exam, the differential includes posttreatment/radiation change, atelectasis/scarring, infection/pneumonia, noting malignancy is not excluded and interval follow-up would be warranted.     3. Enlarging left cervical/supraclavicular lymphadenopathy.     4. Small wedge-shaped hypodensities in the spleen, in keeping with small splenic infarcts, similar in distribution the previous exam.    12/11/2023-CTA Chest  No PE-see report, ? Pneumonia    1/17/2023-CT chest:  1. Left upper lobe paramediastinal mass is stable due to radiation exposure chest with evidence of an prominent. Mediastinal scarring.  2. Advanced emphysematous changes along with evidence of abnormality scarring the left are probably proteinaceous bullous cavitary fibrotic focus and fibrotic changes in the left lower lobe is stable.  3. Small spiculated nodule in the right lower lobe 9 x 8 mm stable. Recommend follow-up within 6 months.     4/9/2024-CT CAP w/con  Improvement--see report    Chief Complaint:  Stage IIII lung cancer, pulmonary embolism, splenic infarct follow up    History of Present Illness:   Aysha Moore is a 69 y.o. female who presents for follow up of lung cancer.     Ms. Moore continues to have increasing fatigue. She denies any CP, diarrhea constipation or nausea.    She does have increasing depression. She has been taking the Celexa 40mg and feels like this is helping. She does have some days with severe fatigue and she is staying in bed. Offered referral to psy but patient  declined      History of PE 9/2023  Xarelto given 9/2023-5/2024      Family and Social history reviewed and is unchanged from 7/27/2023      ROS:  Review of Systems   Constitutional:  Positive for fatigue. Negative for fever.   Respiratory:  Negative for shortness of breath.    Cardiovascular:  Negative for chest pain and leg swelling.   Gastrointestinal:  Negative for abdominal pain and blood in stool.   Genitourinary:  Negative for hematuria.   Skin:  Negative for rash.          Current Outpatient Medications:     albuterol (PROVENTIL/VENTOLIN HFA) 90 mcg/actuation inhaler, Inhale 2 puffs into the lungs every 6 (six) hours as needed. Rescue, Disp: 18 g, Rfl: 5    ALPRAZolam (XANAX) 0.25 MG tablet, Take 1 tablet (0.25 mg total) by mouth 3 (three) times daily as needed for Anxiety., Disp: 30 tablet, Rfl: 1    ammonium lactate 12 % Crea, aaa bid prn dry skin (Patient taking differently: Apply 1 g topically 2 (two) times daily as needed. aaa bid prn dry skin), Disp: 385 g, Rfl: 11    amoxicillin-clavulanate 875-125mg (AUGMENTIN) 875-125 mg per tablet, Take 1 tablet by mouth 2 (two) times daily., Disp: 20 tablet, Rfl: 0    atorvastatin (LIPITOR) 80 MG tablet, TAKE 1 TABLET BY MOUTH EVERY EVENING, Disp: 90 tablet, Rfl: 1    azelastine (ASTELIN) 137 mcg (0.1 %) nasal spray, USE 1 SPRAY IN EACH NOSTRIL 2 TIMES DAILY AS NEEDED FOR RHINITIS OR SINUSITIS, Disp: 90 mL, Rfl: 3    calcium-vitamin D3 (OS-JOLIE 500 + D3) 500 mg-5 mcg (200 unit) per tablet, Take 1 tablet by mouth every morning., Disp: , Rfl:     citalopram (CELEXA) 40 MG tablet, Take 1 tablet (40 mg total) by mouth once daily., Disp: 30 tablet, Rfl: 11    clobetasol 0.05% (TEMOVATE) 0.05 % Oint, Apply topically 2 (two) times daily. for 14 days (Patient taking differently: Apply 1 g topically 2 (two) times daily.), Disp: 45 g, Rfl: 3    ezetimibe (ZETIA) 10 mg tablet, TAKE 1 TABLET BY MOUTH EVERY DAY, Disp: 90 tablet, Rfl: 0    famotidine (PEPCID) 40 MG tablet, TAKE  1 TABLET BY MOUTH EVERY DAY IN THE EVENING, Disp: 90 tablet, Rfl: 1    fluticasone propionate (FLONASE) 50 mcg/actuation nasal spray, 1 spray (50 mcg total) by Each Nostril route daily as needed for Rhinitis or Allergies., Disp: 9.9 mL, Rfl: 2    HYDROcodone-acetaminophen (NORCO) 5-325 mg per tablet, Take 1 tablet by mouth every 4 to 6 hours as needed for Pain., Disp: 60 tablet, Rfl: 0    ketoconazole (NIZORAL) 2 % cream, Apply topically 2 (two) times daily., Disp: 60 g, Rfl: 1    ketoconazole (NIZORAL) 2 % shampoo, Apply topically twice a week., Disp: 120 mL, Rfl: 6    metoprolol succinate (TOPROL-XL) 50 MG 24 hr tablet, Take 1 tablet (50 mg total) by mouth once daily., Disp: 90 tablet, Rfl: 3    nitroGLYCERIN (NITROSTAT) 0.4 MG SL tablet, Place 1 tablet (0.4 mg total) under the tongue every 5 (five) minutes as needed for Chest pain., Disp: 30 tablet, Rfl: 0    omeprazole (PRILOSEC) 40 MG capsule, Take 1 capsule (40 mg total) by mouth once daily., Disp: 30 capsule, Rfl: 11    predniSONE (DELTASONE) 10 MG tablet, Take 3 a day x 3 days then 2 a day x 3 days then 1 a day x 3 days, Disp: 18 tablet, Rfl: 0    promethazine (PHENERGAN) 25 MG tablet, Take 1 tablet (25 mg total) by mouth every 4 to 6 hours as needed., Disp: 30 tablet, Rfl: 5    rivaroxaban (XARELTO) 15 mg Tab, Take 1 tablet (15 mg total) by mouth daily with dinner or evening meal., Disp: 42 tablet, Rfl: 0    tiZANidine (ZANAFLEX) 4 MG tablet, TAKE 1 TABLET BY MOUTH EVERY 6 HOURS AS NEEDED FOR BACK PAIN, Disp: 360 tablet, Rfl: 0    traMADoL (ULTRAM) 50 mg tablet, Take 1 tablet (50 mg total) by mouth every 6 (six) hours as needed for Pain., Disp: 30 tablet, Rfl: 2    triamcinolone acetonide 0.025% (KENALOG) 0.025 % cream, Apply topically 2 (two) times daily., Disp: 80 g, Rfl: 1    umeclidinium-vilanteroL (ANORO ELLIPTA) 62.5-25 mcg/actuation DsDv, Inhale 1 puff into the lungs once daily. Controller, Disp: 1 each, Rfl: 5    valACYclovir (VALTREX) 1000 MG  tablet, TAKE 2 AT ONSET OF FEVER BLISTERS THEN REPEAT IN 12 HOURS (TOTAL 4 TABS PER EPISODE), Disp: 20 tablet, Rfl: 3    zinc 50 mg Tab, Take 1 tablet by mouth once daily., Disp: , Rfl:   No current facility-administered medications for this visit.        Objective:       Physical Examination:     BP (!) 103/50   Pulse 68   Temp 97.8 °F (36.6 °C)   Resp 18   Wt 63.5 kg (140 lb)   BMI 24.80 kg/m²     Physical Exam  Constitutional:       Appearance: Normal appearance.   HENT:      Head: Normocephalic and atraumatic.      Right Ear: External ear normal.      Left Ear: External ear normal.      Nose: Nose normal.      Mouth/Throat:      Mouth: Mucous membranes are moist.   Eyes:      General: No scleral icterus.     Conjunctiva/sclera: Conjunctivae normal.   Cardiovascular:      Rate and Rhythm: Normal rate.   Pulmonary:      Effort: Pulmonary effort is normal.   Abdominal:      General: Abdomen is flat.   Skin:     General: Skin is warm and dry.   Neurological:      General: No focal deficit present.      Mental Status: She is alert and oriented to person, place, and time.   Psychiatric:         Mood and Affect: Mood normal.         Behavior: Behavior normal.         Thought Content: Thought content normal.         Judgment: Judgment normal.         Labs:   Recent Results (from the past 336 hour(s))   CBC W/ AUTO DIFFERENTIAL    Collection Time: 08/25/24  5:05 PM   Result Value Ref Range    WBC 5.08 3.90 - 12.70 K/uL    Hemoglobin 14.2 12.0 - 16.0 g/dL    Hematocrit 42.6 37.0 - 48.5 %    Platelets 249 150 - 450 K/uL               CMP  Sodium   Date Value Ref Range Status   08/25/2024 138 136 - 145 mmol/L Final     Potassium   Date Value Ref Range Status   08/25/2024 3.7 3.5 - 5.1 mmol/L Final     Chloride   Date Value Ref Range Status   08/25/2024 104 95 - 110 mmol/L Final     CO2   Date Value Ref Range Status   08/25/2024 25 23 - 29 mmol/L Final     Glucose   Date Value Ref Range Status   08/25/2024 136 (H) 70 -  "110 mg/dL Final     BUN   Date Value Ref Range Status   08/25/2024 14 8 - 23 mg/dL Final     Creatinine   Date Value Ref Range Status   08/25/2024 0.8 0.5 - 1.4 mg/dL Final     Calcium   Date Value Ref Range Status   08/25/2024 9.7 8.7 - 10.5 mg/dL Final     Total Protein   Date Value Ref Range Status   08/25/2024 7.4 6.0 - 8.4 g/dL Final     Albumin   Date Value Ref Range Status   08/25/2024 4.4 3.5 - 5.2 g/dL Final     Total Bilirubin   Date Value Ref Range Status   08/25/2024 1.6 (H) 0.1 - 1.0 mg/dL Final     Comment:     For infants and newborns, interpretation of results should be based  on gestational age, weight and in agreement with clinical  observations.    Premature Infant recommended reference ranges:  Up to 24 hours.............<8.0 mg/dL  Up to 48 hours............<12.0 mg/dL  3-5 days..................<15.0 mg/dL  6-29 days.................<15.0 mg/dL       Alkaline Phosphatase   Date Value Ref Range Status   08/25/2024 85 55 - 135 U/L Final     AST   Date Value Ref Range Status   08/25/2024 14 10 - 40 U/L Final     ALT   Date Value Ref Range Status   08/25/2024 12 10 - 44 U/L Final     Anion Gap   Date Value Ref Range Status   08/25/2024 9 8 - 16 mmol/L Final     eGFR if    Date Value Ref Range Status   03/29/2022 >60.0 >60 mL/min/1.73 m^2 Final     eGFR if non    Date Value Ref Range Status   03/29/2022 >60.0 >60 mL/min/1.73 m^2 Final     Comment:     Calculation used to obtain the estimated glomerular filtration  rate (eGFR) is the CKD-EPI equation.        No results found for: "CEA"    Assessment/Plan:     Problem List Items Addressed This Visit          Psychiatric    Anxiety    Depression       Oncology    LUNG CANCER-ADENOCARCINOMA OF THE LLL    Relevant Orders    NM PET CT FDG Skull Base to Mid Thigh     Other Visit Diagnoses       Establishing care with new doctor, encounter for    -  Primary    Relevant Orders    Ambulatory referral/consult to Family Practice "    Hypotension, unspecified hypotension type        Breast cancer screening by mammogram        Relevant Orders    Mammo Digital Screening Bilat w/ [facility]            Lung Cancer- continue Tecentriq; PET due in Oct 2024  2. Est care with new provider- patient did not hear will place new referral  3. Depression- Continue Celexa to 40mg daily  4. Hypotension- call for follow up with cardiology to discuss meds.  5. Breast Cancer screen- Patient due for mammogram.      Discussion:     Follow up in about 3 weeks (around 9/16/2024) for with me and 6 weeks with Dr. Albert.      Electronically signed by Meka Chiang, MSN, APRN, AGNP-C, OCN

## 2024-08-23 ENCOUNTER — TELEPHONE (OUTPATIENT)
Dept: INFUSION THERAPY | Facility: HOSPITAL | Age: 69
End: 2024-08-23

## 2024-08-23 NOTE — TELEPHONE ENCOUNTER
Called and left message on voicemail regarding the need for new blood work prior to Tecentriq infusion on 8/26/24. Pt instructed to get this done prior to infusion. Call back number also left for any questions.

## 2024-08-25 ENCOUNTER — LAB VISIT (OUTPATIENT)
Dept: LAB | Facility: HOSPITAL | Age: 69
End: 2024-08-25
Attending: INTERNAL MEDICINE
Payer: MEDICARE

## 2024-08-25 DIAGNOSIS — C34.90 METASTATIC LUNG CANCER (METASTASIS FROM LUNG TO OTHER SITE), UNSPECIFIED LATERALITY: ICD-10-CM

## 2024-08-25 LAB
ALBUMIN SERPL BCP-MCNC: 4.4 G/DL (ref 3.5–5.2)
ALP SERPL-CCNC: 85 U/L (ref 55–135)
ALT SERPL W/O P-5'-P-CCNC: 12 U/L (ref 10–44)
ANION GAP SERPL CALC-SCNC: 9 MMOL/L (ref 8–16)
AST SERPL-CCNC: 14 U/L (ref 10–40)
BASOPHILS # BLD AUTO: 0.02 K/UL (ref 0–0.2)
BASOPHILS NFR BLD: 0.4 % (ref 0–1.9)
BILIRUB SERPL-MCNC: 1.6 MG/DL (ref 0.1–1)
BUN SERPL-MCNC: 14 MG/DL (ref 8–23)
CALCIUM SERPL-MCNC: 9.7 MG/DL (ref 8.7–10.5)
CHLORIDE SERPL-SCNC: 104 MMOL/L (ref 95–110)
CO2 SERPL-SCNC: 25 MMOL/L (ref 23–29)
CREAT SERPL-MCNC: 0.8 MG/DL (ref 0.5–1.4)
DIFFERENTIAL METHOD BLD: NORMAL
EOSINOPHIL # BLD AUTO: 0.1 K/UL (ref 0–0.5)
EOSINOPHIL NFR BLD: 2 % (ref 0–8)
ERYTHROCYTE [DISTWIDTH] IN BLOOD BY AUTOMATED COUNT: 11.9 % (ref 11.5–14.5)
EST. GFR  (NO RACE VARIABLE): >60 ML/MIN/1.73 M^2
GLUCOSE SERPL-MCNC: 136 MG/DL (ref 70–110)
HCT VFR BLD AUTO: 42.6 % (ref 37–48.5)
HGB BLD-MCNC: 14.2 G/DL (ref 12–16)
IMM GRANULOCYTES # BLD AUTO: 0.01 K/UL (ref 0–0.04)
IMM GRANULOCYTES NFR BLD AUTO: 0.2 % (ref 0–0.5)
LYMPHOCYTES # BLD AUTO: 1.8 K/UL (ref 1–4.8)
LYMPHOCYTES NFR BLD: 36.2 % (ref 18–48)
MAGNESIUM SERPL-MCNC: 1.8 MG/DL (ref 1.6–2.6)
MCH RBC QN AUTO: 30.7 PG (ref 27–31)
MCHC RBC AUTO-ENTMCNC: 33.3 G/DL (ref 32–36)
MCV RBC AUTO: 92 FL (ref 82–98)
MONOCYTES # BLD AUTO: 0.6 K/UL (ref 0.3–1)
MONOCYTES NFR BLD: 12.4 % (ref 4–15)
NEUTROPHILS # BLD AUTO: 2.5 K/UL (ref 1.8–7.7)
NEUTROPHILS NFR BLD: 48.8 % (ref 38–73)
NRBC BLD-RTO: 0 /100 WBC
PLATELET # BLD AUTO: 249 K/UL (ref 150–450)
PMV BLD AUTO: 9.2 FL (ref 9.2–12.9)
POTASSIUM SERPL-SCNC: 3.7 MMOL/L (ref 3.5–5.1)
PROT SERPL-MCNC: 7.4 G/DL (ref 6–8.4)
RBC # BLD AUTO: 4.63 M/UL (ref 4–5.4)
SODIUM SERPL-SCNC: 138 MMOL/L (ref 136–145)
WBC # BLD AUTO: 5.08 K/UL (ref 3.9–12.7)

## 2024-08-25 PROCEDURE — 83735 ASSAY OF MAGNESIUM: CPT | Performed by: INTERNAL MEDICINE

## 2024-08-25 PROCEDURE — 36415 COLL VENOUS BLD VENIPUNCTURE: CPT | Performed by: INTERNAL MEDICINE

## 2024-08-25 PROCEDURE — 80053 COMPREHEN METABOLIC PANEL: CPT | Performed by: INTERNAL MEDICINE

## 2024-08-25 PROCEDURE — 85025 COMPLETE CBC W/AUTO DIFF WBC: CPT | Performed by: INTERNAL MEDICINE

## 2024-08-26 ENCOUNTER — OFFICE VISIT (OUTPATIENT)
Facility: CLINIC | Age: 69
End: 2024-08-26
Payer: MEDICARE

## 2024-08-26 ENCOUNTER — INFUSION (OUTPATIENT)
Dept: INFUSION THERAPY | Facility: HOSPITAL | Age: 69
End: 2024-08-26
Attending: INTERNAL MEDICINE
Payer: MEDICARE

## 2024-08-26 VITALS
RESPIRATION RATE: 16 BRPM | OXYGEN SATURATION: 96 % | HEIGHT: 63 IN | TEMPERATURE: 97 F | DIASTOLIC BLOOD PRESSURE: 52 MMHG | WEIGHT: 140 LBS | SYSTOLIC BLOOD PRESSURE: 93 MMHG | HEART RATE: 59 BPM | BODY MASS INDEX: 24.8 KG/M2

## 2024-08-26 VITALS
SYSTOLIC BLOOD PRESSURE: 103 MMHG | RESPIRATION RATE: 18 BRPM | TEMPERATURE: 98 F | DIASTOLIC BLOOD PRESSURE: 50 MMHG | HEART RATE: 68 BPM | WEIGHT: 140 LBS | BODY MASS INDEX: 24.8 KG/M2

## 2024-08-26 DIAGNOSIS — Z12.31 BREAST CANCER SCREENING BY MAMMOGRAM: ICD-10-CM

## 2024-08-26 DIAGNOSIS — Z76.89 ESTABLISHING CARE WITH NEW DOCTOR, ENCOUNTER FOR: Primary | ICD-10-CM

## 2024-08-26 DIAGNOSIS — C34.32 MALIGNANT NEOPLASM OF LOWER LOBE OF LEFT LUNG: Primary | ICD-10-CM

## 2024-08-26 DIAGNOSIS — C34.32 MALIGNANT NEOPLASM OF LOWER LOBE OF LEFT LUNG: ICD-10-CM

## 2024-08-26 DIAGNOSIS — F32.A DEPRESSION, UNSPECIFIED DEPRESSION TYPE: ICD-10-CM

## 2024-08-26 DIAGNOSIS — F41.9 ANXIETY: ICD-10-CM

## 2024-08-26 DIAGNOSIS — R53.83 FATIGUE: ICD-10-CM

## 2024-08-26 DIAGNOSIS — C34.90 METASTATIC LUNG CANCER (METASTASIS FROM LUNG TO OTHER SITE), UNSPECIFIED LATERALITY: ICD-10-CM

## 2024-08-26 DIAGNOSIS — I95.9 HYPOTENSION, UNSPECIFIED HYPOTENSION TYPE: ICD-10-CM

## 2024-08-26 LAB — TSH SERPL DL<=0.005 MIU/L-ACNC: 0.64 UIU/ML (ref 0.34–5.6)

## 2024-08-26 PROCEDURE — 96413 CHEMO IV INFUSION 1 HR: CPT

## 2024-08-26 PROCEDURE — G2211 COMPLEX E/M VISIT ADD ON: HCPCS | Mod: S$PBB,,, | Performed by: NURSE PRACTITIONER

## 2024-08-26 PROCEDURE — 99999 PR PBB SHADOW E&M-EST. PATIENT-LVL V: CPT | Mod: PBBFAC,,, | Performed by: NURSE PRACTITIONER

## 2024-08-26 PROCEDURE — 99215 OFFICE O/P EST HI 40 MIN: CPT | Mod: PBBFAC,PN | Performed by: NURSE PRACTITIONER

## 2024-08-26 PROCEDURE — 63600175 PHARM REV CODE 636 W HCPCS: Mod: JZ,JG | Performed by: INTERNAL MEDICINE

## 2024-08-26 PROCEDURE — 25000003 PHARM REV CODE 250: Performed by: INTERNAL MEDICINE

## 2024-08-26 PROCEDURE — A4216 STERILE WATER/SALINE, 10 ML: HCPCS | Performed by: INTERNAL MEDICINE

## 2024-08-26 PROCEDURE — 99215 OFFICE O/P EST HI 40 MIN: CPT | Mod: S$PBB,,, | Performed by: NURSE PRACTITIONER

## 2024-08-26 PROCEDURE — 84443 ASSAY THYROID STIM HORMONE: CPT | Performed by: NURSE PRACTITIONER

## 2024-08-26 RX ORDER — SODIUM CHLORIDE 0.9 % (FLUSH) 0.9 %
10 SYRINGE (ML) INJECTION
Status: DISCONTINUED | OUTPATIENT
Start: 2024-08-26 | End: 2024-08-26 | Stop reason: HOSPADM

## 2024-08-26 RX ORDER — HEPARIN 100 UNIT/ML
500 SYRINGE INTRAVENOUS
Status: DISCONTINUED | OUTPATIENT
Start: 2024-08-26 | End: 2024-08-26 | Stop reason: HOSPADM

## 2024-08-26 RX ORDER — DIPHENHYDRAMINE HYDROCHLORIDE 50 MG/ML
50 INJECTION INTRAMUSCULAR; INTRAVENOUS ONCE AS NEEDED
Status: DISCONTINUED | OUTPATIENT
Start: 2024-08-26 | End: 2024-08-26 | Stop reason: HOSPADM

## 2024-08-26 RX ORDER — EPINEPHRINE 0.3 MG/.3ML
0.3 INJECTION SUBCUTANEOUS ONCE AS NEEDED
Status: CANCELLED | OUTPATIENT
Start: 2024-08-26

## 2024-08-26 RX ORDER — HEPARIN 100 UNIT/ML
500 SYRINGE INTRAVENOUS
Status: CANCELLED | OUTPATIENT
Start: 2024-08-26

## 2024-08-26 RX ORDER — SODIUM CHLORIDE 0.9 % (FLUSH) 0.9 %
10 SYRINGE (ML) INJECTION
Status: CANCELLED | OUTPATIENT
Start: 2024-08-26

## 2024-08-26 RX ORDER — DIPHENHYDRAMINE HYDROCHLORIDE 50 MG/ML
50 INJECTION INTRAMUSCULAR; INTRAVENOUS ONCE AS NEEDED
Status: CANCELLED | OUTPATIENT
Start: 2024-08-26

## 2024-08-26 RX ORDER — EPINEPHRINE 0.3 MG/.3ML
0.3 INJECTION SUBCUTANEOUS ONCE AS NEEDED
Status: DISCONTINUED | OUTPATIENT
Start: 2024-08-26 | End: 2024-08-26 | Stop reason: HOSPADM

## 2024-08-26 RX ADMIN — ATEZOLIZUMAB 1200 MG: 1200 INJECTION, SOLUTION INTRAVENOUS at 10:08

## 2024-08-26 RX ADMIN — HEPARIN 500 UNITS: 100 SYRINGE at 11:08

## 2024-08-26 RX ADMIN — SODIUM CHLORIDE, PRESERVATIVE FREE 10 ML: 5 INJECTION INTRAVENOUS at 11:08

## 2024-08-26 NOTE — PLAN OF CARE
Problem: Fatigue  Goal: Improved Activity Tolerance  Outcome: Progressing  Intervention: Promote Improved Energy  Flowsheets (Taken 8/26/2024 1137)  Fatigue Management: frequent rest breaks encouraged

## 2024-09-13 ENCOUNTER — TELEPHONE (OUTPATIENT)
Facility: CLINIC | Age: 69
End: 2024-09-13
Payer: MEDICARE

## 2024-09-13 ENCOUNTER — LAB VISIT (OUTPATIENT)
Dept: LAB | Facility: HOSPITAL | Age: 69
End: 2024-09-13
Attending: INTERNAL MEDICINE
Payer: MEDICARE

## 2024-09-13 ENCOUNTER — TELEPHONE (OUTPATIENT)
Dept: CARDIOLOGY | Facility: CLINIC | Age: 69
End: 2024-09-13
Payer: MEDICARE

## 2024-09-13 DIAGNOSIS — C34.90 METASTATIC LUNG CANCER (METASTASIS FROM LUNG TO OTHER SITE), UNSPECIFIED LATERALITY: ICD-10-CM

## 2024-09-13 LAB
ALBUMIN SERPL BCP-MCNC: 4.2 G/DL (ref 3.5–5.2)
ALP SERPL-CCNC: 84 U/L (ref 55–135)
ALT SERPL W/O P-5'-P-CCNC: 18 U/L (ref 10–44)
ANION GAP SERPL CALC-SCNC: 7 MMOL/L (ref 8–16)
AST SERPL-CCNC: 18 U/L (ref 10–40)
BASOPHILS # BLD AUTO: 0.02 K/UL (ref 0–0.2)
BASOPHILS NFR BLD: 0.3 % (ref 0–1.9)
BILIRUB SERPL-MCNC: 0.9 MG/DL (ref 0.1–1)
BUN SERPL-MCNC: 22 MG/DL (ref 8–23)
CALCIUM SERPL-MCNC: 9.4 MG/DL (ref 8.7–10.5)
CHLORIDE SERPL-SCNC: 106 MMOL/L (ref 95–110)
CO2 SERPL-SCNC: 28 MMOL/L (ref 23–29)
CREAT SERPL-MCNC: 0.9 MG/DL (ref 0.5–1.4)
DIFFERENTIAL METHOD BLD: NORMAL
EOSINOPHIL # BLD AUTO: 0.1 K/UL (ref 0–0.5)
EOSINOPHIL NFR BLD: 1.9 % (ref 0–8)
ERYTHROCYTE [DISTWIDTH] IN BLOOD BY AUTOMATED COUNT: 11.8 % (ref 11.5–14.5)
EST. GFR  (NO RACE VARIABLE): >60 ML/MIN/1.73 M^2
GLUCOSE SERPL-MCNC: 95 MG/DL (ref 70–110)
HCT VFR BLD AUTO: 41.8 % (ref 37–48.5)
HGB BLD-MCNC: 13.9 G/DL (ref 12–16)
IMM GRANULOCYTES # BLD AUTO: 0.01 K/UL (ref 0–0.04)
IMM GRANULOCYTES NFR BLD AUTO: 0.2 % (ref 0–0.5)
LYMPHOCYTES # BLD AUTO: 2.6 K/UL (ref 1–4.8)
LYMPHOCYTES NFR BLD: 41.4 % (ref 18–48)
MAGNESIUM SERPL-MCNC: 1.8 MG/DL (ref 1.6–2.6)
MCH RBC QN AUTO: 30.9 PG (ref 27–31)
MCHC RBC AUTO-ENTMCNC: 33.3 G/DL (ref 32–36)
MCV RBC AUTO: 93 FL (ref 82–98)
MONOCYTES # BLD AUTO: 0.8 K/UL (ref 0.3–1)
MONOCYTES NFR BLD: 12.7 % (ref 4–15)
NEUTROPHILS # BLD AUTO: 2.7 K/UL (ref 1.8–7.7)
NEUTROPHILS NFR BLD: 43.5 % (ref 38–73)
NRBC BLD-RTO: 0 /100 WBC
PLATELET # BLD AUTO: 250 K/UL (ref 150–450)
PMV BLD AUTO: 9.7 FL (ref 9.2–12.9)
POTASSIUM SERPL-SCNC: 4.1 MMOL/L (ref 3.5–5.1)
PROT SERPL-MCNC: 6.9 G/DL (ref 6–8.4)
RBC # BLD AUTO: 4.5 M/UL (ref 4–5.4)
SODIUM SERPL-SCNC: 141 MMOL/L (ref 136–145)
WBC # BLD AUTO: 6.28 K/UL (ref 3.9–12.7)

## 2024-09-13 PROCEDURE — 85025 COMPLETE CBC W/AUTO DIFF WBC: CPT | Performed by: INTERNAL MEDICINE

## 2024-09-13 PROCEDURE — 36415 COLL VENOUS BLD VENIPUNCTURE: CPT | Performed by: INTERNAL MEDICINE

## 2024-09-13 PROCEDURE — 80053 COMPREHEN METABOLIC PANEL: CPT | Performed by: INTERNAL MEDICINE

## 2024-09-13 PROCEDURE — 83735 ASSAY OF MAGNESIUM: CPT | Performed by: INTERNAL MEDICINE

## 2024-09-13 NOTE — PROGRESS NOTES
PROGRESS NOTE    Subjective:       Patient ID: Aysha Moore is a 69 y.o. female.    6/2/2023:  Screening CT chest:  2.6 x 2.5 x 3.2cm mass in the posterior LLL     6/20/2023:  PET scan:  35 x 21 mm lobulated pulmonary mass in the posterior left lower lobe with SUV max 11.6      FDG avid lymphadenopathy is present with index nodes outlined below:  -21 x 11 mm AP window node with SUV max 12.1 (image 103)  -21 x 12 mm posterior left hilar node with SUV max 13.7 (image 109)  -16 x 13 mm left hilar node with SUV max 14 (image 1:15)  -10 x 10 mm subcarinal node with SUV max 6.3 (image 111)     7/12/2023-Biopsy of LLL:  LEFT LOWER LOBE OF LUNG, CORE BIOPSIES:   - LUNG ADENOCARCINOMA WITH PLEOMORPHIC FEATURES.   PDL1/TPS: 95%  ALK/BRAF/EGFR/KRAS/RET/ROS1/MET NEGATIVE     7/21/2023:  MRI brain: no mets.    Carbo/Taxol/XRT:  8/8/2023-9/12/2023     Plan: Atezolizumab 1200mg every 3 weeks x 16    9/30/2023-CTA Chest:  1. Segmental right upper lobe pulmonary embolus.  2. Cavitating and spiculated posterior left lower lobe lung mass, decreased slightly in size compared to the prior chest CT exam.  3. Upper lobe predominant centrilobular and subpleural emphysema, with coarse mixed interstitial and alveolar infiltrate along the mediastinal border left upper lobe suggesting reactive or infectious pneumonitis, and with mild posterior bilateral upper lobe groundglass infiltrates.    10/4/2023  Admitted for 3 days with fever to 101, new Dx of PE and splenic infarcts. Echo normal. Started on Xarelto and abx.     9/3/2023-CT abd/p:  IMPRESSION: There are hypodensities within the spleen concerning for splenic infarcts. These can be associated with endocarditis. The patient also has known pulmonary emboli. Transesophageal echocardiogram may be warranted.    10/3/2023-  TTE normal    Atezolizumab  Cycle 1: 10/23/2023  Cycle 2: 11/13/2023  Cycle 3: 12/4/2023  Delay due to ?  Pulmonary issues.  Cleared by pulm  Cycle 4:  1/29/2024  Cycle 5: 2/19/2024  Cycle 6: 3/11/2024  Cycle 7: 4/1/2024  Cycle 8: 4/22/2024  Cycle 9: 5/13/2024  Cycle 10: 6/3/2024  Cycle 15: 9/16/2024- Due      11/10/2023-CT CAP  IMPRESSION:  1. Nodular density in the posterior left lower lobe, essentially unchanged from the previous exam when accounting for technique.     2. Scattered patchy airspace opacities throughout the left lung, increased in size/distribution from the previous exam, the differential includes posttreatment/radiation change, atelectasis/scarring, infection/pneumonia, noting malignancy is not excluded and interval follow-up would be warranted.     3. Enlarging left cervical/supraclavicular lymphadenopathy.     4. Small wedge-shaped hypodensities in the spleen, in keeping with small splenic infarcts, similar in distribution the previous exam.    12/11/2023-CTA Chest  No PE-see report, ? Pneumonia    1/17/2023-CT chest:  1. Left upper lobe paramediastinal mass is stable due to radiation exposure chest with evidence of an prominent. Mediastinal scarring.  2. Advanced emphysematous changes along with evidence of abnormality scarring the left are probably proteinaceous bullous cavitary fibrotic focus and fibrotic changes in the left lower lobe is stable.  3. Small spiculated nodule in the right lower lobe 9 x 8 mm stable. Recommend follow-up within 6 months.     4/9/2024-CT CAP w/con  Improvement--see report    Chief Complaint:  Stage IIII lung cancer, pulmonary embolism, splenic infarct follow up    History of Present Illness:   Aysha Moore is a 69 y.o. female who presents for follow up of lung cancer.     Ms. Moore continues on Imfinzi and is tolerating treatment well. She denies any CP, diarrhea constipation or nausea.     She does have increasing depression. She has been taking the Celexa 40mg and feels like this is helping. She does have some days with severe fatigue and she is staying in  bed. Offered referral to psy but patient declined      History of PE 9/2023  Xarelto given 9/2023-5/2024      Family and Social history reviewed and is unchanged from 7/27/2023      ROS:  Review of Systems   Constitutional:  Positive for fatigue. Negative for fever.   Respiratory:  Negative for shortness of breath.    Cardiovascular:  Negative for chest pain and leg swelling.   Gastrointestinal:  Negative for abdominal pain and blood in stool.   Genitourinary:  Negative for hematuria.   Skin:  Negative for rash.          Current Outpatient Medications:     albuterol (PROVENTIL/VENTOLIN HFA) 90 mcg/actuation inhaler, Inhale 2 puffs into the lungs every 6 (six) hours as needed. Rescue, Disp: 18 g, Rfl: 5    ammonium lactate 12 % Crea, aaa bid prn dry skin (Patient taking differently: Apply 1 g topically 2 (two) times daily as needed. aaa bid prn dry skin), Disp: 385 g, Rfl: 11    atorvastatin (LIPITOR) 80 MG tablet, TAKE 1 TABLET BY MOUTH EVERY EVENING, Disp: 90 tablet, Rfl: 1    azelastine (ASTELIN) 137 mcg (0.1 %) nasal spray, USE 1 SPRAY IN EACH NOSTRIL 2 TIMES DAILY AS NEEDED FOR RHINITIS OR SINUSITIS, Disp: 90 mL, Rfl: 3    calcium-vitamin D3 (OS-JOLIE 500 + D3) 500 mg-5 mcg (200 unit) per tablet, Take 1 tablet by mouth every morning., Disp: , Rfl:     citalopram (CELEXA) 40 MG tablet, Take 1 tablet (40 mg total) by mouth once daily., Disp: 30 tablet, Rfl: 11    ezetimibe (ZETIA) 10 mg tablet, TAKE 1 TABLET BY MOUTH EVERY DAY, Disp: 90 tablet, Rfl: 0    famotidine (PEPCID) 40 MG tablet, TAKE 1 TABLET BY MOUTH EVERY DAY IN THE EVENING, Disp: 90 tablet, Rfl: 1    fluticasone propionate (FLONASE) 50 mcg/actuation nasal spray, 1 spray (50 mcg total) by Each Nostril route daily as needed for Rhinitis or Allergies., Disp: 9.9 mL, Rfl: 2    HYDROcodone-acetaminophen (NORCO) 5-325 mg per tablet, Take 1 tablet by mouth every 4 to 6 hours as needed for Pain., Disp: 60 tablet, Rfl: 0    ketoconazole (NIZORAL) 2 % cream, Apply  topically 2 (two) times daily., Disp: 60 g, Rfl: 1    ketoconazole (NIZORAL) 2 % shampoo, Apply topically twice a week., Disp: 120 mL, Rfl: 6    nitroGLYCERIN (NITROSTAT) 0.4 MG SL tablet, Place 1 tablet (0.4 mg total) under the tongue every 5 (five) minutes as needed for Chest pain., Disp: 30 tablet, Rfl: 0    promethazine (PHENERGAN) 25 MG tablet, Take 1 tablet (25 mg total) by mouth every 4 to 6 hours as needed., Disp: 30 tablet, Rfl: 5    traMADoL (ULTRAM) 50 mg tablet, Take 1 tablet (50 mg total) by mouth every 6 (six) hours as needed for Pain., Disp: 30 tablet, Rfl: 2    triamcinolone acetonide 0.025% (KENALOG) 0.025 % cream, Apply topically 2 (two) times daily., Disp: 80 g, Rfl: 1    umeclidinium-vilanteroL (ANORO ELLIPTA) 62.5-25 mcg/actuation DsDv, Inhale 1 puff into the lungs once daily. Controller, Disp: 1 each, Rfl: 5    valACYclovir (VALTREX) 1000 MG tablet, TAKE 2 AT ONSET OF FEVER BLISTERS THEN REPEAT IN 12 HOURS (TOTAL 4 TABS PER EPISODE), Disp: 20 tablet, Rfl: 3    ALPRAZolam (XANAX) 0.25 MG tablet, Take 1 tablet (0.25 mg total) by mouth 3 (three) times daily as needed for Anxiety., Disp: 30 tablet, Rfl: 1    amoxicillin-clavulanate 875-125mg (AUGMENTIN) 875-125 mg per tablet, Take 1 tablet by mouth 2 (two) times daily., Disp: 20 tablet, Rfl: 0    clobetasol 0.05% (TEMOVATE) 0.05 % Oint, Apply topically 2 (two) times daily. for 14 days (Patient taking differently: Apply 1 g topically 2 (two) times daily.), Disp: 45 g, Rfl: 3    metoprolol succinate (TOPROL-XL) 50 MG 24 hr tablet, Take 1 tablet (50 mg total) by mouth once daily., Disp: 90 tablet, Rfl: 3    omeprazole (PRILOSEC) 40 MG capsule, Take 1 capsule (40 mg total) by mouth once daily., Disp: 30 capsule, Rfl: 11    predniSONE (DELTASONE) 10 MG tablet, Take 3 a day x 3 days then 2 a day x 3 days then 1 a day x 3 days, Disp: 18 tablet, Rfl: 0    rivaroxaban (XARELTO) 15 mg Tab, Take 1 tablet (15 mg total) by mouth daily with dinner or evening  "meal., Disp: 42 tablet, Rfl: 0    tiZANidine (ZANAFLEX) 4 MG tablet, TAKE 1 TABLET BY MOUTH EVERY 6 HOURS AS NEEDED FOR BACK PAIN, Disp: 360 tablet, Rfl: 0    zinc 50 mg Tab, Take 1 tablet by mouth once daily., Disp: , Rfl:   No current facility-administered medications for this visit.        Objective:       Physical Examination:     BP (!) 112/59   Pulse 104   Temp 98.2 °F (36.8 °C)   Resp 18   Ht 5' 3" (1.6 m)   Wt 65.2 kg (143 lb 12.8 oz)   BMI 25.47 kg/m²     Physical Exam  Constitutional:       Appearance: Normal appearance.   HENT:      Head: Normocephalic and atraumatic.      Right Ear: External ear normal.      Left Ear: External ear normal.      Nose: Nose normal.      Mouth/Throat:      Mouth: Mucous membranes are moist.   Eyes:      General: No scleral icterus.     Conjunctiva/sclera: Conjunctivae normal.   Cardiovascular:      Rate and Rhythm: Normal rate.   Pulmonary:      Effort: Pulmonary effort is normal.   Abdominal:      General: Abdomen is flat.   Skin:     General: Skin is warm and dry.   Neurological:      General: No focal deficit present.      Mental Status: She is alert and oriented to person, place, and time.   Psychiatric:         Mood and Affect: Mood normal.         Behavior: Behavior normal.         Thought Content: Thought content normal.         Judgment: Judgment normal.         Labs:   Recent Results (from the past 336 hour(s))   CBC W/ AUTO DIFFERENTIAL    Collection Time: 09/13/24 10:31 AM   Result Value Ref Range    WBC 6.28 3.90 - 12.70 K/uL    Hemoglobin 13.9 12.0 - 16.0 g/dL    Hematocrit 41.8 37.0 - 48.5 %    Platelets 250 150 - 450 K/uL               CMP  Sodium   Date Value Ref Range Status   09/13/2024 141 136 - 145 mmol/L Final     Potassium   Date Value Ref Range Status   09/13/2024 4.1 3.5 - 5.1 mmol/L Final     Chloride   Date Value Ref Range Status   09/13/2024 106 95 - 110 mmol/L Final     CO2   Date Value Ref Range Status   09/13/2024 28 23 - 29 mmol/L Final " "    Glucose   Date Value Ref Range Status   09/13/2024 95 70 - 110 mg/dL Final     BUN   Date Value Ref Range Status   09/13/2024 22 8 - 23 mg/dL Final     Creatinine   Date Value Ref Range Status   09/13/2024 0.9 0.5 - 1.4 mg/dL Final     Calcium   Date Value Ref Range Status   09/13/2024 9.4 8.7 - 10.5 mg/dL Final     Total Protein   Date Value Ref Range Status   09/13/2024 6.9 6.0 - 8.4 g/dL Final     Albumin   Date Value Ref Range Status   09/13/2024 4.2 3.5 - 5.2 g/dL Final     Total Bilirubin   Date Value Ref Range Status   09/13/2024 0.9 0.1 - 1.0 mg/dL Final     Comment:     For infants and newborns, interpretation of results should be based  on gestational age, weight and in agreement with clinical  observations.    Premature Infant recommended reference ranges:  Up to 24 hours.............<8.0 mg/dL  Up to 48 hours............<12.0 mg/dL  3-5 days..................<15.0 mg/dL  6-29 days.................<15.0 mg/dL       Alkaline Phosphatase   Date Value Ref Range Status   09/13/2024 84 55 - 135 U/L Final     AST   Date Value Ref Range Status   09/13/2024 18 10 - 40 U/L Final     ALT   Date Value Ref Range Status   09/13/2024 18 10 - 44 U/L Final     Anion Gap   Date Value Ref Range Status   09/13/2024 7 (L) 8 - 16 mmol/L Final     eGFR if    Date Value Ref Range Status   03/29/2022 >60.0 >60 mL/min/1.73 m^2 Final     eGFR if non    Date Value Ref Range Status   03/29/2022 >60.0 >60 mL/min/1.73 m^2 Final     Comment:     Calculation used to obtain the estimated glomerular filtration  rate (eGFR) is the CKD-EPI equation.        No results found for: "CEA"    Assessment/Plan:     Problem List Items Addressed This Visit          Psychiatric    Anxiety    Depression       ENT    Seasonal allergic rhinitis       Pulmonary    Pulmonary emphysema       Oncology    LUNG CANCER-ADENOCARCINOMA OF THE LLL - Primary       Other    Fatigue     Lung Cancer- continue Tecentriq; PET " scheduled  in Oct 3, 2024  2. Est care with new provider- Patient scheduled with PCP 10/3/2024  3. Depression- Continue Celexa to 40mg daily  4. Hypotension- call for follow up with cardiology to discuss meds.  5. Breast Cancer screen- Patient due for mammogram.      Discussion:     Follow up in about 3 weeks (around 10/7/2024) for with Dr. Hankins and 6 weeks with me.    Electronically signed by Meka Chiang, MSN, APRN, AGNP-C, OCN

## 2024-09-13 NOTE — TELEPHONE ENCOUNTER
A message was sent to Janki Pickett from scheduling on regard to PCP referral, PCP requested is Rodney Viveros NP.

## 2024-09-13 NOTE — TELEPHONE ENCOUNTER
----- Message from Petr Villarreal sent at 9/13/2024  8:30 AM CDT -----  Regarding: appointment  Contact: patient  Type:  Sooner Apoointment Request    Caller is requesting a sooner appointment.  Caller declined first available appointment listed below.  Caller will not accept being placed on the waitlist and is requesting a message be sent to doctor.  Name of Caller:patient  When is the first available appointment?unable to schedule  Symptoms:BP issues  Would the patient rather a call back or a response via [facility]? Please call to schedule soon  Best Call Back Number:[phone]  Additional Information:

## 2024-09-16 ENCOUNTER — OFFICE VISIT (OUTPATIENT)
Facility: CLINIC | Age: 69
End: 2024-09-16
Payer: MEDICARE

## 2024-09-16 ENCOUNTER — INFUSION (OUTPATIENT)
Dept: INFUSION THERAPY | Facility: HOSPITAL | Age: 69
End: 2024-09-16
Attending: INTERNAL MEDICINE
Payer: MEDICARE

## 2024-09-16 VITALS
HEIGHT: 63 IN | DIASTOLIC BLOOD PRESSURE: 67 MMHG | OXYGEN SATURATION: 96 % | SYSTOLIC BLOOD PRESSURE: 117 MMHG | HEART RATE: 86 BPM | WEIGHT: 143.81 LBS | TEMPERATURE: 98 F | RESPIRATION RATE: 17 BRPM | BODY MASS INDEX: 25.48 KG/M2

## 2024-09-16 VITALS
HEART RATE: 104 BPM | DIASTOLIC BLOOD PRESSURE: 59 MMHG | TEMPERATURE: 98 F | HEIGHT: 63 IN | BODY MASS INDEX: 25.48 KG/M2 | RESPIRATION RATE: 18 BRPM | WEIGHT: 143.81 LBS | SYSTOLIC BLOOD PRESSURE: 112 MMHG

## 2024-09-16 DIAGNOSIS — C34.90 METASTATIC LUNG CANCER (METASTASIS FROM LUNG TO OTHER SITE), UNSPECIFIED LATERALITY: ICD-10-CM

## 2024-09-16 DIAGNOSIS — R53.83 FATIGUE, UNSPECIFIED TYPE: ICD-10-CM

## 2024-09-16 DIAGNOSIS — F32.A DEPRESSION, UNSPECIFIED DEPRESSION TYPE: ICD-10-CM

## 2024-09-16 DIAGNOSIS — C34.32 MALIGNANT NEOPLASM OF LOWER LOBE OF LEFT LUNG: Primary | ICD-10-CM

## 2024-09-16 DIAGNOSIS — J43.9 PULMONARY EMPHYSEMA, UNSPECIFIED EMPHYSEMA TYPE: ICD-10-CM

## 2024-09-16 DIAGNOSIS — F41.9 ANXIETY: ICD-10-CM

## 2024-09-16 DIAGNOSIS — J30.2 SEASONAL ALLERGIC RHINITIS, UNSPECIFIED TRIGGER: ICD-10-CM

## 2024-09-16 PROCEDURE — G2211 COMPLEX E/M VISIT ADD ON: HCPCS | Mod: S$PBB,,, | Performed by: NURSE PRACTITIONER

## 2024-09-16 PROCEDURE — A4216 STERILE WATER/SALINE, 10 ML: HCPCS | Performed by: NURSE PRACTITIONER

## 2024-09-16 PROCEDURE — 96413 CHEMO IV INFUSION 1 HR: CPT

## 2024-09-16 PROCEDURE — 25000003 PHARM REV CODE 250: Performed by: NURSE PRACTITIONER

## 2024-09-16 PROCEDURE — 99999 PR PBB SHADOW E&M-EST. PATIENT-LVL IV: CPT | Mod: PBBFAC,,, | Performed by: NURSE PRACTITIONER

## 2024-09-16 PROCEDURE — 99214 OFFICE O/P EST MOD 30 MIN: CPT | Mod: PBBFAC,PN | Performed by: NURSE PRACTITIONER

## 2024-09-16 PROCEDURE — 99215 OFFICE O/P EST HI 40 MIN: CPT | Mod: S$PBB,,, | Performed by: NURSE PRACTITIONER

## 2024-09-16 PROCEDURE — 63600175 PHARM REV CODE 636 W HCPCS: Mod: JZ,JG | Performed by: NURSE PRACTITIONER

## 2024-09-16 RX ORDER — EPINEPHRINE 0.3 MG/.3ML
0.3 INJECTION SUBCUTANEOUS ONCE AS NEEDED
Status: DISCONTINUED | OUTPATIENT
Start: 2024-09-16 | End: 2024-09-16 | Stop reason: HOSPADM

## 2024-09-16 RX ORDER — DIPHENHYDRAMINE HYDROCHLORIDE 50 MG/ML
50 INJECTION INTRAMUSCULAR; INTRAVENOUS ONCE AS NEEDED
Status: DISCONTINUED | OUTPATIENT
Start: 2024-09-16 | End: 2024-09-16 | Stop reason: HOSPADM

## 2024-09-16 RX ORDER — EPINEPHRINE 0.3 MG/.3ML
0.3 INJECTION SUBCUTANEOUS ONCE AS NEEDED
Status: CANCELLED | OUTPATIENT
Start: 2024-09-16

## 2024-09-16 RX ORDER — HEPARIN 100 UNIT/ML
500 SYRINGE INTRAVENOUS
Status: DISCONTINUED | OUTPATIENT
Start: 2024-09-16 | End: 2024-09-16 | Stop reason: HOSPADM

## 2024-09-16 RX ORDER — HEPARIN 100 UNIT/ML
500 SYRINGE INTRAVENOUS
Status: CANCELLED | OUTPATIENT
Start: 2024-09-16

## 2024-09-16 RX ORDER — SODIUM CHLORIDE 0.9 % (FLUSH) 0.9 %
10 SYRINGE (ML) INJECTION
Status: CANCELLED | OUTPATIENT
Start: 2024-09-16

## 2024-09-16 RX ORDER — DIPHENHYDRAMINE HYDROCHLORIDE 50 MG/ML
50 INJECTION INTRAMUSCULAR; INTRAVENOUS ONCE AS NEEDED
Status: CANCELLED | OUTPATIENT
Start: 2024-09-16

## 2024-09-16 RX ORDER — SODIUM CHLORIDE 0.9 % (FLUSH) 0.9 %
10 SYRINGE (ML) INJECTION
Status: DISCONTINUED | OUTPATIENT
Start: 2024-09-16 | End: 2024-09-16 | Stop reason: HOSPADM

## 2024-09-16 RX ADMIN — HEPARIN 500 UNITS: 100 SYRINGE at 11:09

## 2024-09-16 RX ADMIN — ATEZOLIZUMAB 1200 MG: 1200 INJECTION, SOLUTION INTRAVENOUS at 11:09

## 2024-09-16 RX ADMIN — SODIUM CHLORIDE, PRESERVATIVE FREE 10 ML: 5 INJECTION INTRAVENOUS at 11:09

## 2024-09-16 NOTE — PLAN OF CARE
Problem: Fall Injury Risk  Goal: Absence of Fall and Fall-Related Injury  Outcome: Progressing  Intervention: Identify and Manage Contributors  Flowsheets (Taken 9/16/2024 1106)  Self-Care Promotion:   independence encouraged   BADL personal objects within reach   adaptive equipment use encouraged  Medication Review/Management: medications reviewed  Intervention: Promote Injury-Free Environment  Flowsheets (Taken 9/16/2024 1106)  Safety Promotion/Fall Prevention: medications reviewed

## 2024-10-02 ENCOUNTER — TELEPHONE (OUTPATIENT)
Facility: CLINIC | Age: 69
End: 2024-10-02
Payer: MEDICARE

## 2024-10-02 NOTE — TELEPHONE ENCOUNTER
----- Message from Shantal sent at 10/2/2024 11:02 AM CDT -----  Pt calling and would like to speak with Meka or Vonda about rescheduling some treatments.    Pt [phone]    Thank you

## 2024-10-02 NOTE — TELEPHONE ENCOUNTER
Called patient on regard to rescheduling appointment, patient stated she is having a court appointment, she asked to reschedule appointments on 10/07/2024, message sent to scheduling.

## 2024-10-03 ENCOUNTER — HOSPITAL ENCOUNTER (OUTPATIENT)
Dept: RADIOLOGY | Facility: HOSPITAL | Age: 69
Discharge: HOME | End: 2024-10-03
Attending: NURSE PRACTITIONER
Payer: MEDICARE

## 2024-10-03 VITALS — BODY MASS INDEX: 25.34 KG/M2 | HEIGHT: 63 IN | WEIGHT: 143 LBS

## 2024-10-03 DIAGNOSIS — C34.32 MALIGNANT NEOPLASM OF LOWER LOBE OF LEFT LUNG: ICD-10-CM

## 2024-10-03 DIAGNOSIS — R19.09 MASS OF LEFT INGUINAL REGION: Primary | ICD-10-CM

## 2024-10-03 LAB — POCT GLUCOSE: 105 MG/DL (ref 70–110)

## 2024-10-03 PROCEDURE — 82962 GLUCOSE BLOOD TEST: CPT | Mod: PO

## 2024-10-03 PROCEDURE — A9552 F18 FDG: HCPCS | Mod: PO | Performed by: NURSE PRACTITIONER

## 2024-10-03 PROCEDURE — 78815 PET IMAGE W/CT SKULL-THIGH: CPT | Mod: TC,PO

## 2024-10-03 RX ORDER — FLUDEOXYGLUCOSE F18 500 MCI/ML
13 INJECTION INTRAVENOUS
Status: COMPLETED | OUTPATIENT
Start: 2024-10-03 | End: 2024-10-03

## 2024-10-03 RX ADMIN — FLUDEOXYGLUCOSE F-18 13 MILLICURIE: 500 INJECTION INTRAVENOUS at 01:10

## 2024-10-04 ENCOUNTER — TELEPHONE (OUTPATIENT)
Facility: CLINIC | Age: 69
End: 2024-10-04
Payer: MEDICARE

## 2024-10-04 ENCOUNTER — TELEPHONE (OUTPATIENT)
Dept: RADIOLOGY | Facility: HOSPITAL | Age: 69
End: 2024-10-04

## 2024-10-04 DIAGNOSIS — R19.09 MASS OF LEFT INGUINAL REGION: Primary | ICD-10-CM

## 2024-10-04 NOTE — NURSING
Inguinal bx scheduled @ [facility] on 10/11 @ 1pm with arrival @ 1230.  Pre-procedure instructions given and understanding verbalized

## 2024-10-04 NOTE — TELEPHONE ENCOUNTER
----- Message from IRAIS Cohen sent at 10/3/2024  4:23 PM CDT -----  Please notify the patient that their pet scan shows an abnormal area in the left inguinal area. Recommendations for biopsy from radiology. Orders have been placed.

## 2024-10-06 DIAGNOSIS — F43.21 SITUATIONAL DEPRESSION: ICD-10-CM

## 2024-10-06 DIAGNOSIS — F41.9 ANXIETY: ICD-10-CM

## 2024-10-06 RX ORDER — CITALOPRAM 20 MG/1
20 TABLET, FILM COATED ORAL
Qty: 90 TABLET | Refills: 1 | OUTPATIENT
Start: 2024-10-06

## 2024-10-06 NOTE — TELEPHONE ENCOUNTER
Quick DC. Inappropriate Request    Refill Authorization Note   Aysha Moore  is requesting a refill authorization.  Brief Assessment and Rationale for Refill:  Quick Discontinue  Medication Therapy Plan:  dose increase to celexa 40mg (7/15/24) by Meka Chiang, NP-C    Medication Reconciliation Completed:  No      Comments:     Note composed:4:06 PM 10/06/2024

## 2024-10-07 DIAGNOSIS — G89.29 CHRONIC BILATERAL LOW BACK PAIN WITH LEFT-SIDED SCIATICA: ICD-10-CM

## 2024-10-07 DIAGNOSIS — M54.42 CHRONIC BILATERAL LOW BACK PAIN WITH LEFT-SIDED SCIATICA: ICD-10-CM

## 2024-10-07 NOTE — TELEPHONE ENCOUNTER
No care due was identified.  [facility] Embedded Care Due Messages. Reference number: [id number].   10/07/2024 12:06:16 AM CDT

## 2024-10-07 NOTE — TELEPHONE ENCOUNTER
Refill Routing Note   Medication(s) are not appropriate for processing by [facility] for the following reason(s):        Outside of protocol: non-delegated    [facility] action(s):  Route      Medication Therapy Plan:         Appointments  past 12m or future 3m with PCP    Date Provider   Last Visit   2/7/2024 Yoni Daniels MD   Next Visit   Visit date not found Yoni Daniels MD   ED visits in past 90 days: 0        Note composed:12:38 PM 10/07/2024

## 2024-10-08 ENCOUNTER — LAB VISIT (OUTPATIENT)
Dept: LAB | Facility: HOSPITAL | Age: 69
End: 2024-10-08
Attending: INTERNAL MEDICINE
Payer: MEDICARE

## 2024-10-08 DIAGNOSIS — C34.90 METASTATIC LUNG CANCER (METASTASIS FROM LUNG TO OTHER SITE), UNSPECIFIED LATERALITY: ICD-10-CM

## 2024-10-08 LAB
ALBUMIN SERPL BCP-MCNC: 4.3 G/DL (ref 3.5–5.2)
ALP SERPL-CCNC: 83 U/L (ref 55–135)
ALT SERPL W/O P-5'-P-CCNC: 14 U/L (ref 10–44)
ANION GAP SERPL CALC-SCNC: 9 MMOL/L (ref 8–16)
AST SERPL-CCNC: 16 U/L (ref 10–40)
BASOPHILS # BLD AUTO: 0.03 K/UL (ref 0–0.2)
BASOPHILS NFR BLD: 0.5 % (ref 0–1.9)
BILIRUB SERPL-MCNC: 1.3 MG/DL (ref 0.1–1)
BUN SERPL-MCNC: 14 MG/DL (ref 8–23)
CALCIUM SERPL-MCNC: 8.9 MG/DL (ref 8.7–10.5)
CHLORIDE SERPL-SCNC: 109 MMOL/L (ref 95–110)
CO2 SERPL-SCNC: 27 MMOL/L (ref 23–29)
CREAT SERPL-MCNC: 0.6 MG/DL (ref 0.5–1.4)
DIFFERENTIAL METHOD BLD: ABNORMAL
EOSINOPHIL # BLD AUTO: 0.1 K/UL (ref 0–0.5)
EOSINOPHIL NFR BLD: 2.4 % (ref 0–8)
ERYTHROCYTE [DISTWIDTH] IN BLOOD BY AUTOMATED COUNT: 11.9 % (ref 11.5–14.5)
EST. GFR  (NO RACE VARIABLE): >60 ML/MIN/1.73 M^2
GLUCOSE SERPL-MCNC: 103 MG/DL (ref 70–110)
HCT VFR BLD AUTO: 41.2 % (ref 37–48.5)
HGB BLD-MCNC: 13.8 G/DL (ref 12–16)
IMM GRANULOCYTES # BLD AUTO: 0.01 K/UL (ref 0–0.04)
IMM GRANULOCYTES NFR BLD AUTO: 0.2 % (ref 0–0.5)
LYMPHOCYTES # BLD AUTO: 1.9 K/UL (ref 1–4.8)
LYMPHOCYTES NFR BLD: 33.3 % (ref 18–48)
MAGNESIUM SERPL-MCNC: 1.8 MG/DL (ref 1.6–2.6)
MCH RBC QN AUTO: 31.2 PG (ref 27–31)
MCHC RBC AUTO-ENTMCNC: 33.5 G/DL (ref 32–36)
MCV RBC AUTO: 93 FL (ref 82–98)
MONOCYTES # BLD AUTO: 0.8 K/UL (ref 0.3–1)
MONOCYTES NFR BLD: 14.4 % (ref 4–15)
NEUTROPHILS # BLD AUTO: 2.8 K/UL (ref 1.8–7.7)
NEUTROPHILS NFR BLD: 49.2 % (ref 38–73)
NRBC BLD-RTO: 0 /100 WBC
PLATELET # BLD AUTO: 250 K/UL (ref 150–450)
PMV BLD AUTO: 9.2 FL (ref 9.2–12.9)
POTASSIUM SERPL-SCNC: 4 MMOL/L (ref 3.5–5.1)
PROT SERPL-MCNC: 7 G/DL (ref 6–8.4)
RBC # BLD AUTO: 4.42 M/UL (ref 4–5.4)
SODIUM SERPL-SCNC: 145 MMOL/L (ref 136–145)
WBC # BLD AUTO: 5.77 K/UL (ref 3.9–12.7)

## 2024-10-08 PROCEDURE — 36415 COLL VENOUS BLD VENIPUNCTURE: CPT | Performed by: INTERNAL MEDICINE

## 2024-10-08 PROCEDURE — 80053 COMPREHEN METABOLIC PANEL: CPT | Performed by: INTERNAL MEDICINE

## 2024-10-08 PROCEDURE — 83735 ASSAY OF MAGNESIUM: CPT | Performed by: INTERNAL MEDICINE

## 2024-10-08 PROCEDURE — 85025 COMPLETE CBC W/AUTO DIFF WBC: CPT | Performed by: INTERNAL MEDICINE

## 2024-10-08 RX ORDER — DIPHENHYDRAMINE HYDROCHLORIDE 50 MG/ML
50 INJECTION INTRAMUSCULAR; INTRAVENOUS ONCE AS NEEDED
Status: CANCELLED | OUTPATIENT
Start: 2024-10-08

## 2024-10-08 RX ORDER — SODIUM CHLORIDE 0.9 % (FLUSH) 0.9 %
10 SYRINGE (ML) INJECTION
Status: CANCELLED | OUTPATIENT
Start: 2024-10-08

## 2024-10-08 RX ORDER — EPINEPHRINE 0.3 MG/.3ML
0.3 INJECTION SUBCUTANEOUS ONCE AS NEEDED
Status: CANCELLED | OUTPATIENT
Start: 2024-10-08

## 2024-10-08 RX ORDER — HEPARIN 100 UNIT/ML
500 SYRINGE INTRAVENOUS
Status: CANCELLED | OUTPATIENT
Start: 2024-10-08

## 2024-10-09 ENCOUNTER — INFUSION (OUTPATIENT)
Dept: INFUSION THERAPY | Facility: HOSPITAL | Age: 69
End: 2024-10-09
Attending: INTERNAL MEDICINE
Payer: MEDICARE

## 2024-10-09 ENCOUNTER — OFFICE VISIT (OUTPATIENT)
Facility: CLINIC | Age: 69
End: 2024-10-09
Payer: MEDICARE

## 2024-10-09 VITALS
HEART RATE: 74 BPM | RESPIRATION RATE: 18 BRPM | OXYGEN SATURATION: 97 % | WEIGHT: 146.69 LBS | SYSTOLIC BLOOD PRESSURE: 111 MMHG | TEMPERATURE: 97 F | BODY MASS INDEX: 25.99 KG/M2 | HEIGHT: 63 IN | DIASTOLIC BLOOD PRESSURE: 74 MMHG

## 2024-10-09 VITALS
TEMPERATURE: 98 F | HEART RATE: 83 BPM | OXYGEN SATURATION: 95 % | WEIGHT: 147 LBS | BODY MASS INDEX: 26.04 KG/M2 | RESPIRATION RATE: 17 BRPM | DIASTOLIC BLOOD PRESSURE: 61 MMHG | SYSTOLIC BLOOD PRESSURE: 111 MMHG

## 2024-10-09 DIAGNOSIS — R53.83 FATIGUE: ICD-10-CM

## 2024-10-09 DIAGNOSIS — C34.32 MALIGNANT NEOPLASM OF LOWER LOBE OF LEFT LUNG: Primary | ICD-10-CM

## 2024-10-09 DIAGNOSIS — C34.90 METASTATIC LUNG CANCER (METASTASIS FROM LUNG TO OTHER SITE), UNSPECIFIED LATERALITY: ICD-10-CM

## 2024-10-09 LAB — TSH SERPL DL<=0.005 MIU/L-ACNC: 0.76 UIU/ML (ref 0.34–5.6)

## 2024-10-09 PROCEDURE — G2211 COMPLEX E/M VISIT ADD ON: HCPCS | Mod: S$PBB,,, | Performed by: INTERNAL MEDICINE

## 2024-10-09 PROCEDURE — 96413 CHEMO IV INFUSION 1 HR: CPT

## 2024-10-09 PROCEDURE — 84443 ASSAY THYROID STIM HORMONE: CPT | Performed by: NURSE PRACTITIONER

## 2024-10-09 PROCEDURE — 63600175 PHARM REV CODE 636 W HCPCS: Mod: JZ,JG | Performed by: INTERNAL MEDICINE

## 2024-10-09 PROCEDURE — 25000003 PHARM REV CODE 250: Performed by: INTERNAL MEDICINE

## 2024-10-09 PROCEDURE — 99215 OFFICE O/P EST HI 40 MIN: CPT | Mod: S$PBB,,, | Performed by: INTERNAL MEDICINE

## 2024-10-09 PROCEDURE — 99999 PR PBB SHADOW E&M-EST. PATIENT-LVL IV: CPT | Mod: PBBFAC,,, | Performed by: INTERNAL MEDICINE

## 2024-10-09 PROCEDURE — A4216 STERILE WATER/SALINE, 10 ML: HCPCS | Performed by: INTERNAL MEDICINE

## 2024-10-09 PROCEDURE — 99214 OFFICE O/P EST MOD 30 MIN: CPT | Mod: PBBFAC,PN | Performed by: INTERNAL MEDICINE

## 2024-10-09 RX ORDER — HEPARIN 100 UNIT/ML
500 SYRINGE INTRAVENOUS
Status: DISCONTINUED | OUTPATIENT
Start: 2024-10-09 | End: 2024-10-09 | Stop reason: HOSPADM

## 2024-10-09 RX ORDER — SODIUM CHLORIDE 0.9 % (FLUSH) 0.9 %
10 SYRINGE (ML) INJECTION
OUTPATIENT
Start: 2024-10-28

## 2024-10-09 RX ORDER — TIZANIDINE 4 MG/1
4 TABLET ORAL EVERY 6 HOURS PRN
Qty: 360 TABLET | Refills: 0 | OUTPATIENT
Start: 2024-10-09

## 2024-10-09 RX ORDER — HEPARIN 100 UNIT/ML
500 SYRINGE INTRAVENOUS
OUTPATIENT
Start: 2024-10-28

## 2024-10-09 RX ORDER — DIPHENHYDRAMINE HYDROCHLORIDE 50 MG/ML
50 INJECTION INTRAMUSCULAR; INTRAVENOUS ONCE AS NEEDED
OUTPATIENT
Start: 2024-10-28

## 2024-10-09 RX ORDER — EPINEPHRINE 0.3 MG/.3ML
0.3 INJECTION SUBCUTANEOUS ONCE AS NEEDED
OUTPATIENT
Start: 2024-10-28

## 2024-10-09 RX ORDER — SODIUM CHLORIDE 0.9 % (FLUSH) 0.9 %
10 SYRINGE (ML) INJECTION
Status: DISCONTINUED | OUTPATIENT
Start: 2024-10-09 | End: 2024-10-09 | Stop reason: HOSPADM

## 2024-10-09 RX ADMIN — HEPARIN 500 UNITS: 100 SYRINGE at 11:10

## 2024-10-09 RX ADMIN — ATEZOLIZUMAB 1200 MG: 1200 INJECTION, SOLUTION INTRAVENOUS at 11:10

## 2024-10-09 RX ADMIN — SODIUM CHLORIDE, PRESERVATIVE FREE 10 ML: 5 INJECTION INTRAVENOUS at 11:10

## 2024-10-09 NOTE — PROGRESS NOTES
PROGRESS NOTE    Subjective:       Patient ID: Aysha Moore is a 69 y.o. female.    6/2/2023:  Screening CT chest:  2.6 x 2.5 x 3.2cm mass in the posterior LLL     6/20/2023:  PET scan:  35 x 21 mm lobulated pulmonary mass in the posterior left lower lobe with SUV max 11.6      FDG avid lymphadenopathy is present with index nodes outlined below:  -21 x 11 mm AP window node with SUV max 12.1 (image 103)  -21 x 12 mm posterior left hilar node with SUV max 13.7 (image 109)  -16 x 13 mm left hilar node with SUV max 14 (image 1:15)  -10 x 10 mm subcarinal node with SUV max 6.3 (image 111)     7/12/2023-Biopsy of LLL:  LEFT LOWER LOBE OF LUNG, CORE BIOPSIES:   - LUNG ADENOCARCINOMA WITH PLEOMORPHIC FEATURES.   PDL1/TPS: 95%  ALK/BRAF/EGFR/KRAS/RET/ROS1/MET NEGATIVE     7/21/2023:  MRI brain: no mets.    Carbo/Taxol/XRT:  8/8/2023-9/12/2023     Plan: Atezolizumab 1200mg every 3 weeks x 16    9/30/2023-CTA Chest:  1. Segmental right upper lobe pulmonary embolus.  2. Cavitating and spiculated posterior left lower lobe lung mass, decreased slightly in size compared to the prior chest CT exam.  3. Upper lobe predominant centrilobular and subpleural emphysema, with coarse mixed interstitial and alveolar infiltrate along the mediastinal border left upper lobe suggesting reactive or infectious pneumonitis, and with mild posterior bilateral upper lobe groundglass infiltrates.    10/4/2023  Admitted for 3 days with fever to 101, new Dx of PE and splenic infarcts. Echo normal. Started on Xarelto and abx.     9/3/2023-CT abd/p:  IMPRESSION: There are hypodensities within the spleen concerning for splenic infarcts. These can be associated with endocarditis. The patient also has known pulmonary emboli. Transesophageal echocardiogram may be warranted.    10/3/2023-  TTE normal    Atezolizumab  Cycle 1: 10/23/2023  Cycle 2: 11/13/2023  Cycle 3: 12/4/2023  Delay due to ?  Pulmonary issues.  Cleared by pulm  Cycle 4:  1/29/2024  Cycle 5: 2/19/2024  Cycle 6: 3/11/2024  Cycle 7: 4/1/2024  Cycle 8: 4/22/2024  Cycle 9: 5/13/2024  Cycle 17: 10/28/2024-due---Last cycle      11/10/2023-CT CAP  IMPRESSION:  1. Nodular density in the posterior left lower lobe, essentially unchanged from the previous exam when accounting for technique.     2. Scattered patchy airspace opacities throughout the left lung, increased in size/distribution from the previous exam, the differential includes posttreatment/radiation change, atelectasis/scarring, infection/pneumonia, noting malignancy is not excluded and interval follow-up would be warranted.     3. Enlarging left cervical/supraclavicular lymphadenopathy.     4. Small wedge-shaped hypodensities in the spleen, in keeping with small splenic infarcts, similar in distribution the previous exam.    12/11/2023-CTA Chest  No PE-see report, ? Pneumonia    1/17/2023-CT chest:  1. Left upper lobe paramediastinal mass is stable due to radiation exposure chest with evidence of an prominent. Mediastinal scarring.  2. Advanced emphysematous changes along with evidence of abnormality scarring the left are probably proteinaceous bullous cavitary fibrotic focus and fibrotic changes in the left lower lobe is stable.  3. Small spiculated nodule in the right lower lobe 9 x 8 mm stable. Recommend follow-up within 6 months.     4/9/2024-CT CAP w/con  Improvement--see report    10/3/2024-PET:  New inguinal LN on left-hypermetabolic  Low-level activity in left hilar lesion  Stable MS LNs.     Chief Complaint:  No chief complaint on file.  Stage IIII lung cancer, pulmonary embolism, splenic infarct follow up    History of Present Illness:   Aysha Moore is a 69 y.o. female who presents for follow up of lung cancer.     Ms. Moore is doing ok today.  No new complaints.      History of PE 9/2023  Xarelto given 9/2023-5/2024      Family and Social history reviewed and is  unchanged from 7/27/2023      ROS:  Review of Systems   Constitutional:  Negative for fever.   Respiratory:  Negative for shortness of breath.    Cardiovascular:  Negative for chest pain and leg swelling.   Gastrointestinal:  Negative for abdominal pain and blood in stool.   Genitourinary:  Negative for hematuria.   Skin:  Negative for rash.          Current Outpatient Medications:     albuterol (PROVENTIL/VENTOLIN HFA) 90 mcg/actuation inhaler, Inhale 2 puffs into the lungs every 6 (six) hours as needed. Rescue, Disp: 18 g, Rfl: 5    ALPRAZolam (XANAX) 0.25 MG tablet, Take 1 tablet (0.25 mg total) by mouth 3 (three) times daily as needed for Anxiety., Disp: 30 tablet, Rfl: 1    ammonium lactate 12 % Crea, aaa bid prn dry skin (Patient taking differently: Apply 1 g topically 2 (two) times daily as needed. aaa bid prn dry skin), Disp: 385 g, Rfl: 11    amoxicillin-clavulanate 875-125mg (AUGMENTIN) 875-125 mg per tablet, Take 1 tablet by mouth 2 (two) times daily., Disp: 20 tablet, Rfl: 0    atorvastatin (LIPITOR) 80 MG tablet, TAKE 1 TABLET BY MOUTH EVERY EVENING, Disp: 90 tablet, Rfl: 1    azelastine (ASTELIN) 137 mcg (0.1 %) nasal spray, USE 1 SPRAY IN EACH NOSTRIL 2 TIMES DAILY AS NEEDED FOR RHINITIS OR SINUSITIS, Disp: 90 mL, Rfl: 3    calcium-vitamin D3 (OS-JOLIE 500 + D3) 500 mg-5 mcg (200 unit) per tablet, Take 1 tablet by mouth every morning., Disp: , Rfl:     citalopram (CELEXA) 40 MG tablet, Take 1 tablet (40 mg total) by mouth once daily., Disp: 30 tablet, Rfl: 11    clobetasol 0.05% (TEMOVATE) 0.05 % Oint, Apply topically 2 (two) times daily. for 14 days (Patient taking differently: Apply 1 g topically 2 (two) times daily.), Disp: 45 g, Rfl: 3    ezetimibe (ZETIA) 10 mg tablet, TAKE 1 TABLET BY MOUTH EVERY DAY, Disp: 90 tablet, Rfl: 0    famotidine (PEPCID) 40 MG tablet, TAKE 1 TABLET BY MOUTH EVERY DAY IN THE EVENING, Disp: 90 tablet, Rfl: 1    fluticasone propionate (FLONASE) 50 mcg/actuation nasal spray,  1 spray (50 mcg total) by Each Nostril route daily as needed for Rhinitis or Allergies., Disp: 9.9 mL, Rfl: 2    HYDROcodone-acetaminophen (NORCO) 5-325 mg per tablet, Take 1 tablet by mouth every 4 to 6 hours as needed for Pain., Disp: 60 tablet, Rfl: 0    ketoconazole (NIZORAL) 2 % cream, Apply topically 2 (two) times daily., Disp: 60 g, Rfl: 1    ketoconazole (NIZORAL) 2 % shampoo, Apply topically twice a week., Disp: 120 mL, Rfl: 6    metoprolol succinate (TOPROL-XL) 50 MG 24 hr tablet, Take 1 tablet (50 mg total) by mouth once daily., Disp: 90 tablet, Rfl: 3    nitroGLYCERIN (NITROSTAT) 0.4 MG SL tablet, Place 1 tablet (0.4 mg total) under the tongue every 5 (five) minutes as needed for Chest pain., Disp: 30 tablet, Rfl: 0    omeprazole (PRILOSEC) 40 MG capsule, Take 1 capsule (40 mg total) by mouth once daily., Disp: 30 capsule, Rfl: 11    predniSONE (DELTASONE) 10 MG tablet, Take 3 a day x 3 days then 2 a day x 3 days then 1 a day x 3 days, Disp: 18 tablet, Rfl: 0    promethazine (PHENERGAN) 25 MG tablet, Take 1 tablet (25 mg total) by mouth every 4 to 6 hours as needed., Disp: 30 tablet, Rfl: 5    rivaroxaban (XARELTO) 15 mg Tab, Take 1 tablet (15 mg total) by mouth daily with dinner or evening meal., Disp: 42 tablet, Rfl: 0    tiZANidine (ZANAFLEX) 4 MG tablet, TAKE 1 TABLET BY MOUTH EVERY 6 HOURS AS NEEDED FOR BACK PAIN, Disp: 360 tablet, Rfl: 0    traMADoL (ULTRAM) 50 mg tablet, Take 1 tablet (50 mg total) by mouth every 6 (six) hours as needed for Pain., Disp: 30 tablet, Rfl: 2    triamcinolone acetonide 0.025% (KENALOG) 0.025 % cream, Apply topically 2 (two) times daily., Disp: 80 g, Rfl: 1    umeclidinium-vilanteroL (ANORO ELLIPTA) 62.5-25 mcg/actuation DsDv, Inhale 1 puff into the lungs once daily. Controller, Disp: 1 each, Rfl: 5    valACYclovir (VALTREX) 1000 MG tablet, TAKE 2 AT ONSET OF FEVER BLISTERS THEN REPEAT IN 12 HOURS (TOTAL 4 TABS PER EPISODE), Disp: 20 tablet, Rfl: 3    zinc 50 mg Tab,  Take 1 tablet by mouth once daily., Disp: , Rfl:         Objective:       Physical Examination:     /61 (BP Location: Right arm, Patient Position: Sitting)   Pulse 83   Temp 98 °F (36.7 °C)   Resp 17   Wt 66.7 kg (147 lb)   SpO2 95%   BMI 26.04 kg/m²     Physical Exam  Constitutional:       Appearance: Normal appearance.   HENT:      Head: Normocephalic and atraumatic.   Eyes:      General: No scleral icterus.     Conjunctiva/sclera: Conjunctivae normal.   Cardiovascular:      Rate and Rhythm: Normal rate.   Pulmonary:      Effort: Pulmonary effort is normal.   Abdominal:      General: Abdomen is flat.   Neurological:      General: No focal deficit present.      Mental Status: She is alert and oriented to person, place, and time.   Psychiatric:         Mood and Affect: Mood normal.         Behavior: Behavior normal.         Thought Content: Thought content normal.         Judgment: Judgment normal.         Labs:   Recent Results (from the past 2 weeks)   CBC W/ AUTO DIFFERENTIAL    Collection Time: 10/08/24  4:44 PM   Result Value Ref Range    WBC 5.77 3.90 - 12.70 K/uL    Hemoglobin 13.8 12.0 - 16.0 g/dL    Hematocrit 41.2 37.0 - 48.5 %    Platelets 250 150 - 450 K/uL           CMP  Sodium   Date Value Ref Range Status   10/08/2024 145 136 - 145 mmol/L Final     Potassium   Date Value Ref Range Status   10/08/2024 4.0 3.5 - 5.1 mmol/L Final     Chloride   Date Value Ref Range Status   10/08/2024 109 95 - 110 mmol/L Final     CO2   Date Value Ref Range Status   10/08/2024 27 23 - 29 mmol/L Final     Glucose   Date Value Ref Range Status   10/08/2024 103 70 - 110 mg/dL Final     BUN   Date Value Ref Range Status   10/08/2024 14 8 - 23 mg/dL Final     Creatinine   Date Value Ref Range Status   10/08/2024 0.6 0.5 - 1.4 mg/dL Final     Calcium   Date Value Ref Range Status   10/08/2024 8.9 8.7 - 10.5 mg/dL Final     Total Protein   Date Value Ref Range Status   10/08/2024 7.0 6.0 - 8.4 g/dL Final     Albumin  "  Date Value Ref Range Status   10/08/2024 4.3 3.5 - 5.2 g/dL Final     Total Bilirubin   Date Value Ref Range Status   10/08/2024 1.3 (H) 0.1 - 1.0 mg/dL Final     Comment:     For infants and newborns, interpretation of results should be based  on gestational age, weight and in agreement with clinical  observations.    Premature Infant recommended reference ranges:  Up to 24 hours.............<8.0 mg/dL  Up to 48 hours............<12.0 mg/dL  3-5 days..................<15.0 mg/dL  6-29 days.................<15.0 mg/dL       Alkaline Phosphatase   Date Value Ref Range Status   10/08/2024 83 55 - 135 U/L Final     AST   Date Value Ref Range Status   10/08/2024 16 10 - 40 U/L Final     ALT   Date Value Ref Range Status   10/08/2024 14 10 - 44 U/L Final     Anion Gap   Date Value Ref Range Status   10/08/2024 9 8 - 16 mmol/L Final     eGFR if    Date Value Ref Range Status   03/29/2022 >60.0 >60 mL/min/1.73 m^2 Final     eGFR if non    Date Value Ref Range Status   03/29/2022 >60.0 >60 mL/min/1.73 m^2 Final     Comment:     Calculation used to obtain the estimated glomerular filtration  rate (eGFR) is the CKD-EPI equation.        No results found for: "CEA"  No results found for: "PSA"        Assessment/Plan:     Problem List Items Addressed This Visit       LUNG CANCER-ADENOCARCINOMA OF THE LLL - Primary     Patient is doing ok on the Atezo and has no clear signs of AI disease.  She has only one cycle remaining on 10/28.  Will proceed, labs allowing and will have her back with me in about 4 weeks.      Note is made of new adenopathy in the inguinal area.  Will get biopsy but this would be a surprising area of cancer spread.  Discussed this today.               Discussion:     Follow up in about 4 weeks (around 11/6/2024).      Electronically signed by Raad Bee      "

## 2024-10-09 NOTE — ASSESSMENT & PLAN NOTE
Patient is doing ok on the Atezo and has no clear signs of AI disease.  She has only one cycle remaining on 10/28.  Will proceed, labs allowing and will have her back with me in about 4 weeks.      Note is made of new adenopathy in the inguinal area.  Will get biopsy but this would be a surprising area of cancer spread.  Discussed this today.

## 2024-10-11 ENCOUNTER — HOSPITAL ENCOUNTER (OUTPATIENT)
Dept: RADIOLOGY | Facility: HOSPITAL | Age: 69
Discharge: HOME | End: 2024-10-11
Attending: INTERNAL MEDICINE
Payer: MEDICARE

## 2024-10-11 ENCOUNTER — TELEPHONE (OUTPATIENT)
Dept: PULMONOLOGY | Facility: CLINIC | Age: 69
End: 2024-10-11
Payer: MEDICARE

## 2024-10-11 DIAGNOSIS — R19.09 MASS OF LEFT INGUINAL REGION: ICD-10-CM

## 2024-10-11 PROCEDURE — 27000342 US BIOPSY LYMPH NODES (XPD)

## 2024-10-11 PROCEDURE — 27200940 US BIOPSY LYMPH NODES (XPD)

## 2024-10-11 PROCEDURE — A4215 STERILE NEEDLE: HCPCS

## 2024-10-11 PROCEDURE — 27202044 US BIOPSY LYMPH NODES (XPD)

## 2024-10-11 PROCEDURE — 63600175 PHARM REV CODE 636 W HCPCS: Performed by: RADIOLOGY

## 2024-10-11 PROCEDURE — 38505 NEEDLE BIOPSY LYMPH NODES: CPT | Performed by: RADIOLOGY

## 2024-10-11 PROCEDURE — 88305 TISSUE EXAM BY PATHOLOGIST: CPT | Mod: TC | Performed by: PATHOLOGY

## 2024-10-11 PROCEDURE — 88342 IMHCHEM/IMCYTCHM 1ST ANTB: CPT | Mod: TC | Performed by: PATHOLOGY

## 2024-10-11 RX ORDER — LIDOCAINE HYDROCHLORIDE 10 MG/ML
INJECTION, SOLUTION EPIDURAL; INFILTRATION; INTRACAUDAL; PERINEURAL
Status: COMPLETED | OUTPATIENT
Start: 2024-10-11 | End: 2024-10-11

## 2024-10-11 RX ADMIN — LIDOCAINE HYDROCHLORIDE 5 ML: 10 INJECTION, SOLUTION EPIDURAL; INFILTRATION; INTRACAUDAL; PERINEURAL at 01:10

## 2024-10-11 NOTE — DISCHARGE INSTRUCTIONS
Medical Imaging Discharge Instructions    Discharge Instructions After:  Lymph Node Biopsy    Diet:  Resume diet as prescribed by your physician    Medications:  Resume medications as prescribed by your physician    Activity:  Rest today    Care of Dressing and Incision:  May change dressing or bandage as needed    Report to M.[facility] any of the Following:  Any severe pain, new onset pain or worsening symptoms, Excessive bleeding, bruising or swelling, and Temperature of 101 degrees or above    Follow up with your physician regarding the results of this exam. Please feel free to call the radiology department at [phone] for any problems or questions. Ask to speak with the radiology nurse.    Kaylyn Blount RN  Date of Service: 10/11/2024  12:44 PM

## 2024-10-11 NOTE — PLAN OF CARE
Us guided lymph node bx completed.  Pt jeremias well.  Band aid to left upper thigh, no bleeding noted to site.  Pt given d/c instructions and verbalized understanding.  Pt amb out of dept with steady gait.

## 2024-10-16 ENCOUNTER — TELEPHONE (OUTPATIENT)
Facility: CLINIC | Age: 69
End: 2024-10-16
Payer: MEDICARE

## 2024-10-16 NOTE — TELEPHONE ENCOUNTER
----- Message from IRAIS Cohen sent at 10/16/2024  1:07 PM CDT -----  Please call and notify patient her biopsy does not show any cancer. Continue current treatment plan and follow up as scheduled.

## 2024-10-16 NOTE — PROGRESS NOTES
Please call and notify patient her biopsy does not show any cancer. Continue current treatment plan and follow up as scheduled.

## 2024-10-17 ENCOUNTER — OFFICE VISIT (OUTPATIENT)
Dept: PULMONOLOGY | Facility: CLINIC | Age: 69
End: 2024-10-17
Payer: MEDICARE

## 2024-10-17 VITALS
HEIGHT: 63 IN | OXYGEN SATURATION: 97 % | HEART RATE: 76 BPM | WEIGHT: 146.31 LBS | DIASTOLIC BLOOD PRESSURE: 62 MMHG | SYSTOLIC BLOOD PRESSURE: 100 MMHG | BODY MASS INDEX: 25.93 KG/M2 | TEMPERATURE: 98 F

## 2024-10-17 DIAGNOSIS — J43.9 PULMONARY EMPHYSEMA, UNSPECIFIED EMPHYSEMA TYPE: Primary | ICD-10-CM

## 2024-10-17 DIAGNOSIS — Z87.891 PERSONAL HISTORY OF TOBACCO USE, PRESENTING HAZARDS TO HEALTH: ICD-10-CM

## 2024-10-17 DIAGNOSIS — C34.32 MALIGNANT NEOPLASM OF LOWER LOBE OF LEFT LUNG: ICD-10-CM

## 2024-10-17 NOTE — PROGRESS NOTES
Office Visit    Patient Name: Aysha Moore  MRN: [id number]  : 1955      Reason for visit: COPD    HPI:     2019 - Here for evaluation of COPD.  Has been hospitalized twice for respiratory issues.  Here more recently has noted some symptoms but has been out of BREO for a week or so.  Has a h/o smoking - has quit cigarettes but is using a vape.  Has smoked about 1 PPD for about 40 years.    2019 - Here for follow up, no new issues reported.  Still vaping (d/we pt).  Reviewed PFT with pt.  Pt is currently stable on present medications with no recent increases in their symptoms or use of rescue medications.  I have reviewed the medical regimen and re-educated the pt on the role of rescue and controlling medications.  All questions answered.  Inhaler technique seems adequate.    2021 - Here for follow up, Pt is currently stable on present medications with no recent increases in their symptoms or use of rescue medications.  Since our last visit there have been no hospitalizations or ER visits for their respiratory issues and there does not seem to be anything to suggest unrecognized exacerbations.  I have reviewed the medical regimen and re-educated the pt on the role of rescue and controlling medications.  Inhaler technique and understanding seems adequate.  The patient reports no issues with any of there medications for their COPD.  Refills will be taken care of as needed.  All questions answered.  She stopped singulair - she felt that it made her short tempered and she is better since stopping it.  She had a MI recently.  Continues with some sinus infection - seen at Urgent Care given doxycycline but developed itching.  Still with symptoms.  Patient has no known corona virus exposures and has been practicing social distancing.  We have discussed the virus and precautions and all questions have been answered.    8/3/2021 - Here for follow up, Pt is currently stable on present medications with no  recent increases in their symptoms or use of rescue medications.  Since our last visit there have been no hospitalizations or ER visits for their respiratory issues and there does not seem to be anything to suggest unrecognized exacerbations.  I have reviewed the medical regimen and re-educated the pt on the role of rescue and controlling medications.  Inhaler technique and understanding seems adequate.  The patient reports no issues with any of there medications for their COPD.  Refills will be taken care of as needed.  All questions answered.  Has been taken off of lopressor due to decreased BP.  Patient has no known corona virus exposures and has been practicing social distancing.  We have discussed the virus and precautions and all questions have been answered.    2/1/2022 - Here for follow up, was sick for about 3 weeks around [facility] (had known Covid exposure, had HA, sore throat, weak, cough, increased dyspnea, low grade fever).  She did 2 rapid tests which were negative but is interested in getting Covid antibodies checked.  Pt is currently stable on present medications with no recent increases in their symptoms or use of rescue medications.  Since our last visit there have been no hospitalizations or ER visits for their respiratory issues and there does not seem to be anything to suggest unrecognized exacerbations.  I have reviewed the medical regimen and re-educated the pt on the role of rescue and controlling medications.  Inhaler technique and understanding seems adequate.  The patient reports no issues with any of there medications for their COPD.  Refills will be taken care of as needed.  All questions answered.  She has been otherwise stable except for recent illness.  She has not been vaccinated for Covid.    10/5/2022 - Here for follow p, no new issues or problems reported.  We reviewed her CT scans and she will need a follow up CT in 5/2023.  Pt is currently stable on present medications with no  recent increases in their symptoms or use of rescue medications.  Since our last visit there have been no hospitalizations or ER visits for their respiratory issues and there does not seem to be anything to suggest unrecognized exacerbations.  I have reviewed the medical regimen and re-educated the pt on the role of rescue and controlling medications.  Inhaler technique and understanding seems adequate.  The patient reports no issues with any of there medications for their COPD.  Refills will be taken care of as needed.  All questions answered.  Did have Covid in early summer but was not very sick.    5/24/2023 - Here for follow up, Pt is currently stable on present medications with no recent increases in their symptoms or use of rescue medications.  Since our last visit there have been no hospitalizations or ER visits for their respiratory issues and there does not seem to be anything to suggest unrecognized exacerbations.  I have reviewed the medical regimen and re-educated the pt on the role of rescue and controlling medications.  Inhaler technique and understanding seems adequate.  The patient reports no issues with any of there medications for their COPD.  Refills will be taken care of as needed.  All questions answered.  Having issues with her sinuses and she is seeing Dr Chisholm and they are considering surgery.  Reviewed last PFT with her.  She has a skin lesion on her leg and is to see dermatology (she has a FMHx of melanoma and a prior melanoma).       6/20/2023 - Here for results - reviewed CT and PET scans with pt and daughter and all questions answered - we will proceed with Ct guided needle biopsy.    7/18/2023 - Here for biopsy results - + adenocarcinoma.  D/W pt and  and discussed PET findings (also reviewed PET scan).  At this point she needs MRI and referral to oncology.  All questions answered.    8/24/2023 - Here for follow up and so far is tolerating her therapy pretty well.  Asked her if  "she needs any help at home and she is OK.  She is eating a lot and has gained some weight.  No weakness issues.    9/6/2023 - PFT (8/16/23)  Mild obstruction (FEV1 - - 76%), improved with BD  No restriction  Moderate decrease DLCO (41%)    11/28/2023 - Here for follow up, was hospitalized with PE and is on treatments.  Last Ct scan reviewed (see below).  She has completed XRT and initial chemo and is on immunotherapy.  She has noted some increased SOB, DAILY for " a while".  No other new issues.    12/28/2023 - Here for follow up, feels OK but still with some exertional SOB.  No f,c,ns, cough, sputum.  We reviewed CT scans from 11/2023 and 12/2023 and I think the later one actually is better.  Her immunotherapy was held this month.    2/8/2024 - Here for follow up, was sick last week and started on antibiotics and is feeling better but still with some cough.  No fevers.  Any sputum now is white.      10/17/2024 - Here for follow up, has completed radiation and chemotherapy, currently on immunotherapy (but about to finish).  Overall doing OK.  Here for follow up visit.  Patient is currently stable on present medications with no recent increases in their symptoms or use of rescue medications.  Since our last visit there have been no hospitalizations or ER visits for their respiratory issues and there does not seem to be anything to suggest unrecognized exacerbations.  I have reviewed the medical regimen and re-educated the pt on the role of rescue and controlling medications.  Inhaler technique and understanding seems adequate.  The patient reports no issues with any of there medications for their COPD.  Refills will be taken care of as needed.  All questions answered.  We have discussed potential future therapies or options as appropriate.  Most recent PET scan reviewed she had a biopsy of the lymph node which was benign.  Have discussed vaccines with patient including but not limited to the influenza vaccine, " "pneumonia vaccine, RSV vaccine, Shingles vaccine, tetanus vaccine and Covid vaccine.  We discussed the current recommendations and the patient's current status.  I have recommended vaccines as appropriate for the individual patient.  All questions have been answered.    COPD Flowsheet    GOLD A    Last PFT - 8/2019  FEV1- 74 % DLCO - 50 %     + LABA/LAMA    + prn ALBUTEROL    mMRC -  0 - SOB with strenuous exercise   - + 1 - SOB level ground, slight hill   -  2 - SOB walk slower or stop for breath level ground   -  3 - SOB at 100 yards or after few minutes   -  4 - SOB in house, dressing    Referral to PULMONARY REHABILITATION - NO    Tested for alpha-1-antitrypsin - NO  Result - na    Cigarette Counseling    Currently smoking 0 packs per day  40 pack years    Not vaping    I have counseled pt for 3-5 minutes regarding cigarette cessation.  This has included the need to stop smoking as well as strategies, including but not limited to "cold turkey", CHANTIX (including risks and benefits of whitney drug), nicotine replacement and WELLBUTRIN.    Past Medical History    Past Medical History:   Diagnosis Date    Allergy     Anxiety     Arthritis     CAD (coronary artery disease)     coronary stents    Chronic back pain     lower back pain radiates to left leg    Chronic rhinitis     DAILY (dyspnea on exertion)     Hyperlipidemia     Hypertension     Lung cancer 07/01/2023    Melanoma in situ of left upper extremity     left thigh area    MI (myocardial infarction)     New onset type 2 diabetes mellitus 02/08/2024    Seasonal allergic rhinitis 10/29/2020       Past Surgical History    Past Surgical History:   Procedure Laterality Date    BREAST SURGERY      breast reduction    CATARACT EXTRACTION, BILATERAL      coranary stent      EXCISION OF MELANOMA Left 3/30/2022    Procedure: EXCISION, MELANOMA;  Surgeon: David Mcpherson III, MD;  Location: [facility];  Service: General;  Laterality: Left;    EXCISIONAL BIOPSY Left " 3/30/2022    Procedure: EXCISIONAL BIOPSY;  Surgeon: David Mcpherson III, MD;  Location: [facility] OR;  Service: General;  Laterality: Left;    HYSTERECTOMY      total    INSERTION OF TUNNELED CENTRAL VENOUS CATHETER (CVC) WITH SUBCUTANEOUS PORT N/A 8/4/2023    Procedure: [id number];  Surgeon: Remi Mckeon Jr., MD;  Location: [facility] OR;  Service: General;  Laterality: N/A;    LEFT HEART CATHETERIZATION Left 10/16/2020    Procedure: Left heart cath;  Surgeon: Garrett Robins MD;  Location: [facility] CATH/EP LAB;  Service: Cardiology;  Laterality: Left;    OOPHORECTOMY      TOTAL REDUCTION MAMMOPLASTY Bilateral 2000       Medications      Current Outpatient Medications:     albuterol (PROVENTIL/VENTOLIN HFA) 90 mcg/actuation inhaler, Inhale 2 puffs into the lungs every 6 (six) hours as needed. Rescue, Disp: 18 g, Rfl: 5    ammonium lactate 12 % Crea, aaa bid prn dry skin, Disp: 385 g, Rfl: 11    atorvastatin (LIPITOR) 80 MG tablet, TAKE 1 TABLET BY MOUTH EVERY EVENING, Disp: 90 tablet, Rfl: 1    azelastine (ASTELIN) 137 mcg (0.1 %) nasal spray, USE 1 SPRAY IN EACH NOSTRIL 2 TIMES DAILY AS NEEDED FOR RHINITIS OR SINUSITIS, Disp: 90 mL, Rfl: 3    calcium-vitamin D3 (OS-JOLIE 500 + D3) 500 mg-5 mcg (200 unit) per tablet, Take 1 tablet by mouth every morning., Disp: , Rfl:     citalopram (CELEXA) 40 MG tablet, Take 1 tablet (40 mg total) by mouth once daily., Disp: 30 tablet, Rfl: 11    ezetimibe (ZETIA) 10 mg tablet, TAKE 1 TABLET BY MOUTH EVERY DAY, Disp: 90 tablet, Rfl: 0    famotidine (PEPCID) 40 MG tablet, TAKE 1 TABLET BY MOUTH EVERY DAY IN THE EVENING, Disp: 90 tablet, Rfl: 1    fluticasone propionate (FLONASE) 50 mcg/actuation nasal spray, 1 spray (50 mcg total) by Each Nostril route daily as needed for Rhinitis or Allergies., Disp: 9.9 mL, Rfl: 2    ketoconazole (NIZORAL) 2 % cream, Apply topically 2 (two) times daily., Disp: 60 g, Rfl: 1    ketoconazole (NIZORAL) 2 % shampoo, Apply topically twice a  week., Disp: 120 mL, Rfl: 6    nitroGLYCERIN (NITROSTAT) 0.4 MG SL tablet, Place 1 tablet (0.4 mg total) under the tongue every 5 (five) minutes as needed for Chest pain., Disp: 30 tablet, Rfl: 0    promethazine (PHENERGAN) 25 MG tablet, Take 1 tablet (25 mg total) by mouth every 4 to 6 hours as needed., Disp: 30 tablet, Rfl: 5    traMADoL (ULTRAM) 50 mg tablet, Take 1 tablet (50 mg total) by mouth every 6 (six) hours as needed for Pain., Disp: 30 tablet, Rfl: 2    triamcinolone acetonide 0.025% (KENALOG) 0.025 % cream, Apply topically 2 (two) times daily., Disp: 80 g, Rfl: 1    umeclidinium-vilanteroL (ANORO ELLIPTA) 62.5-25 mcg/actuation DsDv, Inhale 1 puff into the lungs once daily. Controller, Disp: 1 each, Rfl: 5    valACYclovir (VALTREX) 1000 MG tablet, TAKE 2 AT ONSET OF FEVER BLISTERS THEN REPEAT IN 12 HOURS (TOTAL 4 TABS PER EPISODE), Disp: 20 tablet, Rfl: 3    ALPRAZolam (XANAX) 0.25 MG tablet, Take 1 tablet (0.25 mg total) by mouth 3 (three) times daily as needed for Anxiety., Disp: 30 tablet, Rfl: 1    amoxicillin-clavulanate 875-125mg (AUGMENTIN) 875-125 mg per tablet, Take 1 tablet by mouth 2 (two) times daily., Disp: 20 tablet, Rfl: 0    clobetasol 0.05% (TEMOVATE) 0.05 % Oint, Apply topically 2 (two) times daily. for 14 days (Patient taking differently: Apply 1 g topically 2 (two) times daily.), Disp: 45 g, Rfl: 3    HYDROcodone-acetaminophen (NORCO) 5-325 mg per tablet, Take 1 tablet by mouth every 4 to 6 hours as needed for Pain. (Patient not taking: Reported on 10/17/2024), Disp: 60 tablet, Rfl: 0    metoprolol succinate (TOPROL-XL) 50 MG 24 hr tablet, Take 1 tablet (50 mg total) by mouth once daily., Disp: 90 tablet, Rfl: 3    omeprazole (PRILOSEC) 40 MG capsule, Take 1 capsule (40 mg total) by mouth once daily., Disp: 30 capsule, Rfl: 11    predniSONE (DELTASONE) 10 MG tablet, Take 3 a day x 3 days then 2 a day x 3 days then 1 a day x 3 days, Disp: 18 tablet, Rfl: 0    rivaroxaban (XARELTO) 15  mg Tab, Take 1 tablet (15 mg total) by mouth daily with dinner or evening meal., Disp: 42 tablet, Rfl: 0    tiZANidine (ZANAFLEX) 4 MG tablet, TAKE 1 TABLET BY MOUTH EVERY 6 HOURS AS NEEDED FOR BACK PAIN, Disp: 360 tablet, Rfl: 0    zinc 50 mg Tab, Take 1 tablet by mouth once daily., Disp: , Rfl:     Allergies    Review of patient's allergies indicates:   Allergen Reactions    Cephalexin      Other reaction(s): Rash    Doxycycline monohydrate Hives    Lanoxin  [digoxin]      Other reaction(s): Rash    Lansoprazole      Other reaction(s): Rash    Macrobid [nitrofurantoin monohyd/m-cryst] Rash       SocHx    Social History     Tobacco Use   Smoking Status Former    Current packs/day: 0.00    Average packs/day: 1 pack/day for 43.2 years (43.2 ttl pk-yrs)    Types: Cigarettes, Vaping with nicotine    Start date:     Quit date: 3/4/2017    Years since quittin.6   Smokeless Tobacco Never       Social History     Substance and Sexual Activity   Alcohol Use Not Currently    Alcohol/week: 0.0 standard drinks of alcohol    Comment: sometimes       Drug Use - no  Occupation - housewife  Asbestos exposure - no  Pets - dogs    FMHx    Family History   Problem Relation Name Age of Onset    Cancer Mother          breast, ovarian, cervical     Heart disease Mother      Breast cancer Mother  50    Ovarian cancer Mother      Cancer Father          melanoma    Stroke Sister      Heart disease Sister      Cancer Sister          leukemia    Macular degeneration Sister      Heart disease Sister      Heart disease Brother      Lung cancer Brother      Cancer Maternal Uncle      Cancer Paternal Aunt          breast    Breast cancer Paternal Aunt  60    Cancer Paternal Uncle      Heart disease Maternal Grandmother      No Known Problems Daughter      No Known Problems Son           Review of Systems  Review of Systems   Constitutional:  Negative for chills, diaphoresis, fever, malaise/fatigue and weight loss.   HENT:  Positive for  "congestion and tinnitus. Negative for ear discharge, ear pain, hearing loss, nosebleeds, sinus pain and sore throat.         Has had tinnitus for years (has seen ENT)   Eyes:  Negative for pain.   Respiratory:  Positive for shortness of breath and wheezing. Negative for cough, hemoptysis, sputum production and stridor.    Cardiovascular:  Negative for chest pain, palpitations, orthopnea, claudication, leg swelling and PND.        H/o PCI   Gastrointestinal:  Negative for abdominal pain, blood in stool, constipation, diarrhea, heartburn, melena, nausea and vomiting.   Genitourinary:  Negative for dysuria, flank pain, frequency, hematuria and urgency.   Musculoskeletal:  Positive for back pain, joint pain and neck pain. Negative for falls and myalgias.        Right knee meniscal injury   Skin:  Negative for itching and rash.   Neurological:  Negative for dizziness, tingling, tremors, sensory change, speech change, focal weakness, seizures, weakness and headaches.   Endo/Heme/Allergies:  Negative for environmental allergies.   Psychiatric/Behavioral:  Negative for depression, substance abuse and suicidal ideas. The patient is not nervous/anxious.        Physical Exam    Vitals:    10/17/24 1301   BP: 100/62   BP Location: Left arm   Patient Position: Sitting   Pulse: 76   Temp: 97.8 °F (36.6 °C)   TempSrc: Temporal   SpO2: 97%   Weight: 66.4 kg (146 lb 4.8 oz)   Height: 5' 3" (1.6 m)       Physical Exam  Vitals and nursing note reviewed.   Constitutional:       General: She is not in acute distress.     Appearance: She is well-developed. She is ill-appearing. She is not toxic-appearing or diaphoretic.   HENT:      Head: Normocephalic and atraumatic.      Right Ear: External ear normal.      Left Ear: External ear normal.      Nose: Nose normal. No congestion or rhinorrhea.      Mouth/Throat:      Mouth: Mucous membranes are moist.      Pharynx: Oropharynx is clear. No oropharyngeal exudate.   Eyes:      General: No " scleral icterus.        Right eye: No discharge.         Left eye: No discharge.      Extraocular Movements: Extraocular movements intact.      Conjunctiva/sclera: Conjunctivae normal.      Pupils: Pupils are equal, round, and reactive to light.   Neck:      Thyroid: No thyromegaly.      Vascular: No carotid bruit or JVD.      Trachea: No tracheal deviation.   Cardiovascular:      Rate and Rhythm: Normal rate and regular rhythm.      Heart sounds: Normal heart sounds. No murmur heard.     No friction rub. No gallop.   Pulmonary:      Effort: Pulmonary effort is normal. No respiratory distress.      Breath sounds: No stridor. Rales (left base) present. No wheezing or rhonchi.   Chest:      Chest wall: No tenderness.   Abdominal:      General: Bowel sounds are normal. There is no distension.      Palpations: Abdomen is soft.      Tenderness: There is no abdominal tenderness. There is no guarding.   Musculoskeletal:         General: No tenderness. Normal range of motion.      Cervical back: Normal range of motion and neck supple. No rigidity or tenderness.      Right lower leg: No edema.      Left lower leg: No edema.   Lymphadenopathy:      Cervical: No cervical adenopathy.   Skin:     General: Skin is warm and dry.   Neurological:      General: No focal deficit present.      Mental Status: She is alert and oriented to person, place, and time. Mental status is at baseline.      Cranial Nerves: No cranial nerve deficit.      Motor: No weakness.   Psychiatric:         Mood and Affect: Mood normal.         Behavior: Behavior normal.         Thought Content: Thought content normal.         Judgment: Judgment normal.         Labs    Lab Results   Component Value Date    WBC 5.77 10/08/2024    HGB 13.8 10/08/2024    HCT 41.2 10/08/2024     10/08/2024       Sodium   Date Value Ref Range Status   10/08/2024 145 136 - 145 mmol/L Final     Potassium   Date Value Ref Range Status   10/08/2024 4.0 3.5 - 5.1 mmol/L Final      Chloride   Date Value Ref Range Status   10/08/2024 109 95 - 110 mmol/L Final     CO2   Date Value Ref Range Status   10/08/2024 27 23 - 29 mmol/L Final     Glucose   Date Value Ref Range Status   10/08/2024 103 70 - 110 mg/dL Final     BUN   Date Value Ref Range Status   10/08/2024 14 8 - 23 mg/dL Final     Creatinine   Date Value Ref Range Status   10/08/2024 0.6 0.5 - 1.4 mg/dL Final     Calcium   Date Value Ref Range Status   10/08/2024 8.9 8.7 - 10.5 mg/dL Final     Total Protein   Date Value Ref Range Status   10/08/2024 7.0 6.0 - 8.4 g/dL Final     Albumin   Date Value Ref Range Status   10/08/2024 4.3 3.5 - 5.2 g/dL Final     Total Bilirubin   Date Value Ref Range Status   10/08/2024 1.3 (H) 0.1 - 1.0 mg/dL Final     Comment:     For infants and newborns, interpretation of results should be based  on gestational age, weight and in agreement with clinical  observations.    Premature Infant recommended reference ranges:  Up to 24 hours.............<8.0 mg/dL  Up to 48 hours............<12.0 mg/dL  3-5 days..................<15.0 mg/dL  6-29 days.................<15.0 mg/dL       Alkaline Phosphatase   Date Value Ref Range Status   10/08/2024 83 55 - 135 U/L Final     AST   Date Value Ref Range Status   10/08/2024 16 10 - 40 U/L Final     ALT   Date Value Ref Range Status   10/08/2024 14 10 - 44 U/L Final     Anion Gap   Date Value Ref Range Status   10/08/2024 9 8 - 16 mmol/L Final       Xrays    CT chest (5/2022)  1.  No pulmonary nodules identified.  2.  Mild/moderate emphysematous lung disease.  3.  No consolidation or pleural effusion.     LUNG RADS CATEGORY 1: NEGATIVE    CT chest (11/10/23)  1. Nodular density in the posterior left lower lobe, essentially unchanged from the previous exam when accounting for technique.  2. Scattered patchy airspace opacities throughout the left lung, increased in size/distribution from the previous exam, the differential includes posttreatment/radiation change,  atelectasis/scarring, infection/pneumonia, noting malignancy is not excluded and interval follow-up would be warranted.  3. Enlarging left cervical/supraclavicular lymphadenopathy.  4. Small wedge-shaped hypodensities in the spleen, in keeping with small splenic infarcts, similar in distribution the previous exam    CT chest (12/11/23)  There is no CT evidence of acute pulmonary thromboembolism. There are ill-defined groundglass opacities in the left upper lobe that are new since the most recent previous CT chest dated 11/10/2023, and are suspicious for pneumonia. Patchy areas of consolidation in the left lower lobe shown on the previous CT are somewhat improved. Significant residual atelectasis and/or scarring persists in the left lower lobe. Moderate emphysema is present.     PET (10/3/2024)  New enlarged, hypermetabolic left inguinal lymph node.  Metastatic disease is possible.  This would be amenable to ultrasound-guided core needle sampling.  Post treatment changes of known left lung malignancy with associated pleuroparenchymal scarring.  Low-level FDG activity associated with left hilar soft tissue density, for which continued attention on follow-up is recommended.  Stable mediastinal lymph nodes, not FDG avid.  Cholelithiasis, atherosclerosis, diverticulosis, and other incidental findings as above.    Impression/Plan    Problem List Items Addressed This Visit          Pulmonary    Pulmonary emphysema - Primary  Continue present medications.  Will refill medications as needed.  Instructed patient to contact us with any issues concerning their medications (cost, reactions, etc.).  Have discussed with patient about inciting conditions which may exacerbate their disease.  We did discuss possible new therapies or de-escalation of therapy (if appropriate).  Asked patient if they were interested in pursuing pulmonary rehabilitation.  All questions answered  RTC 6 months  Patient instructed that they are to call if  symptoms change or new issues develop prior to their next visit.  Prednisone taper to try and settle cough down                     Other    Personal history of tobacco use, presenting hazards to health   has quit      Adenocarcinoma lung  As above  Continue with followup                          Raad Abad MD

## 2024-10-29 ENCOUNTER — LAB VISIT (OUTPATIENT)
Dept: LAB | Facility: HOSPITAL | Age: 69
End: 2024-10-29
Attending: INTERNAL MEDICINE
Payer: MEDICARE

## 2024-10-29 DIAGNOSIS — C34.90 METASTATIC LUNG CANCER (METASTASIS FROM LUNG TO OTHER SITE), UNSPECIFIED LATERALITY: ICD-10-CM

## 2024-10-29 LAB
ALBUMIN SERPL BCP-MCNC: 4.4 G/DL (ref 3.5–5.2)
ALP SERPL-CCNC: 87 U/L (ref 55–135)
ALT SERPL W/O P-5'-P-CCNC: 16 U/L (ref 10–44)
ANION GAP SERPL CALC-SCNC: 8 MMOL/L (ref 8–16)
AST SERPL-CCNC: 18 U/L (ref 10–40)
BASOPHILS # BLD AUTO: 0.03 K/UL (ref 0–0.2)
BASOPHILS NFR BLD: 0.6 % (ref 0–1.9)
BILIRUB SERPL-MCNC: 1.7 MG/DL (ref 0.1–1)
BUN SERPL-MCNC: 12 MG/DL (ref 8–23)
CALCIUM SERPL-MCNC: 9.3 MG/DL (ref 8.7–10.5)
CHLORIDE SERPL-SCNC: 105 MMOL/L (ref 95–110)
CO2 SERPL-SCNC: 28 MMOL/L (ref 23–29)
CREAT SERPL-MCNC: 0.8 MG/DL (ref 0.5–1.4)
DIFFERENTIAL METHOD BLD: ABNORMAL
EOSINOPHIL # BLD AUTO: 0.2 K/UL (ref 0–0.5)
EOSINOPHIL NFR BLD: 3.3 % (ref 0–8)
ERYTHROCYTE [DISTWIDTH] IN BLOOD BY AUTOMATED COUNT: 12.3 % (ref 11.5–14.5)
EST. GFR  (NO RACE VARIABLE): >60 ML/MIN/1.73 M^2
GLUCOSE SERPL-MCNC: 114 MG/DL (ref 70–110)
HCT VFR BLD AUTO: 42.2 % (ref 37–48.5)
HGB BLD-MCNC: 14.1 G/DL (ref 12–16)
IMM GRANULOCYTES # BLD AUTO: 0.01 K/UL (ref 0–0.04)
IMM GRANULOCYTES NFR BLD AUTO: 0.2 % (ref 0–0.5)
LYMPHOCYTES # BLD AUTO: 1.7 K/UL (ref 1–4.8)
LYMPHOCYTES NFR BLD: 34.3 % (ref 18–48)
MAGNESIUM SERPL-MCNC: 1.9 MG/DL (ref 1.6–2.6)
MCH RBC QN AUTO: 31.2 PG (ref 27–31)
MCHC RBC AUTO-ENTMCNC: 33.4 G/DL (ref 32–36)
MCV RBC AUTO: 93 FL (ref 82–98)
MONOCYTES # BLD AUTO: 0.6 K/UL (ref 0.3–1)
MONOCYTES NFR BLD: 11.5 % (ref 4–15)
NEUTROPHILS # BLD AUTO: 2.4 K/UL (ref 1.8–7.7)
NEUTROPHILS NFR BLD: 50.1 % (ref 38–73)
NRBC BLD-RTO: 0 /100 WBC
PLATELET # BLD AUTO: 267 K/UL (ref 150–450)
PMV BLD AUTO: 9.4 FL (ref 9.2–12.9)
POTASSIUM SERPL-SCNC: 4.4 MMOL/L (ref 3.5–5.1)
PROT SERPL-MCNC: 7.3 G/DL (ref 6–8.4)
RBC # BLD AUTO: 4.52 M/UL (ref 4–5.4)
SODIUM SERPL-SCNC: 141 MMOL/L (ref 136–145)
WBC # BLD AUTO: 4.87 K/UL (ref 3.9–12.7)

## 2024-10-29 PROCEDURE — 85025 COMPLETE CBC W/AUTO DIFF WBC: CPT | Performed by: INTERNAL MEDICINE

## 2024-10-29 PROCEDURE — 80053 COMPREHEN METABOLIC PANEL: CPT | Performed by: INTERNAL MEDICINE

## 2024-10-29 PROCEDURE — 36415 COLL VENOUS BLD VENIPUNCTURE: CPT | Performed by: INTERNAL MEDICINE

## 2024-10-29 PROCEDURE — 83735 ASSAY OF MAGNESIUM: CPT | Performed by: INTERNAL MEDICINE

## 2024-10-30 ENCOUNTER — INFUSION (OUTPATIENT)
Dept: INFUSION THERAPY | Facility: HOSPITAL | Age: 69
End: 2024-10-30
Attending: INTERNAL MEDICINE
Payer: MEDICARE

## 2024-10-30 VITALS
OXYGEN SATURATION: 96 % | HEART RATE: 65 BPM | WEIGHT: 145.13 LBS | SYSTOLIC BLOOD PRESSURE: 90 MMHG | BODY MASS INDEX: 25.71 KG/M2 | TEMPERATURE: 97 F | HEIGHT: 63 IN | DIASTOLIC BLOOD PRESSURE: 60 MMHG | RESPIRATION RATE: 18 BRPM

## 2024-10-30 DIAGNOSIS — C34.32 MALIGNANT NEOPLASM OF LOWER LOBE OF LEFT LUNG: Primary | ICD-10-CM

## 2024-10-30 DIAGNOSIS — C34.90 METASTATIC LUNG CANCER (METASTASIS FROM LUNG TO OTHER SITE), UNSPECIFIED LATERALITY: ICD-10-CM

## 2024-10-30 PROCEDURE — 25000003 PHARM REV CODE 250: Performed by: INTERNAL MEDICINE

## 2024-10-30 PROCEDURE — 96413 CHEMO IV INFUSION 1 HR: CPT

## 2024-10-30 PROCEDURE — 63600175 PHARM REV CODE 636 W HCPCS: Performed by: INTERNAL MEDICINE

## 2024-10-30 RX ORDER — SODIUM CHLORIDE 0.9 % (FLUSH) 0.9 %
10 SYRINGE (ML) INJECTION
Status: DISCONTINUED | OUTPATIENT
Start: 2024-10-30 | End: 2024-10-30 | Stop reason: HOSPADM

## 2024-10-30 RX ORDER — HEPARIN 100 UNIT/ML
500 SYRINGE INTRAVENOUS
Status: DISCONTINUED | OUTPATIENT
Start: 2024-10-30 | End: 2024-10-30 | Stop reason: HOSPADM

## 2024-10-30 RX ORDER — DIPHENHYDRAMINE HYDROCHLORIDE 50 MG/ML
50 INJECTION INTRAMUSCULAR; INTRAVENOUS ONCE AS NEEDED
Status: DISCONTINUED | OUTPATIENT
Start: 2024-10-30 | End: 2024-10-30 | Stop reason: HOSPADM

## 2024-10-30 RX ORDER — EPINEPHRINE 0.3 MG/.3ML
0.3 INJECTION SUBCUTANEOUS ONCE AS NEEDED
Status: DISCONTINUED | OUTPATIENT
Start: 2024-10-30 | End: 2024-10-30 | Stop reason: HOSPADM

## 2024-10-30 RX ADMIN — HEPARIN 500 UNITS: 100 SYRINGE at 11:10

## 2024-10-30 RX ADMIN — SODIUM CHLORIDE 1200 MG: 9 INJECTION, SOLUTION INTRAVENOUS at 10:10

## 2024-10-30 RX ADMIN — SODIUM CHLORIDE: 9 INJECTION, SOLUTION INTRAVENOUS at 10:10

## 2024-11-06 ENCOUNTER — OFFICE VISIT (OUTPATIENT)
Facility: CLINIC | Age: 69
End: 2024-11-06
Payer: MEDICARE

## 2024-11-06 VITALS
TEMPERATURE: 98 F | BODY MASS INDEX: 25.9 KG/M2 | SYSTOLIC BLOOD PRESSURE: 103 MMHG | RESPIRATION RATE: 16 BRPM | DIASTOLIC BLOOD PRESSURE: 59 MMHG | HEART RATE: 91 BPM | WEIGHT: 146.19 LBS

## 2024-11-06 DIAGNOSIS — C34.32 MALIGNANT NEOPLASM OF LOWER LOBE OF LEFT LUNG: Primary | ICD-10-CM

## 2024-11-06 DIAGNOSIS — F41.9 ANXIETY: ICD-10-CM

## 2024-11-06 PROCEDURE — 99214 OFFICE O/P EST MOD 30 MIN: CPT | Mod: PBBFAC,PN | Performed by: INTERNAL MEDICINE

## 2024-11-06 PROCEDURE — 99999 PR PBB SHADOW E&M-EST. PATIENT-LVL IV: CPT | Mod: PBBFAC,,, | Performed by: INTERNAL MEDICINE

## 2024-11-06 PROCEDURE — G2211 COMPLEX E/M VISIT ADD ON: HCPCS | Mod: S$PBB,,, | Performed by: INTERNAL MEDICINE

## 2024-11-06 PROCEDURE — 99214 OFFICE O/P EST MOD 30 MIN: CPT | Mod: S$PBB,,, | Performed by: INTERNAL MEDICINE

## 2024-11-06 RX ORDER — ALPRAZOLAM 0.25 MG/1
0.25 TABLET ORAL 3 TIMES DAILY PRN
Qty: 30 TABLET | Refills: 1 | Status: SHIPPED | OUTPATIENT
Start: 2024-11-06 | End: 2024-12-06

## 2024-11-06 NOTE — PROGRESS NOTES
PROGRESS NOTE    Subjective:       Patient ID: Aysha Moore is a 69 y.o. female.    6/2/2023:  Screening CT chest:  2.6 x 2.5 x 3.2cm mass in the posterior LLL     6/20/2023:  PET scan:  35 x 21 mm lobulated pulmonary mass in the posterior left lower lobe with SUV max 11.6      FDG avid lymphadenopathy is present with index nodes outlined below:  -21 x 11 mm AP window node with SUV max 12.1 (image 103)  -21 x 12 mm posterior left hilar node with SUV max 13.7 (image 109)  -16 x 13 mm left hilar node with SUV max 14 (image 1:15)  -10 x 10 mm subcarinal node with SUV max 6.3 (image 111)     7/12/2023-Biopsy of LLL:  LEFT LOWER LOBE OF LUNG, CORE BIOPSIES:   - LUNG ADENOCARCINOMA WITH PLEOMORPHIC FEATURES.   PDL1/TPS: 95%  ALK/BRAF/EGFR/KRAS/RET/ROS1/MET NEGATIVE     7/21/2023:  MRI brain: no mets.    Carbo/Taxol/XRT:  8/8/2023-9/12/2023     Plan: Atezolizumab 1200mg every 3 weeks x 16    9/30/2023-CTA Chest:  1. Segmental right upper lobe pulmonary embolus.  2. Cavitating and spiculated posterior left lower lobe lung mass, decreased slightly in size compared to the prior chest CT exam.  3. Upper lobe predominant centrilobular and subpleural emphysema, with coarse mixed interstitial and alveolar infiltrate along the mediastinal border left upper lobe suggesting reactive or infectious pneumonitis, and with mild posterior bilateral upper lobe groundglass infiltrates.    10/4/2023  Admitted for 3 days with fever to 101, new Dx of PE and splenic infarcts. Echo normal. Started on Xarelto and abx.     9/3/2023-CT abd/p:  IMPRESSION: There are hypodensities within the spleen concerning for splenic infarcts. These can be associated with endocarditis. The patient also has known pulmonary emboli. Transesophageal echocardiogram may be warranted.    10/3/2023-  TTE normal    Atezolizumab  Cycle 1: 10/23/2023  Cycle 2: 11/13/2023  Cycle 3: 12/4/2023  Delay due to ?  Pulmonary issues.  Cleared by pulm  Cycle 4:  1/29/2024  Cycle 5: 2/19/2024  Cycle 6: 3/11/2024  Cycle 7: 4/1/2024  Cycle 8: 4/22/2024  Cycle 9: 5/13/2024  Cycle 17: 10/28/2024-due---Last cycle      11/10/2023-CT CAP  IMPRESSION:  1. Nodular density in the posterior left lower lobe, essentially unchanged from the previous exam when accounting for technique.     2. Scattered patchy airspace opacities throughout the left lung, increased in size/distribution from the previous exam, the differential includes posttreatment/radiation change, atelectasis/scarring, infection/pneumonia, noting malignancy is not excluded and interval follow-up would be warranted.     3. Enlarging left cervical/supraclavicular lymphadenopathy.     4. Small wedge-shaped hypodensities in the spleen, in keeping with small splenic infarcts, similar in distribution the previous exam.    12/11/2023-CTA Chest  No PE-see report, ? Pneumonia    1/17/2023-CT chest:  1. Left upper lobe paramediastinal mass is stable due to radiation exposure chest with evidence of an prominent. Mediastinal scarring.  2. Advanced emphysematous changes along with evidence of abnormality scarring the left are probably proteinaceous bullous cavitary fibrotic focus and fibrotic changes in the left lower lobe is stable.  3. Small spiculated nodule in the right lower lobe 9 x 8 mm stable. Recommend follow-up within 6 months.     4/9/2024-CT CAP w/con  Improvement--see report    10/3/2024-PET:  New inguinal LN on left-hypermetabolic  Low-level activity in left hilar lesion  Stable MS LNs.     10/11/2023-Left inguinal node biopsy negative    Chief Complaint:  No chief complaint on file.  Stage IIII lung cancer, pulmonary embolism, splenic infarct follow up    History of Present Illness:   Aysha Moore is a 69 y.o. female who presents for follow up of lung cancer.     Ms. Moore is doing ok today.  No new complaints.      History of PE 9/2023  Xarelto given  9/2023-5/2024      Family and Social history reviewed and is unchanged from 7/27/2023      ROS:  Review of Systems   Constitutional:  Negative for fever.   Respiratory:  Negative for shortness of breath.    Cardiovascular:  Negative for chest pain and leg swelling.   Gastrointestinal:  Negative for abdominal pain and blood in stool.   Genitourinary:  Negative for hematuria.   Skin:  Negative for rash.          Current Outpatient Medications:     albuterol (PROVENTIL/VENTOLIN HFA) 90 mcg/actuation inhaler, Inhale 2 puffs into the lungs every 6 (six) hours as needed. Rescue, Disp: 18 g, Rfl: 5    ALPRAZolam (XANAX) 0.25 MG tablet, Take 1 tablet (0.25 mg total) by mouth 3 (three) times daily as needed for Anxiety., Disp: 30 tablet, Rfl: 1    ammonium lactate 12 % Crea, aaa bid prn dry skin, Disp: 385 g, Rfl: 11    atorvastatin (LIPITOR) 80 MG tablet, TAKE 1 TABLET BY MOUTH EVERY EVENING, Disp: 90 tablet, Rfl: 1    azelastine (ASTELIN) 137 mcg (0.1 %) nasal spray, USE 1 SPRAY IN EACH NOSTRIL 2 TIMES DAILY AS NEEDED FOR RHINITIS OR SINUSITIS, Disp: 90 mL, Rfl: 3    calcium-vitamin D3 (OS-JOLIE 500 + D3) 500 mg-5 mcg (200 unit) per tablet, Take 1 tablet by mouth every morning., Disp: , Rfl:     citalopram (CELEXA) 40 MG tablet, Take 1 tablet (40 mg total) by mouth once daily., Disp: 30 tablet, Rfl: 11    ezetimibe (ZETIA) 10 mg tablet, TAKE 1 TABLET BY MOUTH EVERY DAY, Disp: 90 tablet, Rfl: 0    famotidine (PEPCID) 40 MG tablet, TAKE 1 TABLET BY MOUTH EVERY DAY IN THE EVENING, Disp: 90 tablet, Rfl: 1    fluticasone propionate (FLONASE) 50 mcg/actuation nasal spray, 1 spray (50 mcg total) by Each Nostril route daily as needed for Rhinitis or Allergies., Disp: 9.9 mL, Rfl: 2    ketoconazole (NIZORAL) 2 % cream, Apply topically 2 (two) times daily., Disp: 60 g, Rfl: 1    ketoconazole (NIZORAL) 2 % shampoo, Apply topically twice a week., Disp: 120 mL, Rfl: 6    metoprolol succinate (TOPROL-XL) 50 MG 24 hr tablet, Take 1 tablet  (50 mg total) by mouth once daily., Disp: 90 tablet, Rfl: 3    nitroGLYCERIN (NITROSTAT) 0.4 MG SL tablet, Place 1 tablet (0.4 mg total) under the tongue every 5 (five) minutes as needed for Chest pain., Disp: 30 tablet, Rfl: 0    omeprazole (PRILOSEC) 40 MG capsule, Take 1 capsule (40 mg total) by mouth once daily., Disp: 30 capsule, Rfl: 11    promethazine (PHENERGAN) 25 MG tablet, Take 1 tablet (25 mg total) by mouth every 4 to 6 hours as needed., Disp: 30 tablet, Rfl: 5    traMADoL (ULTRAM) 50 mg tablet, Take 1 tablet (50 mg total) by mouth every 6 (six) hours as needed for Pain., Disp: 30 tablet, Rfl: 2    triamcinolone acetonide 0.025% (KENALOG) 0.025 % cream, Apply topically 2 (two) times daily., Disp: 80 g, Rfl: 1    umeclidinium-vilanteroL (ANORO ELLIPTA) 62.5-25 mcg/actuation DsDv, Inhale 1 puff into the lungs once daily. Controller, Disp: 1 each, Rfl: 5    valACYclovir (VALTREX) 1000 MG tablet, TAKE 2 AT ONSET OF FEVER BLISTERS THEN REPEAT IN 12 HOURS (TOTAL 4 TABS PER EPISODE), Disp: 20 tablet, Rfl: 3        Objective:       Physical Examination:     BP (!) 103/59   Pulse 91   Temp 98 °F (36.7 °C)   Resp 16   Wt 66.3 kg (146 lb 3.2 oz)   BMI 25.90 kg/m²     Physical Exam  Constitutional:       Appearance: Normal appearance.   HENT:      Head: Normocephalic and atraumatic.   Eyes:      General: No scleral icterus.     Conjunctiva/sclera: Conjunctivae normal.   Cardiovascular:      Rate and Rhythm: Normal rate.   Pulmonary:      Effort: Pulmonary effort is normal.   Abdominal:      General: Abdomen is flat.   Neurological:      General: No focal deficit present.      Mental Status: She is alert and oriented to person, place, and time.   Psychiatric:         Mood and Affect: Mood normal.         Behavior: Behavior normal.         Thought Content: Thought content normal.         Judgment: Judgment normal.         Labs:   Recent Results (from the past 2 weeks)   CBC W/ AUTO DIFFERENTIAL    Collection  Time: 10/29/24  3:28 PM   Result Value Ref Range    WBC 4.87 3.90 - 12.70 K/uL    Hemoglobin 14.1 12.0 - 16.0 g/dL    Hematocrit 42.2 37.0 - 48.5 %    Platelets 267 150 - 450 K/uL           CMP  Sodium   Date Value Ref Range Status   10/29/2024 141 136 - 145 mmol/L Final     Potassium   Date Value Ref Range Status   10/29/2024 4.4 3.5 - 5.1 mmol/L Final     Chloride   Date Value Ref Range Status   10/29/2024 105 95 - 110 mmol/L Final     CO2   Date Value Ref Range Status   10/29/2024 28 23 - 29 mmol/L Final     Glucose   Date Value Ref Range Status   10/29/2024 114 (H) 70 - 110 mg/dL Final     BUN   Date Value Ref Range Status   10/29/2024 12 8 - 23 mg/dL Final     Creatinine   Date Value Ref Range Status   10/29/2024 0.8 0.5 - 1.4 mg/dL Final     Calcium   Date Value Ref Range Status   10/29/2024 9.3 8.7 - 10.5 mg/dL Final     Total Protein   Date Value Ref Range Status   10/29/2024 7.3 6.0 - 8.4 g/dL Final     Albumin   Date Value Ref Range Status   10/29/2024 4.4 3.5 - 5.2 g/dL Final     Total Bilirubin   Date Value Ref Range Status   10/29/2024 1.7 (H) 0.1 - 1.0 mg/dL Final     Comment:     For infants and newborns, interpretation of results should be based  on gestational age, weight and in agreement with clinical  observations.    Premature Infant recommended reference ranges:  Up to 24 hours.............<8.0 mg/dL  Up to 48 hours............<12.0 mg/dL  3-5 days..................<15.0 mg/dL  6-29 days.................<15.0 mg/dL       Alkaline Phosphatase   Date Value Ref Range Status   10/29/2024 87 55 - 135 U/L Final     AST   Date Value Ref Range Status   10/29/2024 18 10 - 40 U/L Final     ALT   Date Value Ref Range Status   10/29/2024 16 10 - 44 U/L Final     Anion Gap   Date Value Ref Range Status   10/29/2024 8 8 - 16 mmol/L Final     eGFR if    Date Value Ref Range Status   03/29/2022 >60.0 >60 mL/min/1.73 m^2 Final     eGFR if non    Date Value Ref Range Status  "  03/29/2022 >60.0 >60 mL/min/1.73 m^2 Final     Comment:     Calculation used to obtain the estimated glomerular filtration  rate (eGFR) is the CKD-EPI equation.        No results found for: "CEA"  No results found for: "PSA"        Assessment/Plan:     Problem List Items Addressed This Visit       LUNG CANCER-ADENOCARCINOMA OF THE LLL - Primary     Patient is doing well and has completed the Atezo therapy.  She also had biopsy of the inguinal node that was active on PET and this was negative.  I discussed this today and will watch her very closely with plan for CT CAP in 3 months and follow every three for now.  She can hold on regular labs and I will see her again after the scan.          Relevant Orders    CBC Auto Differential    Comprehensive Metabolic Panel    CT Chest Abdomen Pelvis With IV Contrast (XPD) Routine Oral Contrast    Anxiety    Relevant Medications    ALPRAZolam (XANAX) 0.25 MG tablet           Discussion:     Follow up in about 3 months (around 2/6/2025).      Electronically signed by Raad Bee      "

## 2024-11-06 NOTE — ASSESSMENT & PLAN NOTE
Patient is doing well and has completed the Atezo therapy.  She also had biopsy of the inguinal node that was active on PET and this was negative.  I discussed this today and will watch her very closely with plan for CT CAP in 3 months and follow every three for now.  She can hold on regular labs and I will see her again after the scan.

## 2024-12-20 ENCOUNTER — HOSPITAL ENCOUNTER (OUTPATIENT)
Facility: HOSPITAL | Age: 69
Discharge: HOME | End: 2024-12-21
Attending: EMERGENCY MEDICINE | Admitting: HOSPITALIST
Payer: MEDICARE

## 2024-12-20 DIAGNOSIS — J44.1 COPD EXACERBATION: Primary | ICD-10-CM

## 2024-12-20 DIAGNOSIS — R06.02 SOB (SHORTNESS OF BREATH): ICD-10-CM

## 2024-12-20 PROBLEM — E87.1 HYPONATREMIA: Status: ACTIVE | Noted: 2024-12-20

## 2024-12-20 PROBLEM — J96.01 ACUTE RESPIRATORY FAILURE WITH HYPOXIA: Status: ACTIVE | Noted: 2024-12-20

## 2024-12-20 LAB
ADENOVIRUS: NOT DETECTED
ALBUMIN SERPL BCP-MCNC: 4.3 G/DL (ref 3.5–5.2)
ALP SERPL-CCNC: 81 U/L (ref 55–135)
ALT SERPL W/O P-5'-P-CCNC: 14 U/L (ref 10–44)
ANION GAP SERPL CALC-SCNC: 10 MMOL/L (ref 8–16)
AST SERPL-CCNC: 17 U/L (ref 10–40)
BASOPHILS # BLD AUTO: 0.02 K/UL (ref 0–0.2)
BASOPHILS NFR BLD: 0.3 % (ref 0–1.9)
BILIRUB SERPL-MCNC: 2.5 MG/DL (ref 0.1–1)
BILIRUB UR QL STRIP: NEGATIVE
BNP SERPL-MCNC: 148 PG/ML (ref 0–99)
BORDETELLA PARAPERTUSSIS (IS1001): NOT DETECTED
BORDETELLA PERTUSSIS (PTXP): NOT DETECTED
BUN SERPL-MCNC: 20 MG/DL (ref 8–23)
CALCIUM SERPL-MCNC: 8.8 MG/DL (ref 8.7–10.5)
CHLAMYDIA PNEUMONIAE: NOT DETECTED
CHLORIDE SERPL-SCNC: 98 MMOL/L (ref 95–110)
CLARITY UR: CLEAR
CO2 SERPL-SCNC: 23 MMOL/L (ref 23–29)
COLOR UR: YELLOW
CORONAVIRUS 229E, COMMON COLD VIRUS: NOT DETECTED
CORONAVIRUS HKU1, COMMON COLD VIRUS: NOT DETECTED
CORONAVIRUS NL63, COMMON COLD VIRUS: NOT DETECTED
CORONAVIRUS OC43, COMMON COLD VIRUS: NOT DETECTED
CREAT SERPL-MCNC: 1 MG/DL (ref 0.5–1.4)
DIFFERENTIAL METHOD BLD: ABNORMAL
EOSINOPHIL # BLD AUTO: 0.1 K/UL (ref 0–0.5)
EOSINOPHIL NFR BLD: 2 % (ref 0–8)
ERYTHROCYTE [DISTWIDTH] IN BLOOD BY AUTOMATED COUNT: 12.1 % (ref 11.5–14.5)
EST. GFR  (NO RACE VARIABLE): >60 ML/MIN/1.73 M^2
FLUBV RNA NPH QL NAA+NON-PROBE: NOT DETECTED
GLUCOSE SERPL-MCNC: 139 MG/DL (ref 70–110)
GLUCOSE UR QL STRIP: NEGATIVE
GROUP A STREP, MOLECULAR: NEGATIVE
HCT VFR BLD AUTO: 41.8 % (ref 37–48.5)
HCV AB SERPL QL IA: NEGATIVE
HGB BLD-MCNC: 14.3 G/DL (ref 12–16)
HGB UR QL STRIP: NORMAL
HIV 1+2 AB+HIV1 P24 AG SERPL QL IA: NEGATIVE
HPIV1 RNA NPH QL NAA+NON-PROBE: NOT DETECTED
HPIV2 RNA NPH QL NAA+NON-PROBE: NOT DETECTED
HPIV3 RNA NPH QL NAA+NON-PROBE: NOT DETECTED
HPIV4 RNA NPH QL NAA+NON-PROBE: NOT DETECTED
HUMAN METAPNEUMOVIRUS: NOT DETECTED
IMM GRANULOCYTES # BLD AUTO: 0.01 K/UL (ref 0–0.04)
IMM GRANULOCYTES NFR BLD AUTO: 0.2 % (ref 0–0.5)
INFLUENZA A (SUBTYPES H1,H1-2009,H3): NOT DETECTED
KETONES UR QL STRIP: NEGATIVE
LEUKOCYTE ESTERASE UR QL STRIP: NEGATIVE
LYMPHOCYTES # BLD AUTO: 1.4 K/UL (ref 1–4.8)
LYMPHOCYTES NFR BLD: 21.8 % (ref 18–48)
MAGNESIUM SERPL-MCNC: 1.7 MG/DL (ref 1.6–2.6)
MCH RBC QN AUTO: 31.5 PG (ref 27–31)
MCHC RBC AUTO-ENTMCNC: 34.2 G/DL (ref 32–36)
MCV RBC AUTO: 92 FL (ref 82–98)
MONOCYTES # BLD AUTO: 0.8 K/UL (ref 0.3–1)
MONOCYTES NFR BLD: 12.1 % (ref 4–15)
MYCOPLASMA PNEUMONIAE: NOT DETECTED
NEUTROPHILS # BLD AUTO: 4.2 K/UL (ref 1.8–7.7)
NEUTROPHILS NFR BLD: 63.6 % (ref 38–73)
NITRITE UR QL STRIP: NEGATIVE
NRBC BLD-RTO: 0 /100 WBC
OHS QRS DURATION: 94 MS
OHS QTC CALCULATION: 455 MS
PH UR STRIP: 6 [PH] (ref 5–8)
PLATELET # BLD AUTO: 202 K/UL (ref 150–450)
PMV BLD AUTO: 9.6 FL (ref 9.2–12.9)
POTASSIUM SERPL-SCNC: 3.7 MMOL/L (ref 3.5–5.1)
PROT SERPL-MCNC: 7.5 G/DL (ref 6–8.4)
PROT UR QL STRIP: NEGATIVE
RBC # BLD AUTO: 4.54 M/UL (ref 4–5.4)
RESPIRATORY INFECTION PANEL SOURCE: ABNORMAL
RSV RNA NPH QL NAA+NON-PROBE: DETECTED
RV+EV RNA NPH QL NAA+NON-PROBE: NOT DETECTED
SARS-COV-2 RNA RESP QL NAA+PROBE: NOT DETECTED
SODIUM SERPL-SCNC: 131 MMOL/L (ref 136–145)
SP GR UR STRIP: 1.01 (ref 1–1.03)
TROPONIN I SERPL HS-MCNC: 24.9 PG/ML (ref 0–14.9)
TROPONIN I SERPL HS-MCNC: 36 PG/ML (ref 0–14.9)
URN SPEC COLLECT METH UR: NORMAL
UROBILINOGEN UR STRIP-ACNC: NEGATIVE EU/DL
WBC # BLD AUTO: 6.55 K/UL (ref 3.9–12.7)

## 2024-12-20 PROCEDURE — 63600175 PHARM REV CODE 636 W HCPCS: Performed by: HOSPITALIST

## 2024-12-20 PROCEDURE — 87798 DETECT AGENT NOS DNA AMP: CPT | Mod: 59 | Performed by: EMERGENCY MEDICINE

## 2024-12-20 PROCEDURE — 87389 HIV-1 AG W/HIV-1&-2 AB AG IA: CPT | Performed by: EMERGENCY MEDICINE

## 2024-12-20 PROCEDURE — 99406 BEHAV CHNG SMOKING 3-10 MIN: CPT

## 2024-12-20 PROCEDURE — 25000242 PHARM REV CODE 250 ALT 637 W/ HCPCS: Performed by: EMERGENCY MEDICINE

## 2024-12-20 PROCEDURE — 86803 HEPATITIS C AB TEST: CPT | Performed by: EMERGENCY MEDICINE

## 2024-12-20 PROCEDURE — G0378 HOSPITAL OBSERVATION PER HR: HCPCS

## 2024-12-20 PROCEDURE — 25000242 PHARM REV CODE 250 ALT 637 W/ HCPCS: Performed by: NURSE PRACTITIONER

## 2024-12-20 PROCEDURE — 96361 HYDRATE IV INFUSION ADD-ON: CPT

## 2024-12-20 PROCEDURE — 99285 EMERGENCY DEPT VISIT HI MDM: CPT | Mod: 25

## 2024-12-20 PROCEDURE — 83880 ASSAY OF NATRIURETIC PEPTIDE: CPT | Performed by: EMERGENCY MEDICINE

## 2024-12-20 PROCEDURE — 63600175 PHARM REV CODE 636 W HCPCS: Performed by: EMERGENCY MEDICINE

## 2024-12-20 PROCEDURE — 25000003 PHARM REV CODE 250: Performed by: EMERGENCY MEDICINE

## 2024-12-20 PROCEDURE — 96365 THER/PROPH/DIAG IV INF INIT: CPT

## 2024-12-20 PROCEDURE — 94799 UNLISTED PULMONARY SVC/PX: CPT

## 2024-12-20 PROCEDURE — 94640 AIRWAY INHALATION TREATMENT: CPT

## 2024-12-20 PROCEDURE — 87651 STREP A DNA AMP PROBE: CPT | Performed by: NURSE PRACTITIONER

## 2024-12-20 PROCEDURE — 25000003 PHARM REV CODE 250: Performed by: HOSPITALIST

## 2024-12-20 PROCEDURE — 83735 ASSAY OF MAGNESIUM: CPT | Performed by: EMERGENCY MEDICINE

## 2024-12-20 PROCEDURE — 85025 COMPLETE CBC W/AUTO DIFF WBC: CPT | Performed by: NURSE PRACTITIONER

## 2024-12-20 PROCEDURE — 94761 N-INVAS EAR/PLS OXIMETRY MLT: CPT

## 2024-12-20 PROCEDURE — 94640 AIRWAY INHALATION TREATMENT: CPT | Mod: XB

## 2024-12-20 PROCEDURE — 81003 URINALYSIS AUTO W/O SCOPE: CPT | Performed by: EMERGENCY MEDICINE

## 2024-12-20 PROCEDURE — 25000003 PHARM REV CODE 250: Performed by: NURSE PRACTITIONER

## 2024-12-20 PROCEDURE — 84484 ASSAY OF TROPONIN QUANT: CPT | Performed by: EMERGENCY MEDICINE

## 2024-12-20 PROCEDURE — 99900031 HC PATIENT EDUCATION (STAT)

## 2024-12-20 PROCEDURE — 80053 COMPREHEN METABOLIC PANEL: CPT | Performed by: NURSE PRACTITIONER

## 2024-12-20 PROCEDURE — 96375 TX/PRO/DX INJ NEW DRUG ADDON: CPT

## 2024-12-20 PROCEDURE — 99900035 HC TECH TIME PER 15 MIN (STAT)

## 2024-12-20 RX ORDER — TALC
6 POWDER (GRAM) TOPICAL NIGHTLY PRN
Status: DISCONTINUED | OUTPATIENT
Start: 2024-12-20 | End: 2024-12-21 | Stop reason: HOSPADM

## 2024-12-20 RX ORDER — SODIUM CHLORIDE 0.9 % (FLUSH) 0.9 %
3 SYRINGE (ML) INJECTION
Status: DISCONTINUED | OUTPATIENT
Start: 2024-12-20 | End: 2024-12-21 | Stop reason: HOSPADM

## 2024-12-20 RX ORDER — ACETAMINOPHEN 325 MG/1
650 TABLET ORAL EVERY 4 HOURS PRN
Status: DISCONTINUED | OUTPATIENT
Start: 2024-12-20 | End: 2024-12-21 | Stop reason: HOSPADM

## 2024-12-20 RX ORDER — PREDNISONE 20 MG/1
40 TABLET ORAL DAILY
Status: DISCONTINUED | OUTPATIENT
Start: 2024-12-21 | End: 2024-12-20

## 2024-12-20 RX ORDER — IPRATROPIUM BROMIDE AND ALBUTEROL SULFATE 2.5; .5 MG/3ML; MG/3ML
3 SOLUTION RESPIRATORY (INHALATION) EVERY 4 HOURS
Status: DISCONTINUED | OUTPATIENT
Start: 2024-12-20 | End: 2024-12-21 | Stop reason: HOSPADM

## 2024-12-20 RX ORDER — METHYLPREDNISOLONE SOD SUCC 125 MG
125 VIAL (EA) INJECTION
Status: COMPLETED | OUTPATIENT
Start: 2024-12-20 | End: 2024-12-20

## 2024-12-20 RX ORDER — IPRATROPIUM BROMIDE AND ALBUTEROL SULFATE 2.5; .5 MG/3ML; MG/3ML
3 SOLUTION RESPIRATORY (INHALATION) EVERY 4 HOURS
Status: DISCONTINUED | OUTPATIENT
Start: 2024-12-21 | End: 2024-12-20

## 2024-12-20 RX ORDER — IPRATROPIUM BROMIDE AND ALBUTEROL SULFATE 2.5; .5 MG/3ML; MG/3ML
3 SOLUTION RESPIRATORY (INHALATION)
Status: COMPLETED | OUTPATIENT
Start: 2024-12-20 | End: 2024-12-20

## 2024-12-20 RX ORDER — ONDANSETRON HYDROCHLORIDE 2 MG/ML
4 INJECTION, SOLUTION INTRAVENOUS EVERY 6 HOURS PRN
Status: DISCONTINUED | OUTPATIENT
Start: 2024-12-20 | End: 2024-12-21 | Stop reason: HOSPADM

## 2024-12-20 RX ORDER — IPRATROPIUM BROMIDE AND ALBUTEROL SULFATE 2.5; .5 MG/3ML; MG/3ML
3 SOLUTION RESPIRATORY (INHALATION)
Status: DISCONTINUED | OUTPATIENT
Start: 2024-12-20 | End: 2024-12-20

## 2024-12-20 RX ADMIN — IPRATROPIUM BROMIDE AND ALBUTEROL SULFATE 3 ML: .5; 3 SOLUTION RESPIRATORY (INHALATION) at 06:12

## 2024-12-20 RX ADMIN — METHYLPREDNISOLONE SODIUM SUCCINATE 125 MG: 125 INJECTION, POWDER, FOR SOLUTION INTRAMUSCULAR; INTRAVENOUS at 06:12

## 2024-12-20 RX ADMIN — SODIUM CHLORIDE 500 ML: 9 INJECTION, SOLUTION INTRAVENOUS at 05:12

## 2024-12-20 RX ADMIN — AZITHROMYCIN MONOHYDRATE 500 MG: 500 INJECTION, POWDER, LYOPHILIZED, FOR SOLUTION INTRAVENOUS at 10:12

## 2024-12-20 RX ADMIN — ACETAMINOPHEN 650 MG: 325 TABLET ORAL at 10:12

## 2024-12-20 RX ADMIN — IPRATROPIUM BROMIDE AND ALBUTEROL SULFATE 3 ML: .5; 3 SOLUTION RESPIRATORY (INHALATION) at 08:12

## 2024-12-20 NOTE — FIRST PROVIDER EVALUATION
Emergency Department TeleTriage Encounter Note      CHIEF COMPLAINT    Chief Complaint   Patient presents with    Shortness of Breath     C/o shortness of breath x 2 days - pt has hx of COPD, and has audible wheezing and increased work of breathing - pt exposed to strep throat by grandchildren    Sore Throat    Cough       VITAL SIGNS   Initial Vitals [12/20/24 1509]   BP Pulse Resp Temp SpO2   92/63 87 (!) 24 97.8 °F (36.6 °C) 95 %      MAP       --            ALLERGIES    Review of patient's allergies indicates:   Allergen Reactions    Cephalexin      Other reaction(s): Rash    Doxycycline monohydrate Hives    Lanoxin  [digoxin]      Other reaction(s): Rash    Lansoprazole      Other reaction(s): Rash    Macrobid [nitrofurantoin monohyd/m-cryst] Rash       PROVIDER TRIAGE NOTE  69 year old female presents to the ER with complaints of SOB with fever, cough, sinus congestion, nausea and vomiting and generalized fatigue x 4 days. Able to tolerate drinking small sips of water with overall decreased appetite. Close contact recently tested + for strep throat. Denies sore throat. Denies specifically any Chest pain associated. Reports Sob worsens with exertion. Hx of COPD. Reports worsening wheezing.     AAOx3, respirations even and non- labored, stable vitals, normal coloration of skin, sitting upright in triage chair, appears in no acute distress.          ORDERS  Labs Reviewed   GROUP A STREP, MOLECULAR   INFLUENZA A & B BY MOLECULAR   HEPATITIS C ANTIBODY   HIV 1 / 2 ANTIBODY   SARS-COV-2 RNA AMPLIFICATION, QUAL   CBC W/ AUTO DIFFERENTIAL   COMPREHENSIVE METABOLIC PANEL       ED Orders (720h ago, onward)      Start Ordered     Status Ordering Provider    12/20/24 1600 12/20/24 1519  Vital Signs  Every 2 hours         Ordered OLGA MASSEY    12/20/24 1520 12/20/24 1519  Influenza A & B by Molecular  STAT         Ordered OLGA MASSEY    12/20/24 1520 12/20/24 1519  COVID-19 Rapid Screening  STAT          Ordered OLGA MASSEY.    12/20/24 1520 12/20/24 1519  CBC auto differential  STAT         Ordered OLAG MASSEY.    12/20/24 1520 12/20/24 1519  Comprehensive metabolic panel  STAT         Ordered OLGA MASSEY.    12/20/24 1520 12/20/24 1519  Insert Saline lock IV  Once         Ordered OLGA MASSEY.    12/20/24 1519 12/20/24 1519  EKG 12-lead  Once         Ordered OLGA MASSEY.    12/20/24 1519 12/20/24 1519  Group A Strep, Molecular  Once         Ordered OLGA MASSEY.    12/20/24 1519 12/20/24 1519  Pulse Oximetry Continuous  Continuous         Ordered OLGA MASSEY.    12/20/24 1519 12/20/24 1519  X-Ray Chest PA And Lateral  1 time imaging         Ordered OLGA MASSEY.    12/20/24 1515 12/20/24 1514  Hepatitis C Antibody  STAT         Ordered LEATHA ALEJANDRA    12/20/24 1515 12/20/24 1514  HIV 1/2 Ag/Ab (4th Gen)  STAT         Ordered LEATHA ALEJANDRA              Virtual Visit Note: The provider triage portion of this emergency department evaluation and documentation was performed via [employer], a HIPAA-compliant telemedicine application, in concert with a tele-presenter in the room. A face to face patient evaluation with one of my colleagues will occur once the patient is placed in an emergency department room.      DISCLAIMER: This note was prepared with [employer] voice recognition transcription software. Garbled syntax, mangled pronouns, and other bizarre constructions may be attributed to that software system.

## 2024-12-20 NOTE — ED PROVIDER NOTES
Encounter Date: 12/20/2024       History     Chief Complaint   Patient presents with    Shortness of Breath     C/o shortness of breath x 2 days - pt has hx of COPD, and has audible wheezing and increased work of breathing - pt exposed to strep throat by grandchildren    Sore Throat    Cough     67-year-old female with a past medical history of CAD, COPD, and lung cancer presents for evaluation of multiple complaints.  She reports that she has had exertional shortness of breath, a cough, and congestion for few days.  She reports that she was exposed to strep and other viral illnesses a few days ago from watching her grandchildren.  She reports that she has had an associated fever, nausea/vomiting, and diarrhea with her symptoms.  She denies any associated chest pain or abdominal pain.  There are no alleviating factors.      Review of patient's allergies indicates:   Allergen Reactions    Cephalexin      Other reaction(s): Rash    Doxycycline monohydrate Hives    Lanoxin  [digoxin]      Other reaction(s): Rash    Lansoprazole      Other reaction(s): Rash    Macrobid [nitrofurantoin monohyd/m-cryst] Rash     Past Medical History:   Diagnosis Date    Allergy     Anxiety     Arthritis     CAD (coronary artery disease)     coronary stents    Chronic back pain     lower back pain radiates to left leg    Chronic rhinitis     DAILY (dyspnea on exertion)     Hyperlipidemia     Hypertension     Lung cancer 07/01/2023    Melanoma in situ of left upper extremity     left thigh area    MI (myocardial infarction)     New onset type 2 diabetes mellitus 02/08/2024    Seasonal allergic rhinitis 10/29/2020     Past Surgical History:   Procedure Laterality Date    BREAST SURGERY      breast reduction    CATARACT EXTRACTION, BILATERAL      coranary stent      EXCISION OF MELANOMA Left 3/30/2022    Procedure: EXCISION, MELANOMA;  Surgeon: David Mcpherson III, MD;  Location: [facility];  Service: General;  Laterality: Left;    EXCISIONAL  BIOPSY Left 3/30/2022    Procedure: EXCISIONAL BIOPSY;  Surgeon: David Mcpherson III, MD;  Location: [facility] OR;  Service: General;  Laterality: Left;    HYSTERECTOMY      total    INSERTION OF TUNNELED CENTRAL VENOUS CATHETER (CVC) WITH SUBCUTANEOUS PORT N/A 2023    Procedure: [id number];  Surgeon: Remi Mckeon Jr., MD;  Location: [facility] OR;  Service: General;  Laterality: N/A;    LEFT HEART CATHETERIZATION Left 10/16/2020    Procedure: Left heart cath;  Surgeon: Garrett Robins MD;  Location: [facility] CATH/EP LAB;  Service: Cardiology;  Laterality: Left;    OOPHORECTOMY      TOTAL REDUCTION MAMMOPLASTY Bilateral      Family History   Problem Relation Name Age of Onset    Cancer Mother          breast, ovarian, cervical     Heart disease Mother      Breast cancer Mother  50    Ovarian cancer Mother      Cancer Father          melanoma    Stroke Sister      Heart disease Sister      Cancer Sister          leukemia    Macular degeneration Sister      Heart disease Sister      Heart disease Brother      Lung cancer Brother      Cancer Maternal Uncle      Cancer Paternal Aunt          breast    Breast cancer Paternal Aunt  60    Cancer Paternal Uncle      Heart disease Maternal Grandmother      No Known Problems Daughter      No Known Problems Son       Social History     Tobacco Use    Smoking status: Former     Current packs/day: 0.00     Average packs/day: 1 pack/day for 43.2 years (43.2 ttl pk-yrs)     Types: Cigarettes, Vaping with nicotine     Start date:      Quit date: 3/4/2017     Years since quittin.8    Smokeless tobacco: Never   Substance Use Topics    Alcohol use: Not Currently     Alcohol/week: 0.0 standard drinks of alcohol     Comment: sometimes    Drug use: No     Review of Systems   Constitutional:  Positive for fever. Negative for chills.   HENT:  Positive for congestion.    Respiratory:  Positive for cough and shortness of breath.    Cardiovascular:  Negative for  chest pain.   Gastrointestinal:  Positive for diarrhea, nausea and vomiting. Negative for abdominal pain.   Genitourinary:  Negative for dysuria.   Musculoskeletal:  Negative for gait problem.   Skin:  Negative for color change.   Neurological:  Negative for dizziness and numbness.   Psychiatric/Behavioral:  Negative for agitation.        Physical Exam     Initial Vitals [12/20/24 1509]   BP Pulse Resp Temp SpO2   92/63 87 (!) 24 97.8 °F (36.6 °C) 95 %      MAP       --         Physical Exam    Nursing note and vitals reviewed.  Constitutional: She appears well-developed and well-nourished.   HENT:   Head: Atraumatic.   Dry mucous membranes noted.   Eyes: EOM are normal. Pupils are equal, round, and reactive to light.   Neck:   Normal range of motion.  Cardiovascular:  Normal rate and regular rhythm.           Pulmonary/Chest: She has wheezes.   Occasional expiratory wheezes noted.   Abdominal: Abdomen is soft. Bowel sounds are normal. She exhibits no distension. There is no abdominal tenderness. There is no rebound and no guarding.   Musculoskeletal:         General: Normal range of motion.      Right shoulder: Normal.      Left shoulder: Normal.      Cervical back: Normal range of motion.     Neurological: She is alert and oriented to person, place, and time.   Skin: Skin is warm and dry.   Psychiatric: She has a normal mood and affect.         ED Course   Critical Care    Date/Time: 12/20/2024 6:49 PM    Performed by: Rojelio Rowe MD  Authorized by: Rojelio Rowe MD  Direct patient critical care time: 14 minutes  Ordering / reviewing critical care time: 13 minutes  Documentation critical care time: 14 minutes  Total critical care time (exclusive of procedural time) : 41 minutes  Critical care was time spent personally by me on the following activities: development of treatment plan with patient or surrogate, examination of patient, obtaining history from patient or surrogate, ordering and performing  treatments and interventions, ordering and review of laboratory studies and ordering and review of radiographic studies.        Labs Reviewed   CBC W/ AUTO DIFFERENTIAL - Abnormal       Result Value    WBC 6.55      RBC 4.54      Hemoglobin 14.3      Hematocrit 41.8      MCV 92      MCH 31.5 (*)     MCHC 34.2      RDW 12.1      Platelets 202      MPV 9.6      Immature Granulocytes 0.2      Gran # (ANC) 4.2      Immature Grans (Abs) 0.01      Lymph # 1.4      Mono # 0.8      Eos # 0.1      Baso # 0.02      nRBC 0      Gran % 63.6      Lymph % 21.8      Mono % 12.1      Eosinophil % 2.0      Basophil % 0.3      Differential Method Automated      Narrative:     Release to patient->Immediate   COMPREHENSIVE METABOLIC PANEL - Abnormal    Sodium 131 (*)     Potassium 3.7      Chloride 98      CO2 23      Glucose 139 (*)     BUN 20      Creatinine 1.0      Calcium 8.8      Total Protein 7.5      Albumin 4.3      Total Bilirubin 2.5 (*)     Alkaline Phosphatase 81      AST 17      ALT 14      eGFR >60.0      Anion Gap 10      Narrative:     Release to patient->Immediate   TROPONIN I HIGH SENSITIVITY - Abnormal    Troponin I High Sensitivity 36.0 (*)    B-TYPE NATRIURETIC PEPTIDE - Abnormal     (*)    GROUP A STREP, MOLECULAR    Group A Strep, Molecular Negative     RESPIRATORY INFECTION PANEL (PCR), NASOPHARYNGEAL    Respiratory Infection Panel Source NP swab      Narrative:     Respiratory Infection Panel source->NP Swab   MAGNESIUM    Magnesium 1.7     HEPATITIS C ANTIBODY   HIV 1 / 2 ANTIBODY   URINALYSIS, REFLEX TO URINE CULTURE   TROPONIN I HIGH SENSITIVITY     EKG Readings: (Independently Interpreted)   Initial Reading: No STEMI. Rhythm: Normal Sinus Rhythm. Heart Rate: 81. Ectopy: No Ectopy. Conduction: Normal. ST Segments: Normal ST Segments. T Waves: Normal. T Waves Flipped: II, III, AVF, V3, V4, V5 and V6. Clinical Impression: Normal Sinus Rhythm     ECG Results              EKG 12-lead (In process)         Collection Time Result Time QRS Duration OHS QTC Calculation    12/20/24 16:18:15 12/20/24 16:54:15 94 455                     In process by Interface, Lab In [facility] (12/20/24 16:54:20)                   Narrative:    Test Reason : R06.02,    Vent. Rate :  81 BPM     Atrial Rate :  81 BPM     P-R Int : 148 ms          QRS Dur :  94 ms      QT Int : 392 ms       P-R-T Axes :  69 -60 -67 degrees    QTcB Int : 455 ms    Normal sinus rhythm  Left axis deviation  T wave abnormality, consider inferior ischemia  T wave abnormality, consider anterolateral ischemia  Abnormal ECG  When compared with ECG of 30-Sep-2023 16:52,  The axis Shifted left  T wave inversion now evident in Inferior leads  T wave inversion now evident in Anterior-lateral leads    Referred By: AAAREFERRAL SELF           Confirmed By:                                   Imaging Results              X-Ray Chest PA And Lateral (Final result)  Result time 12/20/24 16:34:01      Final result by Jorge Welch MD (12/20/24 16:34:01)                   Impression:      No evidence of acute cardiopulmonary disease.      Electronically signed by: Jorge Welch  Date:    12/20/2024  Time:    16:34               Narrative:    EXAMINATION:  XR CHEST PA AND LATERAL    CLINICAL HISTORY:  Shortness of breath    FINDINGS:  Three-view chest radiograph at 16:06 hours compared to prior exams show left internal jugular injection port type central venous catheter, unchanged in position.  The trachea is midline, with the cardiomediastinal silhouette and pulmonary vascular distribution within normal limits.    The lungs are normally and symmetrically expanded, with no consolidation, pleural effusion or evidence of pulmonary edema. No confluent infiltrates or pneumothorax.  No acute fractures.                                       Medications   sodium chloride 0.9% bolus 500 mL 500 mL (0 mLs Intravenous Stopped 12/20/24 1839)   albuterol-ipratropium 2.5 mg-0.5 mg/3 mL nebulizer  solution 3 mL (3 mLs Nebulization Given 12/20/24 1812)   methylPREDNISolone sodium succinate injection 125 mg (125 mg Intravenous Given 12/20/24 1841)     Medical Decision Making  69-year-old female presents with multiple complaints.    Initial differential diagnosis included but not limited to pneumonia, viral illness, and dehydration.    Amount and/or Complexity of Data Reviewed  Labs: ordered.  Radiology: ordered.  ECG/medicine tests: ordered.    Risk  Prescription drug management.  Risk Details: The patient was emergently evaluated in the emergency department, her evaluation was significant for an elderly female lung sounds.  The patient's chest x-ray showed acute abnormalities per Radiology.  The patient's labs were significant for a mildly elevated BNP and a mildly elevated troponin.  The patient's EKG does have diffuse T-wave inversions independent interpretation.  The patient was treated respiratory nebulizer treatments and IV Solu-Medrol.  She did desaturate and have worsening shortness of breath with ambulation.  Her diagnosis at this time is COPD exacerbation.  I will admit her to hospitalist service for further care.  The patient was discussed with the APC on call for the hospitalist service.  She has accepted the patient for admission.  Her respiratory viral panel and repeat troponin are pending at the time admission.                                      Clinical Impression:  Final diagnoses:  [R06.02] SOB (shortness of breath)  [J44.1] COPD exacerbation (Primary)          ED Disposition Condition    Observation Stable                Rojelio Rowe MD  12/20/24 3245

## 2024-12-21 VITALS
OXYGEN SATURATION: 95 % | BODY MASS INDEX: 25.78 KG/M2 | SYSTOLIC BLOOD PRESSURE: 128 MMHG | HEART RATE: 92 BPM | DIASTOLIC BLOOD PRESSURE: 70 MMHG | TEMPERATURE: 98 F | WEIGHT: 145.5 LBS | HEIGHT: 63 IN | RESPIRATION RATE: 18 BRPM

## 2024-12-21 PROBLEM — E87.1 HYPONATREMIA: Status: RESOLVED | Noted: 2024-12-20 | Resolved: 2024-12-21

## 2024-12-21 LAB
ANION GAP SERPL CALC-SCNC: 12 MMOL/L (ref 8–16)
BASOPHILS # BLD AUTO: 0 K/UL (ref 0–0.2)
BASOPHILS NFR BLD: 0 % (ref 0–1.9)
BUN SERPL-MCNC: 18 MG/DL (ref 8–23)
CALCIUM SERPL-MCNC: 9.2 MG/DL (ref 8.7–10.5)
CHLORIDE SERPL-SCNC: 105 MMOL/L (ref 95–110)
CO2 SERPL-SCNC: 22 MMOL/L (ref 23–29)
CREAT SERPL-MCNC: 0.7 MG/DL (ref 0.5–1.4)
DIFFERENTIAL METHOD BLD: ABNORMAL
EOSINOPHIL # BLD AUTO: 0 K/UL (ref 0–0.5)
EOSINOPHIL NFR BLD: 0 % (ref 0–8)
ERYTHROCYTE [DISTWIDTH] IN BLOOD BY AUTOMATED COUNT: 12 % (ref 11.5–14.5)
EST. GFR  (NO RACE VARIABLE): >60 ML/MIN/1.73 M^2
GLUCOSE SERPL-MCNC: 263 MG/DL (ref 70–110)
HCT VFR BLD AUTO: 40.5 % (ref 37–48.5)
HGB BLD-MCNC: 13.5 G/DL (ref 12–16)
IMM GRANULOCYTES # BLD AUTO: 0.02 K/UL (ref 0–0.04)
IMM GRANULOCYTES NFR BLD AUTO: 0.6 % (ref 0–0.5)
LYMPHOCYTES # BLD AUTO: 0.9 K/UL (ref 1–4.8)
LYMPHOCYTES NFR BLD: 26.1 % (ref 18–48)
MAGNESIUM SERPL-MCNC: 2 MG/DL (ref 1.6–2.6)
MCH RBC QN AUTO: 30.8 PG (ref 27–31)
MCHC RBC AUTO-ENTMCNC: 33.3 G/DL (ref 32–36)
MCV RBC AUTO: 92 FL (ref 82–98)
MONOCYTES # BLD AUTO: 0.1 K/UL (ref 0.3–1)
MONOCYTES NFR BLD: 3.1 % (ref 4–15)
NEUTROPHILS # BLD AUTO: 2.3 K/UL (ref 1.8–7.7)
NEUTROPHILS NFR BLD: 70.2 % (ref 38–73)
NRBC BLD-RTO: 0 /100 WBC
PHOSPHATE SERPL-MCNC: 1.7 MG/DL (ref 2.7–4.5)
PLATELET # BLD AUTO: 191 K/UL (ref 150–450)
PMV BLD AUTO: 9.5 FL (ref 9.2–12.9)
POTASSIUM SERPL-SCNC: 3.5 MMOL/L (ref 3.5–5.1)
RBC # BLD AUTO: 4.39 M/UL (ref 4–5.4)
SODIUM SERPL-SCNC: 139 MMOL/L (ref 136–145)
WBC # BLD AUTO: 3.26 K/UL (ref 3.9–12.7)

## 2024-12-21 PROCEDURE — 27000221 HC OXYGEN, UP TO 24 HOURS

## 2024-12-21 PROCEDURE — 63600175 PHARM REV CODE 636 W HCPCS: Performed by: HOSPITALIST

## 2024-12-21 PROCEDURE — 85025 COMPLETE CBC W/AUTO DIFF WBC: CPT | Performed by: NURSE PRACTITIONER

## 2024-12-21 PROCEDURE — 99900031 HC PATIENT EDUCATION (STAT)

## 2024-12-21 PROCEDURE — 80048 BASIC METABOLIC PNL TOTAL CA: CPT | Performed by: NURSE PRACTITIONER

## 2024-12-21 PROCEDURE — 94761 N-INVAS EAR/PLS OXIMETRY MLT: CPT

## 2024-12-21 PROCEDURE — 84100 ASSAY OF PHOSPHORUS: CPT | Performed by: NURSE PRACTITIONER

## 2024-12-21 PROCEDURE — 36415 COLL VENOUS BLD VENIPUNCTURE: CPT | Performed by: NURSE PRACTITIONER

## 2024-12-21 PROCEDURE — 83735 ASSAY OF MAGNESIUM: CPT | Performed by: NURSE PRACTITIONER

## 2024-12-21 PROCEDURE — 94618 PULMONARY STRESS TESTING: CPT

## 2024-12-21 PROCEDURE — 94640 AIRWAY INHALATION TREATMENT: CPT | Mod: XB

## 2024-12-21 PROCEDURE — 96376 TX/PRO/DX INJ SAME DRUG ADON: CPT

## 2024-12-21 PROCEDURE — 25000242 PHARM REV CODE 250 ALT 637 W/ HCPCS: Performed by: HOSPITALIST

## 2024-12-21 PROCEDURE — G0378 HOSPITAL OBSERVATION PER HR: HCPCS

## 2024-12-21 RX ORDER — MELATONIN 10 MG
1 TABLET, SUBLINGUAL SUBLINGUAL NIGHTLY
Status: DISCONTINUED | OUTPATIENT
Start: 2024-12-21 | End: 2024-12-21 | Stop reason: HOSPADM

## 2024-12-21 RX ORDER — ASPIRIN 81 MG/1
81 TABLET ORAL DAILY
Qty: 90 TABLET | Refills: 0 | Status: SHIPPED | OUTPATIENT
Start: 2024-12-21 | End: 2025-12-21

## 2024-12-21 RX ORDER — EZETIMIBE 10 MG/1
10 TABLET ORAL NIGHTLY
Start: 2024-12-21

## 2024-12-21 RX ORDER — PREDNISONE 20 MG/1
40 TABLET ORAL DAILY
Qty: 8 TABLET | Refills: 0 | Status: SHIPPED | OUTPATIENT
Start: 2024-12-21

## 2024-12-21 RX ORDER — ATORVASTATIN CALCIUM 40 MG/1
80 TABLET, FILM COATED ORAL NIGHTLY
Status: DISCONTINUED | OUTPATIENT
Start: 2024-12-21 | End: 2024-12-21 | Stop reason: HOSPADM

## 2024-12-21 RX ORDER — EZETIMIBE 10 MG/1
10 TABLET ORAL NIGHTLY
Status: DISCONTINUED | OUTPATIENT
Start: 2024-12-21 | End: 2024-12-21 | Stop reason: HOSPADM

## 2024-12-21 RX ORDER — CITALOPRAM 20 MG/1
40 TABLET, FILM COATED ORAL NIGHTLY
Status: DISCONTINUED | OUTPATIENT
Start: 2024-12-21 | End: 2024-12-21 | Stop reason: HOSPADM

## 2024-12-21 RX ADMIN — IPRATROPIUM BROMIDE AND ALBUTEROL SULFATE 3 ML: .5; 3 SOLUTION RESPIRATORY (INHALATION) at 06:12

## 2024-12-21 RX ADMIN — METHYLPREDNISOLONE SODIUM SUCCINATE 40 MG: 40 INJECTION, POWDER, FOR SOLUTION INTRAMUSCULAR; INTRAVENOUS at 05:12

## 2024-12-21 RX ADMIN — IPRATROPIUM BROMIDE AND ALBUTEROL SULFATE 3 ML: .5; 3 SOLUTION RESPIRATORY (INHALATION) at 10:12

## 2024-12-21 NOTE — PLAN OF CARE
Problem: Adult Inpatient Plan of Care  Goal: Plan of Care Review  12/21/2024 0543 by Jacqueline Enamorado RN  Outcome: Progressing  Flowsheets (Taken 12/21/2024 0543)  Plan of Care Reviewed With:   patient   family  12/21/2024 0316 by Jacqueline Enamorado RN  Outcome: Progressing  Flowsheets (Taken 12/20/2024 2100)  Plan of Care Reviewed With:   patient   family  Goal: Optimal Comfort and Wellbeing  Outcome: Progressing     Problem: COPD (Chronic Obstructive Pulmonary Disease)  Goal: Optimal Chronic Illness Coping  12/21/2024 0543 by Jacqueline Enamorado RN  Outcome: Progressing  12/21/2024 0316 by Jacqueline Enamorado RN  Outcome: Progressing     Problem: Infection  Goal: Absence of Infection Signs and Symptoms  12/21/2024 0543 by Jacqueline Enamorado RN  Outcome: Progressing  12/21/2024 0316 by Jacqueline Enamorado RN  Outcome: Progressing

## 2024-12-21 NOTE — CARE UPDATE
12/21/24 1050   Home Oxygen Qualification   $ Home O2 Qualification Pulmonary Stress Test/6 min walk   Room Air SpO2 At Rest 95 %   Room Air SpO2 During Ambulation 94 %   SpO2 Post Ambulation 96 %   Post Ambulation Heart Rate 103 bpm   Home O2 Eval Comments pty does not require home 02

## 2024-12-21 NOTE — DISCHARGE INSTRUCTIONS
You will take 2 20mg tablets to equal 40mg of prednisone starting TOMORROW    Due to your history of cornary artery disease, you should be on a baby aspirin indefinitely. Please continue followup with PCP and Cardiologist

## 2024-12-21 NOTE — ASSESSMENT & PLAN NOTE
Patient's COPD is with exacerbation noted by continued dyspnea and worsening of baseline hypoxia currently.  Patient is currently on COPD Pathway. Continue scheduled inhalers Steroids and Supplemental oxygen and monitor respiratory status closely.     Solumedrol IV given in Er  Supplemental oxygen  Neb tx

## 2024-12-21 NOTE — H&P
[facility] Medicine  History & Physical    Patient Name: Aysha Moore  MRN: [id number]  Patient Class: OP- Observation  Admission Date: 12/20/2024  Attending Physician: Hawa Thomas MD   Primary Care Provider: Yoni Daniels MD         Patient information was obtained from patient, past medical records, and ER records.     Subjective:     Principal Problem:Acute respiratory failure with hypoxia    Chief Complaint:   Chief Complaint   Patient presents with    Shortness of Breath     C/o shortness of breath x 2 days - pt has hx of COPD, and has audible wheezing and increased work of breathing - pt exposed to strep throat by grandchildren    Sore Throat    Cough        HPI: 69 year old female with a past medical history of hypertension, hyperlipidemia, prediabetes, CAD, cardiac stents, STEMI, combined systolic and diastolic heart failure, chronic bilateral low back pain with left-sided sciatica, anxiety, seasonal allergic rhinitis, pulmonary emphysema, smoker, COPD, adenocarcinoma of the left lower lung and metastases to the right lung as well as melanoma excision from the left lower extremity.     She presents to the emergency room for reports of shortness for breath, sore throat and cough for the last 2 days.  Reports being exposed to strep in viral illnesses from her grandchildren has had associated fever, nausea vomiting, diarrhea.  Denies chest pain abdominal pain.     In the ER, hypotensive at 92/63, no leukocytosis, afebrile, hyponatremic with sodium 131, T bili at 2.5, troponin I high sensitivity 36, , group B strep negative, respiratory panel pending Mag 1.7, EKG with diffuse T-wave inversions , chest x-ray with no acute process.  Nebulizing treatment, IV Solu-Medrol given in ER.  Patient continued to have shortness for breath and hypoxia placed on supplemental oxygen.  Admit to hospital medicine for acute respiratory failure with hypoxemia secondary to COPD  exacerbation, elevated troponin        Past Medical History:   Diagnosis Date    Allergy     Anxiety     Arthritis     CAD (coronary artery disease)     coronary stents    Chronic back pain     lower back pain radiates to left leg    Chronic rhinitis     DIALY (dyspnea on exertion)     Hyperlipidemia     Hypertension     Lung cancer 07/01/2023    Melanoma in situ of left upper extremity     left thigh area    MI (myocardial infarction)     New onset type 2 diabetes mellitus 02/08/2024    Seasonal allergic rhinitis 10/29/2020       Past Surgical History:   Procedure Laterality Date    BREAST SURGERY      breast reduction    CATARACT EXTRACTION, BILATERAL      coranary stent      EXCISION OF MELANOMA Left 3/30/2022    Procedure: EXCISION, MELANOMA;  Surgeon: David Mcpherson III, MD;  Location: [facility] OR;  Service: General;  Laterality: Left;    EXCISIONAL BIOPSY Left 3/30/2022    Procedure: EXCISIONAL BIOPSY;  Surgeon: David Mcpherson III, MD;  Location: [facility] OR;  Service: General;  Laterality: Left;    HYSTERECTOMY      total    INSERTION OF TUNNELED CENTRAL VENOUS CATHETER (CVC) WITH SUBCUTANEOUS PORT N/A 8/4/2023    Procedure: [id number];  Surgeon: Remi Mckeon Jr., MD;  Location: [facility] OR;  Service: General;  Laterality: N/A;    LEFT HEART CATHETERIZATION Left 10/16/2020    Procedure: Left heart cath;  Surgeon: Garrett Robins MD;  Location: [facility] CATH/EP LAB;  Service: Cardiology;  Laterality: Left;    OOPHORECTOMY      TOTAL REDUCTION MAMMOPLASTY Bilateral 2000       Review of patient's allergies indicates:   Allergen Reactions    Cephalexin      Other reaction(s): Rash    Doxycycline monohydrate Hives    Lanoxin  [digoxin]      Other reaction(s): Rash    Lansoprazole      Other reaction(s): Rash    Macrobid [nitrofurantoin monohyd/m-cryst] Rash       No current facility-administered medications on file prior to encounter.     Current Outpatient Medications on File Prior to Encounter    Medication Sig    ALPRAZolam (XANAX) 0.25 MG tablet Take 1 tablet (0.25 mg total) by mouth 3 (three) times daily as needed for Anxiety. (Patient taking differently: Take 0.25 mg by mouth daily as needed for Anxiety.)    atorvastatin (LIPITOR) 80 MG tablet TAKE 1 TABLET BY MOUTH EVERY EVENING    calcium-vitamin D3 (OS-JOLIE 500 + D3) 500 mg-5 mcg (200 unit) per tablet Take 1 tablet by mouth every evening.    citalopram (CELEXA) 40 MG tablet Take 1 tablet (40 mg total) by mouth once daily. (Patient taking differently: Take 40 mg by mouth every evening.)    ezetimibe (ZETIA) 10 mg tablet TAKE 1 TABLET BY MOUTH EVERY DAY (Patient taking differently: Take 10 mg by mouth every evening.)    azelastine (ASTELIN) 137 mcg (0.1 %) nasal spray USE 1 SPRAY IN EACH NOSTRIL 2 TIMES DAILY AS NEEDED FOR RHINITIS OR SINUSITIS (Patient not taking: Reported on 12/20/2024)    ketoconazole (NIZORAL) 2 % cream Apply topically 2 (two) times daily. (Patient not taking: Reported on 12/20/2024)    ketoconazole (NIZORAL) 2 % shampoo Apply topically twice a week. (Patient not taking: Reported on 12/20/2024)    metoprolol succinate (TOPROL-XL) 50 MG 24 hr tablet Take 1 tablet (50 mg total) by mouth once daily. (Patient not taking: Reported on 12/20/2024)    traMADoL (ULTRAM) 50 mg tablet Take 1 tablet (50 mg total) by mouth every 6 (six) hours as needed for Pain. (Patient not taking: Reported on 12/20/2024)    triamcinolone acetonide 0.025% (KENALOG) 0.025 % cream Apply topically 2 (two) times daily. (Patient not taking: Reported on 12/20/2024)    [DISCONTINUED] albuterol (PROVENTIL/VENTOLIN HFA) 90 mcg/actuation inhaler Inhale 2 puffs into the lungs every 6 (six) hours as needed. Rescue    [DISCONTINUED] ammonium lactate 12 % Crea aaa bid prn dry skin    [DISCONTINUED] famotidine (PEPCID) 40 MG tablet TAKE 1 TABLET BY MOUTH EVERY DAY IN THE EVENING    [DISCONTINUED] fluticasone propionate (FLONASE) 50 mcg/actuation nasal spray 1 spray (50 mcg  total) by Each Nostril route daily as needed for Rhinitis or Allergies.    [DISCONTINUED] nitroGLYCERIN (NITROSTAT) 0.4 MG SL tablet Place 1 tablet (0.4 mg total) under the tongue every 5 (five) minutes as needed for Chest pain.    [DISCONTINUED] omeprazole (PRILOSEC) 40 MG capsule Take 1 capsule (40 mg total) by mouth once daily.    [DISCONTINUED] promethazine (PHENERGAN) 25 MG tablet Take 1 tablet (25 mg total) by mouth every 4 to 6 hours as needed.    [DISCONTINUED] umeclidinium-vilanteroL (ANORO ELLIPTA) 62.5-25 mcg/actuation DsDv Inhale 1 puff into the lungs once daily. Controller    [DISCONTINUED] valACYclovir (VALTREX) 1000 MG tablet TAKE 2 AT ONSET OF FEVER BLISTERS THEN REPEAT IN 12 HOURS (TOTAL 4 TABS PER EPISODE)     Family History       Problem Relation (Age of Onset)    Breast cancer Mother (50), Paternal Aunt (60)    Cancer Mother, Father, Sister, Maternal Uncle, Paternal Aunt, Paternal Uncle    Heart disease Mother, Sister, Sister, Brother, Maternal Grandmother    Lung cancer Brother    Macular degeneration Sister    No Known Problems Daughter, Son    Ovarian cancer Mother    Stroke Sister          Tobacco Use    Smoking status: Former     Current packs/day: 0.00     Average packs/day: 1 pack/day for 43.2 years (43.2 ttl pk-yrs)     Types: Cigarettes, Vaping with nicotine     Start date:      Quit date: 3/4/2017     Years since quittin.8    Smokeless tobacco: Never   Substance and Sexual Activity    Alcohol use: Not Currently     Alcohol/week: 0.0 standard drinks of alcohol     Comment: sometimes    Drug use: No    Sexual activity: Yes     Partners: Male     Review of Systems   Constitutional:  Positive for fever. Negative for activity change.   HENT:  Positive for congestion. Negative for sore throat and trouble swallowing.    Respiratory:  Positive for cough, shortness of breath and wheezing. Negative for choking and chest tightness.    Cardiovascular: Negative.    Gastrointestinal:   Positive for blood in stool, diarrhea, nausea and vomiting. Negative for abdominal distention and abdominal pain.   Genitourinary: Negative.    Musculoskeletal: Negative.    Skin: Negative.    Neurological: Negative.    Psychiatric/Behavioral: Negative.       Objective:     Vital Signs (Most Recent):  Temp: 97.8 °F (36.6 °C) (12/20/24 1509)  Pulse: 94 (12/20/24 1932)  Resp: (!) 24 (12/20/24 1812)  BP: (!) 119/59 (12/20/24 1930)  SpO2: 96 % (12/20/24 1932) Vital Signs (24h Range):  Temp:  [97.8 °F (36.6 °C)] 97.8 °F (36.6 °C)  Pulse:  [66-96] 94  Resp:  [22-24] 24  SpO2:  [64 %-100 %] 96 %  BP: ()/(56-69) 119/59     Weight: 66.2 kg (146 lb)  Body mass index is 25.86 kg/m².     Physical Exam  Vitals reviewed.   Constitutional:       General: She is in acute distress.      Appearance: Normal appearance. She is not ill-appearing.   HENT:      Head: Normocephalic and atraumatic.      Mouth/Throat:      Mouth: Mucous membranes are dry.   Eyes:      Extraocular Movements: Extraocular movements intact.      Pupils: Pupils are equal, round, and reactive to light.   Cardiovascular:      Rate and Rhythm: Normal rate.   Pulmonary:      Effort: Accessory muscle usage and respiratory distress present.      Breath sounds: Examination of the right-upper field reveals wheezing. Examination of the left-upper field reveals wheezing. Examination of the right-middle field reveals wheezing. Examination of the left-middle field reveals wheezing. Examination of the right-lower field reveals wheezing. Examination of the left-lower field reveals wheezing. Wheezing present.      Comments: On supplemental oxygen  Abdominal:      General: Bowel sounds are normal.      Palpations: Abdomen is soft.      Tenderness: There is no abdominal tenderness.   Musculoskeletal:         General: Normal range of motion.      Cervical back: Neck supple.   Skin:     General: Skin is warm and dry.      Capillary Refill: Capillary refill takes less than 2  seconds.   Neurological:      General: No focal deficit present.      Mental Status: She is alert. Mental status is at baseline.   Psychiatric:         Mood and Affect: Mood normal.            CRANIAL NERVES     CN III, IV, VI   Pupils are equal, round, and reactive to light.     Significant Labs: All pertinent labs within the past 24 hours have been reviewed.  Recent Lab Results  (Last 5 results in the past 24 hours)        12/20/24  1933   12/20/24  1915   12/20/24  1557   12/20/24  1554   12/20/24  1551        Group A Strep, Molecular       Negative  Comment: Arcanobacterium haemolyticum and Beta Streptococcus group C   and G will not be detected by this test method.  Please order   Throat Culture ([id number]) if suspected.           Respiratory Infection Panel Source     NP swab           Adenovirus     Not Detected           Coronavirus 229E, Common Cold Virus     Not Detected           Coronavirus HKU1, Common Cold Virus     Not Detected           Coronavirus NL63, Common Cold Virus     Not Detected           Coronavirus OC43, Common Cold Virus     Not Detected  Comment: Coronavirus strains 229E, HKU1, NL63, and OC43 can cause the common   cold   and are not associated with the respiratory disease outbreak caused   by  the COVID-19 (SARS-CoV-2 novel Coronavirus) strain.              Human Metapneumovirus     Not Detected           Human Rhinovirus/Enterovirus     Not Detected           Influenza A H1-2009     Not Detected           Influenza B     Not Detected           Parainfluenza Virus 1     Not Detected           Parainfluenza Virus 2     Not Detected           Parainfluenza Virus 3     Not Detected           Parainfluenza Virus 4     Not Detected           Respiratory Syncytial Virus     Detected  Comment: critical result(s) called and verbal readback obtained from   Kaylyn Maldonado RN-ED by ANA LAURA 12/20/2024 19:12             Bordetella Parapertussis (HS1320)     Not Detected           Bordetella pertussis  (ptxP)     Not Detected           Chlamydia pneumoniae     Not Detected           Mycoplasma pneumoniae     Not Detected  Comment: Respiratory Infection Panel testing performed by Multiplex PCR.           Albumin         4.3       ALP         81       ALT         14       Anion Gap         10       Appearance, UA Clear               AST         17       Baso #         0.02       Basophil %         0.3       Bilirubin (UA) Negative               BILIRUBIN TOTAL         2.5  Comment: For infants and newborns, interpretation of results should be based  on gestational age, weight and in agreement with clinical  observations.    Premature Infant recommended reference ranges:  Up to 24 hours.............<8.0 mg/dL  Up to 48 hours............<12.0 mg/dL  3-5 days..................<15.0 mg/dL  6-29 days.................<15.0 mg/dL         BNP     148  Comment: Values of less than 100 pg/ml are consistent with non-CHF populations.           BUN         20       Calcium         8.8       Chloride         98       CO2         23       Color, UA Yellow               Creatinine         1.0       Differential Method         Automated       eGFR         >60.0       Eos #         0.1       Eos %         2.0       Glucose         139       Glucose, UA Negative               Gran # (ANC)         4.2       Gran %         63.6       Hematocrit         41.8       Hemoglobin         14.3       Immature Grans (Abs)         0.01  Comment: Mild elevation in immature granulocytes is non specific and   can be seen in a variety of conditions including stress response,   acute inflammation, trauma and pregnancy. Correlation with other   laboratory and clinical findings is essential.         Immature Granulocytes         0.2       Ketones, UA Negative               Leukocyte Esterase, UA Negative               Lymph #         1.4       Lymph %         21.8       Magnesium      1.7           MCH         31.5       MCHC         34.2       MCV          92       Mono #         0.8       Mono %         12.1       MPV         9.6       NITRITE UA Negative               nRBC         0       Blood, UA TRACE               pH, UA 6.0               Platelet Count         202       Potassium         3.7       PROTEIN TOTAL         7.5       Protein, UA Negative  Comment: Recommend a 24 hour urine protein or a urine   protein/creatinine ratio if globulin induced proteinuria is  clinically suspected.                 RBC         4.54       RDW         12.1       SARS-CoV2 (COVID-19) Qualitative PCR     Not Detected           Sodium         131       Spec Grav UA 1.015               Specimen UA Urine, Clean Catch               Troponin I High Sensitivity   24.9  Comment: Troponin results differ between methods. Do not use   results between Troponin methods interchangeably.    Alkaline Phospatase levels above 400 U/L may   cause false positive results.    Access hsTnI should not be used for patients taking   Asfotase jessica (Strensiq).     36.0  Comment: Troponin results differ between methods. Do not use   results between Troponin methods interchangeably.    Alkaline Phospatase levels above 400 U/L may   cause false positive results.    Access hsTnI should not be used for patients taking   Asfotase jessica (Strensiq).             UROBILINOGEN UA Negative               WBC         6.55                              Significant Imaging: I have reviewed all pertinent imaging results/findings within the past 24 hours.  Imaging Results              X-Ray Chest PA And Lateral (Final result)  Result time 12/20/24 16:34:01      Final result by Jorge Welch MD (12/20/24 16:34:01)                   Impression:      No evidence of acute cardiopulmonary disease.      Electronically signed by: Jorge Welch  Date:    12/20/2024  Time:    16:34               Narrative:    EXAMINATION:  XR CHEST PA AND LATERAL    CLINICAL HISTORY:  Shortness of breath    FINDINGS:  Three-view chest radiograph  at 16:06 hours compared to prior exams show left internal jugular injection port type central venous catheter, unchanged in position.  The trachea is midline, with the cardiomediastinal silhouette and pulmonary vascular distribution within normal limits.    The lungs are normally and symmetrically expanded, with no consolidation, pleural effusion or evidence of pulmonary edema. No confluent infiltrates or pneumothorax.  No acute fractures.                                       Assessment/Plan:     * Acute respiratory failure with hypoxia  Patient with Hypoxic Respiratory failure which is Acute on chronic.  she is not on home oxygen. Supplemental oxygen was provided and noted-      RSV +  Treat symptomatically  isolation  .   Signs/symptoms of respiratory failure include- respiratory distress and wheezing. Contributing diagnoses includes - CHF, COPD, and Obesity Hypoventilation Labs and images were reviewed. Patient Has not had a recent ABG. Will treat underlying causes and adjust management of respiratory failure as follows-       COPD with acute exacerbation  Patient's COPD is with exacerbation noted by continued dyspnea and worsening of baseline hypoxia currently.  Patient is currently on COPD Pathway. Continue scheduled inhalers Steroids and Supplemental oxygen and monitor respiratory status closely.     Solumedrol IV given in Er  Supplemental oxygen  Neb tx      Hyponatremia  Hyponatremia is likely due to Heart Failure. The patient's most recent sodium results are listed below.  Recent Labs     12/20/24  1551   *     Plan  - Correct the sodium by 4-6mEq in 24 hours.   - Obtain the following studies: na levels q8 hours.  - Will treat the hyponatremia with IV fluids as follows: 500 ml bolus in ER  - Monitor sodium Every 8 hours.   - Patient hyponatremia is worsening. Will new admit, monitor, fluid restriction      Metastatic lung cancer (metastasis from lung to other site), unspecified  laterality  chronic    LUNG CANCER-ADENOCARCINOMA OF THE LLL    Chronic     Chronic combined systolic and diastolic heart failure  Mildly elevated troponin and BNP.  Chest x-ray without evidence of cardiomegaly pleural effusions or pulmonary edema.  Chronic  Continue home meds  Tele monitoring  P.r.n.  EKG with diffuse T-wave inversions ,         Hypertension  Patient's blood pressure range in the last 24 hours was: BP  Min: 92/63  Max: 138/69.The patient's inpatient anti-hypertensive regimen is listed below:  Current Antihypertensives       Plan  - BP is uncontrolled, will adjust as follows: hold antihypertensives for SBP < 110        VTE Risk Mitigation (From admission, onward)           Ordered     IP VTE HIGH RISK PATIENT  Once         12/20/24 2024     Place sequential compression device  Until discontinued         12/20/24 2024                         On 12/20/2024, patient should be placed in hospital observation services under my care in collaboration with Dr. Thomas.           Dorota Delatorre, NP  Department of Hospital Medicine  [facility]

## 2024-12-21 NOTE — RESPIRATORY THERAPY
12/20/24 2044   Patient Assessment/Suction   Level of Consciousness (AVPU) alert   Respiratory Effort Normal;Unlabored   Expansion/Accessory Muscles/Retractions no retractions;no use of accessory muscles   All Lung Fields Breath Sounds clear;diminished   Rhythm/Pattern, Respiratory unlabored   Cough Frequency frequent   Cough Type congested;nonproductive   PRE-TX-O2   Device (Oxygen Therapy) room air   SpO2 (!) 94 %   Pulse Oximetry Type Intermittent   $ Pulse Oximetry - Multiple Charge Pulse Oximetry - Multiple   Pulse 90   Resp 20   Aerosol Therapy   $ Aerosol Therapy Charges Aerosol Treatment   Daily Review of Necessity (SVN) completed   Respiratory Treatment Status (SVN) given   Treatment Route (SVN) mask;oxygen   Patient Position HOB elevated   Post Treatment Assessment (SVN) breath sounds unchanged   Signs of Intolerance (SVN) none   Education   $ Education Bronchodilator;Oxygen;15 min

## 2024-12-21 NOTE — ASSESSMENT & PLAN NOTE
Mildly elevated troponin and BNP.  Chest x-ray without evidence of cardiomegaly pleural effusions or pulmonary edema.  Chronic  Continue home meds  Tele monitoring  P.r.n.  EKG unchanged from prior

## 2024-12-21 NOTE — PLAN OF CARE
Problem: Adult Inpatient Plan of Care  Goal: Plan of Care Review  Outcome: Progressing  Flowsheets (Taken 12/20/2024 2100)  Plan of Care Reviewed With:   patient   family  Goal: Optimal Comfort and Wellbeing  Outcome: Progressing     Problem: COPD (Chronic Obstructive Pulmonary Disease)  Goal: Optimal Chronic Illness Coping  Outcome: Progressing     Problem: Infection  Goal: Absence of Infection Signs and Symptoms  Outcome: Progressing

## 2024-12-21 NOTE — NURSING
20:10pm  Patient arrived on unit from ED via [facility] accompanied by ED staff and family.  Assigned to room 3006. Ambulated from gurney to bed with minimal assistance. Obtained standing weight, 145.5lbs.  Patient transferred to bed, noted shortness of breath with minimal exertion. Isolation precautions secondary to RSV diagnosis, infection control cart placed outside the door.  Patient/family educated on airborne related infectious disease. Provided visitors with N95 mask.  Vital signs recorded.  Patient oriented to patient room, call light, safety, falls precautions.  Assessment to follow.

## 2024-12-21 NOTE — SUBJECTIVE & OBJECTIVE
Past Medical History:   Diagnosis Date    Allergy     Anxiety     Arthritis     CAD (coronary artery disease)     coronary stents    Chronic back pain     lower back pain radiates to left leg    Chronic rhinitis     DAILY (dyspnea on exertion)     Hyperlipidemia     Hypertension     Lung cancer 07/01/2023    Melanoma in situ of left upper extremity     left thigh area    MI (myocardial infarction)     New onset type 2 diabetes mellitus 02/08/2024    Seasonal allergic rhinitis 10/29/2020       Past Surgical History:   Procedure Laterality Date    BREAST SURGERY      breast reduction    CATARACT EXTRACTION, BILATERAL      coranary stent      EXCISION OF MELANOMA Left 3/30/2022    Procedure: EXCISION, MELANOMA;  Surgeon: David Mcpherson III, MD;  Location: [facility] OR;  Service: General;  Laterality: Left;    EXCISIONAL BIOPSY Left 3/30/2022    Procedure: EXCISIONAL BIOPSY;  Surgeon: David Mcpherson III, MD;  Location: [facility] OR;  Service: General;  Laterality: Left;    HYSTERECTOMY      total    INSERTION OF TUNNELED CENTRAL VENOUS CATHETER (CVC) WITH SUBCUTANEOUS PORT N/A 8/4/2023    Procedure: JVIYPNNLE-TPGV-S-CATH;  Surgeon: Remi Mckeon Jr., MD;  Location: [facility] OR;  Service: General;  Laterality: N/A;    LEFT HEART CATHETERIZATION Left 10/16/2020    Procedure: Left heart cath;  Surgeon: Garrett Robins MD;  Location: [facility] CATH/EP LAB;  Service: Cardiology;  Laterality: Left;    OOPHORECTOMY      TOTAL REDUCTION MAMMOPLASTY Bilateral 2000       Review of patient's allergies indicates:   Allergen Reactions    Cephalexin      Other reaction(s): Rash    Doxycycline monohydrate Hives    Lanoxin  [digoxin]      Other reaction(s): Rash    Lansoprazole      Other reaction(s): Rash    Macrobid [nitrofurantoin monohyd/m-cryst] Rash       No current facility-administered medications on file prior to encounter.     Current Outpatient Medications on File Prior to Encounter   Medication  Sig    ALPRAZolam (XANAX) 0.25 MG tablet Take 1 tablet (0.25 mg total) by mouth 3 (three) times daily as needed for Anxiety. (Patient taking differently: Take 0.25 mg by mouth daily as needed for Anxiety.)    atorvastatin (LIPITOR) 80 MG tablet TAKE 1 TABLET BY MOUTH EVERY EVENING    calcium-vitamin D3 (OS-JOLIE 500 + D3) 500 mg-5 mcg (200 unit) per tablet Take 1 tablet by mouth every evening.    citalopram (CELEXA) 40 MG tablet Take 1 tablet (40 mg total) by mouth once daily. (Patient taking differently: Take 40 mg by mouth every evening.)    ezetimibe (ZETIA) 10 mg tablet TAKE 1 TABLET BY MOUTH EVERY DAY (Patient taking differently: Take 10 mg by mouth every evening.)    azelastine (ASTELIN) 137 mcg (0.1 %) nasal spray USE 1 SPRAY IN EACH NOSTRIL 2 TIMES DAILY AS NEEDED FOR RHINITIS OR SINUSITIS (Patient not taking: Reported on 12/20/2024)    ketoconazole (NIZORAL) 2 % cream Apply topically 2 (two) times daily. (Patient not taking: Reported on 12/20/2024)    ketoconazole (NIZORAL) 2 % shampoo Apply topically twice a week. (Patient not taking: Reported on 12/20/2024)    metoprolol succinate (TOPROL-XL) 50 MG 24 hr tablet Take 1 tablet (50 mg total) by mouth once daily. (Patient not taking: Reported on 12/20/2024)    traMADoL (ULTRAM) 50 mg tablet Take 1 tablet (50 mg total) by mouth every 6 (six) hours as needed for Pain. (Patient not taking: Reported on 12/20/2024)    triamcinolone acetonide 0.025% (KENALOG) 0.025 % cream Apply topically 2 (two) times daily. (Patient not taking: Reported on 12/20/2024)    [DISCONTINUED] albuterol (PROVENTIL/VENTOLIN HFA) 90 mcg/actuation inhaler Inhale 2 puffs into the lungs every 6 (six) hours as needed. Rescue    [DISCONTINUED] ammonium lactate 12 % Crea aaa bid prn dry skin    [DISCONTINUED] famotidine (PEPCID) 40 MG tablet TAKE 1 TABLET BY MOUTH EVERY DAY IN THE EVENING    [DISCONTINUED] fluticasone propionate (FLONASE) 50 mcg/actuation nasal spray 1 spray (50 mcg  total) by Each Nostril route daily as needed for Rhinitis or Allergies.    [DISCONTINUED] nitroGLYCERIN (NITROSTAT) 0.4 MG SL tablet Place 1 tablet (0.4 mg total) under the tongue every 5 (five) minutes as needed for Chest pain.    [DISCONTINUED] omeprazole (PRILOSEC) 40 MG capsule Take 1 capsule (40 mg total) by mouth once daily.    [DISCONTINUED] promethazine (PHENERGAN) 25 MG tablet Take 1 tablet (25 mg total) by mouth every 4 to 6 hours as needed.    [DISCONTINUED] umeclidinium-vilanteroL (ANORO ELLIPTA) 62.5-25 mcg/actuation DsDv Inhale 1 puff into the lungs once daily. Controller    [DISCONTINUED] valACYclovir (VALTREX) 1000 MG tablet TAKE 2 AT ONSET OF FEVER BLISTERS THEN REPEAT IN 12 HOURS (TOTAL 4 TABS PER EPISODE)     Family History       Problem Relation (Age of Onset)    Breast cancer Mother (50), Paternal Aunt (60)    Cancer Mother, Father, Sister, Maternal Uncle, Paternal Aunt, Paternal Uncle    Heart disease Mother, Sister, Sister, Brother, Maternal Grandmother    Lung cancer Brother    Macular degeneration Sister    No Known Problems Daughter, Son    Ovarian cancer Mother    Stroke Sister          Tobacco Use    Smoking status: Former     Current packs/day: 0.00     Average packs/day: 1 pack/day for 43.2 years (43.2 ttl pk-yrs)     Types: Cigarettes, Vaping with nicotine     Start date:      Quit date: 3/4/2017     Years since quittin.8    Smokeless tobacco: Never   Substance and Sexual Activity    Alcohol use: Not Currently     Alcohol/week: 0.0 standard drinks of alcohol     Comment: sometimes    Drug use: No    Sexual activity: Yes     Partners: Male     Review of Systems   Constitutional:  Positive for fever. Negative for activity change.   HENT:  Positive for congestion. Negative for sore throat and trouble swallowing.    Respiratory:  Positive for cough, shortness of breath and wheezing. Negative for choking and chest tightness.    Cardiovascular: Negative.     Gastrointestinal:  Positive for blood in stool, diarrhea, nausea and vomiting. Negative for abdominal distention and abdominal pain.   Genitourinary: Negative.    Musculoskeletal: Negative.    Skin: Negative.    Neurological: Negative.    Psychiatric/Behavioral: Negative.       Objective:     Vital Signs (Most Recent):  Temp: 97.8 °F (36.6 °C) (12/20/24 1509)  Pulse: 94 (12/20/24 1932)  Resp: (!) 24 (12/20/24 1812)  BP: (!) 119/59 (12/20/24 1930)  SpO2: 96 % (12/20/24 1932) Vital Signs (24h Range):  Temp:  [97.8 °F (36.6 °C)] 97.8 °F (36.6 °C)  Pulse:  [66-96] 94  Resp:  [22-24] 24  SpO2:  [64 %-100 %] 96 %  BP: ()/(56-69) 119/59     Weight: 66.2 kg (146 lb)  Body mass index is 25.86 kg/m².     Physical Exam  Vitals reviewed.   Constitutional:       General: She is in acute distress.      Appearance: Normal appearance. She is not ill-appearing.   HENT:      Head: Normocephalic and atraumatic.      Mouth/Throat:      Mouth: Mucous membranes are dry.   Eyes:      Extraocular Movements: Extraocular movements intact.      Pupils: Pupils are equal, round, and reactive to light.   Cardiovascular:      Rate and Rhythm: Normal rate.   Pulmonary:      Effort: Accessory muscle usage and respiratory distress present.      Breath sounds: Examination of the right-upper field reveals wheezing. Examination of the left-upper field reveals wheezing. Examination of the right-middle field reveals wheezing. Examination of the left-middle field reveals wheezing. Examination of the right-lower field reveals wheezing. Examination of the left-lower field reveals wheezing. Wheezing present.      Comments: On supplemental oxygen  Abdominal:      General: Bowel sounds are normal.      Palpations: Abdomen is soft.      Tenderness: There is no abdominal tenderness.   Musculoskeletal:         General: Normal range of motion.      Cervical back: Neck supple.   Skin:     General: Skin is warm and dry.      Capillary Refill: Capillary  refill takes less than 2 seconds.   Neurological:      General: No focal deficit present.      Mental Status: She is alert. Mental status is at baseline.   Psychiatric:         Mood and Affect: Mood normal.            CRANIAL NERVES     CN III, IV, VI   Pupils are equal, round, and reactive to light.     Significant Labs: All pertinent labs within the past 24 hours have been reviewed.  Recent Lab Results  (Last 5 results in the past 24 hours)        12/20/24  1933   12/20/24  1915   12/20/24  1557   12/20/24  1554   12/20/24  1551        Group A Strep, Molecular       Negative  Comment: Arcanobacterium haemolyticum and Beta Streptococcus group C   and G will not be detected by this test method.  Please order   Throat Culture ([id number]) if suspected.           Respiratory Infection Panel Source     NP swab           Adenovirus     Not Detected           Coronavirus 229E, Common Cold Virus     Not Detected           Coronavirus HKU1, Common Cold Virus     Not Detected           Coronavirus NL63, Common Cold Virus     Not Detected           Coronavirus OC43, Common Cold Virus     Not Detected  Comment: Coronavirus strains 229E, HKU1, NL63, and OC43 can cause the common   cold   and are not associated with the respiratory disease outbreak caused   by  the COVID-19 (SARS-CoV-2 novel Coronavirus) strain.              Human Metapneumovirus     Not Detected           Human Rhinovirus/Enterovirus     Not Detected           Influenza A H1-2009     Not Detected           Influenza B     Not Detected           Parainfluenza Virus 1     Not Detected           Parainfluenza Virus 2     Not Detected           Parainfluenza Virus 3     Not Detected           Parainfluenza Virus 4     Not Detected           Respiratory Syncytial Virus     Detected  Comment: critical result(s) called and verbal readback obtained from   Kaylyn Maldonado RN-ED by ANA LAURA 12/20/2024 19:12             Bordetella Parapertussis ([id number])     Not Detected            Bordetella pertussis (ptxP)     Not Detected           Chlamydia pneumoniae     Not Detected           Mycoplasma pneumoniae     Not Detected  Comment: Respiratory Infection Panel testing performed by Multiplex PCR.           Albumin         4.3       ALP         81       ALT         14       Anion Gap         10       Appearance, UA Clear               AST         17       Baso #         0.02       Basophil %         0.3       Bilirubin (UA) Negative               BILIRUBIN TOTAL         2.5  Comment: For infants and newborns, interpretation of results should be based  on gestational age, weight and in agreement with clinical  observations.    Premature Infant recommended reference ranges:  Up to 24 hours.............<8.0 mg/dL  Up to 48 hours............<12.0 mg/dL  3-5 days..................<15.0 mg/dL  6-29 days.................<15.0 mg/dL         BNP     148  Comment: Values of less than 100 pg/ml are consistent with non-CHF populations.           BUN         20       Calcium         8.8       Chloride         98       CO2         23       Color, UA Yellow               Creatinine         1.0       Differential Method         Automated       eGFR         >60.0       Eos #         0.1       Eos %         2.0       Glucose         139       Glucose, UA Negative               Gran # (ANC)         4.2       Gran %         63.6       Hematocrit         41.8       Hemoglobin         14.3       Immature Grans (Abs)         0.01  Comment: Mild elevation in immature granulocytes is non specific and   can be seen in a variety of conditions including stress response,   acute inflammation, trauma and pregnancy. Correlation with other   laboratory and clinical findings is essential.         Immature Granulocytes         0.2       Ketones, UA Negative               Leukocyte Esterase, UA Negative               Lymph #         1.4       Lymph %         21.8       Magnesium      1.7           MCH         31.5       MCHC          34.2       MCV         92       Mono #         0.8       Mono %         12.1       MPV         9.6       NITRITE UA Negative               nRBC         0       Blood, UA TRACE               pH, UA 6.0               Platelet Count         202       Potassium         3.7       PROTEIN TOTAL         7.5       Protein, UA Negative  Comment: Recommend a 24 hour urine protein or a urine   protein/creatinine ratio if globulin induced proteinuria is  clinically suspected.                 RBC         4.54       RDW         12.1       SARS-CoV2 (COVID-19) Qualitative PCR     Not Detected           Sodium         131       Spec Grav UA 1.015               Specimen UA Urine, Clean Catch               Troponin I High Sensitivity   24.9  Comment: Troponin results differ between methods. Do not use   results between Troponin methods interchangeably.    Alkaline Phospatase levels above 400 U/L may   cause false positive results.    Access hsTnI should not be used for patients taking   Asfotase jessica (Strensiq).     36.0  Comment: Troponin results differ between methods. Do not use   results between Troponin methods interchangeably.    Alkaline Phospatase levels above 400 U/L may   cause false positive results.    Access hsTnI should not be used for patients taking   Asfotase jessica (Strensiq).             UROBILINOGEN UA Negative               WBC         6.55                              Significant Imaging: I have reviewed all pertinent imaging results/findings within the past 24 hours.  Imaging Results              X-Ray Chest PA And Lateral (Final result)  Result time 12/20/24 16:34:01      Final result by Jorge Welch MD (12/20/24 16:34:01)                   Impression:      No evidence of acute cardiopulmonary disease.      Electronically signed by: Jorge Welch  Date:    12/20/2024  Time:    16:34               Narrative:    EXAMINATION:  XR CHEST PA AND LATERAL    CLINICAL HISTORY:  Shortness of  breath    FINDINGS:  Three-view chest radiograph at 16:06 hours compared to prior exams show left internal jugular injection port type central venous catheter, unchanged in position.  The trachea is midline, with the cardiomediastinal silhouette and pulmonary vascular distribution within normal limits.    The lungs are normally and symmetrically expanded, with no consolidation, pleural effusion or evidence of pulmonary edema. No confluent infiltrates or pneumothorax.  No acute fractures.

## 2024-12-21 NOTE — DISCHARGE SUMMARY
[facility] Medicine  Discharge Summary      Patient Name: Aysha Moore  MRN: [id number]  KHALIDA: [id number]  Patient Class: OP- Observation  Admission Date: 12/20/2024  Hospital Length of Stay: 0 days  Discharge Date and Time:  12/21/2024 9:48 AM  Attending Physician: Nicole Boykin MD   Discharging Provider: Nicole Boykin MD  Primary Care Provider: Yoni Daniels MD    Primary Care Team: Networked reference to record PCT     HPI:   69 year old female with a past medical history of hypertension, hyperlipidemia, prediabetes, CAD, cardiac stents, STEMI, combined systolic and diastolic heart failure, chronic bilateral low back pain with left-sided sciatica, anxiety, seasonal allergic rhinitis, pulmonary emphysema, smoker, COPD, adenocarcinoma of the left lower lung and metastases to the right lung as well as melanoma excision from the left lower extremity.     She presents to the emergency room for reports of shortness for breath, sore throat and cough for the last 2 days.  Reports being exposed to strep in viral illnesses from her grandchildren has had associated fever, nausea vomiting, diarrhea.  Denies chest pain abdominal pain.     In the ER, hypotensive at 92/63, no leukocytosis, afebrile, hyponatremic with sodium 131, T bili at 2.5, troponin I high sensitivity 36, , group B strep negative, respiratory panel pending Mag 1.7, EKG with diffuse T-wave inversions , chest x-ray with no acute process.  Nebulizing treatment, IV Solu-Medrol given in ER.  Patient continued to have shortness for breath and hypoxia placed on supplemental oxygen.  Admit to hospital medicine for acute respiratory failure with hypoxemia secondary to COPD exacerbation, elevated troponin        * No surgery found *      Hospital Course:   69 year old female with a past medical history of hypertension, hyperlipidemia, prediabetes, CAD, cardiac stents, STEMI, combined systolic and diastolic heart  failure, chronic bilateral low back pain with left-sided sciatica, anxiety, seasonal allergic rhinitis, pulmonary emphysema, smoker, COPD, adenocarcinoma of the left lower lung and metastases to the right lung as well as melanoma excision from the left lower extremity.      She presented to the emergency room for reports of shortness for breath, sore throat and cough for the last 2 days. She was found to be RSV positive and treated for COPD exacerbation with IV steroids and nebulizers with much improvement of her symptoms and wheezing overnight. She was saturating well on room air. Home O2 eval was ordered to ensure she does not have oxygen requirement with ambulation. She is seen and examined prior to discharge and deemed appropriate for discharge home.      Goals of Care Treatment Preferences:  Code Status: Full Code      SDOH Screening:  The patient declined to be screened for utility difficulties, food insecurity, transport difficulties, housing insecurity, and interpersonal safety, so no concerns could be identified this admission.     Consults:     * Acute respiratory failure with hypoxia  Patient with Hypoxic Respiratory failure which is Acute on chronic.  she is not on home oxygen. Supplemental oxygen was provided and noted-      RSV +  Treat symptomatically  isolation  .   Signs/symptoms of respiratory failure include- respiratory distress and wheezing. Contributing diagnoses includes - CHF, COPD, and Obesity Hypoventilation Labs and images were reviewed. Patient Has not had a recent ABG. Will treat underlying causes and adjust management of respiratory failure as follows-       COPD with acute exacerbation  Patient's COPD is with exacerbation noted by continued dyspnea and worsening of baseline hypoxia currently.  Patient is currently on COPD Pathway. Continue scheduled inhalers Steroids and Supplemental oxygen and monitor respiratory status closely.     Solumedrol IV given in Er  Supplemental oxygen  Neb  tx    Dcing with PO prednisone    Metastatic lung cancer (metastasis from lung to other site), unspecified laterality  chronic    LUNG CANCER-ADENOCARCINOMA OF THE LLL    Chronic     Chronic combined systolic and diastolic heart failure  Mildly elevated troponin and BNP.  Chest x-ray without evidence of cardiomegaly pleural effusions or pulmonary edema.  Chronic  Continue home meds  Tele monitoring  P.r.n.  EKG unchanged from prior        Hypertension  Patient's blood pressure range in the last 24 hours was: BP  Min: 92/63  Max: 138/69.The patient's inpatient anti-hypertensive regimen is listed below:  Current Antihypertensives       Plan  - BP is uncontrolled, will adjust as follows: hold antihypertensives for SBP < 110        Final Active Diagnoses:    Diagnosis Date Noted POA    PRINCIPAL PROBLEM:  Acute respiratory failure with hypoxia [J96.01] 12/20/2024 Yes    COPD with acute exacerbation [J44.1] 12/20/2024 Yes    Metastatic lung cancer (metastasis from lung to other site), unspecified laterality [C34.90] 07/27/2023 Yes    LUNG CANCER-ADENOCARCINOMA OF THE LLL [C34.32] 07/27/2023 Yes    Chronic combined systolic and diastolic heart failure [I50.42] 11/23/2022 Yes    Hypertension [I10]  Yes      Problems Resolved During this Admission:    Diagnosis Date Noted Date Resolved POA    Hyponatremia [E87.1] 12/20/2024 12/21/2024 Unknown       Discharged Condition: stable    Disposition: Home or Self Care    Follow Up:   Follow-up Information       Yoni Daniels MD. Schedule an appointment as soon as possible for a visit in 1 week(s).    Specialty: Family Medicine  Contact information:  [address] [id number] [phone]                           Patient Instructions:   No discharge procedures on file.    Significant Diagnostic Studies: Labs: BMP:   Recent Labs   Lab 12/20/24  1551 12/20/24  1557 12/21/24  0722   *  --  263*   *  --  139   K 3.7  --  3.5   CL 98  --  105   CO2 23   --  22*   BUN 20  --  18   CREATININE 1.0  --  0.7   CALCIUM 8.8  --  9.2   MG  --  1.7 2.0    and CBC   Recent Labs   Lab 12/20/24  1551 12/21/24  0722   WBC 6.55 3.26*   HGB 14.3 13.5   HCT 41.8 40.5    191       Pending Diagnostic Studies:       None           Medications:  Reconciled Home Medications:      Medication List        START taking these medications      aspirin 81 MG EC tablet  Commonly known as: ECOTRIN  Take 1 tablet (81 mg total) by mouth once daily.     predniSONE 20 MG tablet  Commonly known as: DELTASONE  Take 2 tablets (40 mg total) by mouth once daily.            CHANGE how you take these medications      ALPRAZolam 0.25 MG tablet  Commonly known as: XANAX  Take 1 tablet (0.25 mg total) by mouth 3 (three) times daily as needed for Anxiety.  What changed: when to take this     citalopram 40 MG tablet  Commonly known as: CeleXA  Take 1 tablet (40 mg total) by mouth once daily.  What changed: when to take this            CONTINUE taking these medications      atorvastatin 80 MG tablet  Commonly known as: LIPITOR  TAKE 1 TABLET BY MOUTH EVERY EVENING     calcium-vitamin D3 500 mg-5 mcg (200 unit) per tablet  Commonly known as: OS-JOLIE 500 + D3  Take 1 tablet by mouth every evening.     ezetimibe 10 mg tablet  Commonly known as: ZETIA  Take 1 tablet (10 mg total) by mouth every evening.            STOP taking these medications      azelastine 137 mcg (0.1 %) nasal spray  Commonly known as: ASTELIN     ketoconazole 2 % cream  Commonly known as: NIZORAL     ketoconazole 2 % shampoo  Commonly known as: NIZORAL     metoprolol succinate 50 MG 24 hr tablet  Commonly known as: TOPROL-XL     traMADoL 50 mg tablet  Commonly known as: ULTRAM     triamcinolone acetonide 0.025% 0.025 % cream  Commonly known as: KENALOG              Indwelling Lines/Drains at time of discharge:   Lines/Drains/Airways       Central Venous Catheter Line  Duration             Port A Cath Single Lumen 08/04/23 Internal  Jugular Left 505 days                    Time spent on the discharge of patient: 35 minutes         Nicole Boykin MD  Department of Hospital Medicine  [facility]

## 2024-12-21 NOTE — ASSESSMENT & PLAN NOTE
Hyponatremia is likely due to Heart Failure. The patient's most recent sodium results are listed below.  Recent Labs     12/20/24  1551   *     Plan  - Correct the sodium by 4-6mEq in 24 hours.   - Obtain the following studies: na levels q8 hours.  - Will treat the hyponatremia with IV fluids as follows: 500 ml bolus in ER  - Monitor sodium Every 8 hours.   - Patient hyponatremia is worsening. Will new admit, monitor, fluid restriction

## 2024-12-21 NOTE — ASSESSMENT & PLAN NOTE
Mildly elevated troponin and BNP.  Chest x-ray without evidence of cardiomegaly pleural effusions or pulmonary edema.  Chronic  Continue home meds  Tele monitoring  P.r.n.  EKG with diffuse T-wave inversions ,

## 2024-12-21 NOTE — ASSESSMENT & PLAN NOTE
Patient's blood pressure range in the last 24 hours was: BP  Min: 92/63  Max: 138/69.The patient's inpatient anti-hypertensive regimen is listed below:  Current Antihypertensives       Plan  - BP is uncontrolled, will adjust as follows: hold antihypertensives for SBP < 110

## 2024-12-21 NOTE — ASSESSMENT & PLAN NOTE
Hyponatremia is likely due to Heart Failure. The patient's most recent sodium results are listed below.  Recent Labs     12/20/24  1551 12/21/24  0722   * 139       Plan  - Correct the sodium by 4-6mEq in 24 hours.   - Obtain the following studies: na levels q8 hours.  - Will treat the hyponatremia with IV fluids as follows: 500 ml bolus in ER  - Monitor sodium Every 8 hours.   - Patient hyponatremia is worsening. Will new admit, monitor, fluid restriction

## 2024-12-21 NOTE — HOSPITAL COURSE
69 year old female with a past medical history of hypertension, hyperlipidemia, prediabetes, CAD, cardiac stents, STEMI, combined systolic and diastolic heart failure, chronic bilateral low back pain with left-sided sciatica, anxiety, seasonal allergic rhinitis, pulmonary emphysema, smoker, COPD, adenocarcinoma of the left lower lung and metastases to the right lung as well as melanoma excision from the left lower extremity.      She presented to the emergency room for reports of shortness for breath, sore throat and cough for the last 2 days. She was found to be RSV positive and treated for COPD exacerbation with IV steroids and nebulizers with much improvement of her symptoms and wheezing overnight. She was saturating well on room air. Home O2 eval was ordered to ensure she does not have oxygen requirement with ambulation. She is seen and examined prior to discharge and deemed appropriate for discharge home.

## 2024-12-21 NOTE — ASSESSMENT & PLAN NOTE
Patient with Hypoxic Respiratory failure which is Acute on chronic.  she is not on home oxygen. Supplemental oxygen was provided and noted-      RSV +  Treat symptomatically  isolation  .   Signs/symptoms of respiratory failure include- respiratory distress and wheezing. Contributing diagnoses includes - CHF, COPD, and Obesity Hypoventilation Labs and images were reviewed. Patient Has not had a recent ABG. Will treat underlying causes and adjust management of respiratory failure as follows-

## 2024-12-21 NOTE — ASSESSMENT & PLAN NOTE
Patient's COPD is with exacerbation noted by continued dyspnea and worsening of baseline hypoxia currently.  Patient is currently on COPD Pathway. Continue scheduled inhalers Steroids and Supplemental oxygen and monitor respiratory status closely.     Solumedrol IV given in Er  Supplemental oxygen  Neb tx    Dcing with PO prednisone

## 2024-12-21 NOTE — PLAN OF CARE
[facility]  Initial Discharge Assessment       Primary Care Provider: Yoni Daniels MD    Admission Diagnosis: COPD exacerbation [J44.1]    Admission Date: 12/20/2024  Expected Discharge Date: 12/21/2024     met with the patient at the bedside to complete the initial discharge assessment plan. Demographics and other information on the face sheet verified by the patient as being correct. The patient reported her PCP Dr. Daniels has retired, CM will send referral to internal medicine to request PCP. The patient lives with her spouse and 41 year old dtr (Idalia). She stated she does not use DME and has been independent in all ADLs. She also stated she was still driving prior to admission and drive herself to/from MD apartments. She denies HHC, HD, and coumadin. The patient does not have an Advance Directive. The discharge plan is to return home. Her son will transport her home. CM will continue to follow and assess DC needs.    Transition of Care Barriers: None    Payor: MEDICARE / Plan: MEDICARE PART A & B / Product Type: Government /     Extended Emergency Contact Information  Primary Emergency Contact: Terence Moore  Address: [address] [place] [address] [facility]  Home Phone: [phone]  Mobile Phone: [phone]  Relation: Spouse  Preferred language: English   needed? No  Secondary Emergency Contact: Sree Wayne  Address: Unknown           [place] [address] [place]  Mobile Phone: [phone]  Relation: Son  Preferred language: English   needed? No    Discharge Plan A: Home  Discharge Plan B: Home with family      [id number] - [place] - [address]  [address] [address]  Phone: [phone] Fax: [phone]      Initial Assessment (most recent)       Adult Discharge Assessment - 12/21/24 1051          Discharge Assessment    Assessment Type Discharge Planning  Assessment     Confirmed/corrected address, phone number and insurance Yes     Confirmed Demographics Correct on Facesheet     Source of Information patient     Does patient/caregiver understand observation status Yes     Communicated NAKUL with patient/caregiver Yes     Reason For Admission Acute respiratory failure with hypoxia     People in Home spouse;child(lyubov), adult     Facility Arrived From: Home     Do you expect to return to your current living situation? Yes     Do you have help at home or someone to help you manage your care at home? Yes     Who are your caregiver(s) and their phone number(s)? Terence Moore (Spouse)  [phone]     Prior to hospitilization cognitive status: Unable to Assess     Current cognitive status: Alert/Oriented     Walking or Climbing Stairs Difficulty no     Dressing/Bathing Difficulty no     Home Accessibility wheelchair accessible     Home Layout Able to live on 1st floor     Equipment Currently Used at Home none     Readmission within 30 days? No     Do you currently have service(s) that help you manage your care at home? No     Do you take prescription medications? Yes     Do you have prescription coverage? Yes     Coverage Medicare, Blue cross blue shield     Do you have any problems affording any of your prescribed medications? No     Who is going to help you get home at discharge? Son     How do you get to doctors appointments? car, drives self     Are you on dialysis? No     Do you take coumadin? No     Discharge Plan A Home     Discharge Plan B Home with family     DME Needed Upon Discharge  none     Discharge Plan discussed with: Patient     Transition of Care Barriers None

## 2024-12-21 NOTE — PLAN OF CARE
reviewed the chart and spoke with the patient and the Attending MD. The patient has been medically cleared by the MD to discharge home. Home 02 eval completed and the patient does not need home oxygen. Referral placed to internal medicine to establish patient with PCP. Secure chat sent to Discharge Clinic requesting that they schedule a hospital follow up. The patient's son is at the bedside to transport her home. There are no further case management needs.    The patient is now cleared from a case management standpoint to discharge home.       12/21/24 1119   Final Note   Assessment Type Final Discharge Note   Anticipated Discharge Disposition Home

## 2024-12-21 NOTE — ASSESSMENT & PLAN NOTE
Patient with Hypoxic Respiratory failure which is Acute on chronic.  she is not on home oxygen. Supplemental oxygen was provided and noted-      RSV +  Treat symptomatically  isolation  .   Signs/symptoms of respiratory failure include- respiratory distress and wheezing. Contributing diagnoses includes - CHF, COPD, and Obesity Hypoventilation Labs and images were reviewed. Patient Has not had a recent ABG. Will treat underlying causes and adjust management of respiratory failure as follows-      yes

## 2024-12-21 NOTE — HPI
69 year old female with a past medical history of hypertension, hyperlipidemia, prediabetes, CAD, cardiac stents, STEMI, combined systolic and diastolic heart failure, chronic bilateral low back pain with left-sided sciatica, anxiety, seasonal allergic rhinitis, pulmonary emphysema, smoker, COPD, adenocarcinoma of the left lower lung and metastases to the right lung as well as melanoma excision from the left lower extremity.     She presents to the emergency room for reports of shortness for breath, sore throat and cough for the last 2 days.  Reports being exposed to strep in viral illnesses from her grandchildren has had associated fever, nausea vomiting, diarrhea.  Denies chest pain abdominal pain.     In the ER, hypotensive at 92/63, no leukocytosis, afebrile, hyponatremic with sodium 131, T bili at 2.5, troponin I high sensitivity 36, , group B strep negative, respiratory panel pending Mag 1.7, EKG with diffuse T-wave inversions , chest x-ray with no acute process.  Nebulizing treatment, IV Solu-Medrol given in ER.  Patient continued to have shortness for breath and hypoxia placed on supplemental oxygen.  Admit to hospital medicine for acute respiratory failure with hypoxemia secondary to COPD exacerbation, elevated troponin

## 2024-12-21 NOTE — PLAN OF CARE
Pt AAOx4, vss, free of falls, injuries, pain, nausea and vomiting. Patient had home O2 eval done and no O2 needed for home. Patient D/C home with son  Problem: Adult Inpatient Plan of Care  Goal: Plan of Care Review  Outcome: Met  Goal: Patient-Specific Goal (Individualized)  Outcome: Met  Goal: Absence of Hospital-Acquired Illness or Injury  Outcome: Met  Goal: Optimal Comfort and Wellbeing  Outcome: Met  Goal: Readiness for Transition of Care  Outcome: Met     Problem: COPD (Chronic Obstructive Pulmonary Disease)  Goal: Optimal Chronic Illness Coping  Outcome: Met  Goal: Optimal Level of Functional [place]  Outcome: Met  Goal: Absence of Infection Signs and Symptoms  Outcome: Met  Goal: Improved Oral Intake  Outcome: Met  Goal: Effective Oxygenation and Ventilation  Outcome: Met     Problem: Infection  Goal: Absence of Infection Signs and Symptoms  Outcome: Met     Problem: Diabetes Comorbidity  Goal: Blood Glucose Level Within Targeted Range  Outcome: Met

## 2024-12-21 NOTE — CARE UPDATE
12/21/24 0652   Patient Assessment/Suction   Level of Consciousness (AVPU) alert   Respiratory Effort Normal;Unlabored   Expansion/Accessory Muscles/Retractions expansion symmetric;no retractions;no use of accessory muscles   All Lung Fields Breath Sounds Anterior:;Lateral:;diminished;wheezes, expiratory   Rhythm/Pattern, Respiratory pattern regular;unlabored   PRE-TX-O2   Device (Oxygen Therapy) nasal cannula   $ Is the patient on Low Flow Oxygen? Yes   Flow (L/min) (Oxygen Therapy) 2   SpO2 95 %   Pulse Oximetry Type Intermittent   $ Pulse Oximetry - Multiple Charge Pulse Oximetry - Multiple   Pulse 74   Resp 18   Aerosol Therapy   $ Aerosol Therapy Charges Aerosol Treatment   Daily Review of Necessity (SVN) completed   Respiratory Treatment Status (SVN) given   Treatment Route (SVN) mask;oxygen   Patient Position HOB elevated   Post Treatment Assessment (SVN) breath sounds improved   Signs of Intolerance (SVN) none   Education   $ Education Bronchodilator;15 min

## 2024-12-23 ENCOUNTER — TELEPHONE (OUTPATIENT)
Facility: CLINIC | Age: 69
End: 2024-12-23
Payer: MEDICARE

## 2024-12-23 DIAGNOSIS — Z76.89 REFERRAL OF PATIENT WITHOUT EXAMINATION OR TREATMENT: Primary | ICD-10-CM

## 2024-12-23 NOTE — TELEPHONE ENCOUNTER
----- Message from Aundrea sent at 12/23/2024  3:27 PM CST -----  Pt wanted to speak w/ Meka Chiang. I did let the Pt know that Ms. Chiang was i clinic and I could send her a message for the Pt. However, she was reluctant and asked to have Ms. Harrison call her back.     #  [phone]

## 2024-12-23 NOTE — TELEPHONE ENCOUNTER
Called patient on regard to above message. Per patient she was at the ER due to RSV and is receiving treatment with Prednisone. Patient requested information of name for Family NP a referral was sent. Patient made aware and stated understanding.

## 2024-12-26 ENCOUNTER — PATIENT OUTREACH (OUTPATIENT)
Dept: ADMINISTRATIVE | Facility: CLINIC | Age: 69
End: 2024-12-26
Payer: MEDICARE

## 2024-12-30 ENCOUNTER — TELEPHONE (OUTPATIENT)
Dept: FAMILY MEDICINE | Facility: CLINIC | Age: 69
End: 2024-12-30
Payer: MEDICARE

## 2024-12-30 NOTE — TELEPHONE ENCOUNTER
Spoke with patient   She declined appt with [facility].   She is scheduling an appt with Kaylyn Black NP   per Bev Chiang [facility]

## 2024-12-30 NOTE — TELEPHONE ENCOUNTER
----- Message from  Claudia sent at 12/21/2024 11:30 AM CST -----  Regarding: Hospital follow up  The patient will discharge home from [facility] today. Please contact the patient to schedule a 1 week hospital follow up visit. Please also establish patient with a new PCP.

## 2025-01-15 ENCOUNTER — TELEPHONE (OUTPATIENT)
Facility: CLINIC | Age: 70
End: 2025-01-15
Payer: MEDICARE

## 2025-01-15 NOTE — TELEPHONE ENCOUNTER
----- Message from Aundrea sent at 1/15/2025  3:04 PM CST -----  Pt has a CT Scan coming up. Pt would like to speak to the nurse regarding the Scan.    # [phone]

## 2025-01-15 NOTE — TELEPHONE ENCOUNTER
Called patient on regard to above message. Patient requested appointment with Dr. Hankins after CT of the chest scheduled on 02/06/2025, message sent to scheduling. Patient stated understanding.

## 2025-02-06 ENCOUNTER — HOSPITAL ENCOUNTER (OUTPATIENT)
Dept: RADIOLOGY | Facility: HOSPITAL | Age: 70
Discharge: HOME | End: 2025-02-06
Attending: INTERNAL MEDICINE
Payer: MEDICARE

## 2025-02-06 DIAGNOSIS — C34.32 MALIGNANT NEOPLASM OF LOWER LOBE OF LEFT LUNG: ICD-10-CM

## 2025-02-06 LAB
CREAT SERPL-MCNC: 0.9 MG/DL (ref 0.5–1.4)
SAMPLE: NORMAL

## 2025-02-06 PROCEDURE — 25500020 PHARM REV CODE 255: Mod: PO | Performed by: INTERNAL MEDICINE

## 2025-02-06 PROCEDURE — 74177 CT ABD & PELVIS W/CONTRAST: CPT | Mod: 26,,, | Performed by: RADIOLOGY

## 2025-02-06 PROCEDURE — 71260 CT THORAX DX C+: CPT | Mod: 26,,, | Performed by: RADIOLOGY

## 2025-02-06 PROCEDURE — 71260 CT THORAX DX C+: CPT | Mod: TC,PO

## 2025-02-06 RX ADMIN — IOHEXOL 100 ML: 350 INJECTION, SOLUTION INTRAVENOUS at 11:02

## 2025-02-07 ENCOUNTER — OFFICE VISIT (OUTPATIENT)
Facility: CLINIC | Age: 70
End: 2025-02-07
Payer: MEDICARE

## 2025-02-07 ENCOUNTER — INFUSION (OUTPATIENT)
Dept: INFUSION THERAPY | Facility: HOSPITAL | Age: 70
End: 2025-02-07
Attending: INTERNAL MEDICINE
Payer: MEDICARE

## 2025-02-07 VITALS
HEART RATE: 100 BPM | WEIGHT: 144 LBS | SYSTOLIC BLOOD PRESSURE: 101 MMHG | HEIGHT: 63 IN | TEMPERATURE: 98 F | BODY MASS INDEX: 25.51 KG/M2 | TEMPERATURE: 98 F | RESPIRATION RATE: 16 BRPM | WEIGHT: 149.19 LBS | HEART RATE: 94 BPM | DIASTOLIC BLOOD PRESSURE: 66 MMHG | SYSTOLIC BLOOD PRESSURE: 128 MMHG | OXYGEN SATURATION: 96 % | RESPIRATION RATE: 16 BRPM | BODY MASS INDEX: 26.43 KG/M2 | DIASTOLIC BLOOD PRESSURE: 59 MMHG

## 2025-02-07 DIAGNOSIS — M25.50 ARTHRALGIA, UNSPECIFIED JOINT: ICD-10-CM

## 2025-02-07 DIAGNOSIS — C34.90 METASTATIC LUNG CANCER (METASTASIS FROM LUNG TO OTHER SITE), UNSPECIFIED LATERALITY: ICD-10-CM

## 2025-02-07 DIAGNOSIS — C34.32 MALIGNANT NEOPLASM OF LOWER LOBE OF LEFT LUNG: ICD-10-CM

## 2025-02-07 DIAGNOSIS — C34.32 MALIGNANT NEOPLASM OF LOWER LOBE OF LEFT LUNG: Primary | ICD-10-CM

## 2025-02-07 DIAGNOSIS — C79.9 METASTATIC MALIGNANT NEOPLASM, UNSPECIFIED SITE: Primary | ICD-10-CM

## 2025-02-07 PROCEDURE — 96523 IRRIG DRUG DELIVERY DEVICE: CPT

## 2025-02-07 PROCEDURE — 99999 PR PBB SHADOW E&M-EST. PATIENT-LVL III: CPT | Mod: PBBFAC,,, | Performed by: INTERNAL MEDICINE

## 2025-02-07 PROCEDURE — 63600175 PHARM REV CODE 636 W HCPCS: Performed by: INTERNAL MEDICINE

## 2025-02-07 PROCEDURE — 99213 OFFICE O/P EST LOW 20 MIN: CPT | Mod: PBBFAC,PN | Performed by: INTERNAL MEDICINE

## 2025-02-07 PROCEDURE — A4216 STERILE WATER/SALINE, 10 ML: HCPCS | Performed by: INTERNAL MEDICINE

## 2025-02-07 PROCEDURE — 25000003 PHARM REV CODE 250: Performed by: INTERNAL MEDICINE

## 2025-02-07 PROCEDURE — 99213 OFFICE O/P EST LOW 20 MIN: CPT | Mod: S$PBB,,, | Performed by: INTERNAL MEDICINE

## 2025-02-07 PROCEDURE — G2211 COMPLEX E/M VISIT ADD ON: HCPCS | Mod: S$PBB,,, | Performed by: INTERNAL MEDICINE

## 2025-02-07 RX ORDER — HEPARIN 100 UNIT/ML
500 SYRINGE INTRAVENOUS
OUTPATIENT
Start: 2025-02-07

## 2025-02-07 RX ORDER — HEPARIN 100 UNIT/ML
500 SYRINGE INTRAVENOUS
Status: CANCELLED | OUTPATIENT
Start: 2025-02-07

## 2025-02-07 RX ORDER — SODIUM CHLORIDE 0.9 % (FLUSH) 0.9 %
10 SYRINGE (ML) INJECTION
Status: CANCELLED | OUTPATIENT
Start: 2025-02-07

## 2025-02-07 RX ORDER — METHYLPREDNISOLONE 4 MG/1
TABLET ORAL
Qty: 21 EACH | Refills: 0 | Status: SHIPPED | OUTPATIENT
Start: 2025-02-07 | End: 2025-02-28

## 2025-02-07 RX ORDER — HEPARIN 100 UNIT/ML
500 SYRINGE INTRAVENOUS
Status: DISCONTINUED | OUTPATIENT
Start: 2025-02-07 | End: 2025-02-07 | Stop reason: HOSPADM

## 2025-02-07 RX ORDER — SODIUM CHLORIDE 0.9 % (FLUSH) 0.9 %
10 SYRINGE (ML) INJECTION
Status: DISCONTINUED | OUTPATIENT
Start: 2025-02-07 | End: 2025-02-07 | Stop reason: HOSPADM

## 2025-02-07 RX ORDER — SODIUM CHLORIDE 0.9 % (FLUSH) 0.9 %
10 SYRINGE (ML) INJECTION
OUTPATIENT
Start: 2025-02-07

## 2025-02-07 RX ADMIN — SODIUM CHLORIDE, PRESERVATIVE FREE 10 ML: 5 INJECTION INTRAVENOUS at 10:02

## 2025-02-07 RX ADMIN — HEPARIN 500 UNITS: 100 SYRINGE at 10:02

## 2025-02-07 NOTE — PROGRESS NOTES
PROGRESS NOTE    Subjective:       Patient ID: Aysha Moore is a 69 y.o. female.    6/2/2023:  Screening CT chest:  2.6 x 2.5 x 3.2cm mass in the posterior LLL     6/20/2023:  PET scan:  35 x 21 mm lobulated pulmonary mass in the posterior left lower lobe with SUV max 11.6      FDG avid lymphadenopathy is present with index nodes outlined below:  -21 x 11 mm AP window node with SUV max 12.1 (image 103)  -21 x 12 mm posterior left hilar node with SUV max 13.7 (image 109)  -16 x 13 mm left hilar node with SUV max 14 (image 1:15)  -10 x 10 mm subcarinal node with SUV max 6.3 (image 111)     7/12/2023-Biopsy of LLL:  LEFT LOWER LOBE OF LUNG, CORE BIOPSIES:   - LUNG ADENOCARCINOMA WITH PLEOMORPHIC FEATURES.   PDL1/TPS: 95%  ALK/BRAF/EGFR/KRAS/RET/ROS1/MET NEGATIVE     7/21/2023:  MRI brain: no mets.    Carbo/Taxol/XRT:  8/8/2023-9/12/2023     Atezolizumab x 17  10/23/2023--10/28/2024    9/30/2023-CTA Chest:  1. Segmental right upper lobe pulmonary embolus.  2. Cavitating and spiculated posterior left lower lobe lung mass, decreased slightly in size compared to the prior chest CT exam.  3. Upper lobe predominant centrilobular and subpleural emphysema, with coarse mixed interstitial and alveolar infiltrate along the mediastinal border left upper lobe suggesting reactive or infectious pneumonitis, and with mild posterior bilateral upper lobe groundglass infiltrates.    10/4/2023  Admitted for 3 days with fever to 101, new Dx of PE and splenic infarcts. Echo normal. Started on Xarelto and abx.     9/3/2023-CT abd/p:  IMPRESSION: There are hypodensities within the spleen concerning for splenic infarcts. These can be associated with endocarditis. The patient also has known pulmonary emboli. Transesophageal echocardiogram may be warranted.    10/3/2023-  TTE normal    11/10/2023-CT CAP  IMPRESSION:  1. Nodular density in the posterior left lower lobe, essentially  unchanged from the previous exam when accounting for technique.     2. Scattered patchy airspace opacities throughout the left lung, increased in size/distribution from the previous exam, the differential includes posttreatment/radiation change, atelectasis/scarring, infection/pneumonia, noting malignancy is not excluded and interval follow-up would be warranted.     3. Enlarging left cervical/supraclavicular lymphadenopathy.     4. Small wedge-shaped hypodensities in the spleen, in keeping with small splenic infarcts, similar in distribution the previous exam.    12/11/2023-CTA Chest  No PE-see report, ? Pneumonia    1/17/2023-CT chest:  1. Left upper lobe paramediastinal mass is stable due to radiation exposure chest with evidence of an prominent. Mediastinal scarring.  2. Advanced emphysematous changes along with evidence of abnormality scarring the left are probably proteinaceous bullous cavitary fibrotic focus and fibrotic changes in the left lower lobe is stable.  3. Small spiculated nodule in the right lower lobe 9 x 8 mm stable. Recommend follow-up within 6 months.     4/9/2024-CT CAP w/con  Improvement--see report    10/3/2024-PET:  New inguinal LN on left-hypermetabolic  Low-level activity in left hilar lesion  Stable MS LNs.     10/11/2023-Left inguinal node biopsy negative    2/6/2025-CT CAP:  Negative for malignancy--see report    Chief Complaint:  No chief complaint on file.  Stage IIII lung cancer, pulmonary embolism, splenic infarct follow up    History of Present Illness:   Aysha Moore is a 69 y.o. female who presents for follow up of lung cancer.     Ms. Moore is doing ok today.  No new complaints.      History of PE 9/2023  Xarelto given 9/2023-5/2024      Family and Social history reviewed and is unchanged from 7/27/2023      ROS:  Review of Systems   Constitutional:  Negative for appetite change, fever and unexpected weight change.   HENT:  Negative for mouth sores.    Eyes:  Negative for  visual disturbance.   Respiratory:  Negative for cough and shortness of breath.    Cardiovascular:  Negative for chest pain and leg swelling.   Gastrointestinal:  Negative for abdominal pain, blood in stool and diarrhea.   Genitourinary:  Negative for frequency and hematuria.   Musculoskeletal:  Negative for back pain.   Skin:  Negative for rash.   Neurological:  Negative for headaches.   Hematological:  Negative for adenopathy.   Psychiatric/Behavioral:  The patient is not nervous/anxious.           Current Outpatient Medications:     amoxicillin (AMOXIL) 500 MG capsule, Take 500 mg by mouth 2 (two) times daily., Disp: , Rfl:     aspirin (ECOTRIN) 81 MG EC tablet, Take 1 tablet (81 mg total) by mouth once daily., Disp: 90 tablet, Rfl: 0    atorvastatin (LIPITOR) 80 MG tablet, TAKE 1 TABLET BY MOUTH EVERY EVENING, Disp: 90 tablet, Rfl: 1    calcium-vitamin D3 (OS-JOLIE 500 + D3) 500 mg-5 mcg (200 unit) per tablet, Take 1 tablet by mouth every evening., Disp: , Rfl:     citalopram (CELEXA) 40 MG tablet, Take 1 tablet (40 mg total) by mouth once daily. (Patient taking differently: Take 40 mg by mouth every evening.), Disp: 30 tablet, Rfl: 11    ezetimibe (ZETIA) 10 mg tablet, Take 1 tablet (10 mg total) by mouth every evening., Disp: , Rfl:     predniSONE (DELTASONE) 20 MG tablet, Take 2 tablets (40 mg total) by mouth once daily., Disp: 8 tablet, Rfl: 0    ALPRAZolam (XANAX) 0.25 MG tablet, Take 1 tablet (0.25 mg total) by mouth 3 (three) times daily as needed for Anxiety. (Patient taking differently: Take 0.25 mg by mouth daily as needed for Anxiety.), Disp: 30 tablet, Rfl: 1    methylPREDNISolone (MEDROL DOSEPACK) 4 mg tablet, use as directed, Disp: 21 each, Rfl: 0  No current facility-administered medications for this visit.        Objective:       Physical Examination:     BP (!) 128/59   Pulse 94   Temp 98 °F (36.7 °C)   Resp 16   Wt 65.3 kg (144 lb)   BMI 25.51 kg/m²     Physical Exam  Constitutional:        Appearance: Normal appearance.   HENT:      Head: Normocephalic and atraumatic.   Eyes:      General: No scleral icterus.     Conjunctiva/sclera: Conjunctivae normal.   Cardiovascular:      Rate and Rhythm: Normal rate.   Pulmonary:      Effort: Pulmonary effort is normal.   Abdominal:      General: Abdomen is flat.   Neurological:      General: No focal deficit present.      Mental Status: She is alert and oriented to person, place, and time.   Psychiatric:         Mood and Affect: Mood normal.         Behavior: Behavior normal.         Thought Content: Thought content normal.         Judgment: Judgment normal.         Labs:   Recent Results (from the past 2 weeks)   CBC Auto Differential    Collection Time: 02/06/25  8:56 AM   Result Value Ref Range    WBC 5.53 3.90 - 12.70 K/uL    Hemoglobin 12.7 12.0 - 16.0 g/dL    Hematocrit 37.7 37.0 - 48.5 %    Platelets 216 150 - 450 K/uL           CMP  Sodium   Date Value Ref Range Status   02/06/2025 140 136 - 145 mmol/L Final     Potassium   Date Value Ref Range Status   02/06/2025 3.6 3.5 - 5.1 mmol/L Final     Chloride   Date Value Ref Range Status   02/06/2025 106 95 - 110 mmol/L Final     CO2   Date Value Ref Range Status   02/06/2025 28 23 - 29 mmol/L Final     Glucose   Date Value Ref Range Status   02/06/2025 78 70 - 110 mg/dL Final     BUN   Date Value Ref Range Status   02/06/2025 14 8 - 23 mg/dL Final     Creatinine   Date Value Ref Range Status   02/06/2025 0.8 0.5 - 1.4 mg/dL Final     Calcium   Date Value Ref Range Status   02/06/2025 9.3 8.7 - 10.5 mg/dL Final     Total Protein   Date Value Ref Range Status   02/06/2025 6.8 6.0 - 8.4 g/dL Final     Albumin   Date Value Ref Range Status   02/06/2025 4.1 3.5 - 5.2 g/dL Final     Total Bilirubin   Date Value Ref Range Status   02/06/2025 1.0 0.1 - 1.0 mg/dL Final     Comment:     For infants and newborns, interpretation of results should be based  on gestational age, weight and in agreement with  "clinical  observations.    Premature Infant recommended reference ranges:  Up to 24 hours.............<8.0 mg/dL  Up to 48 hours............<12.0 mg/dL  3-5 days..................<15.0 mg/dL  6-29 days.................<15.0 mg/dL       Alkaline Phosphatase   Date Value Ref Range Status   02/06/2025 83 55 - 135 U/L Final     AST   Date Value Ref Range Status   02/06/2025 19 10 - 40 U/L Final     ALT   Date Value Ref Range Status   02/06/2025 19 10 - 44 U/L Final     Anion Gap   Date Value Ref Range Status   02/06/2025 6 (L) 8 - 16 mmol/L Final     eGFR if    Date Value Ref Range Status   03/29/2022 >60.0 >60 mL/min/1.73 m^2 Final     eGFR if non    Date Value Ref Range Status   03/29/2022 >60.0 >60 mL/min/1.73 m^2 Final     Comment:     Calculation used to obtain the estimated glomerular filtration  rate (eGFR) is the CKD-EPI equation.        No results found for: "CEA"  No results found for: "PSA"        Assessment/Plan:     Problem List Items Addressed This Visit       LUNG CANCER-ADENOCARCINOMA OF THE LLL     Patient is doing ok from this standpoint.  Her scan shows no clear sign of recurrence but note is made of 4mm RLL nodule and will watch this closely.  Will see her again 4 months with chest CT.           Relevant Orders    CBC Auto Differential    Comprehensive Metabolic Panel    CT Chest Without Contrast    Metastatic lung cancer (metastasis from lung to other site), unspecified laterality     See above/below         Metastatic malignant neoplasm, unspecified site - Primary     Other Visit Diagnoses       Arthralgia, unspecified joint        Relevant Medications    methylPREDNISolone (MEDROL DOSEPACK) 4 mg tablet                  Discussion:     Follow up in about 4 months (around 6/7/2025).      Electronically signed by Raad Bee      "

## 2025-02-07 NOTE — ASSESSMENT & PLAN NOTE
Patient is doing ok from this standpoint.  Her scan shows no clear sign of recurrence but note is made of 4mm RLL nodule and will watch this closely.  Will see her again 4 months with chest CT.

## 2025-02-27 ENCOUNTER — OFFICE VISIT (OUTPATIENT)
Dept: PULMONOLOGY | Facility: CLINIC | Age: 70
End: 2025-02-27
Payer: MEDICARE

## 2025-02-27 VITALS
WEIGHT: 152 LBS | SYSTOLIC BLOOD PRESSURE: 102 MMHG | OXYGEN SATURATION: 98 % | DIASTOLIC BLOOD PRESSURE: 70 MMHG | BODY MASS INDEX: 26.93 KG/M2 | HEART RATE: 92 BPM

## 2025-02-27 DIAGNOSIS — R91.8 ABNORMAL CT SCAN OF LUNG: ICD-10-CM

## 2025-02-27 DIAGNOSIS — Z87.891 PERSONAL HISTORY OF TOBACCO USE, PRESENTING HAZARDS TO HEALTH: ICD-10-CM

## 2025-02-27 DIAGNOSIS — J43.9 PULMONARY EMPHYSEMA, UNSPECIFIED EMPHYSEMA TYPE: Primary | ICD-10-CM

## 2025-02-27 DIAGNOSIS — C34.32 MALIGNANT NEOPLASM OF LOWER LOBE OF LEFT LUNG: ICD-10-CM

## 2025-02-27 PROBLEM — J44.1 COPD WITH ACUTE EXACERBATION: Status: RESOLVED | Noted: 2024-12-20 | Resolved: 2025-02-27

## 2025-02-27 RX ORDER — AMOXICILLIN AND CLAVULANATE POTASSIUM 500; 125 MG/1; MG/1
1 TABLET, FILM COATED ORAL EVERY 8 HOURS
COMMUNITY
Start: 2025-01-31

## 2025-02-27 RX ORDER — AZITHROMYCIN 250 MG/1
TABLET, FILM COATED ORAL
COMMUNITY
Start: 2025-01-28

## 2025-02-27 NOTE — PROGRESS NOTES
Office Visit    Patient Name: Aysha Moore  MRN: [id number]  : 1955      Reason for visit: COPD    HPI:     2019 - Here for evaluation of COPD.  Has been hospitalized twice for respiratory issues.  Here more recently has noted some symptoms but has been out of BREO for a week or so.  Has a h/o smoking - has quit cigarettes but is using a vape.  Has smoked about 1 PPD for about 40 years.    2019 - Here for follow up, no new issues reported.  Still vaping (d/we pt).  Reviewed PFT with pt.  Pt is currently stable on present medications with no recent increases in their symptoms or use of rescue medications.  I have reviewed the medical regimen and re-educated the pt on the role of rescue and controlling medications.  All questions answered.  Inhaler technique seems adequate.    2021 - Here for follow up, Pt is currently stable on present medications with no recent increases in their symptoms or use of rescue medications.  Since our last visit there have been no hospitalizations or ER visits for their respiratory issues and there does not seem to be anything to suggest unrecognized exacerbations.  I have reviewed the medical regimen and re-educated the pt on the role of rescue and controlling medications.  Inhaler technique and understanding seems adequate.  The patient reports no issues with any of there medications for their COPD.  Refills will be taken care of as needed.  All questions answered.  She stopped singulair - she felt that it made her short tempered and she is better since stopping it.  She had a MI recently.  Continues with some sinus infection - seen at Urgent Care given doxycycline but developed itching.  Still with symptoms.  Patient has no known corona virus exposures and has been practicing social distancing.  We have discussed the virus and precautions and all questions have been answered.    8/3/2021 - Here for follow up, Pt is currently stable on present medications with no  recent increases in their symptoms or use of rescue medications.  Since our last visit there have been no hospitalizations or ER visits for their respiratory issues and there does not seem to be anything to suggest unrecognized exacerbations.  I have reviewed the medical regimen and re-educated the pt on the role of rescue and controlling medications.  Inhaler technique and understanding seems adequate.  The patient reports no issues with any of there medications for their COPD.  Refills will be taken care of as needed.  All questions answered.  Has been taken off of lopressor due to decreased BP.  Patient has no known corona virus exposures and has been practicing social distancing.  We have discussed the virus and precautions and all questions have been answered.    2/1/2022 - Here for follow up, was sick for about 3 weeks around [place] (had known Covid exposure, had HA, sore throat, weak, cough, increased dyspnea, low grade fever).  She did 2 rapid tests which were negative but is interested in getting Covid antibodies checked.  Pt is currently stable on present medications with no recent increases in their symptoms or use of rescue medications.  Since our last visit there have been no hospitalizations or ER visits for their respiratory issues and there does not seem to be anything to suggest unrecognized exacerbations.  I have reviewed the medical regimen and re-educated the pt on the role of rescue and controlling medications.  Inhaler technique and understanding seems adequate.  The patient reports no issues with any of there medications for their COPD.  Refills will be taken care of as needed.  All questions answered.  She has been otherwise stable except for recent illness.  She has not been vaccinated for Covid.    10/5/2022 - Here for follow p, no new issues or problems reported.  We reviewed her CT scans and she will need a follow up CT in 5/2023.  Pt is currently stable on present medications with no  recent increases in their symptoms or use of rescue medications.  Since our last visit there have been no hospitalizations or ER visits for their respiratory issues and there does not seem to be anything to suggest unrecognized exacerbations.  I have reviewed the medical regimen and re-educated the pt on the role of rescue and controlling medications.  Inhaler technique and understanding seems adequate.  The patient reports no issues with any of there medications for their COPD.  Refills will be taken care of as needed.  All questions answered.  Did have Covid in early summer but was not very sick.    5/24/2023 - Here for follow up, Pt is currently stable on present medications with no recent increases in their symptoms or use of rescue medications.  Since our last visit there have been no hospitalizations or ER visits for their respiratory issues and there does not seem to be anything to suggest unrecognized exacerbations.  I have reviewed the medical regimen and re-educated the pt on the role of rescue and controlling medications.  Inhaler technique and understanding seems adequate.  The patient reports no issues with any of there medications for their COPD.  Refills will be taken care of as needed.  All questions answered.  Having issues with her sinuses and she is seeing Dr Chisholm and they are considering surgery.  Reviewed last PFT with her.  She has a skin lesion on her leg and is to see dermatology (she has a FMHx of melanoma and a prior melanoma).       6/20/2023 - Here for results - reviewed CT and PET scans with pt and daughter and all questions answered - we will proceed with Ct guided needle biopsy.    7/18/2023 - Here for biopsy results - + adenocarcinoma.  D/W pt and  and discussed PET findings (also reviewed PET scan).  At this point she needs MRI and referral to oncology.  All questions answered.    8/24/2023 - Here for follow up and so far is tolerating her therapy pretty well.  Asked her if  "she needs any help at home and she is OK.  She is eating a lot and has gained some weight.  No weakness issues.    9/6/2023 - PFT (8/16/23)  Mild obstruction (FEV1 - - 76%), improved with BD  No restriction  Moderate decrease DLCO (41%)    11/28/2023 - Here for follow up, was hospitalized with PE and is on treatments.  Last Ct scan reviewed (see below).  She has completed XRT and initial chemo and is on immunotherapy.  She has noted some increased SOB, DAILY for " a while".  No other new issues.    12/28/2023 - Here for follow up, feels OK but still with some exertional SOB.  No f,c,ns, cough, sputum.  We reviewed CT scans from 11/2023 and 12/2023 and I think the later one actually is better.  Her immunotherapy was held this month.    2/8/2024 - Here for follow up, was sick last week and started on antibiotics and is feeling better but still with some cough.  No fevers.  Any sputum now is white.      10/17/2024 - Here for follow up, has completed radiation and chemotherapy, currently on immunotherapy (but about to finish).  Overall doing OK.  Here for follow up visit.  Patient is currently stable on present medications with no recent increases in their symptoms or use of rescue medications.  Since our last visit there have been no hospitalizations or ER visits for their respiratory issues and there does not seem to be anything to suggest unrecognized exacerbations.  I have reviewed the medical regimen and re-educated the pt on the role of rescue and controlling medications.  Inhaler technique and understanding seems adequate.  The patient reports no issues with any of there medications for their COPD.  Refills will be taken care of as needed.  All questions answered.  We have discussed potential future therapies or options as appropriate.  Most recent PET scan reviewed she had a biopsy of the lymph node which was benign.  Have discussed vaccines with patient including but not limited to the influenza vaccine, " "pneumonia vaccine, RSV vaccine, Shingles vaccine, tetanus vaccine and Covid vaccine.  We discussed the current recommendations and the patient's current status.  I have recommended vaccines as appropriate for the individual patient.  All questions have been answered.    2/27/2025 - Here for follow up, doing well, most recent Ct chest shows a 4 mm RLL nodule and that will be followed.  No other new issues reported.  Here for follow up visit.  Patient is currently stable on present medications with no recent increases in their symptoms or use of rescue medications.  Since our last visit there have been no hospitalizations or ER visits for their respiratory issues and there does not seem to be anything to suggest unrecognized exacerbations.  I have reviewed the medical regimen and re-educated the pt on the role of rescue and controlling medications.  Inhaler technique and understanding seems adequate.  The patient reports no issues with any of there medications for their COPD.  Refills will be taken care of as needed.  All questions answered.  We have discussed potential future therapies or options as appropriate.  She does have some exertional dyspnea.      COPD Flowsheet    GOLD A    Last PFT - 8/2019  FEV1- 74 % DLCO - 50 %     + LABA/LAMA    + prn ALBUTEROL    mMRC -  0 - SOB with strenuous exercise   - + 1 - SOB level ground, slight hill   -  2 - SOB walk slower or stop for breath level ground   -  3 - SOB at 100 yards or after few minutes   -  4 - SOB in house, dressing    Referral to PULMONARY REHABILITATION - NO    Tested for alpha-1-antitrypsin - NO  Result - na    Cigarette Counseling    Currently smoking 0 packs per day  40 pack years    Not vaping    I have counseled pt for 3-5 minutes regarding cigarette cessation.  This has included the need to stop smoking as well as strategies, including but not limited to "cold turkey", CHANTIX (including risks and benefits of whitney drug), nicotine replacement and " WELLBUTRIN.    Past Medical History    Past Medical History:   Diagnosis Date    Allergy     Anxiety     Arthritis     CAD (coronary artery disease)     coronary stents    Chronic back pain     lower back pain radiates to left leg    Chronic rhinitis     DAILY (dyspnea on exertion)     Hyperlipidemia     Hypertension     Lung cancer 07/01/2023    Melanoma in situ of left upper extremity     left thigh area    MI (myocardial infarction)     New onset type 2 diabetes mellitus 02/08/2024    Seasonal allergic rhinitis 10/29/2020       Past Surgical History    Past Surgical History:   Procedure Laterality Date    BREAST SURGERY      breast reduction    CATARACT EXTRACTION, BILATERAL      coranary stent      EXCISION OF MELANOMA Left 3/30/2022    Procedure: EXCISION, MELANOMA;  Surgeon: David Mcpherson III, MD;  Location: [facility] OR;  Service: General;  Laterality: Left;    EXCISIONAL BIOPSY Left 3/30/2022    Procedure: EXCISIONAL BIOPSY;  Surgeon: David Mcpherson III, MD;  Location: [facility] OR;  Service: General;  Laterality: Left;    HYSTERECTOMY      total    INSERTION OF TUNNELED CENTRAL VENOUS CATHETER (CVC) WITH SUBCUTANEOUS PORT N/A 8/4/2023    Procedure: [id number]-V-CATH;  Surgeon: Remi Mckeon Jr., MD;  Location: [facility] OR;  Service: General;  Laterality: N/A;    LEFT HEART CATHETERIZATION Left 10/16/2020    Procedure: Left heart cath;  Surgeon: Garrett Robins MD;  Location: [facility] CATH/EP LAB;  Service: Cardiology;  Laterality: Left;    OOPHORECTOMY      TOTAL REDUCTION MAMMOPLASTY Bilateral 2000       Medications      Current Outpatient Medications:     aspirin (ECOTRIN) 81 MG EC tablet, Take 1 tablet (81 mg total) by mouth once daily., Disp: 90 tablet, Rfl: 0    atorvastatin (LIPITOR) 80 MG tablet, TAKE 1 TABLET BY MOUTH EVERY EVENING, Disp: 90 tablet, Rfl: 1    calcium-vitamin D3 (OS-JOLIE 500 + D3) 500 mg-5 mcg (200 unit) per tablet, Take 1 tablet by mouth every evening., Disp: , Rfl:      citalopram (CELEXA) 40 MG tablet, Take 1 tablet (40 mg total) by mouth once daily., Disp: 30 tablet, Rfl: 11    ezetimibe (ZETIA) 10 mg tablet, Take 1 tablet (10 mg total) by mouth every evening., Disp: , Rfl:     ALPRAZolam (XANAX) 0.25 MG tablet, Take 1 tablet (0.25 mg total) by mouth 3 (three) times daily as needed for Anxiety. (Patient taking differently: Take 0.25 mg by mouth daily as needed for Anxiety.), Disp: 30 tablet, Rfl: 1    amoxicillin (AMOXIL) 500 MG capsule, Take 500 mg by mouth 2 (two) times daily. (Patient not taking: Reported on 2025), Disp: , Rfl:     amoxicillin-clavulanate 500-125mg (AUGMENTIN) 500-125 mg Tab, Take 1 tablet by mouth every 8 (eight) hours. (Patient not taking: Reported on 2025), Disp: , Rfl:     azithromycin (Z-ROYAL) 250 MG tablet, Take by mouth. (Patient not taking: Reported on 2025), Disp: , Rfl:     methylPREDNISolone (MEDROL DOSEPACK) 4 mg tablet, use as directed (Patient not taking: Reported on 2025), Disp: 21 each, Rfl: 0    predniSONE (DELTASONE) 20 MG tablet, Take 2 tablets (40 mg total) by mouth once daily. (Patient not taking: Reported on 2025), Disp: 8 tablet, Rfl: 0    Allergies    Review of patient's allergies indicates:   Allergen Reactions    Cephalexin      Other reaction(s): Rash    Doxycycline monohydrate Hives    Lanoxin  [digoxin]      Other reaction(s): Rash    Lansoprazole      Other reaction(s): Rash    Macrobid [nitrofurantoin monohyd/m-cryst] Rash       SocHx    Social History     Tobacco Use   Smoking Status Former    Current packs/day: 0.00    Average packs/day: 1 pack/day for 43.2 years (43.2 ttl pk-yrs)    Types: Cigarettes, Vaping with nicotine    Start date:     Quit date: 3/4/2017    Years since quittin.9   Smokeless Tobacco Never       Social History     Substance and Sexual Activity   Alcohol Use Not Currently    Alcohol/week: 0.0 standard drinks of alcohol    Comment: sometimes       Drug Use - no  Occupation -  housewife  Asbestos exposure - no  Pets - dogs    FMHx    Family History   Problem Relation Name Age of Onset    Cancer Mother          breast, ovarian, cervical     Heart disease Mother      Breast cancer Mother  50    Ovarian cancer Mother      Cancer Father          melanoma    Stroke Sister      Heart disease Sister      Cancer Sister          leukemia    Macular degeneration Sister      Heart disease Sister      Heart disease Brother      Lung cancer Brother      Cancer Maternal Uncle      Cancer Paternal Aunt          breast    Breast cancer Paternal Aunt  60    Cancer Paternal Uncle      Heart disease Maternal Grandmother      No Known Problems Daughter      No Known Problems Son           Review of Systems  Review of Systems   Constitutional:  Negative for chills, diaphoresis, fever, malaise/fatigue and weight loss.   HENT:  Positive for congestion and tinnitus. Negative for ear discharge, ear pain, hearing loss, nosebleeds, sinus pain and sore throat.         Has had tinnitus for years (has seen ENT)   Eyes:  Negative for pain.   Respiratory:  Positive for shortness of breath and wheezing. Negative for cough, hemoptysis, sputum production and stridor.    Cardiovascular:  Negative for chest pain, palpitations, orthopnea, claudication, leg swelling and PND.        H/o PCI   Gastrointestinal:  Negative for abdominal pain, blood in stool, constipation, diarrhea, heartburn, melena, nausea and vomiting.   Genitourinary:  Negative for dysuria, flank pain, frequency, hematuria and urgency.   Musculoskeletal:  Positive for back pain, joint pain and neck pain. Negative for falls and myalgias.        Right knee meniscal injury   Skin:  Negative for itching and rash.   Neurological:  Negative for dizziness, tingling, tremors, sensory change, speech change, focal weakness, seizures, weakness and headaches.   Endo/Heme/Allergies:  Negative for environmental allergies.   Psychiatric/Behavioral:  Negative for depression,  substance abuse and suicidal ideas. The patient is not nervous/anxious.        Physical Exam    Vitals:    02/27/25 1137   BP: 102/70   BP Location: Left arm   Patient Position: Sitting   Pulse: 92   SpO2: 98%   Weight: 68.9 kg (152 lb)       Physical Exam  Vitals and nursing note reviewed.   Constitutional:       General: She is not in acute distress.     Appearance: She is well-developed. She is ill-appearing. She is not toxic-appearing or diaphoretic.   HENT:      Head: Normocephalic and atraumatic.      Right Ear: External ear normal.      Left Ear: External ear normal.      Nose: Nose normal. No congestion or rhinorrhea.      Mouth/Throat:      Mouth: Mucous membranes are moist.      Pharynx: Oropharynx is clear. No oropharyngeal exudate.   Eyes:      General: No scleral icterus.        Right eye: No discharge.         Left eye: No discharge.      Extraocular Movements: Extraocular movements intact.      Conjunctiva/sclera: Conjunctivae normal.      Pupils: Pupils are equal, round, and reactive to light.   Neck:      Thyroid: No thyromegaly.      Vascular: No carotid bruit or JVD.      Trachea: No tracheal deviation.   Cardiovascular:      Rate and Rhythm: Normal rate and regular rhythm.      Heart sounds: Normal heart sounds. No murmur heard.     No friction rub. No gallop.   Pulmonary:      Effort: Pulmonary effort is normal. No respiratory distress.      Breath sounds: No stridor. Rales (left base) present. No wheezing or rhonchi.   Chest:      Chest wall: No tenderness.   Abdominal:      General: Bowel sounds are normal. There is no distension.      Palpations: Abdomen is soft.      Tenderness: There is no abdominal tenderness. There is no guarding.   Musculoskeletal:         General: No tenderness. Normal range of motion.      Cervical back: Normal range of motion and neck supple. No rigidity or tenderness.      Right lower leg: No edema.      Left lower leg: No edema.   Lymphadenopathy:      Cervical: No  cervical adenopathy.   Skin:     General: Skin is warm and dry.   Neurological:      General: No focal deficit present.      Mental Status: She is alert and oriented to person, place, and time. Mental status is at baseline.      Cranial Nerves: No cranial nerve deficit.      Motor: No weakness.   Psychiatric:         Mood and Affect: Mood normal.         Behavior: Behavior normal.         Thought Content: Thought content normal.         Judgment: Judgment normal.         Labs    Lab Results   Component Value Date    WBC 5.53 02/06/2025    HGB 12.7 02/06/2025    HCT 37.7 02/06/2025     02/06/2025       Sodium   Date Value Ref Range Status   02/06/2025 140 136 - 145 mmol/L Final     Potassium   Date Value Ref Range Status   02/06/2025 3.6 3.5 - 5.1 mmol/L Final     Chloride   Date Value Ref Range Status   02/06/2025 106 95 - 110 mmol/L Final     CO2   Date Value Ref Range Status   02/06/2025 28 23 - 29 mmol/L Final     Glucose   Date Value Ref Range Status   02/06/2025 78 70 - 110 mg/dL Final     BUN   Date Value Ref Range Status   02/06/2025 14 8 - 23 mg/dL Final     Creatinine   Date Value Ref Range Status   02/06/2025 0.8 0.5 - 1.4 mg/dL Final     Calcium   Date Value Ref Range Status   02/06/2025 9.3 8.7 - 10.5 mg/dL Final     Total Protein   Date Value Ref Range Status   02/06/2025 6.8 6.0 - 8.4 g/dL Final     Albumin   Date Value Ref Range Status   02/06/2025 4.1 3.5 - 5.2 g/dL Final     Total Bilirubin   Date Value Ref Range Status   02/06/2025 1.0 0.1 - 1.0 mg/dL Final     Comment:     For infants and newborns, interpretation of results should be based  on gestational age, weight and in agreement with clinical  observations.    Premature Infant recommended reference ranges:  Up to 24 hours.............<8.0 mg/dL  Up to 48 hours............<12.0 mg/dL  3-5 days..................<15.0 mg/dL  6-29 days.................<15.0 mg/dL       Alkaline Phosphatase   Date Value Ref Range Status   02/06/2025 83 55 -  135 U/L Final     AST   Date Value Ref Range Status   02/06/2025 19 10 - 40 U/L Final     ALT   Date Value Ref Range Status   02/06/2025 19 10 - 44 U/L Final     Anion Gap   Date Value Ref Range Status   02/06/2025 6 (L) 8 - 16 mmol/L Final       Xrays    CT chest (5/2022)  1.  No pulmonary nodules identified.  2.  Mild/moderate emphysematous lung disease.  3.  No consolidation or pleural effusion.     LUNG RADS CATEGORY 1: NEGATIVE    CT chest (11/10/23)  1. Nodular density in the posterior left lower lobe, essentially unchanged from the previous exam when accounting for technique.  2. Scattered patchy airspace opacities throughout the left lung, increased in size/distribution from the previous exam, the differential includes posttreatment/radiation change, atelectasis/scarring, infection/pneumonia, noting malignancy is not excluded and interval follow-up would be warranted.  3. Enlarging left cervical/supraclavicular lymphadenopathy.  4. Small wedge-shaped hypodensities in the spleen, in keeping with small splenic infarcts, similar in distribution the previous exam    CT chest (12/11/23)  There is no CT evidence of acute pulmonary thromboembolism. There are ill-defined groundglass opacities in the left upper lobe that are new since the most recent previous CT chest dated 11/10/2023, and are suspicious for pneumonia. Patchy areas of consolidation in the left lower lobe shown on the previous CT are somewhat improved. Significant residual atelectasis and/or scarring persists in the left lower lobe. Moderate emphysema is present.     PET (10/3/2024)  New enlarged, hypermetabolic left inguinal lymph node.  Metastatic disease is possible.  This would be amenable to ultrasound-guided core needle sampling.  Post treatment changes of known left lung malignancy with associated pleuroparenchymal scarring.  Low-level FDG activity associated with left hilar soft tissue density, for which continued attention on follow-up is  recommended.  Stable mediastinal lymph nodes, not FDG avid.  Cholelithiasis, atherosclerosis, diverticulosis, and other incidental findings as above.    CT chest (2/6/25)  1. No convincing evidence of recurrent neoplasm or metastatic disease in the chest, abdomen, or the pelvis.  2. Stable prominent but nonenlarged left femoral lymph node compared to prior PET CT, nonspecific.  3. A 4 mm right lower lobe pulmonary nodule, nonspecific.  Attention to this at follow-up imaging is recommended.  4. Additional observations as described.      Impression/Plan    Problem List Items Addressed This Visit          Pulmonary    Pulmonary emphysema - Primary  Continue present medications.  Will refill medications as needed.  Instructed patient to contact us with any issues concerning their medications (cost, reactions, etc.).  Have discussed with patient about inciting conditions which may exacerbate their disease.  We did discuss possible new therapies or de-escalation of therapy (if appropriate).  Asked patient if they were interested in pursuing pulmonary rehabilitation.  All questions answered  RTC 6 months  Patient instructed that they are to call if symptoms change or new issues develop prior to their next visit.  Prednisone taper to try and settle cough down                     Other    Personal history of tobacco use, presenting hazards to health   has quit      Adenocarcinoma lung  As above  Continue with followup                          Raad Abad MD

## 2025-03-09 ENCOUNTER — PATIENT MESSAGE (OUTPATIENT)
Dept: ADMINISTRATIVE | Facility: OTHER | Age: 70
End: 2025-03-09
Payer: MEDICARE

## 2025-03-26 ENCOUNTER — PATIENT OUTREACH (OUTPATIENT)
Dept: ADMINISTRATIVE | Facility: HOSPITAL | Age: 70
End: 2025-03-26
Payer: MEDICARE

## 2025-03-26 ENCOUNTER — PATIENT MESSAGE (OUTPATIENT)
Dept: ADMINISTRATIVE | Facility: HOSPITAL | Age: 70
End: 2025-03-26
Payer: MEDICARE

## 2025-03-26 DIAGNOSIS — Z78.0 MENOPAUSE: ICD-10-CM

## 2025-04-16 ENCOUNTER — PATIENT MESSAGE (OUTPATIENT)
Dept: CARDIOLOGY | Facility: CLINIC | Age: 70
End: 2025-04-16
Payer: MEDICARE

## 2025-04-21 ENCOUNTER — RESULTS FOLLOW-UP (OUTPATIENT)
Dept: FAMILY MEDICINE | Facility: CLINIC | Age: 70
End: 2025-04-21

## 2025-04-21 ENCOUNTER — HOSPITAL ENCOUNTER (OUTPATIENT)
Dept: RADIOLOGY | Facility: HOSPITAL | Age: 70
Discharge: HOME | End: 2025-04-21
Payer: MEDICARE

## 2025-04-21 DIAGNOSIS — Z78.0 MENOPAUSE: ICD-10-CM

## 2025-04-21 PROCEDURE — 77080 DXA BONE DENSITY AXIAL: CPT | Mod: TC,PO

## 2025-04-21 PROCEDURE — 77080 DXA BONE DENSITY AXIAL: CPT | Mod: 26,,, | Performed by: RADIOLOGY

## 2025-05-07 ENCOUNTER — HOSPITAL ENCOUNTER (OUTPATIENT)
Dept: RADIOLOGY | Facility: HOSPITAL | Age: 70
Discharge: HOME | End: 2025-05-07
Attending: INTERNAL MEDICINE
Payer: MEDICARE

## 2025-05-07 DIAGNOSIS — C34.32 MALIGNANT NEOPLASM OF LOWER LOBE OF LEFT LUNG: ICD-10-CM

## 2025-05-07 PROCEDURE — 71250 CT THORAX DX C-: CPT | Mod: 26,,, | Performed by: RADIOLOGY

## 2025-05-07 PROCEDURE — 71250 CT THORAX DX C-: CPT | Mod: TC,PO

## 2025-05-08 ENCOUNTER — OFFICE VISIT (OUTPATIENT)
Facility: CLINIC | Age: 70
End: 2025-05-08
Payer: MEDICARE

## 2025-05-08 ENCOUNTER — LAB VISIT (OUTPATIENT)
Dept: LAB | Facility: HOSPITAL | Age: 70
End: 2025-05-08
Attending: INTERNAL MEDICINE
Payer: MEDICARE

## 2025-05-08 VITALS
SYSTOLIC BLOOD PRESSURE: 136 MMHG | HEART RATE: 64 BPM | WEIGHT: 150.81 LBS | DIASTOLIC BLOOD PRESSURE: 78 MMHG | TEMPERATURE: 98 F | RESPIRATION RATE: 18 BRPM | BODY MASS INDEX: 26.71 KG/M2

## 2025-05-08 DIAGNOSIS — F41.9 ANXIETY: ICD-10-CM

## 2025-05-08 DIAGNOSIS — C34.90 METASTATIC LUNG CANCER (METASTASIS FROM LUNG TO OTHER SITE), UNSPECIFIED LATERALITY: ICD-10-CM

## 2025-05-08 DIAGNOSIS — C34.90 METASTATIC LUNG CANCER (METASTASIS FROM LUNG TO OTHER SITE), UNSPECIFIED LATERALITY: Primary | ICD-10-CM

## 2025-05-08 DIAGNOSIS — C34.32 MALIGNANT NEOPLASM OF LOWER LOBE OF LEFT LUNG: ICD-10-CM

## 2025-05-08 LAB
ABSOLUTE EOSINOPHIL (SMH): 0.16 K/UL
ABSOLUTE MONOCYTE (SMH): 0.54 K/UL (ref 0.3–1)
ABSOLUTE NEUTROPHIL COUNT (SMH): 2.6 K/UL (ref 1.8–7.7)
ALBUMIN SERPL-MCNC: 4.3 G/DL (ref 3.5–5.2)
ALP SERPL-CCNC: 78 UNIT/L (ref 55–135)
ALT SERPL-CCNC: 16 UNIT/L (ref 10–44)
ANION GAP (SMH): 9 MMOL/L (ref 8–16)
AST SERPL-CCNC: 17 UNIT/L (ref 10–40)
BASOPHILS # BLD AUTO: 0.03 K/UL
BASOPHILS NFR BLD AUTO: 0.6 %
BILIRUB SERPL-MCNC: 1.3 MG/DL (ref 0.1–1)
BUN SERPL-MCNC: 21 MG/DL (ref 8–23)
CALCIUM SERPL-MCNC: 9.6 MG/DL (ref 8.7–10.5)
CHLORIDE SERPL-SCNC: 106 MMOL/L (ref 95–110)
CO2 SERPL-SCNC: 26 MMOL/L (ref 23–29)
CREAT SERPL-MCNC: 0.7 MG/DL (ref 0.5–1.4)
ERYTHROCYTE [DISTWIDTH] IN BLOOD BY AUTOMATED COUNT: 11.6 % (ref 11.5–14.5)
GFR SERPLBLD CREATININE-BSD FMLA CKD-EPI: >60 ML/MIN/1.73/M2
GLUCOSE SERPL-MCNC: 115 MG/DL (ref 70–110)
HCT VFR BLD AUTO: 41.7 % (ref 37–48.5)
HGB BLD-MCNC: 14.1 GM/DL (ref 12–16)
IMM GRANULOCYTES # BLD AUTO: 0.01 K/UL (ref 0–0.04)
IMM GRANULOCYTES NFR BLD AUTO: 0.2 % (ref 0–0.5)
LYMPHOCYTES # BLD AUTO: 1.81 K/UL (ref 1–4.8)
MAGNESIUM SERPL-MCNC: 1.9 MG/DL (ref 1.6–2.6)
MCH RBC QN AUTO: 31.1 PG (ref 27–31)
MCHC RBC AUTO-ENTMCNC: 33.8 G/DL (ref 32–36)
MCV RBC AUTO: 92 FL (ref 82–98)
NUCLEATED RBC (/100WBC) (SMH): 0 /100 WBC
PLATELET # BLD AUTO: 241 K/UL (ref 150–450)
PMV BLD AUTO: 9.7 FL (ref 9.2–12.9)
POTASSIUM SERPL-SCNC: 4.4 MMOL/L (ref 3.5–5.1)
PROT SERPL-MCNC: 7.2 GM/DL (ref 6–8.4)
RBC # BLD AUTO: 4.54 M/UL (ref 4–5.4)
RELATIVE EOSINOPHIL (SMH): 3.1 % (ref 0–8)
RELATIVE LYMPHOCYTE (SMH): 35.4 % (ref 18–48)
RELATIVE MONOCYTE (SMH): 10.6 % (ref 4–15)
RELATIVE NEUTROPHIL (SMH): 50.1 % (ref 38–73)
SODIUM SERPL-SCNC: 141 MMOL/L (ref 136–145)
WBC # BLD AUTO: 5.11 K/UL (ref 3.9–12.7)

## 2025-05-08 PROCEDURE — 36415 COLL VENOUS BLD VENIPUNCTURE: CPT

## 2025-05-08 PROCEDURE — 80053 COMPREHEN METABOLIC PANEL: CPT

## 2025-05-08 PROCEDURE — 99213 OFFICE O/P EST LOW 20 MIN: CPT | Mod: PBBFAC,PN | Performed by: INTERNAL MEDICINE

## 2025-05-08 PROCEDURE — 85025 COMPLETE CBC W/AUTO DIFF WBC: CPT

## 2025-05-08 PROCEDURE — 83735 ASSAY OF MAGNESIUM: CPT

## 2025-05-08 PROCEDURE — 99999 PR PBB SHADOW E&M-EST. PATIENT-LVL III: CPT | Mod: PBBFAC,,, | Performed by: INTERNAL MEDICINE

## 2025-05-08 RX ORDER — ALPRAZOLAM 0.25 MG/1
0.25 TABLET ORAL 3 TIMES DAILY PRN
Qty: 30 TABLET | Refills: 1 | Status: SHIPPED | OUTPATIENT
Start: 2025-05-08 | End: 2025-06-07

## 2025-05-08 NOTE — PROGRESS NOTES
PROGRESS NOTE    Subjective:       Patient ID: Aysha Moore is a 69 y.o. female.    6/2/2023:  Screening CT chest:  2.6 x 2.5 x 3.2cm mass in the posterior LLL     6/20/2023:  PET scan:  35 x 21 mm lobulated pulmonary mass in the posterior left lower lobe with SUV max 11.6      FDG avid lymphadenopathy is present with index nodes outlined below:  -21 x 11 mm AP window node with SUV max 12.1 (image 103)  -21 x 12 mm posterior left hilar node with SUV max 13.7 (image 109)  -16 x 13 mm left hilar node with SUV max 14 (image 1:15)  -10 x 10 mm subcarinal node with SUV max 6.3 (image 111)     7/12/2023-Biopsy of LLL:  LEFT LOWER LOBE OF LUNG, CORE BIOPSIES:   - LUNG ADENOCARCINOMA WITH PLEOMORPHIC FEATURES.   PDL1/TPS: 95%  ALK/BRAF/EGFR/KRAS/RET/ROS1/MET NEGATIVE     7/21/2023:  MRI brain: no mets.    Carbo/Taxol/XRT:  8/8/2023-9/12/2023     Atezolizumab x 17  10/23/2023--10/28/2024    9/30/2023-CTA Chest:  1. Segmental right upper lobe pulmonary embolus.  2. Cavitating and spiculated posterior left lower lobe lung mass, decreased slightly in size compared to the prior chest CT exam.  3. Upper lobe predominant centrilobular and subpleural emphysema, with coarse mixed interstitial and alveolar infiltrate along the mediastinal border left upper lobe suggesting reactive or infectious pneumonitis, and with mild posterior bilateral upper lobe groundglass infiltrates.    10/4/2023  Admitted for 3 days with fever to 101, new Dx of PE and splenic infarcts. Echo normal. Started on Xarelto and abx.     9/3/2023-CT abd/p:  IMPRESSION: There are hypodensities within the spleen concerning for splenic infarcts. These can be associated with endocarditis. The patient also has known pulmonary emboli. Transesophageal echocardiogram may be warranted.    10/3/2023-  TTE normal    11/10/2023-CT CAP  IMPRESSION:  1. Nodular density in the posterior left lower lobe, essentially  unchanged from the previous exam when accounting for technique.     2. Scattered patchy airspace opacities throughout the left lung, increased in size/distribution from the previous exam, the differential includes posttreatment/radiation change, atelectasis/scarring, infection/pneumonia, noting malignancy is not excluded and interval follow-up would be warranted.     3. Enlarging left cervical/supraclavicular lymphadenopathy.     4. Small wedge-shaped hypodensities in the spleen, in keeping with small splenic infarcts, similar in distribution the previous exam.    12/11/2023-CTA Chest  No PE-see report, ? Pneumonia    1/17/2023-CT chest:  1. Left upper lobe paramediastinal mass is stable due to radiation exposure chest with evidence of an prominent. Mediastinal scarring.  2. Advanced emphysematous changes along with evidence of abnormality scarring the left are probably proteinaceous bullous cavitary fibrotic focus and fibrotic changes in the left lower lobe is stable.  3. Small spiculated nodule in the right lower lobe 9 x 8 mm stable. Recommend follow-up within 6 months.     4/9/2024-CT CAP w/con  Improvement--see report    10/3/2024-PET:  New inguinal LN on left-hypermetabolic  Low-level activity in left hilar lesion  Stable MS LNs.     10/11/2023-Left inguinal node biopsy negative    2/6/2025-CT CAP:  Negative for malignancy--see report    5/8/2025-CT chest:  No change    Chief Complaint:  No chief complaint on file.  Stage IIII lung cancer, pulmonary embolism, splenic infarct follow up    History of Present Illness:   Aysha Moore is a 69 y.o. female who presents for follow up of lung cancer.     Ms. Moore is doing ok today.  No new complaints.  No sob, chest pain.  No abdominal pain.      History of PE 9/2023  Xarelto given 9/2023-5/2024      Family and Social history reviewed and is unchanged from 7/27/2023      ROS:  Review of Systems   Constitutional:  Negative for appetite change, fever and  unexpected weight change.   HENT:  Negative for mouth sores.    Eyes:  Negative for visual disturbance.   Respiratory:  Negative for cough and shortness of breath.    Cardiovascular:  Negative for chest pain and leg swelling.   Gastrointestinal:  Negative for abdominal pain, blood in stool and diarrhea.   Genitourinary:  Negative for frequency and hematuria.   Musculoskeletal:  Negative for back pain.   Skin:  Negative for rash.   Neurological:  Negative for headaches.   Hematological:  Negative for adenopathy.   Psychiatric/Behavioral:  The patient is not nervous/anxious.           Current Outpatient Medications:     ALPRAZolam (XANAX) 0.25 MG tablet, Take 1 tablet (0.25 mg total) by mouth 3 (three) times daily as needed for Anxiety., Disp: 30 tablet, Rfl: 1    aspirin (ECOTRIN) 81 MG EC tablet, Take 1 tablet (81 mg total) by mouth once daily., Disp: 90 tablet, Rfl: 0    atorvastatin (LIPITOR) 80 MG tablet, TAKE 1 TABLET BY MOUTH EVERY EVENING, Disp: 90 tablet, Rfl: 1    calcium-vitamin D3 (OS-JOLIE 500 + D3) 500 mg-5 mcg (200 unit) per tablet, Take 1 tablet by mouth every evening., Disp: , Rfl:     citalopram (CELEXA) 40 MG tablet, Take 1 tablet (40 mg total) by mouth once daily., Disp: 30 tablet, Rfl: 11    ezetimibe (ZETIA) 10 mg tablet, Take 1 tablet (10 mg total) by mouth every evening., Disp: , Rfl:         Objective:       Physical Examination:     /78   Pulse 64   Temp 98.3 °F (36.8 °C)   Resp 18   Wt 68.4 kg (150 lb 12.8 oz)   BMI 26.71 kg/m²     Physical Exam  Constitutional:       Appearance: Normal appearance.   HENT:      Head: Normocephalic and atraumatic.   Eyes:      General: No scleral icterus.     Conjunctiva/sclera: Conjunctivae normal.   Cardiovascular:      Rate and Rhythm: Normal rate.   Pulmonary:      Effort: Pulmonary effort is normal.   Abdominal:      General: Abdomen is flat.   Neurological:      General: No focal deficit present.      Mental Status: She is alert and oriented to  person, place, and time.   Psychiatric:         Mood and Affect: Mood normal.         Behavior: Behavior normal.         Thought Content: Thought content normal.         Judgment: Judgment normal.         Labs:   No results found for this or any previous visit (from the past 2 weeks).          CMP  Sodium   Date Value Ref Range Status   02/06/2025 140 136 - 145 mmol/L Final     Potassium   Date Value Ref Range Status   02/06/2025 3.6 3.5 - 5.1 mmol/L Final     Chloride   Date Value Ref Range Status   02/06/2025 106 95 - 110 mmol/L Final     CO2   Date Value Ref Range Status   02/06/2025 28 23 - 29 mmol/L Final     Glucose   Date Value Ref Range Status   02/06/2025 78 70 - 110 mg/dL Final     BUN   Date Value Ref Range Status   02/06/2025 14 8 - 23 mg/dL Final     Creatinine   Date Value Ref Range Status   02/06/2025 0.8 0.5 - 1.4 mg/dL Final     Calcium   Date Value Ref Range Status   02/06/2025 9.3 8.7 - 10.5 mg/dL Final     Total Protein   Date Value Ref Range Status   02/06/2025 6.8 6.0 - 8.4 g/dL Final     Albumin   Date Value Ref Range Status   02/06/2025 4.1 3.5 - 5.2 g/dL Final     Total Bilirubin   Date Value Ref Range Status   02/06/2025 1.0 0.1 - 1.0 mg/dL Final     Comment:     For infants and newborns, interpretation of results should be based  on gestational age, weight and in agreement with clinical  observations.    Premature Infant recommended reference ranges:  Up to 24 hours.............<8.0 mg/dL  Up to 48 hours............<12.0 mg/dL  3-5 days..................<15.0 mg/dL  6-29 days.................<15.0 mg/dL       Alkaline Phosphatase   Date Value Ref Range Status   02/06/2025 83 55 - 135 U/L Final     AST   Date Value Ref Range Status   02/06/2025 19 10 - 40 U/L Final     ALT   Date Value Ref Range Status   02/06/2025 19 10 - 44 U/L Final     Anion Gap   Date Value Ref Range Status   02/06/2025 6 (L) 8 - 16 mmol/L Final     eGFR if    Date Value Ref Range Status   03/29/2022  ">60.0 >60 mL/min/1.73 m^2 Final     eGFR if non    Date Value Ref Range Status   03/29/2022 >60.0 >60 mL/min/1.73 m^2 Final     Comment:     Calculation used to obtain the estimated glomerular filtration  rate (eGFR) is the CKD-EPI equation.        No results found for: "CEA"  No results found for: "PSA"        Assessment/Plan:     Problem List Items Addressed This Visit       Anxiety    Relevant Medications    ALPRAZolam (XANAX) 0.25 MG tablet    Metastatic lung cancer (metastasis from lung to other site), unspecified laterality - Primary    Patient is doing well and the CT scan shows no changes.  No increased size of the right lung nodule which is 4mm.  Will plan next CT scan in 6 months and will have her follow up with me then.           Relevant Orders    CBC Auto Differential    Comprehensive Metabolic Panel    CT Chest Without Contrast               Discussion:     Follow up in about 6 months (around 11/8/2025).      Electronically signed by Raad Bee      "

## 2025-05-08 NOTE — ASSESSMENT & PLAN NOTE
Patient is doing well and the CT scan shows no changes.  No increased size of the right lung nodule which is 4mm.  Will plan next CT scan in 6 months and will have her follow up with me then.     yes

## 2025-05-13 ENCOUNTER — LAB VISIT (OUTPATIENT)
Dept: LAB | Facility: HOSPITAL | Age: 70
End: 2025-05-13
Payer: MEDICARE

## 2025-05-13 ENCOUNTER — OFFICE VISIT (OUTPATIENT)
Dept: FAMILY MEDICINE | Facility: CLINIC | Age: 70
End: 2025-05-13
Payer: MEDICARE

## 2025-05-13 VITALS
HEIGHT: 63 IN | HEART RATE: 82 BPM | BODY MASS INDEX: 26.68 KG/M2 | SYSTOLIC BLOOD PRESSURE: 128 MMHG | RESPIRATION RATE: 18 BRPM | OXYGEN SATURATION: 97 % | DIASTOLIC BLOOD PRESSURE: 68 MMHG | WEIGHT: 150.56 LBS

## 2025-05-13 DIAGNOSIS — T38.0X5A STEROID-INDUCED HYPERGLYCEMIA: ICD-10-CM

## 2025-05-13 DIAGNOSIS — G62.0 CHEMOTHERAPY-INDUCED NEUROPATHY: ICD-10-CM

## 2025-05-13 DIAGNOSIS — T45.1X5A CHEMOTHERAPY-INDUCED NEUROPATHY: ICD-10-CM

## 2025-05-13 DIAGNOSIS — R73.9 STEROID-INDUCED HYPERGLYCEMIA: ICD-10-CM

## 2025-05-13 DIAGNOSIS — E78.5 HYPERLIPIDEMIA, UNSPECIFIED HYPERLIPIDEMIA TYPE: ICD-10-CM

## 2025-05-13 DIAGNOSIS — Z76.89 ENCOUNTER TO ESTABLISH CARE: Primary | ICD-10-CM

## 2025-05-13 DIAGNOSIS — I50.42 CHRONIC COMBINED SYSTOLIC AND DIASTOLIC HEART FAILURE: ICD-10-CM

## 2025-05-13 PROBLEM — E11.9 NEW ONSET TYPE 2 DIABETES MELLITUS: Status: RESOLVED | Noted: 2024-02-08 | Resolved: 2025-05-13

## 2025-05-13 LAB
ALBUMIN SERPL BCP-MCNC: 3.9 G/DL (ref 3.5–5.2)
ALP SERPL-CCNC: 84 UNIT/L (ref 40–150)
ALT SERPL W/O P-5'-P-CCNC: 18 UNIT/L (ref 10–44)
ANION GAP (OHS): 10 MMOL/L (ref 8–16)
AST SERPL-CCNC: 20 UNIT/L (ref 11–45)
BILIRUB SERPL-MCNC: 1.6 MG/DL (ref 0.1–1)
BUN SERPL-MCNC: 14 MG/DL (ref 8–23)
CALCIUM SERPL-MCNC: 9.1 MG/DL (ref 8.7–10.5)
CHLORIDE SERPL-SCNC: 107 MMOL/L (ref 95–110)
CHOLEST SERPL-MCNC: 151 MG/DL (ref 120–199)
CHOLEST/HDLC SERPL: 3.7 {RATIO} (ref 2–5)
CO2 SERPL-SCNC: 23 MMOL/L (ref 23–29)
CREAT SERPL-MCNC: 0.7 MG/DL (ref 0.5–1.4)
EAG (OHS): 111 MG/DL (ref 68–131)
GFR SERPLBLD CREATININE-BSD FMLA CKD-EPI: >60 ML/MIN/1.73/M2
GLUCOSE SERPL-MCNC: 104 MG/DL (ref 70–110)
HBA1C MFR BLD: 5.5 % (ref 4–5.6)
HDLC SERPL-MCNC: 41 MG/DL (ref 40–75)
HDLC SERPL: 27.2 % (ref 20–50)
LDLC SERPL CALC-MCNC: 83.2 MG/DL (ref 63–159)
NONHDLC SERPL-MCNC: 110 MG/DL
POTASSIUM SERPL-SCNC: 4.2 MMOL/L (ref 3.5–5.1)
PROT SERPL-MCNC: 7.1 GM/DL (ref 6–8.4)
SODIUM SERPL-SCNC: 140 MMOL/L (ref 136–145)
TRIGL SERPL-MCNC: 134 MG/DL (ref 30–150)
TSH SERPL-ACNC: 0.44 UIU/ML (ref 0.4–4)

## 2025-05-13 PROCEDURE — 36415 COLL VENOUS BLD VENIPUNCTURE: CPT | Mod: PO

## 2025-05-13 PROCEDURE — 99214 OFFICE O/P EST MOD 30 MIN: CPT | Mod: PBBFAC,PO

## 2025-05-13 PROCEDURE — 99999 PR PBB SHADOW E&M-EST. PATIENT-LVL IV: CPT | Mod: PBBFAC,,,

## 2025-05-13 PROCEDURE — 85025 COMPLETE CBC W/AUTO DIFF WBC: CPT

## 2025-05-13 PROCEDURE — 80053 COMPREHEN METABOLIC PANEL: CPT

## 2025-05-13 PROCEDURE — 84443 ASSAY THYROID STIM HORMONE: CPT

## 2025-05-13 PROCEDURE — 83036 HEMOGLOBIN GLYCOSYLATED A1C: CPT

## 2025-05-13 PROCEDURE — 80061 LIPID PANEL: CPT

## 2025-05-13 RX ORDER — GABAPENTIN 100 MG/1
100 CAPSULE ORAL 3 TIMES DAILY PRN
Qty: 90 CAPSULE | Refills: 11 | Status: SHIPPED | OUTPATIENT
Start: 2025-05-13 | End: 2026-05-13

## 2025-05-13 NOTE — PROGRESS NOTES
Subjective:       Patient ID: Aysha Moore is a 69 y.o. female.    Chief Complaint: Annual Exam    HPI    History of Present Illness     CHIEF COMPLAINT:  Patient presents today for annual exam with concerns about hand symptoms    MUSCULOSKELETAL:  She reports hand pain when making a fist with associated weakness, resulting in occasionally dropping items. She denies pain at rest. She experiences knee pain upon standing after sleeping in a lateral position, requiring time for pain to subside before mobilization.    ONCOLOGY:  She has completed chemotherapy and immunotherapy treatments. Recent scans showed stable condition with follow-up scans scheduled every six months.    DERMATOLOGIC:  She reports cutaneous symptoms including crusty lesions around eyes and peeling inside ears. She has facial involvement as well. Prescribed ointments are effective when used twice daily, though she reports difficulty with consistent adherence. Her eye symptoms have improved but occasional crusting persists.    CARDIOVASCULAR:  She has a history of heart failure with blood clot. She reports an episode with cold sweats prior to being admitted for her heart condition. She takes nitroglycerin as needed.    ALLERGIES:  She reports allergies to Ceflex, doxycycline, and Macrobid. She tolerates Augmentin well.    ENT:  She reports tinnitus.    MENTAL HEALTH:  She reports decreased activity level due to feeling down and experiencing difficulty with motivation to initiate physical activity.      ROS:  General: positive diminished activity  Eyes: positive eye discharge  ENT: positive tinnitus  Musculoskeletal: positive joint pain, positive limb pain, positive pain with movement, positive difficulty standing up  Neurological: positive numbness          Past Medical History:   Diagnosis Date    Allergy     Anxiety     Arthritis     CAD (coronary artery disease)     coronary stents    Chronic back pain     lower back pain radiates to left leg  "   Chronic rhinitis     DAILY (dyspnea on exertion)     Hyperlipidemia     Hypertension     Lung cancer 07/01/2023    Melanoma in situ of left upper extremity     left thigh area    MI (myocardial infarction)     New onset type 2 diabetes mellitus 02/08/2024    Seasonal allergic rhinitis 10/29/2020       Review of patient's allergies indicates:   Allergen Reactions    Cephalexin      Other reaction(s): Rash    Doxycycline monohydrate Hives    Lanoxin  [digoxin]      Other reaction(s): Rash    Lansoprazole      Other reaction(s): Rash    Macrobid [nitrofurantoin monohyd/m-cryst] Rash       Current Medications[1]    Review of Systems    Objective:      /68 (BP Location: Right arm, Patient Position: Sitting)   Pulse 82   Resp 18   Ht 5' 3" (1.6 m)   Wt 68.3 kg (150 lb 9.2 oz)   SpO2 97%   BMI 26.67 kg/m²   Physical Exam  Vitals reviewed.   Constitutional:       General: She is not in acute distress.     Appearance: Normal appearance. She is normal weight. She is not ill-appearing, toxic-appearing or diaphoretic.   HENT:      Head: Normocephalic.      Right Ear: External ear normal.      Left Ear: External ear normal.      Nose: Nose normal. No congestion or rhinorrhea.      Mouth/Throat:      Mouth: Mucous membranes are moist.      Pharynx: Oropharynx is clear.   Eyes:      General: No scleral icterus.        Right eye: No discharge.         Left eye: No discharge.      Extraocular Movements: Extraocular movements intact.      Conjunctiva/sclera: Conjunctivae normal.   Cardiovascular:      Rate and Rhythm: Normal rate and regular rhythm.      Pulses: Normal pulses.      Heart sounds: Normal heart sounds. No murmur heard.     No friction rub. No gallop.   Pulmonary:      Effort: Pulmonary effort is normal. No respiratory distress.      Breath sounds: Normal breath sounds. No wheezing, rhonchi or rales.   Chest:      Chest wall: No tenderness.   Abdominal:      Palpations: Abdomen is soft.      Tenderness: " There is no abdominal tenderness.   Musculoskeletal:         General: No swelling, tenderness or deformity. Normal range of motion.      Cervical back: Normal range of motion.      Right lower leg: No edema.      Left lower leg: No edema.   Skin:     General: Skin is warm and dry.      Capillary Refill: Capillary refill takes less than 2 seconds.      Coloration: Skin is not jaundiced.      Findings: Rash present. No bruising, erythema or lesion.   Neurological:      Mental Status: She is alert and oriented to person, place, and time.      Gait: Gait normal.   Psychiatric:         Mood and Affect: Mood normal.         Behavior: Behavior normal.         Thought Content: Thought content normal.         Judgment: Judgment normal.             Assessment:       1. Encounter to establish care    2. Steroid-induced hyperglycemia    3. Hyperlipidemia, unspecified hyperlipidemia type    4. Chronic combined systolic and diastolic heart failure    5. Chemotherapy-induced neuropathy          Assessment & Plan    IMPRESSION:  - Assessed symptoms of hand numbness, tingling, and pain, likely related to chemo-induced neuropathy.  - Noted stable cancer status based on recent scans following completion of chemotherapy and immunotherapy.  - Noted current stability and asymptomatic status of heart failure.  - Monitored weight, noting current status as not concerning but requiring continued observation.  - Continued current management of HLD and heart failure.    PLAN SUMMARY:  - Initiated gabapentin for nerve pain symptoms, up to 3 times daily as needed  - Ordered glucose test to recheck levels  - Continue statin and ezetimibe for hyperlipidemia  - Patient to request dermatology medication refills through [facility] from Dr. Rehman  - Ordered lab work for annual exam  - Recommend increasing physical activity to improve overall health and mobility  - Continue following with oncology for cancer treatment and follow-up  - Follow-up scans  planned every 6 months  - Annual follow-up with current provider    NEW ONSET TYPE 2 DIABETES MELLITUS:  - Discussed patient's belief that diabetes diagnosis was steroid-induced and related to elevated glucose levels during chemotherapy and radiation treatments.  - Will monitor weight, which is not concerning at present but requires attention if it increases significantly.  - Ordered glucose test to recheck levels.    CHRONIC COMBINED SYSTOLIC AND DIASTOLIC HEART FAILURE:  - Monitored chronic heart failure, which is stable and currently asymptomatic.  - Patient to continue statin and ezetimibe for hyperlipidemia.    CHEMOTHERAPY-INDUCED POLYNEUROPATHY:  - Explained that chemotherapy and radiation can cause inflammation of nervous tissues, leading to various symptoms including chemotherapy-induced neuropathy as a common side effect.  - Patient reports numbness, tingling, and soreness in hands (especially when making a fist), dropping objects, weakness in hands, and pain in legs/knees when standing up after sleeping on the side.  - After evaluation, determined chemotherapy-induced neuropathy is the likely cause.  - Initiated gabapentin at a low dose, to be taken as needed up to 3 times daily for nerve pain symptoms.    DERMATITIS:  - Patient reports eyes getting crusty and ears peeling inside.  - Discussed the patient's use of 2 dermatological ointments, which are effective when applied strictly twice daily.  - Instructed the patient to contact the office to request dermatology medication refills through [facility] from Dr. Rehman.    TINNITUS:  - Documented the patient reports experiencing tinnitus.    ABNORMAL GLUCOSE:  - Informed the patient about the relationship between steroid use during cancer treatment and elevated glucose levels.  - Noted the patient's glucose levels were previously elevated, possibly due to steroid use during chemotherapy.    HISTORY OF CANCER AND CURRENT TREATMENT:  - Patient has completed  chemotherapy and immunotherapy, with recent scans showing stable condition.  - Follow-up scans are planned every 6 months.  - Advised the patient to continue following with oncology for cancer treatment and follow-up.  - Patient is currently asymptomatic regarding her chronic systolic and diastolic heart failure.    ALLERGIES:  - Documented the patient has allergies to cephalexin, doxycycline, and nitrofurantoin, with amoxicillin-clavulanate being the only effective antibiotic.    FOLLOW-UP AND GENERAL RECOMMENDATIONS:  - Recommend increasing physical activity to improve key [place] health and mobility.  - Ordered lab work for annual exam.  - Follow up annually.          Plan:       Encounter to establish care    Steroid-induced hyperglycemia  -     Comprehensive Metabolic Panel; Future; Expected date: 05/13/2025  -     Hemoglobin A1C; Future; Expected date: 05/13/2025    Hyperlipidemia, unspecified hyperlipidemia type  -     Comprehensive Metabolic Panel; Future; Expected date: 05/13/2025  -     Lipid Panel; Future; Expected date: 05/13/2025  -     TSH; Future; Expected date: 05/13/2025  -     CBC Auto Differential; Future; Expected date: 05/13/2025    Chronic combined systolic and diastolic heart failure    Chemotherapy-induced neuropathy  -     gabapentin (NEURONTIN) 100 MG capsule; Take 1 capsule (100 mg total) by mouth 3 (three) times daily as needed (nerve pain).  Dispense: 90 capsule; Refill: 11                   Dru Gaxiola PA-C  Family Medicine Physician Assistant       Future Appointments       Date Provider Specialty Appt Notes    8/28/2025 Raad Abad MD Pulmonology follow up 6 months    11/10/2025 Raad Hankins MD Hematology and Oncology FU AFTER CT SCAN,    5/15/2026 Dru Gaxiola PA-C Family Medicine annual exam               I spent a total of 20 minutes on the day of the visit.This includes face to face time and non-face to face time preparing to see the patient (eg, review  of tests), obtaining and/or reviewing separately obtained history, documenting clinical information in the electronic or other health record, independently interpreting results and communicating results to the patient/family/caregiver, or care coordinator.      We have addressed [4] Moderate: 1 or more chronic illnesses with exacerbation, progression, or side effects of treatment / 2 or more stable chronic illnesses / 1 undiagnosed new problem with uncertain prognosis / 1 acute illness with systemic symptoms / 1 acute complicated injury  The complexity of the data reviewed and analyzed for this visit was [3] Limited (Reviewed prior external note, ordered unique testing or reviewed the results of each unique test)   The risk of complications and/or morbidity or mortality are [4] Moderate risk (I.e. prescription drug management / decision regarding minor surgery with identified pt or procedure risk factors / decision regarding elective major surgery without identified pt or procedure risk factors / diagnosis or treatment significantly limited by social determinants of health)   The level of Medical Decision Making for this visit is [4] Moderate     This note may have been generated with the assistance of ambient listening technology. If used, verbal consent was obtained by the patient and accompanying visitor(s) for the recording of patient appointment to facilitate this note. I attest to having reviewed and edited the generated note for accuracy, though some syntax or spelling errors may persist. Please contact the author of this note for any clarification.         [1]   Current Outpatient Medications:     ALPRAZolam (XANAX) 0.25 MG tablet, Take 1 tablet (0.25 mg total) by mouth 3 (three) times daily as needed for Anxiety., Disp: 30 tablet, Rfl: 1    atorvastatin (LIPITOR) 80 MG tablet, TAKE 1 TABLET BY MOUTH EVERY EVENING, Disp: 90 tablet, Rfl: 1    calcium-vitamin D3 (OS-JOLIE 500 + D3) 500 mg-5 mcg (200 unit) per  tablet, Take 1 tablet by mouth every evening., Disp: , Rfl:     citalopram (CELEXA) 40 MG tablet, Take 1 tablet (40 mg total) by mouth once daily., Disp: 30 tablet, Rfl: 11    ezetimibe (ZETIA) 10 mg tablet, Take 1 tablet (10 mg total) by mouth every evening., Disp: , Rfl:     aspirin (ECOTRIN) 81 MG EC tablet, Take 1 tablet (81 mg total) by mouth once daily. (Patient not taking: Reported on 5/13/2025), Disp: 90 tablet, Rfl: 0    gabapentin (NEURONTIN) 100 MG capsule, Take 1 capsule (100 mg total) by mouth 3 (three) times daily as needed (nerve pain)., Disp: 90 capsule, Rfl: 11

## 2025-05-14 ENCOUNTER — RESULTS FOLLOW-UP (OUTPATIENT)
Dept: FAMILY MEDICINE | Facility: CLINIC | Age: 70
End: 2025-05-14

## 2025-05-14 LAB
ABSOLUTE EOSINOPHIL (OHS): 0.13 K/UL
ABSOLUTE MONOCYTE (OHS): 0.58 K/UL (ref 0.3–1)
ABSOLUTE NEUTROPHIL COUNT (OHS): 3.03 K/UL (ref 1.8–7.7)
BASOPHILS # BLD AUTO: 0.03 K/UL
BASOPHILS NFR BLD AUTO: 0.6 %
ERYTHROCYTE [DISTWIDTH] IN BLOOD BY AUTOMATED COUNT: 11.8 % (ref 11.5–14.5)
HCT VFR BLD AUTO: 40.8 % (ref 37–48.5)
HGB BLD-MCNC: 13.4 GM/DL (ref 12–16)
IMM GRANULOCYTES # BLD AUTO: 0.01 K/UL (ref 0–0.04)
IMM GRANULOCYTES NFR BLD AUTO: 0.2 % (ref 0–0.5)
LYMPHOCYTES # BLD AUTO: 1.51 K/UL (ref 1–4.8)
MCH RBC QN AUTO: 30.4 PG (ref 27–31)
MCHC RBC AUTO-ENTMCNC: 32.8 G/DL (ref 32–36)
MCV RBC AUTO: 93 FL (ref 82–98)
NUCLEATED RBC (/100WBC) (OHS): 0 /100 WBC
PLATELET # BLD AUTO: 239 K/UL (ref 150–450)
PMV BLD AUTO: 10.5 FL (ref 9.2–12.9)
RBC # BLD AUTO: 4.41 M/UL (ref 4–5.4)
RELATIVE EOSINOPHIL (OHS): 2.5 %
RELATIVE LYMPHOCYTE (OHS): 28.5 % (ref 18–48)
RELATIVE MONOCYTE (OHS): 11 % (ref 4–15)
RELATIVE NEUTROPHIL (OHS): 57.2 % (ref 38–73)
WBC # BLD AUTO: 5.29 K/UL (ref 3.9–12.7)

## 2025-07-22 ENCOUNTER — PATIENT MESSAGE (OUTPATIENT)
Facility: CLINIC | Age: 70
End: 2025-07-22
Payer: MEDICARE

## 2025-07-30 ENCOUNTER — INFUSION (OUTPATIENT)
Dept: INFUSION THERAPY | Facility: HOSPITAL | Age: 70
End: 2025-07-30
Attending: INTERNAL MEDICINE
Payer: MEDICARE

## 2025-07-30 VITALS
OXYGEN SATURATION: 95 % | TEMPERATURE: 99 F | BODY MASS INDEX: 26.22 KG/M2 | SYSTOLIC BLOOD PRESSURE: 123 MMHG | RESPIRATION RATE: 17 BRPM | HEART RATE: 97 BPM | WEIGHT: 148 LBS | HEIGHT: 63 IN | DIASTOLIC BLOOD PRESSURE: 84 MMHG

## 2025-07-30 DIAGNOSIS — C34.32 MALIGNANT NEOPLASM OF LOWER LOBE OF LEFT LUNG: Primary | ICD-10-CM

## 2025-07-30 DIAGNOSIS — C34.90 METASTATIC LUNG CANCER (METASTASIS FROM LUNG TO OTHER SITE), UNSPECIFIED LATERALITY: ICD-10-CM

## 2025-07-30 PROCEDURE — A4216 STERILE WATER/SALINE, 10 ML: HCPCS | Performed by: INTERNAL MEDICINE

## 2025-07-30 PROCEDURE — 96523 IRRIG DRUG DELIVERY DEVICE: CPT

## 2025-07-30 PROCEDURE — 63600175 PHARM REV CODE 636 W HCPCS: Performed by: INTERNAL MEDICINE

## 2025-07-30 PROCEDURE — 25000003 PHARM REV CODE 250: Performed by: INTERNAL MEDICINE

## 2025-07-30 RX ORDER — HEPARIN 100 UNIT/ML
500 SYRINGE INTRAVENOUS
OUTPATIENT
Start: 2025-07-30

## 2025-07-30 RX ORDER — SODIUM CHLORIDE 0.9 % (FLUSH) 0.9 %
10 SYRINGE (ML) INJECTION
Status: DISCONTINUED | OUTPATIENT
Start: 2025-07-30 | End: 2025-07-30 | Stop reason: HOSPADM

## 2025-07-30 RX ORDER — SODIUM CHLORIDE 0.9 % (FLUSH) 0.9 %
10 SYRINGE (ML) INJECTION
OUTPATIENT
Start: 2025-07-30

## 2025-07-30 RX ORDER — HEPARIN 100 UNIT/ML
500 SYRINGE INTRAVENOUS
Status: DISCONTINUED | OUTPATIENT
Start: 2025-07-30 | End: 2025-07-30 | Stop reason: HOSPADM

## 2025-07-30 RX ADMIN — SODIUM CHLORIDE, PRESERVATIVE FREE 10 ML: 5 INJECTION INTRAVENOUS at 11:07

## 2025-07-30 RX ADMIN — HEPARIN 500 UNITS: 100 SYRINGE at 11:07

## 2025-09-04 ENCOUNTER — PATIENT OUTREACH (OUTPATIENT)
Dept: ADMINISTRATIVE | Facility: HOSPITAL | Age: 70
End: 2025-09-04
Payer: MEDICARE

## (undated) DEVICE — GUIDEWIRE DOUBLE ENDED .035 DIA. 150CML

## (undated) DEVICE — NEEDLE SAFETY ECLIPSE 25G 1-1/2IN 305767

## (undated) DEVICE — TIP BOVIE TEFLON E1450X

## (undated) DEVICE — BAG DECANTER 10-102

## (undated) DEVICE — CATHETER DIAGNOSTIC DXTERITY 5FR IMA

## (undated) DEVICE — SUTURE MONOCRYL 4-0 27 SH MCP415H

## (undated) DEVICE — PAD BOVIE ADULT

## (undated) DEVICE — VALVE BLEEDBACK CONTROL 1003330

## (undated) DEVICE — DRAPE C-ARM (FITS NEW C-ARM) 07-CA108

## (undated) DEVICE — DRESSING POST OP MEPILEX AG 4X6  498300

## (undated) DEVICE — BLADE SCALPEL #11 371111

## (undated) DEVICE — INTRODUCER PRELUDE ACT .038INX6FRX11CM

## (undated) DEVICE — SOLUTION IRRI NS BOTTLE 1000ML R5200-01

## (undated) DEVICE — SUTURE ETHILON 2-0 PS 18 585H

## (undated) DEVICE — SYRINGE 5ML 309646

## (undated) DEVICE — PREP CHLORA 10.5ML ORANGE

## (undated) DEVICE — TRAY MINOR SLIDELL MEMORIAL HOSPITAL

## (undated) DEVICE — SUTURE MONOCRYL 4-0 PS-2 27 MCP426H

## (undated) DEVICE — STERISTRIP 1/4 SKIN CLOSURE

## (undated) DEVICE — CATHETER BALLOON EUPHORA 2.50X20MM

## (undated) DEVICE — SWABSTICK BENZOIN S42450

## (undated) DEVICE — CATHETER DIAGNOSTIC DXTERITY 6FR NTR

## (undated) DEVICE — DRAPE T THYROID W/ARMBOARD COVER

## (undated) DEVICE — SUTURE ETHILON 2-0 PSLX 30 1697H

## (undated) DEVICE — SUTURE SILK 2-0 SH 18 CR C012D

## (undated) DEVICE — DRESSING POST OP MEPILEX AG 4X8

## (undated) DEVICE — KIT INFLATION MERIT (IN8152, HP9200E)

## (undated) DEVICE — COVER LIGHT HANDLE LB53

## (undated) DEVICE — GUIDEWIRE J TIP BMW .014X190

## (undated) DEVICE — CATHETER EXPO MLTPK 6FR 100X110CM

## (undated) DEVICE — CATHETER DIAGNOSTIC 4FR JR4

## (undated) DEVICE — GLOVE BIOGEL PI ULTRA TOUCH G GRAY SZ 7

## (undated) DEVICE — DRESSING TELFA 2X3  1961

## (undated) DEVICE — SUTURE VICRYL 3-0 18 VCP774D

## (undated) DEVICE — ADHESIVE DERMABOND SKIN DNX12

## (undated) DEVICE — DRAPE LAPAROTOMY TRANSVERSE 89281

## (undated) DEVICE — GLOVE BIOGEL PI  GOLD SZ 7

## (undated) DEVICE — TRAY GENERAL SURGERY

## (undated) DEVICE — DRAPE NAVIGATOR GAMMA PROBE 098004

## (undated) DEVICE — SUTURE VICRYL 3-0 SH 27 VCP416H

## (undated) DEVICE — CATHETER GUIDE LAUNCHER EBU3.5 6X110

## (undated) DEVICE — DRESSING MEPORE 2.5 X 3   670800